# Patient Record
Sex: MALE | Race: WHITE | Employment: OTHER | URBAN - METROPOLITAN AREA
[De-identification: names, ages, dates, MRNs, and addresses within clinical notes are randomized per-mention and may not be internally consistent; named-entity substitution may affect disease eponyms.]

---

## 2020-09-26 ENCOUNTER — APPOINTMENT (OUTPATIENT)
Dept: GENERAL RADIOLOGY | Age: 75
DRG: 300 | End: 2020-09-26
Payer: COMMERCIAL

## 2020-09-26 ENCOUNTER — APPOINTMENT (OUTPATIENT)
Dept: CT IMAGING | Age: 75
DRG: 300 | End: 2020-09-26
Payer: COMMERCIAL

## 2020-09-26 ENCOUNTER — HOSPITAL ENCOUNTER (INPATIENT)
Age: 75
LOS: 3 days | Discharge: HOME | DRG: 300 | End: 2020-09-29
Attending: EMERGENCY MEDICINE | Admitting: INTERNAL MEDICINE
Payer: COMMERCIAL

## 2020-09-26 PROBLEM — I83.029 VENOUS ULCER OF LEFT LEG (HCC): Status: ACTIVE | Noted: 2020-09-26

## 2020-09-26 PROBLEM — L97.929 VENOUS ULCER OF LEFT LEG (HCC): Status: ACTIVE | Noted: 2020-09-26

## 2020-09-26 LAB
ABO/RH: NORMAL
ALBUMIN SERPL-MCNC: 3.2 GM/DL (ref 3.4–5)
ALP BLD-CCNC: 88 IU/L (ref 40–128)
ALT SERPL-CCNC: 10 U/L (ref 10–40)
ANION GAP SERPL CALCULATED.3IONS-SCNC: 23 MMOL/L (ref 4–16)
ANTIBODY SCREEN: NEGATIVE
APTT: 16.6 SECONDS (ref 25.1–37.1)
AST SERPL-CCNC: 14 IU/L (ref 15–37)
BACTERIA: NEGATIVE /HPF
BASE EXCESS: 3 (ref 0–3.3)
BASOPHILS ABSOLUTE: 0.1 K/CU MM
BASOPHILS RELATIVE PERCENT: 0.9 % (ref 0–1)
BILIRUB SERPL-MCNC: 0.5 MG/DL (ref 0–1)
BILIRUBIN URINE: NEGATIVE MG/DL
BLOOD, URINE: NEGATIVE
BUN BLDV-MCNC: 20 MG/DL (ref 6–23)
CALCIUM SERPL-MCNC: 8.3 MG/DL (ref 8.3–10.6)
CARBON MONOXIDE, BLOOD: 2 % (ref 0–5)
CHLORIDE BLD-SCNC: 100 MMOL/L (ref 99–110)
CLARITY: CLEAR
CO2 CONTENT: 22.1 MMOL/L (ref 19–24)
CO2: 15 MMOL/L (ref 21–32)
COLOR: YELLOW
COMMENT: ABNORMAL
CREAT SERPL-MCNC: 1 MG/DL (ref 0.9–1.3)
DIFFERENTIAL TYPE: ABNORMAL
EOSINOPHILS ABSOLUTE: 0.8 K/CU MM
EOSINOPHILS RELATIVE PERCENT: 6.5 % (ref 0–3)
ERYTHROCYTE SEDIMENTATION RATE: 24 MM/HR (ref 0–20)
GFR AFRICAN AMERICAN: >60 ML/MIN/1.73M2
GFR NON-AFRICAN AMERICAN: >60 ML/MIN/1.73M2
GLUCOSE BLD-MCNC: 241 MG/DL (ref 70–99)
GLUCOSE BLD-MCNC: 248 MG/DL (ref 70–99)
GLUCOSE BLD-MCNC: 257 MG/DL (ref 70–99)
GLUCOSE, URINE: >500 MG/DL
HCO3 ARTERIAL: 21.1 MMOL/L (ref 18–23)
HCT VFR BLD CALC: 35.2 % (ref 42–52)
HEMOGLOBIN: 9.8 GM/DL (ref 13.5–18)
HIGH SENSITIVE C-REACTIVE PROTEIN: 18.4 MG/L
IMMATURE NEUTROPHIL %: 1.6 % (ref 0–0.43)
INR BLD: 1.1 INDEX
KETONES, URINE: NEGATIVE MG/DL
LACTATE: 11 MMOL/L (ref 0.4–2)
LEUKOCYTE ESTERASE, URINE: NEGATIVE
LYMPHOCYTES ABSOLUTE: 5.2 K/CU MM
LYMPHOCYTES RELATIVE PERCENT: 40.2 % (ref 24–44)
MAGNESIUM: 2.2 MG/DL (ref 1.8–2.4)
MCH RBC QN AUTO: 25.5 PG (ref 27–31)
MCHC RBC AUTO-ENTMCNC: 27.8 % (ref 32–36)
MCV RBC AUTO: 91.4 FL (ref 78–100)
METHEMOGLOBIN ARTERIAL: 1.6 %
MONOCYTES ABSOLUTE: 1.2 K/CU MM
MONOCYTES RELATIVE PERCENT: 9 % (ref 0–4)
MUCUS: ABNORMAL HPF
NITRITE URINE, QUANTITATIVE: NEGATIVE
NUCLEATED RBC %: 0 %
O2 SATURATION: 95.7 % (ref 96–97)
PCO2 ARTERIAL: 34 MMHG (ref 32–45)
PDW BLD-RTO: 14.5 % (ref 11.7–14.9)
PH BLOOD: 7.4 (ref 7.34–7.45)
PH, URINE: 5 (ref 5–8)
PLATELET # BLD: 295 K/CU MM (ref 140–440)
PMV BLD AUTO: 10.1 FL (ref 7.5–11.1)
PO2 ARTERIAL: 87 MMHG (ref 75–100)
POTASSIUM SERPL-SCNC: 3 MMOL/L (ref 3.5–5.1)
PRO-BNP: 450.1 PG/ML
PROTEIN UA: NEGATIVE MG/DL
PROTHROMBIN TIME: 13.3 SECONDS (ref 11.7–14.5)
RBC # BLD: 3.85 M/CU MM (ref 4.6–6.2)
RBC URINE: ABNORMAL /HPF (ref 0–3)
SEGMENTED NEUTROPHILS ABSOLUTE COUNT: 5.4 K/CU MM
SEGMENTED NEUTROPHILS RELATIVE PERCENT: 41.8 % (ref 36–66)
SODIUM BLD-SCNC: 138 MMOL/L (ref 135–145)
SPECIFIC GRAVITY UA: 1.04 (ref 1–1.03)
TOTAL CK: 83 IU/L (ref 38–174)
TOTAL IMMATURE NEUTOROPHIL: 0.21 K/CU MM
TOTAL NUCLEATED RBC: 0 K/CU MM
TOTAL PROTEIN: 5.8 GM/DL (ref 6.4–8.2)
TRICHOMONAS: ABNORMAL /HPF
TROPONIN T: <0.01 NG/ML
TSH HIGH SENSITIVITY: 4.56 UIU/ML (ref 0.27–4.2)
UROBILINOGEN, URINE: NORMAL MG/DL (ref 0.2–1)
WBC # BLD: 13 K/CU MM (ref 4–10.5)
WBC UA: <1 /HPF (ref 0–2)

## 2020-09-26 PROCEDURE — 6360000002 HC RX W HCPCS: Performed by: PHYSICIAN ASSISTANT

## 2020-09-26 PROCEDURE — 84484 ASSAY OF TROPONIN QUANT: CPT

## 2020-09-26 PROCEDURE — 36600 WITHDRAWAL OF ARTERIAL BLOOD: CPT

## 2020-09-26 PROCEDURE — 73706 CT ANGIO LWR EXTR W/O&W/DYE: CPT

## 2020-09-26 PROCEDURE — 6360000002 HC RX W HCPCS: Performed by: EMERGENCY MEDICINE

## 2020-09-26 PROCEDURE — 73610 X-RAY EXAM OF ANKLE: CPT

## 2020-09-26 PROCEDURE — 85610 PROTHROMBIN TIME: CPT

## 2020-09-26 PROCEDURE — 82550 ASSAY OF CK (CPK): CPT

## 2020-09-26 PROCEDURE — 96368 THER/DIAG CONCURRENT INF: CPT

## 2020-09-26 PROCEDURE — 6370000000 HC RX 637 (ALT 250 FOR IP): Performed by: PHYSICIAN ASSISTANT

## 2020-09-26 PROCEDURE — 74174 CTA ABD&PLVS W/CONTRAST: CPT

## 2020-09-26 PROCEDURE — 86141 C-REACTIVE PROTEIN HS: CPT

## 2020-09-26 PROCEDURE — 85652 RBC SED RATE AUTOMATED: CPT

## 2020-09-26 PROCEDURE — 80053 COMPREHEN METABOLIC PANEL: CPT

## 2020-09-26 PROCEDURE — 82803 BLOOD GASES ANY COMBINATION: CPT

## 2020-09-26 PROCEDURE — 87077 CULTURE AEROBIC IDENTIFY: CPT

## 2020-09-26 PROCEDURE — 96366 THER/PROPH/DIAG IV INF ADDON: CPT

## 2020-09-26 PROCEDURE — 82962 GLUCOSE BLOOD TEST: CPT

## 2020-09-26 PROCEDURE — 96367 TX/PROPH/DG ADDL SEQ IV INF: CPT

## 2020-09-26 PROCEDURE — 2140000000 HC CCU INTERMEDIATE R&B

## 2020-09-26 PROCEDURE — 86900 BLOOD TYPING SEROLOGIC ABO: CPT

## 2020-09-26 PROCEDURE — 87186 SC STD MICRODIL/AGAR DIL: CPT

## 2020-09-26 PROCEDURE — 87040 BLOOD CULTURE FOR BACTERIA: CPT

## 2020-09-26 PROCEDURE — 71045 X-RAY EXAM CHEST 1 VIEW: CPT

## 2020-09-26 PROCEDURE — 86850 RBC ANTIBODY SCREEN: CPT

## 2020-09-26 PROCEDURE — 87086 URINE CULTURE/COLONY COUNT: CPT

## 2020-09-26 PROCEDURE — 83880 ASSAY OF NATRIURETIC PEPTIDE: CPT

## 2020-09-26 PROCEDURE — 85730 THROMBOPLASTIN TIME PARTIAL: CPT

## 2020-09-26 PROCEDURE — 87147 CULTURE TYPE IMMUNOLOGIC: CPT

## 2020-09-26 PROCEDURE — 73590 X-RAY EXAM OF LOWER LEG: CPT

## 2020-09-26 PROCEDURE — 83735 ASSAY OF MAGNESIUM: CPT

## 2020-09-26 PROCEDURE — 70450 CT HEAD/BRAIN W/O DYE: CPT

## 2020-09-26 PROCEDURE — 87070 CULTURE OTHR SPECIMN AEROBIC: CPT

## 2020-09-26 PROCEDURE — 87075 CULTR BACTERIA EXCEPT BLOOD: CPT

## 2020-09-26 PROCEDURE — 6360000004 HC RX CONTRAST MEDICATION: Performed by: EMERGENCY MEDICINE

## 2020-09-26 PROCEDURE — 85025 COMPLETE CBC W/AUTO DIFF WBC: CPT

## 2020-09-26 PROCEDURE — 96365 THER/PROPH/DIAG IV INF INIT: CPT

## 2020-09-26 PROCEDURE — 2580000003 HC RX 258: Performed by: EMERGENCY MEDICINE

## 2020-09-26 PROCEDURE — G0378 HOSPITAL OBSERVATION PER HR: HCPCS

## 2020-09-26 PROCEDURE — 87076 CULTURE ANAEROBE IDENT EACH: CPT

## 2020-09-26 PROCEDURE — 81001 URINALYSIS AUTO W/SCOPE: CPT

## 2020-09-26 PROCEDURE — 96375 TX/PRO/DX INJ NEW DRUG ADDON: CPT

## 2020-09-26 PROCEDURE — 86901 BLOOD TYPING SEROLOGIC RH(D): CPT

## 2020-09-26 PROCEDURE — 70460 CT HEAD/BRAIN W/DYE: CPT

## 2020-09-26 PROCEDURE — 99285 EMERGENCY DEPT VISIT HI MDM: CPT

## 2020-09-26 PROCEDURE — 93005 ELECTROCARDIOGRAM TRACING: CPT | Performed by: EMERGENCY MEDICINE

## 2020-09-26 PROCEDURE — 84443 ASSAY THYROID STIM HORMONE: CPT

## 2020-09-26 PROCEDURE — 83605 ASSAY OF LACTIC ACID: CPT

## 2020-09-26 RX ORDER — POTASSIUM CHLORIDE 7.45 MG/ML
10 INJECTION INTRAVENOUS PRN
Status: DISCONTINUED | OUTPATIENT
Start: 2020-09-26 | End: 2020-09-29 | Stop reason: HOSPADM

## 2020-09-26 RX ORDER — DEXTROSE MONOHYDRATE 25 G/50ML
12.5 INJECTION, SOLUTION INTRAVENOUS PRN
Status: DISCONTINUED | OUTPATIENT
Start: 2020-09-26 | End: 2020-09-29 | Stop reason: HOSPADM

## 2020-09-26 RX ORDER — 0.9 % SODIUM CHLORIDE 0.9 %
1000 INTRAVENOUS SOLUTION INTRAVENOUS ONCE
Status: COMPLETED | OUTPATIENT
Start: 2020-09-26 | End: 2020-09-26

## 2020-09-26 RX ORDER — POLYETHYLENE GLYCOL 3350 17 G/17G
17 POWDER, FOR SOLUTION ORAL DAILY PRN
Status: DISCONTINUED | OUTPATIENT
Start: 2020-09-26 | End: 2020-09-29 | Stop reason: HOSPADM

## 2020-09-26 RX ORDER — ACETAMINOPHEN 325 MG/1
650 TABLET ORAL EVERY 6 HOURS PRN
Status: DISCONTINUED | OUTPATIENT
Start: 2020-09-26 | End: 2020-09-29 | Stop reason: HOSPADM

## 2020-09-26 RX ORDER — PROMETHAZINE HYDROCHLORIDE 25 MG/1
12.5 TABLET ORAL EVERY 6 HOURS PRN
Status: DISCONTINUED | OUTPATIENT
Start: 2020-09-26 | End: 2020-09-29 | Stop reason: HOSPADM

## 2020-09-26 RX ORDER — SODIUM CHLORIDE 0.9 % (FLUSH) 0.9 %
10 SYRINGE (ML) INJECTION EVERY 12 HOURS SCHEDULED
Status: DISCONTINUED | OUTPATIENT
Start: 2020-09-27 | End: 2020-09-29 | Stop reason: HOSPADM

## 2020-09-26 RX ORDER — NICOTINE POLACRILEX 4 MG
15 LOZENGE BUCCAL PRN
Status: DISCONTINUED | OUTPATIENT
Start: 2020-09-26 | End: 2020-09-29 | Stop reason: HOSPADM

## 2020-09-26 RX ORDER — DEXTROSE MONOHYDRATE 50 MG/ML
100 INJECTION, SOLUTION INTRAVENOUS PRN
Status: DISCONTINUED | OUTPATIENT
Start: 2020-09-26 | End: 2020-09-29 | Stop reason: HOSPADM

## 2020-09-26 RX ORDER — SODIUM CHLORIDE 0.9 % (FLUSH) 0.9 %
10 SYRINGE (ML) INJECTION PRN
Status: DISCONTINUED | OUTPATIENT
Start: 2020-09-26 | End: 2020-09-29 | Stop reason: HOSPADM

## 2020-09-26 RX ORDER — SODIUM CHLORIDE 9 MG/ML
INJECTION, SOLUTION INTRAVENOUS CONTINUOUS
Status: ACTIVE | OUTPATIENT
Start: 2020-09-27 | End: 2020-09-27

## 2020-09-26 RX ORDER — ACETAMINOPHEN 650 MG/1
650 SUPPOSITORY RECTAL EVERY 6 HOURS PRN
Status: DISCONTINUED | OUTPATIENT
Start: 2020-09-26 | End: 2020-09-29 | Stop reason: HOSPADM

## 2020-09-26 RX ORDER — POTASSIUM CHLORIDE 7.45 MG/ML
20 INJECTION INTRAVENOUS ONCE
Status: COMPLETED | OUTPATIENT
Start: 2020-09-26 | End: 2020-09-26

## 2020-09-26 RX ORDER — POTASSIUM CHLORIDE 20 MEQ/1
40 TABLET, EXTENDED RELEASE ORAL PRN
Status: DISCONTINUED | OUTPATIENT
Start: 2020-09-26 | End: 2020-09-29 | Stop reason: HOSPADM

## 2020-09-26 RX ORDER — ONDANSETRON 2 MG/ML
4 INJECTION INTRAMUSCULAR; INTRAVENOUS EVERY 6 HOURS PRN
Status: DISCONTINUED | OUTPATIENT
Start: 2020-09-26 | End: 2020-09-29 | Stop reason: HOSPADM

## 2020-09-26 RX ORDER — POTASSIUM CHLORIDE 20 MEQ/1
40 TABLET, EXTENDED RELEASE ORAL ONCE
Status: COMPLETED | OUTPATIENT
Start: 2020-09-26 | End: 2020-09-26

## 2020-09-26 RX ADMIN — SODIUM CHLORIDE, PRESERVATIVE FREE 10 ML: 5 INJECTION INTRAVENOUS at 20:26

## 2020-09-26 RX ADMIN — IOPAMIDOL 75 ML: 755 INJECTION, SOLUTION INTRAVENOUS at 20:29

## 2020-09-26 RX ADMIN — SODIUM CHLORIDE 1000 ML: 9 INJECTION, SOLUTION INTRAVENOUS at 19:29

## 2020-09-26 RX ADMIN — LEVETIRACETAM 1000 MG: 100 INJECTION, SOLUTION INTRAVENOUS at 19:55

## 2020-09-26 RX ADMIN — SODIUM CHLORIDE 1000 ML: 9 INJECTION, SOLUTION INTRAVENOUS at 18:38

## 2020-09-26 RX ADMIN — IOPAMIDOL 75 ML: 755 INJECTION, SOLUTION INTRAVENOUS at 20:26

## 2020-09-26 RX ADMIN — CEFEPIME HYDROCHLORIDE 2 G: 2 INJECTION, POWDER, FOR SOLUTION INTRAVENOUS at 19:24

## 2020-09-26 RX ADMIN — POTASSIUM CHLORIDE 20 MEQ: 7.46 INJECTION, SOLUTION INTRAVENOUS at 20:21

## 2020-09-26 RX ADMIN — SODIUM CHLORIDE 1000 ML: 9 INJECTION, SOLUTION INTRAVENOUS at 19:25

## 2020-09-26 RX ADMIN — VANCOMYCIN HYDROCHLORIDE 1500 MG: 5 INJECTION, POWDER, LYOPHILIZED, FOR SOLUTION INTRAVENOUS at 20:00

## 2020-09-26 RX ADMIN — POTASSIUM CHLORIDE 40 MEQ: 1500 TABLET, EXTENDED RELEASE ORAL at 20:23

## 2020-09-26 ASSESSMENT — PAIN SCALES - GENERAL
PAINLEVEL_OUTOF10: 0
PAINLEVEL_OUTOF10: 7

## 2020-09-26 NOTE — ED PROVIDER NOTES
imaging performed. While patient was here patient sat up on the bedside commode and had a syncopal episode versus seizure episode. Patient did have a couple seconds of CPR given he did return to spontaneous circulation. Patient has multiple lab abnormalities including leukocytosis hypokalemia lactic acid again given fluids antibiotics potassium also given Keppra. Imaging of his head chest abdomen pelvis and leg showed no signs of ischemia no other acute Tessa Putty just chronic ulcer. Unclear etiology of patient's symptoms he could have had vasovagal syncope while using the restroom or hypoxia episode but no PE no dissection no ischemia patient admitted to hospital medicine for further observation. Total critical care time today provided was 20 minutes. This excludes seperately billable procedure. Critical care time provided for reviewing labs, images, giving fluids, antibiotics, keppra, potassium, consulting medicine that required close evaluation and/or intervention with concern for patient decompensation. 12 lead EKG per my interpretation #1:  Sinus Tachycardia 123  Axis is   Normal  QTc is  460  There is no specific T wave changes appreciated. There is no specific ST wave changes appreciated. Prior EKG to compare with was not available     12 lead EKG per my interpretation #2:  Sinus Tachycardia 120  Axis is   Normal  QTc is  472  There is no specific T wave changes appreciated. There is no specific ST wave changes appreciated. Prior EKG to compare with was not available     All diagnostic, treatment, and disposition decisions were made by myself in conjunction with the Advanced Practice Provider. For all further details of the patient's emergency department visit, please see the Advanced Practice Provider's documentation.         Maribel Tariq DO  09/26/20 [id number]

## 2020-09-26 NOTE — ED NOTES
Bed: 03TR-03  Expected date:   Expected time:   Means of arrival:   Comments:  brendon Wick RN  09/26/20 9264

## 2020-09-27 ENCOUNTER — APPOINTMENT (OUTPATIENT)
Dept: ULTRASOUND IMAGING | Age: 75
DRG: 300 | End: 2020-09-27
Payer: COMMERCIAL

## 2020-09-27 LAB
ANION GAP SERPL CALCULATED.3IONS-SCNC: 8 MMOL/L (ref 4–16)
ANION GAP SERPL CALCULATED.3IONS-SCNC: 8 MMOL/L (ref 4–16)
BASOPHILS ABSOLUTE: 0 K/CU MM
BASOPHILS ABSOLUTE: 0 K/CU MM
BASOPHILS ABSOLUTE: 0.1 K/CU MM
BASOPHILS RELATIVE PERCENT: 0.2 % (ref 0–1)
BASOPHILS RELATIVE PERCENT: 0.2 % (ref 0–1)
BASOPHILS RELATIVE PERCENT: 0.5 % (ref 0–1)
BUN BLDV-MCNC: 15 MG/DL (ref 6–23)
BUN BLDV-MCNC: 18 MG/DL (ref 6–23)
CALCIUM SERPL-MCNC: 7.6 MG/DL (ref 8.3–10.6)
CALCIUM SERPL-MCNC: 7.7 MG/DL (ref 8.3–10.6)
CHLORIDE BLD-SCNC: 106 MMOL/L (ref 99–110)
CHLORIDE BLD-SCNC: 108 MMOL/L (ref 99–110)
CHOLESTEROL, FASTING: 80 MG/DL
CO2: 22 MMOL/L (ref 21–32)
CO2: 24 MMOL/L (ref 21–32)
CREAT SERPL-MCNC: 0.8 MG/DL (ref 0.9–1.3)
CREAT SERPL-MCNC: 0.8 MG/DL (ref 0.9–1.3)
DIFFERENTIAL TYPE: ABNORMAL
EKG ATRIAL RATE: 120 BPM
EKG ATRIAL RATE: 123 BPM
EKG DIAGNOSIS: NORMAL
EKG DIAGNOSIS: NORMAL
EKG P AXIS: 38 DEGREES
EKG P AXIS: 50 DEGREES
EKG P-R INTERVAL: 120 MS
EKG P-R INTERVAL: 152 MS
EKG Q-T INTERVAL: 322 MS
EKG Q-T INTERVAL: 334 MS
EKG QRS DURATION: 82 MS
EKG QRS DURATION: 86 MS
EKG QTC CALCULATION (BAZETT): 460 MS
EKG QTC CALCULATION (BAZETT): 472 MS
EKG R AXIS: 23 DEGREES
EKG R AXIS: 24 DEGREES
EKG T AXIS: 64 DEGREES
EKG T AXIS: 71 DEGREES
EKG VENTRICULAR RATE: 120 BPM
EKG VENTRICULAR RATE: 123 BPM
EOSINOPHILS ABSOLUTE: 0 K/CU MM
EOSINOPHILS ABSOLUTE: 0.1 K/CU MM
EOSINOPHILS ABSOLUTE: 0.1 K/CU MM
EOSINOPHILS RELATIVE PERCENT: 0.1 % (ref 0–3)
EOSINOPHILS RELATIVE PERCENT: 0.6 % (ref 0–3)
EOSINOPHILS RELATIVE PERCENT: 1 % (ref 0–3)
ESTIMATED AVERAGE GLUCOSE: 114 MG/DL
FERRITIN: 24 NG/ML (ref 30–400)
FOLATE: 10.9 NG/ML (ref 3.1–17.5)
GFR AFRICAN AMERICAN: >60 ML/MIN/1.73M2
GFR AFRICAN AMERICAN: >60 ML/MIN/1.73M2
GFR NON-AFRICAN AMERICAN: >60 ML/MIN/1.73M2
GFR NON-AFRICAN AMERICAN: >60 ML/MIN/1.73M2
GLUCOSE BLD-MCNC: 106 MG/DL (ref 70–99)
GLUCOSE BLD-MCNC: 107 MG/DL (ref 70–99)
GLUCOSE BLD-MCNC: 110 MG/DL (ref 70–99)
GLUCOSE BLD-MCNC: 135 MG/DL (ref 70–99)
GLUCOSE BLD-MCNC: 143 MG/DL (ref 70–99)
GLUCOSE BLD-MCNC: 213 MG/DL (ref 70–99)
HBA1C MFR BLD: 5.6 % (ref 4.2–6.3)
HCT VFR BLD CALC: 25 % (ref 42–52)
HCT VFR BLD CALC: 25.3 % (ref 42–52)
HCT VFR BLD CALC: 26.1 % (ref 42–52)
HDLC SERPL-MCNC: 39 MG/DL
HEMOGLOBIN: 7.5 GM/DL (ref 13.5–18)
HEMOGLOBIN: 7.6 GM/DL (ref 13.5–18)
HEMOGLOBIN: 7.7 GM/DL (ref 13.5–18)
IMMATURE NEUTROPHIL %: 0.4 % (ref 0–0.43)
IMMATURE NEUTROPHIL %: 0.5 % (ref 0–0.43)
IMMATURE NEUTROPHIL %: 0.5 % (ref 0–0.43)
IRON: 17 UG/DL (ref 59–158)
LACTATE: 1.2 MMOL/L (ref 0.4–2)
LACTATE: 3.2 MMOL/L (ref 0.4–2)
LDL CHOLESTEROL DIRECT: 31 MG/DL
LYMPHOCYTES ABSOLUTE: 0.9 K/CU MM
LYMPHOCYTES ABSOLUTE: 1.5 K/CU MM
LYMPHOCYTES ABSOLUTE: 1.6 K/CU MM
LYMPHOCYTES RELATIVE PERCENT: 11.5 % (ref 24–44)
LYMPHOCYTES RELATIVE PERCENT: 15.5 % (ref 24–44)
LYMPHOCYTES RELATIVE PERCENT: 6.9 % (ref 24–44)
MCH RBC QN AUTO: 26.1 PG (ref 27–31)
MCH RBC QN AUTO: 26.3 PG (ref 27–31)
MCH RBC QN AUTO: 26.8 PG (ref 27–31)
MCHC RBC AUTO-ENTMCNC: 29.5 % (ref 32–36)
MCHC RBC AUTO-ENTMCNC: 30 % (ref 32–36)
MCHC RBC AUTO-ENTMCNC: 30 % (ref 32–36)
MCV RBC AUTO: 87.7 FL (ref 78–100)
MCV RBC AUTO: 88.5 FL (ref 78–100)
MCV RBC AUTO: 89.1 FL (ref 78–100)
MONOCYTES ABSOLUTE: 0.7 K/CU MM
MONOCYTES ABSOLUTE: 0.8 K/CU MM
MONOCYTES ABSOLUTE: 0.9 K/CU MM
MONOCYTES RELATIVE PERCENT: 6 % (ref 0–4)
MONOCYTES RELATIVE PERCENT: 6.5 % (ref 0–4)
MONOCYTES RELATIVE PERCENT: 6.8 % (ref 0–4)
NUCLEATED RBC %: 0 %
PCT TRANSFERRIN: 6 % (ref 10–44)
PDW BLD-RTO: 14.6 % (ref 11.7–14.9)
PDW BLD-RTO: 14.7 % (ref 11.7–14.9)
PDW BLD-RTO: 14.8 % (ref 11.7–14.9)
PLATELET # BLD: 175 K/CU MM (ref 140–440)
PLATELET # BLD: 184 K/CU MM (ref 140–440)
PLATELET # BLD: 189 K/CU MM (ref 140–440)
PMV BLD AUTO: 9.4 FL (ref 7.5–11.1)
PMV BLD AUTO: 9.6 FL (ref 7.5–11.1)
PMV BLD AUTO: 9.9 FL (ref 7.5–11.1)
POTASSIUM SERPL-SCNC: 4.1 MMOL/L (ref 3.5–5.1)
POTASSIUM SERPL-SCNC: 4.4 MMOL/L (ref 3.5–5.1)
PROLACTIN: 7.4 NG/ML
RBC # BLD: 2.84 M/CU MM (ref 4.6–6.2)
RBC # BLD: 2.85 M/CU MM (ref 4.6–6.2)
RBC # BLD: 2.95 M/CU MM (ref 4.6–6.2)
RETICULOCYTE COUNT PCT: 1.6 % (ref 0.2–2.2)
SEGMENTED NEUTROPHILS ABSOLUTE COUNT: 10.2 K/CU MM
SEGMENTED NEUTROPHILS ABSOLUTE COUNT: 11.6 K/CU MM
SEGMENTED NEUTROPHILS ABSOLUTE COUNT: 7.8 K/CU MM
SEGMENTED NEUTROPHILS RELATIVE PERCENT: 76.1 % (ref 36–66)
SEGMENTED NEUTROPHILS RELATIVE PERCENT: 80.4 % (ref 36–66)
SEGMENTED NEUTROPHILS RELATIVE PERCENT: 86.3 % (ref 36–66)
SODIUM BLD-SCNC: 136 MMOL/L (ref 135–145)
SODIUM BLD-SCNC: 140 MMOL/L (ref 135–145)
T4 FREE: 0.87 NG/DL (ref 0.9–1.8)
T4 FREE: 0.91 NG/DL (ref 0.9–1.8)
TOTAL IMMATURE NEUTOROPHIL: 0.04 K/CU MM
TOTAL IMMATURE NEUTOROPHIL: 0.06 K/CU MM
TOTAL IMMATURE NEUTOROPHIL: 0.07 K/CU MM
TOTAL IRON BINDING CAPACITY: 270 UG/DL (ref 250–450)
TOTAL NUCLEATED RBC: 0 K/CU MM
TRIGLYCERIDE, FASTING: 52 MG/DL
TSH HIGH SENSITIVITY: 2.73 UIU/ML (ref 0.27–4.2)
UNSATURATED IRON BINDING CAPACITY: 253 UG/DL (ref 110–370)
VITAMIN B-12: 285 PG/ML (ref 211–911)
WBC # BLD: 10.2 K/CU MM (ref 4–10.5)
WBC # BLD: 12.6 K/CU MM (ref 4–10.5)
WBC # BLD: 13.4 K/CU MM (ref 4–10.5)

## 2020-09-27 PROCEDURE — 83540 ASSAY OF IRON: CPT

## 2020-09-27 PROCEDURE — 86376 MICROSOMAL ANTIBODY EACH: CPT

## 2020-09-27 PROCEDURE — 80061 LIPID PANEL: CPT

## 2020-09-27 PROCEDURE — G0378 HOSPITAL OBSERVATION PER HR: HCPCS

## 2020-09-27 PROCEDURE — 86800 THYROGLOBULIN ANTIBODY: CPT

## 2020-09-27 PROCEDURE — 80048 BASIC METABOLIC PNL TOTAL CA: CPT

## 2020-09-27 PROCEDURE — 83036 HEMOGLOBIN GLYCOSYLATED A1C: CPT

## 2020-09-27 PROCEDURE — 94761 N-INVAS EAR/PLS OXIMETRY MLT: CPT

## 2020-09-27 PROCEDURE — 86850 RBC ANTIBODY SCREEN: CPT

## 2020-09-27 PROCEDURE — 93925 LOWER EXTREMITY STUDY: CPT

## 2020-09-27 PROCEDURE — 82962 GLUCOSE BLOOD TEST: CPT

## 2020-09-27 PROCEDURE — 6370000000 HC RX 637 (ALT 250 FOR IP): Performed by: NURSE PRACTITIONER

## 2020-09-27 PROCEDURE — 83550 IRON BINDING TEST: CPT

## 2020-09-27 PROCEDURE — 83605 ASSAY OF LACTIC ACID: CPT

## 2020-09-27 PROCEDURE — 2140000000 HC CCU INTERMEDIATE R&B

## 2020-09-27 PROCEDURE — 36415 COLL VENOUS BLD VENIPUNCTURE: CPT

## 2020-09-27 PROCEDURE — 82607 VITAMIN B-12: CPT

## 2020-09-27 PROCEDURE — 2580000003 HC RX 258: Performed by: NURSE PRACTITIONER

## 2020-09-27 PROCEDURE — 86900 BLOOD TYPING SEROLOGIC ABO: CPT

## 2020-09-27 PROCEDURE — 76536 US EXAM OF HEAD AND NECK: CPT

## 2020-09-27 PROCEDURE — 82728 ASSAY OF FERRITIN: CPT

## 2020-09-27 PROCEDURE — 85018 HEMOGLOBIN: CPT

## 2020-09-27 PROCEDURE — 82746 ASSAY OF FOLIC ACID SERUM: CPT

## 2020-09-27 PROCEDURE — 84443 ASSAY THYROID STIM HORMONE: CPT

## 2020-09-27 PROCEDURE — 85025 COMPLETE CBC W/AUTO DIFF WBC: CPT

## 2020-09-27 PROCEDURE — 84146 ASSAY OF PROLACTIN: CPT

## 2020-09-27 PROCEDURE — 84439 ASSAY OF FREE THYROXINE: CPT

## 2020-09-27 PROCEDURE — 99231 SBSQ HOSP IP/OBS SF/LOW 25: CPT | Performed by: SURGERY

## 2020-09-27 PROCEDURE — 93010 ELECTROCARDIOGRAM REPORT: CPT | Performed by: INTERNAL MEDICINE

## 2020-09-27 PROCEDURE — 87040 BLOOD CULTURE FOR BACTERIA: CPT

## 2020-09-27 PROCEDURE — 85045 AUTOMATED RETICULOCYTE COUNT: CPT

## 2020-09-27 RX ADMIN — SODIUM CHLORIDE, PRESERVATIVE FREE 10 ML: 5 INJECTION INTRAVENOUS at 09:29

## 2020-09-27 RX ADMIN — SODIUM CHLORIDE, PRESERVATIVE FREE 10 ML: 5 INJECTION INTRAVENOUS at 21:17

## 2020-09-27 RX ADMIN — SODIUM CHLORIDE: 9 INJECTION, SOLUTION INTRAVENOUS at 02:48

## 2020-09-27 RX ADMIN — INSULIN LISPRO 1 UNITS: 100 INJECTION, SOLUTION INTRAVENOUS; SUBCUTANEOUS at 17:30

## 2020-09-27 ASSESSMENT — PAIN SCALES - GENERAL
PAINLEVEL_OUTOF10: 0

## 2020-09-27 ASSESSMENT — ENCOUNTER SYMPTOMS
SHORTNESS OF BREATH: 0
BACK PAIN: 0
CONSTIPATION: 0
NAUSEA: 0
ABDOMINAL PAIN: 0
CHOKING: 0
DIARRHEA: 0
COUGH: 0

## 2020-09-27 NOTE — ED PROVIDER NOTES
Patient Identification  Mary Williamson is a 77 y.o. male    Chief Complaint  Foot Injury (R)      HPI  (History provided by patient)  This is a 77 y.o. male who was brought in by EMS for chief complaint of foot wound. Patient has had a chronic wound on his right medial ankle for the past decade. This evening it began spontaneously bleeding, wife reports it was a large amount of blood coming from wound, upon seeing this much blood patient reports that he passed out, states this happened twice. On arrival patient reports pain in his right leg around wounds, denies any other symptoms including chest pain or headache or shortness of breath. He states that he does not like to go to the doctor and which is why he has not sought any care for multiple years. REVIEW OF SYSTEMS    Constitutional:  Denies fever, chills  HENT:  Denies sore throat or ear pain   Eyes: Denies vision changes, eye pain  Cardiovascular:  Denies chest pain, syncope  Respiratory:  Denies shortness of breath, cough   GI:  Denies abdominal pain, nausea, vomiting  :  Denies dysuria, discharge  Musculoskeletal:  Denies back pain.  + ankle pain  Skin:  + wound  Neurologic:  Denies headache, focal weakness, or sensory changes     See HPI and nursing notes for additional information     I have reviewed the following nursing documentation:  Allergies: No Known Allergies    Past medical history:  has a past medical history of Hypertension and Lymphoma (Dx 11-11). Past surgical history:  has a past surgical history that includes Tunneled venous port placement and Tunneled venous port placement. Home medications:   Prior to Admission medications    Medication Sig Start Date End Date Taking? Authorizing Provider   ondansetron (ZOFRAN) 8 MG tablet Take 8 mg by mouth every 8 hours as needed. Historical Provider, MD       Social history:  reports that he has never smoked.  He has never used smokeless tobacco. He reports that he does not drink alcohol or use drugs. Family history:    Family History   Problem Relation Age of Onset    Early Death Mother 52    Diabetes Father     Heart Disease Father     Vision Loss Son         \"He wears glasses\"    Early Death Daughter 36         Exam  /82   Pulse 125   Temp 97.8 °F (36.6 °C)   Resp 18   Ht 6' 1\" (1.854 m)   Wt 153 lb (69.4 kg)   SpO2 100%   BMI 20.19 kg/m²   Nursing note and vitals reviewed. Constitutional: Well developed, well nourished. Pale. HENT:      Head: Normocephalic and atraumatic. Ears: External ears normal.      Nose: Nose normal.     Mouth: Membrane mucosa moist and pink. No posterior oropharynx erythema or tonsillar edema  Eyes: Anicteric sclera. No discharge, PERRL  Neck: Supple. Trachea midline. Cardiovascular: Tachycardic, regular rhythm, no murmurs, rubs, or gallops, radial pulses 2+ bilaterally. Posterior tibialis and dorsalis pedis pulses 2+ bilaterally. Cap refill is slightly diminished in bilateral toes. Pulmonary/Chest: Effort normal. No respiratory distress. CTAB. No stridor. No wheezes. No rales. Abdominal: Soft. Nontender to palpation. No distension. No guarding, rebound tenderness, or evidence of ascites. : No CVA tenderness. Musculoskeletal: Moves all extremities. No gross deformity. Neurological: Alert and oriented to person, place, and time. Normal muscle tone. Skin: Patient has large wound noted over the right medial ankle that appears to be some form of venous stasis ulcer, it is approximately 20 cm in diameter, depth is to fat, there are several punctate areas of clotted blood noted but no active bleeding at this time. No surrounding erythema. There is signs of stasis venous dermatitis around edges of wound. Psychiatric: Normal mood and affect. Behavior is normal.      EKG   Please see Dr. Isai Orr note for EKG read.       Radiographs (if obtained):  [] The following radiograph was interpreted by myself in the absence of a radiologist:   [x] Radiologist's Report Reviewed:  CTA LOWER EXTREMITY RIGHT W CONTRAST   Final Result   1. Extensive subcutaneous edema distal to the knee which becomes more   pronounced distally with associated skin thickening and ulceration along the   medial soft tissues of the distal right lower leg. Correlate for cellulitis. No organized drainable fluid collection or subcutaneous gas identified. 2. Mild periosteal reaction along the distal tibia and fibula which is   favored to be related to changes of chronic venous stasis. Osteomyelitis is   felt less likely given no evidence for osteolysis. Please note MRI is more   sensitive and specific for early changes of osteomyelitis. 3. 2 vessel runoff to the right foot with multifocal moderate calcified   atherosclerotic plaque throughout the trifurcation vessels. The peroneal   artery is not identified distal to the mid to distal fibular diaphysis. No   significant stenosis of the imaged bilateral iliac and femoral arteries. 4. Shotty lymph nodes along the right iliac chain and right inguinal region   which are non pathologically enlarged. Patient does have given history of   lymphoma. CT HEAD W CONTRAST   Final Result   No acute intracranial abnormality. CTA CHEST ABDOMEN PELVIS W CONTRAST   Final Result   1. No acute intrathoracic or intra-abdominal process identified. 2. No acute vascular injury identified. 3. No acute thoracic or pelvic fracture identified. 4. Shotty non pathologically enlarged retroperitoneal and right iliac chain   nodes as well as right inguinal nodes. These are nonspecific given no   pathologic enlargement, however, the retroperitoneal and iliac chain nodes do   appear slightly more prominent when compared with PET-CT from 2012 in a   patient with given history of lymphoma. XR TIBIA FIBULA RIGHT (2 VIEWS)   Final Result   1.  No acute fracture of the right tibia and fibula or ankle identified. 2. Remote healed deformity of the distal ankle with surrounding callus   formation at the distal fibula. 3. Nonspecific soft tissue edema of the lower leg and ankle. XR CHEST PORTABLE   Final Result   No acute process. XR ANKLE RIGHT (MIN 3 VIEWS)   Final Result   1. No acute fracture of the right tibia and fibula or ankle identified. 2. Remote healed deformity of the distal ankle with surrounding callus   formation at the distal fibula. 3. Nonspecific soft tissue edema of the lower leg and ankle.                 Labs  Results for orders placed or performed during the hospital encounter of 09/26/20   CBC Auto Differential   Result Value Ref Range    WBC 13.0 (H) 4.0 - 10.5 K/CU MM    RBC 3.85 (L) 4.6 - 6.2 M/CU MM    Hemoglobin 9.8 (L) 13.5 - 18.0 GM/DL    Hematocrit 35.2 (L) 42 - 52 %    MCV 91.4 78 - 100 FL    MCH 25.5 (L) 27 - 31 PG    MCHC 27.8 (L) 32.0 - 36.0 %    RDW 14.5 11.7 - 14.9 %    Platelets 234 985 - 424 K/CU MM    MPV 10.1 7.5 - 11.1 FL    Differential Type AUTOMATED DIFFERENTIAL     Segs Relative 41.8 36 - 66 %    Lymphocytes % 40.2 24 - 44 %    Monocytes % 9.0 (H) 0 - 4 %    Eosinophils % 6.5 (H) 0 - 3 %    Basophils % 0.9 0 - 1 %    Segs Absolute 5.4 K/CU MM    Lymphocytes Absolute 5.2 K/CU MM    Monocytes Absolute 1.2 K/CU MM    Eosinophils Absolute 0.8 K/CU MM    Basophils Absolute 0.1 K/CU MM    Nucleated RBC % 0.0 %    Total Nucleated RBC 0.0 K/CU MM    Total Immature Neutrophil 0.21 K/CU MM    Immature Neutrophil % 1.6 (H) 0 - 0.43 %   CMP   Result Value Ref Range    Sodium 138 135 - 145 MMOL/L    Potassium 3.0 (LL) 3.5 - 5.1 MMOL/L    Chloride 100 99 - 110 mMol/L    CO2 15 (L) 21 - 32 MMOL/L    BUN 20 6 - 23 MG/DL    CREATININE 1.0 0.9 - 1.3 MG/DL    Glucose 241 (H) 70 - 99 MG/DL    Calcium 8.3 8.3 - 10.6 MG/DL    Alb 3.2 (L) 3.4 - 5.0 GM/DL    Total Protein 5.8 (L) 6.4 - 8.2 GM/DL    Total Bilirubin 0.5 0.0 - 1.0 MG/DL    ALT 10 10 - 40 U/L    AST 14 (L) 15 - 37 IU/L    Alkaline Phosphatase 88 40 - 128 IU/L    GFR Non-African American >60 >60 mL/min/1.73m2    GFR African American >60 >60 mL/min/1.73m2    Anion Gap 23 (H) 4 - 16   Lactic Acid, Plasma   Result Value Ref Range    Lactate 11.0 (HH) 0.4 - 2.0 mMOL/L   Troponin   Result Value Ref Range    Troponin T <0.010 <0.01 NG/ML   CK   Result Value Ref Range    Total CK 83 38 - 174 IU/L   Brain Natriuretic Peptide   Result Value Ref Range    Pro-.1 (H) <300 PG/ML   Sedimentation Rate   Result Value Ref Range    Sed Rate 24 (H) 0 - 20 MM/HR   CRP   Result Value Ref Range    CRP, High Sensitivity 18.4 mg/L   Magnesium   Result Value Ref Range    Magnesium 2.2 1.8 - 2.4 mg/dl   TSH without Reflex   Result Value Ref Range    TSH, High Sensitivity 4.560 (H) 0.270 - 4.20 uIu/ml   Protime/INR & PTT   Result Value Ref Range    Protime 13.3 11.7 - 14.5 SECONDS    INR 1.10 INDEX    aPTT 16.6 (L) 25.1 - 37.1 SECONDS   POCT Glucose   Result Value Ref Range    POC Glucose 257 (H) 70 - 99 MG/DL   EKG 12 Lead   Result Value Ref Range    Ventricular Rate 123 BPM    Atrial Rate 123 BPM    P-R Interval 152 ms    QRS Duration 86 ms    Q-T Interval 322 ms    QTc Calculation (Bazett) 460 ms    P Axis 50 degrees    R Axis 24 degrees    T Axis 71 degrees    Diagnosis       Sinus tachycardia  Otherwise normal ECG  No previous ECGs available     TYPE AND SCREEN   Result Value Ref Range    ABO/Rh O POSITIVE     Antibody Screen NEGATIVE          MDM  Patient presents for bleeding from wound, has large chronic wound of her right ankle, also had 2 syncopal episodes that he attributes to seeing his on blood. In ED he is tachycardic, pale in color. Work-up initiated, no bleeding noted from wound. While in ED he was placed on a commode, had another syncopal episode during which she was unresponsive, very pale, difficult to obtain pulses, laid on bed and just a CPR was initiated he was found to have a pulse and awoke.   Unclear source of

## 2020-09-27 NOTE — CONSULTS
and rhythm, normal S1 and S2, no S3 or S4, and has no  murmur   ABDOMEN:  No scars, normal bowel sounds, soft, non-distended, non-tender, no masses palpated, no hepatolienomegaly  Musculoskeletal: Normal  Extremities: Normal, peripheral pulses normal, , yes has  edema has ulcers on the right leg and swollen it is been covered with dressing now  NEUROLOGIC:  Awake, alert, oriented to name, place and time. Cranial nerves II-XII are grossly intact. Motor is  intact. Sensory is intact. ,  and gait is abnormal.    DATA:    CBC:   Recent Labs     09/27/20  0030 09/27/20  0401 09/27/20  1158   WBC 13.4* 12.6* 10.2   HGB 7.7* 7.5* 7.6*    184 189    CMP:  Recent Labs     09/26/20  1835 09/27/20  0030 09/27/20  0401    136 140   K 3.0* 4.4 4.1    106 108   CO2 15* 22 24   BUN 20 18 15   CREATININE 1.0 0.8* 0.8*   CALCIUM 8.3 7.6* 7.7*   PROT 5.8*  --   --    LABALBU 3.2*  --   --    BILITOT 0.5  --   --    ALKPHOS 88  --   --    AST 14*  --   --    ALT 10  --   --      Lipids:   Lab Results   Component Value Date    HDL 39 09/27/2020     Glucose:   Recent Labs     09/26/20  2351 09/27/20  0837 09/27/20  1118   POCGLU 248* 106* 110*     Hemoglobin A1C:   Lab Results   Component Value Date    LABA1C 5.6 09/27/2020     Free T4:   Lab Results   Component Value Date    T4FREE 0.87 09/27/2020     Free T3:   Lab Results   Component Value Date    FT3 3.6 04/30/2012     TSH High Sensitivity:   Lab Results   Component Value Date    TSHHS 4.560 09/26/2020       Xr Tibia Fibula Right (2 Views)    Result Date: 9/26/2020  EXAMINATION: THREE XRAY VIEWS OF THE RIGHT ANKLE; 4 XRAY VIEWS OF THE RIGHT TIBIA AND FIBULA 9/26/2020 6:38 pm COMPARISON: None. d.     1. No acute fracture of the right tibia and fibula or ankle identified. 2. Remote healed deformity of the distal ankle with surrounding callus formation at the distal fibula. 3. Nonspecific soft tissue edema of the lower leg and ankle.      Xr Ankle Right (min 3 Views)    Result Date: 9/26/2020  EXAMINATION: THREE XRAY VIEWS OF THE RIGHT ANKLE; 4 XRAY VIEWS OF THE RIGHT TIBIA AND FIBULA 9/26/2020 6:38 pm COMPARISON: None. HISTORY: ORDERING SYSTEM PROVIDED HISTORY: trauma TECHNOLOGIST PROVIDED HISTORY: Reason for exam:->trauma Reason for Exam: fall Acuity:        1. No acute fracture of the right tibia and fibula or ankle identified. 2. Remote healed deformity of the distal ankle with surrounding callus formation at the distal fibula. 3. Nonspecific soft tissue edema of the lower leg and ankle. Ct Head W Contrast    Result Date: 9/26/2020  EXAMINATION: CT OF THE HEAD WITH CONTRAST  9/26/2020 8:24 pm     No acute intracranial abnormality. Xr Chest Portable    Result Date: 9/26/2020  EXAMINATION: ONE XRAY VIEW OF THE CHEST 9/26/2020 6:38 pm COMPARISON: Chest x-ray August 22, 2013 HISTORY: ORDERING SYSTEM PROVIDED HISTORY: syncope TECHNOLOGIST PROVIDED HISTORY: Reason for exam:->syncope Acuity: Acute FINDINGS: The lungs are without acute focal process. There is no effusion or pneumothorax. The cardiomediastinal silhouette is without acute process. The osseous structures are without acute process. Chronic deformity of the right distal clavicle. No acute process. Cta Chest Abdomen Pelvis W Contrast    Result Date: 9/26/2020  EXAMINATION: CTA OF THE CHEST, ABDOMEN AND PELVIS WITH CONTRAST, 9/26/2020 8:24 pm     1. No acute intrathoracic or intra-abdominal process identified. 2. No acute vascular injury identified. 3. No acute thoracic or pelvic fracture identified. 4. Shotty non pathologically enlarged retroperitoneal and right iliac chain nodes as well as right inguinal nodes. These are nonspecific given no pathologic enlargement, however, the retroperitoneal and iliac chain nodes do appear slightly more prominent when compared with PET-CT from 2012 in a patient with given history of lymphoma.      Cta Lower Extremity Right W Contrast    Result Date: 9/26/2020  EXAMINATION: CTA OF THE RIGHT LOWER EXTREMITY 9/26/2020 8:24 pm     1. Extensive subcutaneous edema distal to the knee which becomes more pronounced distally with associated skin thickening and ulceration along the medial soft tissues of the distal right lower leg. Correlate for cellulitis. No organized drainable fluid collection or subcutaneous gas identified. 2. Mild periosteal reaction along the distal tibia and fibula which is favored to be related to changes of chronic venous stasis. Osteomyelitis is felt less likely given no evidence for osteolysis. Please note MRI is more sensitive and specific for early changes of osteomyelitis. 3. 2 vessel runoff to the right foot with multifocal moderate calcified atherosclerotic plaque throughout the trifurcation vessels. The peroneal artery is not identified distal to the mid to distal fibular diaphysis. No significant stenosis of the imaged bilateral iliac and femoral arteries. 4. Shotty lymph nodes along the right iliac chain and right inguinal region which are non pathologically enlarged. Patient does have given history of lymphoma. Scheduled Medicines   Medications:    sodium chloride flush  10 mL Intravenous 2 times per day    insulin lispro  0-6 Units Subcutaneous TID WC    insulin lispro  0-3 Units Subcutaneous Nightly      Infusions:    dextrose           IMPRESSION    Patient Active Problem List   Diagnosis    Lymphoma ([address]    Confusion    Seizure disorder ([address]    Bacteremia associated with IV line ([address]    Altered mental status    Chronic venous hypertension with ulcer and inflammation (HCC)    Paranoia (HCC)    Leg ulcer ([address]    Venous ulcer of left leg (HCC)        Abnormal thyroid function      RECOMMENDATIONS:      1. Reviewed POC blood glucose . Labs and X ray results   2. Reviewed Home and Current Medicines  3. Will order serum free T4, TSH, and take thyroid antibodies  4.  Also order ultrasound of thyroid gland Will follow with you  Again thank you for sharing pt's care with me.      Truly yours,       Geraldine Rivas MD

## 2020-09-27 NOTE — CONSULTS
Department of [address] MD   Consult Note      Reason for Consult:  Venous stasis ulcer with bleeding    Referring Physician:  Dr. Fabiola Isabel:    Chief Complaint   Patient presents with    Foot Injury     R       History Obtained From:  patient    HISTORY OF PRESENTILLNESS:                The patient is a 77 y.o. male who presents with a bleeding venous stasis ulcer. He has had the ulcer for at least 6 years and it bleeds intermittently. Last night it bled and he passed out twice and then came to the ER. Hgb was 7 in the ER. He does not have a history of blood clots but has evidence of venous disease.     Past Medical History:    Past Medical History:   Diagnosis Date    Hypertension     Lymphoma Dx 11-11     Past Surgical History:    Past Surgical History:   Procedure Laterality Date    TUNNELED VENOUS PORT PLACEMENT      TUNNELED VENOUS PORT PLACEMENT      removed 5/5 2012     Current Medications:   Current Facility-Administered Medications   Medication Dose Route Frequency Provider Last Rate Last Dose    sodium chloride flush 0.9 % injection 10 mL  10 mL Intravenous PRN Colt Wilkinson DO   10 mL at 09/26/20 2026    sodium chloride flush 0.9 % injection 10 mL  10 mL Intravenous 2 times per day Ankit Areas, APRN - NP   10 mL at 09/27/20 0929    sodium chloride flush 0.9 % injection 10 mL  10 mL Intravenous PRN Posey Areas, APRN - NP        acetaminophen (TYLENOL) tablet 650 mg  650 mg Oral Q6H PRN Ankit Areas, APRN - NP        Or   Lena Acosta acetaminophen (TYLENOL) suppository 650 mg  650 mg Rectal Q6H PRN Posey Areas, APRN - NP        polyethylene glycol (GLYCOLAX) packet 17 g  17 g Oral Daily PRN [place] Areas, APRN - NP        promethazine (PHENERGAN) tablet 12.5 mg  12.5 mg Oral Q6H PRN Posey Areas, APRN - NP        Or    ondansetron (ZOFRAN) injection 4 mg  4 mg Intravenous Q6H PRN Posey Areas, APRN - NP        potassium chloride (KLOR-CON M) extended release tablet 40 mEq  40 mEq Oral PRN Jaqui Martínez, APRN - NP        Or    potassium bicarb-citric acid (EFFER-K) effervescent tablet 40 mEq  40 mEq Oral PRN Jaqui Martínez, APRN - NP        Or    potassium chloride 10 mEq/100 mL IVPB (Peripheral Line)  10 mEq Intravenous PRN Jaqui Martínez, APRN - NP        insulin lispro (HUMALOG) injection vial 0-6 Units  0-6 Units Subcutaneous TID WC Jaqui Martínez, APRN - NP        insulin lispro (HUMALOG) injection vial 0-3 Units  0-3 Units Subcutaneous Nightly Jaqui Martínez, APRN - NP   1 Units at 09/26/20 2356    glucose (GLUTOSE) 40 % oral gel 15 g  15 g Oral PRN Jaqui Martínez, APRN - NP        dextrose 50 % IV solution  12.5 g Intravenous PRN Jaqui Martínez, APRN - NP        glucagon (rDNA) injection 1 mg  1 mg Intramuscular PRN Jaqui Martínez, APRN - NP        dextrose 5 % solution  100 mL/hr Intravenous PRN Jaqui Martínez, APRN - NP        0.9 % sodium chloride infusion   Intravenous Continuous Jaqui Martínez, APRN -  mL/hr at 09/27/20 0248        Allergies:  Patient has no known allergies.     Social History:   Social History     Socioeconomic History    Marital status:      Spouse name: Not on file    Number of children: Not on file    Years of education: Not on file    Highest education level: Not on file   Occupational History    Not on file   Social Needs    Financial resource strain: Not on file    Food insecurity     Worry: Not on file     Inability: Not on file    Transportation needs     Medical: Not on file     Non-medical: Not on file   Tobacco Use    Smoking status: Never Smoker    Smokeless tobacco: Never Used   Substance and Sexual Activity    Alcohol use: No    Drug use: No    Sexual activity: Not on file   Lifestyle    Physical activity     Days per week: Not on file     Minutes per session: Not on file    Stress: Not on file   Relationships    Social connections Talks on phone: Not on file     Gets together: Not on file     Attends [employer] service: Not on file     Active member of club or organization: Not on file     Attends meetings of clubs or organizations: Not on file     Relationship status: Not on file    Intimate partner violence     Fear of current or ex partner: Not on file     Emotionally abused: Not on file     Physically abused: Not on file     Forced sexual activity: Not on file   Other Topics Concern    Not on file   Social History Narrative    Not on file     Family History:   Family History   Problem Relation Age of Onset    Early Death Mother 52    Diabetes Father     Heart Disease Father     Vision Loss Son         \"He wears glasses\"    Early Death Daughter 36        REVIEW OF SYSTEMS:    Review of Systems   Constitutional: Negative for activity change, appetite change, chills and fever. Respiratory: Negative for cough, choking and shortness of breath. Cardiovascular: Positive for leg swelling. Negative for chest pain. Gastrointestinal: Negative for abdominal pain, constipation, diarrhea and nausea. Musculoskeletal: Negative for back pain and gait problem. Skin: Positive for pallor and wound. Neurological: Negative for light-headedness and headaches. Psychiatric/Behavioral: Negative for agitation and behavioral problems. PHYSICALEXAM:    /84   Pulse 104   Temp 98.6 °F (37 °C) (Oral)   Resp 24   Ht 6' 1\" (1.854 m)   Wt 18 lb 15.4 oz (8.6 kg)   SpO2 97%   BMI 2.50 kg/m²    Physical Exam  Vitals signs reviewed. Constitutional:       General: He is not in acute distress. Appearance: Normal appearance. He is normal weight. He is not ill-appearing. HENT:      Head: Normocephalic and atraumatic. Neck:      Musculoskeletal: Normal range of motion and neck supple. Cardiovascular:      Rate and Rhythm: Normal rate and regular rhythm.    Pulmonary:      Effort: Pulmonary effort is normal. No respiratory

## 2020-09-27 NOTE — PROGRESS NOTES
Hospitalist Progress Note      Name:  El Ortiz /Age/Sex: 1945  (77 y.o. male)   MRN & CSN:  [id number] & [id number] Admission Date/Time: 2020  6:29 PM   [facility]  [facility] PCP: Marquis Costa, [address] Day: 2    Assessment and Plan:   Mr. Rekha Patel is a 78-year-old male that presented with bleeding from his groin lower extremity wound. Right lower extremity venous stasis ulcer, chronic  Acute blood loss anemia  -Hemoglobin 7.5 from 9.8 yesterday  -Wound dressing this morning clean dry and intact  -General surgery following, appreciate recommendations. Fecal occult blood test has been ordered  - Will f/u LE duplexes  -We will continue to monitor hemoglobin and transfuse for hemoglobin less than 7. Diabetes mellitus  -We will continue sliding scale insulin    Syncope  -Patient describes likely orthostatic event  -PT consult to assist with ambulation  -Was given continuous IV fluid hydration overnight, continue monitor for any further signs of orthostatic hypotension    Lactic acidosis  -Unclear etiology  -Downtrending with fluid hydration  -We will repeat 1 more lactic acid to ensure normalization    Elevated TSH  -T4 on lower end of normal; will consult endocrinology    Diet Diet NPO, After Midnight   DVT Prophylaxis [] Lovenox, []  Heparin, [x] SCDs, [x] Ambulation   GI Prophylaxis [] PPI,  [] H2 Blocker,  [] Carafate,  [x] Diet/Tube Feeds   Code Status Full Code   Disposition Patient requires continued admission due to right lower extremity wound, anemia   MDM [] Low, [x] Moderate,[]  High  Patient's risk as above due to one or more chronic illnesses with exacerbation      History of Present Illness:     Patient states he is now pain is very lower extremity this morning due to his chronic wound. Denies any further bleeding overnight with dressing clean and dry this morning.   When asked about the patient's possible syncopal episode he states that he did not lose consciousness. Patient states he got up from the bathroom felt lightheaded and laid back down to bed and felt better with lightheadedness resolved. No recurrent episodes overnight. Objective: Intake/Output Summary (Last 24 hours) at 9/27/2020 1020  Last data filed at 9/27/2020 0932  Gross per 24 hour   Intake 10 ml   Output 1250 ml   Net -1240 ml      Vitals:   Vitals:    09/27/20 1000   BP: 118/73   Pulse: 94   Resp: 24   Temp:    SpO2: 97%     Physical Exam:   GEN Awake male, sitting upright in bed in no apparent distress. Appears given age. EYES Pupils are equally round. No scleral erythema, discharge, or conjunctivitis. HENT Mucous membranes are moist.   NECK Supple, no apparent thyromegaly or masses. RESP Clear to auscultation, no wheezes, rales or rhonchi. Symmetric chest movement while on room air. CARDIO/VASC S1/S2 auscultated. Regular rate without appreciable murmurs, rubs, or gallops. GI no abdominal tenderness. rectal exam deferred.  No costovertebral angle tenderness. Normal appearing external genitalia. Rubalcava catheter is not present. HEME/LYMPH   No petechiae or ecchymoses. MSK No gross joint deformities. SKIN Normal coloration, warm, dry, lower extremity dressing clean dry and intact  NEURO Cranial nerves appear grossly intact, normal speech  PSYCH Awake, alert, oriented x 4. Affect appropriate.     Medications:   Medications:    sodium chloride flush  10 mL Intravenous 2 times per day    insulin lispro  0-6 Units Subcutaneous TID WC    insulin lispro  0-3 Units Subcutaneous Nightly      Infusions:    dextrose       PRN Meds: sodium chloride flush, 10 mL, PRN  sodium chloride flush, 10 mL, PRN  acetaminophen, 650 mg, Q6H PRN    Or  acetaminophen, 650 mg, Q6H PRN  polyethylene glycol, 17 g, Daily PRN  promethazine, 12.5 mg, Q6H PRN    Or  ondansetron, 4 mg, Q6H PRN  potassium chloride, 40 mEq, PRN    Or  potassium alternative oral replacement, 40 mEq, PRN    Or  potassium chloride, 10 mEq, PRN  glucose, 15 g, PRN  dextrose, 12.5 g, PRN  glucagon (rDNA), 1 mg, PRN  dextrose, 100 mL/hr, PRN          Electronically signed by Jono Cornell MD on 9/27/2020 at 10:20 AM

## 2020-09-27 NOTE — H&P
History and Physical      Name:  Radha Ovalle /Age/Sex: 1945  (77 y.o. male)   MRN & CSN:  [id number] & [id number] Admission Date/Time: 2020  6:29 PM   Location:  [place] [address] PCP: Carrie Garnica MD       Discussed patient with Dr. Tony Olsen and Plan:     Radha Ovalle is a 77 y.o.  male  who presents with bleeding from chronic RLE wound    - Venous stasis ulcer, RLE  Chronic wound- family/pt reports has had for 6-7 years, untreated  Xray tib/fib: no acute process  CTA RLE: Subq edema with no evidence of osteomyelitis; no subq gas or fluid collection  Blood and wound cultures pending from ED  Received Vanc and cefepime in ED  Holding abx; consider restarting if pt spikes fever, WBC increase  Consult to GEN SURG and wound care  Neurovasc checks    - Anemia  hgb 9.8 (unknown baseline)  Reports significant blood loss from wound today; hemostasis prior to arrival  Repeat H & H   Check anemia panel as no recent baseline for hgb   Check type and screen    - Syncope  ? Vasovagal reaction to bleeding and BM in ED  CTA chest: non-acute  Check echo  Fall precautions    - ? Seizure activity  Occurred with syncopal episode in ED- conflicting accounts of event  No hx of seizures  Check prolactin  Seizure precautions  Received loading dose keppra; holding further keppra doses at this time  CT head nonacute  Holding Neuro consult     - Lactic acidosis  Lactic 11  Unclear etiology- low suspicion for infection, ?  Seizure  IVF and trend lactic    - Hypokalemia  K 3.0  Mg 2.2  Replacement protocol and trend BMP    - DM with hyperglycemia  241  Stopped taking meds 7 years ago  Check A1c  Start SSI    - Elevated TSH  4.56  Check free t4    DVT ppx: unable to use pharmacologic intervention (due to bleeding) and SCDs (due to location of large venous ulcer); consider starting lovenox if bleeding does not recur      Chronic  - HLD- Patient stopped taking statin; check lipid profile  - Hx of lymphoma- Reports remission since 2013; CT with enlarged RP and ilaiac susan nodes    Discussed patient with ER physician     Diet Carb control; NPO MN   DVT Prophylaxis [] Lovenox, []  Heparin, [] SCDs, [] Ambulation   GI Prophylaxis [] PPI,  [] H2 Blocker,  [] Carafate,  [] Diet/Tube Feeds   Code Status Full   Disposition Patient requires continued admission due to syncope   MDM [] Low, [] Moderate,[x]  High  Patient's risk as above due to      [] One or more chronic illnesses with exacerbation progression      [x] Two or more stable chronic illnesses      [x] Undiagnosed new problem with uncertain prognosis      [] Elective major surgery      []Prescription drug management       History of Present Illness:     Chief Complaint: bleeding from chronic RLE wound    Vero Thomas is a 77 y.o.  male  who presents with the above complaints, onset today. Context is, patient and spouse report patient has had chronic RLE wound for 6-7 years, that has progressed in severity. He has not had it treated/evaluated during this time. She reports putting hydrogen peroxide on wound and dressing every other day. He states he was removing the dressing today when it began to bleed severely. His spouse states he \"passed out\" during this episode of bleeding 2 times; she denies he fell and hit his head, as she caught him both times. Reports he immediately regained consciousness and was not confused. She called EMS. Once in ED, apparently he was on the bedside commode when he again had a syncopal episode. Some ED staff report he had a seizure based on \"his eyes were rolled back in his head\"; no tonic clonic activity was witnessed. He denies hx of seizure. It was reported his lost his pulse during the episode; however, no CPR, defibrillation, ACLS drugs were administered and patient regained consciousness promptly. His aaox3 on exam. Denies chest pain/pressure, sob, abdominal pain, back pain, n/v, diarrhea. Confirms code status.  Denies regular alcohol use, illicit drug use. Ten point ROS reviewed negative, unless as noted above    Objective:     No intake or output data in the 24 hours ending 09/26/20 2211     Vitals:   Vitals:    09/26/20 1839   BP: 127/80   Pulse: 112   Resp: 18   Temp: 97.8 °F (36.6 °C)   SpO2: 100       Physical Exam:     GEN Awake male, sitting upright in bed in no apparent distress. Appears given age. EYES Pupils are equally round. No scleral erythema, discharge, or conjunctivitis. HENT Mucous membranes are moist. Oral pharynx without exudates, no evidence of thrush. NECK Supple, no apparent thyromegaly or masses. RESP Clear to auscultation, no wheezes, rales or rhonchi. Symmetric chest movement while on room air. CARDIO/VASC S1/S2 auscultated. Regular rate without appreciable murmurs, rubs, or gallops. No JVD or carotid bruits. Peripheral pulses equal bilaterally and palpable. No peripheral edema. GI Abdomen is soft without significant tenderness, masses, or guarding. Bowel sounds are normoactive. Rectal exam deferred.  No costovertebral angle tenderness. Rubalcava catheter is not present. HEME/LYMPH No palpable cervical lymphadenopathy and no hepatosplenomegaly. No petechiae or ecchymoses. MSK No gross joint deformities. Strength equal in BLE and BUE  SKIN Has chronic appearing venous ulcer approx 20 cm long and 12 cm wide to medial RLE. Foot is warm; pt/dp pulses are palpable. NEURO Cranial nerves appear grossly intact, normal speech, no lateralizing weakness. PSYCH Awake, alert, oriented x 3. Affect appropriate. Past Medical History:        Past Medical History:   Diagnosis Date    Hypertension     Lymphoma Dx 11-11       PSHX:  has a past surgical history that includes Tunneled venous port placement and Tunneled venous port placement.     Allergies: No Known Allergies    FAM HX: family history includes Diabetes in his father; Early Death (age of onset: 36) in his daughter; Early Death (age of onset: 52) in his mother; Heart Disease in his father; Vision Loss in his son. Soc HX:   Social History     Socioeconomic History    Marital status:      Spouse name: Not on file    Number of children: Not on file    Years of education: Not on file    Highest education level: Not on file   Occupational History    Not on file   Social Needs    Financial resource strain: Not on file    Food insecurity     Worry: Not on file     Inability: Not on file    Transportation needs     Medical: Not on file     Non-medical: Not on file   Tobacco Use    Smoking status: Never Smoker    Smokeless tobacco: Never Used   Substance and Sexual Activity    Alcohol use: No    Drug use: No    Sexual activity: Not on file   Lifestyle    Physical activity     Days per week: Not on file     Minutes per session: Not on file    Stress: Not on file   Relationships    Social connections     Talks on phone: Not on file     Gets together: Not on file     Attends [employer] service: Not on file     Active member of club or organization: Not on file     Attends meetings of clubs or organizations: Not on file     Relationship status: Not on file    Intimate partner violence     Fear of current or ex partner: Not on file     Emotionally abused: Not on file     Physically abused: Not on file     Forced sexual activity: Not on file   Other Topics Concern    Not on file   Social History Narrative    Not on file       Medications:     Medications:    potassium chloride  20 mEq Intravenous Once      sertraline (ZOLOFT) 25 MG tablet  Take 1 tablet by mouth daily. Shirley Angelucci, APRN - NP  Not Ordered    clotrimazole-betamethasone (LOTRISONE) cream  Twice daily  Shirley Angelucci, APRN - NP  Not Ordered    atenolol (TENORMIN) 25 MG tablet  Take 1 tablet by mouth daily. Shirley Angelucci, APRN - NP  Not Ordered    potassium chloride SA (K-DUR;KLOR-CON M) 20 MEQ tablet  Take 1 tablet by mouth 2 times daily (with meals). stasis.  Osteomyelitis is   felt less likely given no evidence for osteolysis.  Please note MRI is more   sensitive and specific for early changes of osteomyelitis. 3. 2 vessel runoff to the right foot with multifocal moderate calcified   atherosclerotic plaque throughout the trifurcation vessels.  The peroneal   artery is not identified distal to the mid to distal fibular diaphysis.  No   significant stenosis of the imaged bilateral iliac and femoral arteries. 4. Shotty lymph nodes along the right iliac chain and right inguinal region   which are non pathologically enlarged.  Patient does have given history of   lymphoma.       CTA CHEST ABDOMEN PELVIS W CONTRAST [[id number]]  Collected: 09/26/20 2040       Order Status: Completed  Updated: 09/26/20 2053       Narrative:         EXAMINATION:   CTA OF THE CHEST, ABDOMEN AND PELVIS WITH CONTRAST, 9/26/2020 8:24 pm     TECHNIQUE:   CTA of the chest, abdomen and pelvis was performed after the administration   of intravenous contrast.  Multiplanar reformatted images are provided for   review.  MIP images are provided for review. Dose modulation, iterative   reconstruction, and/or weight based adjustment of the mA/kV was utilized to   reduce the radiation dose to as low as reasonably achievable. COMPARISON:   Chest x-ray September 26, 2020; PET-CT July 12, 2012     HISTORY:   ORDERING SYSTEM PROVIDED HISTORY: syncope/cancer   TECHNOLOGIST PROVIDED HISTORY:   Reason for exam:->syncope/cancer   Reason for Exam: syncope/cancer   Acuity: Acute   Type of Exam: Initial     FINDINGS:     CTA CHEST:     Heart and pericardium demonstrate no acute abnormality.  The thoracic aorta   is normal in caliber.  No mediastinal lymphadenopathy.  No pericardial   effusion. No focal consolidation, pleural effusion, or pneumothorax identified.      No acute osseous or soft tissue abnormality of the thorax identified.  Remote   deformity of the distal right clavicle with adjacent small well corticated   ossicle present.  Mild degenerative changes of the bilateral glenohumeral   acromioclavicular joints. CTA ABDOMEN:     The liver and gallbladder demonstrate no acute abnormality.  Spleen,   pancreas, and bilateral adrenal glands demonstrate no acute abnormality.  The   kidneys enhance symmetrically.  No hydronephrosis identified. No evidence for bowel obstruction.  No abnormal bowel wall thickening is   identified.  Normal appendix.  The small bowel is normal in caliber. The abdominal aorta is normal in caliber with no significant atherosclerotic   plaque identified.  A few shotty non pathologically enlarged retroperitoneal   nodes are identified. Marilynne Mage are nonspecific.  A few mildly prominent right   iliac chain lymph nodes are also identified which are nonspecific.  These   nodes appear slightly more prominent when compared with comparison comparison   PET-CT from July 12, 2012.  Similar shotty nodes are identified within the   right inguinal region which are non pathologically enlarged.  These nodes   appear grossly similar to prior PET-CT from 2012. CTA PELVIS:     The bladder and solid pelvic organs demonstrate no acute abnormality. No acute osseous findings.  Small bilateral fat filled inguinal hernia. Degenerative changes of the lower lumbar spine.       Impression:         1. No acute intrathoracic or intra-abdominal process identified. 2. No acute vascular injury identified. 3. No acute thoracic or pelvic fracture identified.    4. Shotty non pathologically enlarged retroperitoneal and right iliac chain   nodes as well as right inguinal nodes.  These are nonspecific given no   pathologic enlargement, however, the retroperitoneal and iliac chain nodes do   appear slightly more prominent when compared with PET-CT from 2012 in a   patient with given history of lymphoma.       CT HEAD W CONTRAST [[id number]]  Collected: 09/26/20 2041       Order Status: AND   FIBULA     9/26/2020 6:38 pm     COMPARISON:   None. HISTORY:   ORDERING SYSTEM PROVIDED HISTORY: trauma   TECHNOLOGIST PROVIDED HISTORY:   Reason for exam:->trauma   Reason for Exam: fall   Acuity: Acute   Type of Exam: Initial; ORDERING SYSTEM PROVIDED HISTORY: pain   TECHNOLOGIST PROVIDED HISTORY:   Reason for exam:->pain   Reason for Exam: fall   Acuity: Acute   Type of Exam: Initial     FINDINGS:   Tibia and fibula: Four views of the right tibia and fibula demonstrate no   acute fracture or dislocation.  Alignment is anatomic.  Mild osteoarthritic   changes of the right knee.  Remote healed deformity of the right distal   fibula with surrounding callus formation present. Right ankle: Two views of the right ankle demonstrate no acute fracture or   dislocation.  Remote healed deformity of the distal fibula.  Anatomic   alignment of the ankle mortise.  Mild hindfoot and midfoot osteoarthritis. Nonspecific soft tissue edema of the lower leg and ankle.  Deformity of the   calcaneus likely reflecting a remote posttraumatic deformity.  No acute   calcaneal fracture identified.       Impression:         1. No acute fracture of the right tibia and fibula or ankle identified. 2. Remote healed deformity of the distal ankle with surrounding callus   formation at the distal fibula. 3. Nonspecific soft tissue edema of the lower leg and ankle.       XR ANKLE RIGHT (MIN 3 VIEWS) [[id number]]  Collected: 09/26/20 1936       Order Status: Completed  Updated: 09/1945       Narrative:         EXAMINATION:   THREE XRAY VIEWS OF THE RIGHT ANKLE; 4 XRAY VIEWS OF THE RIGHT TIBIA AND   FIBULA     9/26/2020 6:38 pm     COMPARISON:   None.      HISTORY:   ORDERING SYSTEM PROVIDED HISTORY: trauma   TECHNOLOGIST PROVIDED HISTORY:   Reason for exam:->trauma   Reason for Exam: fall   Acuity: Acute   Type of Exam: Initial; ORDERING SYSTEM PROVIDED HISTORY: pain   TECHNOLOGIST PROVIDED HISTORY:   Reason for exam:->pain Reason for Exam: fall   Acuity: Acute   Type of Exam: Initial     FINDINGS:   Tibia and fibula: Four views of the right tibia and fibula demonstrate no   acute fracture or dislocation.  Alignment is anatomic.  Mild osteoarthritic   changes of the right knee.  Remote healed deformity of the right distal   fibula with surrounding callus formation present. Right ankle: Two views of the right ankle demonstrate no acute fracture or   dislocation.  Remote healed deformity of the distal fibula.  Anatomic   alignment of the ankle mortise.  Mild hindfoot and midfoot osteoarthritis. Nonspecific soft tissue edema of the lower leg and ankle.  Deformity of the   calcaneus likely reflecting a remote posttraumatic deformity.  No acute   calcaneal fracture identified.       Impression:         1. No acute fracture of the right tibia and fibula or ankle identified. 2. Remote healed deformity of the distal ankle with surrounding callus   formation at the distal fibula. 3. Nonspecific soft tissue edema of the lower leg and ankle.       XR CHEST PORTABLE [[id number]]  Collected: 09/26/20 1934       Order Status: Completed  Updated: 09/26/20 1939       Narrative:         EXAMINATION:   ONE XRAY VIEW OF THE CHEST     9/26/2020 6:38 pm     COMPARISON:   Chest x-ray August 22, 2013     HISTORY:   ORDERING SYSTEM PROVIDED HISTORY: syncope   TECHNOLOGIST PROVIDED HISTORY:   Reason for exam:->syncope   Acuity: Acute     FINDINGS:   The lungs are without acute focal process.  There is no effusion or   pneumothorax. The cardiomediastinal silhouette is without acute process.  The   osseous structures are without acute process.  Chronic deformity of the right   distal clavicle.       Impression:         No acute process.           Sinus tachycardia   Otherwise normal ECG   No previous ECGs available     Electronically signed by Ginette Merlin, APRN - NP on 9/26/2020 at 10:11 PM

## 2020-09-28 LAB
ANION GAP SERPL CALCULATED.3IONS-SCNC: 5 MMOL/L (ref 4–16)
BASOPHILS ABSOLUTE: 0.1 K/CU MM
BASOPHILS RELATIVE PERCENT: 0.8 % (ref 0–1)
BUN BLDV-MCNC: 13 MG/DL (ref 6–23)
CALCIUM SERPL-MCNC: 8.1 MG/DL (ref 8.3–10.6)
CHLORIDE BLD-SCNC: 105 MMOL/L (ref 99–110)
CO2: 28 MMOL/L (ref 21–32)
CREAT SERPL-MCNC: 0.8 MG/DL (ref 0.9–1.3)
CULTURE: NORMAL
DIFFERENTIAL TYPE: ABNORMAL
EOSINOPHILS ABSOLUTE: 0.3 K/CU MM
EOSINOPHILS RELATIVE PERCENT: 3.8 % (ref 0–3)
GFR AFRICAN AMERICAN: >60 ML/MIN/1.73M2
GFR NON-AFRICAN AMERICAN: >60 ML/MIN/1.73M2
GLUCOSE BLD-MCNC: 119 MG/DL (ref 70–99)
GLUCOSE BLD-MCNC: 130 MG/DL (ref 70–99)
GLUCOSE BLD-MCNC: 132 MG/DL (ref 70–99)
GLUCOSE BLD-MCNC: 134 MG/DL (ref 70–99)
GLUCOSE BLD-MCNC: 147 MG/DL (ref 70–99)
HCT VFR BLD CALC: 22.6 % (ref 42–52)
HCT VFR BLD CALC: 28.3 % (ref 42–52)
HEMOGLOBIN: 6.9 GM/DL (ref 13.5–18)
HEMOGLOBIN: 8.4 GM/DL (ref 13.5–18)
IMMATURE NEUTROPHIL %: 0.5 % (ref 0–0.43)
LV EF: 55 %
LVEF MODALITY: NORMAL
LYMPHOCYTES ABSOLUTE: 1.3 K/CU MM
LYMPHOCYTES RELATIVE PERCENT: 19.2 % (ref 24–44)
Lab: NORMAL
MCH RBC QN AUTO: 26.4 PG (ref 27–31)
MCHC RBC AUTO-ENTMCNC: 30.5 % (ref 32–36)
MCV RBC AUTO: 86.6 FL (ref 78–100)
MONOCYTES ABSOLUTE: 0.6 K/CU MM
MONOCYTES RELATIVE PERCENT: 8.8 % (ref 0–4)
NUCLEATED RBC %: 0 %
PDW BLD-RTO: 15.1 % (ref 11.7–14.9)
PLATELET # BLD: 163 K/CU MM (ref 140–440)
PMV BLD AUTO: 9.6 FL (ref 7.5–11.1)
POTASSIUM SERPL-SCNC: 4 MMOL/L (ref 3.5–5.1)
RBC # BLD: 2.61 M/CU MM (ref 4.6–6.2)
SEGMENTED NEUTROPHILS ABSOLUTE COUNT: 4.4 K/CU MM
SEGMENTED NEUTROPHILS RELATIVE PERCENT: 66.9 % (ref 36–66)
SODIUM BLD-SCNC: 138 MMOL/L (ref 135–145)
SPECIMEN: NORMAL
TOTAL IMMATURE NEUTOROPHIL: 0.03 K/CU MM
TOTAL NUCLEATED RBC: 0 K/CU MM
WBC # BLD: 6.6 K/CU MM (ref 4–10.5)

## 2020-09-28 PROCEDURE — 36415 COLL VENOUS BLD VENIPUNCTURE: CPT

## 2020-09-28 PROCEDURE — 94761 N-INVAS EAR/PLS OXIMETRY MLT: CPT

## 2020-09-28 PROCEDURE — 36430 TRANSFUSION BLD/BLD COMPNT: CPT

## 2020-09-28 PROCEDURE — 86922 COMPATIBILITY TEST ANTIGLOB: CPT

## 2020-09-28 PROCEDURE — 99211 OFF/OP EST MAY X REQ PHY/QHP: CPT

## 2020-09-28 PROCEDURE — 2580000003 HC RX 258: Performed by: NURSE PRACTITIONER

## 2020-09-28 PROCEDURE — 2140000000 HC CCU INTERMEDIATE R&B

## 2020-09-28 PROCEDURE — G0378 HOSPITAL OBSERVATION PER HR: HCPCS

## 2020-09-28 PROCEDURE — 86850 RBC ANTIBODY SCREEN: CPT

## 2020-09-28 PROCEDURE — 85014 HEMATOCRIT: CPT

## 2020-09-28 PROCEDURE — 93306 TTE W/DOPPLER COMPLETE: CPT

## 2020-09-28 PROCEDURE — 86900 BLOOD TYPING SEROLOGIC ABO: CPT

## 2020-09-28 PROCEDURE — 80048 BASIC METABOLIC PNL TOTAL CA: CPT

## 2020-09-28 PROCEDURE — 83036 HEMOGLOBIN GLYCOSYLATED A1C: CPT

## 2020-09-28 PROCEDURE — 82962 GLUCOSE BLOOD TEST: CPT

## 2020-09-28 PROCEDURE — 99231 SBSQ HOSP IP/OBS SF/LOW 25: CPT | Performed by: NURSE PRACTITIONER

## 2020-09-28 PROCEDURE — 86901 BLOOD TYPING SEROLOGIC RH(D): CPT

## 2020-09-28 PROCEDURE — 85025 COMPLETE CBC W/AUTO DIFF WBC: CPT

## 2020-09-28 PROCEDURE — P9016 RBC LEUKOCYTES REDUCED: HCPCS

## 2020-09-28 PROCEDURE — 85018 HEMOGLOBIN: CPT

## 2020-09-28 RX ORDER — 0.9 % SODIUM CHLORIDE 0.9 %
250 INTRAVENOUS SOLUTION INTRAVENOUS ONCE
Status: COMPLETED | OUTPATIENT
Start: 2020-09-28 | End: 2020-09-28

## 2020-09-28 RX ADMIN — SODIUM CHLORIDE, PRESERVATIVE FREE 10 ML: 5 INJECTION INTRAVENOUS at 21:18

## 2020-09-28 RX ADMIN — SODIUM CHLORIDE, PRESERVATIVE FREE 10 ML: 5 INJECTION INTRAVENOUS at 10:20

## 2020-09-28 RX ADMIN — SODIUM CHLORIDE 250 ML: 9 INJECTION, SOLUTION INTRAVENOUS at 10:49

## 2020-09-28 RX ADMIN — INSULIN LISPRO 1 UNITS: 100 INJECTION, SOLUTION INTRAVENOUS; SUBCUTANEOUS at 12:09

## 2020-09-28 ASSESSMENT — PAIN SCALES - GENERAL
PAINLEVEL_OUTOF10: 0

## 2020-09-28 NOTE — PROGRESS NOTES
Patient's Hemoglobin level this morning resulted 6.9  Tonya Bhagat informed via perfect serve.   Awaiting response

## 2020-09-28 NOTE — CONSULTS
Consult completed. Pt currently has 2x #18ga PIV's documented as patent and therapeutic needs are met.

## 2020-09-28 NOTE — PLAN OF CARE
Problem: Falls - Risk of:  Goal: Will remain free from falls  Description: Will remain free from falls  9/28/2020 1948 by Ga Chou RN  Outcome: Ongoing  9/28/2020 1223 by Radhika Sparks RN  Outcome: Ongoing  Goal: Absence of physical injury  Description: Absence of physical injury  9/28/2020 1948 by Ga Chou RN  Outcome: Ongoing  9/28/2020 1223 by Radhika Sparks RN  Outcome: Ongoing     Problem: Skin Integrity:  Goal: Will show no infection signs and symptoms  Description: Will show no infection signs and symptoms  9/28/2020 1948 by Ga Chou RN  Outcome: Ongoing  9/28/2020 1223 by Radhika Sparks RN  Outcome: Ongoing  Goal: Absence of new skin breakdown  Description: Absence of new skin breakdown  9/28/2020 1948 by Ga Chou RN  Outcome: Ongoing  9/28/2020 1223 by Radhika Sparks RN  Outcome: Ongoing

## 2020-09-28 NOTE — PROGRESS NOTES
Comprehensive Nutrition Assessment    Type and Reason for Visit:  Positive Nutrition Screen    Nutrition Recommendations/Plan:   Add Ensure High Protein BID  Encourage po intake as able  Will closely monitor po intake, nutrition status, poc    Nutrition Assessment:  Pt admitted for syncope and collapse, pt w/ chronic venous stasis ulcers, positive nutrition screen for wounds, pt on carb control diet with no meals recorded at this time, visited pt at bedside, pt reports UBW ~175#, pt denies any recent unintentional wt loss, pt reports good appetite, pt denies any chewing or swallowing difficulty, pt agreeable to trial high protein supplement, pt is a moderate nutrition risk at this time    Malnutrition Assessment:  Malnutrition Status:    insufficient data  Context:  Acute Illness(no NFPE completed at this time d/t pt eating lunch at time of visit and declined exam at this time)       Estimated Daily Nutrient Needs:  Energy (kcal):  3341-3781(25-28 kcal/kg); Weight Used for Energy Requirements:  Current     Protein (g):  (1.1-1.3 g/kg);  Weight Used for Protein Requirements:  Current          Nutrition Related Findings:  Hemoglobin 6.9      Wounds:  Venous Stasis       Current Nutrition Therapies:    DIET CARB CONTROL; Carb Control: 5 carb choices (75 gms)/meal    Anthropometric Measures:  · Height: 6' 0.99\" (185.4 cm)  · Current Body Weight: 180 lb 8.9 oz (81.9 kg)   · Admission Body Weight: 180 lb 12.4 oz (82 kg)    · Usual Body Weight: (no wt hx available per chart review)     · Ideal Body Weight: 184 lbs; % Ideal Body Weight 98.1 %   · BMI: 23.8  · BMI Categories: Normal Weight (BMI 22.0 to 24.9) age over 72       Nutrition Diagnosis:   · Increased nutrient needs related to healing as evidenced by wounds    Nutrition Interventions:   Food and/or Nutrient Delivery:  Continue Current Diet, Start Oral Nutrition Supplement  Nutrition Education/Counseling:  No recommendation at this time   Coordination of Nutrition Care:  Continued Inpatient Monitoring    Goals:  pt will consume greater than 75% of meals and supplements       Nutrition Monitoring and Evaluation:   Food/Nutrient Intake Outcomes:  Supplement Intake, Food and Nutrient Intake  Physical Signs/Symptoms Outcomes:  Biochemical Data, Chewing or Swallowing, Weight, GI Status, Skin     Discharge Planning:     Too soon to determine     Electronically signed by Daphne Abdi MS, RD, LD on 9/28/20 at 12:16 PM EDT    Contact: [phone]

## 2020-09-28 NOTE — PROGRESS NOTES
GENERAL SURGERY INPATIENT POST-OP NOTE  [facility]) Physicians    PATIENT: Javier Powers, 77 y.o., male, MRN: [id number]    Hospital Day:  LOS: 2 days      Procedure(s): none    Patient was stable overnight. Chart reviewed. Hgb 6.9. Objective:    Vitals: BP (!) 143/86   Pulse 98   Temp 98 °F (36.7 °C) (Oral)   Resp 24   Ht 6' 0.99\" (1.854 m)   Wt 180 lb 8 oz (81.9 kg)   SpO2 98%   BMI 23.82 kg/m²     I/O: 09/27 0701 - 09/28 0700  In: 10 [I.V.:10]  Out: 2650 [Urine:2650]    IV Fluids: dextrose    Scheduled Meds: sodium chloride flush, 10 mL, Intravenous, 2 times per day    insulin lispro, 0-6 Units, Subcutaneous, TID WC    insulin lispro, 0-3 Units, Subcutaneous, Nightly    Physical Exam:  General appearance - alert, oriented to person, place, and time  Chest - clear to auscultation, no wheezes, rales or rhonchi, symmetric air entry  Cardiovascular - normal rate and regular rhythm  Skin - large 15 x 20 cm venous ulcer of right lower leg. There is a dilatated vein which is the probably source.    Neurological - Alert and oriented, Normal speech and No focal findings or movement disorder noted  Musculoskeletal -Full ROM times 4 extremities, No peripheral edema     Labs/Imaging Results:   Recent Results (from the past 24 hour(s))   POCT Glucose    Collection Time: 09/27/20  5:01 PM   Result Value Ref Range    POC Glucose 143 (H) 70 - 99 MG/DL   T4, Free    Collection Time: 09/27/20  8:19 PM   Result Value Ref Range    T4 Free 0.91 0.9 - 1.8 NG/DL   TSH without Reflex    Collection Time: 09/27/20  8:19 PM   Result Value Ref Range    TSH, High Sensitivity 2.730 0.270 - 4.20 uIu/ml   POCT Glucose    Collection Time: 09/27/20  8:43 PM   Result Value Ref Range    POC Glucose 135 (H) 70 - 99 MG/DL   Basic Metabolic Panel w/ Reflex to MG    Collection Time: 09/28/20  4:53 AM   Result Value Ref Range    Sodium 138 135 - 145 MMOL/L    Potassium 4.0 3.5 - 5.1 MMOL/L    Chloride 105 99 - 110 mMol/L    CO2 28 21 - 32 wound center on discharge. Will consult wound care to apply profore    Active Problems:    Venous ulcer of left leg ([address]  Resolved Problems:    * No resolved hospital problems.  Carlo Diaz, APRN-CNP  9/28/2020  12:31 PM

## 2020-09-28 NOTE — CONSULTS
[address] Continence Nurse  Consult Note       Nabila Hector  AGE: 77 y.o. GENDER: male  : 1945  TODAY'S DATE:  2020    Subjective:     Reason for  Evaluation and Assessment:  Wound care eval for rt lower leg wound dressing and profore compression wrap placement       Nabila Hector is a 77 y.o. male referred by:   [x] Physician  [] Nursing  [] Other:     Wound Identification:  Wound Type: venous  Contributing Factors: venous stasis        PAST MEDICAL HISTORY        Diagnosis Date    Hypertension     Lymphoma Dx 11       PAST SURGICAL HISTORY    Past Surgical History:   Procedure Laterality Date    TUNNELED VENOUS PORT PLACEMENT      TUNNELED VENOUS PORT PLACEMENT      removed 2012       FAMILY HISTORY    Family History   Problem Relation Age of Onset    Early Death Mother 52    Diabetes Father     Heart Disease Father     Vision Loss Son         \"He wears glasses\"    Early Death Daughter 36       SOCIAL HISTORY    Social History     Tobacco Use    Smoking status: Never Smoker    Smokeless tobacco: Never Used   Substance Use Topics    Alcohol use: No    Drug use: No       ALLERGIES    No Known Allergies    MEDICATIONS    No current facility-administered medications on file prior to encounter. Current Outpatient Medications on File Prior to Encounter   Medication Sig Dispense Refill    Naproxen Sodium (ALEVE PO) Take 2 tablets by mouth 2 times daily Indications: Adjunct to Opioid Analgesia for Moderate to Severe Pain, Pain      ondansetron (ZOFRAN) 8 MG tablet Take 8 mg by mouth every 8 hours as needed.              Objective:      /83   Pulse 99   Temp 98.6 °F (37 °C) (Oral)   Resp 30   Ht 6' 0.99\" (1.854 m)   Wt 180 lb 8 oz (81.9 kg)   SpO2 97%   BMI 23.82 kg/m²   Mal Risk Score: Mal Scale Score: 17    LABS    CBC:   Lab Results   Component Value Date    WBC 6.6 2020    RBC 2.61 2020    HGB 6.9 2020    HCT 22.6 2020 0 09/28/20 1546   Undermining Maxium Distance (cm) 0 09/28/20 1546   Wound Assessment Other (Comment) 09/28/20 1034   Drainage Amount None 09/28/20 1034   Drainage Description Serosanguinous; Yellow 09/28/20 0941   Odor None 09/28/20 1034   Margins Defined edges 09/28/20 0941   Cassia-wound Assessment Intact 09/28/20 0941   Non-staged Wound Description Full thickness 09/28/20 1546   [place]%Wound Bed 40 09/28/20 0941   Red%Wound Bed 30 09/28/20 0941   Yellow%Wound Bed 30 09/28/20 0941   Black%Wound Bed 0 09/28/20 0941   Purple%Wound Bed 0 09/28/20 0941   Other%Wound Bed 0 09/28/20 0941   Number of days: 1       Response to treatment:  Well tolerated by patient. Pain Assessment:  Severity:  0  Quality of pain: none  Wound Pain Timing/Severity:   Premedicated: no    Plan:     Plan of Care: Wound 09/27/20 Leg Right-Dressing/Treatment: (profore wrap- other dressing still in place aquacel ag salin)     Pt in bed agreeable to wound care eval first eval was this am then wound care returned for 2nd visit this afternoon to place compression wrap. Pt has chronic wound to RLE venous in nature dr Glenys Braga is seeing this pt has ordered dressings. Removed dressing cleansed with NS measured and pictured. Placed dressing this am saline moist aquacel ag saline moist 4x4's abd kerlix tape wound care returned this afternoon removed kerlix and placed profore compression wrap per [employer] order. Pt tolerated well. Pt to f/u at the wound clinic when discharged. Wound care to change dressing and compression wrap on wed. Buttocks and heels intact. Pt is at mild risk for skin breakdown AEB gerard score. Observe gerard orders.      Specialty Bed Required :  yes  [] Low Air Loss   [x] Pressure Redistribution  [] Fluid Immersion  [] Bariatric  [] Total Pressure Relief  [] Other:     Discharge Plan:  Placement for patient upon discharge: tbd  Hospice Care: no  Patient appropriate for Outpatient [address]:  Yes send referral    Patient/Caregiver Teaching:  Level of patient/caregiver understanding able to: pt verbalized understanding. Electronically signed by Rahul Sultana RN,  on 9/28/2020 at 3:53 PM

## 2020-09-28 NOTE — PROGRESS NOTES
Hospitalist Progress Note      Name:  Pam Gutierrez /Age/Sex: 1945  (77 y.o. male)   MRN & CSN:  [id number] & [id number] Admission Date/Time: 2020  6:29 PM   [facility] PCP: Jacqueline Taveras [address] Day: 3    Assessment and Plan:     Mr. Gabriel Morning is a 80-year-old male that presented with bleeding from his groin lower extremity wound.     Right lower extremity venous stasis ulcer, chronic  Acute blood loss anemia  -Hgb 6.9 this AM; GI source? Patient without a BM since admission; instructed patient to let his nurse know when he has a BM so they can collect fecal occult. -General Surgery following, appreciate recs  -LE arterial studies with no significant findings  - Will given one unit pRBC's today and f/u post transfusion HH     Diabetes mellitus  -We will continue sliding scale insulin     Syncope  -Patient describes likely orthostatic event in ED  -PT consult to assist with ambulation  -Will d/c IVF today as patient with no further episodes of Orthostasis and tolerating PO     Lactic acidosis  -Resolved     Elevated TSH  -Discussed with Endocrinology this morning; repeat labs wnl, no evidence of hypothyroidism    Diet DIET CARB CONTROL; Carb Control: 5 carb choices (75 gms)/meal  Dietary Nutrition Supplements: Low Calorie High Protein Supplement   DVT Prophylaxis [] Lovenox, []  Heparin, [x] SCDs, [x] Ambulation   GI Prophylaxis [] PPI,  [] H2 Blocker,  [] Carafate,  [x] Diet/Tube Feeds   Code Status Full Code   Disposition Patient requires continued admission due to anemia   MDM [] Low, [x] Moderate,[]  High  Patient's risk as above due to      History of Present Illness:     Patient denies any blood from the lower extremity wound dressing this morning or overnight. Denies any episodes of hematemesis or hemoptysis. Has yet to have a bowel movement so does not know if he is currently having dark or bloody bowel movements, however prior to admission he denies.   Patient is tolerating diet and denies any nausea vomiting fevers or chills. Objective: Intake/Output Summary (Last 24 hours) at 9/28/2020 1315  Last data filed at 9/28/2020 1030  Gross per 24 hour   Intake 350 ml   Output 1875 ml   Net -1525 ml      Vitals:   Vitals:    09/28/20 1300   BP: 124/73   Pulse: 96   Resp: 25   Temp:    SpO2:      Physical Exam:     GEN    Awake male, sitting upright in bed in no apparent distress. Appears given age. EYES   Pupils are equally round. No scleral erythema, discharge, or conjunctivitis. HENT  Mucous membranes are moist.   NECK  Supple, no apparent thyromegaly or masses. RESP  Clear to auscultation, no wheezes, rales or rhonchi. Symmetric chest movement while on room air. CARDIO/VASC           S1/S2 auscultated. Regular rate without appreciable murmurs, rubs, or gallops. GI        no abdominal tenderness. rectal exam deferred.        No costovertebral angle tenderness. Normal appearing external genitalia. Rubalcava catheter is not present. HEME/LYMPH              No petechiae or ecchymoses. MSK    No gross joint deformities. SKIN    Normal coloration, warm, dry, lower extremity dressing clean dry and intact  NEURO           Cranial nerves appear grossly intact, normal speech  PSYCH            Awake, alert, oriented x 4. Affect appropriate.     Medications:   Medications:    sodium chloride flush  10 mL Intravenous 2 times per day    insulin lispro  0-6 Units Subcutaneous TID WC    insulin lispro  0-3 Units Subcutaneous Nightly      Infusions:    dextrose       PRN Meds: sodium chloride flush, 10 mL, PRN  sodium chloride flush, 10 mL, PRN  acetaminophen, 650 mg, Q6H PRN    Or  acetaminophen, 650 mg, Q6H PRN  polyethylene glycol, 17 g, Daily PRN  promethazine, 12.5 mg, Q6H PRN    Or  ondansetron, 4 mg, Q6H PRN  potassium chloride, 40 mEq, PRN    Or  potassium alternative oral replacement, 40 mEq, PRN    Or  potassium chloride, 10 mEq, PRN  glucose, 15 g, PRN  dextrose, 12.5 g, PRN  glucagon (rDNA), 1 mg, PRN  dextrose, 100 mL/hr, PRN          Electronically signed by Jae Frined MD on 9/28/2020 at 1:15 PM

## 2020-09-29 ENCOUNTER — TELEPHONE (OUTPATIENT)
Dept: FAMILY MEDICINE CLINIC | Age: 75
End: 2020-09-29

## 2020-09-29 VITALS
TEMPERATURE: 98.6 F | BODY MASS INDEX: 23.52 KG/M2 | RESPIRATION RATE: 20 BRPM | SYSTOLIC BLOOD PRESSURE: 144 MMHG | DIASTOLIC BLOOD PRESSURE: 93 MMHG | OXYGEN SATURATION: 97 % | HEART RATE: 99 BPM | HEIGHT: 73 IN | WEIGHT: 177.47 LBS

## 2020-09-29 LAB
ABO/RH: NORMAL
ANION GAP SERPL CALCULATED.3IONS-SCNC: 12 MMOL/L (ref 4–16)
ANTIBODY SCREEN: NEGATIVE
ANTITHYROGLOBULIN AB: <0.9 IU/ML (ref 0–4)
ANTITHYROID MICORSOMAL: 0.3 IU/ML (ref 0–9)
BASOPHILS ABSOLUTE: 0.1 K/CU MM
BASOPHILS RELATIVE PERCENT: 0.9 % (ref 0–1)
BUN BLDV-MCNC: 12 MG/DL (ref 6–23)
CALCIUM SERPL-MCNC: 8.6 MG/DL (ref 8.3–10.6)
CHLORIDE BLD-SCNC: 101 MMOL/L (ref 99–110)
CO2: 24 MMOL/L (ref 21–32)
COMPONENT: NORMAL
CREAT SERPL-MCNC: 0.7 MG/DL (ref 0.9–1.3)
CROSSMATCH RESULT: NORMAL
DIFFERENTIAL TYPE: ABNORMAL
EOSINOPHILS ABSOLUTE: 0.3 K/CU MM
EOSINOPHILS RELATIVE PERCENT: 4.7 % (ref 0–3)
FERRITIN: 25 NG/ML (ref 30–400)
GFR AFRICAN AMERICAN: >60 ML/MIN/1.73M2
GFR NON-AFRICAN AMERICAN: >60 ML/MIN/1.73M2
GLUCOSE BLD-MCNC: 117 MG/DL (ref 70–99)
GLUCOSE BLD-MCNC: 124 MG/DL (ref 70–99)
GLUCOSE BLD-MCNC: 130 MG/DL (ref 70–99)
GLUCOSE BLD-MCNC: 149 MG/DL (ref 70–99)
HCT VFR BLD CALC: 28.6 % (ref 42–52)
HEMOGLOBIN: 8.6 GM/DL (ref 13.5–18)
IMMATURE NEUTROPHIL %: 0.4 % (ref 0–0.43)
IRON: 23 UG/DL (ref 59–158)
LYMPHOCYTES ABSOLUTE: 1.6 K/CU MM
LYMPHOCYTES RELATIVE PERCENT: 22.4 % (ref 24–44)
MCH RBC QN AUTO: 26.1 PG (ref 27–31)
MCHC RBC AUTO-ENTMCNC: 30.1 % (ref 32–36)
MCV RBC AUTO: 86.9 FL (ref 78–100)
MONOCYTES ABSOLUTE: 0.6 K/CU MM
MONOCYTES RELATIVE PERCENT: 9.2 % (ref 0–4)
NUCLEATED RBC %: 0 %
PCT TRANSFERRIN: 7 % (ref 10–44)
PDW BLD-RTO: 14.7 % (ref 11.7–14.9)
PLATELET # BLD: 190 K/CU MM (ref 140–440)
PMV BLD AUTO: 9.4 FL (ref 7.5–11.1)
POTASSIUM SERPL-SCNC: 4 MMOL/L (ref 3.5–5.1)
RBC # BLD: 3.29 M/CU MM (ref 4.6–6.2)
SEGMENTED NEUTROPHILS ABSOLUTE COUNT: 4.3 K/CU MM
SEGMENTED NEUTROPHILS RELATIVE PERCENT: 62.4 % (ref 36–66)
SODIUM BLD-SCNC: 137 MMOL/L (ref 135–145)
STATUS: NORMAL
TOTAL IMMATURE NEUTOROPHIL: 0.03 K/CU MM
TOTAL IRON BINDING CAPACITY: 331 UG/DL (ref 250–450)
TOTAL NUCLEATED RBC: 0 K/CU MM
TRANSFUSION STATUS: NORMAL
UNIT DIVISION: 0
UNIT NUMBER: NORMAL
UNSATURATED IRON BINDING CAPACITY: 308 UG/DL (ref 110–370)
WBC # BLD: 7 K/CU MM (ref 4–10.5)

## 2020-09-29 PROCEDURE — 83540 ASSAY OF IRON: CPT

## 2020-09-29 PROCEDURE — 96366 THER/PROPH/DIAG IV INF ADDON: CPT

## 2020-09-29 PROCEDURE — 80048 BASIC METABOLIC PNL TOTAL CA: CPT

## 2020-09-29 PROCEDURE — 6360000002 HC RX W HCPCS: Performed by: INTERNAL MEDICINE

## 2020-09-29 PROCEDURE — 82962 GLUCOSE BLOOD TEST: CPT

## 2020-09-29 PROCEDURE — 82728 ASSAY OF FERRITIN: CPT

## 2020-09-29 PROCEDURE — 36415 COLL VENOUS BLD VENIPUNCTURE: CPT

## 2020-09-29 PROCEDURE — G0378 HOSPITAL OBSERVATION PER HR: HCPCS

## 2020-09-29 PROCEDURE — 96367 TX/PROPH/DG ADDL SEQ IV INF: CPT

## 2020-09-29 PROCEDURE — 94761 N-INVAS EAR/PLS OXIMETRY MLT: CPT

## 2020-09-29 PROCEDURE — 85025 COMPLETE CBC W/AUTO DIFF WBC: CPT

## 2020-09-29 PROCEDURE — 83550 IRON BINDING TEST: CPT

## 2020-09-29 PROCEDURE — 2580000003 HC RX 258: Performed by: INTERNAL MEDICINE

## 2020-09-29 RX ORDER — FERROUS SULFATE 325(65) MG
325 TABLET ORAL 2 TIMES DAILY
Qty: 180 TABLET | Refills: 1 | Status: SHIPPED | OUTPATIENT
Start: 2020-09-29 | End: 2021-01-06 | Stop reason: ALTCHOICE

## 2020-09-29 RX ORDER — CIPROFLOXACIN 500 MG/1
500 TABLET, FILM COATED ORAL 2 TIMES DAILY
Qty: 10 TABLET | Refills: 0 | Status: SHIPPED | OUTPATIENT
Start: 2020-09-29 | End: 2020-10-04

## 2020-09-29 RX ADMIN — SODIUM CHLORIDE 500 MG: 9 INJECTION, SOLUTION INTRAVENOUS at 11:42

## 2020-09-29 RX ADMIN — INSULIN LISPRO 1 UNITS: 100 INJECTION, SOLUTION INTRAVENOUS; SUBCUTANEOUS at 11:50

## 2020-09-29 ASSESSMENT — PAIN SCALES - GENERAL
PAINLEVEL_OUTOF10: 0

## 2020-09-29 ASSESSMENT — PAIN DESCRIPTION - PAIN TYPE: TYPE: INTRACTABLE PAIN

## 2020-09-29 NOTE — CARE COORDINATION
CM into see pt to initiate a safe discharge plan. Cm into see, introduced self and explained role of CM. Pt and his wife of 47 yrs in room. Pt is kind, alert and oriented. Pt lives with his wife and a one level with basement. Pt uses no DME although they have available a walker. Pt has a PCP. Pt has insurance and is able to afford their medications now. Wife is very concerned regarding the cost of insulin. CM offered to contact [facility] and they agreed. Pt has had home care in the past with [facility]. Pt shared that they maybe sending pt to Wound Clinic for dsg changes. CM contacted [address] with [facility] to follow for needs. Discharge plan at this time is home with wife. Open to [facility] if needed. CM provided card and encouraged to call for any needs or concern. CM is available if any needs arise.    [facility]
Pt on discharge for home with [facility] CHATA Jolley and gave referral. Landon Holder will initiate HC services.
Referral received from [place], [place]. Will follow for possible assistance with new medications at discharge. If the need is determined I will also mail an application to patients home for ongoing help after discharge.
Name band;

## 2020-09-29 NOTE — PROGRESS NOTES
[address] [facility] Liaison spoke pt & wife & is aware of discharge & after speaking with wife pt has not seen a physician since 2013. Advised to make appt with Dr. Liseth Rincon and at that appt ask for hhc to be initiated. Agapito Sylvester agreed and verbalized understanding with hhc not being initiated d/t no MD. James Shi aware.

## 2020-09-29 NOTE — TELEPHONE ENCOUNTER
To Dr. Anika West---    Wants to ask for Home Care after patient is discharged from [facility]  ---for wound care.

## 2020-09-29 NOTE — PROGRESS NOTES
Physical Therapy  Triage defer evaluation, d/c summary in, plan to return home, ambulating in room mod I.   No acute therapy needs at this time

## 2020-09-29 NOTE — DISCHARGE SUMMARY
Discharge Summary    Name:  Vero Thomas /Age/Sex: 1945  (77 y.o. male)   MRN & CSN:  [id number] & [id number] Admission Date/Time: 2020  6:29 PM   Attending:  Brandon Duarte MD Discharging Physician: Lilian Litten, MD     Hospital Course:   Mr. Caden Galeano is a 76-year-old male that presented with bleeding from his groin lower extremity wound. Patient was seen and examined  Denied any worsening leg pain  Ambulating without difficulty  No dizziness, palpitations  No fever, chills     Assessment and Plan  1) Right lower extremity venous stasis ulcer, chronic  -Wound Cx: Klebsiella oxytoca and Providencia rettgeri both sensitive to Cipro  -Dc on Cipro  -Gen surg on board; recommended venous evaluation for ablation post discharge  -Continue wound care as Op    2) Acute blood loss anemia 2/2 above  -Received 1unit PRBC due to Hb of 6.9  -Fe suggestive of Fe def  -Will give IV Venofer prior to discharge  -OP Follow up with GI as patient has not had colonoscopy in the past     3) Elevated BG  -A1c 5.6  -Will have PCP monitor further trend in BG as Op        The patient expressed appropriate understanding of and agreement with the discharge recommendations, medications, and plan.      Consults this admission:  IP CONSULT TO IV TEAM  IP CONSULT TO HOSPITALIST  IP CONSULT TO GENERAL SURGERY  IP CONSULT TO ENDOCRINOLOGY    Discharge Instruction:   Follow up appointments: Gen Surg and GI in 2 weeks  Primary care physician:  within 2 weeks    Diet:  diabetic diet   Activity: activity as tolerated  Disposition: Discharged to:   [x]Home, []HHC, []SNF, []Acute Rehab, []Hospice   Condition on discharge: Stable    Discharge Medications:      Sage Purvis   Home Medication Instructions [id number]    Printed on:20 1139   Medication Information                      ciprofloxacin (CIPRO) 500 MG tablet  Take 1 tablet by mouth 2 times daily for 5 days             ferrous sulfate (IRON 325) 325 (65 Fe) MG tablet  Take 1 tablet by mouth 2 times daily             ondansetron (ZOFRAN) 8 MG tablet  Take 8 mg by mouth every 8 hours as needed. Objective Findings at Discharge:   BP (!) 132/91   Pulse 100   Temp 98.6 °F (37 °C) (Oral)   Resp 20   Ht 6' 0.99\" (1.854 m)   Wt 177 lb 7.5 oz (80.5 kg)   SpO2 97%   BMI 23.42 kg/m²            PHYSICAL EXAM   GEN Awake male, sitting upright in bed in no apparent distress. Appears given age. EYES Pupils are equally round. No scleral erythema, discharge, or conjunctivitis. HENT Mucous membranes are moist. Oral pharynx without exudates, no evidence of thrush. NECK Supple, no apparent thyromegaly or masses. RESP Clear to auscultation, no wheezes, rales or rhonchi. Symmetric chest movement while on room air. CARDIO/VASC S1/S2 auscultated. Regular rate without appreciable murmurs, rubs, or gallops. No JVD or carotid bruits. Peripheral pulses equal bilaterally and palpable. No peripheral edema. GI Abdomen is soft without significant tenderness, masses, or guarding. Bowel sounds are normoactive. Rectal exam deferred.  No costovertebral angle tenderness. Normal appearing external genitalia. Rubalcava catheter is not present. HEME/LYMPH No palpable cervical lymphadenopathy and no hepatosplenomegaly. No petechiae or ecchymoses. MSK No gross joint deformities. SKIN Normal coloration, warm, dry. NEURO Cranial nerves appear grossly intact, normal speech, no lateralizing weakness. PSYCH Awake, alert, oriented x 4. Affect appropriate.     BMP/CBC  Recent Labs     09/27/20  0401 09/27/20  1158 09/28/20  0453 09/28/20  1531 09/29/20  0353     --  138  --  137   K 4.1  --  4.0  --  4.0     --  105  --  101   CO2 24  --  28  --  24   BUN 15  --  13  --  12   CREATININE 0.8*  --  0.8*  --  0.7*   WBC 12.6* 10.2 6.6  --  7.0   HCT 25.0* 25.3* 22.6* 28.3* 28.6*    189 163  --  190       IMAGING:  As above    Discharge Time of 35 minutes    Electronically signed by Pedro Caceres MD on 9/29/2020 at 11:37 AM

## 2020-09-29 NOTE — PLAN OF CARE
Problem: Falls - Risk of:  Goal: Will remain free from falls  Description: Will remain free from falls  9/29/2020 1155 by Marti Borjas RN  Outcome: Completed  9/29/2020 0939 by Marti Borjas RN  Outcome: Ongoing  Goal: Absence of physical injury  Description: Absence of physical injury  9/29/2020 1155 by Marti Borjas RN  Outcome: Completed  9/29/2020 0939 by Marti Borjas RN  Outcome: Ongoing     Problem: Skin Integrity:  Goal: Will show no infection signs and symptoms  Description: Will show no infection signs and symptoms  9/29/2020 1155 by Marti Borjas RN  Outcome: Completed  9/29/2020 0939 by Marti Borjas RN  Outcome: Ongoing  Goal: Absence of new skin breakdown  Description: Absence of new skin breakdown  9/29/2020 1155 by Marti Borjas RN  Outcome: Completed  9/29/2020 0939 by Marti Borjas RN  Outcome: Ongoing

## 2020-09-29 NOTE — PROGRESS NOTES
Progress Note( Dr. Katy Brown)  9/28/2020  Subjective:   Admit Date: 9/26/2020  PCP: Theresa Larry MD    Admitted For :  Bleeding from right lower extremity wound has chronic wound anemia     Consulted For: Abnormal thyroid function test    Interval History: Patient says hemoglobin dropped to 6.9 earlier 7.5 obvious source of bleeding except right leg bleeding as discussed in H&    denies any chest pains,   Is mild SOB . Denies nausea or vomiting. No new bowel or bladder symptoms. Intake/Output Summary (Last 24 hours) at 9/28/2020 2119  Last data filed at 9/28/2020 1504  Gross per 24 hour   Intake 804.17 ml   Output 2550 ml   Net -1745.83 ml       DATA    CBC:   Recent Labs     09/27/20  0401 09/27/20  1158 09/28/20  0453 09/28/20  1531   WBC 12.6* 10.2 6.6  --    HGB 7.5* 7.6* 6.9* 8.4*    189 163  --     CMP:  Recent Labs     09/26/20  1835 09/27/20  0030 09/27/20  0401 09/28/20  0453    136 140 138   K 3.0* 4.4 4.1 4.0    106 108 105   CO2 15* 22 24 28   BUN 20 18 15 13   CREATININE 1.0 0.8* 0.8* 0.8*   CALCIUM 8.3 7.6* 7.7* 8.1*   PROT 5.8*  --   --   --    LABALBU 3.2*  --   --   --    BILITOT 0.5  --   --   --    ALKPHOS 88  --   --   --    AST 14*  --   --   --    ALT 10  --   --   --      Lipids:   Lab Results   Component Value Date    HDL 39 09/27/2020     Glucose:  Recent Labs     09/28/20  1123 09/28/20  1605 09/28/20  2046   POCGLU 147* 132* 119*     LkemgaoliiK0B:  Lab Results   Component Value Date    LABA1C 5.6 09/27/2020     High Sensitivity TSH:   Lab Results   Component Value Date    TSHHS 2.730 09/27/2020     Free T3:   Lab Results   Component Value Date    FT3 3.6 04/30/2012     Free T4:  Lab Results   Component Value Date    T4FREE 0.91 09/27/2020       Xr Tibia Fibula Right (2 Views)    Result Date: 9/26/2020  EXAMINATION: THREE XRAY VIEWS OF THE RIGHT ANKLE; 4 XRAY VIEWS OF THE RIGHT TIBIA AND FIBULA 9/26/2020 6:38 pm COMPARISON: None.  HISTORY: ORDERING SYSTEM PROVIDED HISTORY: trauma TECHNOLOGIST PROVIDED HISTORY: Reason for exam:->trauma Reason for Exam: fall Acuity: Acute Type of Exam: Initial; ORDERING SYSTEM PROVIDED HISTORY: pain TECHNOLOGIST PROVIDED HISTORY: Reason for exam:->pain Reason for Exam: fall Acuity: Acute Type of Exam: Initial FINDINGS: Tibia and fibula: Four views of the right tibia and fibula demonstrate no acute fracture or dislocation. Alignment is anatomic. Mild osteoarthritic changes of the right knee. Remote healed deformity of the right distal fibula with surrounding callus formation present. Right ankle: Two views of the right ankle demonstrate no acute fracture or dislocation. Remote healed deformity of the distal fibula. Anatomic alignment of the ankle mortise. Mild hindfoot and midfoot osteoarthritis. Nonspecific soft tissue edema of the lower leg and ankle. Deformity of the calcaneus likely reflecting a remote posttraumatic deformity. No acute calcaneal fracture identified. 1. No acute fracture of the right tibia and fibula or ankle identified. 2. Remote healed deformity of the distal ankle with surrounding callus formation at the distal fibula. 3. Nonspecific soft tissue edema of the lower leg and ankle. Xr Ankle Right (min 3 Views)    Result Date: 9/26/2020  EXAMINATION: THREE XRAY VIEWS OF THE RIGHT ANKLE; 4 XRAY VIEWS OF THE RIGHT TIBIA AND FIBULA 9/26/2020 6:38 pm COMPARISON: None. HISTORY: ORDERING SYSTEM PROVIDED HISTORY: trauma TECHNOLOGIST PROVIDED HISTORY: Reason for exam:->trauma Reason for Exam: fall Acuity: Acute Type of Exam: Initial; ORDERING SYSTEM PROVIDED HISTORY: pain TECHNOLOGIST PROVIDED HISTORY: Reason for exam:->pain Reason for Exam: fall Acuity: Acute Type of Exam: Initial FINDINGS: Tibia and fibula: Four views of the right tibia and fibula demonstrate no acute fracture or dislocation. Alignment is anatomic. Mild osteoarthritic changes of the right knee.   Remote healed deformity of the right distal fibula with surrounding callus formation present. Right ankle: Two views of the right ankle demonstrate no acute fracture or dislocation. Remote healed deformity of the distal fibula. Anatomic alignment of the ankle mortise. Mild hindfoot and midfoot osteoarthritis. Nonspecific soft tissue edema of the lower leg and ankle. Deformity of the calcaneus likely reflecting a remote posttraumatic deformity. No acute calcaneal fracture identified. 1. No acute fracture of the right tibia and fibula or ankle identified. 2. Remote healed deformity of the distal ankle with surrounding callus formation at the distal fibula. 3. Nonspecific soft tissue edema of the lower leg and ankle. Ct Head W Contrast    Result Date: 9/26/2020  EXAMINATION: CT OF THE HEAD WITH CONTRAST  9/26/2020 8:24 pm TECHNIQUE: CT of the head/brain was performed with the administration of intravenous contrast. Multiplanar reformatted images are provided for review. Dose modulation, iterative reconstruction, and/or weight based adjustment of the mA/kV was utilized to reduce the radiation dose to as low as reasonably achievable. COMPARISON: 08/22/2013. HISTORY: ORDERING SYSTEM PROVIDED HISTORY: seizure/syncope? TECHNOLOGIST PROVIDED HISTORY: Patient had a CTA exam performed prior to head scan Reason for exam:->seizure/syncope? Reason for Exam: seizure/syncope? Acuity: Acute Type of Exam: Initial FINDINGS: BRAIN/VENTRICLES: There is no acute intracranial hemorrhage, mass effect or midline shift. No abnormal extra-axial fluid collection. The gray-white differentiation is maintained without evidence of an acute infarct. There is prominence of the ventricles and sulci due to global parenchymal volume loss. There are nonspecific areas of hypoattenuation within the periventricular and subcortical white matter, which likely represent chronic microvascular ischemic change. No abnormal areas of enhancement.  ORBITS: The visualized portion of the orbits demonstrate no acute abnormality. SINUSES: The visualized paranasal sinuses and mastoid air cells demonstrate no acute abnormality. SOFT TISSUES/SKULL: No acute abnormality of the visualized skull or soft tissues. No acute intracranial abnormality. Us Head Neck Soft Tissue Thyroid    Result Date: 9/27/2020  EXAMINATION: THYROID ULTRASOUND 9/27/2020 COMPARISON: CT soft tissue neck performed 05/02/2012. HISTORY: ORDERING SYSTEM PROVIDED HISTORY: Borderline Hypothyroidism TECHNOLOGIST PROVIDED HISTORY: Reason for exam:-> borderline Hypothyroidism Acuity: Acute Type of Exam: Initial FINDINGS: Right thyroid lobe:  3.5 x 1.4 x 1.9 cm Left thyroid lobe:  3.6 x 1.1 x 1.3 cm Isthmus:  0.3 cm Thyroid Gland:  Thyroid gland demonstrates normal echotexture and vascularity. Nodules: No thyroid nodules are present. Cervical lymphadenopathy: No abnormal lymph nodes in the imaged portions of the neck. Unremarkable thyroid ultrasound. Xr Chest Portable    Result Date: 9/26/2020  EXAMINATION: ONE XRAY VIEW OF THE CHEST 9/26/2020 6:38 pm COMPARISON: Chest x-ray August 22, 2013 HISTORY: ORDERING SYSTEM PROVIDED HISTORY: syncope TECHNOLOGIST PROVIDED HISTORY: Reason for exam:->syncope Acuity: Acute FINDINGS: The lungs are without acute focal process. There is no effusion or pneumothorax. The cardiomediastinal silhouette is without acute process. The osseous structures are without acute process. Chronic deformity of the right distal clavicle. No acute process. Vl Dup Lower Extremity Arteries Bilateral    Result Date: 9/27/2020  EXAMINATION: ARTERIAL DUPLEX ULTRASOUND OF THE BILATERAL LOWER EXTREMITIES  9/27/2020 3:47 pm TECHNIQUE: Duplex ultrasound and Doppler images were obtained of the bilateral lower extremities COMPARISON: None.  HISTORY: ORDERING SYSTEM PROVIDED HISTORY: venous stasis ulcer, check for arterial component TECHNOLOGIST PROVIDED HISTORY: Reason for exam:->venous stasis ulcer, check for arterial component Acuity: Acute Type of Exam: Initial FINDINGS: Suboptimal examination due to edema. Mild scattered atherosclerosis. Right: Flow velocities were measured as follows: Com. Fem. 115 cm/sec Prof.            99 cm/sec SFA Prox. 125 cm/sec SFA Mid.      118 cm/sec SFA Dist.      126 cm/sec Pop.             123, 123 cm/sec PTA             84, 133, 110 cm/sec Peron. Nonvisualized cm/sec TANNA             90, 85, 92 cm/sec Left: Flow velocities were measured as follows: Com. Fem. 142 cm/sec Prof.            94 cm/sec SFA Prox. 163 cm/sec SFA Mid.      81 cm/sec SFA Dist.      94 cm/sec Pop.             83, 89 cm/sec PTA             91, 78, nonvisualized cm/sec Peron. 94, 67, 56 cm/sec TANNA             79, 85, 94 cm/sec     Mild scattered atherosclerosis. No hemodynamically significant stenosis. Cta Chest Abdomen Pelvis W Contrast    Result Date: 9/26/2020  EXAMINATION: CTA OF THE CHEST, ABDOMEN AND PELVIS WITH CONTRAST, 9/26/2020 8:24 pm TECHNIQUE: CTA of the chest, abdomen and pelvis was performed after the administration of intravenous contrast.  Multiplanar reformatted images are provided for review. MIP images are provided for review. Dose modulation, iterative reconstruction, and/or weight based adjustment of the mA/kV was utilized to reduce the radiation dose to as low as reasonably achievable. COMPARISON: Chest x-ray September 26, 2020; PET-CT July 12, 2012 HISTORY: ORDERING SYSTEM PROVIDED HISTORY: syncope/cancer TECHNOLOGIST PROVIDED HISTORY: Reason for exam:->syncope/cancer Reason for Exam: syncope/cancer Acuity: Acute Type of Exam: Initial FINDINGS: CTA CHEST: Heart and pericardium demonstrate no acute abnormality. The thoracic aorta is normal in caliber. No mediastinal lymphadenopathy. No pericardial effusion. No focal consolidation, pleural effusion, or pneumothorax identified. No acute osseous or soft tissue abnormality of the thorax identified.   Remote deformity of CTA of the right lower extremity was performed after the administration of intravenous contrast. Multiplanar reformatted images are provided for review. MIP images are provided for review. . Dose modulation, iterative reconstruction, and/or weight based adjustment of the mA/kV was utilized to reduce the radiation dose to as low as reasonably achievable. COMPARISON: None. HISTORY ORDERING SYSTEM PROVIDED HISTORY: ulceration of leg TECHNOLOGIST PROVIDED HISTORY: Reason for exam:->ulceration of leg Reason for Exam: ulceration of leg Acuity: Unknown Type of Exam: Initial FINDINGS: Bones: No acute fracture or dislocation identified. No suspicious lytic or sclerotic osseous lesions. Periosteal reaction at the distal tibia and fibula is favored to be related to longstanding changes of lymph edema. No osteolysis identified. Healed fracture deformity of the distal fibula. Soft Tissue:  Soft tissues demonstrate diffuse subcutaneous edema distal to the knee with soft tissue thickening and ulceration predominantly along the medial soft tissues of the distal lower leg extending to the ankle. Edema extends into the soft tissues of the foot. Correlate for cellulitis. No organized drainable subcutaneous fluid collection identified. Venous varicosities noted throughout the distal lower extremity. Venous varicosities noted to a lesser extent along the medial right thigh. No subcutaneous gas identified. Shotty non pathologically enlarged nodes along the right iliac chain and right inguinal region. These are nonspecific. Patient does have given history of lymphoma. Normal vascular flow the imaged bilateral common iliac, external iliac, and internal iliac arteries. Scattered calcified atherosclerotic plaque of the bilateral superficial femoral arteries and per fundal with normal flow within the imaged bilateral superficial femoral arteries and profundus a femoral spine laterally.   Mild scattered atherosclerotic plaque of the right popliteal artery with no significant stenosis identified. Multifocal moderate atherosclerotic plaque throughout the trifurcation vessels. There is 2 vessel runoff to the right foot along both the anterior and posterior tibial arteries. The peroneal artery is not seen distal to the level of the mid to distal fibula. Joint:  Anatomic alignment of the right hip, knee, and ankle. Mild degenerative change of the right hip. Mild tricompartmental osteoarthritis of the right knee. 1. Extensive subcutaneous edema distal to the knee which becomes more pronounced distally with associated skin thickening and ulceration along the medial soft tissues of the distal right lower leg. Correlate for cellulitis. No organized drainable fluid collection or subcutaneous gas identified. 2. Mild periosteal reaction along the distal tibia and fibula which is favored to be related to changes of chronic venous stasis. Osteomyelitis is felt less likely given no evidence for osteolysis. Please note MRI is more sensitive and specific for early changes of osteomyelitis. 3. 2 vessel runoff to the right foot with multifocal moderate calcified atherosclerotic plaque throughout the trifurcation vessels. The peroneal artery is not identified distal to the mid to distal fibular diaphysis. No significant stenosis of the imaged bilateral iliac and femoral arteries. 4. Shotty lymph nodes along the right iliac chain and right inguinal region which are non pathologically enlarged. Patient does have given history of lymphoma.        Scheduled Medicines   Medications:    sodium chloride flush  10 mL Intravenous 2 times per day    insulin lispro  0-6 Units Subcutaneous TID WC    insulin lispro  0-3 Units Subcutaneous Nightly      Infusions:    dextrose           Objective:   Vitals: /83   Pulse 96   Temp 98.8 °F (37.1 °C) (Oral)   Resp 22   Ht 6' 0.99\" (1.854 m)   Wt 180 lb 8 oz (81.9 kg)   SpO2 96%   BMI 23.82 kg/m² General appearance: alert and cooperative with exam  Neck: no JVD or bruit  Thyroid : Normal lobes   Lungs: Has Vesicular Breath sounds   Heart:  regular rate and rhythm  Abdomen: soft, non-tender; bowel sounds normal; no masses,  no organomegaly  Musculoskeletal: Normal  Extremities: extremities normal, , yes has edema right leg has stasis ulcers of the legs are covered with dressings neurologic:  Awake, alert, oriented to name, place and time. Cranial nerves II-XII are grossly intact. Motor is  intact. Sensory is intact. ,  and gait is abnormal.    Assessment:     Patient Active Problem List:     Lymphoma ([facility] [address]     Confusion     Seizure disorder ([address]     Bacteremia associated with IV line ([address]     Altered mental status     Chronic venous hypertension with ulcer and inflammation (HCC)     Paranoia (HCC)     Leg ulcer ([facility] [address]     Venous ulcer of left leg ([facility] [address]      Plan:     1. Reviewed POC blood glucose . Labs and X ray results   2. Reviewed Current Medicines   3. Reviewed the repeat thyroid function test normal at this time  4. Thyroid antibody tests are still pending  5. Ultrasound of thyroid was in normal  6. Will sign off the case at this time as my contribution to his care be negligible      .      Emeka Thompson MD

## 2020-09-30 ENCOUNTER — HOSPITAL ENCOUNTER (OUTPATIENT)
Dept: WOUND CARE | Age: 75
Discharge: HOME | End: 2020-09-30
Payer: COMMERCIAL

## 2020-09-30 VITALS
RESPIRATION RATE: 18 BRPM | SYSTOLIC BLOOD PRESSURE: 166 MMHG | DIASTOLIC BLOOD PRESSURE: 98 MMHG | TEMPERATURE: 99.6 F | HEART RATE: 112 BPM

## 2020-09-30 LAB
CULTURE: ABNORMAL
Lab: ABNORMAL
SPECIMEN: ABNORMAL

## 2020-09-30 PROCEDURE — 99213 OFFICE O/P EST LOW 20 MIN: CPT

## 2020-09-30 PROCEDURE — 99213 OFFICE O/P EST LOW 20 MIN: CPT | Performed by: SURGERY

## 2020-09-30 PROCEDURE — 29581 APPL MULTLAYER CMPRN SYS LEG: CPT

## 2020-09-30 NOTE — TELEPHONE ENCOUNTER
Called patient -patient has not been seen here since 2013, per his wife, (chart was not abstracted)----patient contacted Dr. Shahid Davis and made an appt---they have also have [facility] in place.

## 2020-09-30 NOTE — PROGRESS NOTES
[facility] Initial Visit      Radha Ovalle  AGE: 77 y.o. GENDER: male  : 1945  EPISODE DATE:  2020   Referred by:Me    Subjective:     CHIEF COMPLAINT VSU     HISTORY of PRESENT ILLNESS      Radha Ovalle is a 77 y.o. male who presents to the [address] [place] [address] for an initial visit for evaluation and treatment of Chronic venous  wound(s) of  R lower leg medial, R lower leg anterior, R lower leg posterior. The condition is of moderate severity. The wound has been present for 6years. The underlying cause is thought to be venous disease. The patients care to date has included wrapping. The patient has significant underlying medical conditions as below.      Wound Pain Timing/Severity: none  Quality of pain: N/A  Severity of pain:  0 / 10   Modifying Factors: edema and venous stasis  Associated Signs/Symptoms: drainage        PAST MEDICAL HISTORY        Diagnosis Date    Hypertension     Lymphoma Dx 11-       PAST SURGICAL HISTORY    Past Surgical History:   Procedure Laterality Date    TUNNELED VENOUS PORT PLACEMENT      TUNNELED VENOUS PORT PLACEMENT      removed 2012       FAMILY HISTORY    Family History   Problem Relation Age of Onset    Early Death Mother 52    Diabetes Father     Heart Disease Father     Vision Loss Son         \"He wears glasses\"    Early Death Daughter 36       SOCIAL HISTORY    Social History     Tobacco Use    Smoking status: Never Smoker    Smokeless tobacco: Never Used   Substance Use Topics    Alcohol use: No    Drug use: No       ALLERGIES    No Known Allergies    MEDICATIONS    Current Outpatient Medications on File Prior to Encounter   Medication Sig Dispense Refill    ferrous sulfate (IRON 325) 325 (65 Fe) MG tablet Take 1 tablet by mouth 2 times daily 180 tablet 1    ciprofloxacin (CIPRO) 500 MG tablet Take 1 tablet by mouth 2 times daily for 5 days 10 tablet 0    ondansetron (ZOFRAN) 8 MG tablet Take 8 mg by mouth every 8 hours as needed. No current facility-administered medications on file prior to encounter. PROBLEM LIST    Patient Active Problem List   Diagnosis    [facility] [address]    Confusion    Seizure disorder ([facility] [address]    Bacteremia associated with IV line ([facility] [address]    Altered mental status    Chronic venous hypertension with ulcer and inflammation (HCC)    Paranoia (HCC)    Leg ulcer ([facility] [address]    Venous ulcer of left leg ([facility] [address]       REVIEW OF SYSTEMS    Pertinent items are noted in HPI. Objective:      BP (!) 166/98   Pulse 112   Temp 99.6 °F (37.6 °C) (Temporal)   Resp 18     PHYSICAL EXAM    Dermatologic exam: Visual inspection of the periwound reveals the skin to be edematous  Wound exam: see wound description below in procedure note      Assessment:       Chantal Pascual  appears to have a non-healing wound of the right lower leg. The etiology of the wound is felt to be venous. There are multiple complicating factors including edema. A comprehensive wound management program would be helpful to heal this wound. Assessments completed include fall risk and nutritional, functional,and psychological status. At this time appropriate care would include: periodic debridement and wound care as below. Plan:     Discharge instructions:  Compression wrap. WIll apply for graft.  Will need evaluation by Dr. Carlyle Duverney for venous intervention     Treatment Note      Written Patient Dismissal Instructions Given            Electronically signed by Hamida Canseco MD on 9/30/2020 at 1:48 PM

## 2020-10-01 LAB
CULTURE: NORMAL
Lab: NORMAL
SPECIMEN: NORMAL

## 2020-10-02 LAB
CULTURE: NORMAL
Lab: NORMAL
SPECIMEN: NORMAL

## 2020-10-07 ENCOUNTER — HOSPITAL ENCOUNTER (OUTPATIENT)
Dept: WOUND CARE | Age: 75
Discharge: HOME | End: 2020-10-07
Payer: COMMERCIAL

## 2020-10-07 VITALS
SYSTOLIC BLOOD PRESSURE: 156 MMHG | DIASTOLIC BLOOD PRESSURE: 77 MMHG | TEMPERATURE: 99.4 F | RESPIRATION RATE: 20 BRPM | HEART RATE: 109 BPM

## 2020-10-07 PROCEDURE — 11045 DBRDMT SUBQ TISS EACH ADDL: CPT

## 2020-10-07 PROCEDURE — 11045 DBRDMT SUBQ TISS EACH ADDL: CPT | Performed by: SURGERY

## 2020-10-07 PROCEDURE — 11042 DBRDMT SUBQ TIS 1ST 20SQCM/<: CPT

## 2020-10-07 PROCEDURE — 11042 DBRDMT SUBQ TIS 1ST 20SQCM/<: CPT | Performed by: SURGERY

## 2020-10-07 NOTE — PROGRESS NOTES
Wound Care Center Progress Note with Procedure Note      Kashif Hansen  AGE: 77 y.o. GENDER: male  : 1945  EPISODE DATE:  10/7/2020     Subjective:     Chief Complaint   Patient presents with    Wound Check     Right lower leg         HISTORY of PRESENT ILLNESS      Kashif Hansen is a 77 y.o. male who presents today for wound evaluation of Chronic venous wound(s) of R lower leg medial, R lower leg posterior. The wound is of moderate severity. The underlying cause of the wound is venous disease. Wound Pain Timing/Severity: mild  Quality of pain: aching  Severity of pain:  1 / 10   Modifying Factors: venous stasis  Associated Signs/Symptoms: drainage        PAST MEDICAL HISTORY        Diagnosis Date    Hypertension     Lymphoma Dx        PAST SURGICAL HISTORY    Past Surgical History:   Procedure Laterality Date    TUNNELED VENOUS PORT PLACEMENT      TUNNELED VENOUS PORT PLACEMENT      removed 2012       FAMILY HISTORY    Family History   Problem Relation Age of Onset    Early Death Mother 52    Diabetes Father     Heart Disease Father     Vision Loss Son         \"He wears glasses\"    Early Death Daughter 36       SOCIAL HISTORY    Social History     Tobacco Use    Smoking status: Never Smoker    Smokeless tobacco: Never Used   Substance Use Topics    Alcohol use: No    Drug use: No       ALLERGIES    No Known Allergies    MEDICATIONS    Current Outpatient Medications on File Prior to Encounter   Medication Sig Dispense Refill    ferrous sulfate (IRON 325) 325 (65 Fe) MG tablet Take 1 tablet by mouth 2 times daily 180 tablet 1    ondansetron (ZOFRAN) 8 MG tablet Take 8 mg by mouth every 8 hours as needed. No current facility-administered medications on file prior to encounter. REVIEW OF SYSTEMS    Pertinent items are noted in HPI.   Constitutional: Negative for systemic symptoms including fever, chills and malaise. Objective:      BP (!) 156/77   Pulse 109   Temp 99.4 °F (37.4 °C) (Temporal)   Resp 20     PHYSICAL EXAM      General: The patient is in no acute distress. Mental status:  Patient is appropriate, is  oriented to place and plan of care. Dermatologic exam: Visual inspection of the periwound reveals the skin to be edematous  Wound exam: see wound description below in procedure note      Assessment:     Problem List Items Addressed This Visit     Chronic venous hypertension with ulcer and inflammation ([address]    Venous ulcer of left leg ([address] - Primary        Procedure Note    Indications:  Based on my examination of this patient's wound(s) today, sharp excision into necrotic subcutaneous tissue is required to promote healing and evaluate the extent of previous healing. Performed by: Urszula Luong MD    Consent obtained: Yes    Time out taken:  Yes    Pain Control: topical       Debridement:Excisional Debridement    Using curette the wound(s) was/were sharply debrided down through and including the removal of subcutaneous tissue. Devitalized Tissue Debrided:  slough    Pre Debridement Measurements:  Are located in the Wound Documentation Flow Sheet    All active wounds listed below with today's date are evaluated  Wound(s)    debrided this date include # : 1     Post  Debridement Measurements:  Wound 09/30/20 Wound #1 Right Medial Lower Leg Cluster (Onset 5 Years) (Active)   Wound Image   09/30/20 1306   Wound Cleansed Cleansed with saline; Wound cleanser; Soap and water 10/07/20 1303   Offloading for Diabetic Foot Ulcers No 10/07/20 1303   Wound Length (cm) 16.5 cm 10/07/20 1303   Wound Width (cm) 13.5 cm 10/07/20 1303   Wound Depth (cm) 0.1 cm 10/07/20 1303   Wound Surface Area (cm^2) 222.75 cm^2 10/07/20 1303   Change in Wound Size % (l*w) 11.61 10/07/20 1303   Wound Volume (cm^3) 22.28 cm^3 10/07/20 1303   Wound Healing % 12 10/07/20 1303   Post-Procedure Length (cm) 16 cm 10/07/20 1332   Post-Procedure Width (cm) 13.5 cm 10/07/20 1332   Post-Procedure Depth (cm) 0.1 cm 10/07/20 1332   Post-Procedure Surface Area (cm^2) 216 cm^2 10/07/20 1332   Post-Procedure Volume (cm^3) 21.6 cm^3 10/07/20 1332   Distance Tunneling (cm) 0 cm 10/07/20 1303   Tunneling Position ___ O'Clock 0 10/07/20 1303   Undermining Starts ___ O'Clock 0 10/07/20 1303   Undermining Ends___ O'Clock 0 10/07/20 1303   Undermining Maxium Distance (cm) 0 10/07/20 1303   Wound Assessment Granulation tissue 10/07/20 1303   Drainage Amount Large 10/07/20 1303   Drainage Description Serosanguinous 10/07/20 1303   Odor Mild 10/07/20 1303   Cassia-wound Assessment Intact; Warm 10/07/20 1303   Margins Defined edges 10/07/20 1303   Wound Thickness Description not for Pressure Injury Full thickness 10/07/20 1303   Number of days: 7       Percent of Wound(s) Debrided: approximately 100%    Total Surface Area Debrided:  223 sq cm     Bleeding:  Minimal    Hemostasis Achieved:  not needed    Procedural Pain:  1  / 10     Post Procedural Pain:  0 / 10     Response to treatment:  Well tolerated by patient. Status of wound progress and description from last visit:   Improved. Will see about grafts. Plan:       Discharge Instructions            PHYSICIAN ORDERS AND DISCHARGE INSTRUCTIONS     NOTE: Upon discharge from the [address], you will receive a patient experience survey.  We would be grateful if you would take the time to fill this survey out.     Wound care order history:                 Vascular studies: Arterial studies done 9/27/20; no strenosis              Imaging:   Date               Cultures:   Date               Labs/ HbA1c:   Date               Grafts:  Date               HBO:                Antibiotics:               Earlier Wound care treatments:                Authorizations:                        Consults:   Date                           Primary care physician:      Continuing wound care orders and 2:17 PM

## 2020-10-07 NOTE — PROGRESS NOTES
Multilayer Compression Wrap   (Not Unna) Below the Knee    NAME:  Jesse Wilson  YOB: 1945  MEDICAL RECORD NUMBER:  [id number]  DATE:  10/7/2020    Multilayer compression wrap: Removed old Multilayer wrap if indicated and wash leg with mild soap/water. Applied moisturizing agent to dry skin as needed. Applied primary and secondary dressing as ordered. Applied multilayered dressing below the knee to right lower leg. Instructed patient/caregiver not to remove dressing and to keep it clean and dry. Instructed patient/caregiver on complications to report to provider, such as pain, numbness in toes, heavy drainage, and slippage of dressing. Instructed patient on purpose of compression dressing and on activity and exercise recommendations.       Electronically signed by Burgess Semaj LPN on 35/4/8992 at 6:65 PM

## 2020-10-14 ENCOUNTER — HOSPITAL ENCOUNTER (OUTPATIENT)
Dept: WOUND CARE | Age: 75
Discharge: HOME | End: 2020-10-14
Payer: COMMERCIAL

## 2020-10-14 ENCOUNTER — OFFICE VISIT (OUTPATIENT)
Dept: INTERNAL MEDICINE CLINIC | Age: 75
End: 2020-10-14
Payer: COMMERCIAL

## 2020-10-14 VITALS
SYSTOLIC BLOOD PRESSURE: 133 MMHG | TEMPERATURE: 99.4 F | RESPIRATION RATE: 18 BRPM | DIASTOLIC BLOOD PRESSURE: 84 MMHG | HEART RATE: 102 BPM

## 2020-10-14 VITALS
BODY MASS INDEX: 24.67 KG/M2 | TEMPERATURE: 98.1 F | HEART RATE: 104 BPM | WEIGHT: 176.2 LBS | DIASTOLIC BLOOD PRESSURE: 90 MMHG | HEIGHT: 71 IN | OXYGEN SATURATION: 99 % | SYSTOLIC BLOOD PRESSURE: 134 MMHG

## 2020-10-14 PROBLEM — D62 ACUTE BLOOD LOSS ANEMIA: Status: ACTIVE | Noted: 2020-10-14

## 2020-10-14 PROBLEM — E61.1 IRON DEFICIENCY: Status: ACTIVE | Noted: 2020-10-14

## 2020-10-14 PROCEDURE — 99203 OFFICE O/P NEW LOW 30 MIN: CPT | Performed by: FAMILY MEDICINE

## 2020-10-14 PROCEDURE — 11042 DBRDMT SUBQ TIS 1ST 20SQCM/<: CPT | Performed by: SURGERY

## 2020-10-14 PROCEDURE — 11045 DBRDMT SUBQ TISS EACH ADDL: CPT | Performed by: SURGERY

## 2020-10-14 PROCEDURE — 11045 DBRDMT SUBQ TISS EACH ADDL: CPT

## 2020-10-14 PROCEDURE — 11042 DBRDMT SUBQ TIS 1ST 20SQCM/<: CPT

## 2020-10-14 RX ORDER — GINSENG 100 MG
CAPSULE ORAL ONCE
Status: CANCELLED | OUTPATIENT
Start: 2020-10-14

## 2020-10-14 RX ORDER — GENTAMICIN SULFATE 1 MG/G
OINTMENT TOPICAL ONCE
Status: CANCELLED | OUTPATIENT
Start: 2020-10-14

## 2020-10-14 RX ORDER — LIDOCAINE HYDROCHLORIDE 20 MG/ML
JELLY TOPICAL ONCE
Status: CANCELLED | OUTPATIENT
Start: 2020-10-14

## 2020-10-14 RX ORDER — BACITRACIN ZINC AND POLYMYXIN B SULFATE 500; 1000 [USP'U]/G; [USP'U]/G
OINTMENT TOPICAL ONCE
Status: CANCELLED | OUTPATIENT
Start: 2020-10-14

## 2020-10-14 RX ORDER — BACITRACIN, NEOMYCIN, POLYMYXIN B 400; 3.5; 5 [USP'U]/G; MG/G; [USP'U]/G
OINTMENT TOPICAL ONCE
Status: CANCELLED | OUTPATIENT
Start: 2020-10-14

## 2020-10-14 RX ORDER — LIDOCAINE HYDROCHLORIDE 40 MG/ML
SOLUTION TOPICAL ONCE
Status: CANCELLED | OUTPATIENT
Start: 2020-10-14

## 2020-10-14 RX ORDER — LIDOCAINE 40 MG/G
CREAM TOPICAL ONCE
Status: CANCELLED | OUTPATIENT
Start: 2020-10-14

## 2020-10-14 RX ORDER — CLOBETASOL PROPIONATE 0.5 MG/G
OINTMENT TOPICAL ONCE
Status: CANCELLED | OUTPATIENT
Start: 2020-10-14

## 2020-10-14 RX ORDER — LIDOCAINE 50 MG/G
OINTMENT TOPICAL ONCE
Status: CANCELLED | OUTPATIENT
Start: 2020-10-14

## 2020-10-14 RX ORDER — BETAMETHASONE DIPROPIONATE 0.05 %
OINTMENT (GRAM) TOPICAL ONCE
Status: CANCELLED | OUTPATIENT
Start: 2020-10-14

## 2020-10-14 ASSESSMENT — PATIENT HEALTH QUESTIONNAIRE - PHQ9
SUM OF ALL RESPONSES TO PHQ9 QUESTIONS 1 & 2: 0
SUM OF ALL RESPONSES TO PHQ QUESTIONS 1-9: 0
2. FEELING DOWN, DEPRESSED OR HOPELESS: 0
SUM OF ALL RESPONSES TO PHQ QUESTIONS 1-9: 0
1. LITTLE INTEREST OR PLEASURE IN DOING THINGS: 0

## 2020-10-14 ASSESSMENT — ENCOUNTER SYMPTOMS
SHORTNESS OF BREATH: 0
CHEST TIGHTNESS: 0
ABDOMINAL PAIN: 0
CONSTIPATION: 0
COUGH: 0
NAUSEA: 0
BLOOD IN STOOL: 0
BACK PAIN: 0
DIARRHEA: 0
EYES NEGATIVE: 1

## 2020-10-14 NOTE — PROGRESS NOTES
Social History     Tobacco Use    Smoking status: Never Smoker    Smokeless tobacco: Never Used   Substance Use Topics    Alcohol use: No    Drug use: No       Current Outpatient Medications   Medication Sig Dispense Refill    ferrous sulfate (IRON 325) 325 (65 Fe) MG tablet Take 1 tablet by mouth 2 times daily 180 tablet 1     No current facility-administered medications for this visit. OBJECTIVE:    BP (!) 134/90   Pulse 104   Temp 98.1 °F (36.7 °C)   Ht 5' 11\" (1.803 m)   Wt 176 lb 3.2 oz (79.9 kg)   SpO2 99%   BMI 24.57 kg/m²     Physical Exam  Vitals signs reviewed. Constitutional:       General: He is not in acute distress. Appearance: He is well-developed. HENT:      Head: Normocephalic and atraumatic. Right Ear: Tympanic membrane normal.      Left Ear: Tympanic membrane normal.      Nose: Nose normal.      Mouth/Throat:      Mouth: Mucous membranes are moist.   Eyes:      Extraocular Movements: Extraocular movements intact. Pupils: Pupils are equal, round, and reactive to light. Neck:      Musculoskeletal: Neck supple. Cardiovascular:      Rate and Rhythm: Normal rate and regular rhythm. Pulmonary:      Effort: Pulmonary effort is normal. No respiratory distress. Breath sounds: Normal breath sounds. Abdominal:      General: Bowel sounds are normal. There is no distension. Palpations: Abdomen is soft. Tenderness: There is no abdominal tenderness. Musculoskeletal:      Left lower leg: No edema. Comments: R LE--wrapped   Skin:     General: Skin is warm and dry. Neurological:      Mental Status: He is alert and oriented to person, place, and time. Cranial Nerves: No cranial nerve deficit. Psychiatric:         Mood and Affect: Mood normal.         ASSESSMENT:  1. Chronic venous hypertension with ulcer and inflammation involving right side ([address]    2. Acute blood loss anemia    3. Iron deficiency    4.  Screening for colon cancer PLAN:    Orders Placed This Encounter   Procedures    CBC Auto Differential    FERRITIN    IRON AND TIBC   [address]    POCT Fecal Immunochemical Test (FIT)   Obtain old records  Obtain lab  Obtain FIT  [place] [facility]  Keep f/u with wound care  Declines GI any preventative screens at this time         Return in about 6 weeks (around 11/25/2020) for anemia.     Electronically Signed by Molly Oliveira DO

## 2020-10-14 NOTE — PROGRESS NOTES
malaise. Objective:      /84   Pulse 102   Temp 99.4 °F (37.4 °C) (Temporal)   Resp 18     PHYSICAL EXAM      General: The patient is in no acute distress. Mental status:  Patient is appropriate, is  oriented to place and plan of care. Dermatologic exam: Visual inspection of the periwound reveals the skin to be edematous  Wound exam: see wound description below in procedure note      Assessment:     Problem List Items Addressed This Visit     Venous ulcer of left leg ([address] - Primary        Procedure Note    Indications:  Based on my examination of this patient's wound(s) today, sharp excision into necrotic subcutaneous tissue is required to promote healing and evaluate the extent of previous healing. Performed by: Blayne Zhu MD    Consent obtained: Yes    Time out taken:  Yes    Pain Control: topical Anesthetic  Anesthetic: 4% Lidocaine Liquid Topical     Debridement:Excisional Debridement    Using curette the wound(s) was/were sharply debrided down through and including the removal of subcutaneous tissue. Devitalized Tissue Debrided:  slough    Pre Debridement Measurements:  Are located in the Wound Documentation Flow Sheet    All active wounds listed below with today's date are evaluated  Wound(s)    debrided this date include # : 1    Post  Debridement Measurements:      Wound 09/30/20 Wound #1 Right Medial Lower Leg Cluster (Onset 5 Years) (Active)   Wound Image   10/14/20 1430   Wound Cleansed Wound cleanser; Soap and water 10/14/20 1430   Dressing/Treatment Alginate with Ag 09/30/20 1355   Offloading for Diabetic Foot Ulcers No 10/14/20 1430   Wound Length (cm) 14 cm 10/14/20 1430   Wound Width (cm) 13.5 cm 10/14/20 1430   Wound Depth (cm) 0.1 cm 10/14/20 1430   Wound Surface Area (cm^2) 189 cm^2 10/14/20 1430   Change in Wound Size % (l*w) 25 10/14/20 1430   Wound Volume (cm^3) 18.9 cm^3 10/14/20 1430   Wound Healing % 25 10/14/20 1430   Post-Procedure Length (cm) 16 cm 10/07/20 1332   Post-Procedure Width (cm) 13.5 cm 10/07/20 1332   Post-Procedure Depth (cm) 0.1 cm 10/07/20 1332   Post-Procedure Surface Area (cm^2) 216 cm^2 10/07/20 1332   Post-Procedure Volume (cm^3) 21.6 cm^3 10/07/20 1332   Distance Tunneling (cm) 0 cm 10/14/20 1430   Tunneling Position ___ O'Clock 0 10/14/20 1430   Undermining Starts ___ O'Clock 0 10/14/20 1430   Undermining Ends___ O'Clock 0 10/14/20 1430   Undermining Maxium Distance (cm) 0 10/14/20 1430   Wound Assessment Granulation tissue;Slough 10/14/20 1430   Drainage Amount Large 10/14/20 1430   Drainage Description Serosanguinous 10/14/20 1430   Odor Mild 10/14/20 1430   Cassia-wound Assessment Intact 10/14/20 1430   Margins Defined edges 10/14/20 1430   Wound Thickness Description not for Pressure Injury Full thickness 10/14/20 1430   Number of days: 14       Percent of Wound(s) Debrided: approximately 100%    Total Surface Area Debrided:  189 sq cm     Bleeding:  Minimal    Hemostasis Achieved:  by pressure    Procedural Pain:  1  / 10     Post Procedural Pain:  0 / 10     Response to treatment:  Well tolerated by patient. Status of wound progress and description from last visit:   No grafts available to apply so will reapply coban and have it changed 3 times a week due to the drainage. Hopefully we will have enough grafts to apply next week. .      Plan:       Discharge Instructions       PHYSICIAN ORDERS AND DISCHARGE INSTRUCTIONS     NOTE: Upon discharge from the [address], you will receive a patient experience survey.  We would be grateful if you would take the time to fill this survey out.     Wound care order history:                 Vascular studies: Arterial studies done 9/27/20; no stenosis              Imaging:   Date               Cultures:   Date               Labs/ HbA1c:   Date               Grafts:  Date               HBO:                Antibiotics:               Earlier Wound care treatments:                Authorizations:                        Consults: [facility] physician:      Continuing wound care orders and information:              Residence:  private               Continue home health care with:  [id number]               Your wound-care supplies will be provided by: [facility] Cannon provider:              JANINA Suarez loading:  Date               DTKCJ Medications:              SRIOM cleansing:                           LL not scrub or use excessive force.                          Wash hands with soap and water before and after dressing changes.                         Prior to applying a clean dressing, cleanse wound with normal saline,                                wound cleanser, or mild soap and water.                           Ask the physician or nurse before getting the wound(s) wet in a shower              Daily Wound management:                          Keep weight off wounds and reposition every 2 hours.                          AICNF standing for long periods of time.                          LTHTV wraps/stockings in AM and remove at bedtime.                          If swelling is present, elevate legs to the level of the heart or above for 30                              minutes 4-5 times a day and/or when sitting.                                             When taking antibiotics take entire prescription as ordered by physician                             do not stop taking until medicine is all gone.                                                       Orders for this week:  10/14/2020     Right lower leg -- wash with soap and water,pat dry.  Cover with silver calcium alginate, Kerramax. Wrap with Coban 2 leave in place until next visit.  Homecare to change on Friday and Monday  ( may use profore  to wrap)      Auth for grafts on 10/7/2020 -- approved for Janay Lynch and Bijanigraf                        Follow up with CNP   In 1 weeks in the wound care center  Call [phone] for any questions or concerns.   Date__________   Time____________        Treatment Note Wound 09/30/20 Wound #1 Right Medial Lower Leg Cluster (Onset 5 Years)-Dressing/Treatment: (calcium alginate Ag, kerramax, coban 2, )    Written Patient Dismissal Instructions Given            Electronically signed by Navneet Alvarez MD on 10/14/2020 at 7:12 PM

## 2020-10-16 ENCOUNTER — TELEPHONE (OUTPATIENT)
Dept: INTERNAL MEDICINE CLINIC | Age: 75
End: 2020-10-16

## 2020-10-21 ENCOUNTER — HOSPITAL ENCOUNTER (OUTPATIENT)
Dept: WOUND CARE | Age: 75
Discharge: HOME | End: 2020-10-21
Payer: COMMERCIAL

## 2020-10-21 VITALS
TEMPERATURE: 97.6 F | HEART RATE: 89 BPM | RESPIRATION RATE: 16 BRPM | DIASTOLIC BLOOD PRESSURE: 82 MMHG | SYSTOLIC BLOOD PRESSURE: 163 MMHG

## 2020-10-21 PROCEDURE — 87070 CULTURE OTHR SPECIMN AEROBIC: CPT

## 2020-10-21 PROCEDURE — 11045 DBRDMT SUBQ TISS EACH ADDL: CPT | Performed by: NURSE PRACTITIONER

## 2020-10-21 PROCEDURE — 87186 SC STD MICRODIL/AGAR DIL: CPT

## 2020-10-21 PROCEDURE — 11045 DBRDMT SUBQ TISS EACH ADDL: CPT

## 2020-10-21 PROCEDURE — 11042 DBRDMT SUBQ TIS 1ST 20SQCM/<: CPT | Performed by: NURSE PRACTITIONER

## 2020-10-21 PROCEDURE — 11042 DBRDMT SUBQ TIS 1ST 20SQCM/<: CPT

## 2020-10-21 PROCEDURE — 87075 CULTR BACTERIA EXCEPT BLOOD: CPT

## 2020-10-21 PROCEDURE — 87076 CULTURE ANAEROBE IDENT EACH: CPT

## 2020-10-21 PROCEDURE — 87077 CULTURE AEROBIC IDENTIFY: CPT

## 2020-10-21 RX ORDER — LIDOCAINE HYDROCHLORIDE 40 MG/ML
SOLUTION TOPICAL ONCE
Status: DISCONTINUED | OUTPATIENT
Start: 2020-10-21 | End: 2020-10-22 | Stop reason: HOSPADM

## 2020-10-21 RX ORDER — GINSENG 100 MG
CAPSULE ORAL ONCE
Status: CANCELLED | OUTPATIENT
Start: 2020-10-21

## 2020-10-21 RX ORDER — LIDOCAINE HYDROCHLORIDE 20 MG/ML
JELLY TOPICAL ONCE
Status: CANCELLED | OUTPATIENT
Start: 2020-10-21

## 2020-10-21 RX ORDER — LIDOCAINE 50 MG/G
OINTMENT TOPICAL ONCE
Status: CANCELLED | OUTPATIENT
Start: 2020-10-21

## 2020-10-21 RX ORDER — CLOBETASOL PROPIONATE 0.5 MG/G
OINTMENT TOPICAL ONCE
Status: CANCELLED | OUTPATIENT
Start: 2020-10-21

## 2020-10-21 RX ORDER — GENTAMICIN SULFATE 1 MG/G
OINTMENT TOPICAL ONCE
Status: CANCELLED | OUTPATIENT
Start: 2020-10-21

## 2020-10-21 RX ORDER — LIDOCAINE HYDROCHLORIDE 40 MG/ML
SOLUTION TOPICAL ONCE
Status: CANCELLED | OUTPATIENT
Start: 2020-10-21

## 2020-10-21 RX ORDER — BACITRACIN, NEOMYCIN, POLYMYXIN B 400; 3.5; 5 [USP'U]/G; MG/G; [USP'U]/G
OINTMENT TOPICAL ONCE
Status: CANCELLED | OUTPATIENT
Start: 2020-10-21

## 2020-10-21 RX ORDER — BACITRACIN ZINC AND POLYMYXIN B SULFATE 500; 1000 [USP'U]/G; [USP'U]/G
OINTMENT TOPICAL ONCE
Status: CANCELLED | OUTPATIENT
Start: 2020-10-21

## 2020-10-21 RX ORDER — LIDOCAINE 40 MG/G
CREAM TOPICAL ONCE
Status: CANCELLED | OUTPATIENT
Start: 2020-10-21

## 2020-10-21 RX ORDER — BETAMETHASONE DIPROPIONATE 0.05 %
OINTMENT (GRAM) TOPICAL ONCE
Status: CANCELLED | OUTPATIENT
Start: 2020-10-21

## 2020-10-21 NOTE — PROGRESS NOTES
Multilayer Compression Wrap   (Not Unna) Below the Knee    NAME:  Gatito Longoria  YOB: 1945  MEDICAL RECORD NUMBER:  [id number]  DATE:  10/21/2020    Multilayer compression wrap: Removed old Multilayer wrap if indicated and wash leg with mild soap/water. Applied moisturizing agent to dry skin as needed. Applied primary and secondary dressing as ordered. Applied multilayered dressing below the knee to right lower leg. Instructed patient/caregiver not to remove dressing and to keep it clean and dry. Instructed patient/caregiver on complications to report to provider, such as pain, numbness in toes, heavy drainage, and slippage of dressing. Instructed patient on purpose of compression dressing and on activity and exercise recommendations.       Electronically signed by Alayna Dubon LPN on 49/69/1550 at 3:21 PM

## 2020-10-21 NOTE — PROGRESS NOTES
Wound Care Center Progress Note With Procedure    Vero Thomas  AGE: 77 y.o. GENDER: male  : 1945  EPISODE DATE:  10/21/2020     Subjective:     Chief Complaint   Patient presents with    Wound Check     Right lower leg         HISTORY of PRESENT ILLNESS      Vero Thomas is a 77 y.o. male who presents today for wound evaluation of Chronic venous wound(s) of R lower leg medial, R lower leg anterior. The wound is of moderate severity. The underlying cause of the wound is venous disease. Wound Pain Timing/Severity: mild  Quality of pain: aching  Severity of pain:  1 / 10   Modifying Factors: edema and venous stasis  Associated Signs/Symptoms: drainage        PAST MEDICAL HISTORY        Diagnosis Date    Chronic venous hypertension with ulcer and inflammation involving right side (HCC)     RLE    Hypertension     past    Iron deficiency     Lymphoma Dx 11-    Hx of B cell lymphoma-in remission since        PAST SURGICAL HISTORY    Past Surgical History:   Procedure Laterality Date    TUNNELED VENOUS PORT PLACEMENT      TUNNELED VENOUS PORT PLACEMENT      removed 2012       FAMILY HISTORY    Family History   Problem Relation Age of Onset    Early Death Mother 52    Diabetes Father     Heart Disease Father     Vision Loss Son         \"He wears glasses\"    Early Death Daughter 36       SOCIAL HISTORY    Social History     Tobacco Use    Smoking status: Never Smoker    Smokeless tobacco: Never Used   Substance Use Topics    Alcohol use: No    Drug use: No       ALLERGIES    No Known Allergies    MEDICATIONS    Current Outpatient Medications on File Prior to Encounter   Medication Sig Dispense Refill    ferrous sulfate (IRON 325) 325 (65 Fe) MG tablet Take 1 tablet by mouth 2 times daily 180 tablet 1     No current facility-administered medications on file prior to encounter. REVIEW OF SYSTEMS    Pertinent items are noted in HPI.     Constitutional: Negative for Right;Lateral (Active)   Number of days: 2641       Wound 09/27/20 Leg Right (Active)   Wound Etiology Venous 09/28/20 1940   Wound Cleansed Rinsed/Irrigated with saline 09/28/20 0941   Dressing/Treatment Other (comment) 09/29/20 1400   Wound Length (cm) 18 cm 09/28/20 0941   Wound Width (cm) 14 cm 09/28/20 0941   Wound Depth (cm) 0.3 cm 09/28/20 0941   Wound Surface Area (cm^2) 252 cm^2 09/28/20 0941   Wound Volume (cm^3) 75.6 cm^3 09/28/20 0941   Distance Tunneling (cm) 0 cm 09/28/20 1546   Tunneling Position ___ O'Clock 0 09/28/20 1546   Undermining Starts ___ O'Clock 0 09/28/20 1546   Undermining Ends___ O'Clock 0 09/28/20 1546   Undermining Maxium Distance (cm) 0 09/28/20 1546   Wound Thickness Description not for Pressure Injury Full thickness 09/28/20 1546   Number of days: 24       Wound 09/30/20 Wound #1 Right Medial Lower Leg Cluster (Onset 5 Years) (Active)   Wound Image   10/14/20 1430   Wound Cleansed Wound cleanser; Soap and water 10/21/20 1339   Dressing/Treatment Alginate with Ag 09/30/20 1355   Offloading for Diabetic Foot Ulcers No 10/21/20 1339   Wound Length (cm) 16 cm 10/21/20 1339   Wound Width (cm) 13 cm 10/21/20 1339   Wound Depth (cm) 0.1 cm 10/21/20 1339   Wound Surface Area (cm^2) 208 cm^2 10/21/20 1339   Change in Wound Size % (l*w) 17.46 10/21/20 1339   Wound Volume (cm^3) 20.8 cm^3 10/21/20 1339   Wound Healing % 17 10/21/20 1339   Post-Procedure Length (cm) 16 cm 10/21/20 1442   Post-Procedure Width (cm) 13 cm 10/21/20 1442   Post-Procedure Depth (cm) 0.1 cm 10/21/20 1442   Post-Procedure Surface Area (cm^2) 208 cm^2 10/21/20 1442   Post-Procedure Volume (cm^3) 20.8 cm^3 10/21/20 1442   Distance Tunneling (cm) 0 cm 10/21/20 1339   Tunneling Position ___ O'Clock 0 10/21/20 1339   Undermining Starts ___ O'Clock 0 10/21/20 1339   Undermining Ends___ O'Clock 0 10/21/20 1339   Undermining Maxium Distance (cm) 0 10/21/20 1339   Wound Assessment Granulation tissue 10/21/20 1339   Drainage Amount Large 10/21/20 1339   Drainage Description Serosanguinous; Yellow 10/21/20 1339   Odor Moderate 10/21/20 1339   Cassia-wound Assessment Intact 10/21/20 1339   Margins Defined edges 10/21/20 1339   Wound Thickness Description not for Pressure Injury Full thickness 10/21/20 1339   Number of days: 21       Percent of Wound(s) Debrided: approximately 100%    Total  Area  Debrided:  208 sq cm     Bleeding:  Minimal    Hemostasis Achieved:  by pressure    Procedural Pain:  0  / 10     Post Procedural Pain:  0 / 10     Response to treatment:  Well tolerated by patient. Status of wound progress and description from last visit:   There is suspicion for infection. There is increased drainage, pain, and a strong odor. Area was debrided and a culture was obtained. We will hold off on grafts for now. There are areas of healing along medial border of wound and nodules of healthy tissue forming. There is also general hypergranulation throughout. We will apply silver nitrate paste to treat this for 3 days, then home care will re-wrap and add an alginate. We will continue to monitor. Plan:       Discharge Instructions         PHYSICIAN ORDERS AND DISCHARGE INSTRUCTIONS     NOTE: Upon discharge from the [address], you will receive a patient experience survey.  We would be grateful if you would take the time to fill this survey out.     Wound care order history:                 Vascular studies: Arterial studies done 9/27/20; no stenosis              Imaging:   Date               Cultures:   Date               Labs/ HbA1c:   Date               Grafts:  Date               HBO:                Antibiotics:               Earlier Wound care treatments:                Authorizations:                        Consults:   Date                           Primary care physician:      Continuing wound care orders and information:              Residence:  private               [facility] home health care with:  [address] [place] [address] Eliezer Meléndez             [facility] wound-care supplies will be provided by: Melanie Mariee provider:              [id number] with  Elvis Cheney loading:  Date               OVAAU Medications:              JWXDX cleansing:                           NB not scrub or use excessive force.                          Wash hands with soap and water before and after dressing changes.                         Prior to applying a clean dressing, cleanse wound with normal saline,                                wound cleanser, or mild soap and water.                           Ask the physician or nurse before getting the wound(s) wet in a shower              Daily Wound management:                          Keep weight off wounds and reposition every 2 hours.                          AOHCZ standing for long periods of time.                          ZKYQD wraps/stockings in AM and remove at bedtime.                          If swelling is present, elevate legs to the level of the heart or above for 30                              minutes 4-5 times a day and/or when sitting.                                             When taking antibiotics take entire prescription as ordered by physician                             VX not stop taking until medicine is all gone.                                                       Orders for this week:  10/21/2020     Right lower leg -- wash with soap and water,pat dry.  Cover with silver nitrate dampened 4x4 gauze , Kerramax. Wrap with Coban 2 leave in place UNTIL  Friday  Then homecare to cover with silver calcium alginate , Kerramax or ABD and wrap with Coban 2 or profore      Homecare to change on Friday and Monday  ( may use profore  to wrap)      Auth for grafts on 10/7/2020 -- approved for Janay Lynch and Aplvaldezaf                        Follow up with Dr London Colbert 1 weeks in the wound care center  Call [phone] for any questions or concerns.   Date__________   Time____________        Treatment Note Wound 09/30/20 Wound #1 Right Medial Lower Leg Cluster (Onset 5 Years)-Dressing/Treatment: (silver nitrate moist 4x4 gauze keramax coban 2 lite)    Written Patient Dismissal Instructions Given            Electronically signed by LOLY Johnston CNP on 10/21/2020 at 4:46 PM

## 2020-10-25 LAB
CULTURE: ABNORMAL
Lab: ABNORMAL
SPECIMEN: ABNORMAL

## 2020-10-28 ENCOUNTER — HOSPITAL ENCOUNTER (OUTPATIENT)
Dept: WOUND CARE | Age: 75
Discharge: HOME | End: 2020-10-28
Payer: COMMERCIAL

## 2020-10-28 VITALS
SYSTOLIC BLOOD PRESSURE: 196 MMHG | DIASTOLIC BLOOD PRESSURE: 92 MMHG | HEART RATE: 97 BPM | TEMPERATURE: 97.9 F | RESPIRATION RATE: 20 BRPM

## 2020-10-28 PROCEDURE — 29581 APPL MULTLAYER CMPRN SYS LEG: CPT

## 2020-10-28 PROCEDURE — 99213 OFFICE O/P EST LOW 20 MIN: CPT | Performed by: SURGERY

## 2020-10-28 RX ORDER — LIDOCAINE HYDROCHLORIDE 20 MG/ML
JELLY TOPICAL ONCE
Status: CANCELLED | OUTPATIENT
Start: 2020-10-28

## 2020-10-28 RX ORDER — BACITRACIN ZINC AND POLYMYXIN B SULFATE 500; 1000 [USP'U]/G; [USP'U]/G
OINTMENT TOPICAL ONCE
Status: CANCELLED | OUTPATIENT
Start: 2020-10-28

## 2020-10-28 RX ORDER — BETAMETHASONE DIPROPIONATE 0.05 %
OINTMENT (GRAM) TOPICAL ONCE
Status: CANCELLED | OUTPATIENT
Start: 2020-10-28

## 2020-10-28 RX ORDER — BACITRACIN, NEOMYCIN, POLYMYXIN B 400; 3.5; 5 [USP'U]/G; MG/G; [USP'U]/G
OINTMENT TOPICAL ONCE
Status: CANCELLED | OUTPATIENT
Start: 2020-10-28

## 2020-10-28 RX ORDER — GENTAMICIN SULFATE 1 MG/G
OINTMENT TOPICAL ONCE
Status: CANCELLED | OUTPATIENT
Start: 2020-10-28

## 2020-10-28 RX ORDER — LIDOCAINE 40 MG/G
CREAM TOPICAL ONCE
Status: CANCELLED | OUTPATIENT
Start: 2020-10-28

## 2020-10-28 RX ORDER — LIDOCAINE 50 MG/G
OINTMENT TOPICAL ONCE
Status: CANCELLED | OUTPATIENT
Start: 2020-10-28

## 2020-10-28 RX ORDER — LIDOCAINE HYDROCHLORIDE 40 MG/ML
SOLUTION TOPICAL ONCE
Status: DISCONTINUED | OUTPATIENT
Start: 2020-10-28 | End: 2020-10-29 | Stop reason: HOSPADM

## 2020-10-28 RX ORDER — GINSENG 100 MG
CAPSULE ORAL ONCE
Status: CANCELLED | OUTPATIENT
Start: 2020-10-28

## 2020-10-28 RX ORDER — LIDOCAINE HYDROCHLORIDE 40 MG/ML
SOLUTION TOPICAL ONCE
Status: CANCELLED | OUTPATIENT
Start: 2020-10-28

## 2020-10-28 RX ORDER — CLOBETASOL PROPIONATE 0.5 MG/G
OINTMENT TOPICAL ONCE
Status: CANCELLED | OUTPATIENT
Start: 2020-10-28

## 2020-10-28 NOTE — PROGRESS NOTES
Wound Care Center Progress Note       Brigid Banerjee  AGE: 77 y.o. GENDER: male  : 1945  TODAY'S DATE:  10/28/2020        Subjective:     Chief Complaint   Patient presents with    Wound Check     Right lower leg         HISTORY of PRESENT ILLNESS     Brigid Banerjee is a 77 y.o. male who presents today for wound evaluation of Chronic venous wound(s) of R lower leg medial, R lower leg anterior. The wound is of moderate severity. The underlying cause of the wound is venous disease. We cannot do purapply because the wound is TOO big. Wound Pain Timing/Severity: mild  Quality of pain: aching  Severity of pain:  1 / 10   Modifying Factors: venous stasis  Associated Signs/Symptoms: drainage        PAST MEDICAL HISTORY        Diagnosis Date    Chronic venous hypertension with ulcer and inflammation involving right side (HCC)     RLE    Hypertension     past    Iron deficiency     Lymphoma Dx     Hx of B cell lymphoma-in remission since        PAST SURGICAL HISTORY    Past Surgical History:   Procedure Laterality Date    TUNNELED VENOUS PORT PLACEMENT      TUNNELED VENOUS PORT PLACEMENT      removed 2012       FAMILY HISTORY    Family History   Problem Relation Age of Onset    Early Death Mother 52    Diabetes Father     Heart Disease Father     Vision Loss Son         \"He wears glasses\"    Early Death Daughter 36       SOCIAL HISTORY    Social History     Tobacco Use    Smoking status: Never Smoker    Smokeless tobacco: Never Used   Substance Use Topics    Alcohol use: No    Drug use: No       ALLERGIES    No Known Allergies    MEDICATIONS    Current Outpatient Medications on File Prior to Encounter   Medication Sig Dispense Refill    ferrous sulfate (IRON 325) 325 (65 Fe) MG tablet Take 1 tablet by mouth 2 times daily 180 tablet 1     No current facility-administered medications on file prior to encounter.         REVIEW OF SYSTEMS    Pertinent items are noted in HPI. Constitutional: Negative for systemic symptoms including fever, chills and malaise. Objective:      BP (!) 196/92   Pulse 97   Temp 97.9 °F (36.6 °C) (Temporal)   Resp 20     PHYSICAL EXAM      General: The patient is in no acute distress. Mental status:  Patient is appropriate, is  oriented to place and plan of care. Dermatologic exam: Visual inspection of the periwound reveals the skin to be edematous. Wound exam:  see wound description below     All active wounds listed below with today's date are evaluated      Wound 09/30/20 Wound #1 Right Medial Lower Leg Cluster (Onset 5 Years) (Active)   Wound Image   10/28/20 1416   Wound Cleansed Wound cleanser; Soap and water 10/28/20 1416   Dressing/Treatment Alginate with Ag 09/30/20 1355   Offloading for Diabetic Foot Ulcers No 10/21/20 1339   Wound Length (cm) 14.5 cm 10/28/20 1416   Wound Width (cm) 12.5 cm 10/28/20 1416   Wound Depth (cm) 0.1 cm 10/28/20 1416   Wound Surface Area (cm^2) 181.25 cm^2 10/28/20 1416   Change in Wound Size % (l*w) 28.08 10/28/20 1416   Wound Volume (cm^3) 18.12 cm^3 10/28/20 1416   Wound Healing % 28 10/28/20 1416   Post-Procedure Length (cm) 10 cm 10/28/20 1433   Post-Procedure Width (cm) 7 cm 10/28/20 1433   Post-Procedure Depth (cm) 0.1 cm 10/28/20 1433   Post-Procedure Surface Area (cm^2) 70 cm^2 10/28/20 1433   Post-Procedure Volume (cm^3) 7 cm^3 10/28/20 1433   Distance Tunneling (cm) 0 cm 10/28/20 1416   Tunneling Position ___ O'Clock 0 10/28/20 1416   Undermining Starts ___ O'Clock 0 10/28/20 1416   Undermining Ends___ O'Clock 0 10/28/20 1416   Undermining Maxium Distance (cm) 0 10/28/20 1416   Wound Assessment Granulation tissue;Slough 10/28/20 1416   Drainage Amount Large 10/28/20 1416   Drainage Description Serosanguinous; Yellow 10/28/20 1416   Odor Moderate 10/28/20 1416   Cassia-wound Assessment Intact; Warm 10/28/20 1416   Margins Defined edges 10/28/20 1416   Wound             KUILT Medications:              ONVGZ cleansing:                           RV not scrub or use excessive force.                          Wash hands with soap and water before and after dressing changes.                         Prior to applying a clean dressing, cleanse wound with normal saline,                                wound cleanser, or mild soap and water.                           Ask the physician or nurse before getting the wound(s) wet in a shower              Daily Wound management:                          Keep weight off wounds and reposition every 2 hours.                          ZATHX standing for long periods of time.                          ZQTJG wraps/stockings in AM and remove at bedtime.                          If swelling is present, elevate legs to the level of the heart or above for 30                              minutes 4-5 times a day and/or when sitting.                                             When taking antibiotics take entire prescription as ordered by physician                             HR not stop taking until medicine is all gone.                                                       Orders for this week:  10/28/2020     Right lower leg -- wash with soap and water,pat dry.  Cover with silver nitrate dampened 4x4 gauze , Kerramax. Wrap with Coban 2 leave in place UNTIL  Friday  Then homecare to cover with silver calcium alginate , Kerramax or ABD and wrap with Coban 2 or profore       Homecare to change on Friday and Monday  ( may use profore  to wrap)      Auth for grafts on 10/7/2020 -- approved for Janay Lynch and Andreasaf                        Follow up with Dr Starla Mesa 1 weeks in the wound care center  Call [phone] for any questions or concerns.   Date__________   Time____________                     Treatment Note      Written Patient Dismissal Instructions Given            Electronically signed by Carol Calhoun MD on

## 2020-10-28 NOTE — PROGRESS NOTES
Multilayer Compression Wrap   (Not Unna) Below the Knee    NAME:  Serafin Fleming  YOB: 1945  MEDICAL RECORD NUMBER:  [id number]  DATE:  10/28/2020    Multilayer compression wrap: Removed old Multilayer wrap if indicated and wash leg with mild soap/water. Applied moisturizing agent to dry skin as needed. Applied primary and secondary dressing as ordered. Applied multilayered dressing below the knee to right lower leg. Applied multilayered dressing below the knee to left lower leg. Instructed patient/caregiver not to remove dressing and to keep it clean and dry. Instructed patient/caregiver on complications to report to provider, such as pain, numbness in toes, heavy drainage, and slippage of dressing. Instructed patient on purpose of compression dressing and on activity and exercise recommendations.       Electronically signed by Brandi Wall LPN on 71/76/8510 at 3:00 PM

## 2020-11-04 ENCOUNTER — HOSPITAL ENCOUNTER (OUTPATIENT)
Dept: WOUND CARE | Age: 75
Discharge: HOME | End: 2020-11-04
Payer: COMMERCIAL

## 2020-11-04 VITALS
DIASTOLIC BLOOD PRESSURE: 96 MMHG | TEMPERATURE: 98.8 F | HEART RATE: 106 BPM | SYSTOLIC BLOOD PRESSURE: 199 MMHG | RESPIRATION RATE: 18 BRPM

## 2020-11-04 PROCEDURE — 11042 DBRDMT SUBQ TIS 1ST 20SQCM/<: CPT | Performed by: SURGERY

## 2020-11-04 PROCEDURE — 11045 DBRDMT SUBQ TISS EACH ADDL: CPT | Performed by: SURGERY

## 2020-11-04 PROCEDURE — 11045 DBRDMT SUBQ TISS EACH ADDL: CPT

## 2020-11-04 PROCEDURE — 11042 DBRDMT SUBQ TIS 1ST 20SQCM/<: CPT

## 2020-11-04 RX ORDER — LIDOCAINE HYDROCHLORIDE 20 MG/ML
JELLY TOPICAL ONCE
Status: CANCELLED | OUTPATIENT
Start: 2020-11-04

## 2020-11-04 RX ORDER — LIDOCAINE 50 MG/G
OINTMENT TOPICAL ONCE
Status: CANCELLED | OUTPATIENT
Start: 2020-11-04

## 2020-11-04 RX ORDER — LIDOCAINE HYDROCHLORIDE 40 MG/ML
SOLUTION TOPICAL ONCE
Status: CANCELLED | OUTPATIENT
Start: 2020-11-04

## 2020-11-04 RX ORDER — LIDOCAINE 40 MG/G
CREAM TOPICAL ONCE
Status: CANCELLED | OUTPATIENT
Start: 2020-11-04

## 2020-11-04 RX ORDER — GINSENG 100 MG
CAPSULE ORAL ONCE
Status: CANCELLED | OUTPATIENT
Start: 2020-11-04

## 2020-11-04 RX ORDER — BACITRACIN ZINC AND POLYMYXIN B SULFATE 500; 1000 [USP'U]/G; [USP'U]/G
OINTMENT TOPICAL ONCE
Status: CANCELLED | OUTPATIENT
Start: 2020-11-04

## 2020-11-04 RX ORDER — BETAMETHASONE DIPROPIONATE 0.05 %
OINTMENT (GRAM) TOPICAL ONCE
Status: CANCELLED | OUTPATIENT
Start: 2020-11-04

## 2020-11-04 RX ORDER — BACITRACIN, NEOMYCIN, POLYMYXIN B 400; 3.5; 5 [USP'U]/G; MG/G; [USP'U]/G
OINTMENT TOPICAL ONCE
Status: CANCELLED | OUTPATIENT
Start: 2020-11-04

## 2020-11-04 RX ORDER — GENTAMICIN SULFATE 1 MG/G
OINTMENT TOPICAL ONCE
Status: CANCELLED | OUTPATIENT
Start: 2020-11-04

## 2020-11-04 RX ORDER — CLOBETASOL PROPIONATE 0.5 MG/G
OINTMENT TOPICAL ONCE
Status: CANCELLED | OUTPATIENT
Start: 2020-11-04

## 2020-11-04 NOTE — PLAN OF CARE
Problem: Wound:  Goal: Will show signs of wound healing; wound closure and no evidence of infection  Description: Will show signs of wound healing; wound closure and no evidence of infection  11/4/2020 1415 by Claudia Weaver LPN  Outcome: Ongoing  11/4/2020 1345 by Kimberly Bettencourt LPN  Outcome: Ongoing

## 2020-11-04 NOTE — PROGRESS NOTES
Multilayer Compression Wrap   (Not Unna) Below the Knee    NAME:  El Ortiz  YOB: 1945  MEDICAL RECORD NUMBER:  [id number]  DATE:  11/4/2020    Multilayer compression wrap: Removed old Multilayer wrap if indicated and wash leg with mild soap/water. Applied moisturizing agent to dry skin as needed. Applied primary and secondary dressing as ordered. Applied multilayered dressing below the knee to right lower leg. Instructed patient/caregiver not to remove dressing and to keep it clean and dry. Instructed patient/caregiver on complications to report to provider, such as pain, numbness in toes, heavy drainage, and slippage of dressing. Instructed patient on purpose of compression dressing and on activity and exercise recommendations.       Electronically signed by Terrence Zhang LPN on 80/1/8484 at 0:96 PM

## 2020-11-04 NOTE — PLAN OF CARE
Problem: Wound:  Goal: Will show signs of wound healing; wound closure and no evidence of infection  Description: Will show signs of wound healing; wound closure and no evidence of infection  Outcome: Ongoing     Problem: Wound:  Goal: Will show signs of wound healing; wound closure and no evidence of infection  Description: Will show signs of wound healing; wound closure and no evidence of infection  Outcome: Ongoing

## 2020-11-04 NOTE — PROGRESS NOTES
Wound Care Center Progress Note with Procedure Note      Dillan Fernandez  AGE: 77 y.o. GENDER: male  : 1945  EPISODE DATE:  2020     Subjective:     Chief Complaint   Patient presents with    Wound Check     Right lower leg         HISTORY of PRESENT ILLNESS      Dillan Fernandez is a 77 y.o. male who presents today for wound evaluation of Chronic venous wound(s) of R lower leg medial, R lower leg anterior. The wound is of moderate severity. The underlying cause of the wound is venous disease. It is slowly improving  Wound Pain Timing/Severity: none  Quality of pain: N/A  Severity of pain:  0 / 10   Modifying Factors: venous stasis  Associated Signs/Symptoms: drainage        PAST MEDICAL HISTORY        Diagnosis Date    Chronic venous hypertension with ulcer and inflammation involving right side (HCC)     RLE    Hypertension     past    Iron deficiency     Lymphoma Dx 11    Hx of B cell lymphoma-in remission since        PAST SURGICAL HISTORY    Past Surgical History:   Procedure Laterality Date    TUNNELED VENOUS PORT PLACEMENT      TUNNELED VENOUS PORT PLACEMENT      removed 2012       FAMILY HISTORY    Family History   Problem Relation Age of Onset    Early Death Mother 52    Diabetes Father     Heart Disease Father     Vision Loss Son         \"He wears glasses\"    Early Death Daughter 36       SOCIAL HISTORY    Social History     Tobacco Use    Smoking status: Never Smoker    Smokeless tobacco: Never Used   Substance Use Topics    Alcohol use: No    Drug use: No       ALLERGIES    No Known Allergies    MEDICATIONS    Current Outpatient Medications on File Prior to Encounter   Medication Sig Dispense Refill    ferrous sulfate (IRON 325) 325 (65 Fe) MG tablet Take 1 tablet by mouth 2 times daily 180 tablet 1     No current facility-administered medications on file prior to encounter.         REVIEW OF SYSTEMS      Constitutional: Negative for systemic symptoms including fever, chills and malaise. Objective:      BP (!) 199/96   Pulse 106   Temp 98.8 °F (37.1 °C) (Temporal)   Resp 18     PHYSICAL EXAM      General: The patient is in no acute distress. Mental status:  Patient is appropriate, is  oriented to place and plan of care. Dermatologic exam: Visual inspection of the periwound reveals the skin to be edematous  Wound exam: see wound description below in procedure note      Assessment:     Problem List Items Addressed This Visit     Chronic venous hypertension with ulcer and inflammation (HCC) - Primary    Relevant Orders    Supply: Wound Dressings    Supply: Cover and Secure    Supply: Edema Control    Venous ulcer of left leg (HCC)    Relevant Orders    Supply: Wound Dressings    Supply: Cover and Secure    Supply: Edema Control        Procedure Note    Indications:  Based on my examination of this patient's wound(s) today, sharp excision into necrotic dermis and subcutaneous tissue is required to promote healing and evaluate the extent of previous healing. Performed by: Yvonne Macias MD    Consent obtained: Yes    Time out taken:  Yes    Pain Control: topical Anesthetic  Anesthetic: 4% Lidocaine Liquid Topical     Debridement:Excisional Debridement    Using curette and forceps the wound(s) was/were sharply debrided down through and including the removal of dermis and subcutaneous tissue. Devitalized Tissue Debrided:  slough    Pre Debridement Measurements:  Are located in the Wound Documentation Flow Sheet    All active wounds listed below with today's date are evaluated  Wound(s)    debrided this date include # : 1     Post  Debridement Measurements:  Wound 09/30/20 Wound #1 Right Medial Lower Leg Cluster (Onset 5 Years) (Active)   Wound Image   11/04/20 1332   Wound Cleansed Wound cleanser; Soap and water 11/04/20 1332   Dressing/Treatment Alginate with Ag 09/30/20 5834 Offloading for Diabetic Foot Ulcers No 11/04/20 1332   Wound Length (cm) 14 cm 11/04/20 1332   Wound Width (cm) 9 cm 11/04/20 1332   Wound Depth (cm) 0.1 cm 11/04/20 1332   Wound Surface Area (cm^2) 126 cm^2 11/04/20 1332   Change in Wound Size % (l*w) 50 11/04/20 1332   Wound Volume (cm^3) 12.6 cm^3 11/04/20 1332   Wound Healing % 50 11/04/20 1332   Post-Procedure Length (cm) 14 cm 11/04/20 1343   Post-Procedure Width (cm) 9 cm 11/04/20 1343   Post-Procedure Depth (cm) 0.1 cm 11/04/20 1343   Post-Procedure Surface Area (cm^2) 126 cm^2 11/04/20 1343   Post-Procedure Volume (cm^3) 12.6 cm^3 11/04/20 1343   Distance Tunneling (cm) 0 cm 11/04/20 1332   Tunneling Position ___ O'Clock 0 11/04/20 1332   Undermining Starts ___ O'Clock 0 11/04/20 1332   Undermining Ends___ O'Clock 0 11/04/20 1332   Undermining Maxium Distance (cm) 0 11/04/20 1332   Wound Assessment Granulation tissue;Slough 11/04/20 1332   Drainage Amount Moderate 11/04/20 1332   Drainage Description Serosanguinous 11/04/20 1332   Odor Moderate 11/04/20 1332   Cassia-wound Assessment Dry/flaky; Intact 11/04/20 1332   Margins Defined edges 11/04/20 1332   Wound Thickness Description not for Pressure Injury Full thickness 11/04/20 1332   Number of days: 35       Percent of Wound(s) Debrided: approximately 100%    Total Surface Area Debrided:  126 sq cm     Bleeding:  None    Hemostasis Achieved:  not needed    Procedural Pain:  0  / 10     Post Procedural Pain:  0 / 10     Response to treatment:  Well tolerated by patient. Status of wound progress and description from last visit:   Improved. Still too large for a graft per our administration. Plan:       Discharge Instructions       PHYSICIAN ORDERS AND DISCHARGE INSTRUCTIONS     NOTE: [facility] will receive a patient experience survey.  We would be grateful if you would take the time to fill this survey out.     Wound care order history:                 Vascular studies: Arterial studies done 9/27/20; no stenosis              Imaging:   Date               Cultures:   Date               Labs/ HbA1c:   Date               Grafts:  Date               HBO:                Antibiotics:               Earlier Wound care treatments:                Authorizations:                        Consults:   Date                           Primary care physician:      Continuing wound care orders and information:              Residence:  [facility] home health care with: [facility]               Your wound-care supplies will be provided by: Flora Sheffield provider:              DEDRICK with  Greeley Dryer loading:  Date               RUNIQ Medications:              ANSHV cleansing:                           LY not scrub or use excessive force.                          Wash hands with soap and water before and after dressing changes.                           Prior to applying a clean dressing, cleanse wound with normal saline,                                wound cleanser, or mild soap and water.                           Ask the physician or nurse before getting the wound(s) wet in a shower              Daily Wound management:                          Keep weight off wounds and reposition every 2 hours.                          YKVRL standing for long periods of time.                          GEWKP wraps/stockings in AM and remove at bedtime.                          If swelling is present, elevate legs to the level of the heart or above for 30                              minutes 4-5 times a day and/or when sitting.                                             When taking antibiotics take entire prescription as ordered by physician                             LQ not stop taking until medicine is all gone.                                                       Orders for this week:  11/04/2020     Right lower leg -- wash with soap and water,pat dry.  Cover with silver nitrate dampened 4x4 gauze , Kerramax. Wrap with Coban 2 leave in place UNTIL  Friday [facility] homecare to cover with Silver calcium alginate , Kerramax or ABD and wrap with Coban 2 or profore       Homecare to change on Friday and Monday  ( may use profore  to wrap)      Auth for grafts on 10/7/2020 -- approved for Cris, Janay and Apligraf                        Follow up with Dr [facility] 1 weeks in the wound care center  Call [phone] for any questions or concerns.   Date__________   Time____________        Treatment Note      Written Patient Dismissal Instructions Given            Electronically signed by Rajan Chong MD on 11/4/2020 at 1:54 PM

## 2020-11-11 ENCOUNTER — HOSPITAL ENCOUNTER (OUTPATIENT)
Dept: WOUND CARE | Age: 75
Discharge: HOME | End: 2020-11-11
Payer: COMMERCIAL

## 2020-11-11 PROCEDURE — 11042 DBRDMT SUBQ TIS 1ST 20SQCM/<: CPT

## 2020-11-11 PROCEDURE — 11042 DBRDMT SUBQ TIS 1ST 20SQCM/<: CPT | Performed by: SURGERY

## 2020-11-11 RX ORDER — GINSENG 100 MG
CAPSULE ORAL ONCE
Status: CANCELLED | OUTPATIENT
Start: 2020-11-11

## 2020-11-11 RX ORDER — CLOBETASOL PROPIONATE 0.5 MG/G
OINTMENT TOPICAL ONCE
Status: CANCELLED | OUTPATIENT
Start: 2020-11-11

## 2020-11-11 RX ORDER — LIDOCAINE 40 MG/G
CREAM TOPICAL ONCE
Status: CANCELLED | OUTPATIENT
Start: 2020-11-11

## 2020-11-11 RX ORDER — LIDOCAINE HYDROCHLORIDE 40 MG/ML
SOLUTION TOPICAL ONCE
Status: DISCONTINUED | OUTPATIENT
Start: 2020-11-11 | End: 2020-11-12 | Stop reason: HOSPADM

## 2020-11-11 RX ORDER — BACITRACIN, NEOMYCIN, POLYMYXIN B 400; 3.5; 5 [USP'U]/G; MG/G; [USP'U]/G
OINTMENT TOPICAL ONCE
Status: CANCELLED | OUTPATIENT
Start: 2020-11-11

## 2020-11-11 RX ORDER — LIDOCAINE HYDROCHLORIDE 20 MG/ML
JELLY TOPICAL ONCE
Status: CANCELLED | OUTPATIENT
Start: 2020-11-11

## 2020-11-11 RX ORDER — GENTAMICIN SULFATE 1 MG/G
OINTMENT TOPICAL ONCE
Status: CANCELLED | OUTPATIENT
Start: 2020-11-11

## 2020-11-11 RX ORDER — BACITRACIN ZINC AND POLYMYXIN B SULFATE 500; 1000 [USP'U]/G; [USP'U]/G
OINTMENT TOPICAL ONCE
Status: CANCELLED | OUTPATIENT
Start: 2020-11-11

## 2020-11-11 RX ORDER — LIDOCAINE HYDROCHLORIDE 40 MG/ML
SOLUTION TOPICAL ONCE
Status: CANCELLED | OUTPATIENT
Start: 2020-11-11

## 2020-11-11 RX ORDER — BETAMETHASONE DIPROPIONATE 0.05 %
OINTMENT (GRAM) TOPICAL ONCE
Status: CANCELLED | OUTPATIENT
Start: 2020-11-11

## 2020-11-11 RX ORDER — LIDOCAINE 50 MG/G
OINTMENT TOPICAL ONCE
Status: CANCELLED | OUTPATIENT
Start: 2020-11-11

## 2020-11-11 NOTE — PROGRESS NOTES
Multilayer Compression Wrap   (Not Unna) Below the Knee    NAME:  Mary Williamson  YOB: 1945  MEDICAL RECORD NUMBER:  [id number]  DATE:  11/11/2020    Multilayer compression wrap: Removed old Multilayer wrap if indicated and wash leg with mild soap/water. Applied moisturizing agent to dry skin as needed. Applied primary and secondary dressing as ordered. Applied multilayered dressing below the knee to right lower leg. Instructed patient/caregiver not to remove dressing and to keep it clean and dry. Instructed patient/caregiver on complications to report to provider, such as pain, numbness in toes, heavy drainage, and slippage of dressing. Instructed patient on purpose of compression dressing and on activity and exercise recommendations.       Electronically signed by Roel Mendoza LPN on 41/27/9329 at 2:05 PM

## 2020-11-11 NOTE — PROGRESS NOTES
Wound Care Center Progress Note with Procedure Note      Haleigh Escobar  AGE: 77 y.o. GENDER: male  : 1945  EPISODE DATE:  2020     Subjective:     Chief Complaint   Patient presents with    Wound Check     riht lower leg         HISTORY of PRESENT ILLNESS      aHleigh Escobar is a 77 y.o. male who presents today for wound evaluation of Chronic venous and traumatic wound(s) of R lower leg medial, R lower leg anterior. The wound is of moderate severity. The underlying cause of the wound is trauma and venous disease. It is getting smaller  Wound Pain Timing/Severity: none  Quality of pain: N/A  Severity of pain:  0 / 10   Modifying Factors: venous stasis  Associated Signs/Symptoms: drainage        PAST MEDICAL HISTORY        Diagnosis Date    Chronic venous hypertension with ulcer and inflammation involving right side (HCC)     RLE    Hypertension     past    Iron deficiency     Lymphoma Dx     Hx of B cell lymphoma-in remission since        PAST SURGICAL HISTORY    Past Surgical History:   Procedure Laterality Date    TUNNELED VENOUS PORT PLACEMENT      TUNNELED VENOUS PORT PLACEMENT      removed 2012       FAMILY HISTORY    Family History   Problem Relation Age of Onset    Early Death Mother 52    Diabetes Father     Heart Disease Father     Vision Loss Son         \"He wears glasses\"    Early Death Daughter 36       SOCIAL HISTORY    Social History     Tobacco Use    Smoking status: Never Smoker    Smokeless tobacco: Never Used   Substance Use Topics    Alcohol use: No    Drug use: No       ALLERGIES    No Known Allergies    MEDICATIONS    Current Outpatient Medications on File Prior to Encounter   Medication Sig Dispense Refill    ferrous sulfate (IRON 325) 325 (65 Fe) MG tablet Take 1 tablet by mouth 2 times daily 180 tablet 1     No current facility-administered medications on file prior to encounter.         REVIEW OF SYSTEMS    Pertinent items are noted in HPI. Constitutional: Negative for systemic symptoms including fever, chills and malaise. Objective: There were no vitals taken for this visit. PHYSICAL EXAM      General: The patient is in no acute distress. Mental status:  Patient is appropriate, is  oriented to place and plan of care. Dermatologic exam: Visual inspection of the periwound reveals the skin to be edematous  Wound exam: see wound description below in procedure note      Assessment:     Problem List Items Addressed This Visit     Chronic venous hypertension with ulcer and inflammation (HCC) - Primary    Relevant Medications    lidocaine (XYLOCAINE) 4 % external solution    Other Relevant Orders    Supply: Wound Cleanser    Venous ulcer of left leg (HCC)    Relevant Medications    lidocaine (XYLOCAINE) 4 % external solution    Other Relevant Orders    Supply: Wound Cleanser        Procedure Note    Indications:  Based on my examination of this patient's wound(s) today, sharp excision into necrotic subcutaneous tissue is required to promote healing and evaluate the extent of previous healing. Performed by: William Luong MD    Consent obtained: Yes    Time out taken:  Yes    Pain Control: topical Anesthetic  Anesthetic: 4% Lidocaine Liquid Topical     Debridement:Excisional Debridement    Using curette the wound(s) was/were sharply debrided down through and including the removal of subcutaneous tissue. Devitalized Tissue Debrided:  slough    Pre Debridement Measurements:  Are located in the Wound Documentation Flow Sheet    All active wounds listed below with today's date are evaluated  Wound(s)    debrided this date include # : 1     Post  Debridement Measurements:  Wound 09/30/20 Wound #1 Right Medial Lower Leg Cluster (Onset 5 Years) (Active)   Wound Image   11/04/20 1332   Dressing Status New dressing applied;Clean;Dry; Intact 11/11/20 1406   Wound Cleansed Wound cleanser; Soap and water;Vashe 11/11/20 1338   Dressing/Treatment Alginate with Ag 09/30/20 1355   Offloading for Diabetic Foot Ulcers No 11/11/20 1338   Wound Length (cm) 13 cm 11/11/20 1338   Wound Width (cm) 8.3 cm 11/11/20 1338   Wound Depth (cm) 0.1 cm 11/11/20 1338   Wound Surface Area (cm^2) 107.9 cm^2 11/11/20 1338   Change in Wound Size % (l*w) 57.18 11/11/20 1338   Wound Volume (cm^3) 10.79 cm^3 11/11/20 1338   Wound Healing % 57 11/11/20 1338   Post-Procedure Length (cm) 13 cm 11/11/20 1356   Post-Procedure Width (cm) 8.3 cm 11/11/20 1356   Post-Procedure Depth (cm) 0.1 cm 11/11/20 1356   Post-Procedure Surface Area (cm^2) 107.9 cm^2 11/11/20 1356   Post-Procedure Volume (cm^3) 10.79 cm^3 11/11/20 1356   Distance Tunneling (cm) 0 cm 11/11/20 1338   Tunneling Position ___ O'Clock 0 11/11/20 1338   Undermining Starts ___ O'Clock 0 11/11/20 1338   Undermining Ends___ O'Clock 0 11/11/20 1338   Undermining Maxium Distance (cm) 0 11/11/20 1338   Wound Assessment Granulation tissue 11/11/20 1338   Drainage Amount Large 11/11/20 1338   Drainage Description Serosanguinous 11/11/20 1338   Odor Mild 11/11/20 1338   Cassia-wound Assessment Dry/flaky 11/11/20 1338   Margins Defined edges 11/11/20 1338   Wound Thickness Description not for Pressure Injury Full thickness 11/11/20 1338   Number of days: 42       Percent of Wound(s) Debrided: approximately 20%    Total Surface Area Debrided:  20 sq cm     Bleeding:  None    Hemostasis Achieved:  not needed    Procedural Pain:  0  / 10     Post Procedural Pain:  0 / 10     Response to treatment:  Well tolerated by patient. Status of wound progress and description from last visit:   Improved but still too large for a graft per our administration. Plan:       Discharge Instructions         PHYSICIAN ORDERS AND DISCHARGE INSTRUCTIONS     NOTE: Upon discharge from the [address], you will receive a patient experience survey.  We would be grateful if you would take the time to fill  11/11/2020     Right lower leg -- wash with soap and water,pat dry. To sarah wound desitin.  Cover with silver calcium alginate and , Kerramax. Wrap with Coban 2 leave in place UNTIL  Friday [facility] homecare to cover with Silver calcium alginate , Kerramax or ABD and wrap with Coban 2 or profore       Homecare to change on Friday and Monday  ( may use profore  to wrap)      Auth for grafts on 10/7/2020 -- approved for Janay Lynch and Reebl                        Follow up with Dr Baltazar Liz 1 weeks in the wound care center  Call [phone] for any questions or concerns.   Date__________   Time____________             Treatment Note Wound 09/30/20 Wound #1 Right Medial Lower Leg Cluster (Onset 5 Years)-Dressing/Treatment: (silver algaine, kerramax, coban 2)    Written Patient Dismissal Instructions Given            Electronically signed by Morro Wang MD on 11/11/2020 at 2:21 PM

## 2020-11-18 ENCOUNTER — HOSPITAL ENCOUNTER (OUTPATIENT)
Dept: WOUND CARE | Age: 75
Discharge: HOME | End: 2020-11-18
Payer: COMMERCIAL

## 2020-11-18 VITALS
TEMPERATURE: 99 F | RESPIRATION RATE: 16 BRPM | SYSTOLIC BLOOD PRESSURE: 168 MMHG | DIASTOLIC BLOOD PRESSURE: 96 MMHG | HEART RATE: 100 BPM

## 2020-11-18 PROCEDURE — 11042 DBRDMT SUBQ TIS 1ST 20SQCM/<: CPT

## 2020-11-18 PROCEDURE — 87186 SC STD MICRODIL/AGAR DIL: CPT

## 2020-11-18 PROCEDURE — 87075 CULTR BACTERIA EXCEPT BLOOD: CPT

## 2020-11-18 PROCEDURE — 11042 DBRDMT SUBQ TIS 1ST 20SQCM/<: CPT | Performed by: NURSE PRACTITIONER

## 2020-11-18 PROCEDURE — 87070 CULTURE OTHR SPECIMN AEROBIC: CPT

## 2020-11-18 PROCEDURE — 11045 DBRDMT SUBQ TISS EACH ADDL: CPT | Performed by: NURSE PRACTITIONER

## 2020-11-18 PROCEDURE — 87077 CULTURE AEROBIC IDENTIFY: CPT

## 2020-11-18 PROCEDURE — 11045 DBRDMT SUBQ TISS EACH ADDL: CPT

## 2020-11-18 PROCEDURE — 87076 CULTURE ANAEROBE IDENT EACH: CPT

## 2020-11-18 RX ORDER — BETAMETHASONE DIPROPIONATE 0.05 %
OINTMENT (GRAM) TOPICAL ONCE
Status: CANCELLED | OUTPATIENT
Start: 2020-11-18

## 2020-11-18 RX ORDER — LIDOCAINE 50 MG/G
OINTMENT TOPICAL ONCE
Status: CANCELLED | OUTPATIENT
Start: 2020-11-18

## 2020-11-18 RX ORDER — LIDOCAINE HYDROCHLORIDE 40 MG/ML
SOLUTION TOPICAL ONCE
Status: DISCONTINUED | OUTPATIENT
Start: 2020-11-18 | End: 2020-11-19 | Stop reason: HOSPADM

## 2020-11-18 RX ORDER — GENTAMICIN SULFATE 1 MG/G
OINTMENT TOPICAL ONCE
Status: CANCELLED | OUTPATIENT
Start: 2020-11-18

## 2020-11-18 RX ORDER — LIDOCAINE 40 MG/G
CREAM TOPICAL ONCE
Status: CANCELLED | OUTPATIENT
Start: 2020-11-18

## 2020-11-18 RX ORDER — BACITRACIN, NEOMYCIN, POLYMYXIN B 400; 3.5; 5 [USP'U]/G; MG/G; [USP'U]/G
OINTMENT TOPICAL ONCE
Status: CANCELLED | OUTPATIENT
Start: 2020-11-18

## 2020-11-18 RX ORDER — BACITRACIN ZINC AND POLYMYXIN B SULFATE 500; 1000 [USP'U]/G; [USP'U]/G
OINTMENT TOPICAL ONCE
Status: CANCELLED | OUTPATIENT
Start: 2020-11-18

## 2020-11-18 RX ORDER — GENTAMICIN SULFATE 1 MG/G
OINTMENT TOPICAL
Qty: 30 G | Refills: 0 | Status: SHIPPED | OUTPATIENT
Start: 2020-11-18 | End: 2020-11-25

## 2020-11-18 RX ORDER — LIDOCAINE HYDROCHLORIDE 20 MG/ML
JELLY TOPICAL ONCE
Status: CANCELLED | OUTPATIENT
Start: 2020-11-18

## 2020-11-18 RX ORDER — GINSENG 100 MG
CAPSULE ORAL ONCE
Status: CANCELLED | OUTPATIENT
Start: 2020-11-18

## 2020-11-18 RX ORDER — CLOBETASOL PROPIONATE 0.5 MG/G
OINTMENT TOPICAL ONCE
Status: CANCELLED | OUTPATIENT
Start: 2020-11-18

## 2020-11-18 RX ORDER — LIDOCAINE HYDROCHLORIDE 40 MG/ML
SOLUTION TOPICAL ONCE
Status: CANCELLED | OUTPATIENT
Start: 2020-11-18

## 2020-11-18 RX ORDER — SULFAMETHOXAZOLE AND TRIMETHOPRIM 800; 160 MG/1; MG/1
1 TABLET ORAL 2 TIMES DAILY
Qty: 20 TABLET | Refills: 0 | Status: SHIPPED | OUTPATIENT
Start: 2020-11-18 | End: 2020-11-28

## 2020-11-18 RX ORDER — GENTAMICIN SULFATE 1 MG/G
OINTMENT TOPICAL ONCE
Status: DISCONTINUED | OUTPATIENT
Start: 2020-11-18 | End: 2020-11-19 | Stop reason: HOSPADM

## 2020-11-18 NOTE — PROGRESS NOTES
Wound Care Center Progress Note With Procedure    Brandy Pete  AGE: 77 y.o. GENDER: male  : 1945  EPISODE DATE:  2020     Subjective:     Chief Complaint   Patient presents with    Wound Check     right medial leg         HISTORY of PRESENT ILLNESS      Brandy Pete is a 77 y.o. male who presents today for wound evaluation of Chronic venous and traumatic ulcer(s) of the right medial lower leg and right anterior lower leg. The ulcer is of moderate severity. The underlying cause of the wound is trauma and venous. Wound Pain Timing/Severity: none  Quality of pain: N/A  Severity of pain:  0 / 10   Modifying Factors: venous stasis  Associated Signs/Symptoms: edema and drainage        PAST MEDICAL HISTORY        Diagnosis Date    Chronic venous hypertension with ulcer and inflammation involving right side (HCC)     RLE    Hypertension     past    Iron deficiency     Lymphoma Dx     Hx of B cell lymphoma-in remission since        PAST SURGICAL HISTORY    Past Surgical History:   Procedure Laterality Date    TUNNELED VENOUS PORT PLACEMENT      TUNNELED VENOUS PORT PLACEMENT      removed 2012       FAMILY HISTORY    Family History   Problem Relation Age of Onset    Early Death Mother 52    Diabetes Father     Heart Disease Father     Vision Loss Son         \"He wears glasses\"    Early Death Daughter 36       SOCIAL HISTORY    Social History     Tobacco Use    Smoking status: Never Smoker    Smokeless tobacco: Never Used   Substance Use Topics    Alcohol use: No    Drug use: No       ALLERGIES    No Known Allergies    MEDICATIONS    Current Outpatient Medications on File Prior to Encounter   Medication Sig Dispense Refill    ferrous sulfate (IRON 325) 325 (65 Fe) MG tablet Take 1 tablet by mouth 2 times daily 180 tablet 1     No current facility-administered medications on file prior to encounter.         REVIEW OF SYSTEMS    Pertinent items are noted in HPI.    Constitutional: Negative for systemic symptoms including fever, chills and malaise. Objective:      BP (!) 168/96   Pulse 100   Temp 99 °F (37.2 °C) (Temporal)   Resp 16     PHYSICAL EXAM    General: The patient is in no acute distress. Mental status:  Patient is appropriate, is  oriented to place and plan of care. Dermatologic exam: Visual inspection of the periwound reveals the skin to be edematous  Wound exam: see wound description below in procedure note      Assessment:     Problem List Items Addressed This Visit     Chronic venous hypertension with ulcer and inflammation (HCC) - Primary    Relevant Medications    lidocaine (XYLOCAINE) 4 % external solution    gentamicin (GARAMYCIN) 0.1 % ointment (Start on 11/18/2020  2:15 PM)    mupirocin (BACTROBAN) 2 % ointment (Start on 11/18/2020  2:15 PM)    Other Relevant Orders    Supply: Wound Cleanser    Supply: Wound Dressings    Supply: Cover and Secure    Culture, Wound    Venous ulcer of left leg (HCC)    Relevant Medications    lidocaine (XYLOCAINE) 4 % external solution    gentamicin (GARAMYCIN) 0.1 % ointment (Start on 11/18/2020  2:15 PM)    mupirocin (BACTROBAN) 2 % ointment (Start on 11/18/2020  2:15 PM)    Other Relevant Orders    Supply: Wound Cleanser    Supply: Wound Dressings    Supply: Cover and Secure        Procedure Note    Indications:  Based on my examination of this patient's wound(s) today, sharp excision into necrotic subcutaneous tissue is required to promote healing and evaluate the extent of previous healing. Performed by: LOLY Lujan CNP    Consent obtained: Yes    Time out taken:  Yes    Pain Control: Anesthetic  Anesthetic: 4% Lidocaine Liquid Topical        Debridement:Excisional Debridement    Using curette the wound(s) was/were sharply debrided down through and including the removal of subcutaneous tissue.         Devitalized Tissue Debrided:  fibrin, biofilm, slough and exudate    Pre Debridement Measurements:  Are located in the Wound Documentation Flow Sheet    All active wounds listed below with today's date are evaluated  Wound(s)    debrided this date include # : 1     Post  Debridement Measurements:  Wound 09/30/20 Wound #1 Right Medial Lower Leg Cluster (Onset 5 Years) (Active)   Wound Image   11/18/20 1305   Dressing Status New dressing applied;Clean;Dry; Intact 11/11/20 1406   Wound Cleansed Wound cleanser; Soap and water 11/18/20 1305   Dressing/Treatment Alginate with Ag 09/30/20 1355   Offloading for Diabetic Foot Ulcers No 11/18/20 1305   Wound Length (cm) 14 cm 11/18/20 1305   Wound Width (cm) 8.5 cm 11/18/20 1305   Wound Depth (cm) 0.1 cm 11/18/20 1305   Wound Surface Area (cm^2) 119 cm^2 11/18/20 1305   Change in Wound Size % (l*w) 52.78 11/18/20 1305   Wound Volume (cm^3) 11.9 cm^3 11/18/20 1305   Wound Healing % 53 11/18/20 1305   Post-Procedure Length (cm) 14 cm 11/18/20 1348   Post-Procedure Width (cm) 8.5 cm 11/18/20 1348   Post-Procedure Depth (cm) 0.1 cm 11/18/20 1348   Post-Procedure Surface Area (cm^2) 119 cm^2 11/18/20 1348   Post-Procedure Volume (cm^3) 11.9 cm^3 11/18/20 1348   Distance Tunneling (cm) 0 cm 11/18/20 1305   Tunneling Position ___ O'Clock 0 11/18/20 1305   Undermining Starts ___ O'Clock 0 11/18/20 1305   Undermining Ends___ O'Clock 0 11/18/20 1305   Undermining Maxium Distance (cm) 0 11/18/20 1305   Wound Assessment Granulation tissue 11/18/20 1305   Drainage Amount Large 11/18/20 1305   Drainage Description Serosanguinous 11/18/20 1305   Odor Moderate 11/18/20 1305   Cassia-wound Assessment Dry/flaky 11/18/20 1305   Margins Defined edges 11/18/20 1305   Wound Thickness Description not for Pressure Injury Full thickness 11/18/20 1305   Number of days: 49       Percent of Wound(s) Debrided: approximately 100%    Total  Area  Debrided:  119 sq cm     Bleeding:  Minimal    Hemostasis Achieved:  by pressure    Procedural Pain:  0  / 10     Post Procedural Pain:  0 / 10 Response to treatment:  Well tolerated by patient. Status of wound progress and description from last visit: Wound larger today, dark areas of tissue with an odor. Wound culture obtained. Based on last wound culture will start Bactrim for 10 days and topical Bactroban and gent and continue compression. Plan:       Discharge Instructions       PHYSICIAN ORDERS AND DISCHARGE INSTRUCTIONS     NOTE: Upon discharge from the [address], you will receive a patient experience survey. We would be grateful if you would take the time to fill this survey out.     Wound care order history:                 Vascular studies: Arterial studies done 9/27/20; no stenosis              Imaging:   Date               Cultures:   obtained on 11/18/2020               Labs/ HbA1c:   Date               Grafts:  Date               HBO:                Antibiotics:  Bactrim DS BID for 10 days on 11/18/2020              Earlier Wound care treatments:                Authorizations:                        Consults:   Date                           Primary care physician:      Continuing wound care orders and information:              Residence:  [facility]               Continue home health care with: [facility]               Your wound-care supplies will be provided by: Marvin Jessica provider:              BALJEET with  Ken Feliz loading:  Date               YLVRX Medications:              IGLBW cleansing:                           LN not scrub or use excessive force.                          Wash hands with soap and water before and after dressing changes.                           Prior to applying a clean dressing, cleanse wound with normal saline,                                wound cleanser, or mild soap and water.                           Ask the physician or nurse before getting the wound(s) wet in a shower              Daily Wound management:                          Keep weight off wounds and reposition every 2 hours.                          LZLEX standing for long periods of time.                          IMFII wraps/stockings in AM and remove at bedtime.                          If swelling is present, elevate legs to the level of the heart or above for 30                              minutes 4-5 times a day and/or when sitting.                                             When taking antibiotics take entire prescription as ordered by physician                             do not stop taking until medicine is all gone.                                                       Orders for this week:  11/11/2020     Right lower leg -- wash with soap and water,pat dry. Apply Bactroban and Gentamycin then: Cover with calcium alginate and , Kerramax. Wrap with Coban 2. Homecare to cover with Silver calcium alginate , Kerramax or ABD and wrap with Coban 2 or profore       Homecare to change on Friday and Monday  ( may use profore  to wrap)      Auth for grafts on 10/7/2020 -- approved for Janay Lynch and [employer]                        Follow up with Dr Pamella Osler 1 weeks in the wound care center  Call [phone] for any questions or concerns.   Date__________   Time____________        Treatment Note Wound 09/30/20 Wound #1 Right Medial Lower Leg Cluster (Onset 5 Years)-Dressing/Treatment: (gentamycin bactroban Ca+ algaine, kerramax, coban 2)    Written Patient Dismissal Instructions Given            Electronically signed by LOLY Silverman CNP on 11/18/2020 at 2:04 PM

## 2020-11-18 NOTE — PROGRESS NOTES
Multilayer Compression Wrap   (Not Unna) Below the Knee    NAME:  Pam Gutierrez  YOB: 1945  MEDICAL RECORD NUMBER:  [id number]  DATE:  11/18/2020    Multilayer compression wrap: Removed old Multilayer wrap if indicated and wash leg with mild soap/water. Applied moisturizing agent to dry skin as needed. Applied primary and secondary dressing as ordered. Applied multilayered dressing below the knee to right lower leg. Instructed patient/caregiver not to remove dressing and to keep it clean and dry. Instructed patient/caregiver on complications to report to provider, such as pain, numbness in toes, heavy drainage, and slippage of dressing. Instructed patient on purpose of compression dressing and on activity and exercise recommendations.       Electronically signed by Pita Alaniz LPN on 35/74/4907 at 1:55 PM

## 2020-11-24 ENCOUNTER — OFFICE VISIT (OUTPATIENT)
Dept: INTERNAL MEDICINE CLINIC | Age: 75
End: 2020-11-24
Payer: COMMERCIAL

## 2020-11-24 VITALS
HEIGHT: 71 IN | HEART RATE: 100 BPM | WEIGHT: 176 LBS | BODY MASS INDEX: 24.64 KG/M2 | OXYGEN SATURATION: 96 % | DIASTOLIC BLOOD PRESSURE: 98 MMHG | SYSTOLIC BLOOD PRESSURE: 148 MMHG | TEMPERATURE: 98 F

## 2020-11-24 PROCEDURE — 99213 OFFICE O/P EST LOW 20 MIN: CPT | Performed by: FAMILY MEDICINE

## 2020-11-24 RX ORDER — METOPROLOL SUCCINATE 25 MG/1
25 TABLET, EXTENDED RELEASE ORAL DAILY
Qty: 30 TABLET | Refills: 2 | Status: SHIPPED | OUTPATIENT
Start: 2020-11-24 | End: 2021-03-01

## 2020-11-24 ASSESSMENT — ENCOUNTER SYMPTOMS
BACK PAIN: 0
SHORTNESS OF BREATH: 0
ABDOMINAL PAIN: 0
CHEST TIGHTNESS: 0
COUGH: 0
NAUSEA: 0

## 2020-11-24 NOTE — PROGRESS NOTES
Vero Thomas  1945  77 y.o.  male    Chief Complaint   Patient presents with    1 Month Follow-Up     6 week follow up         History of Present Illness  Vero Thomas is a 77 y.o. male who presents today for a check. He did not do labs. He has home care now and continues to follow with the wound center. He has RLE chronic venous HTN with ulcerations. He feels the swelling has decreased and he is doing better. +BP elevated. Several readings have been up. He states he can get very nervous at the doctor's office. +Anemia on last labs. He denies bleeding. Hx of lymphoma-in remission per pt. Review of Systems   Constitutional: Negative for diaphoresis and fever. Respiratory: Negative for cough, chest tightness and shortness of breath. Cardiovascular: Negative for chest pain and palpitations. Gastrointestinal: Negative for abdominal pain and nausea. Genitourinary: Negative for difficulty urinating. Musculoskeletal: Negative for back pain. Neurological: Negative for dizziness and headaches. Psychiatric/Behavioral: Negative for dysphoric mood.        No Known Allergies    Past Medical History:   Diagnosis Date    Chronic venous hypertension with ulcer and inflammation involving right side (HCC)     RLE    Hypertension     past    Iron deficiency     Lymphoma Dx 11-11    Hx of B cell lymphoma-in remission since 2012       Past Surgical History:   Procedure Laterality Date    TUNNELED VENOUS PORT PLACEMENT      TUNNELED VENOUS PORT PLACEMENT      removed 5/5 2012       Family History   Problem Relation Age of Onset    Early Death Mother 52    Diabetes Father     Heart Disease Father     Vision Loss Son         \"He wears glasses\"    Early Death Daughter 36       Social History     Tobacco Use    Smoking status: Never Smoker    Smokeless tobacco: Never Used   Substance Use Topics    Alcohol use: No    Drug use: No       Current Outpatient Medications   Medication Sig Dispense Refill  metoprolol succinate (TOPROL XL) 25 MG extended release tablet Take 1 tablet by mouth daily 30 tablet 2    ferrous sulfate (IRON 325) 325 (65 Fe) MG tablet Take 1 tablet by mouth 2 times daily 180 tablet 1    ciprofloxacin (CIPRO) 500 MG tablet Take 500 mg by mouth 2 times daily       No current facility-administered medications for this visit. OBJECTIVE:    BP (!) 148/98 (Site: Left Upper Arm, Position: Sitting, Cuff Size: Large Adult)   Pulse 100   Temp 98 °F (36.7 °C) (Temporal)   Ht 5' 11\" (1.803 m)   Wt 176 lb (79.8 kg)   SpO2 96%   BMI 24.55 kg/m²     Physical Exam  Vitals signs reviewed. Constitutional:       General: He is not in acute distress. Appearance: He is well-developed. Eyes:      Extraocular Movements: Extraocular movements intact. Conjunctiva/sclera: Conjunctivae normal.      Pupils: Pupils are equal, round, and reactive to light. Neck:      Musculoskeletal: Neck supple. Cardiovascular:      Rate and Rhythm: Normal rate and regular rhythm. Pulmonary:      Effort: Pulmonary effort is normal. No respiratory distress. Breath sounds: Normal breath sounds. Abdominal:      General: Bowel sounds are normal.      Palpations: Abdomen is soft. Tenderness: There is no abdominal tenderness. Musculoskeletal:      Left lower leg: No edema. Comments: RLE- bandaged   Neurological:      Mental Status: He is alert and oriented to person, place, and time. Cranial Nerves: No cranial nerve deficit. Psychiatric:         Mood and Affect: Mood normal.         ASSESSMENT:  1. Essential hypertension    2. Chronic venous hypertension with ulcer and inflammation involving right side ([address]    3. Anemia, unspecified type    4.  Need for vaccination against Streptococcus pneumoniae        PLAN:    Orders Placed This Encounter   Procedures    Pneumococcal polysaccharide vaccine 23-valent greater than or equal to 3yo subcutaneous/IM    CBC Auto Differential    FERRITIN    IRON AND TIBC    BASIC METABOLIC PANEL    Urinalysis with Microscopic       Orders Placed This Encounter   Medications    metoprolol succinate (TOPROL XL) 25 MG extended release tablet     Sig: Take 1 tablet by mouth daily     Dispense:  30 tablet     Refill:  2   Obtain lab now  Start Metoprolol  ADR's explained  Monitor BP and call with readings  Keep f/u with the wound center           Return in about 3 months (around 2/24/2021) for HTN.     Electronically Signed by Bushra Biggs DO

## 2020-11-25 ENCOUNTER — HOSPITAL ENCOUNTER (OUTPATIENT)
Dept: WOUND CARE | Age: 75
Discharge: HOME | End: 2020-11-25
Payer: COMMERCIAL

## 2020-11-25 VITALS
RESPIRATION RATE: 20 BRPM | SYSTOLIC BLOOD PRESSURE: 173 MMHG | HEART RATE: 101 BPM | TEMPERATURE: 98 F | DIASTOLIC BLOOD PRESSURE: 100 MMHG

## 2020-11-25 LAB
CULTURE: ABNORMAL
Lab: ABNORMAL
SPECIMEN: ABNORMAL

## 2020-11-25 PROCEDURE — 11042 DBRDMT SUBQ TIS 1ST 20SQCM/<: CPT | Performed by: SURGERY

## 2020-11-25 PROCEDURE — 11042 DBRDMT SUBQ TIS 1ST 20SQCM/<: CPT

## 2020-11-25 PROCEDURE — 11045 DBRDMT SUBQ TISS EACH ADDL: CPT

## 2020-11-25 PROCEDURE — 11045 DBRDMT SUBQ TISS EACH ADDL: CPT | Performed by: SURGERY

## 2020-11-25 RX ORDER — CLOBETASOL PROPIONATE 0.5 MG/G
OINTMENT TOPICAL ONCE
Status: CANCELLED | OUTPATIENT
Start: 2020-11-25

## 2020-11-25 RX ORDER — BACITRACIN, NEOMYCIN, POLYMYXIN B 400; 3.5; 5 [USP'U]/G; MG/G; [USP'U]/G
OINTMENT TOPICAL ONCE
Status: CANCELLED | OUTPATIENT
Start: 2020-11-25

## 2020-11-25 RX ORDER — GENTAMICIN SULFATE 1 MG/G
OINTMENT TOPICAL ONCE
Status: DISCONTINUED | OUTPATIENT
Start: 2020-11-25 | End: 2020-11-26 | Stop reason: HOSPADM

## 2020-11-25 RX ORDER — LIDOCAINE HYDROCHLORIDE 40 MG/ML
SOLUTION TOPICAL ONCE
Status: CANCELLED | OUTPATIENT
Start: 2020-11-25

## 2020-11-25 RX ORDER — BETAMETHASONE DIPROPIONATE 0.05 %
OINTMENT (GRAM) TOPICAL ONCE
Status: CANCELLED | OUTPATIENT
Start: 2020-11-25

## 2020-11-25 RX ORDER — LIDOCAINE HYDROCHLORIDE 20 MG/ML
JELLY TOPICAL ONCE
Status: CANCELLED | OUTPATIENT
Start: 2020-11-25

## 2020-11-25 RX ORDER — LIDOCAINE 40 MG/G
CREAM TOPICAL ONCE
Status: CANCELLED | OUTPATIENT
Start: 2020-11-25

## 2020-11-25 RX ORDER — LIDOCAINE 50 MG/G
OINTMENT TOPICAL ONCE
Status: DISCONTINUED | OUTPATIENT
Start: 2020-11-25 | End: 2020-11-26 | Stop reason: HOSPADM

## 2020-11-25 RX ORDER — BACITRACIN ZINC AND POLYMYXIN B SULFATE 500; 1000 [USP'U]/G; [USP'U]/G
OINTMENT TOPICAL ONCE
Status: CANCELLED | OUTPATIENT
Start: 2020-11-25

## 2020-11-25 RX ORDER — LIDOCAINE 50 MG/G
OINTMENT TOPICAL ONCE
Status: CANCELLED | OUTPATIENT
Start: 2020-11-25

## 2020-11-25 RX ORDER — GINSENG 100 MG
CAPSULE ORAL ONCE
Status: CANCELLED | OUTPATIENT
Start: 2020-11-25

## 2020-11-25 RX ORDER — GENTAMICIN SULFATE 1 MG/G
OINTMENT TOPICAL ONCE
Status: CANCELLED | OUTPATIENT
Start: 2020-11-25

## 2020-11-25 NOTE — PROGRESS NOTES
Wound Care Center Progress Note with Procedure Note      Марина Tejeda  AGE: 77 y.o. GENDER: male  : 1945  EPISODE DATE:  2020     Subjective:     Chief Complaint   Patient presents with    Wound Check     rt leg         HISTORY of PRESENT ILLNESS      Марина Tejeda is a 77 y.o. male who presents today for wound evaluation of Chronic venous wound(s) of R lower leg medial, R lower leg anterior. The wound is of moderate severity. The underlying cause of the wound is trauma and venous disease.     Wound Pain Timing/Severity: none  Quality of pain: N/A  Severity of pain:  0 / 10   Modifying Factors: edema and venous stasis  Associated Signs/Symptoms: drainage        PAST MEDICAL HISTORY        Diagnosis Date    Chronic venous hypertension with ulcer and inflammation involving right side (HCC)     RLE    Hypertension     past    Iron deficiency     Lymphoma Dx 11    Hx of B cell lymphoma-in remission since        PAST SURGICAL HISTORY    Past Surgical History:   Procedure Laterality Date    TUNNELED VENOUS PORT PLACEMENT      TUNNELED VENOUS PORT PLACEMENT      removed 2012       FAMILY HISTORY    Family History   Problem Relation Age of Onset    Early Death Mother 52    Diabetes Father     Heart Disease Father     Vision Loss Son         \"He wears glasses\"    Early Death Daughter 36       SOCIAL HISTORY    Social History     Tobacco Use    Smoking status: Never Smoker    Smokeless tobacco: Never Used   Substance Use Topics    Alcohol use: No    Drug use: No       ALLERGIES    No Known Allergies    MEDICATIONS    Current Outpatient Medications on File Prior to Encounter   Medication Sig Dispense Refill    mupirocin (BACTROBAN) 2 % ointment Apply to wound with each dressing change 30 g 0    gentamicin (GARAMYCIN) 0.1 % ointment Apply to wound with each dressing change 30 g 0    sulfamethoxazole-trimethoprim (BACTRIM DS;SEPTRA DS) 800-160 MG per tablet Take 1 tablet by mouth 2 times daily for 10 days 20 tablet 0    ferrous sulfate (IRON 325) 325 (65 Fe) MG tablet Take 1 tablet by mouth 2 times daily 180 tablet 1    metoprolol succinate (TOPROL XL) 25 MG extended release tablet Take 1 tablet by mouth daily 30 tablet 2     No current facility-administered medications on file prior to encounter. REVIEW OF SYSTEMS    Pertinent items are noted in HPI. Constitutional: Negative for systemic symptoms including fever, chills and malaise. Objective:      BP (!) 173/100   Pulse 101   Temp 98 °F (36.7 °C) (Temporal)   Resp 20     PHYSICAL EXAM      General: The patient is in no acute distress. Mental status:  Patient is appropriate, is  oriented to place and plan of care.   Dermatologic exam: Visual inspection of the periwound reveals the skin to be atrophic and edematous  Wound exam: see wound description below in procedure note      Assessment:     Problem List Items Addressed This Visit     Chronic venous hypertension with ulcer and inflammation ([facility] [address] - Primary    Relevant Medications    lidocaine (XYLOCAINE) 5 % ointment (Start on 11/25/2020  2:15 PM)    gentamicin (GARAMYCIN) 0.1 % ointment (Start on 11/25/2020  2:15 PM)    mupirocin (BACTROBAN) 2 % ointment (Start on 11/25/2020  2:15 PM)    Other Relevant Orders    Supply: Wound Cleanser    Supply: Wound Dressings    Supply: Cover and Secure    Supply: Edema Control    Venous ulcer of left leg (HCC)    Relevant Medications    lidocaine (XYLOCAINE) 5 % ointment (Start on 11/25/2020  2:15 PM)    gentamicin (GARAMYCIN) 0.1 % ointment (Start on 11/25/2020  2:15 PM)    mupirocin (BACTROBAN) 2 % ointment (Start on 11/25/2020  2:15 PM)    Other Relevant Orders    Supply: Wound Cleanser    Supply: Wound Dressings    Supply: Cover and Secure    Supply: Edema Control        Procedure Note    Indications:  Based on my examination of this patient's wound(s) today, sharp excision into necrotic subcutaneous tissue is required to promote healing and evaluate the extent of previous healing. Performed by: Dipika Orlando MD    Consent obtained: Yes    Time out taken:  Yes    Pain Control: topical       Debridement:Excisional Debridement    Using curette the wound(s) was/were sharply debrided down through and including the removal of subcutaneous tissue. Devitalized Tissue Debrided:  slough    Pre Debridement Measurements:  Are located in the Wound Documentation Flow Sheet    All active wounds listed below with today's date are evaluated  Wound(s)    debrided this date include # : 1     Post  Debridement Measurements:  Wound 09/30/20 Wound #1 Right Medial Lower Leg Cluster (Onset 5 Years) (Active)   Wound Image   11/18/20 1305   Dressing Status New dressing applied;Clean;Dry; Intact 11/11/20 1406   Wound Cleansed Wound cleanser 11/25/20 1337   Dressing/Treatment Alginate with Ag 09/30/20 1355   Offloading for Diabetic Foot Ulcers No 11/18/20 1305   Wound Length (cm) 13.5 cm 11/25/20 1337   Wound Width (cm) 8.5 cm 11/25/20 1337   Wound Depth (cm) 0.1 cm 11/25/20 1337   Wound Surface Area (cm^2) 114.75 cm^2 11/25/20 1337   Change in Wound Size % (l*w) 54.46 11/25/20 1337   Wound Volume (cm^3) 11.48 cm^3 11/25/20 1337   Wound Healing % 54 11/25/20 1337   Post-Procedure Length (cm) 13.5 cm 11/25/20 1356   Post-Procedure Width (cm) 8.5 cm 11/25/20 1356   Post-Procedure Depth (cm) 0.1 cm 11/25/20 1356   Post-Procedure Surface Area (cm^2) 114.75 cm^2 11/25/20 1356   Post-Procedure Volume (cm^3) 11.48 cm^3 11/25/20 1356   Distance Tunneling (cm) 0 cm 11/18/20 1305   Tunneling Position ___ O'Clock 0 11/25/20 1337   Undermining Starts ___ O'Clock 0 11/25/20 1337   Undermining Ends___ O'Clock 0 11/25/20 1337   Undermining Maxium Distance (cm) 0 11/25/20 1337   Wound Assessment Granulation tissue 11/18/20 1305   Drainage Amount Moderate 11/25/20 1337   Drainage Description Serosanguinous 11/25/20 1337   Odor None 11/25/20 1337   Cassia-wound Assessment Intact 11/25/20 1337   Margins Defined edges 11/25/20 1337   Wound Thickness Description not for Pressure Injury Full thickness 11/25/20 1337   Number of days: 56       Percent of Wound(s) Debrided: approximately 50%    Total Surface Area Debrided:  60 sq cm     Bleeding:  None    Hemostasis Achieved:  not needed    Procedural Pain:  0  / 10     Post Procedural Pain:  0 / 10     Response to treatment:  Well tolerated by patient. Status of wound progress and description from last visit:   Improved. Will add cipro to cover the gm neg bacteria in the culture. Plan:       Discharge Instructions            PHYSICIAN ORDERS AND DISCHARGE INSTRUCTIONS     NOTE: Upon discharge from the [address], you will receive a patient experience survey. We would be grateful if you would take the time to fill this survey out.     Wound care order history:                 Vascular studies: Arterial studies done 9/27/20; no stenosis              Imaging:   Date               Cultures:   obtained on 11/18/2020               Labs/ HbA1c:   Date               Grafts:  Date               HBO:                Antibiotics:  Bactrim DS BID for 10 days on 11/18/2020                                   Cipro BID for 10 days ordered on 11/25/2020              Earlier Wound care treatments:                Authorizations:                        Consults:   Date                           Primary care physician:      Continuing wound care orders and information:              Residence:  private               Continue [facility] health care with: [facility]               Your wound-care supplies will be provided by:  Hugh Monroe provider:              Compression with  Jennifer Cost loading:  Date               PCEFP Medications:              WTQIZ cleansing:                           FE not scrub or use excessive force.                          Wash hands with soap and water before and after dressing changes.                         Prior to applying a clean dressing, cleanse wound with normal saline,                                wound cleanser, or mild soap and water.                           Ask the physician or nurse before getting the wound(s) wet in a shower              Daily Wound management:                          Keep weight off wounds and reposition every 2 hours.                          WUMPH standing for long periods of time.                          NZBCO wraps/stockings in AM and remove at bedtime.                          If swelling is present, elevate legs to the level of the heart or above for 30                              minutes 4-5 times a day and/or when sitting.                                             When taking antibiotics take entire prescription as ordered by physician                             do not stop taking until medicine is all gone.                                                       Orders for this week:  11/25/2020     Right lower leg -- wash with soap and water,pat dry. Apply Bactroban and Gentamycin then: Cover with calcium alginate and , Kerramax. Wrap with Coban 2. Homecare to cover with Silver calcium alginate , Kerramax or ABD and wrap with Coban 2 or profore       Homecare to change on Friday and Monday  ( may use profore  to wrap)      Auth for grafts on 10/7/2020 -- approved for Janay Lynch and [employer]                        Follow up with Dr Omaira Farrar 1 weeks in the wound care center  Call [phone] for any questions or concerns.   Date__________   Time____________             Treatment Note      Written Patient Dismissal Instructions Given            Electronically signed by Diamantina Goldmann, MD on 11/25/2020 at 2:02 PM

## 2020-11-30 ENCOUNTER — VIRTUAL VISIT (OUTPATIENT)
Dept: INTERNAL MEDICINE CLINIC | Age: 75
End: 2020-11-30
Payer: COMMERCIAL

## 2020-11-30 VITALS — HEIGHT: 71 IN | WEIGHT: 176 LBS | BODY MASS INDEX: 24.64 KG/M2

## 2020-11-30 PROCEDURE — G0438 PPPS, INITIAL VISIT: HCPCS | Performed by: FAMILY MEDICINE

## 2020-11-30 RX ORDER — CIPROFLOXACIN 500 MG/1
500 TABLET, FILM COATED ORAL 2 TIMES DAILY
COMMUNITY
End: 2021-01-06 | Stop reason: ALTCHOICE

## 2020-11-30 ASSESSMENT — PATIENT HEALTH QUESTIONNAIRE - PHQ9
SUM OF ALL RESPONSES TO PHQ QUESTIONS 1-9: 0
2. FEELING DOWN, DEPRESSED OR HOPELESS: 0
SUM OF ALL RESPONSES TO PHQ9 QUESTIONS 1 & 2: 0
SUM OF ALL RESPONSES TO PHQ QUESTIONS 1-9: 0
SUM OF ALL RESPONSES TO PHQ QUESTIONS 1-9: 0
1. LITTLE INTEREST OR PLEASURE IN DOING THINGS: 0

## 2020-11-30 ASSESSMENT — LIFESTYLE VARIABLES: HOW OFTEN DO YOU HAVE A DRINK CONTAINING ALCOHOL: 0

## 2020-11-30 NOTE — PATIENT INSTRUCTIONS
Personalized Preventive Plan for Young Mueller - 11/30/2020  Medicare offers a range of preventive health benefits. Some of the tests and screenings are paid in full while other may be subject to a deductible, co-insurance, and/or copay. Some of these benefits include a comprehensive review of your medical history including lifestyle, illnesses that may run in your family, and various assessments and screenings as appropriate. After reviewing your medical record and screening and assessments performed today your provider may have ordered immunizations, labs, imaging, and/or referrals for you. A list of these orders (if applicable) as well as your Preventive Care list are included within your After Visit Summary for your review. Other Preventive Recommendations:    · A preventive eye exam performed by an eye specialist is recommended every 1-2 years to screen for glaucoma; cataracts, macular degeneration, and other eye disorders. · A preventive dental visit is recommended every 6 months. · Try to get at least 150 minutes of exercise per week or 10,000 steps per day on a pedometer . · Order or download the FREE \"Exercise & Physical Activity: Your Everyday Guide\" from [employer]. Call 4-209.657.7381 or search [employer] online. · You need 0650-9670 mg of calcium and 8928-2217 IU of vitamin D per day. It is possible to meet your calcium requirement with diet alone, but a vitamin D supplement is usually necessary to meet this goal.  · When exposed to the sun, use a sunscreen that protects against both UVA and UVB radiation with an SPF of 30 or greater. Reapply every 2 to 3 hours or after sweating, drying off with a towel, or swimming. · Always wear a seat belt when traveling in a car. Always wear a helmet when riding a bicycle or motorcycle.

## 2020-11-30 NOTE — PROGRESS NOTES
Medicare Annual Wellness Visit  Name: Bartolo Juarez Date: 2020   MRN: [id number] Sex: Male   Age: 77 y.o. Ethnicity: Non-/Non    : 1945 Race: Jcarlos Kennedy is here for Medicare AWV    Screenings for behavioral, psychosocial and functional/safety risks, and cognitive dysfunction are all negative except as indicated below. These results, as well as other patient data from the [address] [facility] [address] form, are documented in Flowsheets linked to this Encounter. No Known Allergies    Prior to Visit Medications    Medication Sig Taking?  Authorizing Provider   ciprofloxacin (CIPRO) 500 MG tablet Take 500 mg by mouth 2 times daily Yes Fran Gupta MD   metoprolol succinate (TOPROL XL) 25 MG extended release tablet Take 1 tablet by mouth daily Yes Andres Morris DO   ferrous sulfate (IRON 325) 325 (65 Fe) MG tablet Take 1 tablet by mouth 2 times daily Yes Danyelle Sales MD       Past Medical History:   Diagnosis Date    Chronic venous hypertension with ulcer and inflammation involving right side (HCC)     RLE    Hypertension     past    Iron deficiency     Lymphoma Dx     Hx of B cell lymphoma-in remission since        Past Surgical History:   Procedure Laterality Date    TUNNELED VENOUS PORT PLACEMENT      TUNNELED VENOUS PORT PLACEMENT      removed 2012       Family History   Problem Relation Age of Onset    Early Death Mother 52    Diabetes Father     Heart Disease Father     Vision Loss Son         \"He wears glasses\"    Early Death Daughter 36       CareTeam (Including outside providers/suppliers regularly involved in providing care):   Patient Care Team:  Andres Morris DO as PCP - General (Family Medicine)  Andres Morris DO as PCP - Xiomara Alvarez Provider    Wt Readings from Last 3 Encounters:   20 176 lb (79.8 kg)   20 176 lb (79.8 kg)   10/14/20 176 lb 3.2 oz (79.9 kg)     Vitals:    20 0856   Weight: 176 lb trouble with your hearing?: No  Do you have difficulty driving, watching TV, or doing any of your daily activities because of your eyesight?: No  Have you had an eye exam within the past year?: (!) No  Hearing/Vision Interventions:  · Vision concerns:  patient encouraged to make appointment with his/her eye specialist    ADL:  ADLs  In the past 7 days, did you need help from others to perform any of the following everyday activities? Eating, dressing, grooming, bathing, toileting, or walking/balance?: None  In the past 7 days, did you need help from others to take care of any of the following? Laundry, housekeeping, banking/finances, shopping, telephone use, food preparation, transportation, or taking medications?: (!) Transportation  ADL Interventions:  · Patient declines any further evaluation/treatment for this issue stating that he hasn't driven for quite some time, and that his wife takes him where he needs to go    Personalized Preventive Plan   Current Health Maintenance Status  There is no immunization history for the selected administration types on file for this patient. Health Maintenance   Topic Date Due    Hepatitis C screen  1945    DTaP/Tdap/Td vaccine (1 - Tdap) 03/04/1965    Shingles Vaccine (1 of 2) 03/04/1996    Colon cancer screen colonoscopy  03/04/1996    Pneumococcal 65+ years Vaccine (1 of 1 - PPSV23) 03/04/2011    Flu vaccine (1) 09/01/2020    Annual Wellness Visit (AWV)  09/26/2020    Lipid screen  09/27/2025    Hepatitis A vaccine  Aged Out    Hepatitis B vaccine  Aged Out    Hib vaccine  Aged Out    Meningococcal (ACWY) vaccine  Aged Out     Recommendations for [employer] Due: see orders and patient instructions/AVS.    Unable to obtain 3 vital signs due to patient not having equipment to take temperature/BP.     Recommended screening schedule for the next 5-10 years is provided to the patient in written form: see Patient Instructions/AVS.    Vania Em is a 77 y.o. male being evaluated by a Virtual Visit (audio visit) encounter to address concerns as mentioned above. A caregiver was present when appropriate. Due to this being a TeleHealth encounter (During [facility] public health emergency), evaluation of the following organ systems was limited: Vitals/Constitutional/EENT/Resp/CV/GI//MS/Neuro/Skin/Heme-Lymph-Imm. Pursuant to the emergency declaration under the [address] [place] [address], [address] [place] [address] authority and the [employer] Act, this Virtual Visit was conducted with patient's (and/or legal guardian's) consent, to reduce the patient's risk of exposure to COVID-19 and provide necessary medical care. The patient (and/or legal guardian) has also been advised to contact this office for worsening conditions or problems, and seek emergency medical treatment and/or call 911 if deemed necessary. Patient identification was verified at the start of the visit: Yes    Total time spent for this encounter: Not billed by time    Services were provided through a audio synchronous discussion virtually to substitute for in-person clinic visit. Patient and provider were located at their individual homes. --Leretha Angelucci on 11/30/2020 at 9:06 AM    An electronic signature was used to authenticate this note. Bruce Quijano, 11/30/2020, performed the documented evaluation under the direct supervision of the attending physician.

## 2020-12-02 ENCOUNTER — HOSPITAL ENCOUNTER (OUTPATIENT)
Dept: WOUND CARE | Age: 75
Discharge: HOME | End: 2020-12-02
Payer: COMMERCIAL

## 2020-12-02 VITALS — SYSTOLIC BLOOD PRESSURE: 168 MMHG | HEART RATE: 86 BPM | TEMPERATURE: 97.6 F | DIASTOLIC BLOOD PRESSURE: 93 MMHG

## 2020-12-02 PROCEDURE — 11045 DBRDMT SUBQ TISS EACH ADDL: CPT | Performed by: SURGERY

## 2020-12-02 PROCEDURE — 11045 DBRDMT SUBQ TISS EACH ADDL: CPT

## 2020-12-02 PROCEDURE — 11042 DBRDMT SUBQ TIS 1ST 20SQCM/<: CPT | Performed by: SURGERY

## 2020-12-02 PROCEDURE — 11042 DBRDMT SUBQ TIS 1ST 20SQCM/<: CPT

## 2020-12-02 RX ORDER — LIDOCAINE HYDROCHLORIDE 20 MG/ML
JELLY TOPICAL ONCE
Status: CANCELLED | OUTPATIENT
Start: 2020-12-02

## 2020-12-02 RX ORDER — CLOBETASOL PROPIONATE 0.5 MG/G
OINTMENT TOPICAL ONCE
Status: CANCELLED | OUTPATIENT
Start: 2020-12-02

## 2020-12-02 RX ORDER — LIDOCAINE 40 MG/G
CREAM TOPICAL ONCE
Status: CANCELLED | OUTPATIENT
Start: 2020-12-02

## 2020-12-02 RX ORDER — BACITRACIN, NEOMYCIN, POLYMYXIN B 400; 3.5; 5 [USP'U]/G; MG/G; [USP'U]/G
OINTMENT TOPICAL ONCE
Status: CANCELLED | OUTPATIENT
Start: 2020-12-02

## 2020-12-02 RX ORDER — GENTAMICIN SULFATE 1 MG/G
OINTMENT TOPICAL ONCE
Status: CANCELLED | OUTPATIENT
Start: 2020-12-02

## 2020-12-02 RX ORDER — LIDOCAINE 50 MG/G
OINTMENT TOPICAL ONCE
Status: CANCELLED | OUTPATIENT
Start: 2020-12-02

## 2020-12-02 RX ORDER — LIDOCAINE HYDROCHLORIDE 40 MG/ML
SOLUTION TOPICAL ONCE
Status: CANCELLED | OUTPATIENT
Start: 2020-12-02

## 2020-12-02 RX ORDER — BETAMETHASONE DIPROPIONATE 0.05 %
OINTMENT (GRAM) TOPICAL ONCE
Status: CANCELLED | OUTPATIENT
Start: 2020-12-02

## 2020-12-02 RX ORDER — BACITRACIN ZINC AND POLYMYXIN B SULFATE 500; 1000 [USP'U]/G; [USP'U]/G
OINTMENT TOPICAL ONCE
Status: CANCELLED | OUTPATIENT
Start: 2020-12-02

## 2020-12-02 RX ORDER — GINSENG 100 MG
CAPSULE ORAL ONCE
Status: CANCELLED | OUTPATIENT
Start: 2020-12-02

## 2020-12-02 NOTE — PROGRESS NOTES
Wound Care Center Progress Note with Procedure Note      Huang Gil  AGE: 77 y.o. GENDER: male  : 1945  EPISODE DATE:  2020     Subjective:     Chief Complaint   Patient presents with    Wound Check     right leg          HISTORY of PRESENT ILLNESS      Huang Gil is a 77 y.o. male who presents today for wound evaluation of Chronic venous wound(s) of R lower leg medial, R lower leg anterior. The wound is of moderate severity. The underlying cause of the wound is venous disease and trauma.     Wound Pain Timing/Severity: none  Quality of pain: N/A  Severity of pain:  0 / 10   Modifying Factors: venous stasis  Associated Signs/Symptoms: drainage        PAST MEDICAL HISTORY        Diagnosis Date    Chronic venous hypertension with ulcer and inflammation involving right side (HCC)     RLE    Hypertension     past    Iron deficiency     Lymphoma Dx 11-    Hx of B cell lymphoma-in remission since        PAST SURGICAL HISTORY    Past Surgical History:   Procedure Laterality Date    TUNNELED VENOUS PORT PLACEMENT      TUNNELED VENOUS PORT PLACEMENT      removed 2012       FAMILY HISTORY    Family History   Problem Relation Age of Onset    Early Death Mother 52    Diabetes Father     Heart Disease Father     Vision Loss Son         \"He wears glasses\"    Early Death Daughter 36       SOCIAL HISTORY    Social History     Tobacco Use    Smoking status: Never Smoker    Smokeless tobacco: Never Used   Substance Use Topics    Alcohol use: No    Drug use: No       ALLERGIES    No Known Allergies    MEDICATIONS    Current Outpatient Medications on File Prior to Encounter   Medication Sig Dispense Refill    ciprofloxacin (CIPRO) 500 MG tablet Take 500 mg by mouth 2 times daily      metoprolol succinate (TOPROL XL) 25 MG extended release tablet Take 1 tablet by mouth daily 30 tablet 2    ferrous sulfate (IRON 325) 325 (65 Fe) MG tablet Take 1 tablet by mouth 2 times daily 180 tablet 1     No current facility-administered medications on file prior to encounter. REVIEW OF SYSTEMS    Pertinent items are noted in HPI. Constitutional: Negative for systemic symptoms including fever, chills and malaise. Objective:      BP (!) 168/93   Pulse 86   Temp 97.6 °F (36.4 °C) (Temporal)     PHYSICAL EXAM      General: The patient is in no acute distress. Mental status:  Patient is appropriate, is  oriented to place and plan of care. Dermatologic exam: Visual inspection of the periwound reveals the skin to be edematous  Wound exam: see wound description below in procedure note      Assessment:     Problem List Items Addressed This Visit     Chronic venous hypertension with ulcer and inflammation ([facility] [address] - Primary    Relevant Orders    Supply: Wound Cleanser    Supply: Wound Dressings    Supply: Cover and Secure    Supply: Edema Control    Venous ulcer of left leg (HCC)    Relevant Orders    Supply: Wound Cleanser    Supply: Wound Dressings    Supply: Cover and Secure    Supply: Edema Control        Procedure Note    Indications:  Based on my examination of this patient's wound(s) today, sharp excision into necrotic subcutaneous tissue is required to promote healing and evaluate the extent of previous healing. Performed by: Anuj Tomas MD    Consent obtained: Yes    Time out taken:  Yes    Pain Control: topical Anesthetic  Anesthetic: 5% Lidocaine Ointment Topical     Debridement:Excisional Debridement    Using curette the wound(s) was/were sharply debrided down through and including the removal of subcutaneous tissue.         Devitalized Tissue Debrided:  slough    Pre Debridement Measurements:  Are located in the Wound Documentation Flow Sheet    All active wounds listed below with today's date are evaluated  Wound(s)    debrided this date include # : 1     Post  Debridement Measurements:  Wound 09/30/20 Wound #1 Right Medial Lower Leg Cluster (Onset 5 Years) (Active)   Wound Image   12/02/20 1319   Dressing Status New dressing applied;Clean;Dry; Intact 11/11/20 1406   Wound Cleansed Soap and water 12/02/20 1319   Dressing/Treatment Alginate with Ag 09/30/20 1355   Offloading for Diabetic Foot Ulcers No 11/18/20 1305   Wound Length (cm) 14 cm 12/02/20 1319   Wound Width (cm) 9.5 cm 12/02/20 1319   Wound Depth (cm) 0.1 cm 12/02/20 1319   Wound Surface Area (cm^2) 133 cm^2 12/02/20 1319   Change in Wound Size % (l*w) 47.22 12/02/20 1319   Wound Volume (cm^3) 13.3 cm^3 12/02/20 1319   Wound Healing % 47 12/02/20 1319   Post-Procedure Length (cm) 14 cm 12/02/20 1344   Post-Procedure Width (cm) 9.5 cm 12/02/20 1344   Post-Procedure Depth (cm) 0.1 cm 12/02/20 1344   Post-Procedure Surface Area (cm^2) 133 cm^2 12/02/20 1344   Post-Procedure Volume (cm^3) 13.3 cm^3 12/02/20 1344   Distance Tunneling (cm) 0 cm 11/18/20 1305   Tunneling Position ___ O'Clock 0 12/02/20 1319   Undermining Starts ___ O'Clock 0 12/02/20 1319   Undermining Ends___ O'Clock 0 12/02/20 1319   Undermining Maxium Distance (cm) 0 12/02/20 1319   Wound Assessment Granulation tissue 11/18/20 1305   Drainage Amount Moderate 12/02/20 1319   Drainage Description Serosanguinous 12/02/20 1319   Odor None 12/02/20 1319   Cassia-wound Assessment Intact 12/02/20 1319   Margins Defined edges 12/02/20 1319   Wound Thickness Description not for Pressure Injury Full thickness 12/02/20 1319   Number of days: 63       Percent of Wound(s) Debrided: approximately 50%    Total Surface Area Debrided:  60 sq cm     Bleeding:  None    Hemostasis Achieved:  not needed    Procedural Pain:  0  / 10     Post Procedural Pain:  0 / 10     Response to treatment:  Well tolerated by patient. Status of wound progress and description from last visit:   Wound still too large for grafts per admiistration.       Plan:       Discharge Instructions          PHYSICIAN ORDERS AND DISCHARGE INSTRUCTIONS     NOTE: Upon discharge from the [address] [facility],[address], you will receive a patient experience survey. We would be grateful if you would take the time to fill this survey out.     Wound care order history:                 Vascular studies: Arterial studies done 9/27/20; no stenosis              Imaging:   Date               Cultures:   obtained on 11/18/2020               Labs/ HbA1c:   Date               Grafts:  Date               HBO:                Antibiotics:  Bactrim DS BID for 10 days on 11/18/2020                                   Cipro BID for 10 days ordered on 11/25/2020              Earlier Wound care treatments:                Authorizations:                        Consults:   Date                           Primary care physician:      Continuing wound care orders and information:              Residence:  [facility] care with: [facility]               Your wound-care supplies will be provided by: Doe Spencer provider:              [id number] with  Raul Perez loading:  Date               OJKWI Medications:              HIPFB cleansing:                           VY not scrub or use excessive force.                          Wash hands with soap and water before and after dressing changes.                         Prior to applying a clean dressing, cleanse wound with normal saline,                                wound cleanser, or mild soap and water.                           Ask the physician or nurse before getting the wound(s) wet in a shower              Daily Wound management:                          Keep weight off wounds and reposition every 2 hours.                          Avoid standing for long periods of time.                          Apply wraps/stockings in AM and remove at bedtime.                          If swelling is present, elevate legs to the level of the heart or above for 30                              minutes 4-5 times a day and/or when sitting.                                             VQZV taking antibiotics take entire prescription as ordered by physician                             VO not stop taking until medicine is all gone.                                                       Orders for this week:  12/2/2020     Right lower leg -- wash with soap and water,pat dry. Apply Desitin or Calmazine to sarah wound  IN CLINIC TODAY:  Apply Santyl to wound bed  then: Cover with  Saline moist 4x4  and , Kerramax. Wrap with Coban 2. AT home: Apply Bactriban and gentamycin ointment and  Homecare to cover with Silver calcium alginate , Kerramax or ABD and wrap with Coban 2 or profore       Homecare to change on Friday and Monday  ( may use profore  to wrap)      Auth for grafts on 10/7/2020 -- approved for Janay Lynch and Andreasaf                        Follow up with Dr Waldo Brooks 1 weeks in the wound care center  Call [phone] for any questions or concerns.   Date__________   Time____________             Treatment Note Wound 09/30/20 Wound #1 Right Medial Lower Leg Cluster (Onset 5 Years)-Dressing/Treatment: (desitin santyl saline moistg 4x4 kerramax coban 2)    Written Patient Dismissal Instructions Given            Electronically signed by Hamida Canseco MD on 12/2/2020 at 2:37 PM

## 2020-12-02 NOTE — PROGRESS NOTES
Multilayer Compression Wrap   (Not Unna) Below the Knee    NAME:  Emmanuelle Garrett  YOB: 1945  MEDICAL RECORD NUMBER:  [id number]  DATE:  12/2/2020    Multilayer compression wrap: Removed old Multilayer wrap if indicated and wash leg with mild soap/water. Applied moisturizing agent to dry skin as needed. Applied primary and secondary dressing as ordered. Applied multilayered dressing below the knee to left lower leg. Instructed patient/caregiver not to remove dressing and to keep it clean and dry. Instructed patient/caregiver on complications to report to provider, such as pain, numbness in toes, heavy drainage, and slippage of dressing. Instructed patient on purpose of compression dressing and on activity and exercise recommendations.       Electronically signed by Hector Adam LPN on 53/1/7398 at 0:83 PM

## 2020-12-09 ENCOUNTER — HOSPITAL ENCOUNTER (OUTPATIENT)
Dept: WOUND CARE | Age: 75
Discharge: HOME | End: 2020-12-09
Payer: COMMERCIAL

## 2020-12-09 VITALS
RESPIRATION RATE: 18 BRPM | TEMPERATURE: 97.5 F | HEART RATE: 80 BPM | DIASTOLIC BLOOD PRESSURE: 88 MMHG | SYSTOLIC BLOOD PRESSURE: 164 MMHG

## 2020-12-09 PROCEDURE — 11042 DBRDMT SUBQ TIS 1ST 20SQCM/<: CPT

## 2020-12-09 PROCEDURE — 11045 DBRDMT SUBQ TISS EACH ADDL: CPT

## 2020-12-09 PROCEDURE — 11042 DBRDMT SUBQ TIS 1ST 20SQCM/<: CPT | Performed by: SURGERY

## 2020-12-09 PROCEDURE — 11045 DBRDMT SUBQ TISS EACH ADDL: CPT | Performed by: SURGERY

## 2020-12-09 RX ORDER — GINSENG 100 MG
CAPSULE ORAL ONCE
Status: CANCELLED | OUTPATIENT
Start: 2020-12-09

## 2020-12-09 RX ORDER — BETAMETHASONE DIPROPIONATE 0.05 %
OINTMENT (GRAM) TOPICAL ONCE
Status: CANCELLED | OUTPATIENT
Start: 2020-12-09

## 2020-12-09 RX ORDER — BACITRACIN, NEOMYCIN, POLYMYXIN B 400; 3.5; 5 [USP'U]/G; MG/G; [USP'U]/G
OINTMENT TOPICAL ONCE
Status: CANCELLED | OUTPATIENT
Start: 2020-12-09

## 2020-12-09 RX ORDER — LIDOCAINE 40 MG/G
CREAM TOPICAL ONCE
Status: CANCELLED | OUTPATIENT
Start: 2020-12-09

## 2020-12-09 RX ORDER — LIDOCAINE HYDROCHLORIDE 40 MG/ML
SOLUTION TOPICAL ONCE
Status: CANCELLED | OUTPATIENT
Start: 2020-12-09

## 2020-12-09 RX ORDER — GENTAMICIN SULFATE 1 MG/G
OINTMENT TOPICAL ONCE
Status: CANCELLED | OUTPATIENT
Start: 2020-12-09

## 2020-12-09 RX ORDER — LIDOCAINE HYDROCHLORIDE 20 MG/ML
JELLY TOPICAL ONCE
Status: CANCELLED | OUTPATIENT
Start: 2020-12-09

## 2020-12-09 RX ORDER — BACITRACIN ZINC AND POLYMYXIN B SULFATE 500; 1000 [USP'U]/G; [USP'U]/G
OINTMENT TOPICAL ONCE
Status: CANCELLED | OUTPATIENT
Start: 2020-12-09

## 2020-12-09 RX ORDER — LIDOCAINE 50 MG/G
OINTMENT TOPICAL ONCE
Status: CANCELLED | OUTPATIENT
Start: 2020-12-09

## 2020-12-09 RX ORDER — LIDOCAINE HYDROCHLORIDE 40 MG/ML
SOLUTION TOPICAL ONCE
Status: DISCONTINUED | OUTPATIENT
Start: 2020-12-09 | End: 2020-12-10 | Stop reason: HOSPADM

## 2020-12-09 RX ORDER — CLOBETASOL PROPIONATE 0.5 MG/G
OINTMENT TOPICAL ONCE
Status: CANCELLED | OUTPATIENT
Start: 2020-12-09

## 2020-12-09 NOTE — PROGRESS NOTES
Hayder Both Compression Wrap   (Not Unna) Below the Knee    NAME:  El Ortiz  YOB: 1945  MEDICAL RECORD NUMBER:  [id number]  DATE:  12/9/2020    Multilayer compression wrap: Removed old Multilayer wrap if indicated and wash leg with mild soap/water. Applied moisturizing agent to dry skin as needed. Applied primary and secondary dressing as ordered. Applied multilayered dressing below the knee to right lower leg. Instructed patient/caregiver not to remove dressing and to keep it clean and dry. Instructed patient/caregiver on complications to report to provider, such as pain, numbness in toes, heavy drainage, and slippage of dressing. Instructed patient on purpose of compression dressing and on activity and exercise recommendations.       Electronically signed by Sara Philip RN on 12/9/2020 at 2:32 PM

## 2020-12-09 NOTE — PROGRESS NOTES
Wound Care Center Progress Note with Procedure Note      Brigid Banerjee  AGE: 77 y.o. GENDER: male  : 1945  EPISODE DATE:  2020     Subjective:     Chief Complaint   Patient presents with    Wound Check     right lower leg         HISTORY of PRESENT ILLNESS      Brigid Banerjee is a 77 y.o. male who presents today for wound evaluation of Chronic venous wound(s) of R lower leg medial, R lower leg anterior. The wound is of moderate severity. The underlying cause of the wound is trauma and venous disease.     Wound Pain Timing/Severity: none  Quality of pain: N/A  Severity of pain:  0 / 10   Modifying Factors: edema and venous stasis  Associated Signs/Symptoms: drainage        PAST MEDICAL HISTORY        Diagnosis Date    Chronic venous hypertension with ulcer and inflammation involving right side (HCC)     RLE    Hypertension     past    Iron deficiency     Lymphoma Dx 11-    Hx of B cell lymphoma-in remission since        PAST SURGICAL HISTORY    Past Surgical History:   Procedure Laterality Date    TUNNELED VENOUS PORT PLACEMENT      TUNNELED VENOUS PORT PLACEMENT      removed 2012       FAMILY HISTORY    Family History   Problem Relation Age of Onset    Early Death Mother 52    Diabetes Father     Heart Disease Father     Vision Loss Son         \"He wears glasses\"    Early Death Daughter 36       SOCIAL HISTORY    Social History     Tobacco Use    Smoking status: Never Smoker    Smokeless tobacco: Never Used   Substance Use Topics    Alcohol use: No    Drug use: No       ALLERGIES    No Known Allergies    MEDICATIONS    Current Outpatient Medications on File Prior to Encounter   Medication Sig Dispense Refill    ciprofloxacin (CIPRO) 500 MG tablet Take 500 mg by mouth 2 times daily      metoprolol succinate (TOPROL XL) 25 MG extended release tablet Take 1 tablet by mouth daily 30 tablet 2    ferrous sulfate (IRON 325) 325 (65 Fe) MG tablet Take 1 tablet by mouth 2 times daily 180 tablet 1     No current facility-administered medications on file prior to encounter. REVIEW OF SYSTEMS    Pertinent items are noted in HPI. Constitutional: Negative for systemic symptoms including fever, chills and malaise. Objective:      BP (!) 164/88   Pulse 80   Temp 97.5 °F (36.4 °C) (Temporal)   Resp 18     PHYSICAL EXAM      General: The patient is in no acute distress. Mental status:  Patient is appropriate, is  oriented to place and plan of care. Dermatologic exam: Visual inspection of the periwound reveals the skin to be atrophic and edematous  Wound exam: see wound description below in procedure note      Assessment:     Problem List Items Addressed This Visit     Chronic venous hypertension with ulcer and inflammation (HCC) - Primary    Relevant Medications    lidocaine (XYLOCAINE) 4 % external solution    collagenase ointment    Other Relevant Orders    Supply: Wound Cleanser    Supply: Wound Dressings    Supply: Cover and Secure    Supply: Edema Control    Venous ulcer of left leg (HCC)    Relevant Medications    lidocaine (XYLOCAINE) 4 % external solution    collagenase ointment    Other Relevant Orders    Supply: Wound Cleanser    Supply: Wound Dressings    Supply: Cover and Secure    Supply: Edema Control        Procedure Note    Indications:  Based on my examination of this patient's wound(s) today, sharp excision into necrotic subcutaneous tissue is required to promote healing and evaluate the extent of previous healing. Performed by: Sabrina Perez MD    Consent obtained: Yes    Time out taken:  Yes    Pain Control: topical       Debridement:Excisional Debridement    Using curette the wound(s) was/were sharply debrided down through and including the removal of subcutaneous tissue.         Devitalized Tissue Debrided:  slough    Pre Debridement Measurements:  Are located in the Wound Documentation Flow Sheet    All active wounds listed below with today's date are evaluated  Wound(s)    debrided this date include # : 1     Post  Debridement Measurements:  Wound 09/30/20 Wound #1 Right Medial Lower Leg Cluster (Onset 5 Years) (Active)   Wound Image   12/02/20 1319   Dressing Status New dressing applied;Clean;Dry; Intact 11/11/20 1406   Wound Cleansed Soap and water;Vashe 12/09/20 1400   Dressing/Treatment Alginate with Ag 09/30/20 1355   Offloading for Diabetic Foot Ulcers No 12/09/20 1400   Wound Length (cm) 13 cm 12/09/20 1400   Wound Width (cm) 7 cm 12/09/20 1400   Wound Depth (cm) 0.1 cm 12/09/20 1400   Wound Surface Area (cm^2) 91 cm^2 12/09/20 1400   Change in Wound Size % (l*w) 63.89 12/09/20 1400   Wound Volume (cm^3) 9.1 cm^3 12/09/20 1400   Wound Healing % 64 12/09/20 1400   Post-Procedure Length (cm) 13 cm 12/09/20 1411   Post-Procedure Width (cm) 7 cm 12/09/20 1411   Post-Procedure Depth (cm) 0.1 cm 12/09/20 1411   Post-Procedure Surface Area (cm^2) 91 cm^2 12/09/20 1411   Post-Procedure Volume (cm^3) 9.1 cm^3 12/09/20 1411   Distance Tunneling (cm) 0 cm 11/18/20 1305   Tunneling Position ___ O'Clock 0 12/09/20 1400   Undermining Starts ___ O'Clock 0 12/09/20 1400   Undermining Ends___ O'Clock 0 12/09/20 1400   Undermining Maxium Distance (cm) 0 12/09/20 1400   Wound Assessment Granulation tissue 12/09/20 1400   Drainage Amount Moderate 12/09/20 1400   Drainage Description Serosanguinous 12/09/20 1400   Odor Mild 12/09/20 1400   Cassia-wound Assessment Maceration 12/09/20 1400   Margins Defined edges 12/09/20 1400   Wound Thickness Description not for Pressure Injury Full thickness 12/09/20 1400   Number of days: 70       Percent of Wound(s) Debrided: approximately 100%    Total Surface Area Debrided:  90 sq cm     Bleeding:  None    Hemostasis Achieved:  not needed    Procedural Pain:  0  / 10     Post Procedural Pain:  0 / 10     Response to treatment:  Well tolerated by patient.      Status of wound progress and description from last visit:   Improved. Plan:       Discharge Instructions       PHYSICIAN ORDERS AND DISCHARGE INSTRUCTIONS     NOTE: Upon discharge from the [address], you will receive a patient experience survey. We would be grateful if you would take the time to fill this survey out.     Wound care order history:                 Vascular studies: Arterial studies done 9/27/20; no stenosis              Imaging:   Date               Cultures:   obtained on 11/18/2020               Labs/ HbA1c:   Date               Grafts:  Date               HBO:                Antibiotics:  Bactrim DS BID for 10 days on 11/18/2020                                   Cipro BID for 10 days ordered on 11/25/2020              Earlier Wound care treatments:                Authorizations:                        Consults:   Date                           Primary care physician:      Continuing wound care orders and information:              Residence:  private               Continue [facility] health care with: [facility]               Your wound-care supplies will be provided by: Mendel Kayser provider:              [id number] with  Janith Meline loading:  Date               CEFLS Medications:              ZCLFH cleansing:                           JV not scrub or use excessive force.                          Wash hands with soap and water before and after dressing changes.                           Prior to applying a clean dressing, cleanse wound with normal saline,                                wound cleanser, or mild soap and water.                           Ask the physician or nurse before getting the wound(s) wet in a shower              Daily Wound management:                          Keep weight off wounds and reposition every 2 hours.                          TFGSW standing for long periods of time.                          IZBWZ wraps/stockings in AM and remove at bedtime.                          If swelling is present, elevate legs to the level of the heart or above for 30                              minutes 4-5 times a day and/or when sitting.                                             When taking antibiotics take entire prescription as ordered by physician                             do not stop taking until medicine is all gone.                                                       Orders for this week:  12/9/2020     Right lower leg -- wash with soap and water,pat dry. Apply Desitin or Calmazine to sarah wound  IN CLINIC TODAY:  Apply Santyl to wound bed  then: Cover with  Saline moist 4x4  and , Kerramax. Wrap with Coban 2. AT home: Apply Bactriban and gentamycin ointment and  Homecare to cover with Silver calcium alginate , Kerramax or ABD and wrap with Coban 2 or profore       Homecare to change on Friday and Monday  ( may use profore  to wrap)      Auth for grafts on 10/7/2020 -- approved for Cris, Nushield and Apligraf                        Follow up with Dr Starla Mesa 1 weeks in the wound care center  Call [phone] for any questions or concerns.   Date__________   Time        Treatment Note Wound 09/30/20 Wound #1 Right Medial Lower Leg Cluster (Onset 5 Years)-Dressing/Treatment: (desitin santyl saline moistg 4x4 kerramax coban 2)    Written Patient Dismissal Instructions Given            Electronically signed by Carol Calhoun MD on 12/9/2020 at 2:52 PM

## 2020-12-16 ENCOUNTER — HOSPITAL ENCOUNTER (OUTPATIENT)
Dept: WOUND CARE | Age: 75
Discharge: HOME | End: 2020-12-16
Payer: COMMERCIAL

## 2020-12-16 VITALS
HEART RATE: 84 BPM | SYSTOLIC BLOOD PRESSURE: 167 MMHG | DIASTOLIC BLOOD PRESSURE: 88 MMHG | RESPIRATION RATE: 18 BRPM | TEMPERATURE: 97.3 F

## 2020-12-16 PROCEDURE — 15272 SKIN SUB GRAFT T/A/L ADD-ON: CPT

## 2020-12-16 PROCEDURE — 15272 SKIN SUB GRAFT T/A/L ADD-ON: CPT | Performed by: SURGERY

## 2020-12-16 PROCEDURE — 15271 SKIN SUB GRAFT TRNK/ARM/LEG: CPT

## 2020-12-16 PROCEDURE — 15271 SKIN SUB GRAFT TRNK/ARM/LEG: CPT | Performed by: SURGERY

## 2020-12-16 RX ORDER — LIDOCAINE 50 MG/G
OINTMENT TOPICAL ONCE
Status: CANCELLED | OUTPATIENT
Start: 2020-12-16 | End: 2020-12-16

## 2020-12-16 RX ORDER — LIDOCAINE HYDROCHLORIDE 20 MG/ML
JELLY TOPICAL ONCE
Status: CANCELLED | OUTPATIENT
Start: 2020-12-16 | End: 2020-12-16

## 2020-12-16 RX ORDER — LIDOCAINE 40 MG/G
CREAM TOPICAL ONCE
Status: CANCELLED | OUTPATIENT
Start: 2020-12-16 | End: 2020-12-16

## 2020-12-16 RX ORDER — LIDOCAINE HYDROCHLORIDE 40 MG/ML
SOLUTION TOPICAL ONCE
Status: CANCELLED | OUTPATIENT
Start: 2020-12-16 | End: 2020-12-16

## 2020-12-16 RX ORDER — BACITRACIN ZINC AND POLYMYXIN B SULFATE 500; 1000 [USP'U]/G; [USP'U]/G
OINTMENT TOPICAL ONCE
Status: CANCELLED | OUTPATIENT
Start: 2020-12-16 | End: 2020-12-16

## 2020-12-16 RX ORDER — BETAMETHASONE DIPROPIONATE 0.05 %
OINTMENT (GRAM) TOPICAL ONCE
Status: CANCELLED | OUTPATIENT
Start: 2020-12-16 | End: 2020-12-16

## 2020-12-16 RX ORDER — GINSENG 100 MG
CAPSULE ORAL ONCE
Status: CANCELLED | OUTPATIENT
Start: 2020-12-16 | End: 2020-12-16

## 2020-12-16 RX ORDER — BACITRACIN, NEOMYCIN, POLYMYXIN B 400; 3.5; 5 [USP'U]/G; MG/G; [USP'U]/G
OINTMENT TOPICAL ONCE
Status: CANCELLED | OUTPATIENT
Start: 2020-12-16 | End: 2020-12-16

## 2020-12-16 RX ORDER — CLOBETASOL PROPIONATE 0.5 MG/G
OINTMENT TOPICAL ONCE
Status: CANCELLED | OUTPATIENT
Start: 2020-12-16 | End: 2020-12-16

## 2020-12-16 RX ORDER — LIDOCAINE 50 MG/G
OINTMENT TOPICAL ONCE
Status: DISCONTINUED | OUTPATIENT
Start: 2020-12-16 | End: 2020-12-17 | Stop reason: HOSPADM

## 2020-12-16 RX ORDER — GENTAMICIN SULFATE 1 MG/G
OINTMENT TOPICAL ONCE
Status: CANCELLED | OUTPATIENT
Start: 2020-12-16 | End: 2020-12-16

## 2020-12-16 ASSESSMENT — PAIN SCALES - GENERAL: PAINLEVEL_OUTOF10: 0

## 2020-12-16 NOTE — PROGRESS NOTES
Multilayer Compression Wrap   (Not Unna) Below the Knee    NAME:  El Ortiz  YOB: 1945  MEDICAL RECORD NUMBER:  [id number]  DATE:  12/16/2020    Multilayer compression wrap: Removed old Multilayer wrap if indicated and wash leg with mild soap/water. Applied moisturizing agent to dry skin as needed. Applied primary and secondary dressing as ordered. Applied multilayered dressing below the knee to right lower leg. Instructed patient/caregiver not to remove dressing and to keep it clean and dry. Instructed patient/caregiver on complications to report to provider, such as pain, numbness in toes, heavy drainage, and slippage of dressing. Instructed patient on purpose of compression dressing and on activity and exercise recommendations.       Electronically signed by Terrence Zhang LPN on 32/20/8640 at 2:28 PM

## 2020-12-16 NOTE — PROGRESS NOTES
Wound Care Center Progress Note and Bioengineered Skin Application      Dillan Fernandez  AGE: 77 y.o. GENDER: male  : 1945  EPISODE DATE:  2020     Subjective:     Chief Complaint   Patient presents with    Wound Check     right leg          HISTORY of PRESENT ILLNESS      Dillan Fernandez is a 77 y.o. male who presents today for wound evaluation of Chronic venous ulcer(s) of R lower leg lateral.  The ulcer is of moderate severity. The underlying cause of the wound is venous disease and trauma. Appligraft If appropriate skin graft will be applied.   Wound Pain Timing/Severity: none  Quality of pain: N/A  Severity of pain:  0 / 10   Modifying Factors: edema and venous stasis  Associated Signs/Symptoms: drainage        PAST MEDICAL HISTORY        Diagnosis Date    Chronic venous hypertension with ulcer and inflammation involving right side (HCC)     RLE    Hypertension     past    Iron deficiency     Lymphoma Dx 11    Hx of B cell lymphoma-in remission since        PAST SURGICAL HISTORY    Past Surgical History:   Procedure Laterality Date    TUNNELED VENOUS PORT PLACEMENT      TUNNELED VENOUS PORT PLACEMENT      removed 2012       FAMILY HISTORY    Family History   Problem Relation Age of Onset    Early Death Mother 52    Diabetes Father     Heart Disease Father     Vision Loss Son         \"He wears glasses\"    Early Death Daughter 36       SOCIAL HISTORY    Social History     Tobacco Use    Smoking status: Never Smoker    Smokeless tobacco: Never Used   Substance Use Topics    Alcohol use: No    Drug use: No       ALLERGIES    No Known Allergies    MEDICATIONS    Current Outpatient Medications on File Prior to Encounter   Medication Sig Dispense Refill    ciprofloxacin (CIPRO) 500 MG tablet Take 500 mg by mouth 2 times daily      metoprolol succinate (TOPROL XL) 25 MG extended release tablet Take 1 tablet by mouth daily 30 tablet 2    ferrous sulfate (IRON 325) 325 (65 Fe) MG tablet Take 1 tablet by mouth 2 times daily 180 tablet 1     No current facility-administered medications on file prior to encounter. REVIEW OF SYSTEMS    Pertinent items are noted in HPI. Constitutional: Negative for systemic symptoms including fever, chills and malaise. Objective:      BP (!) 167/88   Pulse 84   Temp 97.3 °F (36.3 °C) (Temporal)   Resp 18     PHYSICAL EXAM      General: The patient is in no acute distress. Mental status:  Patient is appropriate, is  oriented to place and plan of care. Dermatologic exam: Visual inspection of the periwound reveals the skin to be edematous  Wound exam: see wound description below in procedure note      Assessment:     Problem List Items Addressed This Visit     Chronic venous hypertension with ulcer and inflammation (HCC) - Primary    Relevant Medications    lidocaine (XYLOCAINE) 5 % ointment    Other Relevant Orders    Supply: Wound Cleanser    Supply: Wound Dressings    Supply: Cover and Secure    Venous ulcer of left leg (HCC)    Relevant Medications    lidocaine (XYLOCAINE) 5 % ointment    Other Relevant Orders    Supply: Wound Cleanser    Supply: Wound Dressings    Supply: Cover and Secure          Conditions above and those listed in the past history are being treated appropriately and are considered under adequate control so as not to preclude the use of a graft. SKIN SUBSTITUTES/GRAFT APPLICATIONS PROCEDURE NOTE    Indicaton:     Chronic ulcer(s) of the right leg  that has(have) failed to heal with the usual wound protocol used for greater than 30 days. See Epic chart for previous modalities and details of previous treatment. The patient has no contraindications or evidence of infection. The patient's competency and support system is appropriate for proper care and follow up for the use of this graft, therefore a graft was placed as below. Procedure:     The wound bed was cleansed and prepared as necessary to accept the graft. Debridement was not required. Product Utilized:          [x] APLIGRAF   [x]44 sq cm   []88 sq cm    []132 sq cm  []176 sq cm           [] DERMAGRAFT  [] 38 sq cm   []76 sq cm    []114 sq cm  []152 sq cm      [] NUSHIELD  [] 1.6 sq/cm disc   [] (2X3) 6 sq/cm    [] (2X4) 8 sq/cm         [] (3X4) 12 sq/cm  [] (4x4) 16 sq/cm   [] (4X6) 24 sq/cm         [] (6X6) 36 sq/cm        [] AFFINITY    [] (1.5 cm x 1.5cm) 2.25 cm   [] (2.5 cm x 2.5 cm) 6.25 cm         [] THERASKIN  [] 13 sq cm   []37.34 sq cm             [] EpiFix   [] 1.54 sq cm disc    [] 2 pieces (3.08 sq cm)    [] 3  pieces (4.62 sq  cm)   [] 6 sq/cm    [] 16 sq cm     [] 18 sq cm   Fenestrated        [] OASIS   [] 10.5 sq cm   []21 sq cm    []35 sq cm Tri-Matrix  []70 sq cm          Tri-Matrix                    [] PURAPLY   [] 1.6 sq cm disc     [] 4 sq cm   [] 8 sq cm   [] 25sq cm  [] 54 sq cm                  [] Grafix      [] 1.54 sq cm disc    [] 3 sq cm    [] 6 sq cm   [] 12 sq cm   [] 25 sq cm        Skin Substitute Applied:    Performed by: Emery Bruno MD    Consent obtained: Yes    Time out taken: Yes     Fenestrated: Yes    Skin Substitute was Applied to Wound Number(s):     1      Wound 09/30/20 Wound #1 Right Medial Lower Leg Cluster (Onset 5 Years) (Active)   Wound Image   12/16/20 1351   Dressing Status New dressing applied;Clean;Dry; Intact 11/11/20 1406   Wound Cleansed Soap and water 12/16/20 1351   Dressing/Treatment Alginate with Ag 09/30/20 1355   Offloading for Diabetic Foot Ulcers No 12/16/20 1351   Wound Length (cm) 13 cm 12/16/20 1351   Wound Width (cm) 7 cm 12/16/20 1351   Wound Depth (cm) 0.1 cm 12/16/20 1351   Wound Surface Area (cm^2) 91 cm^2 12/16/20 1351   Change in Wound Size % (l*w) 63.89 12/16/20 1351   Wound Volume (cm^3) 9.1 cm^3 12/16/20 1351   Wound Healing % 64 12/16/20 1351   Post-Procedure Length (cm) 13 cm 12/09/20 1411   Post-Procedure Width (cm) 7 cm 12/09/20 1411   Post-Procedure Depth (cm) 0.1 cm 12/09/20 1411   Post-Procedure Surface Area (cm^2) 91 cm^2 12/09/20 1411   Post-Procedure Volume (cm^3) 9.1 cm^3 12/09/20 1411   Distance Tunneling (cm) 0 cm 12/16/20 1351   Tunneling Position ___ O'Clock 0 12/16/20 1351   Undermining Starts ___ O'Clock 0 12/16/20 1351   Undermining Ends___ O'Clock 0 12/16/20 1351   Undermining Maxium Distance (cm) 0 12/16/20 1351   Wound Assessment Granulation tissue 12/16/20 1351   Drainage Amount Moderate 12/16/20 1351   Drainage Description Serosanguinous 12/16/20 1351   Odor Mild 12/16/20 1351   Cassia-wound Assessment Maceration 12/16/20 1351   Margins Defined edges 12/16/20 1351   Wound Thickness Description not for Pressure Injury Full thickness 12/16/20 1351   Number of days: 77         Total Surface Area Covered 44 sq/cm    Was the Product Layered  No      Amount Wasted 44 sq/cm    Reason for Waste: material provided was greater than wound size      Secured: Yes    Instruments used to complete placement of graft::forceps    Secured With:   [] N/A  []Steri Strips    []Sutures     []Staples [x]Other    Procedural Pain: 0/10     Post Procedural Pain: 0 / 10    Response to Treatment:  Well tolerated by patient. Status of wound progress and description from last visit:   Improved. Plan:     Discharge instructions:  Discharge Instructions          PHYSICIAN ORDERS AND DISCHARGE INSTRUCTIONS     NOTE: [facility] will receive a patient experience survey.  We would be grateful if you would take the time to fill this survey out.     Wound care order history:                 Vascular studies: Arterial studies done 9/27/20; no stenosis              Imaging:   Date               Cultures:   obtained on 11/18/2020               Labs/ HbA1c:   Date               Grafts:                       #1 Apligraf on 12/16/2020              HBO:                Antibiotics:  Bactrim DS BID for 10 days on 11/18/2020                                   Cipro BID for 10 days ordered on 11/25/2020              Earlier Wound care treatments:                Authorizations:                        Consults:   Date                           Primary care physician:      Continuing wound care orders and information:              Residence:  private               [facility] care with: [facility] wound-care supplies will be provided by: Mendel Kayser provider:              [email or web address] with  Miguel A Solorzano loading:  Date               EMAXI Medications:              CCZEG cleansing:                           PA not scrub or use excessive force.                          Wash hands with soap and water before and after dressing changes.                         Prior to applying a clean dressing, cleanse wound with normal saline,                                wound cleanser, or mild soap and water.                           Ask the physician or nurse before getting the wound(s) wet in a shower              Daily Wound management:                          Keep weight off wounds and reposition every 2 hours.                          CJJJF standing for long periods of time.                          OYZQS wraps/stockings in AM and remove at bedtime.                          If swelling is present, elevate legs to the level of the heart or above for 30                              minutes 4-5 times a day and/or when sitting.                                             When taking antibiotics take entire prescription as ordered by physician                             RT not stop taking until medicine is all gone.                                                       Orders for this week:  12/16/2020     Right lower leg -- wash with soap and water,pat dry. Apply Desitin or Calmazine to sarah wound  IN CLINIC TODAY:  Apply #1 Apligraf to wound bed leave in place , mepitel in place then  DRAMMEN.  Wrap with Coban 2.  AT home: ( Apligaf in place do not remove mepitel) Homecare to cover with Silver calcium alginate , Kerramax or ABD and wrap with Coban 2 or profore       Homecare to change on Friday and Monday  ( may use profore  to wrap) --- GRAFT IN PLACE      Auth for grafts on 10/7/2020 -- approved for Puraply, Nushield and Apligraf                        Follow up with Dr Gallo Alondra 1 weeks in the wound care center  Call [phone] for any questions or concerns.   Date__________   Time             Treatment Note      Written Patient Dismissal Instructions Given            Electronically signed by Anuj Tomas MD on 12/16/2020 at 2:23 PM

## 2020-12-16 NOTE — PROGRESS NOTES
Apligraf Treatment Note    NAME:  Cherie Arias  YOB: 1945  MEDICAL RECORD NUMBER:  [id number]  DATE:  12/16/2020    Goal: Patient will receive safe and proper application of skin substitute. Patient will comply with caring for dressing, offloading and reporting complications. Expiration date and pH of Apligraf checked immediately prior to use. Package intact prior to use and no damage noted. Transport temperature controlled and acceptable. Apligraf was removed from protective sterile packaging by physician and applied to prepared wound bed. Apligraf was applied to right leg  and affixed with steri-strips by the provider. Apligraf was covered with non-adherent ulcer dressing. Applied mepitel over non-adherent. Applied dry gauze and/or roll gauze. Coban or ace wrap was applied to secure graft and decrease edema. Patient/caregiver was instructed not to remove dressing and to keep it clean and dry. Pt/family/caregiver was instructed on signs and symptoms of complications to report such as draining through dressing, dressing falling down/slipping, getting wet, or severe pain or tingling in toes   Pt/family/caregiver was instructed on need for offloading and elevation of affected extremity and on use of prescribed offloading device. Apligraf may be applied a total of 5 times per wound over a 12 week period. Date of first application of Apligraf for this current wound is December 16, 2020.        Guidelines followed    Electronically signed by Yael Matos RN on 12/16/2020 at 3:17 PM

## 2020-12-23 ENCOUNTER — HOSPITAL ENCOUNTER (OUTPATIENT)
Dept: WOUND CARE | Age: 75
Discharge: HOME | End: 2020-12-23
Payer: COMMERCIAL

## 2020-12-23 VITALS
SYSTOLIC BLOOD PRESSURE: 177 MMHG | DIASTOLIC BLOOD PRESSURE: 98 MMHG | TEMPERATURE: 97.4 F | HEART RATE: 93 BPM | RESPIRATION RATE: 18 BRPM

## 2020-12-23 PROCEDURE — 15271 SKIN SUB GRAFT TRNK/ARM/LEG: CPT | Performed by: SURGERY

## 2020-12-23 PROCEDURE — 15272 SKIN SUB GRAFT T/A/L ADD-ON: CPT

## 2020-12-23 PROCEDURE — 15272 SKIN SUB GRAFT T/A/L ADD-ON: CPT | Performed by: SURGERY

## 2020-12-23 PROCEDURE — C5271 LOW COST SKIN SUBSTITUTE APP: HCPCS

## 2020-12-23 RX ORDER — GENTAMICIN SULFATE 1 MG/G
OINTMENT TOPICAL ONCE
Status: CANCELLED | OUTPATIENT
Start: 2020-12-23 | End: 2020-12-23

## 2020-12-23 RX ORDER — BACITRACIN ZINC AND POLYMYXIN B SULFATE 500; 1000 [USP'U]/G; [USP'U]/G
OINTMENT TOPICAL ONCE
Status: CANCELLED | OUTPATIENT
Start: 2020-12-23 | End: 2020-12-23

## 2020-12-23 RX ORDER — LIDOCAINE HYDROCHLORIDE 20 MG/ML
JELLY TOPICAL ONCE
Status: CANCELLED | OUTPATIENT
Start: 2020-12-23 | End: 2020-12-23

## 2020-12-23 RX ORDER — BACITRACIN, NEOMYCIN, POLYMYXIN B 400; 3.5; 5 [USP'U]/G; MG/G; [USP'U]/G
OINTMENT TOPICAL ONCE
Status: CANCELLED | OUTPATIENT
Start: 2020-12-23 | End: 2020-12-23

## 2020-12-23 RX ORDER — LIDOCAINE HYDROCHLORIDE 40 MG/ML
SOLUTION TOPICAL ONCE
Status: CANCELLED | OUTPATIENT
Start: 2020-12-23 | End: 2020-12-23

## 2020-12-23 RX ORDER — LIDOCAINE 50 MG/G
OINTMENT TOPICAL ONCE
Status: CANCELLED | OUTPATIENT
Start: 2020-12-23 | End: 2020-12-23

## 2020-12-23 RX ORDER — GINSENG 100 MG
CAPSULE ORAL ONCE
Status: CANCELLED | OUTPATIENT
Start: 2020-12-23 | End: 2020-12-23

## 2020-12-23 RX ORDER — BETAMETHASONE DIPROPIONATE 0.05 %
OINTMENT (GRAM) TOPICAL ONCE
Status: CANCELLED | OUTPATIENT
Start: 2020-12-23 | End: 2020-12-23

## 2020-12-23 RX ORDER — CLOBETASOL PROPIONATE 0.5 MG/G
OINTMENT TOPICAL ONCE
Status: CANCELLED | OUTPATIENT
Start: 2020-12-23 | End: 2020-12-23

## 2020-12-23 RX ORDER — LIDOCAINE 40 MG/G
CREAM TOPICAL ONCE
Status: CANCELLED | OUTPATIENT
Start: 2020-12-23 | End: 2020-12-23

## 2020-12-23 ASSESSMENT — PAIN SCALES - GENERAL: PAINLEVEL_OUTOF10: 0

## 2020-12-23 NOTE — PROGRESS NOTES
Multilayer Compression Wrap   (Not Unna) Below the Knee    NAME:  Jesse Wilson  YOB: 1945  MEDICAL RECORD NUMBER:  [id number]  DATE:  12/23/2020    Multilayer compression wrap: Removed old Multilayer wrap if indicated and wash leg with mild soap/water. Applied moisturizing agent to dry skin as needed. Applied primary and secondary dressing as ordered. Applied multilayered dressing below the knee to right lower leg. Instructed patient/caregiver not to remove dressing and to keep it clean and dry. Instructed patient/caregiver on complications to report to provider, such as pain, numbness in toes, heavy drainage, and slippage of dressing. Instructed patient on purpose of compression dressing and on activity and exercise recommendations.       Electronically signed by Burgess Semaj LPN on 83/76/6496 at 2:33 PM

## 2020-12-23 NOTE — PROGRESS NOTES
325 (65 Fe) MG tablet Take 1 tablet by mouth 2 times daily 180 tablet 1     No current facility-administered medications on file prior to encounter. REVIEW OF SYSTEMS    Pertinent items are noted in HPI. Constitutional: Negative for systemic symptoms including fever, chills and malaise. Objective:      BP (!) 177/98   Pulse 93   Temp 97.4 °F (36.3 °C) (Temporal)   Resp 18     PHYSICAL EXAM      General: The patient is in no acute distress. Mental status:  Patient is appropriate, is  oriented to place and plan of care. Dermatologic exam: Visual inspection of the periwound reveals the skin to be atrophic and edematous  Wound exam: see wound description below in procedure note      Assessment:     Problem List Items Addressed This Visit     Chronic venous hypertension with ulcer and inflammation (HCC) - Primary    Relevant Orders    Supply: Wound Dressings    Supply: Cover and Secure    Supply: Edema Control    Venous ulcer of left leg (HCC)    Relevant Orders    Supply: Wound Dressings    Supply: Cover and Secure    Supply: Edema Control          Conditions above and those listed in the past history are being treated appropriately and are considered under adequate control so as not to preclude the use of a graft. SKIN SUBSTITUTES/GRAFT APPLICATIONS PROCEDURE NOTE    Indicaton:     Chronic ulcer(s) of the right lower leg  that has(have) failed to heal with the usual wound protocol used for greater than 30 days. See Epic chart for previous modalities and details of previous treatment. The patient has no contraindications or evidence of infection. The patient's competency and support system is appropriate for proper care and follow up for the use of this graft, therefore a graft was placed as below. Procedure: The wound bed was cleansed and prepared as necessary to accept the graft. Debridement was required.     Consent obtained: Yes    Time out taken: Yes    Using curette sharp Excisional Debridement of the wound(s) was/were performed down through and including the removal of subcutaneous tissue. Devitalized Tissue Debrided:  slough      Bleeding:  None    Hemostasis Achieved:  not needed    Procedural Pain:  0  / 10     Post Procedural Pain:  0 / 10     Having prepared the wound bed, the skin graft was completed as below. Product Utilized:          [x] APLIGRAF   [x]44 sq cm   []88 sq cm    []132 sq cm  []176 sq cm           [] DERMAGRAFT  [] 38 sq cm   []76 sq cm    []114 sq cm  []152 sq cm      [] NUSHIELD  [] 1.6 sq/cm disc   [] (2X3) 6 sq/cm    [] (2X4) 8 sq/cm         [] (3X4) 12 sq/cm  [] (4x4) 16 sq/cm   [] (4X6) 24 sq/cm         [] (6X6) 36 sq/cm        [] AFFINITY    [] (1.5 cm x 1.5cm) 2.25 cm   [] (2.5 cm x 2.5 cm) 6.25 cm         [] THERASKIN  [] 13 sq cm   []37.34 sq cm             [] EpiFix   [] 1.54 sq cm disc    [] 2 pieces (3.08 sq cm)    [] 3  pieces (4.62 sq  cm)   [] 6 sq/cm    [] 16 sq cm     [] 18 sq cm   Fenestrated        [] OASIS   [] 10.5 sq cm   []21 sq cm    []35 sq cm Tri-Matrix  []70 sq cm          Tri-Matrix                    [] PURAPLY   [] 1.6 sq cm disc     [] 4 sq cm   [] 8 sq cm   [] 25sq cm  [] 54 sq cm                  [] Grafix      [] 1.54 sq cm disc    [] 3 sq cm    [] 6 sq cm   [] 12 sq cm   [] 25 sq cm        Skin Substitute Applied:    Performed by: Blayne Zhu MD    Consent obtained: Yes    Time out taken: Yes     Fenestrated: Yes    Skin Substitute was Applied to Wound Number(s):     1      Wound 09/30/20 Wound #1 Right Medial Lower Leg Cluster (Onset 5 Years) (Active)   Wound Image   12/16/20 1351   Dressing Status New dressing applied;Clean;Dry; Intact 11/11/20 1406   Wound Cleansed Soap and water 12/23/20 1336   Dressing/Treatment Alginate with Ag 09/30/20 1355   Offloading for Diabetic Foot Ulcers No 12/23/20 1336   Wound Length (cm) 13 cm 12/23/20 1336   Wound Width (cm) 7.2 cm 12/23/20 1336   Wound Depth (cm) 0.1 cm 12/23/20 1336   Wound Surface Area (cm^2) 93.6 cm^2 12/23/20 1336   Change in Wound Size % (l*w) 62.86 12/23/20 1336   Wound Volume (cm^3) 9.36 cm^3 12/23/20 1336   Wound Healing % 63 12/23/20 1336   Post-Procedure Length (cm) 13 cm 12/09/20 1411   Post-Procedure Width (cm) 7 cm 12/09/20 1411   Post-Procedure Depth (cm) 0.1 cm 12/09/20 1411   Post-Procedure Surface Area (cm^2) 91 cm^2 12/09/20 1411   Post-Procedure Volume (cm^3) 9.1 cm^3 12/09/20 1411   Distance Tunneling (cm) 0 cm 12/23/20 1336   Tunneling Position ___ O'Clock 0 12/23/20 1336   Undermining Starts ___ O'Clock 0 12/23/20 1336   Undermining Ends___ O'Clock 0 12/23/20 1336   Undermining Maxium Distance (cm) 0 12/23/20 1336   Wound Assessment Granulation tissue 12/23/20 1336   Drainage Amount Moderate 12/23/20 1336   Drainage Description Serosanguinous 12/23/20 1336   Odor Mild 12/23/20 1336   Cassia-wound Assessment Maceration 12/23/20 1336   Margins Defined edges 12/23/20 1336   Wound Thickness Description not for Pressure Injury Full thickness 12/16/20 1351   Number of days: 84         Total Surface Area Covered 44 sq/cm    Was the Product Layered  No      Amount Wasted 0 sq/cm    Reason for Waste: material provided was greater than wound size      Secured: Yes    Instruments used to complete placement of graft::curette    Secured With:   [] N/A  []Steri Strips    []Sutures     []Staples [x]Other    Procedural Pain: 0/10     Post Procedural Pain: 0 / 10    Response to Treatment:  Well tolerated by patient. Status of wound progress and description from last visit:   Improved. Plan:     Discharge instructions:  Discharge Instructions       PHYSICIAN ORDERS AND DISCHARGE INSTRUCTIONS     NOTE: [facility] you will receive a patient experience survey.  We would be grateful if you would take the time to fill this survey out.     Wound care order history:                 Vascular studies: Arterial studies done 9/27/20; no stenosis              Imaging:   Date               Cultures:   obtained on 11/18/2020               Labs/ HbA1c:   Date               Grafts:                       #1 Apligraf on 12/16/2020                     #2 Apligraf on 12/23/2020              HBO:                Antibiotics:  Bactrim DS BID for 10 days on 11/18/2020                                   Cipro BID for 10 days ordered on 11/25/2020              Earlier Wound care treatments:                Authorizations:                        Consults:   Date                           Primary care physician:      Continuing wound care orders and information:              Residence:  [facility] care with: [facility]               Your wound-care supplies will be provided by: Talon Myers provider:              JESICA with  Gurdeep Azul loading:  Date               LZFFR Medications:              ZBGXF cleansing:                           ZW not scrub or use excessive force.                          Wash hands with soap and water before and after dressing changes.                           Prior to applying a clean dressing, cleanse wound with normal saline,                                wound cleanser, or mild soap and water.                           Ask the physician or nurse before getting the wound(s) wet in a shower              Daily Wound management:                          Keep weight off wounds and reposition every 2 hours.                          HQUFT standing for long periods of time.                          QOPUP wraps/stockings in AM and remove at bedtime.                          If swelling is present, elevate legs to the level of the heart or above for 30                              minutes 4-5 times a day and/or when sitting.                                             When taking antibiotics take entire prescription as ordered by physician                             CY not stop taking until medicine is all gone.                                                       Orders for this week:  12/23/2020     Right lower leg -- wash with soap and water,pat dry. Apply Desitin or Calmazine to sarah wound  IN CLINIC TODAY:  Apply #2 Apligraf to wound bed leave in place , mepitel in place then  DRAMMEN. Wrap with Coban 2.  AT home: ( Apligaf in place do not remove mepitel) Homecare to cover with Silver calcium alginate , Kerramax or ABD and wrap with Coban 2 or profore       Homecare to change on Friday and Monday  ( may use profore  to wrap) --- GRAFT IN PLACE      Auth for grafts on 10/7/2020 -- approved for Puraply, Nushield and Apligraf                        Follow up with Dr [facility] 1 weeks in the wound care center  Call [phone] for any questions or concerns.   Date__________   Time        Treatment Note      Written Patient Dismissal Instructions Given            Electronically signed by Rajan Chong MD on 12/23/2020 at 2:01 PM

## 2020-12-30 ENCOUNTER — HOSPITAL ENCOUNTER (OUTPATIENT)
Dept: WOUND CARE | Age: 75
Discharge: HOME | End: 2020-12-30
Payer: COMMERCIAL

## 2020-12-30 VITALS
DIASTOLIC BLOOD PRESSURE: 84 MMHG | SYSTOLIC BLOOD PRESSURE: 157 MMHG | HEART RATE: 96 BPM | TEMPERATURE: 98.5 F | RESPIRATION RATE: 18 BRPM

## 2020-12-30 PROCEDURE — 15272 SKIN SUB GRAFT T/A/L ADD-ON: CPT | Performed by: SURGERY

## 2020-12-30 PROCEDURE — 15271 SKIN SUB GRAFT TRNK/ARM/LEG: CPT | Performed by: SURGERY

## 2020-12-30 PROCEDURE — 15272 SKIN SUB GRAFT T/A/L ADD-ON: CPT

## 2020-12-30 PROCEDURE — 15271 SKIN SUB GRAFT TRNK/ARM/LEG: CPT

## 2020-12-30 RX ORDER — CLOBETASOL PROPIONATE 0.5 MG/G
OINTMENT TOPICAL ONCE
Status: CANCELLED | OUTPATIENT
Start: 2020-12-30 | End: 2020-12-30

## 2020-12-30 RX ORDER — GENTAMICIN SULFATE 1 MG/G
OINTMENT TOPICAL ONCE
Status: CANCELLED | OUTPATIENT
Start: 2020-12-30 | End: 2020-12-30

## 2020-12-30 RX ORDER — BETAMETHASONE DIPROPIONATE 0.05 %
OINTMENT (GRAM) TOPICAL ONCE
Status: CANCELLED | OUTPATIENT
Start: 2020-12-30 | End: 2020-12-30

## 2020-12-30 RX ORDER — LIDOCAINE HYDROCHLORIDE 40 MG/ML
SOLUTION TOPICAL ONCE
Status: CANCELLED | OUTPATIENT
Start: 2020-12-30 | End: 2020-12-30

## 2020-12-30 RX ORDER — LIDOCAINE 50 MG/G
OINTMENT TOPICAL ONCE
Status: CANCELLED | OUTPATIENT
Start: 2020-12-30 | End: 2020-12-30

## 2020-12-30 RX ORDER — LIDOCAINE HYDROCHLORIDE 20 MG/ML
JELLY TOPICAL ONCE
Status: CANCELLED | OUTPATIENT
Start: 2020-12-30 | End: 2020-12-30

## 2020-12-30 RX ORDER — BACITRACIN, NEOMYCIN, POLYMYXIN B 400; 3.5; 5 [USP'U]/G; MG/G; [USP'U]/G
OINTMENT TOPICAL ONCE
Status: CANCELLED | OUTPATIENT
Start: 2020-12-30 | End: 2020-12-30

## 2020-12-30 RX ORDER — GINSENG 100 MG
CAPSULE ORAL ONCE
Status: CANCELLED | OUTPATIENT
Start: 2020-12-30 | End: 2020-12-30

## 2020-12-30 RX ORDER — LIDOCAINE 50 MG/G
OINTMENT TOPICAL ONCE
Status: DISCONTINUED | OUTPATIENT
Start: 2020-12-30 | End: 2020-12-31 | Stop reason: HOSPADM

## 2020-12-30 RX ORDER — BACITRACIN ZINC AND POLYMYXIN B SULFATE 500; 1000 [USP'U]/G; [USP'U]/G
OINTMENT TOPICAL ONCE
Status: CANCELLED | OUTPATIENT
Start: 2020-12-30 | End: 2020-12-30

## 2020-12-30 RX ORDER — LIDOCAINE 40 MG/G
CREAM TOPICAL ONCE
Status: CANCELLED | OUTPATIENT
Start: 2020-12-30 | End: 2020-12-30

## 2020-12-30 NOTE — PROGRESS NOTES
Apligraf Treatment Note    NAME:  Brandy Pete  YOB: 1945  MEDICAL RECORD NUMBER:  [id number]  DATE:  12/30/2020    Goal: Patient will receive safe and proper application of skin substitute. Patient will comply with caring for dressing, offloading and reporting complications. Expiration date and pH of Apligraf checked immediately prior to use. Package intact prior to use and no damage noted. Transport temperature controlled and acceptable. Apligraf was removed from protective sterile packaging by physician and applied to prepared wound bed. Apligraf was applied to right leg and affixed with steri-strips by the provider. Apligraf was covered with non-adherent ulcer dressing. Applied mepitel over non-adherent. Applied dry gauze and/or roll gauze. Coban or ace wrap was applied to secure graft and decrease edema. Patient/caregiver was instructed not to remove dressing and to keep it clean and dry. Pt/family/caregiver was instructed on signs and symptoms of complications to report such as draining through dressing, dressing falling down/slipping, getting wet, or severe pain or tingling in toes   Pt/family/caregiver was instructed on need for offloading and elevation of affected extremity and on use of prescribed offloading device. Apligraf may be applied a total of 5 times per wound over a 12 week period. Date of first application of Apligraf for this current wound is December 16, 2020.        Guidelines followed    Electronically signed by Mango George RN on 12/30/2020 at 2:07 PM

## 2020-12-30 NOTE — PROGRESS NOTES
Wound Care Center Progress Note and Bioengineered Skin Application      Fe Parsons  AGE: 77 y.o. GENDER: male  : 1945  EPISODE DATE:  2020     Subjective:     Chief Complaint   Patient presents with    Wound Check     Right lower leg         HISTORY of PRESENT ILLNESS      Fe Parsons is a 77 y.o. male who presents today for wound evaluation of Chronic venous ulcer(s) of R lower leg medial.  The ulcer is of moderate severity. The underlying cause of the wound is trauma and venous disease. Apligraf If appropriate skin graft will be applied.   Wound Pain Timing/Severity: none  Quality of pain: N/A  Severity of pain:  0 / 10   Modifying Factors: venous stasis  Associated Signs/Symptoms: drainage        PAST MEDICAL HISTORY        Diagnosis Date    Chronic venous hypertension with ulcer and inflammation involving right side (HCC)     RLE    Hypertension     past    Iron deficiency     Lymphoma Dx 11    Hx of B cell lymphoma-in remission since        PAST SURGICAL HISTORY    Past Surgical History:   Procedure Laterality Date    TUNNELED VENOUS PORT PLACEMENT      TUNNELED VENOUS PORT PLACEMENT      removed 2012       FAMILY HISTORY    Family History   Problem Relation Age of Onset    Early Death Mother 52    Diabetes Father     Heart Disease Father     Vision Loss Son         \"He wears glasses\"    Early Death Daughter 36       SOCIAL HISTORY    Social History     Tobacco Use    Smoking status: Never Smoker    Smokeless tobacco: Never Used   Substance Use Topics    Alcohol use: No    Drug use: No       ALLERGIES    No Known Allergies    MEDICATIONS    Current Outpatient Medications on File Prior to Encounter   Medication Sig Dispense Refill    ciprofloxacin (CIPRO) 500 MG tablet Take 500 mg by mouth 2 times daily      metoprolol succinate (TOPROL XL) 25 MG extended release tablet Take 1 tablet by mouth daily 30 tablet 2    ferrous sulfate (IRON 325) 325 (65 Fe) MG tablet Take 1 tablet by mouth 2 times daily 180 tablet 1     No current facility-administered medications on file prior to encounter. REVIEW OF SYSTEMS    Pertinent items are noted in HPI. Constitutional: Negative for systemic symptoms including fever, chills and malaise. Objective:      BP (!) 157/84   Pulse 96   Temp 98.5 °F (36.9 °C) (Temporal)   Resp 18     PHYSICAL EXAM      General: The patient is in no acute distress. Mental status:  Patient is appropriate, is  oriented to place and plan of care. Dermatologic exam: Visual inspection of the periwound reveals the skin to be edematous  Wound exam: see wound description below in procedure note      Assessment:     Problem List Items Addressed This Visit     Chronic venous hypertension with ulcer and inflammation (HCC) - Primary    Relevant Medications    lidocaine (XYLOCAINE) 5 % ointment (Start on 12/30/2020  2:00 PM)    Other Relevant Orders    Supply: Wound Cleanser    Supply: Wound Dressings    Supply: Cover and Secure    Venous ulcer of left leg (HCC)    Relevant Medications    lidocaine (XYLOCAINE) 5 % ointment (Start on 12/30/2020  2:00 PM)    Other Relevant Orders    Supply: Wound Cleanser    Supply: Wound Dressings    Supply: Cover and Secure          Conditions above and those listed in the past history are being treated appropriately and are considered under adequate control so as not to preclude the use of a graft. SKIN SUBSTITUTES/GRAFT APPLICATIONS PROCEDURE NOTE    Indicaton:     Chronic ulcer(s) of the right lower leg  that has(have) failed to heal with the usual wound protocol used for greater than 30 days. See Epic chart for previous modalities and details of previous treatment. The patient has no contraindications or evidence of infection. The patient's competency and support system is appropriate for proper care and follow up for the use of this graft, therefore a graft was placed as below. Procedure:     The survey. We would be grateful if you would take the time to fill this survey out.     Wound care order history:                 Vascular studies: Arterial studies done 9/27/20; no stenosis              Imaging:   Date               Cultures:   obtained on 11/18/2020               Labs/ HbA1c:   Date               Grafts:                       #1 Apligraf on 12/16/2020                     #2 Apligraf on 12/23/2020                     #3 Apligraf on 12/30/2020               HBO:                Antibiotics:  Bactrim DS BID for 10 days on 11/18/2020                                   Cipro BID for 10 days ordered on 11/25/2020              Earlier Wound care treatments:                Authorizations:                        Consults:   Date                           Primary care physician:      Continuing wound care orders and information:              Residence:  [facility]               Continue home health care with: [facility] wound-care supplies will be provided by: Darren Banks provider:              [email or web address] with  Ene Frias loading:  Date               HNYIW Medications:              RLTSL cleansing:                           ZV not scrub or use excessive force.                          Wash hands with soap and water before and after dressing changes.                           Prior to applying a clean dressing, cleanse wound with normal saline,                                wound cleanser, or mild soap and water.                           Ask the physician or nurse before getting the wound(s) wet in a shower              Daily Wound management:                          Keep weight off wounds and reposition every 2 hours.                          Avoid standing for long periods of time.                          GDCCO wraps/stockings in AM and remove at bedtime.                          If swelling is present, elevate legs to the level of the heart or above for 30                              NQOXRZD 4-5 times a day and/or when sitting.                                             When taking antibiotics take entire prescription as ordered by physician                             do not stop taking until medicine is all gone.                                                       Orders for this week:  12/30/2020     Right lower leg -- wash with soap and water,pat dry. Apply Desitin or Calmazine to sarah wound  IN CLINIC TODAY:  Apply #3 Apligraf to wound bed leave in place , mepitel in place then [place]. Wrap with Coban 2.  AT home: ( Apligaf in place do not remove mepitel) Homecare to cover with Silver calcium alginate , Kerramax or ABD and wrap with Coban 2 or profore       Homecare to change on Friday and Monday  ( may use profore  to wrap) --- GRAFT IN PLACE      Auth for grafts on 10/7/2020 -- approved for Puraplvin, Nushield and Apligraf                        Follow up with Dr Elmer Chauhan 1 weeks in the wound care center  Call [phone] for any questions or concerns.   Date__________   Time        Treatment Note Wound 09/30/20 Wound #1 Right Medial Lower Leg Cluster (Onset 5 Years)-Dressing/Treatment: (silver algainte, abd, coban 2 )    Written Patient Dismissal Instructions Given            Electronically signed by Kapil Anderson MD on 12/30/2020 at 1:55 PM

## 2020-12-30 NOTE — PROGRESS NOTES
Multilayer Compression Wrap   (Not Unna) Below the Knee    NAME:  Stephanie Leonard  YOB: 1945  MEDICAL RECORD NUMBER:  [id number]  DATE:  12/30/2020    Multilayer compression wrap: Removed old Multilayer wrap if indicated and wash leg with mild soap/water. Applied moisturizing agent to dry skin as needed. Applied primary and secondary dressing as ordered. Applied multilayered dressing below the knee to right lower leg. Instructed patient/caregiver not to remove dressing and to keep it clean and dry. Instructed patient/caregiver on complications to report to provider, such as pain, numbness in toes, heavy drainage, and slippage of dressing. Instructed patient on purpose of compression dressing and on activity and exercise recommendations.       Electronically signed by Hira Napoles LPN on 03/30/8955 at 1:54 PM

## 2021-01-06 ENCOUNTER — HOSPITAL ENCOUNTER (OUTPATIENT)
Dept: WOUND CARE | Age: 76
Discharge: HOME | End: 2021-01-06
Payer: COMMERCIAL

## 2021-01-06 VITALS
RESPIRATION RATE: 20 BRPM | TEMPERATURE: 97 F | HEART RATE: 79 BPM | SYSTOLIC BLOOD PRESSURE: 175 MMHG | DIASTOLIC BLOOD PRESSURE: 93 MMHG

## 2021-01-06 DIAGNOSIS — I87.331 CHRONIC VENOUS HYPERTENSION WITH ULCER AND INFLAMMATION INVOLVING RIGHT SIDE (HCC): Primary | ICD-10-CM

## 2021-01-06 DIAGNOSIS — I83.029 VENOUS ULCER OF LEFT LEG (HCC): ICD-10-CM

## 2021-01-06 DIAGNOSIS — L97.929 VENOUS ULCER OF LEFT LEG (HCC): ICD-10-CM

## 2021-01-06 DIAGNOSIS — L97.919 CHRONIC VENOUS HYPERTENSION WITH ULCER AND INFLAMMATION INVOLVING RIGHT SIDE (HCC): Primary | ICD-10-CM

## 2021-01-06 PROCEDURE — 15271 SKIN SUB GRAFT TRNK/ARM/LEG: CPT | Performed by: SURGERY

## 2021-01-06 PROCEDURE — 15272 SKIN SUB GRAFT T/A/L ADD-ON: CPT | Performed by: SURGERY

## 2021-01-06 PROCEDURE — 15272 SKIN SUB GRAFT T/A/L ADD-ON: CPT

## 2021-01-06 PROCEDURE — 15271 SKIN SUB GRAFT TRNK/ARM/LEG: CPT

## 2021-01-06 RX ORDER — GENTAMICIN SULFATE 1 MG/G
OINTMENT TOPICAL ONCE
Status: CANCELLED | OUTPATIENT
Start: 2021-01-06 | End: 2021-01-06

## 2021-01-06 RX ORDER — BACITRACIN, NEOMYCIN, POLYMYXIN B 400; 3.5; 5 [USP'U]/G; MG/G; [USP'U]/G
OINTMENT TOPICAL ONCE
Status: CANCELLED | OUTPATIENT
Start: 2021-01-06 | End: 2021-01-06

## 2021-01-06 RX ORDER — LIDOCAINE HYDROCHLORIDE 20 MG/ML
JELLY TOPICAL ONCE
Status: CANCELLED | OUTPATIENT
Start: 2021-01-06 | End: 2021-01-06

## 2021-01-06 RX ORDER — CLOBETASOL PROPIONATE 0.5 MG/G
OINTMENT TOPICAL ONCE
Status: CANCELLED | OUTPATIENT
Start: 2021-01-06 | End: 2021-01-06

## 2021-01-06 RX ORDER — LIDOCAINE 50 MG/G
OINTMENT TOPICAL ONCE
Status: DISCONTINUED | OUTPATIENT
Start: 2021-01-06 | End: 2021-01-07 | Stop reason: HOSPADM

## 2021-01-06 RX ORDER — GINSENG 100 MG
CAPSULE ORAL ONCE
Status: CANCELLED | OUTPATIENT
Start: 2021-01-06 | End: 2021-01-06

## 2021-01-06 RX ORDER — LIDOCAINE HYDROCHLORIDE 40 MG/ML
SOLUTION TOPICAL ONCE
Status: CANCELLED | OUTPATIENT
Start: 2021-01-06 | End: 2021-01-06

## 2021-01-06 RX ORDER — LIDOCAINE 50 MG/G
OINTMENT TOPICAL ONCE
Status: CANCELLED | OUTPATIENT
Start: 2021-01-06 | End: 2021-01-06

## 2021-01-06 RX ORDER — BACITRACIN ZINC AND POLYMYXIN B SULFATE 500; 1000 [USP'U]/G; [USP'U]/G
OINTMENT TOPICAL ONCE
Status: CANCELLED | OUTPATIENT
Start: 2021-01-06 | End: 2021-01-06

## 2021-01-06 RX ORDER — BETAMETHASONE DIPROPIONATE 0.05 %
OINTMENT (GRAM) TOPICAL ONCE
Status: CANCELLED | OUTPATIENT
Start: 2021-01-06 | End: 2021-01-06

## 2021-01-06 RX ORDER — LIDOCAINE 40 MG/G
CREAM TOPICAL ONCE
Status: CANCELLED | OUTPATIENT
Start: 2021-01-06 | End: 2021-01-06

## 2021-01-06 NOTE — PROGRESS NOTES
Apligraf Treatment Note    NAME:  Marichuy Sharpe  YOB: 1945  MEDICAL RECORD NUMBER:  [id number]  DATE:  1/6/2021    Goal: Patient will receive safe and proper application of skin substitute. Patient will comply with caring for dressing, offloading and reporting complications. Expiration date and pH of Apligraf checked immediately prior to use. Package intact prior to use and no damage noted. Transport temperature controlled and acceptable. Apligraf was removed from protective sterile packaging by physician and applied to prepared wound bed. Apligraf was applied to right leg and affixed with steri-strips by the provider. Apligraf was covered with non-adherent ulcer dressing. Applied mepitel over non-adherent. Applied dry gauze and/or roll gauze. Coban or ace wrap was applied to secure graft and decrease edema. Patient/caregiver was instructed not to remove dressing and to keep it clean and dry. Pt/family/caregiver was instructed on signs and symptoms of complications to report such as draining through dressing, dressing falling down/slipping, getting wet, or severe pain or tingling in toes   Pt/family/caregiver was instructed on need for offloading and elevation of affected extremity and on use of prescribed offloading device. Apligraf may be applied a total of 5 times per wound over a 12 week period. Date of first application of Apligraf for this current wound is December 16, 2020.        Guidelines followed    Electronically signed by Michelle Concepcion RN on 1/6/2021 at 2:47 PM

## 2021-01-06 NOTE — PROGRESS NOTES
Wound Care Center Progress Note and Bioengineered Skin Application      Aria West  AGE: 77 y.o. GENDER: male  : 1945  EPISODE DATE:  2021     Subjective:     Chief Complaint   Patient presents with    Wound Check     rt leg         HISTORY of PRESENT ILLNESS      Aria West is a 77 y.o. male who presents today for wound evaluation of Chronic venous ulcer(s) of R lower leg medial.  The ulcer is of moderate severity. The underlying cause of the wound is venous disease and trauma. Apligraf If appropriate skin graft will be applied. Wound Pain Timing/Severity: none  Quality of pain: N/A  Severity of pain:  0 / 10   Modifying Factors: venous stasis  Associated Signs/Symptoms: none        PAST MEDICAL HISTORY        Diagnosis Date    Chronic venous hypertension with ulcer and inflammation involving right side (HCC)     RLE    Hypertension     past    Iron deficiency     Lymphoma Dx     Hx of B cell lymphoma-in remission since        PAST SURGICAL HISTORY    Past Surgical History:   Procedure Laterality Date    TUNNELED VENOUS PORT PLACEMENT      TUNNELED VENOUS PORT PLACEMENT      removed 2012       FAMILY HISTORY    Family History   Problem Relation Age of Onset    Early Death Mother 52    Diabetes Father     Heart Disease Father     Vision Loss Son         \"He wears glasses\"    Early Death Daughter 36       SOCIAL HISTORY    Social History     Tobacco Use    Smoking status: Never Smoker    Smokeless tobacco: Never Used   Substance Use Topics    Alcohol use: No    Drug use: No       ALLERGIES    No Known Allergies    MEDICATIONS    Current Outpatient Medications on File Prior to Encounter   Medication Sig Dispense Refill    metoprolol succinate (TOPROL XL) 25 MG extended release tablet Take 1 tablet by mouth daily 30 tablet 2     No current facility-administered medications on file prior to encounter.         REVIEW OF SYSTEMS    Pertinent items are noted in line and down to [facility]" otherwise just the red text. Consent obtained: Yes    Time out taken: Yes    Using curette sharp Excisional Debridement of the wound(s) was/were performed down through and including the removal of subcutaneous tissue. Devitalized Tissue Debrided:  slough      Bleeding:  Minimal    Hemostasis Achieved:  by pressure    Procedural Pain:  0  / 10     Post Procedural Pain:  0 / 10     Having prepared the wound bed, the skin graft was completed as below. Product Utilized:          [x] APLIGRAF   [x]44 sq cm   []88 sq cm    []132 sq cm  []176 sq cm           [] DERMAGRAFT  [] 38 sq cm   []76 sq cm    []114 sq cm  []152 sq cm      [] NUSHIELD  [] 1.6 sq/cm disc   [] (2X3) 6 sq/cm    [] (2X4) 8 sq/cm         [] (3X4) 12 sq/cm  [] (4x4) 16 sq/cm   [] (4X6) 24 sq/cm         [] (6X6) 36 sq/cm        [] AFFINITY    [] (1.5 cm x 1.5cm) 2.25 cm   [] (2.5 cm x 2.5 cm) 6.25 cm         [] THERASKIN  [] 13 sq cm   []37.34 sq cm             [] EpiFix   [] 1.54 sq cm disc    [] 2 pieces (3.08 sq cm)    [] 3  pieces (4.62 sq  cm)   [] 6 sq/cm    [] 16 sq cm     [] 18 sq cm   Fenestrated        [] OASIS   [] 10.5 sq cm   []21 sq cm    []35 sq cm Tri-Matrix  []70 sq cm          Tri-Matrix                    [] PURAPLY   [] 1.6 sq cm disc     [] 4 sq cm   [] 8 sq cm   [] 25sq cm  [] 54 sq cm                  [] Grafix      [] 1.54 sq cm disc    [] 3 sq cm    [] 6 sq cm   [] 12 sq cm   [] 25 sq cm        Skin Substitute Applied:    Performed by: Tami Bernstein MD    Consent obtained: Yes    Time out taken: Yes     Fenestrated: Yes    Skin Substitute was Applied to Wound Number(s):  1         Wound 09/30/20 Wound #1 Right Medial Lower Leg Cluster (Onset 5 Years) (Active)   Wound Image   12/30/20 1321   Dressing Status New dressing applied;Clean;Dry; Intact 12/30/20 1355   Wound Cleansed Soap and water 01/06/21 1334   Dressing/Treatment Alginate with Ag 09/30/20 1355   Offloading for Diabetic Foot Ulcers No 12/30/20 1321   Wound Length (cm) 12 cm 01/06/21 1334   Wound Width (cm) 6 cm 01/06/21 1334   Wound Depth (cm) 0.1 cm 01/06/21 1334   Wound Surface Area (cm^2) 72 cm^2 01/06/21 1334   Change in Wound Size % (l*w) 71.43 01/06/21 1334   Wound Volume (cm^3) 7.2 cm^3 01/06/21 1334   Wound Healing % 71 01/06/21 1334   Post-Procedure Length (cm) 12 cm 01/06/21 1354   Post-Procedure Width (cm) 6 cm 01/06/21 1354   Post-Procedure Depth (cm) 0.1 cm 01/06/21 1354   Post-Procedure Surface Area (cm^2) 72 cm^2 01/06/21 1354   Post-Procedure Volume (cm^3) 7.2 cm^3 01/06/21 1354   Distance Tunneling (cm) 0 cm 01/06/21 1334   Tunneling Position ___ O'Clock 0 01/06/21 1334   Undermining Starts ___ O'Clock 0 01/06/21 1334   Undermining Ends___ O'Clock 00 01/06/21 1334   Undermining Maxium Distance (cm) 0 12/30/20 1321   Wound Assessment Granulation tissue 12/30/20 1321   Drainage Amount Moderate 01/06/21 1334   Drainage Description Serosanguinous 01/06/21 1334   Odor Mild 01/06/21 1334   Cassia-wound Assessment Maceration 12/30/20 1321   Margins Defined edges 01/06/21 1334   Wound Thickness Description not for Pressure Injury Full thickness 01/06/21 1334   Number of days: 98         Total Surface Area Covered 44 sq/cm    Was the Product Layered  No      Amount Wasted 0 sq/cm    Reason for Waste: material provided was greater than wound size      Secured: Yes    Instruments used to complete placement of graft::curette    Secured With:   [] N/A  []Steri Strips    []Sutures     []Staples [x]Other    Procedural Pain: 0/10     Post Procedural Pain: 0 / 10    Response to Treatment:  Well tolerated by patient. Status of wound progress and description from last visit:   Improved. Plan:     Discharge instructions:  Discharge Instructions       PHYSICIAN ORDERS AND DISCHARGE INSTRUCTIONS     NOTE: Upon discharge from the [address], you will receive a patient experience survey.  We would be grateful if you would take the time to fill this survey out.     Wound care order history:                 Vascular studies: Arterial studies done 9/27/20; no stenosis              Imaging:   Date               Cultures:   obtained on 11/18/2020               Labs/ HbA1c:   Date               Grafts:                       #1 Apligraf on 12/16/2020                     #2 Apligraf on 12/23/2020                     #3 Apligraf on 12/30/2020                     #4 Apligraf on 1/6/2021               HBO:                Antibiotics:  Bactrim DS BID for 10 days on 11/18/2020                                   Cipro BID for 10 days ordered on 11/25/2020              Earlier Wound care treatments:                Authorizations:                        Consults:   Date                           Primary care physician:      Continuing wound care orders and information:              Residence:  private               [facility] home health care with: [facility] wound-care supplies will be provided by: Jc Diallo provider:              MIO with  Norvel Fallen loading:  Date               KPZEP Medications:              PMSMK cleansing:                           NL not scrub or use excessive force.                          Wash hands with soap and water before and after dressing changes.                           Prior to applying a clean dressing, cleanse wound with normal saline,                                wound cleanser, or mild soap and water.                           Ask the physician or nurse before getting the wound(s) wet in a shower              Daily Wound management:                          Keep weight off wounds and reposition every 2 hours.                          Avoid standing for long periods of time.                          CODKR wraps/stockings in AM and remove at bedtime.                          If swelling is present, elevate legs to the level of the heart or above for 30                              LHHQAOU 4-5 times a day and/or when sitting.                                             When taking antibiotics take entire prescription as ordered by physician                             do not stop taking until medicine is all gone.                                           Orders for this week:  1/6/21     Right lower leg -- wash with soap and water,pat dry. Apply Desitin or Calmazine to sarah wound  IN CLINIC TODAY:  Apply #4 Apligraf to wound bed leave in place , mepitel in place then [facility]. Wrap with Coban 2.  AT home: ( Apligaf in place do not remove mepitel) Homecare to cover with Silver calcium alginate , Kerramax or ABD and wrap with Coban 2 or profore       Homecare to change on Friday and Monday  ( may use profore  to wrap) --- GRAFT IN PLACE      Auth for grafts on 10/7/2020 -- approved for Janay Lynch and Apligraf                        Follow up with Dr Zhanna Galvan 1 weeks in the wound care center  Call [phone] for any questions or concerns.   Date__________   Time        Treatment Note      Written Patient Dismissal Instructions Given            Electronically signed by Val Segovia MD on 1/6/2021 at 2:08 PM

## 2021-01-06 NOTE — PROGRESS NOTES
Multilayer Compression Wrap   (Not Unna) Below the Knee    NAME:  Shaun Carr  YOB: 1945  MEDICAL RECORD NUMBER:  [id number]  DATE:  1/6/2021    Multilayer compression wrap: Removed old Multilayer wrap if indicated and wash leg with mild soap/water. Applied moisturizing agent to dry skin as needed. Applied primary and secondary dressing as ordered. Applied multilayered dressing below the knee to right lower leg. Instructed patient/caregiver not to remove dressing and to keep it clean and dry. Instructed patient/caregiver on complications to report to provider, such as pain, numbness in toes, heavy drainage, and slippage of dressing. Instructed patient on purpose of compression dressing and on activity and exercise recommendations.       Electronically signed by Shireen Allison LPN on 6/9/2192 at 5:91 PM

## 2021-01-13 ENCOUNTER — HOSPITAL ENCOUNTER (OUTPATIENT)
Dept: WOUND CARE | Age: 76
Discharge: HOME | End: 2021-01-13
Payer: COMMERCIAL

## 2021-01-13 VITALS
TEMPERATURE: 97.6 F | HEART RATE: 79 BPM | RESPIRATION RATE: 20 BRPM | SYSTOLIC BLOOD PRESSURE: 177 MMHG | DIASTOLIC BLOOD PRESSURE: 90 MMHG

## 2021-01-13 DIAGNOSIS — L97.919 CHRONIC VENOUS HYPERTENSION WITH ULCER AND INFLAMMATION INVOLVING RIGHT SIDE (HCC): Primary | ICD-10-CM

## 2021-01-13 DIAGNOSIS — L97.929 VENOUS ULCER OF LEFT LEG (HCC): ICD-10-CM

## 2021-01-13 DIAGNOSIS — I87.331 CHRONIC VENOUS HYPERTENSION WITH ULCER AND INFLAMMATION INVOLVING RIGHT SIDE (HCC): Primary | ICD-10-CM

## 2021-01-13 DIAGNOSIS — I83.029 VENOUS ULCER OF LEFT LEG (HCC): ICD-10-CM

## 2021-01-13 PROCEDURE — 15272 SKIN SUB GRAFT T/A/L ADD-ON: CPT | Performed by: SURGERY

## 2021-01-13 PROCEDURE — 15272 SKIN SUB GRAFT T/A/L ADD-ON: CPT

## 2021-01-13 PROCEDURE — 15271 SKIN SUB GRAFT TRNK/ARM/LEG: CPT | Performed by: SURGERY

## 2021-01-13 PROCEDURE — 15271 SKIN SUB GRAFT TRNK/ARM/LEG: CPT

## 2021-01-13 RX ORDER — BACITRACIN, NEOMYCIN, POLYMYXIN B 400; 3.5; 5 [USP'U]/G; MG/G; [USP'U]/G
OINTMENT TOPICAL ONCE
Status: CANCELLED | OUTPATIENT
Start: 2021-01-13 | End: 2021-01-13

## 2021-01-13 RX ORDER — CLOBETASOL PROPIONATE 0.5 MG/G
OINTMENT TOPICAL ONCE
Status: CANCELLED | OUTPATIENT
Start: 2021-01-13 | End: 2021-01-13

## 2021-01-13 RX ORDER — LIDOCAINE 40 MG/G
CREAM TOPICAL ONCE
Status: CANCELLED | OUTPATIENT
Start: 2021-01-13 | End: 2021-01-13

## 2021-01-13 RX ORDER — LIDOCAINE HYDROCHLORIDE 40 MG/ML
SOLUTION TOPICAL ONCE
Status: CANCELLED | OUTPATIENT
Start: 2021-01-13 | End: 2021-01-13

## 2021-01-13 RX ORDER — LIDOCAINE HYDROCHLORIDE 20 MG/ML
JELLY TOPICAL ONCE
Status: CANCELLED | OUTPATIENT
Start: 2021-01-13 | End: 2021-01-13

## 2021-01-13 RX ORDER — GINSENG 100 MG
CAPSULE ORAL ONCE
Status: CANCELLED | OUTPATIENT
Start: 2021-01-13 | End: 2021-01-13

## 2021-01-13 RX ORDER — BETAMETHASONE DIPROPIONATE 0.05 %
OINTMENT (GRAM) TOPICAL ONCE
Status: CANCELLED | OUTPATIENT
Start: 2021-01-13 | End: 2021-01-13

## 2021-01-13 RX ORDER — BACITRACIN ZINC AND POLYMYXIN B SULFATE 500; 1000 [USP'U]/G; [USP'U]/G
OINTMENT TOPICAL ONCE
Status: CANCELLED | OUTPATIENT
Start: 2021-01-13 | End: 2021-01-13

## 2021-01-13 RX ORDER — LIDOCAINE 50 MG/G
OINTMENT TOPICAL ONCE
Status: CANCELLED | OUTPATIENT
Start: 2021-01-13 | End: 2021-01-13

## 2021-01-13 RX ORDER — GENTAMICIN SULFATE 1 MG/G
OINTMENT TOPICAL ONCE
Status: CANCELLED | OUTPATIENT
Start: 2021-01-13 | End: 2021-01-13

## 2021-01-13 RX ORDER — LIDOCAINE HYDROCHLORIDE 40 MG/ML
SOLUTION TOPICAL ONCE
Status: DISCONTINUED | OUTPATIENT
Start: 2021-01-13 | End: 2021-01-14 | Stop reason: HOSPADM

## 2021-01-13 NOTE — PROGRESS NOTES
Multilayer Compression Wrap   (Not Unna) Below the Knee    NAME:  Dez Cordero  YOB: 1945  MEDICAL RECORD NUMBER:  [id number]  DATE:  1/13/2021    Multilayer compression wrap: Removed old Multilayer wrap if indicated and wash leg with mild soap/water. Applied moisturizing agent to dry skin as needed. Applied primary and secondary dressing as ordered. Applied multilayered dressing below the knee to right lower leg. Instructed patient/caregiver not to remove dressing and to keep it clean and dry. Instructed patient/caregiver on complications to report to provider, such as pain, numbness in toes, heavy drainage, and slippage of dressing. Instructed patient on purpose of compression dressing and on activity and exercise recommendations.       Electronically signed by Jose Duncan RN on 1/13/2021 at 4:58 PM

## 2021-01-13 NOTE — PROGRESS NOTES
Wound Care Center Progress Note and Bioengineered Skin Application      Mahi Mensah  AGE: 77 y.o. GENDER: male  : 1945  EPISODE DATE:  2021     Subjective:     Chief Complaint   Patient presents with    Wound Check     rt leg         HISTORY of PRESENT ILLNESS      Mahi Mensah is a 77 y.o. male who presents today for wound evaluation of Chronic venous ulcer(s) of R lower leg medial.  The ulcer is of moderate severity. The underlying cause of the wound is trauma and venous disease. Appligraf If appropriate skin graft will be applied. Wound Pain Timing/Severity: none  Quality of pain: N/A  Severity of pain:  0 / 10   Modifying Factors: edema and venous stasis  Associated Signs/Symptoms: drainage        PAST MEDICAL HISTORY        Diagnosis Date    Chronic venous hypertension with ulcer and inflammation involving right side (HCC)     RLE    Hypertension     past    Iron deficiency     Lymphoma Dx 11-    Hx of B cell lymphoma-in remission since        PAST SURGICAL HISTORY    Past Surgical History:   Procedure Laterality Date    TUNNELED VENOUS PORT PLACEMENT      TUNNELED VENOUS PORT PLACEMENT      removed 2012       FAMILY HISTORY    Family History   Problem Relation Age of Onset    Early Death Mother 52    Diabetes Father     Heart Disease Father     Vision Loss Son         \"He wears glasses\"    Early Death Daughter 36       SOCIAL HISTORY    Social History     Tobacco Use    Smoking status: Never Smoker    Smokeless tobacco: Never Used   Substance Use Topics    Alcohol use: No    Drug use: No       ALLERGIES    No Known Allergies    MEDICATIONS    Current Outpatient Medications on File Prior to Encounter   Medication Sig Dispense Refill    metoprolol succinate (TOPROL XL) 25 MG extended release tablet Take 1 tablet by mouth daily 30 tablet 2     No current facility-administered medications on file prior to encounter.         REVIEW OF SYSTEMS    Pertinent items are (cm) 6 cm 01/13/21 1432   Wound Depth (cm) 0.1 cm 01/13/21 1432   Wound Surface Area (cm^2) 72 cm^2 01/13/21 1432   Change in Wound Size % (l*w) 71.43 01/13/21 1432   Wound Volume (cm^3) 7.2 cm^3 01/13/21 1432   Wound Healing % 71 01/13/21 1432   Post-Procedure Length (cm) 12 cm 01/13/21 1455   Post-Procedure Width (cm) 6 cm 01/13/21 1455   Post-Procedure Depth (cm) 0.1 cm 01/13/21 1455   Post-Procedure Surface Area (cm^2) 72 cm^2 01/13/21 1455   Post-Procedure Volume (cm^3) 7.2 cm^3 01/13/21 1455   Distance Tunneling (cm) 0 cm 01/13/21 1432   Tunneling Position ___ O'Clock 0 01/13/21 1432   Undermining Starts ___ O'Clock 0 01/13/21 1432   Undermining Ends___ O'Clock 0 01/13/21 1432   Undermining Maxium Distance (cm) 0 01/13/21 1432   Wound Assessment Granulation tissue 12/30/20 1321   Drainage Amount Moderate 01/13/21 1432   Drainage Description Serosanguinous 01/13/21 1432   Odor None 01/13/21 1432   Cassia-wound Assessment Intact 01/13/21 1432   Margins Defined edges 01/13/21 1432   Wound Thickness Description not for Pressure Injury Full thickness 01/13/21 1432   Number of days: 105         Total Surface Area Covered 44 sq/cm    Was the Product Layered  No      Amount Wasted 0 sq/cm    Reason for Waste: material provided was greater than wound size      Secured: Yes    Instruments used to complete placement of graft::forceps    Secured With:   [] N/A  []Steri Strips    []Sutures     []Staples [x]Other    Procedural Pain: 0/10     Post Procedural Pain: 0 / 10    Response to Treatment:  Well tolerated by patient. Status of wound progress and description from last visit:   Improved. Plan:     Discharge instructions:  Discharge Instructions          PHYSICIAN ORDERS AND DISCHARGE INSTRUCTIONS     NOTE: [facility] will receive a patient experience survey.  We would be grateful if you would take the time to fill this survey out.     Wound care order history:                 Vascular studies: Arterial studies done 9/27/20; no stenosis              Imaging:   Date               Cultures:   obtained on 11/18/2020               Labs/ HbA1c:   Date               Grafts:                       #1 Apligraf on 12/16/2020                     #2 Apligraf on 12/23/2020                     #3 Apligraf on 12/30/2020                     #4 Apligraf on 1/6/2021                     # 5 Apligraf on 1/13/21               HBO:                Antibiotics:  Bactrim DS BID for 10 days on 11/18/2020                                   Cipro BID for 10 days ordered on 11/25/2020              Earlier Wound care treatments:                Authorizations:                        Consults:   Date                           Primary care physician:      Continuing wound care orders and information:              Residence:  [facility]               Continue home health care with: [facility]               Your wound-care supplies will be provided by: Benitez Paez provider:              MARVIN with  Yuri Mueller loading:  Date               NJCGA Medications:              GFSLS cleansing:                           WK not scrub or use excessive force.                          Wash hands with soap and water before and after dressing changes.                           Prior to applying a clean dressing, cleanse wound with normal saline,                                wound cleanser, or mild soap and water.                           Ask the physician or nurse before getting the wound(s) wet in a shower              Daily Wound management:                          Keep weight off wounds and reposition every 2 hours.                          YPQDR standing for long periods of time.                          IWZCA wraps/stockings in AM and remove at bedtime.                          If swelling is present, elevate legs to the level of the heart or above for 30                              minutes 4-5 times a day and/or when sitting.                                             When taking antibiotics take entire prescription as ordered by physician                             do not stop taking until medicine is all gone.                                           Orders for this week:  1/13/21     Right lower leg -- wash with soap and water,pat dry. Apply Desitin or Calmazine to sarah wound  IN CLINIC TODAY:  Apply #5 Apligraf to wound bed leave in place , mepitel in place then [facility] scott. Wrap with Coban 2.  AT home: ( Apligaf in place do not remove mepitel) Homecare to cover with Silver calcium alginate , Kerramax or ABD and wrap with Coban 2 or profore       Homecare to change on Friday and Monday  ( may use profore  to wrap) --- GRAFT IN PLACE      Auth for grafts on 10/7/2020 -- approved for Janay Lynch and Apligraf                        Follow up with Dr Jocelyne Severin 1 weeks in the wound care center  Call [phone] for any questions or concerns.   Date__________   Time             Treatment Note Wound 09/30/20 Wound #1 Right Medial Lower Leg Cluster (Onset 5 Years)-Dressing/Treatment: (Apligraf, mepitel)    Written Patient Dismissal Instructions Given            Electronically signed by Barrera Hwang MD on 1/13/2021 at 3:03 PM

## 2021-01-20 ENCOUNTER — HOSPITAL ENCOUNTER (OUTPATIENT)
Dept: WOUND CARE | Age: 76
Discharge: HOME | End: 2021-01-20
Payer: COMMERCIAL

## 2021-01-20 VITALS — SYSTOLIC BLOOD PRESSURE: 178 MMHG | DIASTOLIC BLOOD PRESSURE: 100 MMHG | TEMPERATURE: 97.5 F | HEART RATE: 94 BPM

## 2021-01-20 DIAGNOSIS — I87.331 CHRONIC VENOUS HYPERTENSION WITH ULCER AND INFLAMMATION INVOLVING RIGHT SIDE (HCC): Primary | ICD-10-CM

## 2021-01-20 DIAGNOSIS — L97.929 VENOUS ULCER OF LEFT LEG (HCC): ICD-10-CM

## 2021-01-20 DIAGNOSIS — L97.919 CHRONIC VENOUS HYPERTENSION WITH ULCER AND INFLAMMATION INVOLVING RIGHT SIDE (HCC): Primary | ICD-10-CM

## 2021-01-20 DIAGNOSIS — I83.029 VENOUS ULCER OF LEFT LEG (HCC): ICD-10-CM

## 2021-01-20 PROCEDURE — 11045 DBRDMT SUBQ TISS EACH ADDL: CPT | Performed by: SURGERY

## 2021-01-20 PROCEDURE — 87070 CULTURE OTHR SPECIMN AEROBIC: CPT

## 2021-01-20 PROCEDURE — 87186 SC STD MICRODIL/AGAR DIL: CPT

## 2021-01-20 PROCEDURE — 87077 CULTURE AEROBIC IDENTIFY: CPT

## 2021-01-20 PROCEDURE — 87076 CULTURE ANAEROBE IDENT EACH: CPT

## 2021-01-20 PROCEDURE — 11042 DBRDMT SUBQ TIS 1ST 20SQCM/<: CPT

## 2021-01-20 PROCEDURE — 87075 CULTR BACTERIA EXCEPT BLOOD: CPT

## 2021-01-20 PROCEDURE — 11042 DBRDMT SUBQ TIS 1ST 20SQCM/<: CPT | Performed by: SURGERY

## 2021-01-20 PROCEDURE — 11045 DBRDMT SUBQ TISS EACH ADDL: CPT

## 2021-01-20 PROCEDURE — 87185 SC STD ENZYME DETCJ PER NZM: CPT

## 2021-01-20 RX ORDER — BACITRACIN, NEOMYCIN, POLYMYXIN B 400; 3.5; 5 [USP'U]/G; MG/G; [USP'U]/G
OINTMENT TOPICAL ONCE
Status: CANCELLED | OUTPATIENT
Start: 2021-01-20 | End: 2021-01-20

## 2021-01-20 RX ORDER — LIDOCAINE HYDROCHLORIDE 20 MG/ML
JELLY TOPICAL ONCE
Status: CANCELLED | OUTPATIENT
Start: 2021-01-20 | End: 2021-01-20

## 2021-01-20 RX ORDER — LIDOCAINE 40 MG/G
CREAM TOPICAL ONCE
Status: CANCELLED | OUTPATIENT
Start: 2021-01-20 | End: 2021-01-20

## 2021-01-20 RX ORDER — CLOBETASOL PROPIONATE 0.5 MG/G
OINTMENT TOPICAL ONCE
Status: CANCELLED | OUTPATIENT
Start: 2021-01-20 | End: 2021-01-20

## 2021-01-20 RX ORDER — BACITRACIN ZINC AND POLYMYXIN B SULFATE 500; 1000 [USP'U]/G; [USP'U]/G
OINTMENT TOPICAL ONCE
Status: CANCELLED | OUTPATIENT
Start: 2021-01-20 | End: 2021-01-20

## 2021-01-20 RX ORDER — BETAMETHASONE DIPROPIONATE 0.05 %
OINTMENT (GRAM) TOPICAL ONCE
Status: CANCELLED | OUTPATIENT
Start: 2021-01-20 | End: 2021-01-20

## 2021-01-20 RX ORDER — GINSENG 100 MG
CAPSULE ORAL ONCE
Status: CANCELLED | OUTPATIENT
Start: 2021-01-20 | End: 2021-01-20

## 2021-01-20 RX ORDER — LIDOCAINE HYDROCHLORIDE 40 MG/ML
SOLUTION TOPICAL ONCE
Status: CANCELLED | OUTPATIENT
Start: 2021-01-20 | End: 2021-01-20

## 2021-01-20 RX ORDER — LIDOCAINE HYDROCHLORIDE 40 MG/ML
SOLUTION TOPICAL ONCE
Status: DISCONTINUED | OUTPATIENT
Start: 2021-01-20 | End: 2021-01-21 | Stop reason: HOSPADM

## 2021-01-20 RX ORDER — LIDOCAINE 50 MG/G
OINTMENT TOPICAL ONCE
Status: CANCELLED | OUTPATIENT
Start: 2021-01-20 | End: 2021-01-20

## 2021-01-20 RX ORDER — GENTAMICIN SULFATE 1 MG/G
OINTMENT TOPICAL ONCE
Status: CANCELLED | OUTPATIENT
Start: 2021-01-20 | End: 2021-01-20

## 2021-01-20 NOTE — PLAN OF CARE
Multilayer Compression Wrap   (Not Unna) Below the Knee    NAME:  Vladimir Cardoso  YOB: 1945  MEDICAL RECORD NUMBER:  [id number]  DATE:  1/20/2021    Multilayer compression wrap: Applied multilayered dressing below the knee to right lower leg.       Electronically signed by Shannan Kay LPN on 8/57/7079 at 2:63 PM

## 2021-01-20 NOTE — PROGRESS NOTES
Wound Care Center Progress Note with Procedure Note      Pedro Vasquez  AGE: 77 y.o. GENDER: male  : 1945  EPISODE DATE:  2021     Subjective:     Chief Complaint   Patient presents with    Wound Check     R leg          HISTORY of PRESENT ILLNESS      Pedro Vasquez is a 77 y.o. male who presents today for wound evaluation of Chronic venous wound(s) of R lower leg medial.  The wound is of moderate severity. The underlying cause of the wound is venous disease and trauma. Wound Pain Timing/Severity: none  Quality of pain: N/A  Severity of pain:  0 / 10   Modifying Factors: venous stasis  Associated Signs/Symptoms: erythema        PAST MEDICAL HISTORY        Diagnosis Date    Chronic venous hypertension with ulcer and inflammation involving right side (HCC)     RLE    Hypertension     past    Iron deficiency     Lymphoma Dx 11    Hx of B cell lymphoma-in remission since        PAST SURGICAL HISTORY    Past Surgical History:   Procedure Laterality Date    TUNNELED VENOUS PORT PLACEMENT      TUNNELED VENOUS PORT PLACEMENT      removed 2012       FAMILY HISTORY    Family History   Problem Relation Age of Onset    Early Death Mother 52    Diabetes Father     Heart Disease Father     Vision Loss Son         \"He wears glasses\"    Early Death Daughter 36       SOCIAL HISTORY    Social History     Tobacco Use    Smoking status: Never Smoker    Smokeless tobacco: Never Used   Substance Use Topics    Alcohol use: No    Drug use: No       ALLERGIES    No Known Allergies    MEDICATIONS    Current Outpatient Medications on File Prior to Encounter   Medication Sig Dispense Refill    metoprolol succinate (TOPROL XL) 25 MG extended release tablet Take 1 tablet by mouth daily 30 tablet 2     No current facility-administered medications on file prior to encounter.         REVIEW OF SYSTEMS    A comprehensive review of systems was negative. Constitutional: Negative for systemic symptoms including fever, chills and malaise. Objective:      BP (!) 178/100   Pulse 94   Temp 97.5 °F (36.4 °C) (Temporal)     PHYSICAL EXAM      General: The patient is in no acute distress. Mental status:  Patient is appropriate, is  oriented to place and plan of care. Dermatologic exam: Visual inspection of the periwound reveals the skin to be edematous  Wound exam: see wound description below in procedure note      Assessment:     Problem List Items Addressed This Visit     Chronic venous hypertension with ulcer and inflammation (HCC) - Primary    Relevant Medications    lidocaine (XYLOCAINE) 4 % external solution    Other Relevant Orders    Supply: Wound Cleanser    Supply: Wound Dressings    Supply: Cover and Secure    Supply: Edema Control    Venous ulcer of left leg (HCC)    Relevant Medications    lidocaine (XYLOCAINE) 4 % external solution    Other Relevant Orders    Supply: Wound Cleanser    Supply: Wound Dressings    Supply: Cover and Secure    Supply: Edema Control        Procedure Note    Indications:  Based on my examination of this patient's wound(s) today, sharp excision into necrotic subcutaneous tissue is required to promote healing and evaluate the extent of previous healing. Performed by: Sheri Bobby MD    Consent obtained: Yes    Time out taken:  Yes    Pain Control: topical Anesthetic  Anesthetic: 4% Lidocaine Liquid Topical     Debridement:Excisional Debridement    Using curette the wound(s) was/were sharply debrided down through and including the removal of subcutaneous tissue.         Devitalized Tissue Debrided:  slough    Pre Debridement Measurements:  Are located in the Wound Documentation Flow Sheet    All active wounds listed below with today's date are evaluated  Wound(s)    debrided this date include # : 1     Post  Debridement Measurements:  Wound 09/30/20 Wound #1 Right Medial Lower Leg Cluster (Onset 5 Years) (Active)   Wound Image   01/13/21 1432   Dressing Status New dressing applied 01/13/21 1657   Wound Cleansed Soap and water 01/20/21 1331   Dressing/Treatment Alginate with Ag 01/13/21 1657   Offloading for Diabetic Foot Ulcers No 12/30/20 1321   Wound Length (cm) 12 cm 01/20/21 1331   Wound Width (cm) 8 cm 01/20/21 1331   Wound Depth (cm) 0.1 cm 01/20/21 1331   Wound Surface Area (cm^2) 96 cm^2 01/20/21 1331   Change in Wound Size % (l*w) 61.9 01/20/21 1331   Wound Volume (cm^3) 9.6 cm^3 01/20/21 1331   Wound Healing % 62 01/20/21 1331   Post-Procedure Length (cm) 12 cm 01/13/21 1455   Post-Procedure Width (cm) 6 cm 01/13/21 1455   Post-Procedure Depth (cm) 0.1 cm 01/13/21 1455   Post-Procedure Surface Area (cm^2) 72 cm^2 01/13/21 1455   Post-Procedure Volume (cm^3) 7.2 cm^3 01/13/21 1455   Distance Tunneling (cm) 0 cm 01/20/21 1331   Tunneling Position ___ O'Clock 0 01/20/21 1331   Undermining Starts ___ O'Clock 0 01/20/21 1331   Undermining Ends___ O'Clock 0 01/20/21 1331   Undermining Maxium Distance (cm) 0 01/20/21 1331   Wound Assessment Hyper granulation tissue 01/20/21 1331   Drainage Amount Moderate 01/20/21 1331   Drainage Description Serosanguinous 01/20/21 1331   Odor None 01/20/21 1331   Cassia-wound Assessment Intact 01/20/21 1331   Margins Defined edges 01/20/21 1331   Wound Thickness Description not for Pressure Injury Full thickness 01/20/21 1331   Number of days: 112       Percent of Wound(s) Debrided: approximately 50%    Total Surface Area Debrided:  40 sq cm     Bleeding:  Minimal    Hemostasis Achieved:  by pressure    Procedural Pain:  0  / 10     Post Procedural Pain:  0 / 10     Response to treatment:  Well tolerated by patient. Status of wound progress and description from last visit:   Improved. Will start on cipro for cellulitis. .      Plan:       Discharge Instructions          PHYSICIAN ORDERS AND DISCHARGE INSTRUCTIONS     NOTE: Upon discharge from the [address], you will receive a patient experience survey. We would be grateful if you would take the time to fill this survey out.     Wound care order history:                 Vascular studies: Arterial studies done 9/27/20; no stenosis              Imaging:   Date               Cultures:   obtained on 11/18/2020                                 Obtained on 1/20/2021               Labs/ HbA1c:   Date               Grafts:                       #1 Apligraf on 12/16/2020                     #2 Apligraf on 12/23/2020                     #3 Apligraf on 12/30/2020                     #4 Apligraf on 1/6/2021                     # 5 Apligraf on 1/13/21               HBO:                Antibiotics:  Bactrim DS BID for 10 days on 11/18/2020                                   Cipro BID for 10 days ordered on 11/25/2020                                    Cipro 500 mg BID on 1/20/2021              Earlier Wound care treatments:                Authorizations:                        Consults:   Date                           Primary care physician:      Continuing wound care orders and information:              Residence:  [facility]               Continue home health care with: [facility]               Your wound-care supplies will be provided by: Timothy Palafox provider:              LULY with  Zaki Ortiz loading:  Date               HWKNN Medications:              NSSAP cleansing:                           TB not scrub or use excessive force.                          Wash hands with soap and water before and after dressing changes.                           Prior to applying a clean dressing, cleanse wound with normal saline,                                wound cleanser, or mild soap and water.                           Ask the physician or nurse before getting the wound(s) wet in a shower              Daily Wound management:                          Keep weight off wounds and reposition every 2 hours.                          IEMNX standing for long periods of time.                          HPACA wraps/stockings in AM and remove at bedtime.                          If swelling is present, elevate legs to the level of the heart or above for 30                              minutes 4-5 times a day and/or when sitting.                                             When taking antibiotics take entire prescription as ordered by physician                             SX not stop taking until medicine is all gone.                                           Orders for this week:  1/20/21     Right lower leg -- wash with soap and water,pat dry. Apply Desitin or Calmazine to sarah wound  IN CLINIC TODAY:  Apply #5 Apligraf to wound bed leave in place , mepitel in place then [place]. Wrap with Coban 2.  AT home: ( Apligaf in place do not remove mepitel) Homecare to cover with Silver calcium alginate , Kerramax or ABD and wrap with conform and tape. Tubi e to leg, on at all times        Homecare to change on Friday and Monday  ( may use profore  to wrap) --- GRAFT IN PLACE      Auth for grafts on 10/7/2020 -- approved for Janay Lynch and Apligraf                        Follow up with Dr Bess Mercado 1 weeks in the wound care center  Call [phone] for any questions or concerns.   Date__________   Time             Treatment Note      Written Patient Dismissal Instructions Given            Electronically signed by Domenica Dubon MD on 1/20/2021 at 2:02 PM

## 2021-01-24 LAB
CULTURE: ABNORMAL
Lab: ABNORMAL
SPECIMEN: ABNORMAL

## 2021-01-27 ENCOUNTER — HOSPITAL ENCOUNTER (OUTPATIENT)
Dept: WOUND CARE | Age: 76
Discharge: HOME | End: 2021-01-27
Payer: COMMERCIAL

## 2021-01-27 VITALS
RESPIRATION RATE: 18 BRPM | HEART RATE: 79 BPM | DIASTOLIC BLOOD PRESSURE: 91 MMHG | SYSTOLIC BLOOD PRESSURE: 161 MMHG | TEMPERATURE: 96.8 F

## 2021-01-27 DIAGNOSIS — I83.029 VENOUS ULCER OF LEFT LEG (HCC): ICD-10-CM

## 2021-01-27 DIAGNOSIS — L97.919 CHRONIC VENOUS HYPERTENSION WITH ULCER AND INFLAMMATION INVOLVING RIGHT SIDE (HCC): Primary | ICD-10-CM

## 2021-01-27 DIAGNOSIS — L97.929 VENOUS ULCER OF LEFT LEG (HCC): ICD-10-CM

## 2021-01-27 DIAGNOSIS — I87.331 CHRONIC VENOUS HYPERTENSION WITH ULCER AND INFLAMMATION INVOLVING RIGHT SIDE (HCC): Primary | ICD-10-CM

## 2021-01-27 PROCEDURE — 15271 SKIN SUB GRAFT TRNK/ARM/LEG: CPT | Performed by: SURGERY

## 2021-01-27 PROCEDURE — 15271 SKIN SUB GRAFT TRNK/ARM/LEG: CPT

## 2021-01-27 RX ORDER — LIDOCAINE HYDROCHLORIDE 20 MG/ML
JELLY TOPICAL ONCE
Status: CANCELLED | OUTPATIENT
Start: 2021-01-27 | End: 2021-01-27

## 2021-01-27 RX ORDER — BETAMETHASONE DIPROPIONATE 0.05 %
OINTMENT (GRAM) TOPICAL ONCE
Status: CANCELLED | OUTPATIENT
Start: 2021-01-27 | End: 2021-01-27

## 2021-01-27 RX ORDER — LIDOCAINE 40 MG/G
CREAM TOPICAL ONCE
Status: CANCELLED | OUTPATIENT
Start: 2021-01-27 | End: 2021-01-27

## 2021-01-27 RX ORDER — BACITRACIN ZINC AND POLYMYXIN B SULFATE 500; 1000 [USP'U]/G; [USP'U]/G
OINTMENT TOPICAL ONCE
Status: CANCELLED | OUTPATIENT
Start: 2021-01-27 | End: 2021-01-27

## 2021-01-27 RX ORDER — CLOBETASOL PROPIONATE 0.5 MG/G
OINTMENT TOPICAL ONCE
Status: CANCELLED | OUTPATIENT
Start: 2021-01-27 | End: 2021-01-27

## 2021-01-27 RX ORDER — LIDOCAINE 50 MG/G
OINTMENT TOPICAL ONCE
Status: CANCELLED | OUTPATIENT
Start: 2021-01-27 | End: 2021-01-27

## 2021-01-27 RX ORDER — LIDOCAINE HYDROCHLORIDE 40 MG/ML
SOLUTION TOPICAL ONCE
Status: DISCONTINUED | OUTPATIENT
Start: 2021-01-27 | End: 2021-01-28 | Stop reason: HOSPADM

## 2021-01-27 RX ORDER — BACITRACIN, NEOMYCIN, POLYMYXIN B 400; 3.5; 5 [USP'U]/G; MG/G; [USP'U]/G
OINTMENT TOPICAL ONCE
Status: CANCELLED | OUTPATIENT
Start: 2021-01-27 | End: 2021-01-27

## 2021-01-27 RX ORDER — GENTAMICIN SULFATE 1 MG/G
OINTMENT TOPICAL ONCE
Status: CANCELLED | OUTPATIENT
Start: 2021-01-27 | End: 2021-01-27

## 2021-01-27 RX ORDER — GINSENG 100 MG
CAPSULE ORAL ONCE
Status: CANCELLED | OUTPATIENT
Start: 2021-01-27 | End: 2021-01-27

## 2021-01-27 RX ORDER — LIDOCAINE HYDROCHLORIDE 40 MG/ML
SOLUTION TOPICAL ONCE
Status: CANCELLED | OUTPATIENT
Start: 2021-01-27 | End: 2021-01-27

## 2021-01-27 ASSESSMENT — PAIN SCALES - GENERAL: PAINLEVEL_OUTOF10: 0

## 2021-01-27 ASSESSMENT — PAIN DESCRIPTION - PAIN TYPE: TYPE: ACUTE PAIN

## 2021-01-27 NOTE — PROGRESS NOTES
Wound Care Center Progress Note and Bioengineered Skin Application      Anne Marie Spivey  AGE: 77 y.o. GENDER: male  : 1945  EPISODE DATE:  2021     Subjective:     Chief Complaint   Patient presents with    Wound Check         HISTORY of PRESENT ILLNESS      Anne Marie Spivey is a 77 y.o. male who presents today for wound evaluation of Chronic venous ulcer(s) of R lower leg medial.  The ulcer is of moderate severity. The underlying cause of the wound is venous disease and trauma. Nushield If appropriate skin graft will be applied. Wound Pain Timing/Severity: none  Quality of pain: N/A  Severity of pain:  0 / 10   Modifying Factors: venous stasis  Associated Signs/Symptoms: none        PAST MEDICAL HISTORY        Diagnosis Date    Chronic venous hypertension with ulcer and inflammation involving right side (HCC)     RLE    Hypertension     past    Iron deficiency     Lymphoma Dx     Hx of B cell lymphoma-in remission since        PAST SURGICAL HISTORY    Past Surgical History:   Procedure Laterality Date    TUNNELED VENOUS PORT PLACEMENT      TUNNELED VENOUS PORT PLACEMENT      removed 2012       FAMILY HISTORY    Family History   Problem Relation Age of Onset    Early Death Mother 52    Diabetes Father     Heart Disease Father     Vision Loss Son         \"He wears glasses\"    Early Death Daughter 36       SOCIAL HISTORY    Social History     Tobacco Use    Smoking status: Never Smoker    Smokeless tobacco: Never Used   Substance Use Topics    Alcohol use: No    Drug use: No       ALLERGIES    No Known Allergies    MEDICATIONS    Current Outpatient Medications on File Prior to Encounter   Medication Sig Dispense Refill    metoprolol succinate (TOPROL XL) 25 MG extended release tablet Take 1 tablet by mouth daily 30 tablet 2     No current facility-administered medications on file prior to encounter.         REVIEW OF SYSTEMS    Pertinent items are noted in HPI.  Constitutional: Negative for systemic symptoms including fever, chills and malaise. Objective:      BP (!) 161/91   Pulse 79   Temp 96.8 °F (36 °C) (Temporal)   Resp 18     PHYSICAL EXAM      General: The patient is in no acute distress. Mental status:  Patient is appropriate, is  oriented to place and plan of care. Dermatologic exam: Visual inspection of the periwound reveals the skin to be edematous  Wound exam: see wound description below in procedure note      Assessment:     Problem List Items Addressed This Visit     Chronic venous hypertension with ulcer and inflammation (HCC) - Primary    Relevant Medications    lidocaine (XYLOCAINE) 4 % external solution (Start on 1/27/2021  2:15 PM)    Other Relevant Orders    Supply: Wound Cleanser    Supply: Wound Dressings    Supply: Cover and Secure    Supply: Edema Control    Venous ulcer of left leg (HCC)    Relevant Medications    lidocaine (XYLOCAINE) 4 % external solution (Start on 1/27/2021  2:15 PM)    Other Relevant Orders    Supply: Wound Cleanser    Supply: Wound Dressings    Supply: Cover and Secure    Supply: Edema Control          Conditions above and those listed in the past history are being treated appropriately and are considered under adequate control so as not to preclude the use of a graft. SKIN SUBSTITUTES/GRAFT APPLICATIONS PROCEDURE NOTE    Indicaton:     Chronic ulcer(s) of the right lower leg  that has(have) failed to heal with the usual wound protocol used for greater than 30 days. See Epic chart for previous modalities and details of previous treatment. The patient has no contraindications or evidence of infection. The patient's competency and support system is appropriate for proper care and follow up for the use of this graft, therefore a graft was placed as below. Procedure: The wound bed was cleansed and prepared as necessary to accept the graft. Debridement was required.     Consent obtained: Yes    Time out taken: Yes    Using curette sharp Excisional Debridement of the wound(s) was/were performed down through and including the removal of subcutaneous tissue. Devitalized Tissue Debrided:  slough      Bleeding:  None    Hemostasis Achieved:  not needed    Procedural Pain:  0  / 10     Post Procedural Pain:  0 / 10     Having prepared the wound bed, the skin graft was completed as below.     Product Utilized:          [] APLIGRAF   []44 sq cm   []88 sq cm    []132 sq cm  []176 sq cm           [] DERMAGRAFT  [] 38 sq cm   []76 sq cm    []114 sq cm  []152 sq cm      [x] NUSHIELD  [] 1.6 sq/cm disc   [] (2X3) 6 sq/cm    [] (2X4) 8 sq/cm         [] (3X4) 12 sq/cm  [] (4x4) 16 sq/cm   [x] (4X6) 24 sq/cm         [] (6X6) 36 sq/cm        [] AFFINITY    [] (1.5 cm x 1.5cm) 2.25 cm   [] (2.5 cm x 2.5 cm) 6.25 cm         [] THERASKIN  [] 13 sq cm   []37.34 sq cm             [] EpiFix   [] 1.54 sq cm disc    [] 2 pieces (3.08 sq cm)    [] 3  pieces (4.62 sq  cm)   [] 6 sq/cm    [] 16 sq cm     [] 18 sq cm   Fenestrated        [] OASIS   [] 10.5 sq cm   []21 sq cm    []35 sq cm Tri-Matrix  []70 sq cm          Tri-Matrix                    [] PURAPLY   [] 1.6 sq cm disc     [] 4 sq cm   [] 8 sq cm   [] 25sq cm  [] 54 sq cm                  [] Grafix      [] 1.54 sq cm disc    [] 3 sq cm    [] 6 sq cm   [] 12 sq cm   [] 25 sq cm        Skin Substitute Applied:    Performed by: Alissa Shipley MD    Consent obtained: Yes    Time out taken: Yes     Fenestrated: Yes    Skin Substitute was Applied to Wound Number(s):     1      Wound 09/30/20 Wound #1 Right Medial Lower Leg Cluster (Onset 5 Years) (Active)   Wound Image   01/27/21 1334   Wound Etiology Venous 01/27/21 1334   Dressing Status New dressing applied 01/13/21 1657   Wound Cleansed Soap and water 01/27/21 1334   Dressing/Treatment Alginate with Ag 01/13/21 1657   Offloading for Diabetic Foot Ulcers No 12/30/20 1321   Wound Length (cm) 12 cm 01/27/21 1334   Wound Width (cm) 7.8 cm 01/27/21 1334   Wound Depth (cm) 0.1 cm 01/27/21 1334   Wound Surface Area (cm^2) 93.6 cm^2 01/27/21 1334   Change in Wound Size % (l*w) 62.86 01/27/21 1334   Wound Volume (cm^3) 9.36 cm^3 01/27/21 1334   Wound Healing % 63 01/27/21 1334   Post-Procedure Length (cm) 12 cm 01/27/21 1349   Post-Procedure Width (cm) 7.8 cm 01/27/21 1349   Post-Procedure Depth (cm) 0.1 cm 01/27/21 1349   Post-Procedure Surface Area (cm^2) 93.6 cm^2 01/27/21 1349   Post-Procedure Volume (cm^3) 9.36 cm^3 01/27/21 1349   Distance Tunneling (cm) 0 cm 01/27/21 1334   Tunneling Position ___ O'Clock 0 01/27/21 1334   Undermining Starts ___ O'Clock 0 01/27/21 1334   Undermining Ends___ O'Clock 0 01/27/21 1334   Undermining Maxium Distance (cm) 0 01/27/21 1334   Wound Assessment Granulation tissue 01/27/21 1334   Drainage Amount Moderate 01/27/21 1334   Drainage Description Serosanguinous 01/27/21 1334   Odor None 01/27/21 1334   Cassia-wound Assessment Intact 01/27/21 1334   Margins Defined edges 01/27/21 1334   Wound Thickness Description not for Pressure Injury Full thickness 01/27/21 1334   Number of days: 119         Total Surface Area Covered 24 sq/cm    Was the Product Layered  No      Amount Wasted 0 sq/cm    Reason for Waste: material provided was greater than wound size      Secured: Yes    Instruments used to complete placement of graft::curette    Secured With:   [] N/A  []Steri Strips    []Sutures     []Staples [x]Other    Procedural Pain: 0/10     Post Procedural Pain: 0 / 10    Response to Treatment:  Well tolerated by patient. Status of wound progress and description from last visit:   Improved. Plan:     Discharge instructions:  Discharge Instructions         PHYSICIAN ORDERS AND DISCHARGE INSTRUCTIONS     NOTE: Upon discharge from the [address], you will receive a patient experience survey.  We would be grateful if you would take the time to fill this survey out.     Wound care order bedtime.                          If swelling is present, elevate legs to the level of the heart or above for 30                              minutes 4-5 times a day and/or when sitting.                                             When taking antibiotics take entire prescription as ordered by physician                             do not stop taking until medicine is all gone.                                           Orders for this week:  1/27/21     Right lower leg -- wash with soap and water,pat dry. Apply Desitin or Calmazine to sarah wound  IN CLINIC TODAY:  Apply #1 Nushield  to wound bed leave in place , mepitel in place then [place]. Wrap with Coban 2.  AT home: ( Apligaf in place do not remove mepitel) Homecare to cover with Silver calcium alginate , Kerramax or ABD and wrap with conform and tape. Tubi e to leg, on at all times        Homecare to change on Friday and Monday  ( may use profore  to wrap) --- GRAFT IN PLACE      Auth for grafts on 10/7/2020 -- approved for Puraply, Nushield and Apligraf                        Follow up with Dr Johana Dixon 1 weeks in the wound care center  Call [phone] for any questions or concerns.   Date__________ [place] [facility]                 Treatment Note      Written Patient Dismissal Instructions Given            Electronically signed by Laura Ontiveros MD on 1/27/2021 at 1:55 PM

## 2021-01-27 NOTE — PROGRESS NOTES
NuShieldTreatment Note    NAME:  Lizet Odell  YOB: 1945  MEDICAL RECORD NUMBER:  [id number]  DATE:  1/27/2021    Goal:  Patient will receive safe and proper application of skin substitute. Patient will comply with caring for dressing, and reporting complications. [x]Expiration date checked immediately prior to use. [x] Package intact prior to use and no damage noted. [x] Transport temperature controlled and acceptable. Nushield was removed from protective sterile packaging by physician and            applied Chorion side down to the prepared ulcer bed. Nushield was hydrated with sterile normal saline per physician. Nushield was applied to rigth lower leg  and affixed with steri-strips by the physician. [x] Nushield was covered with non-adherent ulcer dressing. [x] Applied mepitel over non-adherent. [x] Applied dry gauze and/or roll gauze. Patient/caregiver was instructed not to remove dressing and to keep it clean    and dry. Pt/family/caregiver was instructed on signs and symptoms of complications       to report such as draining through dressing, dressing falling down/slipping,           getting wet, or severe pain or tingling. NuShield may be applied a total of 10 times per wound over a 12 month period. Date of first application of NuShield for this current wound is January 27, 2021 .     Guidelines followed    Electronically signed by Hira Almodovar RN on 1/27/2021 at 3:23 PM

## 2021-02-03 ENCOUNTER — HOSPITAL ENCOUNTER (OUTPATIENT)
Dept: WOUND CARE | Age: 76
Discharge: HOME | End: 2021-02-03
Payer: COMMERCIAL

## 2021-02-03 VITALS
TEMPERATURE: 97.8 F | DIASTOLIC BLOOD PRESSURE: 94 MMHG | HEART RATE: 81 BPM | SYSTOLIC BLOOD PRESSURE: 165 MMHG | RESPIRATION RATE: 18 BRPM

## 2021-02-03 DIAGNOSIS — L97.919 CHRONIC VENOUS HYPERTENSION WITH ULCER AND INFLAMMATION INVOLVING RIGHT SIDE (HCC): Primary | ICD-10-CM

## 2021-02-03 DIAGNOSIS — I87.331 CHRONIC VENOUS HYPERTENSION WITH ULCER AND INFLAMMATION INVOLVING RIGHT SIDE (HCC): Primary | ICD-10-CM

## 2021-02-03 DIAGNOSIS — L97.929 VENOUS ULCER OF LEFT LEG (HCC): ICD-10-CM

## 2021-02-03 DIAGNOSIS — I83.029 VENOUS ULCER OF LEFT LEG (HCC): ICD-10-CM

## 2021-02-03 PROCEDURE — 15271 SKIN SUB GRAFT TRNK/ARM/LEG: CPT | Performed by: NURSE PRACTITIONER

## 2021-02-03 PROCEDURE — 15271 SKIN SUB GRAFT TRNK/ARM/LEG: CPT

## 2021-02-03 RX ORDER — LIDOCAINE 50 MG/G
OINTMENT TOPICAL ONCE
Status: CANCELLED | OUTPATIENT
Start: 2021-02-03 | End: 2021-02-03

## 2021-02-03 RX ORDER — GINSENG 100 MG
CAPSULE ORAL ONCE
Status: CANCELLED | OUTPATIENT
Start: 2021-02-03 | End: 2021-02-03

## 2021-02-03 RX ORDER — BETAMETHASONE DIPROPIONATE 0.05 %
OINTMENT (GRAM) TOPICAL ONCE
Status: CANCELLED | OUTPATIENT
Start: 2021-02-03 | End: 2021-02-03

## 2021-02-03 RX ORDER — BACITRACIN, NEOMYCIN, POLYMYXIN B 400; 3.5; 5 [USP'U]/G; MG/G; [USP'U]/G
OINTMENT TOPICAL ONCE
Status: CANCELLED | OUTPATIENT
Start: 2021-02-03 | End: 2021-02-03

## 2021-02-03 RX ORDER — LIDOCAINE 40 MG/G
CREAM TOPICAL ONCE
Status: CANCELLED | OUTPATIENT
Start: 2021-02-03 | End: 2021-02-03

## 2021-02-03 RX ORDER — LIDOCAINE 50 MG/G
OINTMENT TOPICAL ONCE
Status: DISCONTINUED | OUTPATIENT
Start: 2021-02-03 | End: 2021-02-04 | Stop reason: HOSPADM

## 2021-02-03 RX ORDER — CLOBETASOL PROPIONATE 0.5 MG/G
OINTMENT TOPICAL ONCE
Status: CANCELLED | OUTPATIENT
Start: 2021-02-03 | End: 2021-02-03

## 2021-02-03 RX ORDER — LIDOCAINE HYDROCHLORIDE 20 MG/ML
JELLY TOPICAL ONCE
Status: CANCELLED | OUTPATIENT
Start: 2021-02-03 | End: 2021-02-03

## 2021-02-03 RX ORDER — GENTAMICIN SULFATE 1 MG/G
OINTMENT TOPICAL ONCE
Status: CANCELLED | OUTPATIENT
Start: 2021-02-03 | End: 2021-02-03

## 2021-02-03 RX ORDER — BACITRACIN ZINC AND POLYMYXIN B SULFATE 500; 1000 [USP'U]/G; [USP'U]/G
OINTMENT TOPICAL ONCE
Status: CANCELLED | OUTPATIENT
Start: 2021-02-03 | End: 2021-02-03

## 2021-02-03 RX ORDER — LIDOCAINE HYDROCHLORIDE 40 MG/ML
SOLUTION TOPICAL ONCE
Status: CANCELLED | OUTPATIENT
Start: 2021-02-03 | End: 2021-02-03

## 2021-02-03 NOTE — PROGRESS NOTES
Wound Care Center Progress Note and Bioengineered Skin Application      Si Dell  AGE: 77 y.o. GENDER: male  : 1945  EPISODE DATE:  2/3/2021     Subjective:     Chief Complaint   Patient presents with    Wound Check     Right lower leg         HISTORY of PRESENT ILLNESS      Si Dell is a 77 y.o. male who presents today for wound evaluation of Chronic venous ulcer(s) of R lower leg medial.  The ulcer is of moderate severity. The underlying cause of the wound is venous disease and trauma. Nushield If appropriate skin graft will be applied. Wound Pain Timing/Severity: none  Quality of pain: N/A  Severity of pain:  0 / 10   Modifying Factors: venous stasis  Associated Signs/Symptoms: none        PAST MEDICAL HISTORY        Diagnosis Date    Chronic venous hypertension with ulcer and inflammation involving right side (HCC)     RLE    Hypertension     past    Iron deficiency     Lymphoma Dx     Hx of B cell lymphoma-in remission since        PAST SURGICAL HISTORY    Past Surgical History:   Procedure Laterality Date    TUNNELED VENOUS PORT PLACEMENT      TUNNELED VENOUS PORT PLACEMENT      removed 2012       FAMILY HISTORY    Family History   Problem Relation Age of Onset    Early Death Mother 52    Diabetes Father     Heart Disease Father     Vision Loss Son         \"He wears glasses\"    Early Death Daughter 36       SOCIAL HISTORY    Social History     Tobacco Use    Smoking status: Never Smoker    Smokeless tobacco: Never Used   Substance Use Topics    Alcohol use: No    Drug use: No       ALLERGIES    No Known Allergies    MEDICATIONS    Current Outpatient Medications on File Prior to Encounter   Medication Sig Dispense Refill    metoprolol succinate (TOPROL XL) 25 MG extended release tablet Take 1 tablet by mouth daily 30 tablet 2     No current facility-administered medications on file prior to encounter.         REVIEW OF SYSTEMS    Pertinent items are noted in Yes    Time out taken: Yes    Using curette sharp Excisional Debridement of the wound(s) was/were performed down through and including the removal of subcutaneous tissue. Devitalized Tissue Debrided:  slough and exudate      Bleeding:  Minimal    Hemostasis Achieved:  by pressure    Procedural Pain:  0  / 10     Post Procedural Pain:  0 / 10     Having prepared the wound bed, the skin graft was completed as below.     Product Utilized:          [] APLIGRAF   []44 sq cm   []88 sq cm    []132 sq cm  []176 sq cm           [] DERMAGRAFT  [] 38 sq cm   []76 sq cm    []114 sq cm  []152 sq cm      [x] NUSHIELD  [] 1.6 sq/cm disc   [] (2X3) 6 sq/cm    [] (2X4) 8 sq/cm         [] (3X4) 12 sq/cm  [] (4x4) 16 sq/cm   [x] (4X6) 24 sq/cm         [] (6X6) 36 sq/cm        [] AFFINITY    [] (1.5 cm x 1.5cm) 2.25 cm   [] (2.5 cm x 2.5 cm) 6.25 cm         [] THERASKIN  [] 13 sq cm   []37.34 sq cm             [] EpiFix   [] 1.54 sq cm disc    [] 2 pieces (3.08 sq cm)    [] 3  pieces (4.62 sq  cm)   [] 6 sq/cm    [] 16 sq cm     [] 18 sq cm   Fenestrated        [] OASIS   [] 10.5 sq cm   []21 sq cm    []35 sq cm Tri-Matrix  []70 sq cm          Tri-Matrix                    [] PURAPLY   [] 1.6 sq cm disc     [] 4 sq cm   [] 8 sq cm   [] 25sq cm  [] 54 sq cm                  [] Grafix      [] 1.54 sq cm disc    [] 3 sq cm    [] 6 sq cm   [] 12 sq cm   [] 25 sq cm        Skin Substitute Applied:    Performed by: LOLY Morocho CNP    Consent obtained: Yes    Time out taken: Yes     Fenestrated: No    Skin Substitute was Applied to Wound Number(s):     1      Wound 07/29/13 Venous ulcer Ankle Right;Lateral (Active)   Number of days: 2746       Wound 09/30/20 Wound #1 Right Medial Lower Leg Cluster (Onset 5 Years) (Active)   Wound Image   01/27/21 1334   Wound Etiology Venous 02/03/21 1326   Dressing Status New dressing applied 01/27/21 1414   Wound Cleansed Soap and water 02/03/21 1326   Dressing/Treatment Alginate with Ag 01/13/21 1657   Offloading for Diabetic Foot Ulcers No 12/30/20 1321   Wound Length (cm) 12 cm 02/03/21 1326   Wound Width (cm) 8.4 cm 02/03/21 1326   Wound Depth (cm) 0.1 cm 02/03/21 1326   Wound Surface Area (cm^2) 100.8 cm^2 02/03/21 1326   Change in Wound Size % (l*w) 60 02/03/21 1326   Wound Volume (cm^3) 10.08 cm^3 02/03/21 1326   Wound Healing % 60 02/03/21 1326   Post-Procedure Length (cm) 12 cm 02/03/21 1343   Post-Procedure Width (cm) 8.4 cm 02/03/21 1343   Post-Procedure Depth (cm) 0.1 cm 02/03/21 1343   Post-Procedure Surface Area (cm^2) 100.8 cm^2 02/03/21 1343   Post-Procedure Volume (cm^3) 10.08 cm^3 02/03/21 1343   Distance Tunneling (cm) 0 cm 02/03/21 1326   Tunneling Position ___ O'Clock 0 02/03/21 1326   Undermining Starts ___ O'Clock 0 02/03/21 1326   Undermining Ends___ O'Clock 0 02/03/21 1326   Undermining Maxium Distance (cm) 0 02/03/21 1326   Wound Assessment Granulation tissue;Pink/red;Slough 02/03/21 1326   Drainage Amount Moderate 02/03/21 1326   Drainage Description Serosanguinous; Yellow 02/03/21 1326   Odor Mild 02/03/21 1326   Cassia-wound Assessment Dry/flaky; Intact 02/03/21 1326   Margins Defined edges 02/03/21 1326   Wound Thickness Description not for Pressure Injury Full thickness 02/03/21 1326   Number of days: 126         Total Surface Area Covered 24 sq/cm    Was the Product Layered  No      Amount Wasted 0 sq/cm    Reason for Waste: material provided was greater than wound size      Secured: Yes    Instruments used to complete placement of graft::scissors and forceps    Secured With:   [] N/A  [x]Steri Strips    []Sutures     []Staples []Other    Procedural Pain: 0/10     Post Procedural Pain: 0 / 10    Response to Treatment:  Well tolerated by patient. Status of wound progress and description from last visit:   Improving. Some hypergranulation is present. We will proceed with graft today. No signs and symptoms of infections present.        Plan:     Discharge instructions:  Discharge Instructions       PHYSICIAN ORDERS AND DISCHARGE INSTRUCTIONS     NOTE: Upon discharge from the [address], you will receive a patient experience survey. We would be grateful if you would take the time to fill this survey out.     Wound care order history:                 Vascular studies: Arterial studies done 9/27/20; no stenosis              Imaging:   Date               Cultures:   obtained on 11/18/2020                                 Obtained on 1/20/2021               Labs/ HbA1c:   Date               Grafts:                       #1 Apligraf on 12/16/2020                     #2 Apligraf on 12/23/2020                     #3 Apligraf on 12/30/2020                     #4 Apligraf on 1/6/2021                     # 5 Apligraf on 1/13/21                     #1 Nushield 1/27/2021                     #2 Nushield 2/3/2021               HBO:                Antibiotics:  Bactrim DS BID for 10 days on 11/18/2020                                   Cipro BID for 10 days ordered on 11/25/2020                                    Cipro 500 mg BID on 1/20/2021              Earlier Wound care treatments:                Authorizations:                        Consults:   Date                           Primary care physician:      Continuing wound care orders and information:              Residence:  private               [facility] care with: [facility]               Your wound-care supplies will be provided by: Jodee Levin provider:              CRISTIANA with  Agnes Palacios loading:  Date               WKXHL Medications:              VMWDS cleansing:                           UZ not scrub or use excessive force.                          Wash hands with soap and water before and after dressing changes.                         Prior to applying a clean dressing, cleanse wound with normal saline,                                wound cleanser, or mild soap and water.                         UPE the physician or nurse before getting the wound(s) wet in a shower              Daily Wound management:                          Keep weight off wounds and reposition every 2 hours.                          ONKXG standing for long periods of time.                          BENQI wraps/stockings in AM and remove at bedtime.                          If swelling is present, elevate legs to the level of the heart or above for 30                              minutes 4-5 times a day and/or when sitting.                                             When taking antibiotics take entire prescription as ordered by physician                             MQ not stop taking until medicine is all gone.                                           Orders for this week: 2/3/21     Right lower leg -- wash with soap and water,pat dry. Apply Desitin or Calmazine to sarah wound  IN CLINIC TODAY:  Apply #2 Nushield  to wound bed leave in place , sorbact in place then [place]. Wrap with Coban 2.  AT home: ( Apligaf in place do not remove mepitel) Homecare to cover with Silver calcium alginate , Kerramax or ABD and wrap with conform and tape. Tubi e to leg, on at all times        Homecare to change on Friday and Monday  ( may use profore  to wrap) --- GRAFT IN PLACE      Auth for grafts on 10/7/2020 -- approved for Puraply, Nushield and Apligraf                        Follow up with Dr Filiberto aSmuels 1 weeks in the wound care center  Call [phone] for any questions or concerns.   Date__________   Time        Treatment Note      Written Patient Dismissal Instructions Given            Electronically signed by LOLY Wooten CNP on 2/3/2021 at 1:49 PM

## 2021-02-10 ENCOUNTER — HOSPITAL ENCOUNTER (OUTPATIENT)
Dept: WOUND CARE | Age: 76
Discharge: HOME | End: 2021-02-10
Payer: COMMERCIAL

## 2021-02-10 VITALS
SYSTOLIC BLOOD PRESSURE: 157 MMHG | DIASTOLIC BLOOD PRESSURE: 86 MMHG | TEMPERATURE: 97.8 F | RESPIRATION RATE: 18 BRPM | HEART RATE: 78 BPM

## 2021-02-10 DIAGNOSIS — L97.929 VENOUS ULCER OF LEFT LEG (HCC): ICD-10-CM

## 2021-02-10 DIAGNOSIS — I87.331 CHRONIC VENOUS HYPERTENSION WITH ULCER AND INFLAMMATION INVOLVING RIGHT SIDE (HCC): Primary | ICD-10-CM

## 2021-02-10 DIAGNOSIS — L97.919 CHRONIC VENOUS HYPERTENSION WITH ULCER AND INFLAMMATION INVOLVING RIGHT SIDE (HCC): Primary | ICD-10-CM

## 2021-02-10 DIAGNOSIS — I83.029 VENOUS ULCER OF LEFT LEG (HCC): ICD-10-CM

## 2021-02-10 PROCEDURE — 15271 SKIN SUB GRAFT TRNK/ARM/LEG: CPT | Performed by: SURGERY

## 2021-02-10 PROCEDURE — 15271 SKIN SUB GRAFT TRNK/ARM/LEG: CPT

## 2021-02-10 RX ORDER — GINSENG 100 MG
CAPSULE ORAL ONCE
Status: CANCELLED | OUTPATIENT
Start: 2021-02-10 | End: 2021-02-10

## 2021-02-10 RX ORDER — LIDOCAINE HYDROCHLORIDE 20 MG/ML
JELLY TOPICAL ONCE
Status: CANCELLED | OUTPATIENT
Start: 2021-02-10 | End: 2021-02-10

## 2021-02-10 RX ORDER — LIDOCAINE 50 MG/G
OINTMENT TOPICAL ONCE
Status: CANCELLED | OUTPATIENT
Start: 2021-02-10 | End: 2021-02-10

## 2021-02-10 RX ORDER — BACITRACIN ZINC AND POLYMYXIN B SULFATE 500; 1000 [USP'U]/G; [USP'U]/G
OINTMENT TOPICAL ONCE
Status: CANCELLED | OUTPATIENT
Start: 2021-02-10 | End: 2021-02-10

## 2021-02-10 RX ORDER — CLOBETASOL PROPIONATE 0.5 MG/G
OINTMENT TOPICAL ONCE
Status: CANCELLED | OUTPATIENT
Start: 2021-02-10 | End: 2021-02-10

## 2021-02-10 RX ORDER — BETAMETHASONE DIPROPIONATE 0.05 %
OINTMENT (GRAM) TOPICAL ONCE
Status: CANCELLED | OUTPATIENT
Start: 2021-02-10 | End: 2021-02-10

## 2021-02-10 RX ORDER — LIDOCAINE 40 MG/G
CREAM TOPICAL ONCE
Status: CANCELLED | OUTPATIENT
Start: 2021-02-10 | End: 2021-02-10

## 2021-02-10 RX ORDER — GENTAMICIN SULFATE 1 MG/G
OINTMENT TOPICAL ONCE
Status: CANCELLED | OUTPATIENT
Start: 2021-02-10 | End: 2021-02-10

## 2021-02-10 RX ORDER — LIDOCAINE HYDROCHLORIDE 40 MG/ML
SOLUTION TOPICAL ONCE
Status: CANCELLED | OUTPATIENT
Start: 2021-02-10 | End: 2021-02-10

## 2021-02-10 RX ORDER — BACITRACIN, NEOMYCIN, POLYMYXIN B 400; 3.5; 5 [USP'U]/G; MG/G; [USP'U]/G
OINTMENT TOPICAL ONCE
Status: CANCELLED | OUTPATIENT
Start: 2021-02-10 | End: 2021-02-10

## 2021-02-10 RX ORDER — LIDOCAINE 50 MG/G
OINTMENT TOPICAL ONCE
Status: DISCONTINUED | OUTPATIENT
Start: 2021-02-10 | End: 2021-02-11 | Stop reason: HOSPADM

## 2021-02-10 NOTE — PROGRESS NOTES
NuShieldTreatment Note    NAME:  Vandana Prescott  YOB: 1945  MEDICAL RECORD NUMBER:  [id number]  DATE:  2/10/2021    Goal:  Patient will receive safe and proper application of skin substitute. Patient will comply with caring for dressing, and reporting complications. [x]Expiration date checked immediately prior to use. [x] Package intact prior to use and no damage noted. [x] Transport temperature controlled and acceptable. Nushield was removed from protective sterile packaging by physician and            applied Chorion side down to the prepared ulcer bed. Nushield was hydrated with sterile normal saline per physician. Nushield was applied to right leg  and affixed with steri-strips by the physician. [x] Nushield was covered with non-adherent ulcer dressing. [x] Applied mepitel over non-adherent. [x] Applied dry gauze and/or roll gauze. Patient/caregiver was instructed not to remove dressing and to keep it clean    and dry. Pt/family/caregiver was instructed on signs and symptoms of complications       to report such as draining through dressing, dressing falling down/slipping,           getting wet, or severe pain or tingling. NuShield may be applied a total of 10 times per wound over a 12 month period. Date of first application of NuShield for this current wound is January 27, 2021 .     Guidelines followed    Electronically signed by Lenora Guerrero RN on 2/10/2021 at 3:09 PM

## 2021-02-10 NOTE — PROGRESS NOTES
HPI.  Constitutional: Negative for systemic symptoms including fever, chills and malaise. Objective:      BP (!) 157/86   Pulse 78   Temp 97.8 °F (36.6 °C) (Bladder)   Resp 18     PHYSICAL EXAM      General: The patient is in no acute distress. Mental status:  Patient is appropriate, is  oriented to place and plan of care. Dermatologic exam: Visual inspection of the periwound reveals the skin to be edematous  Wound exam: see wound description below in procedure note      Assessment:     Problem List Items Addressed This Visit     Chronic venous hypertension with ulcer and inflammation (HCC) - Primary    Relevant Medications    lidocaine (XYLOCAINE) 5 % ointment (Start on 2/10/2021  1:45 PM)    Other Relevant Orders    Supply: Wound Cleanser    Supply: Wound Dressings    Supply: Cover and Secure    Supply: Edema Control    Venous ulcer of left leg (HCC)    Relevant Medications    lidocaine (XYLOCAINE) 5 % ointment (Start on 2/10/2021  1:45 PM)    Other Relevant Orders    Supply: Wound Cleanser    Supply: Wound Dressings    Supply: Cover and Secure    Supply: Edema Control          Conditions above and those listed in the past history are being treated appropriately and are considered under adequate control so as not to preclude the use of a graft. SKIN SUBSTITUTES/GRAFT APPLICATIONS PROCEDURE NOTE    Indicaton:     Chronic ulcer(s) of the right lower leg  that has(have) failed to heal with the usual wound protocol used for greater than 30 days. See Epic chart for previous modalities and details of previous treatment. The patient has no contraindications or evidence of infection. The patient's competency and support system is appropriate for proper care and follow up for the use of this graft, therefore a graft was placed as below. Procedure: The wound bed was cleansed and prepared as necessary to accept the graft. Debridement was required.     Consent obtained: Yes    Time out taken: Yes    Using curette and forceps sharp Excisional Debridement of the wound(s) was/were performed down through and including the removal of dermis and subcutaneous tissue. Devitalized Tissue Debrided:  slough      Bleeding:  None    Hemostasis Achieved:  not needed    Procedural Pain:  0  / 10     Post Procedural Pain:  0 / 10     Having prepared the wound bed, the skin graft was completed as below.     Product Utilized:          [] APLIGRAF   []44 sq cm   []88 sq cm    []132 sq cm  []176 sq cm           [] DERMAGRAFT  [] 38 sq cm   []76 sq cm    []114 sq cm  []152 sq cm      [] NUSHIELD  [] 1.6 sq/cm disc   [] (2X3) 6 sq/cm    [] (2X4) 8 sq/cm         [] (3X4) 12 sq/cm  [] (4x4) 16 sq/cm   [x] (4X6) 24 sq/cm         [] (6X6) 36 sq/cm        [] AFFINITY    [] (1.5 cm x 1.5cm) 2.25 cm   [] (2.5 cm x 2.5 cm) 6.25 cm         [] THERASKIN  [] 13 sq cm   []37.34 sq cm             [] EpiFix   [] 1.54 sq cm disc    [] 2 pieces (3.08 sq cm)    [] 3  pieces (4.62 sq  cm)   [] 6 sq/cm    [] 16 sq cm     [] 18 sq cm   Fenestrated        [] OASIS   [] 10.5 sq cm   []21 sq cm    []35 sq cm Tri-Matrix  []70 sq cm          Tri-Matrix                    [] PURAPLY   [] 1.6 sq cm disc     [] 4 sq cm   [] 8 sq cm   [] 25sq cm  [] 54 sq cm                  [] Grafix      [] 1.54 sq cm disc    [] 3 sq cm    [] 6 sq cm   [] 12 sq cm   [] 25 sq cm        Skin Substitute Applied:    Performed by: Kong Lassiter MD    Consent obtained: Yes    Time out taken: Yes     Fenestrated: Yes    Skin Substitute was Applied to Wound Number(s):     1      Wound 09/30/20 Wound #1 Right Medial Lower Leg Cluster (Onset 5 Years) (Active)   Wound Image   02/10/21 1311   Wound Etiology Venous 02/10/21 1311   Dressing Status New dressing applied 02/03/21 1413   Wound Cleansed Soap and water 02/10/21 1311   Dressing/Treatment Alginate with Ag 01/13/21 1657   Offloading for Diabetic Foot Ulcers No 12/30/20 1321   Wound Length (cm) 12.5 cm 02/10/21 1311   Wound Width (cm) 8.5 cm 02/10/21 1311   Wound Depth (cm) 0.1 cm 02/10/21 1311   Wound Surface Area (cm^2) 106.25 cm^2 02/10/21 1311   Change in Wound Size % (l*w) 57.84 02/10/21 1311   Wound Volume (cm^3) 10.62 cm^3 02/10/21 1311   Wound Healing % 58 02/10/21 1311   Post-Procedure Length (cm) 12 cm 02/03/21 1343   Post-Procedure Width (cm) 8.4 cm 02/03/21 1343   Post-Procedure Depth (cm) 0.1 cm 02/03/21 1343   Post-Procedure Surface Area (cm^2) 100.8 cm^2 02/03/21 1343   Post-Procedure Volume (cm^3) 10.08 cm^3 02/03/21 1343   Distance Tunneling (cm) 0 cm 02/10/21 1311   Tunneling Position ___ O'Clock 0 02/10/21 1311   Undermining Starts ___ O'Clock 0 02/10/21 1311   Undermining Ends___ O'Clock 0 02/10/21 1311   Undermining Maxium Distance (cm) 0 02/10/21 1311   Wound Assessment Granulation tissue;Pink/red;Slough 02/10/21 1311   Drainage Amount Moderate 02/10/21 1311   Drainage Description Serosanguinous; Yellow 02/10/21 1311   Odor Mild 02/10/21 1311   Cassia-wound Assessment Dry/flaky; Intact 02/10/21 1311   Margins Defined edges 02/10/21 1311   Wound Thickness Description not for Pressure Injury Full thickness 02/10/21 1311   Number of days: 133         Total Surface Area Covered 24 sq/cm    Was the Product Layered  No      Amount Wasted 0 sq/cm    Reason for Waste: material provided was greater than wound size      Secured: Yes    Instruments used to complete placement of graft::forceps    Secured With:   [] N/A  []Steri Strips    []Sutures     []Staples [x]Other    Procedural Pain: 0/10     Post Procedural Pain: 0 / 10    Response to Treatment:  Well tolerated by patient. Status of wound progress and description from last visit:   IMproved. Plan:     Discharge instructions:  Discharge Instructions           PHYSICIAN ORDERS AND DISCHARGE INSTRUCTIONS     NOTE: Upon discharge from the [address], you will receive a patient experience survey.  We would be grateful if you would take the time to fill this survey out.     Wound care order history:                 Vascular studies: Arterial studies done 9/27/20; no stenosis              Imaging:   Date               Cultures:   obtained on 11/18/2020                                 Obtained on 1/20/2021               Labs/ HbA1c:   Date               Grafts:                       #1 Apligraf on 12/16/2020                     #2 Apligraf on 12/23/2020                     #3 Apligraf on 12/30/2020                     #4 Apligraf on 1/6/2021                     # 5 Apligraf on 1/13/21                     #1 Nushield 1/27/2021                     #2 Nushield 2/3/2021                     #3 Nushield 2/10/2021               HBO:                Antibiotics:  Bactrim DS BID for 10 days on 11/18/2020                                   Cipro BID for 10 days ordered on 11/25/2020                                    Cipro 500 mg BID on 1/20/2021              Earlier Wound care treatments:                Authorizations:                        Consults:   Date                           Primary care physician:      Continuing wound care orders and information:              Residence:  [facility] home health care with: [facility]               Your wound-care supplies will be provided by: Erin Peña provider:              MONTSERRAT Kraft loading:  Date               QPAKI Medications:              YOPGA cleansing:                           KS not scrub or use excessive force.                          Wash hands with soap and water before and after dressing changes.                           Prior to applying a clean dressing, cleanse wound with normal saline,                                wound cleanser, or mild soap and water.                           Ask the physician or nurse before getting the wound(s) wet in a shower              Daily Wound management:                          Keep weight off wounds and reposition every 2 hours.                          DYBPJ standing for long periods of time.                          FEAUX wraps/stockings in AM and remove at bedtime.                          If swelling is present, elevate legs to the level of the heart or above for 30                              minutes 4-5 times a day and/or when sitting.                                             When taking antibiotics take entire prescription as ordered by physician                             UB not stop taking until medicine is all gone.                                           Orders for this week: 2/10/21     Right lower leg -- wash with soap and water,pat dry. Apply Desitin or Calmazine to sarah wound  IN CLINIC TODAY:  Apply #3 Nushield  to wound bed leave in place , sorbact in place then [place]. Wrap with Coban 2.  AT home: ( Nushield in place do not remove mepitel) Homecare to cover with Silver calcium alginate , Kerramax or ABD and wrap with conform and tape. Tubi e to leg, on at all times        Homecare to change on Friday and Monday  ( may use profore  to wrap) --- GRAFT IN PLACE      Auth for grafts on 10/7/2020 -- approved for Cris, Janay and Apligraf                        Follow up with Dr Quynh Garcia 1 weeks in the wound care center  Call [phone] for any questions or concerns.   Date__________   Time             Treatment Note      Written Patient Dismissal Instructions Given            Electronically signed by Vikas Gongora MD on 2/10/2021 at 1:44 PM

## 2021-02-12 ENCOUNTER — TELEPHONE (OUTPATIENT)
Dept: INTERNAL MEDICINE CLINIC | Age: 76
End: 2021-02-12

## 2021-02-12 NOTE — TELEPHONE ENCOUNTER
Received a msg from [facility] needing a verbal okay for home visit as well as wound care. Left msg giving verbal okay for home care and wound care.

## 2021-02-17 ENCOUNTER — HOSPITAL ENCOUNTER (OUTPATIENT)
Dept: WOUND CARE | Age: 76
Discharge: HOME | End: 2021-02-17
Payer: COMMERCIAL

## 2021-02-17 VITALS
DIASTOLIC BLOOD PRESSURE: 82 MMHG | RESPIRATION RATE: 20 BRPM | SYSTOLIC BLOOD PRESSURE: 147 MMHG | HEART RATE: 72 BPM | TEMPERATURE: 97.8 F

## 2021-02-17 DIAGNOSIS — L97.929 VENOUS ULCER OF LEFT LEG (HCC): ICD-10-CM

## 2021-02-17 DIAGNOSIS — I83.029 VENOUS ULCER OF LEFT LEG (HCC): ICD-10-CM

## 2021-02-17 DIAGNOSIS — L97.919 CHRONIC VENOUS HYPERTENSION WITH ULCER AND INFLAMMATION INVOLVING RIGHT SIDE (HCC): Primary | ICD-10-CM

## 2021-02-17 DIAGNOSIS — I87.331 CHRONIC VENOUS HYPERTENSION WITH ULCER AND INFLAMMATION INVOLVING RIGHT SIDE (HCC): Primary | ICD-10-CM

## 2021-02-17 PROCEDURE — 15271 SKIN SUB GRAFT TRNK/ARM/LEG: CPT

## 2021-02-17 PROCEDURE — 15271 SKIN SUB GRAFT TRNK/ARM/LEG: CPT | Performed by: NURSE PRACTITIONER

## 2021-02-17 RX ORDER — BACITRACIN, NEOMYCIN, POLYMYXIN B 400; 3.5; 5 [USP'U]/G; MG/G; [USP'U]/G
OINTMENT TOPICAL ONCE
Status: CANCELLED | OUTPATIENT
Start: 2021-02-17 | End: 2021-02-17

## 2021-02-17 RX ORDER — LIDOCAINE 50 MG/G
OINTMENT TOPICAL ONCE
Status: DISCONTINUED | OUTPATIENT
Start: 2021-02-17 | End: 2021-02-18 | Stop reason: HOSPADM

## 2021-02-17 RX ORDER — LIDOCAINE 40 MG/G
CREAM TOPICAL ONCE
Status: CANCELLED | OUTPATIENT
Start: 2021-02-17 | End: 2021-02-17

## 2021-02-17 RX ORDER — BACITRACIN ZINC AND POLYMYXIN B SULFATE 500; 1000 [USP'U]/G; [USP'U]/G
OINTMENT TOPICAL ONCE
Status: CANCELLED | OUTPATIENT
Start: 2021-02-17 | End: 2021-02-17

## 2021-02-17 RX ORDER — GENTAMICIN SULFATE 1 MG/G
OINTMENT TOPICAL ONCE
Status: CANCELLED | OUTPATIENT
Start: 2021-02-17 | End: 2021-02-17

## 2021-02-17 RX ORDER — LIDOCAINE HYDROCHLORIDE 20 MG/ML
JELLY TOPICAL ONCE
Status: CANCELLED | OUTPATIENT
Start: 2021-02-17 | End: 2021-02-17

## 2021-02-17 RX ORDER — GINSENG 100 MG
CAPSULE ORAL ONCE
Status: CANCELLED | OUTPATIENT
Start: 2021-02-17 | End: 2021-02-17

## 2021-02-17 RX ORDER — LIDOCAINE HYDROCHLORIDE 40 MG/ML
SOLUTION TOPICAL ONCE
Status: CANCELLED | OUTPATIENT
Start: 2021-02-17 | End: 2021-02-17

## 2021-02-17 RX ORDER — LIDOCAINE 50 MG/G
OINTMENT TOPICAL ONCE
Status: CANCELLED | OUTPATIENT
Start: 2021-02-17 | End: 2021-02-17

## 2021-02-17 RX ORDER — BETAMETHASONE DIPROPIONATE 0.05 %
OINTMENT (GRAM) TOPICAL ONCE
Status: CANCELLED | OUTPATIENT
Start: 2021-02-17 | End: 2021-02-17

## 2021-02-17 RX ORDER — CLOBETASOL PROPIONATE 0.5 MG/G
OINTMENT TOPICAL ONCE
Status: CANCELLED | OUTPATIENT
Start: 2021-02-17 | End: 2021-02-17

## 2021-02-17 NOTE — PROGRESS NOTES
Multilayer Compression Wrap   (Not Unna) Below the Knee    NAME:  Jair Obregon  YOB: 1945  MEDICAL RECORD NUMBER:  [id number]  DATE:  2/17/2021    Multilayer compression wrap: Removed old Multilayer wrap if indicated and wash leg with mild soap/water. Applied moisturizing agent to dry skin as needed. Applied primary and secondary dressing as ordered. Applied multilayered dressing below the knee to right lower leg. Instructed patient/caregiver not to remove dressing and to keep it clean and dry. Instructed patient/caregiver on complications to report to provider, such as pain, numbness in toes, heavy drainage, and slippage of dressing. Instructed patient on purpose of compression dressing and on activity and exercise recommendations.       Electronically signed by Grant Harris RN on 2/17/2021 at 2:10 PM

## 2021-02-17 NOTE — PROGRESS NOTES
Wound Care Center Progress Note and Bioengineered Skin Application      Davis Cardozo  AGE: 77 y.o. GENDER: male  : 1945  EPISODE DATE:  2021     Subjective:     Chief Complaint   Patient presents with    Wound Infection     rt eg         HISTORY of PRESENT ILLNESS     Davis Cardozo is a 77 y.o. male who presents today for wound evaluation of Chronic venous ulcer(s) of the right lower medial leg. The ulcer is of moderate severity. The underlying cause of the wound is venous disease and trauma. If appropriate skin graft will be applied. Wound Pain Timing/Severity: none  Quality of pain: N/A  Severity of pain:  0 / 10   Modifying Factors: edema and venous stasis  Associated Signs/Symptoms: drainage        PAST MEDICAL HISTORY        Diagnosis Date    Chronic venous hypertension with ulcer and inflammation involving right side (HCC)     RLE    Hypertension     past    Iron deficiency     Lymphoma Dx 11-    Hx of B cell lymphoma-in remission since        PAST SURGICAL HISTORY    Past Surgical History:   Procedure Laterality Date    TUNNELED VENOUS PORT PLACEMENT      TUNNELED VENOUS PORT PLACEMENT      removed 2012       FAMILY HISTORY    Family History   Problem Relation Age of Onset    Early Death Mother 52    Diabetes Father     Heart Disease Father     Vision Loss Son         \"He wears glasses\"    Early Death Daughter 36       SOCIAL HISTORY    Social History     Tobacco Use    Smoking status: Never Smoker    Smokeless tobacco: Never Used   Substance Use Topics    Alcohol use: No    Drug use: No       ALLERGIES    No Known Allergies    MEDICATIONS    Current Outpatient Medications on File Prior to Encounter   Medication Sig Dispense Refill    metoprolol succinate (TOPROL XL) 25 MG extended release tablet Take 1 tablet by mouth daily 30 tablet 2     No current facility-administered medications on file prior to encounter.         REVIEW OF SYSTEMS    Pertinent items are noted in HPI. Constitutional: Negative for systemic symptoms including fever, chills and malaise. Objective:      BP (!) 147/82   Pulse 72   Temp 97.8 °F (36.6 °C)   Resp 20     PHYSICAL EXAM  General: The patient is in no acute distress. Mental status:  Patient is appropriate, is  oriented to place and plan of care. Dermatologic exam: Visual inspection of the periwound reveals the skin to be edematous  Wound exam: see wound description below in procedure note      Assessment:     Problem List Items Addressed This Visit     Chronic venous hypertension with ulcer and inflammation (HCC) - Primary    Relevant Medications    lidocaine (XYLOCAINE) 5 % ointment (Start on 2/17/2021  2:00 PM)    Other Relevant Orders    Supply: Wound Cleanser    Supply: Wound Dressings    Supply: Cover and Secure    Supply: Edema Control    Venous ulcer of left leg (HCC)    Relevant Medications    lidocaine (XYLOCAINE) 5 % ointment (Start on 2/17/2021  2:00 PM)    Other Relevant Orders    Supply: Wound Cleanser    Supply: Wound Dressings    Supply: Cover and Secure    Supply: Edema Control          Conditions above and those listed in the past history are being treated appropriately and are considered under adequate control so as not to preclude the use of a graft. SKIN SUBSTITUTES/GRAFT APPLICATIONS PROCEDURE NOTE    Indicaton:     Chronic ulcer(s) of the right lower medial leg  that has(have) failed to heal with the usual wound protocol used for greater than 30 days. See Epic chart for previous modalities and details of previous treatment. The patient has no contraindications or evidence of infection. The patient's competency and support system is appropriate for proper care and follow up for the use of this graft, therefore a graft was placed as below. Procedure: The wound bed was cleansed and prepared as necessary to accept the graft. Debridement was required.     Consent obtained: Yes    Time out taken: Yes    Using curette sharp Excisional Debridement of the wound(s) was/were performed down through and including the removal of subcutaneous tissue. Devitalized Tissue Debrided:  fibrin, slough and exudate      Bleeding:  Minimal    Hemostasis Achieved:  by pressure    Procedural Pain:  0  / 10     Post Procedural Pain:  0 / 10     Having prepared the wound bed, the skin graft was completed as below.     Product Utilized:          [] APLIGRAF   []44 sq cm   []88 sq cm    []132 sq cm  []176 sq cm           [] DERMAGRAFT  [] 38 sq cm   []76 sq cm    []114 sq cm  []152 sq cm      [x] NUSHIELD  [] 1.6 sq/cm disc   [] (2X3) 6 sq/cm    [] (2X4) 8 sq/cm         [] (3X4) 12 sq/cm  [] (4x4) 16 sq/cm   [x] (4X6) 24 sq/cm         [] (6X6) 36 sq/cm        [] AFFINITY    [] (1.5 cm x 1.5cm) 2.25 cm   [] (2.5 cm x 2.5 cm) 6.25 cm         [] THERASKIN  [] 13 sq cm   []37.34 sq cm             [] EpiFix   [] 1.54 sq cm disc    [] 2 pieces (3.08 sq cm)    [] 3  pieces (4.62 sq  cm)   [] 6 sq/cm    [] 16 sq cm     [] 18 sq cm   Fenestrated        [] OASIS   [] 10.5 sq cm   []21 sq cm    []35 sq cm Tri-Matrix  []70 sq cm          Tri-Matrix                    [] PURAPLY   [] 1.6 sq cm disc     [] 4 sq cm   [] 8 sq cm   [] 25sq cm  [] 54 sq cm                  [] Grafix      [] 1.54 sq cm disc    [] 3 sq cm    [] 6 sq cm   [] 12 sq cm   [] 25 sq cm        Skin Substitute Applied:    Performed by: LOLY Howard CNP    Consent obtained: Yes    Time out taken: Yes     Fenestrated: No    Skin Substitute was Applied to Wound Number(s):     1      Wound 09/30/20 Wound #1 Right Medial Lower Leg Cluster (Onset 5 Years) (Active)   Wound Image   02/10/21 1311   Wound Etiology Venous 02/17/21 1317   Dressing Status New dressing applied 02/03/21 1413   Wound Cleansed Soap and water 02/17/21 1317   Dressing/Treatment Alginate with Ag 01/13/21 1657   Offloading for Diabetic Foot Ulcers No 12/30/20 1321   Wound Length (cm) 1.5 cm 02/17/21 1317   Wound Width (cm) 7.3 cm 02/17/21 1317   Wound Depth (cm) 0.1 cm 02/17/21 1317   Wound Surface Area (cm^2) 10.95 cm^2 02/17/21 1317   Change in Wound Size % (l*w) 95.65 02/17/21 1317   Wound Volume (cm^3) 1.1 cm^3 02/17/21 1317   Wound Healing % 96 02/17/21 1317   Post-Procedure Length (cm) 1.5 cm 02/17/21 1343   Post-Procedure Width (cm) 7.3 cm 02/17/21 1343   Post-Procedure Depth (cm) 0.1 cm 02/17/21 1343   Post-Procedure Surface Area (cm^2) 10.95 cm^2 02/17/21 1343   Post-Procedure Volume (cm^3) 1.1 cm^3 02/17/21 1343   Distance Tunneling (cm) 0 cm 02/17/21 1317   Tunneling Position ___ O'Clock 0 02/17/21 1317   Undermining Starts ___ O'Clock 0 02/17/21 1317   Undermining Ends___ O'Clock 0 02/17/21 1317   Undermining Maxium Distance (cm) 00 02/17/21 1317   Wound Assessment Granulation tissue;Pink/red;Slough 02/10/21 1311   Drainage Amount Moderate 02/17/21 1317   Drainage Description Serosanguinous 02/17/21 1317   Odor None 02/17/21 1317   Cassia-wound Assessment Intact 02/17/21 1317   Margins Defined edges 02/17/21 1317   Wound Thickness Description not for Pressure Injury Full thickness 02/17/21 1317   Number of days: 140         Total Surface Area Covered 10.95 sq/cm    Was the Product Layered  Yes      Amount Wasted 0 sq/cm    Reason for Waste: material provided was greater than wound size      Secured: Yes    Instruments used to complete placement of graft::scissors and forceps    Secured With:   [] N/A  []Steri Strips  []Sutures  []Staples [] Mepitel  [x] Sorbact  []Other    Procedural Pain: 0/10     Post Procedural Pain: 0 / 10    Response to Treatment:  Well tolerated by patient. Status of wound progress and description from last visit: Improved. Plan:     Discharge instructions:  Discharge Instructions       PHYSICIAN ORDERS AND DISCHARGE INSTRUCTIONS     NOTE: Upon discharge from the [address], you will receive a patient experience survey.  We would be grateful if you would take the time to fill this survey out.     Wound care order history:                 Vascular studies: Arterial studies done 9/27/20; no stenosis              Imaging:   Date               Cultures:   obtained on 11/18/2020                                 Obtained on 1/20/2021               Labs/ HbA1c:   Date               Grafts:                       #1 Apligraf on 12/16/2020                     #2 Apligraf on 12/23/2020                     #3 Apligraf on 12/30/2020                     #4 Apligraf on 1/6/2021                     # 5 Apligraf on 1/13/21                     #1 Nushield 1/27/2021                     #2 Nushield 2/3/2021                     #3 Nushield 2/10/2021                     #4 Nushield Right Medial Lower Leg 2/17/21                 HBO:                Antibiotics:  Bactrim DS BID for 10 days on 11/18/2020                                   Cipro BID for 10 days ordered on 11/25/2020                                    Cipro 500 mg BID on 1/20/2021              Earlier Wound care treatments:                Authorizations:                        Consults:   Date                           Primary care physician:      Continuing wound care orders and information:              Residence:  private               [facility] home health care with: [facility]               Your wound-care supplies will be provided by: Joycelyn Thompson provider:              [id number] with  Alpa Spore loading:  Date               SINDY Medications:              BIAFE cleansing:                           PJ not scrub or use excessive force.                          Wash hands with soap and water before and after dressing changes.                           Prior to applying a clean dressing, cleanse wound with normal saline,                                wound cleanser, or mild soap and water.                           Ask the physician or nurse before getting the wound(s) wet in a shower              Daily Wound management:                          ZAHM weight off wounds and reposition every 2 hours.                          ZFHEE standing for long periods of time.                          LHFXB wraps/stockings in AM and remove at bedtime.                          If swelling is present, elevate legs to the level of the heart or above for 30                              minutes 4-5 times a day and/or when sitting.                                             When taking antibiotics take entire prescription as ordered by physician                             QN not stop taking until medicine is all gone.                                           Orders for this week: 2/10/21     Right lower leg -- wash with soap and water,pat dry. Apply Desitin or Calmazine to sarah wound  IN CLINIC TODAY:  Apply #4 Nushield to wound bed leave in place, sorbact in place. Stimulin Power to areas not covered by graft. Cover with Kerramax. Wrap with Coban 2.  AT home: (Nushield in place do not remove sorbact) Homecare to cover with Silver calcium alginate , Kerramax or ABD and wrap with conform and tape. Tubi e to leg, on at all times        Homecare to change on Friday and Monday (may use profore  to wrap) --- GRAFT IN PLACE      Auth for grafts on 10/7/2020 -- approved for Puraply, Nushield and Apligraf                        Follow up with Dr Beti Petit in 1 week in the wound care center  Call [phone] for any questions or concerns.   Date__________   Time        Treatment Note Wound 09/30/20 Wound #1 Right Medial Lower Leg Cluster (Onset 5 Years)-Dressing/Treatment: (NuShield, sorbact)    Written Patient Dismissal Instructions Given            Electronically signed by LOLY Shepard CNP on 2/17/2021 at 1:49 PM

## 2021-02-24 ENCOUNTER — HOSPITAL ENCOUNTER (OUTPATIENT)
Dept: WOUND CARE | Age: 76
Discharge: HOME | End: 2021-02-24
Payer: COMMERCIAL

## 2021-02-24 VITALS
SYSTOLIC BLOOD PRESSURE: 162 MMHG | DIASTOLIC BLOOD PRESSURE: 91 MMHG | RESPIRATION RATE: 18 BRPM | TEMPERATURE: 98 F | HEART RATE: 78 BPM

## 2021-02-24 DIAGNOSIS — I87.331 CHRONIC VENOUS HYPERTENSION WITH ULCER AND INFLAMMATION INVOLVING RIGHT SIDE (HCC): Primary | ICD-10-CM

## 2021-02-24 DIAGNOSIS — L97.929 VENOUS ULCER OF LEFT LEG (HCC): ICD-10-CM

## 2021-02-24 DIAGNOSIS — I83.029 VENOUS ULCER OF LEFT LEG (HCC): ICD-10-CM

## 2021-02-24 DIAGNOSIS — L97.919 CHRONIC VENOUS HYPERTENSION WITH ULCER AND INFLAMMATION INVOLVING RIGHT SIDE (HCC): Primary | ICD-10-CM

## 2021-02-24 PROCEDURE — 11042 DBRDMT SUBQ TIS 1ST 20SQCM/<: CPT

## 2021-02-24 PROCEDURE — 11045 DBRDMT SUBQ TISS EACH ADDL: CPT

## 2021-02-24 PROCEDURE — 11042 DBRDMT SUBQ TIS 1ST 20SQCM/<: CPT | Performed by: SURGERY

## 2021-02-24 PROCEDURE — 11045 DBRDMT SUBQ TISS EACH ADDL: CPT | Performed by: SURGERY

## 2021-02-24 RX ORDER — LIDOCAINE HYDROCHLORIDE 20 MG/ML
JELLY TOPICAL ONCE
Status: CANCELLED | OUTPATIENT
Start: 2021-02-24 | End: 2021-02-24

## 2021-02-24 RX ORDER — BACITRACIN, NEOMYCIN, POLYMYXIN B 400; 3.5; 5 [USP'U]/G; MG/G; [USP'U]/G
OINTMENT TOPICAL ONCE
Status: CANCELLED | OUTPATIENT
Start: 2021-02-24 | End: 2021-02-24

## 2021-02-24 RX ORDER — LIDOCAINE HYDROCHLORIDE 40 MG/ML
SOLUTION TOPICAL ONCE
Status: CANCELLED | OUTPATIENT
Start: 2021-02-24 | End: 2021-02-24

## 2021-02-24 RX ORDER — GINSENG 100 MG
CAPSULE ORAL ONCE
Status: CANCELLED | OUTPATIENT
Start: 2021-02-24 | End: 2021-02-24

## 2021-02-24 RX ORDER — BETAMETHASONE DIPROPIONATE 0.05 %
OINTMENT (GRAM) TOPICAL ONCE
Status: CANCELLED | OUTPATIENT
Start: 2021-02-24 | End: 2021-02-24

## 2021-02-24 RX ORDER — BACITRACIN ZINC AND POLYMYXIN B SULFATE 500; 1000 [USP'U]/G; [USP'U]/G
OINTMENT TOPICAL ONCE
Status: CANCELLED | OUTPATIENT
Start: 2021-02-24 | End: 2021-02-24

## 2021-02-24 RX ORDER — GENTAMICIN SULFATE 1 MG/G
OINTMENT TOPICAL ONCE
Status: CANCELLED | OUTPATIENT
Start: 2021-02-24 | End: 2021-02-24

## 2021-02-24 RX ORDER — LIDOCAINE 50 MG/G
OINTMENT TOPICAL ONCE
Status: CANCELLED | OUTPATIENT
Start: 2021-02-24 | End: 2021-02-24

## 2021-02-24 RX ORDER — LIDOCAINE HYDROCHLORIDE 40 MG/ML
SOLUTION TOPICAL ONCE
Status: DISCONTINUED | OUTPATIENT
Start: 2021-02-24 | End: 2021-02-25 | Stop reason: HOSPADM

## 2021-02-24 RX ORDER — LIDOCAINE 40 MG/G
CREAM TOPICAL ONCE
Status: CANCELLED | OUTPATIENT
Start: 2021-02-24 | End: 2021-02-24

## 2021-02-24 RX ORDER — CLOBETASOL PROPIONATE 0.5 MG/G
OINTMENT TOPICAL ONCE
Status: CANCELLED | OUTPATIENT
Start: 2021-02-24 | End: 2021-02-24

## 2021-02-24 ASSESSMENT — PAIN SCALES - GENERAL: PAINLEVEL_OUTOF10: 0

## 2021-02-24 NOTE — PROGRESS NOTES
Wound Care Center Progress Note with Procedure Note      Si Dell  AGE: 77 y.o. GENDER: male  : 1945  EPISODE DATE:  2021     Subjective:     Chief Complaint   Patient presents with    Wound Check         HISTORY of PRESENT ILLNESS      Si Dell is a 77 y.o. male who presents today for wound evaluation of Chronic venous wound(s) of R lower leg medial.  The wound is of moderate severity. The underlying cause of the wound is trauma and venous disease. Wound Pain Timing/Severity: none  Quality of pain: N/A  Severity of pain:  0 / 10   Modifying Factors: edema and venous stasis  Associated Signs/Symptoms: drainage        PAST MEDICAL HISTORY        Diagnosis Date    Chronic venous hypertension with ulcer and inflammation involving right side (HCC)     RLE    Hypertension     past    Iron deficiency     Lymphoma Dx     Hx of B cell lymphoma-in remission since        PAST SURGICAL HISTORY    Past Surgical History:   Procedure Laterality Date    TUNNELED VENOUS PORT PLACEMENT      TUNNELED VENOUS PORT PLACEMENT      removed 2012       FAMILY HISTORY    Family History   Problem Relation Age of Onset    Early Death Mother 52    Diabetes Father     Heart Disease Father     Vision Loss Son         \"He wears glasses\"    Early Death Daughter 36       SOCIAL HISTORY    Social History     Tobacco Use    Smoking status: Never Smoker    Smokeless tobacco: Never Used   Substance Use Topics    Alcohol use: No    Drug use: No       ALLERGIES    No Known Allergies    MEDICATIONS    Current Outpatient Medications on File Prior to Encounter   Medication Sig Dispense Refill    metoprolol succinate (TOPROL XL) 25 MG extended release tablet Take 1 tablet by mouth daily 30 tablet 2     No current facility-administered medications on file prior to encounter. REVIEW OF SYSTEMS    Pertinent items are noted in HPI.   Constitutional: Negative for systemic symptoms including fever, chills and malaise. Objective:      BP (!) 162/91   Pulse 78   Temp 98 °F (36.7 °C) (Temporal)   Resp 18     PHYSICAL EXAM      General: The patient is in no acute distress. Mental status:  Patient is appropriate, is  oriented to place and plan of care. Dermatologic exam: Visual inspection of the periwound reveals the skin to be normal in turgor and texture  Wound exam: see wound description below in procedure note      Assessment:     Problem List Items Addressed This Visit     Chronic venous hypertension with ulcer and inflammation (HCC) - Primary    Relevant Medications    lidocaine (XYLOCAINE) 4 % external solution    Other Relevant Orders    Supply: Wound Cleanser    Supply: Wound Dressings    Supply: Cover and Secure    Supply: Edema Control    Venous ulcer of left leg (HCC)    Relevant Medications    lidocaine (XYLOCAINE) 4 % external solution    Other Relevant Orders    Supply: Wound Cleanser    Supply: Wound Dressings    Supply: Cover and Secure    Supply: Edema Control        Procedure Note    Indications:  Based on my examination of this patient's wound(s) today, sharp excision into necrotic subcutaneous tissue is required to promote healing and evaluate the extent of previous healing. Performed by: Krista Strong MD    Consent obtained: Yes    Time out taken:  Yes    Pain Control: topical Anesthetic  Anesthetic: 5% Lidocaine Ointment Topical     Debridement:Excisional Debridement    Using curette the wound(s) was/were sharply debrided down through and including the removal of subcutaneous tissue.         Devitalized Tissue Debrided:  slough    Pre Debridement Measurements:  Are located in the Wound Documentation Flow Sheet    All active wounds listed below with today's date are evaluated  Wound(s)    debrided this date include # : 1     Post  Debridement Measurements:  Wound 09/30/20 Wound #1 Right Medial Lower Leg Cluster (Onset 5 INSTRUCTIONS     NOTE: Upon discharge from the [address], you will receive a patient experience survey. We would be grateful if you would take the time to fill this survey out.     Wound care order history:                 Vascular studies: Arterial studies done 9/27/20; no stenosis              Imaging:   Date               Cultures:   obtained on 11/18/2020                                 Obtained on 1/20/2021               Labs/ HbA1c:   Date               Grafts:                       #1 Apligraf on 12/16/2020                     #2 Apligraf on 12/23/2020                     #3 Apligraf on 12/30/2020                     #4 Apligraf on 1/6/2021                     # 5 Apligraf on 1/13/21                     #1 [place] 1/27/2021                     #2 [place] 2/3/2021                     #3 [place] 2/10/2021                     #4 [place] Right Medial Lower Leg 2/17/21                  HBO:                Antibiotics:  Bactrim DS BID for 10 days on 11/18/2020                                   Cipro BID for 10 days ordered on 11/25/2020                                    Cipro 500 mg BID on 1/20/2021              Earlier Wound care treatments:                Authorizations:                        Consults:   Date                           Primary care physician:      Continuing wound care orders and information:              Residence:  private               [facility] health care with: [facility]               Your wound-care supplies will be provided by: Roge Baxter provider:              BEV with  Consuelo Joyner loading:  Date               TCKXL Medications:              DHTMQ cleansing:                           JL not scrub or use excessive force.                          Wash hands with soap and water before and after dressing changes.                           Prior to applying a clean dressing, cleanse wound with normal saline,                                wound cleanser, or mild soap

## 2021-03-01 ENCOUNTER — OFFICE VISIT (OUTPATIENT)
Dept: INTERNAL MEDICINE CLINIC | Age: 76
End: 2021-03-01
Payer: COMMERCIAL

## 2021-03-01 VITALS
OXYGEN SATURATION: 98 % | HEART RATE: 72 BPM | BODY MASS INDEX: 25.44 KG/M2 | WEIGHT: 182.4 LBS | TEMPERATURE: 96.2 F | SYSTOLIC BLOOD PRESSURE: 146 MMHG | DIASTOLIC BLOOD PRESSURE: 70 MMHG

## 2021-03-01 DIAGNOSIS — L97.919 CHRONIC VENOUS HYPERTENSION WITH ULCER AND INFLAMMATION INVOLVING RIGHT SIDE (HCC): ICD-10-CM

## 2021-03-01 DIAGNOSIS — I87.331 CHRONIC VENOUS HYPERTENSION WITH ULCER AND INFLAMMATION INVOLVING RIGHT SIDE (HCC): ICD-10-CM

## 2021-03-01 DIAGNOSIS — R73.9 HYPERGLYCEMIA: ICD-10-CM

## 2021-03-01 DIAGNOSIS — I10 ESSENTIAL HYPERTENSION: Primary | ICD-10-CM

## 2021-03-01 DIAGNOSIS — D64.9 ANEMIA, UNSPECIFIED TYPE: ICD-10-CM

## 2021-03-01 LAB
ANION GAP SERPL CALCULATED.3IONS-SCNC: 11 MMOL/L (ref 3–16)
BASOPHILS ABSOLUTE: 0 K/UL (ref 0–0.2)
BASOPHILS RELATIVE PERCENT: 0.6 %
BUN BLDV-MCNC: 14 MG/DL (ref 7–20)
CALCIUM SERPL-MCNC: 9.5 MG/DL (ref 8.3–10.6)
CHLORIDE BLD-SCNC: 101 MMOL/L (ref 99–110)
CO2: 28 MMOL/L (ref 21–32)
CREAT SERPL-MCNC: 0.6 MG/DL (ref 0.8–1.3)
EOSINOPHILS ABSOLUTE: 0.4 K/UL (ref 0–0.6)
EOSINOPHILS RELATIVE PERCENT: 6.5 %
FERRITIN: 99.7 NG/ML (ref 30–400)
GFR AFRICAN AMERICAN: >60
GFR NON-AFRICAN AMERICAN: >60
GLUCOSE BLD-MCNC: 95 MG/DL (ref 70–99)
HCT VFR BLD CALC: 44.7 % (ref 40.5–52.5)
HEMOGLOBIN: 15.2 G/DL (ref 13.5–17.5)
IRON SATURATION: 23 % (ref 20–50)
IRON: 86 UG/DL (ref 59–158)
LYMPHOCYTES ABSOLUTE: 1.5 K/UL (ref 1–5.1)
LYMPHOCYTES RELATIVE PERCENT: 22.2 %
MCH RBC QN AUTO: 31.3 PG (ref 26–34)
MCHC RBC AUTO-ENTMCNC: 34 G/DL (ref 31–36)
MCV RBC AUTO: 92 FL (ref 80–100)
MONOCYTES ABSOLUTE: 0.6 K/UL (ref 0–1.3)
MONOCYTES RELATIVE PERCENT: 9.5 %
NEUTROPHILS ABSOLUTE: 4.1 K/UL (ref 1.7–7.7)
NEUTROPHILS RELATIVE PERCENT: 61.2 %
PDW BLD-RTO: 15 % (ref 12.4–15.4)
PLATELET # BLD: 156 K/UL (ref 135–450)
PMV BLD AUTO: 9.4 FL (ref 5–10.5)
POTASSIUM SERPL-SCNC: 4.5 MMOL/L (ref 3.5–5.1)
RBC # BLD: 4.86 M/UL (ref 4.2–5.9)
SODIUM BLD-SCNC: 140 MMOL/L (ref 136–145)
TOTAL IRON BINDING CAPACITY: 367 UG/DL (ref 260–445)
WBC # BLD: 6.6 K/UL (ref 4–11)

## 2021-03-01 PROCEDURE — 36415 COLL VENOUS BLD VENIPUNCTURE: CPT | Performed by: FAMILY MEDICINE

## 2021-03-01 PROCEDURE — 99214 OFFICE O/P EST MOD 30 MIN: CPT | Performed by: FAMILY MEDICINE

## 2021-03-01 RX ORDER — METOPROLOL SUCCINATE 50 MG/1
50 TABLET, EXTENDED RELEASE ORAL DAILY
Qty: 30 TABLET | Refills: 2 | Status: SHIPPED | OUTPATIENT
Start: 2021-03-01 | End: 2021-06-14 | Stop reason: SDUPTHER

## 2021-03-01 ASSESSMENT — ENCOUNTER SYMPTOMS
COUGH: 0
NAUSEA: 0
ABDOMINAL PAIN: 0
SHORTNESS OF BREATH: 0
BACK PAIN: 0

## 2021-03-01 ASSESSMENT — PATIENT HEALTH QUESTIONNAIRE - PHQ9
SUM OF ALL RESPONSES TO PHQ QUESTIONS 1-9: 0
SUM OF ALL RESPONSES TO PHQ9 QUESTIONS 1 & 2: 0

## 2021-03-01 NOTE — PROGRESS NOTES
Anne Marie Spivey  1945  77 y.o.  male    Chief Complaint   Patient presents with    3 Month Follow-Up    Hypertension     discuss blood pressure medications         History of Present Illness  Anne Marie Spivey is a 77 y.o. male who presents today for a check up. Patient Active Problem List    Diagnosis Date Noted    Chronic venous hypertension with ulcer and inflammation ([facility] 08/09/2013     Priority: High    Essential hypertension 03/01/2021    Acute blood loss anemia 10/14/2020    Iron deficiency 10/14/2020    Venous ulcer of left leg [facility] 09/26/2020    Leg ulcer ([facility] 08/23/2013     -HTN- On Metoprolol XL 25 mg. Bp elevated today  -Anemia- He did not do labs  -RLE venous ulcer- he is improving, in wound management  -Hyperglycemia    Review of Systems   Constitutional: Negative for diaphoresis and fever. Respiratory: Negative for cough and shortness of breath. Cardiovascular: Negative for chest pain and palpitations. Gastrointestinal: Negative for abdominal pain and nausea. Genitourinary: Negative for difficulty urinating. Musculoskeletal: Negative for back pain. Neurological: Negative for dizziness and headaches. Psychiatric/Behavioral: Negative for dysphoric mood.        No Known Allergies    Past Medical History:   Diagnosis Date    Chronic venous hypertension with ulcer and inflammation involving right side (HCC)     RLE    Hypertension     past    Iron deficiency     Lymphoma Dx 11-11    Hx of B cell lymphoma-in remission since 2012       Past Surgical History:   Procedure Laterality Date    TUNNELED VENOUS PORT PLACEMENT      TUNNELED VENOUS PORT PLACEMENT      removed 5/5 2012       Family History   Problem Relation Age of Onset    Early Death Mother 52    Diabetes Father     Heart Disease Father     Vision Loss Son         \"He wears glasses\"    Early Death Daughter 36       Social History     Tobacco Use    Smoking status: Never Smoker    Smokeless tobacco: Never Used Substance Use Topics    Alcohol use: No    Drug use: No       Current Outpatient Medications   Medication Sig Dispense Refill    metoprolol succinate (TOPROL XL) 50 MG extended release tablet Take 1 tablet by mouth daily 30 tablet 2     No current facility-administered medications for this visit. OBJECTIVE:    BP (!) 146/70   Pulse 72   Temp 96.2 °F (35.7 °C)   Wt 182 lb 6.4 oz (82.7 kg)   SpO2 98%   BMI 25.44 kg/m²     Physical Exam  Vitals signs reviewed. Constitutional:       General: He is not in acute distress. Eyes:      Extraocular Movements: Extraocular movements intact. Conjunctiva/sclera: Conjunctivae normal.      Pupils: Pupils are equal, round, and reactive to light. Neck:      Musculoskeletal: Neck supple. Cardiovascular:      Rate and Rhythm: Normal rate and regular rhythm. Comments: Varicose veins  Pulmonary:      Effort: Pulmonary effort is normal. No respiratory distress. Breath sounds: Normal breath sounds. Abdominal:      General: Bowel sounds are normal.      Palpations: Abdomen is soft. Tenderness: There is no abdominal tenderness. Musculoskeletal:      Right lower leg: No edema. Left lower leg: No edema. Comments: R leg wrapped   Neurological:      Mental Status: He is alert and oriented to person, place, and time. Cranial Nerves: No cranial nerve deficit. Psychiatric:         Mood and Affect: Mood normal.         ASSESSMENT:  1. Essential hypertension    2. Chronic venous hypertension with ulcer and inflammation involving right side ([address]    3. Anemia, unspecified type    4.  Hyperglycemia        PLAN:    Orders Placed This Encounter   Procedures    CBC Auto Differential    IRON AND TIBC    FERRITIN    Hemoglobin A1C    BASIC METABOLIC PANEL       Orders Placed This Encounter   Medications    metoprolol succinate (TOPROL XL) 50 MG extended release tablet     Sig: Take 1 tablet by mouth daily     Dispense:  30 tablet Refill:  2     D/C Metoprolol 25   Obtain lab  Increase Metoprolol to 50 mg  ADR's explained  Pt to complete FIT test  Keep f/u with wound mgmt           Return for Follow up in  3 to 4 months.     Electronically Signed by Samantha Segundo DO

## 2021-03-02 LAB
ESTIMATED AVERAGE GLUCOSE: 114 MG/DL
HBA1C MFR BLD: 5.6 %

## 2021-03-03 ENCOUNTER — HOSPITAL ENCOUNTER (OUTPATIENT)
Dept: WOUND CARE | Age: 76
Discharge: HOME | End: 2021-03-03
Payer: COMMERCIAL

## 2021-03-03 VITALS
DIASTOLIC BLOOD PRESSURE: 89 MMHG | SYSTOLIC BLOOD PRESSURE: 168 MMHG | HEART RATE: 70 BPM | RESPIRATION RATE: 16 BRPM | TEMPERATURE: 97 F

## 2021-03-03 DIAGNOSIS — L97.929 VENOUS ULCER OF LEFT LEG (HCC): ICD-10-CM

## 2021-03-03 DIAGNOSIS — I83.029 VENOUS ULCER OF LEFT LEG (HCC): ICD-10-CM

## 2021-03-03 DIAGNOSIS — L97.919 CHRONIC VENOUS HYPERTENSION WITH ULCER AND INFLAMMATION INVOLVING RIGHT SIDE (HCC): Primary | ICD-10-CM

## 2021-03-03 DIAGNOSIS — I87.331 CHRONIC VENOUS HYPERTENSION WITH ULCER AND INFLAMMATION INVOLVING RIGHT SIDE (HCC): Primary | ICD-10-CM

## 2021-03-03 PROCEDURE — 99213 OFFICE O/P EST LOW 20 MIN: CPT

## 2021-03-03 PROCEDURE — 99213 OFFICE O/P EST LOW 20 MIN: CPT | Performed by: SURGERY

## 2021-03-03 RX ORDER — LIDOCAINE HYDROCHLORIDE 20 MG/ML
JELLY TOPICAL ONCE
Status: CANCELLED | OUTPATIENT
Start: 2021-03-03 | End: 2021-03-03

## 2021-03-03 RX ORDER — LIDOCAINE HYDROCHLORIDE 40 MG/ML
SOLUTION TOPICAL ONCE
Status: CANCELLED | OUTPATIENT
Start: 2021-03-03 | End: 2021-03-03

## 2021-03-03 RX ORDER — GENTAMICIN SULFATE 1 MG/G
OINTMENT TOPICAL ONCE
Status: CANCELLED | OUTPATIENT
Start: 2021-03-03 | End: 2021-03-03

## 2021-03-03 RX ORDER — LIDOCAINE 50 MG/G
OINTMENT TOPICAL ONCE
Status: CANCELLED | OUTPATIENT
Start: 2021-03-03 | End: 2021-03-03

## 2021-03-03 RX ORDER — GINSENG 100 MG
CAPSULE ORAL ONCE
Status: CANCELLED | OUTPATIENT
Start: 2021-03-03 | End: 2021-03-03

## 2021-03-03 RX ORDER — CLOBETASOL PROPIONATE 0.5 MG/G
OINTMENT TOPICAL ONCE
Status: CANCELLED | OUTPATIENT
Start: 2021-03-03 | End: 2021-03-03

## 2021-03-03 RX ORDER — BACITRACIN, NEOMYCIN, POLYMYXIN B 400; 3.5; 5 [USP'U]/G; MG/G; [USP'U]/G
OINTMENT TOPICAL ONCE
Status: CANCELLED | OUTPATIENT
Start: 2021-03-03 | End: 2021-03-03

## 2021-03-03 RX ORDER — BETAMETHASONE DIPROPIONATE 0.05 %
OINTMENT (GRAM) TOPICAL ONCE
Status: CANCELLED | OUTPATIENT
Start: 2021-03-03 | End: 2021-03-03

## 2021-03-03 RX ORDER — LIDOCAINE 40 MG/G
CREAM TOPICAL ONCE
Status: CANCELLED | OUTPATIENT
Start: 2021-03-03 | End: 2021-03-03

## 2021-03-03 RX ORDER — BACITRACIN ZINC AND POLYMYXIN B SULFATE 500; 1000 [USP'U]/G; [USP'U]/G
OINTMENT TOPICAL ONCE
Status: CANCELLED | OUTPATIENT
Start: 2021-03-03 | End: 2021-03-03

## 2021-03-03 NOTE — PROGRESS NOTES
Wound Care Center Progress Note       Jenni Marino  AGE: 77 y.o. GENDER: male  : 1945  TODAY'S DATE:  3/3/2021        Subjective:     Chief Complaint   Patient presents with    Wound Check     RLE          HISTORY of PRESENT ILLNESS     Jenni Marino is a 77 y.o. male who presents today for wound evaluation of Chronic venous and traumatic wound(s) of R lower leg medial.  The wound is of moderate severity. The underlying cause of the wound is venous disease and trauma. Wound Pain Timing/Severity: none  Quality of pain: N/A  Severity of pain:  0 / 10   Modifying Factors: edema and venous stasis  Associated Signs/Symptoms: drainage        PAST MEDICAL HISTORY        Diagnosis Date    Chronic venous hypertension with ulcer and inflammation involving right side (HCC)     RLE    Hypertension     past    Iron deficiency     Lymphoma Dx 11-    Hx of B cell lymphoma-in remission since        PAST SURGICAL HISTORY    Past Surgical History:   Procedure Laterality Date    TUNNELED VENOUS PORT PLACEMENT      TUNNELED VENOUS PORT PLACEMENT      removed 2012       FAMILY HISTORY    Family History   Problem Relation Age of Onset    Early Death Mother 52    Diabetes Father     Heart Disease Father     Vision Loss Son         \"He wears glasses\"    Early Death Daughter 36       SOCIAL HISTORY    Social History     Tobacco Use    Smoking status: Never Smoker    Smokeless tobacco: Never Used   Substance Use Topics    Alcohol use: No    Drug use: No       ALLERGIES    No Known Allergies    MEDICATIONS    Current Outpatient Medications on File Prior to Encounter   Medication Sig Dispense Refill    metoprolol succinate (TOPROL XL) 50 MG extended release tablet Take 1 tablet by mouth daily 30 tablet 2     No current facility-administered medications on file prior to encounter.         REVIEW OF SYSTEMS    Pertinent items are noted in HPI.  Constitutional: Negative for systemic symptoms including fever, chills and malaise. Objective:      BP (!) 168/89   Pulse 70   Temp 97 °F (36.1 °C) (Temporal)   Resp 16     PHYSICAL EXAM      General: The patient is in no acute distress. Mental status:  Patient is appropriate, is  oriented to place and plan of care. Dermatologic exam: Visual inspection of the periwound reveals the skin to be edematous.   Wound exam:  see wound description below     All active wounds listed below with today's date are evaluated      Wound 09/30/20 Wound #1 Right Medial Lower Leg Cluster (Onset 5 Years) (Active)   Wound Image   03/03/21 1322   Wound Etiology Venous 02/24/21 1347   Dressing Status New dressing applied 02/17/21 1400   Wound Cleansed Soap and water 03/03/21 1322   Dressing/Treatment Alginate with Ag 01/13/21 1657   Offloading for Diabetic Foot Ulcers No offloading required 03/03/21 1322   Wound Length (cm) 12.5 cm 03/03/21 1322   Wound Width (cm) 8 cm 03/03/21 1322   Wound Depth (cm) 0.1 cm 03/03/21 1322   Wound Surface Area (cm^2) 100 cm^2 03/03/21 1322   Change in Wound Size % (l*w) 60.32 03/03/21 1322   Wound Volume (cm^3) 10 cm^3 03/03/21 1322   Wound Healing % 60 03/03/21 1322   Post-Procedure Length (cm) 12.6 cm 02/24/21 1409   Post-Procedure Width (cm) 8.5 cm 02/24/21 1409   Post-Procedure Depth (cm) 0.1 cm 02/24/21 1409   Post-Procedure Surface Area (cm^2) 107.1 cm^2 02/24/21 1409   Post-Procedure Volume (cm^3) 10.71 cm^3 02/24/21 1409   Distance Tunneling (cm) 0 cm 03/03/21 1322   Tunneling Position ___ O'Clock 0 03/03/21 1322   Undermining Starts ___ O'Clock 0 03/03/21 1322   Undermining Ends___ O'Clock 0 03/03/21 1322   Undermining Maxium Distance (cm) 0 03/03/21 1322   Wound Assessment Granulation tissue;Slough 03/03/21 1322   Drainage Amount Large 03/03/21 1322   Drainage Description Serosanguinous;Brown 03/03/21 1322   Odor Moderate 03/03/21 1322   Cassia-wound Assessment Maceration;Dry/flaky 03/03/21 1322   Margins Attached edges; Defined edges 03/03/21 1322   Wound Thickness Description not for Pressure Injury Full thickness 03/03/21 1322   Number of days: 154       Assessment:       Problem List Items Addressed This Visit     Chronic venous hypertension with ulcer and inflammation (HCC)    Venous ulcer of left leg ([address]          Status of wound progress and description from last visit:   Improved. Seeing Dr. Ijeoma Chahal on the 18th for venous ablation evaluation. Plan:     Discharge Instructions       PHYSICIAN ORDERS AND DISCHARGE INSTRUCTIONS     NOTE: Upon discharge from the [address], you will receive a patient experience survey.  We would be grateful if you would take the time to fill this survey out.     Wound care order history:                 Vascular studies: Arterial studies done 9/27/20; no stenosis              Imaging:   Date               Cultures:   obtained on 11/18/2020                                 Obtained on 1/20/2021               Labs/ HbA1c:   Date               Grafts:                       #1 Apligraf on 12/16/2020                     #2 Apligraf on 12/23/2020                     #3 Apligraf on 12/30/2020                     #4 Apligraf on 1/6/2021                     # 5 Apligraf on 1/13/21                     #1 [place] 1/27/2021                     #2 [place] 2/3/2021                     #3 [place] 2/10/2021                     #4 [place] Right Medial Lower Leg 2/17/21                  HBO:                Antibiotics:  Bactrim DS BID for 10 days on 11/18/2020                                   Cipro BID for 10 days ordered on 11/25/2020                                    Cipro 500 mg BID on 1/20/2021              Earlier Wound care treatments:                Authorizations:                        Consults:   Date                           Primary care physician:      Continuing wound care orders and information:              Residence:  [facility] home health care with: [facility]               [facility] wound-care supplies will be provided by: Monica Plata provider:              [id number] with  Chaparro Tena loading:  Date               ZANDK Medications:              YTPLR cleansing:                           VW not scrub or use excessive force.                          Wash hands with soap and water before and after dressing changes.                         Prior to applying a clean dressing, cleanse wound with normal saline,                                wound cleanser, or mild soap and water.                           Ask the physician or nurse before getting the wound(s) wet in a shower              Daily Wound management:                          Keep weight off wounds and reposition every 2 hours.                          JDIGG standing for long periods of time.                          BQGRE wraps/stockings in AM and remove at bedtime.                          If swelling is present, elevate legs to the level of the heart or above for 30                              minutes 4-5 times a day and/or when sitting.                                             When taking antibiotics take entire prescription as ordered by physician                             IC not stop taking until medicine is all gone.                                           Orders for this week: 3/3/21     Right lower leg -- wash with soap and water,pat dry. Apply Desitin or Calmazine to sarah wound  IN CLINIC TODAY: Stimulin Power to areas not covered by graft. Cover with Kerramax. Wrap with Coban 2.  AT home:  Homecare to cover with Silver calcium alginate , Kerramax or ABD and wrap with conform and tape.  Tubi e to leg, on at all times        Homecare to change on Friday and Monday (may use profore  to wrap) ---      Auth for grafts on 10/7/2020 -- approved for Janay Lynch and Rebel     Referral to Dr Mely Stringer on 2/24/2021                         Follow up with Dr Elizabeth Marcos in 1 week in the wound care center  Call [phone] for any questions or concerns.   Date__________   Time        Treatment Note      Written Patient Dismissal Instructions Given            Electronically signed by Laura Ontiveros MD on 3/3/2021 at 1:39 PM

## 2021-03-10 ENCOUNTER — HOSPITAL ENCOUNTER (OUTPATIENT)
Dept: WOUND CARE | Age: 76
Discharge: HOME | End: 2021-03-10
Payer: COMMERCIAL

## 2021-03-10 VITALS
SYSTOLIC BLOOD PRESSURE: 149 MMHG | RESPIRATION RATE: 16 BRPM | TEMPERATURE: 98 F | HEART RATE: 67 BPM | DIASTOLIC BLOOD PRESSURE: 78 MMHG

## 2021-03-10 DIAGNOSIS — I87.331 CHRONIC VENOUS HYPERTENSION WITH ULCER AND INFLAMMATION INVOLVING RIGHT SIDE (HCC): Primary | ICD-10-CM

## 2021-03-10 DIAGNOSIS — I83.029 VENOUS ULCER OF LEFT LEG (HCC): ICD-10-CM

## 2021-03-10 DIAGNOSIS — L97.919 CHRONIC VENOUS HYPERTENSION WITH ULCER AND INFLAMMATION INVOLVING RIGHT SIDE (HCC): Primary | ICD-10-CM

## 2021-03-10 DIAGNOSIS — L97.929 VENOUS ULCER OF LEFT LEG (HCC): ICD-10-CM

## 2021-03-10 PROCEDURE — 15271 SKIN SUB GRAFT TRNK/ARM/LEG: CPT

## 2021-03-10 PROCEDURE — 15271 SKIN SUB GRAFT TRNK/ARM/LEG: CPT | Performed by: SURGERY

## 2021-03-10 RX ORDER — BACITRACIN, NEOMYCIN, POLYMYXIN B 400; 3.5; 5 [USP'U]/G; MG/G; [USP'U]/G
OINTMENT TOPICAL ONCE
Status: CANCELLED | OUTPATIENT
Start: 2021-03-10 | End: 2021-03-10

## 2021-03-10 RX ORDER — LIDOCAINE HYDROCHLORIDE 40 MG/ML
SOLUTION TOPICAL ONCE
Status: CANCELLED | OUTPATIENT
Start: 2021-03-10 | End: 2021-03-10

## 2021-03-10 RX ORDER — BACITRACIN ZINC AND POLYMYXIN B SULFATE 500; 1000 [USP'U]/G; [USP'U]/G
OINTMENT TOPICAL ONCE
Status: CANCELLED | OUTPATIENT
Start: 2021-03-10 | End: 2021-03-10

## 2021-03-10 RX ORDER — LIDOCAINE HYDROCHLORIDE 20 MG/ML
JELLY TOPICAL ONCE
Status: CANCELLED | OUTPATIENT
Start: 2021-03-10 | End: 2021-03-10

## 2021-03-10 RX ORDER — LIDOCAINE HYDROCHLORIDE 40 MG/ML
SOLUTION TOPICAL ONCE
Status: DISCONTINUED | OUTPATIENT
Start: 2021-03-10 | End: 2021-03-11 | Stop reason: HOSPADM

## 2021-03-10 RX ORDER — LIDOCAINE 50 MG/G
OINTMENT TOPICAL ONCE
Status: CANCELLED | OUTPATIENT
Start: 2021-03-10 | End: 2021-03-10

## 2021-03-10 RX ORDER — GINSENG 100 MG
CAPSULE ORAL ONCE
Status: CANCELLED | OUTPATIENT
Start: 2021-03-10 | End: 2021-03-10

## 2021-03-10 RX ORDER — GENTAMICIN SULFATE 1 MG/G
OINTMENT TOPICAL ONCE
Status: CANCELLED | OUTPATIENT
Start: 2021-03-10 | End: 2021-03-10

## 2021-03-10 RX ORDER — LIDOCAINE 40 MG/G
CREAM TOPICAL ONCE
Status: CANCELLED | OUTPATIENT
Start: 2021-03-10 | End: 2021-03-10

## 2021-03-10 RX ORDER — BETAMETHASONE DIPROPIONATE 0.05 %
OINTMENT (GRAM) TOPICAL ONCE
Status: CANCELLED | OUTPATIENT
Start: 2021-03-10 | End: 2021-03-10

## 2021-03-10 RX ORDER — CLOBETASOL PROPIONATE 0.5 MG/G
OINTMENT TOPICAL ONCE
Status: CANCELLED | OUTPATIENT
Start: 2021-03-10 | End: 2021-03-10

## 2021-03-10 NOTE — PROGRESS NOTES
Wound Care Center Progress Note and Bioengineered Skin Application      Juan Luis Camacho  AGE: 77 y.o. GENDER: male  : 1945  EPISODE DATE:  3/10/2021     Subjective:     Chief Complaint   Patient presents with    Wound Check         HISTORY of PRESENT ILLNESS      Juan Luis Camacho is a 77 y.o. male who presents today for wound evaluation of Chronic venous ulcer(s) of R lower leg medial.  The ulcer is of moderate severity. The underlying cause of the wound is venous disease and trauma. Nushield If appropriate skin graft will be applied. Wound Pain Timing/Severity: none  Quality of pain: N/A  Severity of pain:  0 / 10   Modifying Factors: edema and venous stasis  Associated Signs/Symptoms: drainage        PAST MEDICAL HISTORY        Diagnosis Date    Chronic venous hypertension with ulcer and inflammation involving right side (HCC)     RLE    Hypertension     past    Iron deficiency     Lymphoma Dx 11-    Hx of B cell lymphoma-in remission since        PAST SURGICAL HISTORY    Past Surgical History:   Procedure Laterality Date    TUNNELED VENOUS PORT PLACEMENT      TUNNELED VENOUS PORT PLACEMENT      removed 2012       FAMILY HISTORY    Family History   Problem Relation Age of Onset    Early Death Mother 52    Diabetes Father     Heart Disease Father     Vision Loss Son         \"He wears glasses\"    Early Death Daughter 36       SOCIAL HISTORY    Social History     Tobacco Use    Smoking status: Never Smoker    Smokeless tobacco: Never Used   Substance Use Topics    Alcohol use: No    Drug use: No       ALLERGIES    No Known Allergies    MEDICATIONS    Current Outpatient Medications on File Prior to Encounter   Medication Sig Dispense Refill    metoprolol succinate (TOPROL XL) 50 MG extended release tablet Take 1 tablet by mouth daily 30 tablet 2     No current facility-administered medications on file prior to encounter.         REVIEW OF SYSTEMS    Pertinent items are noted in HPI.  Constitutional: Negative for systemic symptoms including fever, chills and malaise. Objective:      BP (!) 149/78   Pulse 67   Temp 98 °F (36.7 °C) (Temporal)   Resp 16     PHYSICAL EXAM      General: The patient is in no acute distress. Mental status:  Patient is appropriate, is  oriented to place and plan of care. Dermatologic exam: Visual inspection of the periwound reveals the skin to be edematous  Wound exam: see wound description below in procedure note      Assessment:     Problem List Items Addressed This Visit     Chronic venous hypertension with ulcer and inflammation (HCC) - Primary    Relevant Medications    lidocaine (XYLOCAINE) 4 % external solution (Start on 3/10/2021  2:15 PM)    Other Relevant Orders    Supply: Wound Cleanser    Supply: Wound Dressings    Supply: Cover and Secure    Supply: Edema Control    Venous ulcer of left leg (HCC)    Relevant Medications    lidocaine (XYLOCAINE) 4 % external solution (Start on 3/10/2021  2:15 PM)    Other Relevant Orders    Supply: Wound Cleanser    Supply: Wound Dressings    Supply: Cover and Secure    Supply: Edema Control          Conditions above and those listed in the past history are being treated appropriately and are considered under adequate control so as not to preclude the use of a graft. SKIN SUBSTITUTES/GRAFT APPLICATIONS PROCEDURE NOTE    Indicaton:     Chronic ulcer(s) of the right lower leg  that has(have) failed to heal with the usual wound protocol used for greater than 30 days. See Epic chart for previous modalities and details of previous treatment. The patient has no contraindications or evidence of infection. The patient's competency and support system is appropriate for proper care and follow up for the use of this graft, therefore a graft was placed as below. Procedure: The wound bed was cleansed and prepared as necessary to accept the graft. Debridement was required.     Consent obtained: Yes    Time out taken: Yes    Using curette and forceps sharp Excisional Debridement of the wound(s) was/were performed down through and including the removal of dermis and subcutaneous tissue. Devitalized Tissue Debrided:  slough      Bleeding:  None    Hemostasis Achieved:  not needed    Procedural Pain:  0  / 10     Post Procedural Pain:  0 / 10     Having prepared the wound bed, the skin graft was completed as below.     Product Utilized:          [] APLIGRAF   []44 sq cm   []88 sq cm    []132 sq cm  []176 sq cm           [] DERMAGRAFT  [] 38 sq cm   []76 sq cm    []114 sq cm  []152 sq cm      [x] NUSHIELD  [] 1.6 sq/cm disc   [] (2X3) 6 sq/cm    [] (2X4) 8 sq/cm         [] (3X4) 12 sq/cm  [] (4x4) 16 sq/cm   [x] (4X6) 24 sq/cm         [] (6X6) 36 sq/cm        [] AFFINITY    [] (1.5 cm x 1.5cm) 2.25 cm   [] (2.5 cm x 2.5 cm) 6.25 cm         [] THERASKIN  [] 13 sq cm   []37.34 sq cm             [] EpiFix   [] 1.54 sq cm disc    [] 2 pieces (3.08 sq cm)    [] 3  pieces (4.62 sq  cm)   [] 6 sq/cm    [] 16 sq cm     [] 18 sq cm   Fenestrated        [] OASIS   [] 10.5 sq cm   []21 sq cm    []35 sq cm Tri-Matrix  []70 sq cm          Tri-Matrix                    [] PURAPLY   [] 1.6 sq cm disc     [] 4 sq cm   [] 8 sq cm   [] 25sq cm  [] 54 sq cm                  [] Grafix      [] 1.54 sq cm disc    [] 3 sq cm    [] 6 sq cm   [] 12 sq cm   [] 25 sq cm        Skin Substitute Applied:    Performed by: Alissa Shipley MD    Consent obtained: Yes    Time out taken: Yes     Fenestrated: Yes    Skin Substitute was Applied to Wound Number(s):     1      Wound 09/30/20 Wound #1 Right Medial Lower Leg Cluster (Onset 5 Years) (Active)   Wound Image   03/03/21 1322   Wound Etiology Venous 03/10/21 1334   Dressing Status New dressing applied 02/17/21 1400   Wound Cleansed Soap and water 03/10/21 1334   Dressing/Treatment Alginate with Ag 01/13/21 1657   Offloading for Diabetic Foot Ulcers No offloading required 03/10/21 1334   Wound Length (cm) 12 cm 03/10/21 1334   Wound Width (cm) 8.5 cm 03/10/21 1334   Wound Depth (cm) 0.1 cm 03/10/21 1334   Wound Surface Area (cm^2) 102 cm^2 03/10/21 1334   Change in Wound Size % (l*w) 59.52 03/10/21 1334   Wound Volume (cm^3) 10.2 cm^3 03/10/21 1334   Wound Healing % 60 03/10/21 1334   Post-Procedure Length (cm) 12.6 cm 02/24/21 1409   Post-Procedure Width (cm) 8.5 cm 02/24/21 1409   Post-Procedure Depth (cm) 0.1 cm 02/24/21 1409   Post-Procedure Surface Area (cm^2) 107.1 cm^2 02/24/21 1409   Post-Procedure Volume (cm^3) 10.71 cm^3 02/24/21 1409   Distance Tunneling (cm) 0 cm 03/10/21 1334   Tunneling Position ___ O'Clock 0 03/10/21 1334   Undermining Starts ___ O'Clock 0 03/10/21 1334   Undermining Ends___ O'Clock 0 03/10/21 1334   Undermining Maxium Distance (cm) 0 03/10/21 1334   Wound Assessment Granulation tissue;Slough 03/10/21 1334   Drainage Amount Large 03/10/21 1334   Drainage Description Serosanguinous;Brown 03/10/21 1334   Odor Moderate 03/10/21 1334   Cassia-wound Assessment Maceration;Dry/flaky 03/10/21 1334   Margins Attached edges; Defined edges 03/10/21 1334   Wound Thickness Description not for Pressure Injury Full thickness 03/10/21 1334   Number of days: 161         Total Surface Area Covered 24 sq/cm    Was the Product Layered  No      Amount Wasted 0 sq/cm    Reason for Waste: material provided was greater than wound size      Secured: Yes    Instruments used to complete placement of graft::forceps    Secured With:   [] N/A  []Steri Strips    []Sutures     []Staples [x]Other    Procedural Pain: 0/10     Post Procedural Pain: 0 / 10    Response to Treatment:  Well tolerated by patient. Status of wound progress and description from last visit:   Improved. Plan:     Discharge instructions:  Discharge Instructions          PHYSICIAN ORDERS AND DISCHARGE INSTRUCTIONS     NOTE: Upon discharge from the [address], you will receive a patient experience survey. We would be grateful if you would take the time to fill this survey out.     Wound care order history:                 Vascular studies: Arterial studies done 9/27/20; no stenosis              Imaging:   Date               Cultures:   obtained on 11/18/2020                                 Obtained on 1/20/2021               Labs/ HbA1c:   Date               Grafts:                       #1 Apligraf on 12/16/2020                     #2 Apligraf on 12/23/2020                     #3 Apligraf on 12/30/2020                     #4 Apligraf on 1/6/2021                     # 5 Apligraf on 1/13/21                     #1 Nushield 1/27/2021                     #2 Nushield 2/3/2021                     #3 Nushield 2/10/2021                     #4 Nushield Right Medial Lower Leg 2/17/21                     #5 Nushield 3/10/2021                   HBO:                Antibiotics:  Bactrim DS BID for 10 days on 11/18/2020                                   Cipro BID for 10 days ordered on 11/25/2020                                    Cipro 500 mg BID on 1/20/2021              Earlier Wound care treatments:                Authorizations:                        Consults:   Date                           Primary care physician:      Continuing wound care orders and information:              Residence:  private               [facility] care with: [facility]               Your wound-care supplies will be provided by: Cee Bingham provider:              MISTY with  Lugene Lute loading:  Date               RRWPG Medications:              TBWMW cleansing:                           NF not scrub or use excessive force.                          Wash hands with soap and water before and after dressing changes.                           Prior to applying a clean dressing, cleanse wound with normal saline,                                wound cleanser, or mild soap and water.                           Ask the physician or nurse before getting the wound(s) wet in a shower              Daily Wound management:                          YJKX weight off wounds and reposition every 2 hours.                          KFHLZ standing for long periods of time.                          NBBUH wraps/stockings in AM and remove at bedtime.                          If swelling is present, elevate legs to the level of the heart or above for 30                              minutes 4-5 times a day and/or when sitting.                                             When taking antibiotics take entire prescription as ordered by physician                             FV not stop taking until medicine is all gone.                                           Orders for this week: 3/10/21     Right lower leg -- wash with soap and water,pat dry. Apply Desitin or Calmazine to sarah wound. #5 Nushield to area and covered with mepitel(leave in place until next visit) , Cover with Silver calcium alginate , Kerramax or ABD and wrap with coban 2       Homecare to change on Friday and Monday (may use profore to wrap) ---      Referral to Dr Emeka Singh on 2/24/2021                         Follow up with Dr Melanie Cuello in 1 week in the wound care center  Call [phone] for any questions or concerns.   Date__________   Time             Treatment Note      Written Patient Dismissal Instructions Given            Electronically signed by Daria Hou MD on 3/10/2021 at 2:13 PM

## 2021-03-10 NOTE — PROGRESS NOTES
Multilayer Compression Wrap   (Not Unna) Below the Knee    NAME:  Jair Obregon  YOB: 1945  MEDICAL RECORD NUMBER:  [id number]  DATE:  3/10/2021    Multilayer compression wrap: Removed old Multilayer wrap if indicated and wash leg with mild soap/water. Applied moisturizing agent to dry skin as needed. Applied primary and secondary dressing as ordered. Applied multilayered dressing below the knee to right lower leg. Instructed patient/caregiver not to remove dressing and to keep it clean and dry. Instructed patient/caregiver on complications to report to provider, such as pain, numbness in toes, heavy drainage, and slippage of dressing. Instructed patient on purpose of compression dressing and on activity and exercise recommendations.       Electronically signed by Radha Ghosh LPN on 7/71/5741 at 8:70 PM

## 2021-03-17 ENCOUNTER — HOSPITAL ENCOUNTER (OUTPATIENT)
Dept: WOUND CARE | Age: 76
Discharge: HOME | End: 2021-03-17
Payer: COMMERCIAL

## 2021-03-17 VITALS
HEART RATE: 68 BPM | SYSTOLIC BLOOD PRESSURE: 156 MMHG | RESPIRATION RATE: 16 BRPM | TEMPERATURE: 97.7 F | DIASTOLIC BLOOD PRESSURE: 74 MMHG

## 2021-03-17 DIAGNOSIS — L97.919 CHRONIC VENOUS HYPERTENSION WITH ULCER AND INFLAMMATION INVOLVING RIGHT SIDE (HCC): Primary | ICD-10-CM

## 2021-03-17 DIAGNOSIS — L97.929 VENOUS ULCER OF LEFT LEG (HCC): ICD-10-CM

## 2021-03-17 DIAGNOSIS — I83.029 VENOUS ULCER OF LEFT LEG (HCC): ICD-10-CM

## 2021-03-17 DIAGNOSIS — I87.331 CHRONIC VENOUS HYPERTENSION WITH ULCER AND INFLAMMATION INVOLVING RIGHT SIDE (HCC): Primary | ICD-10-CM

## 2021-03-17 PROCEDURE — 11042 DBRDMT SUBQ TIS 1ST 20SQCM/<: CPT | Performed by: SURGERY

## 2021-03-17 PROCEDURE — 11042 DBRDMT SUBQ TIS 1ST 20SQCM/<: CPT

## 2021-03-17 RX ORDER — LIDOCAINE HYDROCHLORIDE 40 MG/ML
SOLUTION TOPICAL ONCE
Status: CANCELLED | OUTPATIENT
Start: 2021-03-17 | End: 2021-03-17

## 2021-03-17 RX ORDER — BETAMETHASONE DIPROPIONATE 0.05 %
OINTMENT (GRAM) TOPICAL ONCE
Status: CANCELLED | OUTPATIENT
Start: 2021-03-17 | End: 2021-03-17

## 2021-03-17 RX ORDER — CLOBETASOL PROPIONATE 0.5 MG/G
OINTMENT TOPICAL ONCE
Status: CANCELLED | OUTPATIENT
Start: 2021-03-17 | End: 2021-03-17

## 2021-03-17 RX ORDER — LIDOCAINE HYDROCHLORIDE 20 MG/ML
JELLY TOPICAL ONCE
Status: CANCELLED | OUTPATIENT
Start: 2021-03-17 | End: 2021-03-17

## 2021-03-17 RX ORDER — GINSENG 100 MG
CAPSULE ORAL ONCE
Status: CANCELLED | OUTPATIENT
Start: 2021-03-17 | End: 2021-03-17

## 2021-03-17 RX ORDER — LIDOCAINE 50 MG/G
OINTMENT TOPICAL ONCE
Status: CANCELLED | OUTPATIENT
Start: 2021-03-17 | End: 2021-03-17

## 2021-03-17 RX ORDER — GENTAMICIN SULFATE 1 MG/G
OINTMENT TOPICAL ONCE
Status: CANCELLED | OUTPATIENT
Start: 2021-03-17 | End: 2021-03-17

## 2021-03-17 RX ORDER — BACITRACIN, NEOMYCIN, POLYMYXIN B 400; 3.5; 5 [USP'U]/G; MG/G; [USP'U]/G
OINTMENT TOPICAL ONCE
Status: CANCELLED | OUTPATIENT
Start: 2021-03-17 | End: 2021-03-17

## 2021-03-17 RX ORDER — BACITRACIN ZINC AND POLYMYXIN B SULFATE 500; 1000 [USP'U]/G; [USP'U]/G
OINTMENT TOPICAL ONCE
Status: CANCELLED | OUTPATIENT
Start: 2021-03-17 | End: 2021-03-17

## 2021-03-17 RX ORDER — LIDOCAINE 40 MG/G
CREAM TOPICAL ONCE
Status: CANCELLED | OUTPATIENT
Start: 2021-03-17 | End: 2021-03-17

## 2021-03-17 NOTE — PROGRESS NOTES
Wound Care Center Progress Note with Procedure Note      Shanta Carrizales  AGE: 77 y.o. GENDER: male  : 1945  EPISODE DATE:  3/17/2021     Subjective:     Chief Complaint   Patient presents with    Wound Check     BLE          HISTORY of PRESENT ILLNESS      Shanta Carrizales is a 77 y.o. male who presents today for wound evaluation of Chronic venous wound(s) of R lower leg medial.  The wound is of moderate severity. The underlying cause of the wound is venous disease and trauma. Wound Pain Timing/Severity: none  Quality of pain: N/A  Severity of pain:  0 / 10   Modifying Factors: venous stasis  Associated Signs/Symptoms: drainage        PAST MEDICAL HISTORY        Diagnosis Date    Chronic venous hypertension with ulcer and inflammation involving right side (HCC)     RLE    Hypertension     past    Iron deficiency     Lymphoma Dx 11-    Hx of B cell lymphoma-in remission since        PAST SURGICAL HISTORY    Past Surgical History:   Procedure Laterality Date    TUNNELED VENOUS PORT PLACEMENT      TUNNELED VENOUS PORT PLACEMENT      removed 2012       FAMILY HISTORY    Family History   Problem Relation Age of Onset    Early Death Mother 52    Diabetes Father     Heart Disease Father     Vision Loss Son         \"He wears glasses\"    Early Death Daughter 36       SOCIAL HISTORY    Social History     Tobacco Use    Smoking status: Never Smoker    Smokeless tobacco: Never Used   Substance Use Topics    Alcohol use: No    Drug use: No       ALLERGIES    No Known Allergies    MEDICATIONS    Current Outpatient Medications on File Prior to Encounter   Medication Sig Dispense Refill    metoprolol succinate (TOPROL XL) 50 MG extended release tablet Take 1 tablet by mouth daily 30 tablet 2     No current facility-administered medications on file prior to encounter.         REVIEW OF SYSTEMS      Constitutional: Negative for systemic symptoms including fever, chills and malaise. Objective:      BP (!) 156/74   Pulse 68   Temp 97.7 °F (36.5 °C) (Temporal)   Resp 16     PHYSICAL EXAM      General: The patient is in no acute distress. Mental status:  Patient is appropriate, is  oriented to place and plan of care. Dermatologic exam: Visual inspection of the periwound reveals the skin to be edematous  Wound exam: see wound description below in procedure note      Assessment:     Problem List Items Addressed This Visit     Chronic venous hypertension with ulcer and inflammation (HCC) - Primary    Relevant Orders    Supply: Wound Cleanser    Supply: Wound Dressings    Supply: Cover and Secure    Venous ulcer of left leg (HCC)    Relevant Orders    Supply: Wound Cleanser    Supply: Wound Dressings    Supply: Cover and Secure        Procedure Note    Indications:  Based on my examination of this patient's wound(s) today, sharp excision into necrotic subcutaneous tissue is required to promote healing and evaluate the extent of previous healing. Performed by: Lisandra Ibarra MD    Consent obtained: Yes    Time out taken:  Yes    Pain Control: topical Anesthetic  Anesthetic: 5% Lidocaine Ointment Topical     Debridement:Excisional Debridement    Using curette the wound(s) was/were sharply debrided down through and including the removal of subcutaneous tissue.         Devitalized Tissue Debrided:  slough    Pre Debridement Measurements:  Are located in the Wound Documentation Flow Sheet    All active wounds listed below with today's date are evaluated  Wound(s)    debrided this date include # : 1     Post  Debridement Measurements:  Wound 09/30/20 Wound #1 Right Medial Lower Leg Cluster (Onset 5 Years) (Active)   Wound Image   03/03/21 1322   Wound Etiology Venous 03/10/21 1334   Dressing Status New dressing applied 02/17/21 1400   Wound Cleansed Soap and water 03/17/21 1317   Dressing/Treatment Alginate with Ag 01/13/21 1657   Offloading for Diabetic Foot Ulcers No offloading required 03/17/21 1317   Wound Length (cm) 13 cm 03/17/21 1317   Wound Width (cm) 8 cm 03/17/21 1317   Wound Depth (cm) 0.1 cm 03/17/21 1317   Wound Surface Area (cm^2) 104 cm^2 03/17/21 1317   Change in Wound Size % (l*w) 58.73 03/17/21 1317   Wound Volume (cm^3) 10.4 cm^3 03/17/21 1317   Wound Healing % 59 03/17/21 1317   Post-Procedure Length (cm) 12.6 cm 02/24/21 1409   Post-Procedure Width (cm) 8.5 cm 02/24/21 1409   Post-Procedure Depth (cm) 0.1 cm 02/24/21 1409   Post-Procedure Surface Area (cm^2) 107.1 cm^2 02/24/21 1409   Post-Procedure Volume (cm^3) 10.71 cm^3 02/24/21 1409   Distance Tunneling (cm) 0 cm 03/17/21 1317   Tunneling Position ___ O'Clock 0 03/17/21 1317   Undermining Starts ___ O'Clock 0 03/17/21 1317   Undermining Ends___ O'Clock 0 03/17/21 1317   Undermining Maxium Distance (cm) 0 03/17/21 1317   Wound Assessment Granulation tissue;Slough 03/17/21 1317   Drainage Amount Large 03/17/21 1317   Drainage Description Serosanguinous;Brown 03/17/21 1317   Odor Moderate 03/17/21 1317   Cassia-wound Assessment Maceration;Dry/flaky 03/17/21 1317   Margins Attached edges; Defined edges 03/17/21 1317   Wound Thickness Description not for Pressure Injury Full thickness 03/17/21 1317   Number of days: 168       Percent of Wound(s) Debrided: approximately 20%    Total Surface Area Debrided:  20 sq cm     Bleeding:  None    Hemostasis Achieved:  not needed    Procedural Pain:  0  / 10     Post Procedural Pain:  0 / 10     Response to treatment:  Well tolerated by patient. Status of wound progress and description from last visit:   Improved. Plan:       Discharge Instructions           PHYSICIAN ORDERS AND DISCHARGE INSTRUCTIONS     NOTE: Upon discharge from the [address], you will receive a patient experience survey.  We would be grateful if you would take the time to fill this survey out.     Wound care order history:                 Vascular studies: Arterial studies done 9/27/20; no stenosis              Imaging:   Date               Cultures:   obtained on 11/18/2020                                 Obtained on 1/20/2021               Labs/ HbA1c:   Date               Grafts:                       #1 Apligraf on 12/16/2020                     #2 Apligraf on 12/23/2020                     #3 Apligraf on 12/30/2020                     #4 Apligraf on 1/6/2021                     # 5 Apligraf on 1/13/21                     #1 Nushield 1/27/2021                     #2 Nushield 2/3/2021                     #3 Nushield 2/10/2021                     #4 Nushield Right Medial Lower Leg 2/17/21                     #5 Nushield 3/10/2021                   HBO:                Antibiotics:  Bactrim DS BID for 10 days on 11/18/2020                                   Cipro BID for 10 days ordered on 11/25/2020                                    Cipro 500 mg BID on 1/20/2021              Earlier Wound care treatments:                Authorizations:                        Consults:   Date                           Primary care physician:      Continuing wound care orders and information:              Residence:  private               [facility] home health care with: [facility]               Your wound-care supplies will be provided by: Rosa Maria Nunes provider:              ILIANA with  Katarina Jose loading:  Date               CISVF Medications:              ENVLA cleansing:                           QS not scrub or use excessive force.                          Wash hands with soap and water before and after dressing changes.                           Prior to applying a clean dressing, cleanse wound with normal saline,                                wound cleanser, or mild soap and water.                           Ask the physician or nurse before getting the wound(s) wet in a shower              Daily Wound management:                          Keep weight off wounds and reposition

## 2021-03-18 ENCOUNTER — HOSPITAL ENCOUNTER (OUTPATIENT)
Dept: WOUND CARE | Age: 76
Discharge: HOME | End: 2021-03-18

## 2021-03-24 ENCOUNTER — HOSPITAL ENCOUNTER (OUTPATIENT)
Dept: WOUND CARE | Age: 76
Discharge: HOME | End: 2021-03-24
Payer: COMMERCIAL

## 2021-03-24 VITALS
TEMPERATURE: 97.2 F | RESPIRATION RATE: 16 BRPM | DIASTOLIC BLOOD PRESSURE: 87 MMHG | SYSTOLIC BLOOD PRESSURE: 151 MMHG | HEART RATE: 81 BPM

## 2021-03-24 DIAGNOSIS — L97.919 CHRONIC VENOUS HYPERTENSION WITH ULCER AND INFLAMMATION INVOLVING RIGHT SIDE (HCC): Primary | ICD-10-CM

## 2021-03-24 DIAGNOSIS — I83.029 VENOUS ULCER OF LEFT LEG (HCC): ICD-10-CM

## 2021-03-24 DIAGNOSIS — L97.929 VENOUS ULCER OF LEFT LEG (HCC): ICD-10-CM

## 2021-03-24 DIAGNOSIS — I87.331 CHRONIC VENOUS HYPERTENSION WITH ULCER AND INFLAMMATION INVOLVING RIGHT SIDE (HCC): Primary | ICD-10-CM

## 2021-03-24 PROCEDURE — 11042 DBRDMT SUBQ TIS 1ST 20SQCM/<: CPT

## 2021-03-24 PROCEDURE — 11042 DBRDMT SUBQ TIS 1ST 20SQCM/<: CPT | Performed by: SURGERY

## 2021-03-24 RX ORDER — LIDOCAINE 50 MG/G
OINTMENT TOPICAL ONCE
Status: CANCELLED | OUTPATIENT
Start: 2021-03-24 | End: 2021-03-24

## 2021-03-24 RX ORDER — LIDOCAINE HYDROCHLORIDE 20 MG/ML
JELLY TOPICAL ONCE
Status: CANCELLED | OUTPATIENT
Start: 2021-03-24 | End: 2021-03-24

## 2021-03-24 RX ORDER — LIDOCAINE HYDROCHLORIDE 40 MG/ML
SOLUTION TOPICAL ONCE
Status: DISCONTINUED | OUTPATIENT
Start: 2021-03-24 | End: 2021-03-25 | Stop reason: HOSPADM

## 2021-03-24 RX ORDER — CLOBETASOL PROPIONATE 0.5 MG/G
OINTMENT TOPICAL ONCE
Status: CANCELLED | OUTPATIENT
Start: 2021-03-24 | End: 2021-03-24

## 2021-03-24 RX ORDER — LIDOCAINE HYDROCHLORIDE 40 MG/ML
SOLUTION TOPICAL ONCE
Status: CANCELLED | OUTPATIENT
Start: 2021-03-24 | End: 2021-03-24

## 2021-03-24 RX ORDER — GENTAMICIN SULFATE 1 MG/G
OINTMENT TOPICAL ONCE
Status: CANCELLED | OUTPATIENT
Start: 2021-03-24 | End: 2021-03-24

## 2021-03-24 RX ORDER — BACITRACIN ZINC AND POLYMYXIN B SULFATE 500; 1000 [USP'U]/G; [USP'U]/G
OINTMENT TOPICAL ONCE
Status: CANCELLED | OUTPATIENT
Start: 2021-03-24 | End: 2021-03-24

## 2021-03-24 RX ORDER — BETAMETHASONE DIPROPIONATE 0.05 %
OINTMENT (GRAM) TOPICAL ONCE
Status: CANCELLED | OUTPATIENT
Start: 2021-03-24 | End: 2021-03-24

## 2021-03-24 RX ORDER — BACITRACIN, NEOMYCIN, POLYMYXIN B 400; 3.5; 5 [USP'U]/G; MG/G; [USP'U]/G
OINTMENT TOPICAL ONCE
Status: CANCELLED | OUTPATIENT
Start: 2021-03-24 | End: 2021-03-24

## 2021-03-24 RX ORDER — LIDOCAINE 40 MG/G
CREAM TOPICAL ONCE
Status: CANCELLED | OUTPATIENT
Start: 2021-03-24 | End: 2021-03-24

## 2021-03-24 RX ORDER — GINSENG 100 MG
CAPSULE ORAL ONCE
Status: CANCELLED | OUTPATIENT
Start: 2021-03-24 | End: 2021-03-24

## 2021-03-24 NOTE — PROGRESS NOTES
Wound Care Center Progress Note with Procedure Note      Lizet Odell  AGE: 77 y.o. GENDER: male  : 1945  EPISODE DATE:  3/24/2021     Subjective:     Chief Complaint   Patient presents with    Wound Check         HISTORY of PRESENT ILLNESS      Lizet Odell is a 77 y.o. male who presents today for wound evaluation of Chronic venous and traumatic wound(s) of R lower leg medial.  The wound is of moderate severity. The underlying cause of the wound is venous disease and trauma. Wound Pain Timing/Severity: none  Quality of pain: N/A  Severity of pain:  0 / 10   Modifying Factors: venous stasis  Associated Signs/Symptoms: drainage        PAST MEDICAL HISTORY        Diagnosis Date    Chronic venous hypertension with ulcer and inflammation involving right side (HCC)     RLE    Hypertension     past    Iron deficiency     Lymphoma Dx 11-    Hx of B cell lymphoma-in remission since        PAST SURGICAL HISTORY    Past Surgical History:   Procedure Laterality Date    TUNNELED VENOUS PORT PLACEMENT      TUNNELED VENOUS PORT PLACEMENT      removed 2012       FAMILY HISTORY    Family History   Problem Relation Age of Onset    Early Death Mother 52    Diabetes Father     Heart Disease Father     Vision Loss Son         \"He wears glasses\"    Early Death Daughter 36       SOCIAL HISTORY    Social History     Tobacco Use    Smoking status: Never Smoker    Smokeless tobacco: Never Used   Substance Use Topics    Alcohol use: No    Drug use: No       ALLERGIES    No Known Allergies    MEDICATIONS    Current Outpatient Medications on File Prior to Encounter   Medication Sig Dispense Refill    metoprolol succinate (TOPROL XL) 50 MG extended release tablet Take 1 tablet by mouth daily 30 tablet 2     No current facility-administered medications on file prior to encounter.         REVIEW OF SYSTEMS    Pertinent items are noted in HPI.  Constitutional: Negative for systemic symptoms including fever, chills and malaise. Objective:      BP (!) 151/87   Pulse 81   Temp 97.2 °F (36.2 °C) (Temporal)   Resp 16     PHYSICAL EXAM      General: The patient is in no acute distress. Mental status:  Patient is appropriate, is  oriented to place and plan of care. Dermatologic exam: Visual inspection of the periwound reveals the skin to be edematous  Wound exam: see wound description below in procedure note      Assessment:     Problem List Items Addressed This Visit     Chronic venous hypertension with ulcer and inflammation (HCC) - Primary    Relevant Medications    lidocaine (XYLOCAINE) 4 % external solution (Start on 3/24/2021  1:45 PM)    Other Relevant Orders    Supply: Wound Cleanser    Supply: Wound Dressings    Supply: Cover and Secure    Supply: Edema Control    Venous ulcer of left leg (HCC)    Relevant Medications    lidocaine (XYLOCAINE) 4 % external solution (Start on 3/24/2021  1:45 PM)    Other Relevant Orders    Supply: Wound Cleanser    Supply: Wound Dressings    Supply: Cover and Secure    Supply: Edema Control        Procedure Note    Indications:  Based on my examination of this patient's wound(s) today, sharp excision into necrotic subcutaneous tissue is required to promote healing and evaluate the extent of previous healing. Performed by: Lisandra Ibarra MD    Consent obtained: Yes    Time out taken:  Yes    Pain Control: topical Anesthetic  Anesthetic: 4% Lidocaine Liquid Topical     Debridement:Excisional Debridement    Using curette and forceps the wound(s) was/were sharply debrided down through and including the removal of dermis and subcutaneous tissue.         Devitalized Tissue Debrided:  slough    Pre Debridement Measurements:  Are located in the Wound Documentation Flow Sheet    All active wounds listed below with today's date are evaluated  Wound(s)    debrided this date include # : 1     Post Debridement Measurements:  Wound 09/30/20 Wound #1 Right Medial Lower Leg Cluster (Onset 5 Years) (Active)   Wound Image   03/24/21 1315   Wound Etiology Venous 03/24/21 1315   Dressing Status New dressing applied 02/17/21 1400   Wound Cleansed Soap and water 03/24/21 1315   Dressing/Treatment Alginate with Ag 01/13/21 1657   Offloading for Diabetic Foot Ulcers No offloading required 03/24/21 1315   Wound Length (cm) 12.5 cm 03/24/21 1315   Wound Width (cm) 8 cm 03/24/21 1315   Wound Depth (cm) 0.1 cm 03/24/21 1315   Wound Surface Area (cm^2) 100 cm^2 03/24/21 1315   Change in Wound Size % (l*w) 60.32 03/24/21 1315   Wound Volume (cm^3) 10 cm^3 03/24/21 1315   Wound Healing % 60 03/24/21 1315   Post-Procedure Length (cm) 12.5 cm 03/24/21 1323   Post-Procedure Width (cm) 8 cm 03/24/21 1323   Post-Procedure Depth (cm) 0.1 cm 03/24/21 1323   Post-Procedure Surface Area (cm^2) 100 cm^2 03/24/21 1323   Post-Procedure Volume (cm^3) 10 cm^3 03/24/21 1323   Distance Tunneling (cm) 0 cm 03/24/21 1315   Tunneling Position ___ O'Clock 0 03/24/21 1315   Undermining Starts ___ O'Clock 0 03/24/21 1315   Undermining Ends___ O'Clock 0 03/24/21 1315   Undermining Maxium Distance (cm) 0 03/24/21 1315   Wound Assessment Granulation tissue;Slough 03/24/21 1315   Drainage Amount Large 03/24/21 1315   Drainage Description Serosanguinous;Brown 03/24/21 1315   Odor Moderate 03/24/21 1315   Cassia-wound Assessment Maceration;Dry/flaky 03/24/21 1315   Margins Attached edges; Defined edges 03/24/21 1315   Wound Thickness Description not for Pressure Injury Full thickness 03/24/21 1315   Number of days: 175       Percent of Wound(s) Debrided: approximately 10%    Total Surface Area Debrided:  10 sq cm     Bleeding:  None    Hemostasis Achieved:  not needed    Procedural Pain:  0  / 10     Post Procedural Pain:  0 / 10     Response to treatment:  Well tolerated by patient.      Status of wound progress and description from last visit: Improved. Plan:       Discharge Instructions          PHYSICIAN ORDERS AND DISCHARGE INSTRUCTIONS     NOTE: Upon discharge from the [address], you will receive a patient experience survey. We would be grateful if you would take the time to fill this survey out.     Wound care order history:                 Vascular studies: Arterial studies done 9/27/20; no stenosis              Imaging:   Date               Cultures:   obtained on 11/18/2020                                 Obtained on 1/20/2021               Labs/ HbA1c:   Date               Grafts:                       #1 Apligraf on 12/16/2020                     #2 Apligraf on 12/23/2020                     #3 Apligraf on 12/30/2020                     #4 Apligraf on 1/6/2021                     # 5 Apligraf on 1/13/21                     #1 [place] 1/27/2021                     #2 [place] 2/3/2021                     #3 [place] 2/10/2021                     #4 [place] Right Medial Lower Leg 2/17/21                     #5 [place] 3/10/2021                   HBO:                Antibiotics:  Bactrim DS BID for 10 days on 11/18/2020                                   Cipro BID for 10 days ordered on 11/25/2020                                    Cipro 500 mg BID on 1/20/2021              Earlier Wound care treatments:                Authorizations:                        Consults:   Date                           Primary care physician:      Continuing wound care orders and information:              Residence:  private               formerly [facility] home health care with: [facility]               Your wound-care supplies will be provided by:  Lucina Butler provider:              EDITH Ibarra loading:  Date               SZYUX Medications:              PYGFT cleansing:                           MR not scrub or use excessive force.                          Wash hands with soap and water before and after dressing changes.                         Prior to applying a clean dressing, cleanse wound with normal saline,                                wound cleanser, or mild soap and water.                           Ask the physician or nurse before getting the wound(s) wet in a shower              Daily Wound management:                          Keep weight off wounds and reposition every 2 hours.                          VBJQP standing for long periods of time.                          JHPWR wraps/stockings in AM and remove at bedtime.                          If swelling is present, elevate legs to the level of the heart or above for 30                              minutes 4-5 times a day and/or when sitting.                                             When taking antibiotics take entire prescription as ordered by physician                             MY not stop taking until medicine is all gone.                                           Orders for this week: 3/24/21     Right lower leg -- wash with soap and water,pat dry. Apply Desitin or Calmazine to sarah wound. Cover with Silver calcium alginate , Kerramax or ABD and wrap with conform and double tubi f       Homecare to change on  Monday (may use profore to wrap) ---      Referral to Dr Ijeoma Chahal on 2/24/2021, appt 3/18/2021     Nurse visit on Thursday Right lower leg -- wash with soap and water,pat dry. Apply Desitin or Calmazine to sarah wound. Cover with Silver calcium alginate , Kerramax or ABD and wrap with coban 2                      Follow up with Dr Kulwant Mendenhall in 1 week in the wound care center  Call [phone] for any questions or concerns.   Date__________   Time             Treatment Note      Written Patient Dismissal Instructions Given            Electronically signed by Janene Boland MD on 3/24/2021 at 1:34 PM

## 2021-03-31 ENCOUNTER — HOSPITAL ENCOUNTER (OUTPATIENT)
Dept: WOUND CARE | Age: 76
Discharge: HOME | End: 2021-03-31
Payer: COMMERCIAL

## 2021-03-31 VITALS
SYSTOLIC BLOOD PRESSURE: 159 MMHG | DIASTOLIC BLOOD PRESSURE: 86 MMHG | RESPIRATION RATE: 18 BRPM | TEMPERATURE: 97 F | HEART RATE: 84 BPM

## 2021-03-31 DIAGNOSIS — I87.331 CHRONIC VENOUS HYPERTENSION WITH ULCER AND INFLAMMATION INVOLVING RIGHT SIDE (HCC): Primary | ICD-10-CM

## 2021-03-31 DIAGNOSIS — I83.029 VENOUS ULCER OF LEFT LEG (HCC): ICD-10-CM

## 2021-03-31 DIAGNOSIS — L97.919 CHRONIC VENOUS HYPERTENSION WITH ULCER AND INFLAMMATION INVOLVING RIGHT SIDE (HCC): Primary | ICD-10-CM

## 2021-03-31 DIAGNOSIS — L97.929 VENOUS ULCER OF LEFT LEG (HCC): ICD-10-CM

## 2021-03-31 PROCEDURE — 11045 DBRDMT SUBQ TISS EACH ADDL: CPT

## 2021-03-31 PROCEDURE — 11042 DBRDMT SUBQ TIS 1ST 20SQCM/<: CPT

## 2021-03-31 PROCEDURE — 11045 DBRDMT SUBQ TISS EACH ADDL: CPT | Performed by: NURSE PRACTITIONER

## 2021-03-31 PROCEDURE — 11042 DBRDMT SUBQ TIS 1ST 20SQCM/<: CPT | Performed by: NURSE PRACTITIONER

## 2021-03-31 RX ORDER — CLOBETASOL PROPIONATE 0.5 MG/G
OINTMENT TOPICAL ONCE
Status: CANCELLED | OUTPATIENT
Start: 2021-03-31 | End: 2021-03-31

## 2021-03-31 RX ORDER — GINSENG 100 MG
CAPSULE ORAL ONCE
Status: CANCELLED | OUTPATIENT
Start: 2021-03-31 | End: 2021-03-31

## 2021-03-31 RX ORDER — GENTAMICIN SULFATE 1 MG/G
OINTMENT TOPICAL ONCE
Status: CANCELLED | OUTPATIENT
Start: 2021-03-31 | End: 2021-03-31

## 2021-03-31 RX ORDER — LIDOCAINE HYDROCHLORIDE 20 MG/ML
JELLY TOPICAL ONCE
Status: CANCELLED | OUTPATIENT
Start: 2021-03-31 | End: 2021-03-31

## 2021-03-31 RX ORDER — BACITRACIN, NEOMYCIN, POLYMYXIN B 400; 3.5; 5 [USP'U]/G; MG/G; [USP'U]/G
OINTMENT TOPICAL ONCE
Status: CANCELLED | OUTPATIENT
Start: 2021-03-31 | End: 2021-03-31

## 2021-03-31 RX ORDER — LIDOCAINE 40 MG/G
CREAM TOPICAL ONCE
Status: CANCELLED | OUTPATIENT
Start: 2021-03-31 | End: 2021-03-31

## 2021-03-31 RX ORDER — LIDOCAINE 50 MG/G
OINTMENT TOPICAL ONCE
Status: CANCELLED | OUTPATIENT
Start: 2021-03-31 | End: 2021-03-31

## 2021-03-31 RX ORDER — BACITRACIN ZINC AND POLYMYXIN B SULFATE 500; 1000 [USP'U]/G; [USP'U]/G
OINTMENT TOPICAL ONCE
Status: CANCELLED | OUTPATIENT
Start: 2021-03-31 | End: 2021-03-31

## 2021-03-31 RX ORDER — LIDOCAINE HYDROCHLORIDE 40 MG/ML
SOLUTION TOPICAL ONCE
Status: DISCONTINUED | OUTPATIENT
Start: 2021-03-31 | End: 2021-04-01 | Stop reason: HOSPADM

## 2021-03-31 RX ORDER — LIDOCAINE HYDROCHLORIDE 40 MG/ML
SOLUTION TOPICAL ONCE
Status: CANCELLED | OUTPATIENT
Start: 2021-03-31 | End: 2021-03-31

## 2021-03-31 RX ORDER — BETAMETHASONE DIPROPIONATE 0.05 %
OINTMENT (GRAM) TOPICAL ONCE
Status: CANCELLED | OUTPATIENT
Start: 2021-03-31 | End: 2021-03-31

## 2021-03-31 NOTE — PROGRESS NOTES
Wound Care Center Progress Note With Procedure    Shellie Baldwin  AGE: 77 y.o. GENDER: male  : 1945  EPISODE DATE:  3/31/2021     Subjective:     Chief Complaint   Patient presents with    Wound Check     RLE         HISTORY of PRESENT ILLNESS    Shellie Baldwin is a 77 y.o. male who presents today for wound evaluation of Chronic venous and traumatic wound(s) of right medial lower leg. The wound is of moderate severity. The underlying cause of the wound is venous disease and trauma. Wound Pain Timing/Severity: none  Quality of pain: N/A  Severity of pain:  0 / 10   Modifying Factors: venous stasis  Associated Signs/Symptoms: drainage        PAST MEDICAL HISTORY        Diagnosis Date    Chronic venous hypertension with ulcer and inflammation involving right side (HCC)     RLE    Hypertension     past    Iron deficiency     Lymphoma Dx 11-    Hx of B cell lymphoma-in remission since        PAST SURGICAL HISTORY    Past Surgical History:   Procedure Laterality Date    TUNNELED VENOUS PORT PLACEMENT      TUNNELED VENOUS PORT PLACEMENT      removed 2012       FAMILY HISTORY    Family History   Problem Relation Age of Onset    Early Death Mother 52    Diabetes Father     Heart Disease Father     Vision Loss Son         \"He wears glasses\"    Early Death Daughter 36       SOCIAL HISTORY    Social History     Tobacco Use    Smoking status: Never Smoker    Smokeless tobacco: Never Used   Substance Use Topics    Alcohol use: No    Drug use: No       ALLERGIES    No Known Allergies    MEDICATIONS    Current Outpatient Medications on File Prior to Encounter   Medication Sig Dispense Refill    metoprolol succinate (TOPROL XL) 50 MG extended release tablet Take 1 tablet by mouth daily 30 tablet 2     No current facility-administered medications on file prior to encounter. REVIEW OF SYSTEMS    Pertinent items are noted in HPI.     Constitutional: Negative for systemic symptoms including fever, chills and malaise. Objective:      BP (!) 159/86   Pulse 84   Temp 97 °F (36.1 °C) (Temporal)   Resp 18     PHYSICAL EXAM    General: The patient is in no acute distress. Mental status:  Patient is appropriate, is  oriented to place and plan of care. Dermatologic exam: Visual inspection of the periwound reveals the skin to be edematous  Wound exam: see wound description below in procedure note      Assessment:     Problem List Items Addressed This Visit     Chronic venous hypertension with ulcer and inflammation (HCC) - Primary    Relevant Medications    lidocaine (XYLOCAINE) 4 % external solution    Other Relevant Orders    Supply: Wound Cleanser    Supply: Wound Dressings    Supply: Cover and Secure    Supply: Edema Control    Venous ulcer of left leg (HCC)    Relevant Medications    lidocaine (XYLOCAINE) 4 % external solution    Other Relevant Orders    Supply: Wound Cleanser    Supply: Wound Dressings    Supply: Cover and Secure    Supply: Edema Control        Procedure Note    Indications:  Based on my examination of this patient's wound(s) today, sharp excision into necrotic subcutaneous tissue is required to promote healing and evaluate the extent of previous healing. Performed by: LOLY Patel CNP    Consent obtained: Yes    Time out taken:  Yes    Pain Control: Anesthetic  Anesthetic: 5% Lidocaine Ointment Topical     Debridement:Excisional Debridement    Using curette the wound(s) was/were sharply debrided down through and including the removal of subcutaneous tissue.         Devitalized Tissue Debrided:  slough and exudate    Pre Debridement Measurements:  Are located in the Wound Documentation Flow Sheet    All active wounds listed below with today's date are evaluated  Wound(s)    debrided this date include # : 1     Post  Debridement Measurements:  Wound 09/30/20 Wound #1 Right Medial Lower Leg Cluster (Onset 5 Years) (Active)   Wound Image   03/24/21 1315   Wound Etiology Venous 03/24/21 1315   Dressing Status New dressing applied 03/24/21 1339   Wound Cleansed Soap and water 03/31/21 1253   Dressing/Treatment Alginate with Ag 01/13/21 1657   Offloading for Diabetic Foot Ulcers No offloading required 03/24/21 1315   Wound Length (cm) 12.5 cm 03/31/21 1253   Wound Width (cm) 7.5 cm 03/31/21 1253   Wound Depth (cm) 0.1 cm 03/31/21 1253   Wound Surface Area (cm^2) 93.75 cm^2 03/31/21 1253   Change in Wound Size % (l*w) 62.8 03/31/21 1253   Wound Volume (cm^3) 9.38 cm^3 03/31/21 1253   Wound Healing % 63 03/31/21 1253   Post-Procedure Length (cm) 12.5 cm 03/31/21 1307   Post-Procedure Width (cm) 7.5 cm 03/31/21 1307   Post-Procedure Depth (cm) 0.1 cm 03/31/21 1307   Post-Procedure Surface Area (cm^2) 93.75 cm^2 03/31/21 1307   Post-Procedure Volume (cm^3) 9.38 cm^3 03/31/21 1307   Distance Tunneling (cm) 0 cm 03/31/21 1253   Tunneling Position ___ O'Clock 0 03/31/21 1253   Undermining Starts ___ O'Clock 0 03/31/21 1253   Undermining Ends___ O'Clock 0 03/31/21 1253   Undermining Maxium Distance (cm) 0 03/31/21 1253   Wound Assessment Granulation tissue;Slough 03/31/21 1253   Drainage Amount Large 03/31/21 1253   Drainage Description Serosanguinous;Brown 03/31/21 1253   Odor Moderate 03/31/21 1253   Cassia-wound Assessment Maceration;Dry/flaky 03/31/21 1253   Margins Attached edges; Defined edges 03/31/21 1253   Wound Thickness Description not for Pressure Injury Full thickness 03/31/21 1253   Number of days: 182       Percent of Wound(s) Debrided: approximately 100%    Total  Area  Debrided:  93.75 sq cm     Bleeding:  Minimal    Hemostasis Achieved:  by pressure    Procedural Pain:  0  / 10     Post Procedural Pain:  0 / 10     Response to treatment:  Well tolerated by patient. Status of wound progress and description from last visit: Improved, continue regimen.       Plan:       Discharge Instructions           PHYSICIAN ORDERS AND DISCHARGE INSTRUCTIONS     NOTE: Upon discharge from the [address] [facility],[address], you will receive a patient experience survey. We would be grateful if you would take the time to fill this survey out.     Wound care order history:                 Vascular studies: Arterial studies done 9/27/20; no stenosis              Imaging:   Date               Cultures:   obtained on 11/18/2020                                 Obtained on 1/20/2021               Labs/ HbA1c:   Date               Grafts:                       #1 Apligraf on 12/16/2020                     #2 Apligraf on 12/23/2020                     #3 Apligraf on 12/30/2020                     #4 Apligraf on 1/6/2021                     # 5 Apligraf on 1/13/21                     #1 [place] 1/27/2021                     #2 [place] 2/3/2021                     #3 [place] 2/10/2021                     #4 [place] Right Medial Lower Leg 2/17/21                     #5 [place] 3/10/2021                   HBO:                Antibiotics:  Bactrim DS BID for 10 days on 11/18/2020                                   Cipro BID for 10 days ordered on 11/25/2020                                    Cipro 500 mg BID on 1/20/2021              Earlier Wound care treatments:                Authorizations:                        Consults:   Date                           Primary care physician:      Continuing wound care orders and information:              Residence:  private               Continue home health care with: [facility]               Your wound-care supplies will be provided by: Aidan Mcfarland provider:              [id number] with  Ronald Laser loading:  Date               GSVPJ Medications:              XQGFH cleansing:                           US not scrub or use excessive force.                          Wash hands with soap and water before and after dressing changes.                           Prior to applying a clean dressing, cleanse wound with normal saline,                                PQJKF cleanser, or mild soap and water.                           Ask the physician or nurse before getting the wound(s) wet in a shower              Daily Wound management:                          AQNX weight off wounds and reposition every 2 hours.                          OMQKG standing for long periods of time.                          EFWIF wraps/stockings in AM and remove at bedtime.                          If swelling is present, elevate legs to the level of the heart or above for 30                              minutes 4-5 times a day and/or when sitting.                                             When taking antibiotics take entire prescription as ordered by physician                             NS not stop taking until medicine is all gone.                                           Orders for this week: 3/31/21     Right lower leg -- wash with soap and water,pat dry. Right lower leg -- wash with soap and water,pat dry. Apply Desitin or Calmazine to sarah wound. Cover with Silver calcium alginate , Kerramax or ABD and wrap with coban 2        Homecare to change on Friday and   Monday (may use profore to wrap)  ---      Referral to Dr Nicole Traylor on 2/24/2021, appt 3/18/2021                     Follow up with Dr Ileana Gilliland in 1 week in the wound care center  Call [phone] for any questions or concerns.   Date__________ Devon Smith              Treatment Note      Written Patient Dismissal Instructions Given            Electronically signed by LOLY Quintanilla CNP on 3/31/2021 at 1:20 PM

## 2021-03-31 NOTE — PROGRESS NOTES
Multilayer Compression Wrap   (Not Unna) Below the Knee    NAME:  Argenis Cruz  YOB: 1945  MEDICAL RECORD NUMBER:  [id number]  DATE:  3/31/2021    Multilayer compression wrap: Removed old Multilayer wrap if indicated and wash leg with mild soap/water. Applied moisturizing agent to dry skin as needed. Applied primary and secondary dressing as ordered. Applied multilayered dressing below the knee to right lower leg. Instructed patient/caregiver not to remove dressing and to keep it clean and dry. Instructed patient/caregiver on complications to report to provider, such as pain, numbness in toes, heavy drainage, and slippage of dressing. Instructed patient on purpose of compression dressing and on activity and exercise recommendations.       Electronically signed by Marta Hart LPN on 1/67/8494 at 5:30 PM

## 2021-04-07 ENCOUNTER — HOSPITAL ENCOUNTER (OUTPATIENT)
Dept: WOUND CARE | Age: 76
Discharge: HOME | End: 2021-04-07
Payer: COMMERCIAL

## 2021-04-07 VITALS
HEART RATE: 81 BPM | DIASTOLIC BLOOD PRESSURE: 85 MMHG | SYSTOLIC BLOOD PRESSURE: 157 MMHG | RESPIRATION RATE: 18 BRPM | TEMPERATURE: 98.7 F

## 2021-04-07 DIAGNOSIS — L97.929 VENOUS ULCER OF LEFT LEG (HCC): ICD-10-CM

## 2021-04-07 DIAGNOSIS — L97.919 CHRONIC VENOUS HYPERTENSION WITH ULCER AND INFLAMMATION INVOLVING RIGHT SIDE (HCC): Primary | ICD-10-CM

## 2021-04-07 DIAGNOSIS — I83.029 VENOUS ULCER OF LEFT LEG (HCC): ICD-10-CM

## 2021-04-07 DIAGNOSIS — I87.331 CHRONIC VENOUS HYPERTENSION WITH ULCER AND INFLAMMATION INVOLVING RIGHT SIDE (HCC): Primary | ICD-10-CM

## 2021-04-07 PROCEDURE — 11045 DBRDMT SUBQ TISS EACH ADDL: CPT

## 2021-04-07 PROCEDURE — 11045 DBRDMT SUBQ TISS EACH ADDL: CPT | Performed by: SURGERY

## 2021-04-07 PROCEDURE — 11042 DBRDMT SUBQ TIS 1ST 20SQCM/<: CPT | Performed by: SURGERY

## 2021-04-07 PROCEDURE — 11042 DBRDMT SUBQ TIS 1ST 20SQCM/<: CPT

## 2021-04-07 RX ORDER — BETAMETHASONE DIPROPIONATE 0.05 %
OINTMENT (GRAM) TOPICAL ONCE
Status: CANCELLED | OUTPATIENT
Start: 2021-04-07 | End: 2021-04-07

## 2021-04-07 RX ORDER — LIDOCAINE 50 MG/G
OINTMENT TOPICAL ONCE
Status: DISCONTINUED | OUTPATIENT
Start: 2021-04-07 | End: 2021-04-08 | Stop reason: HOSPADM

## 2021-04-07 RX ORDER — GINSENG 100 MG
CAPSULE ORAL ONCE
Status: CANCELLED | OUTPATIENT
Start: 2021-04-07 | End: 2021-04-07

## 2021-04-07 RX ORDER — GENTAMICIN SULFATE 1 MG/G
OINTMENT TOPICAL ONCE
Status: CANCELLED | OUTPATIENT
Start: 2021-04-07 | End: 2021-04-07

## 2021-04-07 RX ORDER — BACITRACIN ZINC AND POLYMYXIN B SULFATE 500; 1000 [USP'U]/G; [USP'U]/G
OINTMENT TOPICAL ONCE
Status: CANCELLED | OUTPATIENT
Start: 2021-04-07 | End: 2021-04-07

## 2021-04-07 RX ORDER — CLOBETASOL PROPIONATE 0.5 MG/G
OINTMENT TOPICAL ONCE
Status: CANCELLED | OUTPATIENT
Start: 2021-04-07 | End: 2021-04-07

## 2021-04-07 RX ORDER — LIDOCAINE HYDROCHLORIDE 40 MG/ML
SOLUTION TOPICAL ONCE
Status: CANCELLED | OUTPATIENT
Start: 2021-04-07 | End: 2021-04-07

## 2021-04-07 RX ORDER — LIDOCAINE HYDROCHLORIDE 20 MG/ML
JELLY TOPICAL ONCE
Status: CANCELLED | OUTPATIENT
Start: 2021-04-07 | End: 2021-04-07

## 2021-04-07 RX ORDER — BACITRACIN, NEOMYCIN, POLYMYXIN B 400; 3.5; 5 [USP'U]/G; MG/G; [USP'U]/G
OINTMENT TOPICAL ONCE
Status: CANCELLED | OUTPATIENT
Start: 2021-04-07 | End: 2021-04-07

## 2021-04-07 RX ORDER — CLOBETASOL PROPIONATE 0.5 MG/G
OINTMENT TOPICAL ONCE
Status: DISCONTINUED | OUTPATIENT
Start: 2021-04-07 | End: 2021-04-08 | Stop reason: HOSPADM

## 2021-04-07 RX ORDER — LIDOCAINE 40 MG/G
CREAM TOPICAL ONCE
Status: CANCELLED | OUTPATIENT
Start: 2021-04-07 | End: 2021-04-07

## 2021-04-07 RX ORDER — LIDOCAINE 50 MG/G
OINTMENT TOPICAL ONCE
Status: CANCELLED | OUTPATIENT
Start: 2021-04-07 | End: 2021-04-07

## 2021-04-07 ASSESSMENT — PAIN SCALES - GENERAL: PAINLEVEL_OUTOF10: 0

## 2021-04-07 NOTE — PROGRESS NOTES
Multilayer Compression Wrap   (Not Unna) Below the Knee    NAME:  Autumn Aguilar  YOB: 1945  MEDICAL RECORD NUMBER:  [id number]  DATE:  4/7/2021    Multilayer compression wrap: Removed old Multilayer wrap if indicated and wash leg with mild soap/water. Applied moisturizing agent to dry skin as needed. Applied primary and secondary dressing as ordered. Applied multilayered dressing below the knee to right lower leg. Instructed patient/caregiver not to remove dressing and to keep it clean and dry. Instructed patient/caregiver on complications to report to provider, such as pain, numbness in toes, heavy drainage, and slippage of dressing. Instructed patient on purpose of compression dressing and on activity and exercise recommendations.       Electronically signed by Yajaira Huston LPN on 1/1/9354 at 1:07 PM

## 2021-04-07 NOTE — PROGRESS NOTES
Wound Care Center Progress Note with Procedure Note      Robert Charles  AGE: 77 y.o. GENDER: male  : 1945  EPISODE DATE:  2021     Subjective:     Chief Complaint   Patient presents with    Wound Check     right leg          HISTORY of PRESENT ILLNESS      Robert Charles is a 77 y.o. male who presents today for wound evaluation of Chronic venous wound(s) of R lower leg medial.  The wound is of moderate severity. The underlying cause of the wound is venous disease and trauma. It is improving  Wound Pain Timing/Severity: none  Quality of pain: N/A  Severity of pain:  0 / 10   Modifying Factors: venous stasis  Associated Signs/Symptoms: drainage        PAST MEDICAL HISTORY        Diagnosis Date    Chronic venous hypertension with ulcer and inflammation involving right side (HCC)     RLE    Hypertension     past    Iron deficiency     Lymphoma Dx 11-    Hx of B cell lymphoma-in remission since        PAST SURGICAL HISTORY    Past Surgical History:   Procedure Laterality Date    TUNNELED VENOUS PORT PLACEMENT      TUNNELED VENOUS PORT PLACEMENT      removed 2012       FAMILY HISTORY    Family History   Problem Relation Age of Onset    Early Death Mother 52    Diabetes Father     Heart Disease Father     Vision Loss Son         \"He wears glasses\"    Early Death Daughter 36       SOCIAL HISTORY    Social History     Tobacco Use    Smoking status: Never Smoker    Smokeless tobacco: Never Used   Substance Use Topics    Alcohol use: No    Drug use: No       ALLERGIES    No Known Allergies    MEDICATIONS    Current Outpatient Medications on File Prior to Encounter   Medication Sig Dispense Refill    metoprolol succinate (TOPROL XL) 50 MG extended release tablet Take 1 tablet by mouth daily 30 tablet 2     No current facility-administered medications on file prior to encounter.         REVIEW OF SYSTEMS    Pertinent items are noted in debrided this date include # : 1     Post  Debridement Measurements:  Wound 09/30/20 Wound #1 Right Medial Lower Leg Cluster (Onset 5 Years) (Active)   Wound Image   03/24/21 1315   Wound Etiology Venous 04/07/21 1302   Dressing Status New dressing applied 03/31/21 1325   Wound Cleansed Soap and water 04/07/21 1302   Dressing/Treatment Alginate with Ag 01/13/21 1657   Offloading for Diabetic Foot Ulcers No offloading required 04/07/21 1302   Wound Length (cm) 12.5 cm 04/07/21 1302   Wound Width (cm) 6.5 cm 04/07/21 1302   Wound Depth (cm) 0.1 cm 04/07/21 1302   Wound Surface Area (cm^2) 81.25 cm^2 04/07/21 1302   Change in Wound Size % (l*w) 67.76 04/07/21 1302   Wound Volume (cm^3) 8.12 cm^3 04/07/21 1302   Wound Healing % 68 04/07/21 1302   Post-Procedure Length (cm) 12.5 cm 03/31/21 1307   Post-Procedure Width (cm) 7.5 cm 03/31/21 1307   Post-Procedure Depth (cm) 0.1 cm 03/31/21 1307   Post-Procedure Surface Area (cm^2) 93.75 cm^2 03/31/21 1307   Post-Procedure Volume (cm^3) 9.38 cm^3 03/31/21 1307   Distance Tunneling (cm) 0 cm 04/07/21 1302   Tunneling Position ___ O'Clock 0 04/07/21 1302   Undermining Starts ___ O'Clock 0 04/07/21 1302   Undermining Ends___ O'Clock 0 04/07/21 1302   Undermining Maxium Distance (cm) 0 04/07/21 1302   Wound Assessment Granulation tissue;Slough 04/07/21 1302   Drainage Amount Large 04/07/21 1302   Drainage Description Serosanguinous;Brown 04/07/21 1302   Odor Moderate 04/07/21 1302   Cassia-wound Assessment Maceration;Dry/flaky 04/07/21 1302   Margins Attached edges; Defined edges 04/07/21 1302   Wound Thickness Description not for Pressure Injury Full thickness 04/07/21 1302   Number of days: 189       Percent of Wound(s) Debrided: approximately 100%    Total Surface Area Debrided:  81 sq cm     Bleeding:  None    Hemostasis Achieved:  not needed    Procedural Pain:  0  / 10     Post Procedural Pain:  0 / 10     Response to treatment:  Well tolerated by patient.      Status of wound progress and description from last visit:   Imrpoved. Plan:       Discharge Instructions         PHYSICIAN ORDERS AND DISCHARGE INSTRUCTIONS     NOTE: Upon discharge from the [address], you will receive a patient experience survey. We would be grateful if you would take the time to fill this survey out.     Wound care order history:                 Vascular studies: Arterial studies done 9/27/20; no stenosis              Imaging:   Date               Cultures:   obtained on 11/18/2020                                 Obtained on 1/20/2021               Labs/ HbA1c:   Date               Grafts:                       #1 Apligraf on 12/16/2020                     #2 Apligraf on 12/23/2020                     #3 Apligraf on 12/30/2020                     #4 Apligraf on 1/6/2021                     # 5 Apligraf on 1/13/21                     #1 [place] 1/27/2021                     #2 [place] 2/3/2021                     #3 [place] 2/10/2021                     #4 [place] Right Medial Lower Leg 2/17/21                     #5 [place] 3/10/2021                   HBO:                Antibiotics:  Bactrim DS BID for 10 days on 11/18/2020                                   Cipro BID for 10 days ordered on 11/25/2020                                    Cipro 500 mg BID on 1/20/2021              Earlier Wound care treatments:                Authorizations:                        Consults:   Date                           Primary care physician:      Continuing wound care orders and information:              Residence:  private               Continue home health care with: [facility]               Your wound-care supplies will be provided by:  Hanna Herron provider:              Compression with  Rafaela Fearing loading:  Date               MNGES Medications:              KDEPV cleansing:                           KB not scrub or use excessive force.                          Wash hands with soap and water before and after dressing changes.                         Prior to applying a clean dressing, cleanse wound with normal saline,                                wound cleanser, or mild soap and water.                           Ask the physician or nurse before getting the wound(s) wet in a shower              Daily Wound management:                          Keep weight off wounds and reposition every 2 hours.                          PVEQK standing for long periods of time.                          KPUAG wraps/stockings in AM and remove at bedtime.                          If swelling is present, elevate legs to the level of the heart or above for 30                              minutes 4-5 times a day and/or when sitting.                                             When taking antibiotics take entire prescription as ordered by physician                             ZU not stop taking until medicine is all gone.                                           Orders for this week: 4/7/21     Right lower leg -- wash with soap and water,pat dry. Right lower leg -- wash with soap and water,pat dry. ( Apply clobetasol on proximal leg in clinic)Apply Desitin or Calmazine to sarah wound. Cover with Silver calcium alginate , Kerramax or ABD and wrap with coban 2        Homecare to change on Friday and   Monday (may use profore to wrap)  ---      Referral to Dr Cici Khan on 2/24/2021, appt 3/18/2021 then 4/9/2021                    Follow up with Dr Joel Wiggins in 1 week in the wound care center  Call [phone] for any questions or concerns.   Date__________   Time             Treatment Note      Written Patient Dismissal Instructions Given            Electronically signed by Sharon Quick MD on 4/7/2021 at 1:31 PM

## 2021-04-11 PROBLEM — I83.019 VENOUS STASIS ULCER OF RIGHT LOWER LEG WITH EDEMA OF RIGHT LOWER LEG (HCC): Status: ACTIVE | Noted: 2021-04-11

## 2021-04-11 PROBLEM — R60.9 VENOUS STASIS ULCER OF RIGHT LOWER LEG WITH EDEMA OF RIGHT LOWER LEG (HCC): Status: ACTIVE | Noted: 2021-04-11

## 2021-04-11 PROBLEM — I83.893 VARICOSE VEINS OF LOWER EXTREMITIES WITH COMPLICATIONS, BILATERAL: Status: ACTIVE | Noted: 2021-04-11

## 2021-04-11 PROBLEM — I83.891 VENOUS STASIS ULCER OF RIGHT LOWER LEG WITH EDEMA OF RIGHT LOWER LEG (HCC): Status: ACTIVE | Noted: 2021-04-11

## 2021-04-11 PROBLEM — R60.0 VENOUS STASIS ULCER OF RIGHT LOWER LEG WITH EDEMA OF RIGHT LOWER LEG (HCC): Status: ACTIVE | Noted: 2021-04-11

## 2021-04-11 PROBLEM — L97.919 VENOUS STASIS ULCER OF RIGHT LOWER LEG WITH EDEMA OF RIGHT LOWER LEG (HCC): Status: ACTIVE | Noted: 2021-04-11

## 2021-04-12 ENCOUNTER — TELEPHONE (OUTPATIENT)
Dept: INTERNAL MEDICINE CLINIC | Age: 76
End: 2021-04-12

## 2021-04-12 NOTE — TELEPHONE ENCOUNTER
[facility] calling to request Verbal Order to recert for wound care. Returned call. VO given via . Advised to return call, if any further need. No further action is needed, at this time.

## 2021-04-14 ENCOUNTER — HOSPITAL ENCOUNTER (OUTPATIENT)
Dept: WOUND CARE | Age: 76
Discharge: HOME | End: 2021-04-14
Payer: COMMERCIAL

## 2021-04-14 VITALS
RESPIRATION RATE: 18 BRPM | SYSTOLIC BLOOD PRESSURE: 158 MMHG | TEMPERATURE: 97.6 F | HEART RATE: 67 BPM | DIASTOLIC BLOOD PRESSURE: 91 MMHG

## 2021-04-14 DIAGNOSIS — R60.9 VENOUS STASIS ULCER OF RIGHT LOWER LEG WITH EDEMA OF RIGHT LOWER LEG (HCC): Primary | ICD-10-CM

## 2021-04-14 DIAGNOSIS — L97.929 VENOUS ULCER OF LEFT LEG (HCC): ICD-10-CM

## 2021-04-14 DIAGNOSIS — L97.919 CHRONIC VENOUS HYPERTENSION WITH ULCER AND INFLAMMATION INVOLVING RIGHT SIDE (HCC): ICD-10-CM

## 2021-04-14 DIAGNOSIS — I83.891 VENOUS STASIS ULCER OF RIGHT LOWER LEG WITH EDEMA OF RIGHT LOWER LEG (HCC): Primary | ICD-10-CM

## 2021-04-14 DIAGNOSIS — I87.331 CHRONIC VENOUS HYPERTENSION WITH ULCER AND INFLAMMATION INVOLVING RIGHT SIDE (HCC): ICD-10-CM

## 2021-04-14 DIAGNOSIS — I83.019 VENOUS STASIS ULCER OF RIGHT LOWER LEG WITH EDEMA OF RIGHT LOWER LEG (HCC): Primary | ICD-10-CM

## 2021-04-14 DIAGNOSIS — L97.919 VENOUS STASIS ULCER OF RIGHT LOWER LEG WITH EDEMA OF RIGHT LOWER LEG (HCC): Primary | ICD-10-CM

## 2021-04-14 DIAGNOSIS — I83.029 VENOUS ULCER OF LEFT LEG (HCC): ICD-10-CM

## 2021-04-14 DIAGNOSIS — I83.893 VARICOSE VEINS OF LOWER EXTREMITIES WITH COMPLICATIONS, BILATERAL: ICD-10-CM

## 2021-04-14 PROCEDURE — 11042 DBRDMT SUBQ TIS 1ST 20SQCM/<: CPT

## 2021-04-14 PROCEDURE — 11045 DBRDMT SUBQ TISS EACH ADDL: CPT

## 2021-04-14 PROCEDURE — 11045 DBRDMT SUBQ TISS EACH ADDL: CPT | Performed by: NURSE PRACTITIONER

## 2021-04-14 PROCEDURE — 11042 DBRDMT SUBQ TIS 1ST 20SQCM/<: CPT | Performed by: NURSE PRACTITIONER

## 2021-04-14 RX ORDER — BACITRACIN ZINC AND POLYMYXIN B SULFATE 500; 1000 [USP'U]/G; [USP'U]/G
OINTMENT TOPICAL ONCE
Status: CANCELLED | OUTPATIENT
Start: 2021-04-14 | End: 2021-04-14

## 2021-04-14 RX ORDER — LIDOCAINE 40 MG/G
CREAM TOPICAL ONCE
Status: CANCELLED | OUTPATIENT
Start: 2021-04-14 | End: 2021-04-14

## 2021-04-14 RX ORDER — GENTAMICIN SULFATE 1 MG/G
OINTMENT TOPICAL ONCE
Status: CANCELLED | OUTPATIENT
Start: 2021-04-14 | End: 2021-04-14

## 2021-04-14 RX ORDER — LIDOCAINE HYDROCHLORIDE 40 MG/ML
SOLUTION TOPICAL ONCE
Status: CANCELLED | OUTPATIENT
Start: 2021-04-14 | End: 2021-04-14

## 2021-04-14 RX ORDER — GINSENG 100 MG
CAPSULE ORAL ONCE
Status: CANCELLED | OUTPATIENT
Start: 2021-04-14 | End: 2021-04-14

## 2021-04-14 RX ORDER — LIDOCAINE HYDROCHLORIDE 40 MG/ML
SOLUTION TOPICAL ONCE
Status: DISCONTINUED | OUTPATIENT
Start: 2021-04-14 | End: 2021-04-15 | Stop reason: HOSPADM

## 2021-04-14 RX ORDER — LIDOCAINE HYDROCHLORIDE 20 MG/ML
JELLY TOPICAL ONCE
Status: CANCELLED | OUTPATIENT
Start: 2021-04-14 | End: 2021-04-14

## 2021-04-14 RX ORDER — BACITRACIN, NEOMYCIN, POLYMYXIN B 400; 3.5; 5 [USP'U]/G; MG/G; [USP'U]/G
OINTMENT TOPICAL ONCE
Status: CANCELLED | OUTPATIENT
Start: 2021-04-14 | End: 2021-04-14

## 2021-04-14 RX ORDER — BETAMETHASONE DIPROPIONATE 0.05 %
OINTMENT (GRAM) TOPICAL ONCE
Status: CANCELLED | OUTPATIENT
Start: 2021-04-14 | End: 2021-04-14

## 2021-04-14 RX ORDER — CLOBETASOL PROPIONATE 0.5 MG/G
OINTMENT TOPICAL ONCE
Status: CANCELLED | OUTPATIENT
Start: 2021-04-14 | End: 2021-04-14

## 2021-04-14 RX ORDER — LIDOCAINE 50 MG/G
OINTMENT TOPICAL ONCE
Status: CANCELLED | OUTPATIENT
Start: 2021-04-14 | End: 2021-04-14

## 2021-04-14 NOTE — PROGRESS NOTES
Wound Care Center Progress Note With Procedure    Terese Lopes  AGE: 77 y.o. GENDER: male  : 1945  EPISODE DATE:  2021     Subjective:     Chief Complaint   Patient presents with    Wound Check     RLE          HISTORY of PRESENT ILLNESS      Terese Lopes is a 77 y.o. male who presents today for wound evaluation of Chronic venous wound(s) of R lower leg medial.  The wound is of moderate severity. The underlying cause of the wound is venous disease and trauma. It is improving  Patient was seen by dr. April Santana and is getting venous interventions later this month. Wound Pain Timing/Severity: none  Quality of pain: N/A  Severity of pain:  0 / 10   Modifying Factors: venous stasis  Associated Signs/Symptoms: drainage        PAST MEDICAL HISTORY        Diagnosis Date    Chronic venous hypertension with ulcer and inflammation involving right side (HCC)     RLE    Hypertension     past    Iron deficiency     Lymphoma Dx 11-11    Hx of B cell lymphoma-in remission since        PAST SURGICAL HISTORY    Past Surgical History:   Procedure Laterality Date    TUNNELED VENOUS PORT PLACEMENT      TUNNELED VENOUS PORT PLACEMENT      removed 2012       FAMILY HISTORY    Family History   Problem Relation Age of Onset    Early Death Mother 52    Diabetes Father     Heart Disease Father     Vision Loss Son         \"He wears glasses\"    Early Death Daughter 36       SOCIAL HISTORY    Social History     Tobacco Use    Smoking status: Never Smoker    Smokeless tobacco: Never Used   Substance Use Topics    Alcohol use: No    Drug use: No       ALLERGIES    No Known Allergies    MEDICATIONS    Current Outpatient Medications on File Prior to Encounter   Medication Sig Dispense Refill    metoprolol succinate (TOPROL XL) 50 MG extended release tablet Take 1 tablet by mouth daily 30 tablet 2     No current facility-administered medications on file prior to encounter.         REVIEW OF SYSTEMS    Pertinent items are noted in HPI. Constitutional: Negative for systemic symptoms including fever, chills and malaise. Objective:      BP (!) 158/91   Pulse 67   Temp 97.6 °F (36.4 °C) (Temporal)   Resp 18     PHYSICAL EXAM      General: The patient is in no acute distress. Mental status:  Patient is appropriate, is  oriented to place and plan of care. Dermatologic exam: Visual inspection of the periwound reveals the skin to be dry, scaly and edematous  Wound exam: see wound description below in procedure note      Assessment:     Problem List Items Addressed This Visit     Chronic venous hypertension with ulcer and inflammation (HCC)    Relevant Medications    lidocaine (XYLOCAINE) 4 % external solution (Start on 4/14/2021  1:45 PM)    Other Relevant Orders    Supply: Wound Cleanser    Supply: Wound Dressings    Supply: Cover and Secure    Supply: Edema Control    Venous ulcer of left leg (HCC)    Relevant Medications    lidocaine (XYLOCAINE) 4 % external solution (Start on 4/14/2021  1:45 PM)    Other Relevant Orders    Supply: Wound Cleanser    Supply: Wound Dressings    Supply: Cover and Secure    Supply: Edema Control    Venous stasis ulcer of right lower leg with edema of right lower leg (HCC) - Primary    Varicose veins of lower extremities with complications, bilateral        Procedure Note    Indications:  Based on my examination of this patient's wound(s) today, sharp excision into necrotic subcutaneous tissue is required to promote healing and evaluate the extent of previous healing. Performed by: LOLY Avila CNP    Consent obtained: Yes    Time out taken:  Yes    Pain Control: Anesthetic  Anesthetic: 4% Lidocaine Liquid Topical     Debridement:Excisional Debridement    Using #15 blade scalpel the wound(s) was/were sharply debrided down through and including the removal of subcutaneous tissue.         Devitalized Tissue Debrided:  fibrin, biofilm and slough    Pre Debridement Measurements:  Are located in the Wound Documentation Flow Sheet    All active wounds listed below with today's date are evaluated  Wound(s)    debrided this date include # : 1     Post  Debridement Measurements:  Wound 07/29/13 Venous ulcer Ankle Right;Lateral (Active)   Number of days: 2816       Wound 09/30/20 Wound #1 Right Medial Lower Leg Cluster (Onset 5 Years) (Active)   Wound Image   04/14/21 1307   Wound Etiology Venous 04/07/21 1302   Dressing Status New dressing applied 04/07/21 1352   Wound Cleansed Soap and water;Cleansed with saline 04/14/21 1307   Dressing/Treatment Alginate with Ag 01/13/21 1657   Offloading for Diabetic Foot Ulcers No offloading required 04/14/21 1307   Wound Length (cm) 12.5 cm 04/14/21 1307   Wound Width (cm) 7.4 cm 04/14/21 1307   Wound Depth (cm) 0.1 cm 04/14/21 1307   Wound Surface Area (cm^2) 92.5 cm^2 04/14/21 1307   Change in Wound Size % (l*w) 63.29 04/14/21 1307   Wound Volume (cm^3) 9.25 cm^3 04/14/21 1307   Wound Healing % 63 04/14/21 1307   Post-Procedure Length (cm) 12.5 cm 04/07/21 1321   Post-Procedure Width (cm) 6.5 cm 04/07/21 1321   Post-Procedure Depth (cm) 0.1 cm 04/07/21 1321   Post-Procedure Surface Area (cm^2) 81.25 cm^2 04/07/21 1321   Post-Procedure Volume (cm^3) 8.12 cm^3 04/07/21 1321   Distance Tunneling (cm) 0 cm 04/14/21 1307   Tunneling Position ___ O'Clock 0 04/14/21 1307   Undermining Starts ___ O'Clock 0 04/14/21 1307   Undermining Ends___ O'Clock 0 04/14/21 1307   Undermining Maxium Distance (cm) 0 04/14/21 1307   Wound Assessment Granulation tissue;Slough 04/14/21 1307   Drainage Amount Large 04/14/21 1307   Drainage Description Serosanguinous;Brown 04/14/21 1307   Odor Moderate 04/14/21 1307   Cassia-wound Assessment Maceration;Dry/flaky 04/14/21 1307   Margins Defined edges; Attached edges 04/14/21 1307   Wound Thickness Description not for Pressure Injury Full thickness 04/14/21 1307   Number of days: 196       Percent of Wound(s) Debrided: approximately 100%    Total  Area  Debrided:  92.5 sq cm     Bleeding:  Minimal    Hemostasis Achieved:  by pressure    Procedural Pain:  0  / 10     Post Procedural Pain:  0 / 10     Response to treatment:  Well tolerated by patient. Status of wound progress and description from last visit:   Stable. Patient wound bed is clean. He is receiving procedure from dr. Ya Vidales later this month to fix veins in bilat lower extremities. May consider grafts when this is done. Continue plan of care for now       Plan:       Discharge Instructions       [address]     NOTE: Upon discharge from the [address], you will receive a patient experience survey.  We would be grateful if you would take the time to fill this survey out.     Wound care order history:                 Vascular studies: Arterial studies done 9/27/20; no stenosis              Imaging:   Date               Cultures:   obtained on 11/18/2020                                 Obtained on 1/20/2021               Labs/ HbA1c:   Date               Grafts:                       #1 Apligraf on 12/16/2020                     #2 Apligraf on 12/23/2020                     #3 Apligraf on 12/30/2020                     #4 Apligraf on 1/6/2021                     # 5 Apligraf on 1/13/21                     #1 [place] 1/27/2021                     #2 [place] 2/3/2021                     #3 [place] 2/10/2021                     #4 [place] Right Medial Lower Leg 2/17/21                     #5 [place] 3/10/2021                   HBO:                Antibiotics:  Bactrim DS BID for 10 days on 11/18/2020                                   Cipro BID for 10 days ordered on 11/25/2020                                    Cipro 500 mg BID on 1/20/2021              Earlier Wound care treatments:                Authorizations:                        Consults:   Date                           Primary care physician:      Continuing

## 2021-04-14 NOTE — PROGRESS NOTES
Multilayer Compression Wrap   (Not Unna) Below the Knee    NAME:  Nehal Sylvester  YOB: 1945  MEDICAL RECORD NUMBER:  [id number]  DATE:  4/14/2021    Multilayer compression wrap: Removed old Multilayer wrap if indicated and wash leg with mild soap/water. Applied moisturizing agent to dry skin as needed. Applied primary and secondary dressing as ordered. Applied multilayered dressing below the knee to right lower leg. Instructed patient/caregiver not to remove dressing and to keep it clean and dry. Instructed patient/caregiver on complications to report to provider, such as pain, numbness in toes, heavy drainage, and slippage of dressing. Instructed patient on purpose of compression dressing and on activity and exercise recommendations.       Electronically signed by William Mendoza LPN on 7/61/9133 at 2:36 PM

## 2021-04-19 ENCOUNTER — TELEPHONE (OUTPATIENT)
Dept: INTERNAL MEDICINE CLINIC | Age: 76
End: 2021-04-19

## 2021-04-19 NOTE — TELEPHONE ENCOUNTER
[facility] to continue/recert [facility]. Returned call. Spoke with Danay Cervantes at [address] and provided verbal order. She voiced understanding/thanks. No further action is needed, at this time.

## 2021-04-21 ENCOUNTER — HOSPITAL ENCOUNTER (OUTPATIENT)
Dept: WOUND CARE | Age: 76
Discharge: HOME | End: 2021-04-21
Payer: COMMERCIAL

## 2021-04-21 VITALS
TEMPERATURE: 97.9 F | RESPIRATION RATE: 20 BRPM | DIASTOLIC BLOOD PRESSURE: 89 MMHG | HEART RATE: 70 BPM | SYSTOLIC BLOOD PRESSURE: 150 MMHG

## 2021-04-21 DIAGNOSIS — L97.929 VENOUS ULCER OF LEFT LEG (HCC): ICD-10-CM

## 2021-04-21 DIAGNOSIS — L97.919 CHRONIC VENOUS HYPERTENSION WITH ULCER AND INFLAMMATION INVOLVING RIGHT SIDE (HCC): Primary | ICD-10-CM

## 2021-04-21 DIAGNOSIS — I83.029 VENOUS ULCER OF LEFT LEG (HCC): ICD-10-CM

## 2021-04-21 DIAGNOSIS — I87.331 CHRONIC VENOUS HYPERTENSION WITH ULCER AND INFLAMMATION INVOLVING RIGHT SIDE (HCC): Primary | ICD-10-CM

## 2021-04-21 PROCEDURE — 11042 DBRDMT SUBQ TIS 1ST 20SQCM/<: CPT

## 2021-04-21 PROCEDURE — 11045 DBRDMT SUBQ TISS EACH ADDL: CPT

## 2021-04-21 PROCEDURE — 11045 DBRDMT SUBQ TISS EACH ADDL: CPT | Performed by: SURGERY

## 2021-04-21 PROCEDURE — 11043 DBRDMT MUSC&/FSCA 1ST 20/<: CPT

## 2021-04-21 PROCEDURE — 11042 DBRDMT SUBQ TIS 1ST 20SQCM/<: CPT | Performed by: SURGERY

## 2021-04-21 RX ORDER — LIDOCAINE HYDROCHLORIDE 20 MG/ML
JELLY TOPICAL ONCE
Status: CANCELLED | OUTPATIENT
Start: 2021-04-21 | End: 2021-04-21

## 2021-04-21 RX ORDER — BETAMETHASONE DIPROPIONATE 0.05 %
OINTMENT (GRAM) TOPICAL ONCE
Status: CANCELLED | OUTPATIENT
Start: 2021-04-21 | End: 2021-04-21

## 2021-04-21 RX ORDER — GENTAMICIN SULFATE 1 MG/G
OINTMENT TOPICAL ONCE
Status: CANCELLED | OUTPATIENT
Start: 2021-04-21 | End: 2021-04-21

## 2021-04-21 RX ORDER — BACITRACIN, NEOMYCIN, POLYMYXIN B 400; 3.5; 5 [USP'U]/G; MG/G; [USP'U]/G
OINTMENT TOPICAL ONCE
Status: CANCELLED | OUTPATIENT
Start: 2021-04-21 | End: 2021-04-21

## 2021-04-21 RX ORDER — LIDOCAINE 40 MG/G
CREAM TOPICAL ONCE
Status: CANCELLED | OUTPATIENT
Start: 2021-04-21 | End: 2021-04-21

## 2021-04-21 RX ORDER — CLOBETASOL PROPIONATE 0.5 MG/G
OINTMENT TOPICAL ONCE
Status: CANCELLED | OUTPATIENT
Start: 2021-04-21 | End: 2021-04-21

## 2021-04-21 RX ORDER — GINSENG 100 MG
CAPSULE ORAL ONCE
Status: CANCELLED | OUTPATIENT
Start: 2021-04-21 | End: 2021-04-21

## 2021-04-21 RX ORDER — LIDOCAINE 50 MG/G
OINTMENT TOPICAL ONCE
Status: CANCELLED | OUTPATIENT
Start: 2021-04-21 | End: 2021-04-21

## 2021-04-21 RX ORDER — LIDOCAINE HYDROCHLORIDE 40 MG/ML
SOLUTION TOPICAL ONCE
Status: CANCELLED | OUTPATIENT
Start: 2021-04-21 | End: 2021-04-21

## 2021-04-21 RX ORDER — LIDOCAINE HYDROCHLORIDE 40 MG/ML
SOLUTION TOPICAL ONCE
Status: DISCONTINUED | OUTPATIENT
Start: 2021-04-21 | End: 2021-04-22 | Stop reason: HOSPADM

## 2021-04-21 RX ORDER — BACITRACIN ZINC AND POLYMYXIN B SULFATE 500; 1000 [USP'U]/G; [USP'U]/G
OINTMENT TOPICAL ONCE
Status: CANCELLED | OUTPATIENT
Start: 2021-04-21 | End: 2021-04-21

## 2021-04-21 NOTE — PROGRESS NOTES
Multilayer Compression Wrap   (Not Unna) Below the Knee    NAME:  Janet Vance  YOB: 1945  MEDICAL RECORD NUMBER:  [id number]  DATE:  4/21/2021    Multilayer compression wrap: Removed old Multilayer wrap if indicated and wash leg with mild soap/water. Applied moisturizing agent to dry skin as needed. Applied primary and secondary dressing as ordered. Applied multilayered dressing below the knee to right lower leg. Instructed patient/caregiver not to remove dressing and to keep it clean and dry. Instructed patient/caregiver on complications to report to provider, such as pain, numbness in toes, heavy drainage, and slippage of dressing. Instructed patient on purpose of compression dressing and on activity and exercise recommendations.       Electronically signed by Jose D March RN on 4/21/2021 at 2:23 PM

## 2021-04-21 NOTE — PROGRESS NOTES
Wound Care Center Progress Note with Procedure Note      Dorita Douglass  AGE: 77 y.o. GENDER: male  : 1945  EPISODE DATE:  2021     Subjective:     Chief Complaint   Patient presents with    Wound Check     rt leg         HISTORY of PRESENT ILLNESS      Dorita Douglass is a 77 y.o. male who presents today for wound evaluation of Chronic venous wound(s) of R lower leg medial.  The wound is of moderate severity. The underlying cause of the wound is trauma and venous disease. He is having an ablation next week. Wound Pain Timing/Severity: none  Quality of pain: N/A  Severity of pain:  0 / 10   Modifying Factors: edema and venous stasis  Associated Signs/Symptoms: drainage        PAST MEDICAL HISTORY        Diagnosis Date    Chronic venous hypertension with ulcer and inflammation involving right side (HCC)     RLE    Hypertension     past    Iron deficiency     Lymphoma Dx 11-    Hx of B cell lymphoma-in remission since        PAST SURGICAL HISTORY    Past Surgical History:   Procedure Laterality Date    TUNNELED VENOUS PORT PLACEMENT      TUNNELED VENOUS PORT PLACEMENT      removed 2012       FAMILY HISTORY    Family History   Problem Relation Age of Onset    Early Death Mother 52    Diabetes Father     Heart Disease Father     Vision Loss Son         \"He wears glasses\"    Early Death Daughter 36       SOCIAL HISTORY    Social History     Tobacco Use    Smoking status: Never Smoker    Smokeless tobacco: Never Used   Substance Use Topics    Alcohol use: No    Drug use: No       ALLERGIES    No Known Allergies    MEDICATIONS    Current Outpatient Medications on File Prior to Encounter   Medication Sig Dispense Refill    metoprolol succinate (TOPROL XL) 50 MG extended release tablet Take 1 tablet by mouth daily 30 tablet 2     No current facility-administered medications on file prior to encounter.         REVIEW OF SYSTEMS    Pertinent items are noted in HPI. Constitutional: Negative for systemic symptoms including fever, chills and malaise. Objective:      BP (!) 150/89   Pulse 70   Temp 97.9 °F (36.6 °C) (Temporal)   Resp 20     PHYSICAL EXAM      General: The patient is in no acute distress. Mental status:  Patient is appropriate, is  oriented to place and plan of care. Dermatologic exam: Visual inspection of the periwound reveals the skin to be edematous  Wound exam: see wound description below in procedure note      Assessment:     Problem List Items Addressed This Visit     Chronic venous hypertension with ulcer and inflammation (HCC) - Primary    Relevant Medications    lidocaine (XYLOCAINE) 4 % external solution    Other Relevant Orders    Supply: Wound Cleanser    Supply: Wound Dressings    Supply: Cover and Secure    Venous ulcer of left leg (HCC)    Relevant Medications    lidocaine (XYLOCAINE) 4 % external solution    Other Relevant Orders    Supply: Wound Cleanser    Supply: Wound Dressings    Supply: Cover and Secure        Procedure Note    Indications:  Based on my examination of this patient's wound(s) today, sharp excision into necrotic subcutaneous tissue is required to promote healing and evaluate the extent of previous healing. Performed by: Sukh Arguelles MD    Consent obtained: Yes    Time out taken:  Yes    Pain Control: topical Anesthetic  Anesthetic: 4% Lidocaine Liquid Topical     Debridement:Excisional Debridement    Using curette the wound(s) was/were sharply debrided down through and including the removal of subcutaneous tissue.         Devitalized Tissue Debrided:  slough    Pre Debridement Measurements:  Are located in the Wound Documentation Flow Sheet    All active wounds listed below with today's date are evaluated  Wound(s)    debrided this date include # : 1     Post  Debridement Measurements:  Wound 09/30/20 Wound #1 Right Medial Lower Leg Cluster (Onset 5 Years) (Active)   Wound Image   04/14/21 1307   Wound Etiology Venous 04/07/21 1302   Dressing Status New dressing applied 04/14/21 1349   Wound Cleansed Soap and water 04/21/21 1259   Dressing/Treatment Alginate with Ag 01/13/21 1657   Offloading for Diabetic Foot Ulcers No offloading required 04/21/21 1259   Wound Length (cm) 12 cm 04/21/21 1259   Wound Width (cm) 6 cm 04/21/21 1259   Wound Depth (cm) 0.1 cm 04/21/21 1259   Wound Surface Area (cm^2) 72 cm^2 04/21/21 1259   Change in Wound Size % (l*w) 71.43 04/21/21 1259   Wound Volume (cm^3) 7.2 cm^3 04/21/21 1259   Wound Healing % 71 04/21/21 1259   Post-Procedure Length (cm) 12 cm 04/21/21 1320   Post-Procedure Width (cm) 6 cm 04/21/21 1320   Post-Procedure Depth (cm) 0.1 cm 04/21/21 1320   Post-Procedure Surface Area (cm^2) 72 cm^2 04/21/21 1320   Post-Procedure Volume (cm^3) 7.2 cm^3 04/21/21 1320   Distance Tunneling (cm) 0 cm 04/21/21 1259   Tunneling Position ___ O'Clock 0 04/21/21 1259   Undermining Starts ___ O'Clock 0 04/21/21 1259   Undermining Ends___ O'Clock 0 04/21/21 1259   Undermining Maxium Distance (cm) 0 04/21/21 1259   Wound Assessment Granulation tissue;Slough 04/14/21 1307   Drainage Amount Moderate 04/21/21 1259   Drainage Description Serosanguinous 04/21/21 1259   Odor Moderate 04/21/21 1259   Cassia-wound Assessment Intact 04/21/21 1259   Margins Defined edges 04/21/21 1259   Wound Thickness Description not for Pressure Injury Full thickness 04/21/21 1259   Number of days: 203       Percent of Wound(s) Debrided: approximately 50%    Total Surface Area Debrided:  35 sq cm     Bleeding:  Minimal    Hemostasis Achieved:  by pressure    Procedural Pain:  0  / 10     Post Procedural Pain:  0 / 10     Response to treatment:  Well tolerated by patient. Status of wound progress and description from last visit:   Improved.       Plan:       Discharge Instructions       PHYSICIAN ORDERS AND DISCHARGE INSTRUCTIONS     NOTE: Upon discharge from the Wound Center, you will receive a patient experience survey. We would be grateful if you would take the time to fill this survey out.     Wound care order history:                 Vascular studies: Arterial studies done 9/27/20; no stenosis              Imaging:   Date               Cultures:   obtained on 11/18/2020                                 Obtained on 1/20/2021               Labs/ HbA1c:   Date               Grafts:                       #1 Apligraf on 12/16/2020                     #2 Apligraf on 12/23/2020                     #3 Apligraf on 12/30/2020                     #4 Apligraf on 1/6/2021                     # 5 Apligraf on 1/13/21                     #1 [place] 1/27/2021                     #2 [place] 2/3/2021                     #3 [place] 2/10/2021                     #4 [place] Right Medial Lower Leg 2/17/21                     #5 [place] 3/10/2021                   HBO:                Antibiotics:  Bactrim DS BID for 10 days on 11/18/2020                                   Cipro BID for 10 days ordered on 11/25/2020                                    Cipro 500 mg BID on 1/20/2021              Earlier Wound care treatments:                Authorizations:                        Consults:   Date                           Primary care physician:      Continuing wound care orders and information:              [place]idence[place]  [facility] [place] [facility] [place]               Your wound-care supplies will be provided by: Joyce Hickman provider:              MISHA Elizondo loading:  Date               [facility] Medications:              TEFJB cleansing:                           CO not scrub or use excessive force.                          Wash hands with soap and water before and after dressing changes.                           Prior to applying a clean dressing, cleanse wound with normal saline,                                wound cleanser, or mild soap and water.                           SILAS the physician or nurse before getting the wound(s) wet in a shower              Daily Wound management:                          KHCY weight off wounds and reposition every 2 hours.                          LXTXL standing for long periods of time.                          HXMIJ wraps/stockings in AM and remove at bedtime.                          If swelling is present, elevate legs to the level of the heart or above for 30                              minutes 4-5 times a day and/or when sitting.                                             When taking antibiotics take entire prescription as ordered by physician                             BY not stop taking until medicine is all gone.                                           Orders for this week: 4/21/21     Right lower leg -- wash with soap and water,pat dry. Right lower leg -- wash with soap and water,pat dry. ( Apply clobetasol on proximal leg in clinic)Apply Desitin or Calmazine to sarah wound. Cover with Silver calcium alginate , Kerramax or ABD and wrap with coban 2        Homecare to change on Friday and   Monday (may use profore to wrap)  ---      Referral to Dr Tam Jimenez on 2/24/2021, appt 3/18/2021 then 4/9/2021,  first procedure scheduled on 4/27/2021                    Follow up with Dr Keyana Jasso in 1 week in the wound care center  Call [phone] for any questions or concerns.   Date__________   Time        Treatment Note      Written Patient Dismissal Instructions Given            Electronically signed by Lonnie Marvin MD on 4/21/2021 at 1:32 PM

## 2021-04-28 ENCOUNTER — HOSPITAL ENCOUNTER (OUTPATIENT)
Dept: WOUND CARE | Age: 76
Discharge: HOME | End: 2021-04-28
Payer: COMMERCIAL

## 2021-04-28 VITALS
TEMPERATURE: 99 F | RESPIRATION RATE: 18 BRPM | HEART RATE: 68 BPM | DIASTOLIC BLOOD PRESSURE: 83 MMHG | SYSTOLIC BLOOD PRESSURE: 135 MMHG

## 2021-04-28 DIAGNOSIS — L97.929 VENOUS ULCER OF LEFT LEG (HCC): ICD-10-CM

## 2021-04-28 DIAGNOSIS — I83.029 VENOUS ULCER OF LEFT LEG (HCC): ICD-10-CM

## 2021-04-28 DIAGNOSIS — I87.331 CHRONIC VENOUS HYPERTENSION WITH ULCER AND INFLAMMATION INVOLVING RIGHT SIDE (HCC): Primary | ICD-10-CM

## 2021-04-28 DIAGNOSIS — L97.919 CHRONIC VENOUS HYPERTENSION WITH ULCER AND INFLAMMATION INVOLVING RIGHT SIDE (HCC): Primary | ICD-10-CM

## 2021-04-28 PROCEDURE — 11042 DBRDMT SUBQ TIS 1ST 20SQCM/<: CPT | Performed by: SURGERY

## 2021-04-28 PROCEDURE — 11042 DBRDMT SUBQ TIS 1ST 20SQCM/<: CPT

## 2021-04-28 RX ORDER — LIDOCAINE HYDROCHLORIDE 20 MG/ML
JELLY TOPICAL ONCE
Status: CANCELLED | OUTPATIENT
Start: 2021-04-28 | End: 2021-04-28

## 2021-04-28 RX ORDER — GINSENG 100 MG
CAPSULE ORAL ONCE
Status: CANCELLED | OUTPATIENT
Start: 2021-04-28 | End: 2021-04-28

## 2021-04-28 RX ORDER — BETAMETHASONE DIPROPIONATE 0.05 %
OINTMENT (GRAM) TOPICAL ONCE
Status: CANCELLED | OUTPATIENT
Start: 2021-04-28 | End: 2021-04-28

## 2021-04-28 RX ORDER — BACITRACIN, NEOMYCIN, POLYMYXIN B 400; 3.5; 5 [USP'U]/G; MG/G; [USP'U]/G
OINTMENT TOPICAL ONCE
Status: CANCELLED | OUTPATIENT
Start: 2021-04-28 | End: 2021-04-28

## 2021-04-28 RX ORDER — LIDOCAINE HYDROCHLORIDE 40 MG/ML
SOLUTION TOPICAL ONCE
Status: CANCELLED | OUTPATIENT
Start: 2021-04-28 | End: 2021-04-28

## 2021-04-28 RX ORDER — LIDOCAINE 40 MG/G
CREAM TOPICAL ONCE
Status: CANCELLED | OUTPATIENT
Start: 2021-04-28 | End: 2021-04-28

## 2021-04-28 RX ORDER — LIDOCAINE HYDROCHLORIDE 40 MG/ML
SOLUTION TOPICAL ONCE
Status: DISCONTINUED | OUTPATIENT
Start: 2021-04-28 | End: 2021-04-29 | Stop reason: HOSPADM

## 2021-04-28 RX ORDER — BACITRACIN ZINC AND POLYMYXIN B SULFATE 500; 1000 [USP'U]/G; [USP'U]/G
OINTMENT TOPICAL ONCE
Status: CANCELLED | OUTPATIENT
Start: 2021-04-28 | End: 2021-04-28

## 2021-04-28 RX ORDER — CLOBETASOL PROPIONATE 0.5 MG/G
OINTMENT TOPICAL ONCE
Status: CANCELLED | OUTPATIENT
Start: 2021-04-28 | End: 2021-04-28

## 2021-04-28 RX ORDER — LIDOCAINE 50 MG/G
OINTMENT TOPICAL ONCE
Status: CANCELLED | OUTPATIENT
Start: 2021-04-28 | End: 2021-04-28

## 2021-04-28 RX ORDER — GENTAMICIN SULFATE 1 MG/G
OINTMENT TOPICAL ONCE
Status: CANCELLED | OUTPATIENT
Start: 2021-04-28 | End: 2021-04-28

## 2021-04-28 ASSESSMENT — PAIN SCALES - GENERAL: PAINLEVEL_OUTOF10: 0

## 2021-04-28 NOTE — PROGRESS NOTES
Multilayer Compression Wrap   (Not Unna) Below the Knee    NAME:  Prabhu Villegas  YOB: 1945  MEDICAL RECORD NUMBER:  [id number]  DATE:  4/28/2021    Multilayer compression wrap: Removed old Multilayer wrap if indicated and wash leg with mild soap/water. Applied moisturizing agent to dry skin as needed. Applied primary and secondary dressing as ordered. Applied multilayered dressing below the knee to right lower leg. Instructed patient/caregiver not to remove dressing and to keep it clean and dry. Instructed patient/caregiver on complications to report to provider, such as pain, numbness in toes, heavy drainage, and slippage of dressing. Instructed patient on purpose of compression dressing and on activity and exercise recommendations.       Electronically signed by Julissa Covington RN on 4/28/2021 at 1:42 PM

## 2021-04-28 NOTE — PROGRESS NOTES
SYSTEMS    Pertinent items are noted in HPI. Constitutional: Negative for systemic symptoms including fever, chills and malaise. Objective:      /83   Pulse 68   Temp 99 °F (37.2 °C) (Temporal)   Resp 18     PHYSICAL EXAM      General: The patient is in no acute distress. Mental status:  Patient is appropriate, is  oriented to place and plan of care. Dermatologic exam: Visual inspection of the periwound reveals the skin to be edematous  Wound exam: see wound description below in procedure note      Assessment:     Problem List Items Addressed This Visit     Chronic venous hypertension with ulcer and inflammation (HCC) - Primary    Relevant Medications    lidocaine (XYLOCAINE) 4 % external solution    Other Relevant Orders    Supply: Wound Cleanser    Supply: Wound Dressings    Supply: Cover and Secure    Supply: Edema Control    Venous ulcer of left leg (HCC)    Relevant Medications    lidocaine (XYLOCAINE) 4 % external solution    Other Relevant Orders    Supply: Wound Cleanser    Supply: Wound Dressings    Supply: Cover and Secure    Supply: Edema Control        Procedure Note    Indications:  Based on my examination of this patient's wound(s) today, sharp excision into necrotic subcutaneous tissue is required to promote healing and evaluate the extent of previous healing. Performed by: Ranjeet Lind MD    Consent obtained: Yes    Time out taken:  Yes    Pain Control: topical Anesthetic  Anesthetic: 4% Lidocaine Liquid Topical     Debridement:Excisional Debridement    Using curette the wound(s) was/were sharply debrided down through and including the removal of subcutaneous tissue.         Devitalized Tissue Debrided:  slough    Pre Debridement Measurements:  Are located in the Wound Documentation Flow Sheet    All active wounds listed below with today's date are evaluated  Wound(s)    debrided this date include # : 1     Post  Debridement Measurements:  Wound 09/30/20 Wound #1 Right Medial Lower Leg Cluster (Onset 5 Years) (Active)   Wound Image   04/28/21 1302   Wound Etiology Venous 04/28/21 1302   Dressing Status New dressing applied 04/21/21 1422   Wound Cleansed Soap and water 04/28/21 1302   Dressing/Treatment Alginate with Ag 01/13/21 1657   Offloading for Diabetic Foot Ulcers No offloading required 04/21/21 1259   Wound Length (cm) 12 cm 04/28/21 1302   Wound Width (cm) 6 cm 04/28/21 1302   Wound Depth (cm) 0.1 cm 04/28/21 1302   Wound Surface Area (cm^2) 72 cm^2 04/28/21 1302   Change in Wound Size % (l*w) 71.43 04/28/21 1302   Wound Volume (cm^3) 7.2 cm^3 04/28/21 1302   Wound Healing % 71 04/28/21 1302   Post-Procedure Length (cm) 12 cm 04/28/21 1321   Post-Procedure Width (cm) 6 cm 04/28/21 1321   Post-Procedure Depth (cm) 0.1 cm 04/28/21 1321   Post-Procedure Surface Area (cm^2) 72 cm^2 04/28/21 1321   Post-Procedure Volume (cm^3) 7.2 cm^3 04/28/21 1321   Distance Tunneling (cm) 0 cm 04/28/21 1302   Tunneling Position ___ O'Clock 0 04/28/21 1302   Undermining Starts ___ O'Clock 0 04/28/21 1302   Undermining Ends___ O'Clock 0 04/28/21 1302   Undermining Maxium Distance (cm) 0 04/28/21 1302   Wound Assessment Granulation tissue 04/28/21 1302   Drainage Amount Moderate 04/28/21 1302   Drainage Description Serosanguinous 04/28/21 1302   Odor Moderate 04/28/21 1302   Cassia-wound Assessment Intact 04/28/21 1302   Margins Defined edges 04/28/21 1302   Wound Thickness Description not for Pressure Injury Full thickness 04/28/21 1302   Number of days: 210       Percent of Wound(s) Debrided: approximately 10%    Total Surface Area Debrided:  7 sq cm     Bleeding:  None    Hemostasis Achieved:  not needed    Procedural Pain:  0  / 10     Post Procedural Pain:  0 / 10     Response to treatment:  Well tolerated by patient. Status of wound progress and description from last visit:   Improved.       Plan:       Discharge Instructions         PHYSICIAN ORDERS AND DISCHARGE INSTRUCTIONS     NOTE: Upon discharge from the [address], you will receive a patient experience survey. We would be grateful if you would take the time to fill this survey out.     Wound care order history:                 Vascular studies: Arterial studies done 9/27/20; no stenosis              Imaging:   Date               Cultures:   obtained on 11/18/2020                                 Obtained on 1/20/2021               Labs/ HbA1c:   Date               Grafts:                       #1 Apligraf on 12/16/2020                     #2 Apligraf on 12/23/2020                     #3 Apligraf on 12/30/2020                     #4 Apligraf on 1/6/2021                     # 5 Apligraf on 1/13/21                     #1 [place] 1/27/2021                     #2 [place] 2/3/2021                     #3 [place] 2/10/2021                     #4 [place] Right Medial Lower Leg 2/17/21                     #5 [place] 3/10/2021                   HBO:                Antibiotics:  Bactrim DS BID for 10 days on 11/18/2020                                   Cipro BID for 10 days ordered on 11/25/2020                                    Cipro 500 mg BID on 1/20/2021                                    Cipro 500 mg BID on 4/28/2021              Earlier Wound care treatments:                Authorizations:                        Consults:   Date                           Primary care physician:      Continuing wound care orders and information:              Residence:  private               Continue home health care with: [facility]               Your wound-care supplies will be provided by: Eder Begum provider:              [id number] with Mal Blizzard loading:  Date               RBTFS Medications:              QGAHG cleansing:                           UL not scrub or use excessive force.                          Wash hands with soap and water before and after dressing changes.                           Prior to applying a clean dressing, cleanse wound with normal saline,                                wound cleanser, or mild soap and water.                           Ask the physician or nurse before getting the wound(s) wet in a shower              Daily Wound management:                          DLIG weight off wounds and reposition every 2 hours.                          AHOFC standing for long periods of time.                          IIPHH wraps/stockings in AM and remove at bedtime.                          If swelling is present, elevate legs to the level of the heart or above for 30                              minutes 4-5 times a day and/or when sitting.                                             When taking antibiotics take entire prescription as ordered by physician                             HX not stop taking until medicine is all gone.                                           Orders for this week: 4/28/21     Right lower leg -- wash with soap and water,pat dry. Right lower leg -- wash with soap and water,pat dry. ( Apply clobetasol on proximal leg in clinic)Apply Desitin or Calmazine to sarah wound. Cover with anasept gel and damp with saline  4x4  , Kerramax or ABD and wrap with coban 2        Homecare to change on Friday and   Monday (may use profore to wrap)  ---      Referral to Dr Bill Elliott on 2/24/2021, appt 3/18/2021 then 4/9/2021,  first procedure scheduled on 4/27/2021                    Follow up with Dr Marek Barrientos in 1 week in the wound care center  Call [phone] for any questions or concerns.   Date__________   Time             Treatment Note      Written Patient Dismissal Instructions Given            Electronically signed by Loetta Cheadle, MD on 4/28/2021 at 1:38 PM

## 2021-04-30 ENCOUNTER — TELEPHONE (OUTPATIENT)
Dept: WOUND CARE | Age: 76
End: 2021-04-30

## 2021-04-30 NOTE — TELEPHONE ENCOUNTER
Spoke with Tod Banegas at [facility] who said she is working on getting Anasept, but client's wife is concerned about the cost. Home care nurse will use previous wound treatment (silver ca alginate) until Anasept is available.     Electronically signed by Luis Cortes RN on 4/30/2021 at 10:43 AM

## 2021-05-05 ENCOUNTER — HOSPITAL ENCOUNTER (OUTPATIENT)
Dept: WOUND CARE | Age: 76
Discharge: HOME | End: 2021-05-05
Payer: COMMERCIAL

## 2021-05-05 VITALS
HEART RATE: 74 BPM | RESPIRATION RATE: 16 BRPM | SYSTOLIC BLOOD PRESSURE: 162 MMHG | DIASTOLIC BLOOD PRESSURE: 90 MMHG | TEMPERATURE: 98.3 F

## 2021-05-05 DIAGNOSIS — L97.929 VENOUS ULCER OF LEFT LEG (HCC): ICD-10-CM

## 2021-05-05 DIAGNOSIS — I83.029 VENOUS ULCER OF LEFT LEG (HCC): ICD-10-CM

## 2021-05-05 DIAGNOSIS — L97.919 CHRONIC VENOUS HYPERTENSION WITH ULCER AND INFLAMMATION INVOLVING RIGHT SIDE (HCC): Primary | ICD-10-CM

## 2021-05-05 DIAGNOSIS — I87.331 CHRONIC VENOUS HYPERTENSION WITH ULCER AND INFLAMMATION INVOLVING RIGHT SIDE (HCC): Primary | ICD-10-CM

## 2021-05-05 PROCEDURE — 11042 DBRDMT SUBQ TIS 1ST 20SQCM/<: CPT | Performed by: SURGERY

## 2021-05-05 PROCEDURE — 11042 DBRDMT SUBQ TIS 1ST 20SQCM/<: CPT

## 2021-05-05 PROCEDURE — 11045 DBRDMT SUBQ TISS EACH ADDL: CPT

## 2021-05-05 PROCEDURE — 11045 DBRDMT SUBQ TISS EACH ADDL: CPT | Performed by: SURGERY

## 2021-05-05 RX ORDER — GINSENG 100 MG
CAPSULE ORAL ONCE
Status: CANCELLED | OUTPATIENT
Start: 2021-05-05 | End: 2021-05-05

## 2021-05-05 RX ORDER — BACITRACIN, NEOMYCIN, POLYMYXIN B 400; 3.5; 5 [USP'U]/G; MG/G; [USP'U]/G
OINTMENT TOPICAL ONCE
Status: CANCELLED | OUTPATIENT
Start: 2021-05-05 | End: 2021-05-05

## 2021-05-05 RX ORDER — LIDOCAINE HYDROCHLORIDE 20 MG/ML
JELLY TOPICAL ONCE
Status: CANCELLED | OUTPATIENT
Start: 2021-05-05 | End: 2021-05-05

## 2021-05-05 RX ORDER — LIDOCAINE 50 MG/G
OINTMENT TOPICAL ONCE
Status: CANCELLED | OUTPATIENT
Start: 2021-05-05 | End: 2021-05-05

## 2021-05-05 RX ORDER — GENTAMICIN SULFATE 1 MG/G
OINTMENT TOPICAL ONCE
Status: CANCELLED | OUTPATIENT
Start: 2021-05-05 | End: 2021-05-05

## 2021-05-05 RX ORDER — LIDOCAINE 50 MG/G
OINTMENT TOPICAL ONCE
Status: DISCONTINUED | OUTPATIENT
Start: 2021-05-05 | End: 2021-05-06 | Stop reason: HOSPADM

## 2021-05-05 RX ORDER — CLOBETASOL PROPIONATE 0.5 MG/G
OINTMENT TOPICAL ONCE
Status: CANCELLED | OUTPATIENT
Start: 2021-05-05 | End: 2021-05-05

## 2021-05-05 RX ORDER — LIDOCAINE 40 MG/G
CREAM TOPICAL ONCE
Status: CANCELLED | OUTPATIENT
Start: 2021-05-05 | End: 2021-05-05

## 2021-05-05 RX ORDER — BETAMETHASONE DIPROPIONATE 0.05 %
OINTMENT (GRAM) TOPICAL ONCE
Status: CANCELLED | OUTPATIENT
Start: 2021-05-05 | End: 2021-05-05

## 2021-05-05 RX ORDER — BACITRACIN ZINC AND POLYMYXIN B SULFATE 500; 1000 [USP'U]/G; [USP'U]/G
OINTMENT TOPICAL ONCE
Status: CANCELLED | OUTPATIENT
Start: 2021-05-05 | End: 2021-05-05

## 2021-05-05 RX ORDER — LIDOCAINE HYDROCHLORIDE 40 MG/ML
SOLUTION TOPICAL ONCE
Status: CANCELLED | OUTPATIENT
Start: 2021-05-05 | End: 2021-05-05

## 2021-05-05 NOTE — PROGRESS NOTES
Multilayer Compression Wrap   (Not Unna) Below the Knee    NAME:  Ronny Stroud  YOB: 1945  MEDICAL RECORD NUMBER:  [id number]  DATE:  5/5/2021    Multilayer compression wrap: Removed old Multilayer wrap if indicated and wash leg with mild soap/water. Applied moisturizing agent to dry skin as needed. Applied primary and secondary dressing as ordered. Applied multilayered dressing below the knee to right lower leg. Instructed patient/caregiver not to remove dressing and to keep it clean and dry. Instructed patient/caregiver on complications to report to provider, such as pain, numbness in toes, heavy drainage, and slippage of dressing. Instructed patient on purpose of compression dressing and on activity and exercise recommendations.       Electronically signed by Tnaja Patel LPN on 6/3/4724 at 7:93 PM

## 2021-05-05 NOTE — PROGRESS NOTES
Wound Care Center Progress Note with Procedure Note      Vinny Jerez  AGE: 77 y.o. GENDER: male  : 1945  EPISODE DATE:  2021     Subjective:     Chief Complaint   Patient presents with    Wound Check     Right lower leg         HISTORY of PRESENT ILLNESS      Vinny Jerez is a 77 y.o. male who presents today for wound evaluation of Chronic venous wound(s) of R lower leg medial.  The wound is of moderate severity. The underlying cause of the wound is venous disease and trauma. Wound Pain Timing/Severity: none  Quality of pain: N/A  Severity of pain:  0 / 10   Modifying Factors: venous stasis  Associated Signs/Symptoms: erythema        PAST MEDICAL HISTORY        Diagnosis Date    Chronic venous hypertension with ulcer and inflammation involving right side (HCC)     RLE    Hypertension     past    Iron deficiency     Lymphoma Dx 11-    Hx of B cell lymphoma-in remission since        PAST SURGICAL HISTORY    Past Surgical History:   Procedure Laterality Date    TUNNELED VENOUS PORT PLACEMENT      TUNNELED VENOUS PORT PLACEMENT      removed 2012       FAMILY HISTORY    Family History   Problem Relation Age of Onset    Early Death Mother 52    Diabetes Father     Heart Disease Father     Vision Loss Son         \"He wears glasses\"    Early Death Daughter 36       SOCIAL HISTORY    Social History     Tobacco Use    Smoking status: Never Smoker    Smokeless tobacco: Never Used   Substance Use Topics    Alcohol use: No    Drug use: No       ALLERGIES    No Known Allergies    MEDICATIONS    Current Outpatient Medications on File Prior to Encounter   Medication Sig Dispense Refill    metoprolol succinate (TOPROL XL) 50 MG extended release tablet Take 1 tablet by mouth daily 30 tablet 2     No current facility-administered medications on file prior to encounter.         REVIEW OF SYSTEMS    Pertinent items are noted in HPI.  Constitutional: Negative for systemic symptoms including fever, chills and malaise. Objective:      BP (!) 162/90   Pulse 74   Temp 98.3 °F (36.8 °C) (Temporal)   Resp 16     PHYSICAL EXAM      General: The patient is in no acute distress. Mental status:  Patient is appropriate, is  oriented to place and plan of care. Dermatologic exam: Visual inspection of the periwound reveals the skin to be edematous  Wound exam: see wound description below in procedure note      Assessment:     Problem List Items Addressed This Visit     Chronic venous hypertension with ulcer and inflammation (HCC) - Primary    Relevant Medications    lidocaine (XYLOCAINE) 5 % ointment    Other Relevant Orders    Supply: Wound Cleanser    Supply: Wound Dressings    Supply: Cover and Secure    Supply: Edema Control    Venous ulcer of left leg (HCC)    Relevant Medications    lidocaine (XYLOCAINE) 5 % ointment    Other Relevant Orders    Supply: Wound Cleanser    Supply: Wound Dressings    Supply: Cover and Secure    Supply: Edema Control        Procedure Note    Indications:  Based on my examination of this patient's wound(s) today, sharp excision into necrotic subcutaneous tissue is required to promote healing and evaluate the extent of previous healing. Performed by: Andreia Lara MD    Consent obtained: Yes    Time out taken:  Yes    Pain Control: topical Anesthetic  Anesthetic: 4% Lidocaine Liquid Topical     Debridement:Excisional Debridement    Using curette the wound(s) was/were sharply debrided down through and including the removal of subcutaneous tissue.         Devitalized Tissue Debrided:  slough    Pre Debridement Measurements:  Are located in the Wound Documentation Flow Sheet    All active wounds listed below with today's date are evaluated  Wound(s)    debrided this date include # : 1     Post  Debridement Measurements:  Wound 09/30/20 Wound #1 Right Medial Lower Leg Cluster (Onset 5 Years) (Active) Wound Image   04/28/21 1302   Wound Etiology Venous 04/28/21 1302   Dressing Status New dressing applied 04/28/21 1340   Wound Cleansed Soap and water 05/05/21 1240   Dressing/Treatment Alginate with Ag 01/13/21 1657   Offloading for Diabetic Foot Ulcers No 05/05/21 1240   Wound Length (cm) 12.5 cm 05/05/21 1240   Wound Width (cm) 6.5 cm 05/05/21 1240   Wound Depth (cm) 0.1 cm 05/05/21 1240   Wound Surface Area (cm^2) 81.25 cm^2 05/05/21 1240   Change in Wound Size % (l*w) 67.76 05/05/21 1240   Wound Volume (cm^3) 8.12 cm^3 05/05/21 1240   Wound Healing % 68 05/05/21 1240   Post-Procedure Length (cm) 12 cm 04/28/21 1321   Post-Procedure Width (cm) 6 cm 04/28/21 1321   Post-Procedure Depth (cm) 0.1 cm 04/28/21 1321   Post-Procedure Surface Area (cm^2) 72 cm^2 04/28/21 1321   Post-Procedure Volume (cm^3) 7.2 cm^3 04/28/21 1321   Distance Tunneling (cm) 0 cm 05/05/21 1240   Tunneling Position ___ O'Clock 0 05/05/21 1240   Undermining Starts ___ O'Clock 0 05/05/21 1240   Undermining Ends___ O'Clock 0 05/05/21 1240   Undermining Maxium Distance (cm) 0 05/05/21 1240   Wound Assessment Granulation tissue;Pink/red 05/05/21 1240   Drainage Amount Moderate 05/05/21 1240   Drainage Description Serosanguinous 05/05/21 1240   Odor Mild 05/05/21 1240   Cassia-wound Assessment Dry/flaky 05/05/21 1240   Margins Defined edges 05/05/21 1240   Wound Thickness Description not for Pressure Injury Full thickness 05/05/21 1240   Number of days: 217       Percent of Wound(s) Debrided: approximately 90%    Total Surface Area Debrided:  80 sq cm     Bleeding:  None    Hemostasis Achieved:  not needed    Procedural Pain:  0  / 10     Post Procedural Pain:  0 / 10     Response to treatment:  Well tolerated by patient. Status of wound progress and description from last visit:   Improved.       Plan:       Discharge Instructions       PHYSICIAN ORDERS AND DISCHARGE INSTRUCTIONS     NOTE: Upon discharge from the [address], you will receive a patient experience survey. We would be grateful if you would take the time to fill this survey out.     Wound care order history:                 Vascular studies: Arterial studies done 9/27/20; no stenosis              Imaging:   Date               Cultures:   obtained on 11/18/2020                                 Obtained on 1/20/2021               Labs/ HbA1c:   Date               Grafts:                       #1 Apligraf on 12/16/2020                     #2 Apligraf on 12/23/2020                     #3 Apligraf on 12/30/2020                     #4 Apligraf on 1/6/2021                     # 5 Apligraf on 1/13/21                     #1 [place] 1/27/2021                     #2 [place] 2/3/2021                     #3 [place] 2/10/2021                     #4 [place] Right Medial Lower Leg 2/17/21                     #5 [place] 3/10/2021                   HBO:                Antibiotics:  Bactrim DS BID for 10 days on 11/18/2020                                   Cipro BID for 10 days ordered on 11/25/2020                                    Cipro 500 mg BID on 1/20/2021                                    Cipro 500 mg BID on 4/28/2021              Earlier Wound care treatments:                Authorizations:                        Consults:   Date                           Primary care physician:      Continuing wound care orders and information:              Residence:  private               [facility] care with: [facility]               Your wound-care supplies will be provided by: Satnam Corbin provider:              JAKE Singh loading:  Date               XCCON Medications:              OHNHM cleansing:                           VD not scrub or use excessive force.                          Wash hands with soap and water before and after dressing changes.                           Prior to applying a clean dressing, cleanse wound with normal saline,                                wound cleanser, or mild soap and water.                           Ask the physician or nurse before getting the wound(s) wet in a shower              Daily Wound management:                          YICO weight off wounds and reposition every 2 hours.                          UGQPR standing for long periods of time.                          ORTSK wraps/stockings in AM and remove at bedtime.                          If swelling is present, elevate legs to the level of the heart or above for 30                              minutes 4-5 times a day and/or when sitting.                                             When taking antibiotics take entire prescription as ordered by physician                             II not stop taking until medicine is all gone.                                           Orders for this week: 5/5/21     Right lower leg -- wash with soap and water,pat dry. Right lower leg -- wash with soap and water,pat dry. ( Apply clobetasol on proximal leg in clinic)Apply Desitin or Calmazine to sarah wound. Cover with anasept gel covered  4x4  , Kerramax or ABD and wrap with coban 2        Homecare to change on Friday and   Monday (may use profore to wrap)  ---      Referral to Dr Sakshi Fagan on 2/24/2021, appt 3/18/2021 then 4/9/2021,  first procedure scheduled on 4/27/2021                    Follow up with Dr Sincere Foster in 1 week in the wound care center  Call [phone] for any questions or concerns.   Date__________   Time        Treatment Note      Written Patient Dismissal Instructions Given            Electronically signed by Fatoumata Godoy MD on 5/5/2021 at 1:21 PM

## 2021-05-12 ENCOUNTER — HOSPITAL ENCOUNTER (OUTPATIENT)
Dept: WOUND CARE | Age: 76
Discharge: HOME | End: 2021-05-12
Payer: COMMERCIAL

## 2021-05-12 VITALS
TEMPERATURE: 97.4 F | RESPIRATION RATE: 16 BRPM | DIASTOLIC BLOOD PRESSURE: 89 MMHG | SYSTOLIC BLOOD PRESSURE: 187 MMHG | HEART RATE: 73 BPM

## 2021-05-12 DIAGNOSIS — I87.331 CHRONIC VENOUS HYPERTENSION WITH ULCER AND INFLAMMATION INVOLVING RIGHT SIDE (HCC): Primary | ICD-10-CM

## 2021-05-12 DIAGNOSIS — L97.919 CHRONIC VENOUS HYPERTENSION WITH ULCER AND INFLAMMATION INVOLVING RIGHT SIDE (HCC): Primary | ICD-10-CM

## 2021-05-12 DIAGNOSIS — I83.029 VENOUS ULCER OF LEFT LEG (HCC): ICD-10-CM

## 2021-05-12 DIAGNOSIS — L97.929 VENOUS ULCER OF LEFT LEG (HCC): ICD-10-CM

## 2021-05-12 PROCEDURE — 11045 DBRDMT SUBQ TISS EACH ADDL: CPT | Performed by: SURGERY

## 2021-05-12 PROCEDURE — 11045 DBRDMT SUBQ TISS EACH ADDL: CPT

## 2021-05-12 PROCEDURE — 11042 DBRDMT SUBQ TIS 1ST 20SQCM/<: CPT | Performed by: SURGERY

## 2021-05-12 PROCEDURE — 11042 DBRDMT SUBQ TIS 1ST 20SQCM/<: CPT

## 2021-05-12 RX ORDER — LIDOCAINE HYDROCHLORIDE 40 MG/ML
SOLUTION TOPICAL ONCE
Status: CANCELLED | OUTPATIENT
Start: 2021-05-12 | End: 2021-05-12

## 2021-05-12 RX ORDER — LIDOCAINE 40 MG/G
CREAM TOPICAL ONCE
Status: CANCELLED | OUTPATIENT
Start: 2021-05-12 | End: 2021-05-12

## 2021-05-12 RX ORDER — LIDOCAINE HYDROCHLORIDE 20 MG/ML
JELLY TOPICAL ONCE
Status: CANCELLED | OUTPATIENT
Start: 2021-05-12 | End: 2021-05-12

## 2021-05-12 RX ORDER — LIDOCAINE HYDROCHLORIDE 40 MG/ML
SOLUTION TOPICAL ONCE
Status: DISCONTINUED | OUTPATIENT
Start: 2021-05-12 | End: 2021-05-13 | Stop reason: HOSPADM

## 2021-05-12 RX ORDER — CLOBETASOL PROPIONATE 0.5 MG/G
OINTMENT TOPICAL ONCE
Status: CANCELLED | OUTPATIENT
Start: 2021-05-12 | End: 2021-05-12

## 2021-05-12 RX ORDER — BACITRACIN, NEOMYCIN, POLYMYXIN B 400; 3.5; 5 [USP'U]/G; MG/G; [USP'U]/G
OINTMENT TOPICAL ONCE
Status: CANCELLED | OUTPATIENT
Start: 2021-05-12 | End: 2021-05-12

## 2021-05-12 RX ORDER — BETAMETHASONE DIPROPIONATE 0.05 %
OINTMENT (GRAM) TOPICAL ONCE
Status: CANCELLED | OUTPATIENT
Start: 2021-05-12 | End: 2021-05-12

## 2021-05-12 RX ORDER — GINSENG 100 MG
CAPSULE ORAL ONCE
Status: CANCELLED | OUTPATIENT
Start: 2021-05-12 | End: 2021-05-12

## 2021-05-12 RX ORDER — BACITRACIN ZINC AND POLYMYXIN B SULFATE 500; 1000 [USP'U]/G; [USP'U]/G
OINTMENT TOPICAL ONCE
Status: CANCELLED | OUTPATIENT
Start: 2021-05-12 | End: 2021-05-12

## 2021-05-12 RX ORDER — LIDOCAINE 50 MG/G
OINTMENT TOPICAL ONCE
Status: CANCELLED | OUTPATIENT
Start: 2021-05-12 | End: 2021-05-12

## 2021-05-12 RX ORDER — GENTAMICIN SULFATE 1 MG/G
OINTMENT TOPICAL ONCE
Status: CANCELLED | OUTPATIENT
Start: 2021-05-12 | End: 2021-05-12

## 2021-05-12 NOTE — PROGRESS NOTES
Wound Care Center Progress Note with Procedure Note      Ronny Stroud  AGE: 77 y.o. GENDER: male  : 1945  EPISODE DATE:  2021     Subjective:     Chief Complaint   Patient presents with    Wound Check     Right lower leg         HISTORY of PRESENT ILLNESS      Ronny Stroud is a 77 y.o. male who presents today for wound evaluation of Chronic venous wound(s) of R lower leg medial.  The wound is of moderate severity. The underlying cause of the wound is venous disease and trauma.     Wound Pain Timing/Severity: none  Quality of pain: N/A  Severity of pain:  0 / 10   Modifying Factors: edema and venous stasis  Associated Signs/Symptoms: none        PAST MEDICAL HISTORY        Diagnosis Date    Chronic venous hypertension with ulcer and inflammation involving right side (HCC)     RLE    Hypertension     past    Iron deficiency     Lymphoma Dx 11-    Hx of B cell lymphoma-in remission since        PAST SURGICAL HISTORY    Past Surgical History:   Procedure Laterality Date    TUNNELED VENOUS PORT PLACEMENT      TUNNELED VENOUS PORT PLACEMENT      removed 2012       FAMILY HISTORY    Family History   Problem Relation Age of Onset    Early Death Mother 52    Diabetes Father     Heart Disease Father     Vision Loss Son         \"He wears glasses\"    Early Death Daughter 36       SOCIAL HISTORY    Social History     Tobacco Use    Smoking status: Never Smoker    Smokeless tobacco: Never Used   Substance Use Topics    Alcohol use: No    Drug use: No       ALLERGIES    No Known Allergies    MEDICATIONS    Current Outpatient Medications on File Prior to Encounter   Medication Sig Dispense Refill    ciprofloxacin (CIPRO) 500 MG tablet TAKE 1 TABLET BY MOUTH TWO TIMES A DAY      metoprolol succinate (TOPROL XL) 50 MG extended release tablet Take 1 tablet by mouth daily 30 tablet 2     No current facility-administered medications on file prior to encounter. REVIEW OF SYSTEMS    Pertinent items are noted in HPI. Constitutional: Negative for systemic symptoms including fever, chills and malaise. Objective:      BP (!) 187/89   Pulse 73   Temp 97.4 °F (36.3 °C) (Temporal)   Resp 16     PHYSICAL EXAM      General: The patient is in no acute distress. Mental status:  Patient is appropriate, is  oriented to place and plan of care. Dermatologic exam: Visual inspection of the periwound reveals the skin to be edematous  Wound exam: see wound description below in procedure note      Assessment:     Problem List Items Addressed This Visit     Chronic venous hypertension with ulcer and inflammation (HCC) - Primary    Relevant Medications    lidocaine (XYLOCAINE) 4 % external solution (Start on 5/12/2021  1:45 PM)    Other Relevant Orders    Supply: Wound Cleanser    Venous ulcer of left leg (HCC)    Relevant Medications    lidocaine (XYLOCAINE) 4 % external solution (Start on 5/12/2021  1:45 PM)    Other Relevant Orders    Supply: Wound Cleanser        Procedure Note    Indications:  Based on my examination of this patient's wound(s) today, sharp excision into necrotic subcutaneous tissue is required to promote healing and evaluate the extent of previous healing. Performed by: Susan Hernandez MD    Consent obtained: Yes    Time out taken:  Yes    Pain Control: topical Anesthetic  Anesthetic: 4% Lidocaine Liquid Topical     Debridement:Excisional Debridement    Using curette the wound(s) was/were sharply debrided down through and including the removal of subcutaneous tissue.         Devitalized Tissue Debrided:  slough    Pre Debridement Measurements:  Are located in the Wound Documentation Flow Sheet    All active wounds listed below with today's date are evaluated  Wound(s)    debrided this date include # : 1     Post  Debridement Measurements:  Wound 09/30/20 Wound #1 Right Medial Lower Leg Cluster (Onset 5 Years) (Active)   Wound Image   04/28/21 1302   Wound Etiology Venous 04/28/21 1302   Dressing Status New dressing applied;Clean;Dry; Intact 05/05/21 1324   Wound Cleansed Soap and water 05/12/21 1321   Dressing/Treatment Alginate with Ag 01/13/21 1657   Offloading for Diabetic Foot Ulcers No 05/12/21 1321   Wound Length (cm) 12 cm 05/12/21 1321   Wound Width (cm) 6 cm 05/12/21 1321   Wound Depth (cm) 0.1 cm 05/12/21 1321   Wound Surface Area (cm^2) 72 cm^2 05/12/21 1321   Change in Wound Size % (l*w) 71.43 05/12/21 1321   Wound Volume (cm^3) 7.2 cm^3 05/12/21 1321   Wound Healing % 71 05/12/21 1321   Post-Procedure Length (cm) 12 cm 05/12/21 1323   Post-Procedure Width (cm) 6 cm 05/12/21 1323   Post-Procedure Depth (cm) 0.1 cm 05/12/21 1323   Post-Procedure Surface Area (cm^2) 72 cm^2 05/12/21 1323   Post-Procedure Volume (cm^3) 7.2 cm^3 05/12/21 1323   Distance Tunneling (cm) 0 cm 05/12/21 1321   Tunneling Position ___ O'Clock 0 05/12/21 1321   Undermining Starts ___ O'Clock 0 05/12/21 1321   Undermining Ends___ O'Clock 0 05/12/21 1321   Undermining Maxium Distance (cm) 0 05/12/21 1321   Wound Assessment Granulation tissue;Pink/red 05/12/21 1321   Drainage Amount Moderate 05/12/21 1321   Drainage Description Serosanguinous 05/12/21 1321   Odor Mild 05/12/21 1321   Cassia-wound Assessment Dry/flaky 05/12/21 1321   Margins Defined edges 05/12/21 1321   Wound Thickness Description not for Pressure Injury Full thickness 05/12/21 1321   Number of days: 224       Percent of Wound(s) Debrided: approximately 100%    Total Surface Area Debrided:  72 sq cm     Bleeding:  Minimal    Hemostasis Achieved:  by pressure    Procedural Pain:  0  / 10     Post Procedural Pain:  0 / 10     Response to treatment:  Well tolerated by patient. Status of wound progress and description from last visit:   Improved.       Plan:       Discharge Instructions       PHYSICIAN ORDERS AND DISCHARGE INSTRUCTIONS     NOTE: Upon discharge from the [address], you will receive a patient experience survey. We would be grateful if you would take the time to fill this survey out.     Wound care order history:                 Vascular studies: Arterial studies done 9/27/20; no stenosis              Imaging:   Date               Cultures:   obtained on 11/18/2020                                 Obtained on 1/20/2021               Labs/ HbA1c:   Date               Grafts:                       #1 Apligraf on 12/16/2020                     #2 Apligraf on 12/23/2020                     #3 Apligraf on 12/30/2020                     #4 Apligraf on 1/6/2021                     # 5 Apligraf on 1/13/21                     #1 [place] 1/27/2021                     #2 [place] 2/3/2021                     #3 [place] 2/10/2021                     #4 [place] Right Medial Lower Leg 2/17/21                     #5 [place] 3/10/2021                   HBO:                Antibiotics:  Bactrim DS BID for 10 days on 11/18/2020                                   Cipro BID for 10 days ordered on 11/25/2020                                    Cipro 500 mg BID on 1/20/2021                                    Cipro 500 mg BID on 4/28/2021              Earlier Wound care treatments:                Authorizations:                        Consults:   Date                           Primary care physician:      Continuing wound care orders and information:              Residence:  private               Continue [facility] care with: [facility]               Your wound-care supplies will be provided by: Jane Kearney provider:              [id number] with  Michelle Shin loading:  Date               YAVYZ Medications:              NTMCD cleansing:                           NU not scrub or use excessive force.                          Wash hands with soap and water before and after dressing changes.                           Prior to applying a clean dressing, cleanse wound with normal saline,                                wound cleanser, or mild soap and water.                           Ask the physician or nurse before getting the wound(s) wet in a shower              Daily Wound management:                          LZKI weight off wounds and reposition every 2 hours.                          ZYYMX standing for long periods of time.                          IYRQJ wraps/stockings in AM and remove at bedtime.                          If swelling is present, elevate legs to the level of the heart or above for 30                              minutes 4-5 times a day and/or when sitting.                                             When taking antibiotics take entire prescription as ordered by physician                             XY not stop taking until medicine is all gone.                                           Orders for this week: 5/12/21     Right lower leg -- wash with soap and water,pat dry. Right lower leg -- wash with soap and water,pat dry. ( Apply clobetasol on proximal leg in clinic)Apply Desitin or Calmazine to sarah wound. Cover with anasept gel covered  4x4  , Kerramax or ABD and wrap with coban 2        Homecare to change on Friday and   Monday (may use profore to wrap)  ---      Referral to Dr Nicole Traylor on 2/24/2021, appt 3/18/2021 then 4/9/2021,  first procedure scheduled on 4/27/2021                    Follow up with Dr Ileana Gilliland in 1 week in the wound care center  Call [phone] for any questions or concerns.   Date__________   Time        Treatment Note      Written Patient Dismissal Instructions Given            Electronically signed by Sivan Gomez MD on 5/12/2021 at 1:38 PM

## 2021-05-19 ENCOUNTER — HOSPITAL ENCOUNTER (OUTPATIENT)
Dept: WOUND CARE | Age: 76
Discharge: HOME | End: 2021-05-19
Payer: COMMERCIAL

## 2021-05-19 VITALS
RESPIRATION RATE: 16 BRPM | HEART RATE: 68 BPM | TEMPERATURE: 97.5 F | SYSTOLIC BLOOD PRESSURE: 151 MMHG | DIASTOLIC BLOOD PRESSURE: 88 MMHG

## 2021-05-19 DIAGNOSIS — I83.029 VENOUS ULCER OF LEFT LEG (HCC): ICD-10-CM

## 2021-05-19 DIAGNOSIS — I87.331 CHRONIC VENOUS HYPERTENSION WITH ULCER AND INFLAMMATION INVOLVING RIGHT SIDE (HCC): Primary | ICD-10-CM

## 2021-05-19 DIAGNOSIS — L97.919 CHRONIC VENOUS HYPERTENSION WITH ULCER AND INFLAMMATION INVOLVING RIGHT SIDE (HCC): Primary | ICD-10-CM

## 2021-05-19 DIAGNOSIS — L97.929 VENOUS ULCER OF LEFT LEG (HCC): ICD-10-CM

## 2021-05-19 PROCEDURE — 11045 DBRDMT SUBQ TISS EACH ADDL: CPT

## 2021-05-19 PROCEDURE — 11045 DBRDMT SUBQ TISS EACH ADDL: CPT | Performed by: SURGERY

## 2021-05-19 PROCEDURE — 11042 DBRDMT SUBQ TIS 1ST 20SQCM/<: CPT | Performed by: SURGERY

## 2021-05-19 PROCEDURE — 11042 DBRDMT SUBQ TIS 1ST 20SQCM/<: CPT

## 2021-05-19 RX ORDER — LIDOCAINE HYDROCHLORIDE 40 MG/ML
SOLUTION TOPICAL ONCE
Status: CANCELLED | OUTPATIENT
Start: 2021-05-19 | End: 2021-05-19

## 2021-05-19 RX ORDER — LIDOCAINE HYDROCHLORIDE 20 MG/ML
JELLY TOPICAL ONCE
Status: CANCELLED | OUTPATIENT
Start: 2021-05-19 | End: 2021-05-19

## 2021-05-19 RX ORDER — GINSENG 100 MG
CAPSULE ORAL ONCE
Status: CANCELLED | OUTPATIENT
Start: 2021-05-19 | End: 2021-05-19

## 2021-05-19 RX ORDER — BACITRACIN, NEOMYCIN, POLYMYXIN B 400; 3.5; 5 [USP'U]/G; MG/G; [USP'U]/G
OINTMENT TOPICAL ONCE
Status: CANCELLED | OUTPATIENT
Start: 2021-05-19 | End: 2021-05-19

## 2021-05-19 RX ORDER — BACITRACIN ZINC AND POLYMYXIN B SULFATE 500; 1000 [USP'U]/G; [USP'U]/G
OINTMENT TOPICAL ONCE
Status: CANCELLED | OUTPATIENT
Start: 2021-05-19 | End: 2021-05-19

## 2021-05-19 RX ORDER — LIDOCAINE 40 MG/G
CREAM TOPICAL ONCE
Status: CANCELLED | OUTPATIENT
Start: 2021-05-19 | End: 2021-05-19

## 2021-05-19 RX ORDER — GENTAMICIN SULFATE 1 MG/G
OINTMENT TOPICAL ONCE
Status: CANCELLED | OUTPATIENT
Start: 2021-05-19 | End: 2021-05-19

## 2021-05-19 RX ORDER — LIDOCAINE 50 MG/G
OINTMENT TOPICAL ONCE
Status: CANCELLED | OUTPATIENT
Start: 2021-05-19 | End: 2021-05-19

## 2021-05-19 RX ORDER — LIDOCAINE HYDROCHLORIDE 40 MG/ML
SOLUTION TOPICAL ONCE
Status: DISCONTINUED | OUTPATIENT
Start: 2021-05-19 | End: 2021-05-20 | Stop reason: HOSPADM

## 2021-05-19 RX ORDER — BETAMETHASONE DIPROPIONATE 0.05 %
OINTMENT (GRAM) TOPICAL ONCE
Status: CANCELLED | OUTPATIENT
Start: 2021-05-19 | End: 2021-05-19

## 2021-05-19 RX ORDER — CLOBETASOL PROPIONATE 0.5 MG/G
OINTMENT TOPICAL ONCE
Status: CANCELLED | OUTPATIENT
Start: 2021-05-19 | End: 2021-05-19

## 2021-05-19 ASSESSMENT — PAIN SCALES - GENERAL: PAINLEVEL_OUTOF10: 0

## 2021-05-19 NOTE — PROGRESS NOTES
Wound Care Center Progress Note with Procedure Note      Prabhu Villegas  AGE: 77 y.o. GENDER: male  : 1945  EPISODE DATE:  2021     Subjective:     Chief Complaint   Patient presents with    Wound Check         HISTORY of PRESENT ILLNESS      Prabhu Villegas is a 77 y.o. male who presents today for wound evaluation of Chronic venous and traumatic wound(s) of R lower leg medial.  The wound is of moderate severity. The underlying cause of the wound is venous disease and trauma.     Wound Pain Timing/Severity: none  Quality of pain: N/A  Severity of pain:  0 / 10   Modifying Factors: edema and venous stasis  Associated Signs/Symptoms: none        PAST MEDICAL HISTORY        Diagnosis Date    Chronic venous hypertension with ulcer and inflammation involving right side (HCC)     RLE    Hypertension     past    Iron deficiency     Lymphoma Dx 11    Hx of B cell lymphoma-in remission since        PAST SURGICAL HISTORY    Past Surgical History:   Procedure Laterality Date    TUNNELED VENOUS PORT PLACEMENT      TUNNELED VENOUS PORT PLACEMENT      removed 2012       FAMILY HISTORY    Family History   Problem Relation Age of Onset    Early Death Mother 52    Diabetes Father     Heart Disease Father     Vision Loss Son         \"He wears glasses\"    Early Death Daughter 36       SOCIAL HISTORY    Social History     Tobacco Use    Smoking status: Never Smoker    Smokeless tobacco: Never Used   Substance Use Topics    Alcohol use: No    Drug use: No       ALLERGIES    No Known Allergies    MEDICATIONS    Current Outpatient Medications on File Prior to Encounter   Medication Sig Dispense Refill    ciprofloxacin (CIPRO) 500 MG tablet TAKE 1 TABLET BY MOUTH TWO TIMES A DAY      metoprolol succinate (TOPROL XL) 50 MG extended release tablet Take 1 tablet by mouth daily 30 tablet 2     No current facility-administered medications on file prior to encounter. REVIEW OF SYSTEMS    Pertinent items are noted in HPI. Constitutional: Negative for systemic symptoms including fever, chills and malaise. Objective:      BP (!) 151/88   Pulse 68   Temp 97.5 °F (36.4 °C) (Temporal)   Resp 16     PHYSICAL EXAM      General: The patient is in no acute distress. Mental status:  Patient is appropriate, is  oriented to place and plan of care. Dermatologic exam: Visual inspection of the periwound reveals the skin to be edematous  Wound exam: see wound description below in procedure note      Assessment:     Problem List Items Addressed This Visit     Chronic venous hypertension with ulcer and inflammation (HCC) - Primary    Relevant Medications    lidocaine (XYLOCAINE) 4 % external solution    Other Relevant Orders    Supply: Wound Cleanser    Supply: Wound Dressings    Supply: Cover and Secure    Supply: Edema Control    Venous ulcer of left leg (HCC)    Relevant Medications    lidocaine (XYLOCAINE) 4 % external solution    Other Relevant Orders    Supply: Wound Cleanser    Supply: Wound Dressings    Supply: Cover and Secure    Supply: Edema Control        Procedure Note    Indications:  Based on my examination of this patient's wound(s) today, sharp excision into necrotic subcutaneous tissue is required to promote healing and evaluate the extent of previous healing. Performed by: Fatoumata Godoy MD    Consent obtained: Yes    Time out taken:  Yes    Pain Control: topical       Debridement:Excisional Debridement    Using curette the wound(s) was/were sharply debrided down through and including the removal of subcutaneous tissue.         Devitalized Tissue Debrided:  slough    Pre Debridement Measurements:  Are located in the Wound Documentation Flow Sheet    All active wounds listed below with today's date are evaluated  Wound(s)    debrided this date include # : 1     Post  Debridement Measurements:  Wound 09/30/20 Wound #1 Right Medial Lower Leg Cluster (Onset 5 Years) (Active)   Wound Image   05/19/21 1257   Wound Etiology Venous 05/19/21 1257   Dressing Status New dressing applied;Clean;Dry; Intact 05/12/21 1354   Wound Cleansed Soap and water 05/19/21 1257   Dressing/Treatment Alginate with Ag 01/13/21 1657   Offloading for Diabetic Foot Ulcers No 05/12/21 1321   Wound Length (cm) 12 cm 05/19/21 1257   Wound Width (cm) 6 cm 05/19/21 1257   Wound Depth (cm) 0.1 cm 05/19/21 1257   Wound Surface Area (cm^2) 72 cm^2 05/19/21 1257   Change in Wound Size % (l*w) 71.43 05/19/21 1257   Wound Volume (cm^3) 7.2 cm^3 05/19/21 1257   Wound Healing % 71 05/19/21 1257   Post-Procedure Length (cm) 12 cm 05/19/21 1310   Post-Procedure Width (cm) 6 cm 05/19/21 1310   Post-Procedure Depth (cm) 0.1 cm 05/19/21 1310   Post-Procedure Surface Area (cm^2) 72 cm^2 05/19/21 1310   Post-Procedure Volume (cm^3) 7.2 cm^3 05/19/21 1310   Distance Tunneling (cm) 0 cm 05/19/21 1257   Tunneling Position ___ O'Clock 0 05/19/21 1257   Undermining Starts ___ O'Clock 0 05/19/21 1257   Undermining Ends___ O'Clock 0 05/19/21 1257   Undermining Maxium Distance (cm) 0 05/19/21 1257   Wound Assessment Granulation tissue 05/19/21 1257   Drainage Amount Moderate 05/19/21 1257   Drainage Description Serosanguinous 05/19/21 1257   Odor Mild 05/19/21 1257   Cassia-wound Assessment Dry/flaky 05/19/21 1257   Margins Defined edges 05/19/21 1257   Wound Thickness Description not for Pressure Injury Full thickness 05/19/21 1257   Number of days: 231       Wound 05/19/21 Pedal Anterior;Right #2 Right dorsal foot (Active)   Wound Image   05/19/21 1310   Wound Etiology Traumatic 05/19/21 1310   Wound Cleansed Cleansed with saline 05/19/21 1310   Wound Length (cm) 0.7 cm 05/19/21 1310   Wound Width (cm) 0.5 cm 05/19/21 1310   Wound Depth (cm) 0.1 cm 05/19/21 1310   Wound Surface Area (cm^2) 0.35 cm^2 05/19/21 1310   Wound Volume (cm^3) 0.04 cm^3 05/19/21 1310   Distance Tunneling (cm) 0 cm 05/19/21 1310 Tunneling Position ___ O'Clock 0 05/19/21 1310   Undermining Starts ___ O'Clock 0 05/19/21 1310   Undermining Ends___ O'Clock 0 05/19/21 1310   Undermining Maxium Distance (cm) 0 05/19/21 1310   Wound Assessment Granulation tissue 05/19/21 1310   Drainage Amount Moderate 05/19/21 1310   Drainage Description Serosanguinous 05/19/21 1310   Odor None 05/19/21 1310   Cassia-wound Assessment Intact 05/19/21 1310   Margins Attached edges; Defined edges 05/19/21 1310   Wound Thickness Description not for Pressure Injury Full thickness 05/19/21 1310   Number of days: 0       Percent of Wound(s) Debrided: approximately 100%    Total Surface Area Debrided:  72 sq cm     Bleeding:  None    Hemostasis Achieved:  not needed    Procedural Pain:  0  / 10     Post Procedural Pain:  0 / 10     Response to treatment:  Well tolerated by patient. Status of wound progress and description from last visit:   Improved. Plan:       Discharge Instructions       PHYSICIAN ORDERS AND DISCHARGE INSTRUCTIONS     NOTE: Upon discharge from the [address], you will receive a patient experience survey.  We would be grateful if you would take the time to fill this survey out.     Wound care order history:                 Vascular studies: Arterial studies done 9/27/20; no stenosis              Imaging:   Date               Cultures:   obtained on 11/18/2020                                 Obtained on 1/20/2021               Labs/ HbA1c:   Date               Grafts:                       #1 Apligraf on 12/16/2020                     #2 Apligraf on 12/23/2020                     #3 Apligraf on 12/30/2020                     #4 Apligraf on 1/6/2021                     # 5 Apligraf on 1/13/21                     #1 [place] 1/27/2021                     #2 [place] 2/3/2021                     #3 [place] 2/10/2021                     #4 [place] Right Medial Lower Leg 2/17/21                     #5 [place] 3/10/2021                   HBO:                Antibiotics:  Bactrim DS BID for 10 days on 11/18/2020                                   Cipro BID for 10 days ordered on 11/25/2020                                    Cipro 500 mg BID on 1/20/2021                                    Cipro 500 mg BID on 4/28/2021              Earlier Wound care treatments:                [id number]:                        Consults:   Date                           Primary care physician:      Continuing wound care orders and information:              Residence:  private               Continue home health care with: [facility] wound-care supplies will be provided by: Leidy Wise provider:              CHANDANA with  Jesica Tadeo loading:  Date               VTPAB Medications:              ROUZC cleansing:                           ZY not scrub or use excessive force.                          Wash hands with soap and water before and after dressing changes.                           Prior to applying a clean dressing, cleanse wound with normal saline,                                wound cleanser, or mild soap and water.                           Ask the physician or nurse before getting the wound(s) wet in a shower              Daily Wound management:                          Keep weight off wounds and reposition every 2 hours.                          CRZKK standing for long periods of time.                          WDCZV wraps/stockings in AM and remove at bedtime.                          If swelling is present, elevate legs to the level of the heart or above for 30                              minutes 4-5 times a day and/or when sitting.                                             When taking antibiotics take entire prescription as ordered by physician                             CP not stop taking until medicine is all gone.                                           Orders for this week: 5/19/21    Right dorsal foot- wash with soap and water,past dry. Cover with jennifer and optfoam border. Change with wrap change.      Right lower leg -- wash with soap and water,pat dry. Right lower leg -- wash with soap and water,pat dry. ( Apply clobetasol on proximal leg in clinic)Apply Desitin or Calmazine to sarah wound. Cover with anasept gel covered  4x4  , Kerramax or ABD and wrap with coban 2        Homecare to change on Friday and   Monday (may use profore to wrap)  ---      Referral to Dr Martin Lab on 2/24/2021, appt 3/18/2021 then 4/9/2021,  first procedure scheduled on 4/27/2021                    Follow up with Dr yMra Aguirre in 1 week in the wound care center  Call [phone] for any questions or concerns.   Date__________   Time        Treatment Note      Written Patient Dismissal Instructions Given            Electronically signed by Rommel Matute MD on 5/19/2021 at 1:34 PM

## 2021-05-19 NOTE — PROGRESS NOTES
Multilayer Compression Wrap   (Not Unna) Below the Knee    NAME:  Argenis Cruz  YOB: 1945  MEDICAL RECORD NUMBER:  [id number]  DATE:  5/19/2021    Multilayer compression wrap: Removed old Multilayer wrap if indicated and wash leg with mild soap/water. Applied moisturizing agent to dry skin as needed. Applied primary and secondary dressing as ordered. Applied multilayered dressing below the knee to right lower leg. Instructed patient/caregiver not to remove dressing and to keep it clean and dry. Instructed patient/caregiver on complications to report to provider, such as pain, numbness in toes, heavy drainage, and slippage of dressing. Instructed patient on purpose of compression dressing and on activity and exercise recommendations.       Electronically signed by Stewart Dumont LPN on 5/58/7476 at 0:83 PM

## 2021-05-25 PROBLEM — I83.892 VARICOSE VEINS OF LEFT LOWER EXTREMITY WITH OTHER COMPLICATIONS: Status: ACTIVE | Noted: 2021-05-25

## 2021-05-26 ENCOUNTER — HOSPITAL ENCOUNTER (OUTPATIENT)
Dept: WOUND CARE | Age: 76
Discharge: HOME | End: 2021-05-26
Payer: COMMERCIAL

## 2021-05-26 VITALS
HEART RATE: 70 BPM | SYSTOLIC BLOOD PRESSURE: 151 MMHG | TEMPERATURE: 98.7 F | RESPIRATION RATE: 18 BRPM | DIASTOLIC BLOOD PRESSURE: 85 MMHG

## 2021-05-26 DIAGNOSIS — I83.029 VENOUS ULCER OF LEFT LEG (HCC): ICD-10-CM

## 2021-05-26 DIAGNOSIS — I87.331 CHRONIC VENOUS HYPERTENSION WITH ULCER AND INFLAMMATION INVOLVING RIGHT SIDE (HCC): Primary | ICD-10-CM

## 2021-05-26 DIAGNOSIS — L97.929 VENOUS ULCER OF LEFT LEG (HCC): ICD-10-CM

## 2021-05-26 DIAGNOSIS — L97.919 CHRONIC VENOUS HYPERTENSION WITH ULCER AND INFLAMMATION INVOLVING RIGHT SIDE (HCC): Primary | ICD-10-CM

## 2021-05-26 PROCEDURE — 11042 DBRDMT SUBQ TIS 1ST 20SQCM/<: CPT | Performed by: SURGERY

## 2021-05-26 PROCEDURE — 11042 DBRDMT SUBQ TIS 1ST 20SQCM/<: CPT

## 2021-05-26 PROCEDURE — 11045 DBRDMT SUBQ TISS EACH ADDL: CPT

## 2021-05-26 PROCEDURE — 11045 DBRDMT SUBQ TISS EACH ADDL: CPT | Performed by: SURGERY

## 2021-05-26 RX ORDER — GINSENG 100 MG
CAPSULE ORAL ONCE
Status: CANCELLED | OUTPATIENT
Start: 2021-05-26 | End: 2021-05-26

## 2021-05-26 RX ORDER — GENTAMICIN SULFATE 1 MG/G
OINTMENT TOPICAL ONCE
Status: CANCELLED | OUTPATIENT
Start: 2021-05-26 | End: 2021-05-26

## 2021-05-26 RX ORDER — LIDOCAINE HYDROCHLORIDE 20 MG/ML
JELLY TOPICAL ONCE
Status: CANCELLED | OUTPATIENT
Start: 2021-05-26 | End: 2021-05-26

## 2021-05-26 RX ORDER — BACITRACIN ZINC AND POLYMYXIN B SULFATE 500; 1000 [USP'U]/G; [USP'U]/G
OINTMENT TOPICAL ONCE
Status: CANCELLED | OUTPATIENT
Start: 2021-05-26 | End: 2021-05-26

## 2021-05-26 RX ORDER — LIDOCAINE HYDROCHLORIDE 40 MG/ML
SOLUTION TOPICAL ONCE
Status: CANCELLED | OUTPATIENT
Start: 2021-05-26 | End: 2021-05-26

## 2021-05-26 RX ORDER — LIDOCAINE 40 MG/G
CREAM TOPICAL ONCE
Status: CANCELLED | OUTPATIENT
Start: 2021-05-26 | End: 2021-05-26

## 2021-05-26 RX ORDER — BACITRACIN, NEOMYCIN, POLYMYXIN B 400; 3.5; 5 [USP'U]/G; MG/G; [USP'U]/G
OINTMENT TOPICAL ONCE
Status: CANCELLED | OUTPATIENT
Start: 2021-05-26 | End: 2021-05-26

## 2021-05-26 RX ORDER — LIDOCAINE HYDROCHLORIDE 40 MG/ML
SOLUTION TOPICAL ONCE
Status: DISCONTINUED | OUTPATIENT
Start: 2021-05-26 | End: 2021-05-27 | Stop reason: HOSPADM

## 2021-05-26 RX ORDER — LIDOCAINE 50 MG/G
OINTMENT TOPICAL ONCE
Status: CANCELLED | OUTPATIENT
Start: 2021-05-26 | End: 2021-05-26

## 2021-05-26 RX ORDER — BETAMETHASONE DIPROPIONATE 0.05 %
OINTMENT (GRAM) TOPICAL ONCE
Status: CANCELLED | OUTPATIENT
Start: 2021-05-26 | End: 2021-05-26

## 2021-05-26 RX ORDER — CLOBETASOL PROPIONATE 0.5 MG/G
OINTMENT TOPICAL ONCE
Status: CANCELLED | OUTPATIENT
Start: 2021-05-26 | End: 2021-05-26

## 2021-05-26 NOTE — PROGRESS NOTES
Wound Care Center Progress Note with Procedure Note      Maxine Quick  AGE: 77 y.o. GENDER: male  : 1945  EPISODE DATE:  2021     Subjective:     Chief Complaint   Patient presents with    Wound Check     Right lower leg         HISTORY of PRESENT ILLNESS      Maxine Quick is a 77 y.o. male who presents today for wound evaluation of Chronic venous wound(s) of R lower leg medial.  The wound is of moderate severity. The underlying cause of the wound is venous disease and trauma.     Wound Pain Timing/Severity: none  Quality of pain: N/A  Severity of pain:  0 / 10   Modifying Factors: venous stasis  Associated Signs/Symptoms: drainage        PAST MEDICAL HISTORY        Diagnosis Date    Chronic venous hypertension with ulcer and inflammation involving right side (HCC)     RLE    Hypertension     past    Iron deficiency     Lymphoma Dx 11-    Hx of B cell lymphoma-in remission since        PAST SURGICAL HISTORY    Past Surgical History:   Procedure Laterality Date    TUNNELED VENOUS PORT PLACEMENT      TUNNELED VENOUS PORT PLACEMENT      removed 2012       FAMILY HISTORY    Family History   Problem Relation Age of Onset    Early Death Mother 52    Diabetes Father     Heart Disease Father     Vision Loss Son         \"He wears glasses\"    Early Death Daughter 36       SOCIAL HISTORY    Social History     Tobacco Use    Smoking status: Never Smoker    Smokeless tobacco: Never Used   Substance Use Topics    Alcohol use: No    Drug use: No       ALLERGIES    No Known Allergies    MEDICATIONS    Current Outpatient Medications on File Prior to Encounter   Medication Sig Dispense Refill    ciprofloxacin (CIPRO) 500 MG tablet TAKE 1 TABLET BY MOUTH TWO TIMES A DAY      metoprolol succinate (TOPROL XL) 50 MG extended release tablet Take 1 tablet by mouth daily 30 tablet 2     No current facility-administered medications on file prior to encounter. REVIEW OF SYSTEMS    Pertinent items are noted in HPI. Constitutional: Negative for systemic symptoms including fever, chills and malaise. Objective:      BP (!) 151/85   Pulse 70   Temp 98.7 °F (37.1 °C) (Temporal)   Resp 18     PHYSICAL EXAM      General: The patient is in no acute distress. Mental status:  Patient is appropriate, is  oriented to place and plan of care. Dermatologic exam: Visual inspection of the periwound reveals the skin to be edematous  Wound exam: see wound description below in procedure note      Assessment:     Problem List Items Addressed This Visit     Chronic venous hypertension with ulcer and inflammation (HCC) - Primary    Relevant Medications    lidocaine (XYLOCAINE) 4 % external solution    Other Relevant Orders    Supply: Wound Cleanser    Supply: Wound Dressings    Supply: Cover and Secure    Supply: Edema Control    Venous ulcer of left leg (HCC)    Relevant Medications    lidocaine (XYLOCAINE) 4 % external solution    Other Relevant Orders    Supply: Wound Cleanser    Supply: Wound Dressings    Supply: Cover and Secure    Supply: Edema Control        Procedure Note    Indications:  Based on my examination of this patient's wound(s) today, sharp excision into necrotic subcutaneous tissue is required to promote healing and evaluate the extent of previous healing. Performed by: Peri Villeda MD    Consent obtained: Yes    Time out taken:  Yes    Pain Control: topical       Debridement:Excisional Debridement    Using curette the wound(s) was/were sharply debrided down through and including the removal of subcutaneous tissue.         Devitalized Tissue Debrided:  slough    Pre Debridement Measurements:  Are located in the Wound Documentation Flow Sheet    All active wounds listed below with today's date are evaluated  Wound(s)    debrided this date include # : 1     Post  Debridement Measurements:  Wound 09/30/20 Wound #1 Right Medial Lower Leg Cluster (Onset 5 Years) (Active)   Wound Image   05/19/21 1257   Wound Etiology Venous 05/19/21 1257   Dressing Status New dressing applied;Clean;Dry; Intact 05/26/21 1325   Wound Cleansed Wound cleanser; Soap and water 05/26/21 1256   Dressing/Treatment Alginate with Ag 01/13/21 1657   Offloading for Diabetic Foot Ulcers No offloading required 05/26/21 1256   Wound Length (cm) 12 cm 05/26/21 1256   Wound Width (cm) 6 cm 05/26/21 1256   Wound Depth (cm) 0.1 cm 05/26/21 1256   Wound Surface Area (cm^2) 72 cm^2 05/26/21 1256   Change in Wound Size % (l*w) 71.43 05/26/21 1256   Wound Volume (cm^3) 7.2 cm^3 05/26/21 1256   Wound Healing % 71 05/26/21 1256   Post-Procedure Length (cm) 12 cm 05/26/21 1319   Post-Procedure Width (cm) 6 cm 05/26/21 1319   Post-Procedure Depth (cm) 0.1 cm 05/26/21 1319   Post-Procedure Surface Area (cm^2) 72 cm^2 05/26/21 1319   Post-Procedure Volume (cm^3) 7.2 cm^3 05/26/21 1319   Distance Tunneling (cm) 0 cm 05/26/21 1256   Tunneling Position ___ O'Clock 0 05/26/21 1256   Undermining Starts ___ O'Clock 0 05/26/21 1256   Undermining Ends___ O'Clock 0 05/26/21 1256   Undermining Maxium Distance (cm) 0 05/26/21 1256   Wound Assessment Granulation tissue;Pink/red 05/26/21 1256   Drainage Amount Moderate 05/26/21 1256   Drainage Description Serosanguinous 05/26/21 1256   Odor Mild 05/26/21 1256   Cassia-wound Assessment Dry/flaky 05/26/21 1256   Margins Defined edges 05/26/21 1256   Wound Thickness Description not for Pressure Injury Full thickness 05/26/21 1256   Number of days: 238       Wound 05/19/21 Pedal Anterior;Right #2 Right dorsal foot (Active)   Wound Image   05/19/21 1310   Wound Etiology Traumatic 05/19/21 1310   Dressing Status New dressing applied;Clean;Dry; Intact 05/26/21 1325   Wound Cleansed Soap and water 05/26/21 1256   Offloading for Diabetic Foot Ulcers No offloading required 05/26/21 1256   Wound Length (cm) 0.5 cm 05/26/21 1256   Wound Width (cm) 0.5 cm 05/26/21 1256   Wound Depth (cm) 0.1 cm 05/26/21 1256   Wound Surface Area (cm^2) 0.25 cm^2 05/26/21 1256   Change in Wound Size % (l*w) 28.57 05/26/21 1256   Wound Volume (cm^3) 0.02 cm^3 05/26/21 1256   Wound Healing % 50 05/26/21 1256   Distance Tunneling (cm) 0 cm 05/26/21 1256   Tunneling Position ___ O'Clock 0 05/26/21 1256   Undermining Starts ___ O'Clock 0 05/26/21 1256   Undermining Ends___ O'Clock 0 05/26/21 1256   Undermining Maxium Distance (cm) 0 05/26/21 1256   Wound Assessment Granulation tissue;Pink/red 05/26/21 1256   Drainage Amount Small 05/26/21 1256   Drainage Description Serosanguinous 05/26/21 1256   Odor None 05/26/21 1256   Cassia-wound Assessment Maceration 05/26/21 1256   Margins Attached edges 05/26/21 1256   Wound Thickness Description not for Pressure Injury Full thickness 05/26/21 1256   Number of days: 7       Percent of Wound(s) Debrided: approximately 100%    Total Surface Area Debrided:  71 sq cm     Bleeding:  Minimal    Hemostasis Achieved:  by pressure    Procedural Pain:  0  / 10     Post Procedural Pain:  0 / 10     Response to treatment:  Well tolerated by patient. Status of wound progress and description from last visit:   Improved. Plan:       Discharge Instructions          PHYSICIAN ORDERS AND DISCHARGE INSTRUCTIONS     NOTE: Upon discharge from the [address], you will receive a patient experience survey.  We would be grateful if you would take the time to fill this survey out.     Wound care order history:                 Vascular studies: Arterial studies done 9/27/20; no stenosis              Imaging:   Date               Cultures:   obtained on 11/18/2020                                 Obtained on 1/20/2021               Labs/ HbA1c:   Date               Grafts:                       #1 Apligraf on 12/16/2020                     #2 Apligraf on 12/23/2020                     #3 Apligraf on 12/30/2020                     #4 Apligraf on 1/6/2021                     # 5 Apligraf on 1/13/21                     #1 [place] 1/27/2021                     #2 [place] 2/3/2021                     #3 [place] 2/10/2021                     #4 [place] Right Medial Lower Leg 2/17/21                     #5 [place] 3/10/2021                   HBO:                Antibiotics:  Bactrim DS BID for 10 days on 11/18/2020                                   Cipro BID for 10 days ordered on 11/25/2020                                    Cipro 500 mg BID on 1/20/2021                                    Cipro 500 mg BID on 4/28/2021              Earlier Wound care treatments:                Authorizations:                        Consults:   Date                           Primary care physician:      Continuing wound care orders and information:              Residence:  private               Continue home health care with: [facility] wound-care supplies will be provided by: Leidy Wise provider:              JACKIE with  Jesica Tadeo loading:  Date               DMNRE Medications:              MIUBE cleansing:                           HC not scrub or use excessive force.                          Wash hands with soap and water before and after dressing changes.                         Prior to applying a clean dressing, cleanse wound with normal saline,                                wound cleanser, or mild soap and water.                           Ask the physician or nurse before getting the wound(s) wet in a shower              Daily Wound management:                          Keep weight off wounds and reposition every 2 hours.                          Avoid standing for long periods of time.                          Apply wraps/stockings in AM and remove at bedtime.                          If swelling is present, elevate legs to the level of the heart or above for 30                              minutes 4-5 times a day and/or when sitting.

## 2021-05-26 NOTE — PROGRESS NOTES
Multilayer Compression Wrap   (Not Unna) Below the Knee    NAME:  Jcarlos Green  YOB: 1945  MEDICAL RECORD NUMBER:  [id number]  DATE:  5/26/2021    Multilayer compression wrap: Removed old Multilayer wrap if indicated and wash leg with mild soap/water. Applied moisturizing agent to dry skin as needed. Applied primary and secondary dressing as ordered. Applied multilayered dressing below the knee to right lower leg. Instructed patient/caregiver not to remove dressing and to keep it clean and dry. Instructed patient/caregiver on complications to report to provider, such as pain, numbness in toes, heavy drainage, and slippage of dressing. Instructed patient on purpose of compression dressing and on activity and exercise recommendations.       Electronically signed by Cristela Yee LPN on 7/28/6669 at 0:33 PM

## 2021-06-02 ENCOUNTER — HOSPITAL ENCOUNTER (OUTPATIENT)
Dept: WOUND CARE | Age: 76
Discharge: HOME | End: 2021-06-02
Payer: COMMERCIAL

## 2021-06-02 VITALS
TEMPERATURE: 98 F | HEART RATE: 68 BPM | SYSTOLIC BLOOD PRESSURE: 165 MMHG | RESPIRATION RATE: 18 BRPM | DIASTOLIC BLOOD PRESSURE: 86 MMHG

## 2021-06-02 DIAGNOSIS — L97.919 CHRONIC VENOUS HYPERTENSION WITH ULCER AND INFLAMMATION INVOLVING RIGHT SIDE (HCC): Primary | ICD-10-CM

## 2021-06-02 DIAGNOSIS — L97.929 VENOUS ULCER OF LEFT LEG (HCC): ICD-10-CM

## 2021-06-02 DIAGNOSIS — I87.331 CHRONIC VENOUS HYPERTENSION WITH ULCER AND INFLAMMATION INVOLVING RIGHT SIDE (HCC): Primary | ICD-10-CM

## 2021-06-02 DIAGNOSIS — I83.029 VENOUS ULCER OF LEFT LEG (HCC): ICD-10-CM

## 2021-06-02 PROCEDURE — 11045 DBRDMT SUBQ TISS EACH ADDL: CPT | Performed by: SURGERY

## 2021-06-02 PROCEDURE — 11042 DBRDMT SUBQ TIS 1ST 20SQCM/<: CPT

## 2021-06-02 PROCEDURE — 11042 DBRDMT SUBQ TIS 1ST 20SQCM/<: CPT | Performed by: SURGERY

## 2021-06-02 PROCEDURE — 11045 DBRDMT SUBQ TISS EACH ADDL: CPT

## 2021-06-02 RX ORDER — LIDOCAINE HYDROCHLORIDE 20 MG/ML
JELLY TOPICAL ONCE
Status: CANCELLED | OUTPATIENT
Start: 2021-06-02 | End: 2021-06-02

## 2021-06-02 RX ORDER — GINSENG 100 MG
CAPSULE ORAL ONCE
Status: CANCELLED | OUTPATIENT
Start: 2021-06-02 | End: 2021-06-02

## 2021-06-02 RX ORDER — BACITRACIN ZINC AND POLYMYXIN B SULFATE 500; 1000 [USP'U]/G; [USP'U]/G
OINTMENT TOPICAL ONCE
Status: CANCELLED | OUTPATIENT
Start: 2021-06-02 | End: 2021-06-02

## 2021-06-02 RX ORDER — BETAMETHASONE DIPROPIONATE 0.05 %
OINTMENT (GRAM) TOPICAL ONCE
Status: CANCELLED | OUTPATIENT
Start: 2021-06-02 | End: 2021-06-02

## 2021-06-02 RX ORDER — GENTAMICIN SULFATE 1 MG/G
OINTMENT TOPICAL ONCE
Status: CANCELLED | OUTPATIENT
Start: 2021-06-02 | End: 2021-06-02

## 2021-06-02 RX ORDER — LIDOCAINE HYDROCHLORIDE 40 MG/ML
SOLUTION TOPICAL ONCE
Status: CANCELLED | OUTPATIENT
Start: 2021-06-02 | End: 2021-06-02

## 2021-06-02 RX ORDER — BACITRACIN, NEOMYCIN, POLYMYXIN B 400; 3.5; 5 [USP'U]/G; MG/G; [USP'U]/G
OINTMENT TOPICAL ONCE
Status: CANCELLED | OUTPATIENT
Start: 2021-06-02 | End: 2021-06-02

## 2021-06-02 RX ORDER — CLOBETASOL PROPIONATE 0.5 MG/G
OINTMENT TOPICAL ONCE
Status: CANCELLED | OUTPATIENT
Start: 2021-06-02 | End: 2021-06-02

## 2021-06-02 RX ORDER — LIDOCAINE 50 MG/G
OINTMENT TOPICAL ONCE
Status: DISCONTINUED | OUTPATIENT
Start: 2021-06-02 | End: 2021-06-03 | Stop reason: HOSPADM

## 2021-06-02 RX ORDER — LIDOCAINE 50 MG/G
OINTMENT TOPICAL ONCE
Status: CANCELLED | OUTPATIENT
Start: 2021-06-02 | End: 2021-06-02

## 2021-06-02 RX ORDER — LIDOCAINE 40 MG/G
CREAM TOPICAL ONCE
Status: CANCELLED | OUTPATIENT
Start: 2021-06-02 | End: 2021-06-02

## 2021-06-02 NOTE — PROGRESS NOTES
Wound Care Center Progress Note with Procedure Note      Ana Luisa Stiles  AGE: 77 y.o. GENDER: male  : 1945  EPISODE DATE:  2021     Subjective:     Chief Complaint   Patient presents with    Wound Check     right lower leg         HISTORY of PRESENT ILLNESS      Ana Luisa Stiles is a 77 y.o. male who presents today for wound evaluation of Chronic venous and traumatic wound(s) of R lower leg medial.  The wound is of moderate severity. The underlying cause of the wound is venous disease and trauma.     Wound Pain Timing/Severity: none  Quality of pain: N/A  Severity of pain:  0 / 10   Modifying Factors: edema and venous stasis  Associated Signs/Symptoms: erythema        PAST MEDICAL HISTORY        Diagnosis Date    Chronic venous hypertension with ulcer and inflammation involving right side (HCC)     RLE    Hypertension     past    Iron deficiency     Lymphoma Dx 11    Hx of B cell lymphoma-in remission since        PAST SURGICAL HISTORY    Past Surgical History:   Procedure Laterality Date    TUNNELED VENOUS PORT PLACEMENT      TUNNELED VENOUS PORT PLACEMENT      removed 2012       FAMILY HISTORY    Family History   Problem Relation Age of Onset    Early Death Mother 52    Diabetes Father     Heart Disease Father     Vision Loss Son         \"He wears glasses\"    Early Death Daughter 36       SOCIAL HISTORY    Social History     Tobacco Use    Smoking status: Never Smoker    Smokeless tobacco: Never Used   Substance Use Topics    Alcohol use: No    Drug use: No       ALLERGIES    No Known Allergies    MEDICATIONS    Current Outpatient Medications on File Prior to Encounter   Medication Sig Dispense Refill    ciprofloxacin (CIPRO) 500 MG tablet TAKE 1 TABLET BY MOUTH TWO TIMES A DAY      metoprolol succinate (TOPROL XL) 50 MG extended release tablet Take 1 tablet by mouth daily 30 tablet 2     No current facility-administered medications on file prior to encounter. REVIEW OF SYSTEMS    Pertinent items are noted in HPI. Constitutional: Negative for systemic symptoms including fever, chills and malaise. Objective:      BP (!) 165/86   Pulse 68   Temp 98 °F (36.7 °C) (Temporal)   Resp 18     PHYSICAL EXAM      General: The patient is in no acute distress. Mental status:  Patient is appropriate, is  oriented to place and plan of care. Dermatologic exam: Visual inspection of the periwound reveals the skin to be edematous  Wound exam: see wound description below in procedure note      Assessment:     Problem List Items Addressed This Visit     Chronic venous hypertension with ulcer and inflammation (HCC) - Primary    Relevant Medications    lidocaine (XYLOCAINE) 5 % ointment    Other Relevant Orders    Supply: Wound Cleanser    Supply: Wound Dressings    Supply: Cover and Secure    Supply: Edema Control    Venous ulcer of left leg (HCC)    Relevant Medications    lidocaine (XYLOCAINE) 5 % ointment    Other Relevant Orders    Supply: Wound Cleanser    Supply: Wound Dressings    Supply: Cover and Secure    Supply: Edema Control        Procedure Note    Indications:  Based on my examination of this patient's wound(s) today, sharp excision into necrotic subcutaneous tissue is required to promote healing and evaluate the extent of previous healing. Performed by: Kelly Hermosillo MD    Consent obtained: Yes    Time out taken:  Yes    Pain Control: topical       Debridement:Excisional Debridement    Using curette the wound(s) was/were sharply debrided down through and including the removal of subcutaneous tissue.         Devitalized Tissue Debrided:  slough    Pre Debridement Measurements:  Are located in the Wound Documentation Flow Sheet    All active wounds listed below with today's date are evaluated  Wound(s)    debrided this date include # : 1     Post  Debridement Measurements:  Wound 09/30/20 Wound #1 Right Medial Lower Leg Cluster (Onset 5 Years) (Active)   Wound Image   06/02/21 1247   Wound Etiology Venous 05/19/21 1257   Dressing Status New dressing applied;Clean;Dry; Intact 05/26/21 1325   Wound Cleansed Wound cleanser; Soap and water 06/02/21 1247   Dressing/Treatment Alginate with Ag 01/13/21 1657   Offloading for Diabetic Foot Ulcers No offloading required 06/02/21 1247   Wound Length (cm) 11 cm 06/02/21 1247   Wound Width (cm) 5.7 cm 06/02/21 1247   Wound Depth (cm) 0.1 cm 06/02/21 1247   Wound Surface Area (cm^2) 62.7 cm^2 06/02/21 1247   Change in Wound Size % (l*w) 75.12 06/02/21 1247   Wound Volume (cm^3) 6.27 cm^3 06/02/21 1247   Wound Healing % 75 06/02/21 1247   Post-Procedure Length (cm) 11 cm 06/02/21 1320   Post-Procedure Width (cm) 5.7 cm 06/02/21 1320   Post-Procedure Depth (cm) 0.1 cm 06/02/21 1320   Post-Procedure Surface Area (cm^2) 62.7 cm^2 06/02/21 1320   Post-Procedure Volume (cm^3) 6.27 cm^3 06/02/21 1320   Distance Tunneling (cm) 0 cm 06/02/21 1247   Tunneling Position ___ O'Clock 0 06/02/21 1247   Undermining Starts ___ O'Clock 0 06/02/21 1247   Undermining Ends___ O'Clock 0 06/02/21 1247   Undermining Maxium Distance (cm) 0 06/02/21 1247   Wound Assessment Granulation tissue;Pink/red 06/02/21 1247   Drainage Amount Moderate 06/02/21 1247   Drainage Description Serosanguinous 06/02/21 1247   Odor Mild 06/02/21 1247   Cassia-wound Assessment Dry/flaky 06/02/21 1247   Margins Defined edges 06/02/21 1247   Wound Thickness Description not for Pressure Injury Full thickness 06/02/21 1247   Number of days: 245       Wound 05/19/21 Pedal Anterior;Right #2 Right dorsal foot (Active)   Wound Image   05/19/21 1310   Wound Etiology Traumatic 05/19/21 1310   Dressing Status New dressing applied;Clean;Dry; Intact 05/26/21 1325   Wound Cleansed Soap and water 05/26/21 1256   Offloading for Diabetic Foot Ulcers No offloading required 05/26/21 1256   Wound Length (cm) 0.5 cm 05/26/21 1256   Wound Width (cm) 0.5 cm 05/26/21 1256   Wound Depth (cm) 0.1 cm 05/26/21 1256   Wound Surface Area (cm^2) 0.25 cm^2 05/26/21 1256   Change in Wound Size % (l*w) 28.57 05/26/21 1256   Wound Volume (cm^3) 0.02 cm^3 05/26/21 1256   Wound Healing % 50 05/26/21 1256   Distance Tunneling (cm) 0 cm 05/26/21 1256   Tunneling Position ___ O'Clock 0 05/26/21 1256   Undermining Starts ___ O'Clock 0 05/26/21 1256   Undermining Ends___ O'Clock 0 05/26/21 1256   Undermining Maxium Distance (cm) 0 05/26/21 1256   Wound Assessment Granulation tissue;Pink/red 05/26/21 1256   Drainage Amount Small 05/26/21 1256   Drainage Description Serosanguinous 05/26/21 1256   Odor None 05/26/21 1256   Cassia-wound Assessment Maceration 05/26/21 1256   Margins Attached edges 05/26/21 1256   Wound Thickness Description not for Pressure Injury Full thickness 05/26/21 1256   Number of days: 14       Percent of Wound(s) Debrided: approximately 100%    Total Surface Area Debrided:  62 sq cm     Bleeding:  Minimal    Hemostasis Achieved:  by pressure    Procedural Pain:  0  / 10     Post Procedural Pain:  0 / 10     Response to treatment:  Well tolerated by patient. Status of wound progress and description from last visit:   Improved. Plan:       Discharge Instructions       PHYSICIAN ORDERS AND DISCHARGE INSTRUCTIONS     NOTE: [facility] will receive a patient experience survey.  We would be grateful if you would take the time to fill this survey out.     Wound care order history:                 Vascular studies: Arterial studies done 9/27/20; no stenosis              Imaging:   Date               Cultures:   obtained on 11/18/2020                                 Obtained on 1/20/2021               Labs/ HbA1c:   Date               Grafts:                       #1 Apligraf on 12/16/2020                     #2 Apligraf on 12/23/2020                     #3 Apligraf on 12/30/2020                     #4 Apligraf on 1/6/2021                     # 5 Apligraf on 1/13/21                     #1 [place] 1/27/2021                     #2 [place] 2/3/2021                     #3 [place] 2/10/2021                     #4 [place] Right Medial Lower Leg 2/17/21                     #5 [place] 3/10/2021                   HBO:                Antibiotics:  Bactrim DS BID for 10 days on 11/18/2020                                   Cipro BID for 10 days ordered on 11/25/2020                                    Cipro 500 mg BID on 1/20/2021                                    Cipro 500 mg BID on 4/28/2021              Earlier Wound care treatments:                Authorizations:                        Consults:   Date                           Primary care physician:      Continuing wound care orders and information:              Residence:  [facility] home health care with: [facility] wound-care supplies will be provided by: Enoc Hernandes provider:              JAIME Lynn loading:  Date               GCIKN Medications:              NAEZC cleansing:                           HO not scrub or use excessive force.                          Wash hands with soap and water before and after dressing changes.                           Prior to applying a clean dressing, cleanse wound with normal saline,                                wound cleanser, or mild soap and water.                           Ask the physician or nurse before getting the wound(s) wet in a shower              Daily Wound management:                          Keep weight off wounds and reposition every 2 hours.                          Avoid standing for long periods of time.                          Apply wraps/stockings in AM and remove at bedtime.                          If swelling is present, elevate legs to the level of the heart or above for 30                              minutes 4-5 times a day and/or when sitting.                                             BSAX taking antibiotics take entire prescription as ordered by physician                             EL not stop taking until medicine is all gone.                                           Orders for this week: 6/2/21     Right dorsal foot- wash with soap and water,past dry. Cover with jennifer and optfoam border. Change with wrap change.      Right lower leg -- wash with soap and water,pat dry. Right lower leg -- wash with soap and water,pat dry. ( Apply clobetasol on proximal leg in clinic)Apply Desitin or Calmazine to sarah wound. Cover with anasept gel covered  4x4  , Kerramax or ABD and wrap with coban 2        Homecare to change on Friday and   Monday (may use profore to wrap)  ---      Referral to Dr July Rangel on 2/24/2021, appt 3/18/2021 then 4/9/2021,  first procedure scheduled on 4/27/2021                    Follow up with Dr Ezio Donovan in 1 week in the wound care center  Call [phone] for any questions or concerns.   Date__________   Time        Treatment Note      Written Patient Dismissal Instructions Given            Electronically signed by Pastor Miriam MD on 6/2/2021 at 1:39 PM

## 2021-06-02 NOTE — PROGRESS NOTES
Multilayer Compression Wrap   (Not Unna) Below the Knee    NAME:  Lola Encinas  YOB: 1945  MEDICAL RECORD NUMBER:  [id number]  DATE:  6/2/2021    Multilayer compression wrap: Removed old Multilayer wrap if indicated and wash leg with mild soap/water. Applied moisturizing agent to dry skin as needed. Applied primary and secondary dressing as ordered. Applied multilayered dressing below the knee to right lower leg. Instructed patient/caregiver not to remove dressing and to keep it clean and dry. Instructed patient/caregiver on complications to report to provider, such as pain, numbness in toes, heavy drainage, and slippage of dressing. Instructed patient on purpose of compression dressing and on activity and exercise recommendations.       Electronically signed by Petra Marx LPN on 6/1/4066 at 8:83 PM

## 2021-06-09 ENCOUNTER — HOSPITAL ENCOUNTER (OUTPATIENT)
Dept: WOUND CARE | Age: 76
Discharge: HOME | End: 2021-06-09
Payer: COMMERCIAL

## 2021-06-09 VITALS
DIASTOLIC BLOOD PRESSURE: 86 MMHG | SYSTOLIC BLOOD PRESSURE: 155 MMHG | RESPIRATION RATE: 16 BRPM | HEART RATE: 70 BPM | TEMPERATURE: 98.6 F

## 2021-06-09 DIAGNOSIS — L97.929 VENOUS ULCER OF LEFT LEG (HCC): ICD-10-CM

## 2021-06-09 DIAGNOSIS — L97.919 CHRONIC VENOUS HYPERTENSION WITH ULCER AND INFLAMMATION INVOLVING RIGHT SIDE (HCC): Primary | ICD-10-CM

## 2021-06-09 DIAGNOSIS — I83.029 VENOUS ULCER OF LEFT LEG (HCC): ICD-10-CM

## 2021-06-09 DIAGNOSIS — I87.331 CHRONIC VENOUS HYPERTENSION WITH ULCER AND INFLAMMATION INVOLVING RIGHT SIDE (HCC): Primary | ICD-10-CM

## 2021-06-09 PROCEDURE — 11045 DBRDMT SUBQ TISS EACH ADDL: CPT

## 2021-06-09 PROCEDURE — 11042 DBRDMT SUBQ TIS 1ST 20SQCM/<: CPT

## 2021-06-09 PROCEDURE — 11045 DBRDMT SUBQ TISS EACH ADDL: CPT | Performed by: SURGERY

## 2021-06-09 PROCEDURE — 11042 DBRDMT SUBQ TIS 1ST 20SQCM/<: CPT | Performed by: SURGERY

## 2021-06-09 RX ORDER — BETAMETHASONE DIPROPIONATE 0.05 %
OINTMENT (GRAM) TOPICAL ONCE
Status: CANCELLED | OUTPATIENT
Start: 2021-06-09 | End: 2021-06-09

## 2021-06-09 RX ORDER — GINSENG 100 MG
CAPSULE ORAL ONCE
Status: CANCELLED | OUTPATIENT
Start: 2021-06-09 | End: 2021-06-09

## 2021-06-09 RX ORDER — LIDOCAINE 50 MG/G
OINTMENT TOPICAL ONCE
Status: CANCELLED | OUTPATIENT
Start: 2021-06-09 | End: 2021-06-09

## 2021-06-09 RX ORDER — BACITRACIN, NEOMYCIN, POLYMYXIN B 400; 3.5; 5 [USP'U]/G; MG/G; [USP'U]/G
OINTMENT TOPICAL ONCE
Status: CANCELLED | OUTPATIENT
Start: 2021-06-09 | End: 2021-06-09

## 2021-06-09 RX ORDER — LIDOCAINE HYDROCHLORIDE 20 MG/ML
JELLY TOPICAL ONCE
Status: CANCELLED | OUTPATIENT
Start: 2021-06-09 | End: 2021-06-09

## 2021-06-09 RX ORDER — LIDOCAINE HYDROCHLORIDE 40 MG/ML
SOLUTION TOPICAL ONCE
Status: DISCONTINUED | OUTPATIENT
Start: 2021-06-09 | End: 2021-06-10 | Stop reason: HOSPADM

## 2021-06-09 RX ORDER — CLOBETASOL PROPIONATE 0.5 MG/G
OINTMENT TOPICAL ONCE
Status: CANCELLED | OUTPATIENT
Start: 2021-06-09 | End: 2021-06-09

## 2021-06-09 RX ORDER — BACITRACIN ZINC AND POLYMYXIN B SULFATE 500; 1000 [USP'U]/G; [USP'U]/G
OINTMENT TOPICAL ONCE
Status: CANCELLED | OUTPATIENT
Start: 2021-06-09 | End: 2021-06-09

## 2021-06-09 RX ORDER — LIDOCAINE HYDROCHLORIDE 40 MG/ML
SOLUTION TOPICAL ONCE
Status: CANCELLED | OUTPATIENT
Start: 2021-06-09 | End: 2021-06-09

## 2021-06-09 RX ORDER — GENTAMICIN SULFATE 1 MG/G
OINTMENT TOPICAL ONCE
Status: CANCELLED | OUTPATIENT
Start: 2021-06-09 | End: 2021-06-09

## 2021-06-09 RX ORDER — LIDOCAINE 40 MG/G
CREAM TOPICAL ONCE
Status: CANCELLED | OUTPATIENT
Start: 2021-06-09 | End: 2021-06-09

## 2021-06-09 NOTE — PROGRESS NOTES
Multilayer Compression Wrap   (Not Unna) Below the Knee    NAME:  Harland Moritz  YOB: 1945  MEDICAL RECORD NUMBER:  [id number]  DATE:  6/9/2021    Multilayer compression wrap: Removed old Multilayer wrap if indicated and wash leg with mild soap/water. Applied moisturizing agent to dry skin as needed. Applied primary and secondary dressing as ordered. Applied multilayered dressing below the knee to right lower leg. Instructed patient/caregiver not to remove dressing and to keep it clean and dry. Instructed patient/caregiver on complications to report to provider, such as pain, numbness in toes, heavy drainage, and slippage of dressing. Instructed patient on purpose of compression dressing and on activity and exercise recommendations.       Electronically signed by Compa Saba LPN on 6/0/0074 at 8:76 PM

## 2021-06-09 NOTE — PROGRESS NOTES
Wound Care Center Progress Note with Procedure Note      Harland Moritz  AGE: 77 y.o. GENDER: male  : 1945  EPISODE DATE:  2021     Subjective:     Chief Complaint   Patient presents with    Wound Check     Right lower leg         HISTORY of PRESENT ILLNESS      Harland Moritz is a 77 y.o. male who presents today for wound evaluation of Chronic venous wound(s) of R lower leg medial.  The wound is of moderate severity. The underlying cause of the wound is venous disease.     Wound Pain Timing/Severity: none  Quality of pain: N/A  Severity of pain:  0 / 10   Modifying Factors: edema and venous stasis  Associated Signs/Symptoms: none        PAST MEDICAL HISTORY        Diagnosis Date    Chronic venous hypertension with ulcer and inflammation involving right side (HCC)     RLE    Hypertension     past    Iron deficiency     Lymphoma Dx 11-    Hx of B cell lymphoma-in remission since        PAST SURGICAL HISTORY    Past Surgical History:   Procedure Laterality Date    TUNNELED VENOUS PORT PLACEMENT      TUNNELED VENOUS PORT PLACEMENT      removed 2012       FAMILY HISTORY    Family History   Problem Relation Age of Onset    Early Death Mother 52    Diabetes Father     Heart Disease Father     Vision Loss Son         \"He wears glasses\"    Early Death Daughter 36       SOCIAL HISTORY    Social History     Tobacco Use    Smoking status: Never Smoker    Smokeless tobacco: Never Used   Substance Use Topics    Alcohol use: No    Drug use: No       ALLERGIES    No Known Allergies    MEDICATIONS    Current Outpatient Medications on File Prior to Encounter   Medication Sig Dispense Refill    ciprofloxacin (CIPRO) 500 MG tablet TAKE 1 TABLET BY MOUTH TWO TIMES A DAY      metoprolol succinate (TOPROL XL) 50 MG extended release tablet Take 1 tablet by mouth daily 30 tablet 2     No current facility-administered medications on file prior to encounter. REVIEW OF SYSTEMS    Pertinent items are noted in HPI. Constitutional: Negative for systemic symptoms including fever, chills and malaise. Objective:      BP (!) 155/86   Pulse 70   Temp 98.6 °F (37 °C) (Temporal)   Resp 16     PHYSICAL EXAM      General: The patient is in no acute distress. Mental status:  Patient is appropriate, is  oriented to place and plan of care. Dermatologic exam: Visual inspection of the periwound reveals the skin to be edematous  Wound exam: see wound description below in procedure note      Assessment:     Problem List Items Addressed This Visit     Chronic venous hypertension with ulcer and inflammation (HCC) - Primary    Relevant Medications    lidocaine (XYLOCAINE) 4 % external solution    Other Relevant Orders    Supply: Wound Cleanser    Supply: Wound Dressings    Supply: Cover and Secure    Supply: Edema Control    Venous ulcer of left leg (HCC)    Relevant Medications    lidocaine (XYLOCAINE) 4 % external solution    Other Relevant Orders    Supply: Wound Cleanser    Supply: Wound Dressings    Supply: Cover and Secure    Supply: Edema Control        Procedure Note    Indications:  Based on my examination of this patient's wound(s) today, sharp excision into necrotic subcutaneous tissue is required to promote healing and evaluate the extent of previous healing. Performed by: Allison Ness MD    Consent obtained: Yes    Time out taken:  Yes    Pain Control: topical       Debridement:Excisional Debridement    Using curette the wound(s) was/were sharply debrided down through and including the removal of subcutaneous tissue.         Devitalized Tissue Debrided:  slough    Pre Debridement Measurements:  Are located in the Wound Documentation Flow Sheet    All active wounds listed below with today's date are evaluated  Wound(s)    debrided this date include # : 1     Post  Debridement Measurements:  Wound 09/30/20 Wound #1 Right Medial Lower Leg Cluster (Onset 5 Years) (Active)   Wound Image   06/09/21 1251   Wound Etiology Venous 05/19/21 1257   Dressing Status New dressing applied;Clean;Dry; Intact 06/02/21 1424   Wound Cleansed Wound cleanser; Soap and water 06/09/21 1251   Dressing/Treatment Alginate with Ag 01/13/21 1657   Offloading for Diabetic Foot Ulcers No offloading required 06/09/21 1251   Wound Length (cm) 10.5 cm 06/09/21 1251   Wound Width (cm) 5.5 cm 06/09/21 1251   Wound Depth (cm) 0.1 cm 06/09/21 1251   Wound Surface Area (cm^2) 57.75 cm^2 06/09/21 1251   Change in Wound Size % (l*w) 77.08 06/09/21 1251   Wound Volume (cm^3) 5.78 cm^3 06/09/21 1251   Wound Healing % 77 06/09/21 1251   Post-Procedure Length (cm) 10.5 cm 06/09/21 1316   Post-Procedure Width (cm) 5.5 cm 06/09/21 1316   Post-Procedure Depth (cm) 0.1 cm 06/09/21 1316   Post-Procedure Surface Area (cm^2) 57.75 cm^2 06/09/21 1316   Post-Procedure Volume (cm^3) 5.78 cm^3 06/09/21 1316   Distance Tunneling (cm) 0 cm 06/09/21 1251   Tunneling Position ___ O'Clock 0 06/09/21 1251   Undermining Starts ___ O'Clock 0 06/09/21 1251   Undermining Ends___ O'Clock 0 06/09/21 1251   Undermining Maxium Distance (cm) 0 06/09/21 1251   Wound Assessment Granulation tissue 06/09/21 1251   Drainage Amount Large 06/09/21 1251   Drainage Description Serosanguinous 06/09/21 1251   Odor None 06/09/21 1251   Cassia-wound Assessment Dry/flaky 06/09/21 1251   Margins Defined edges 06/09/21 1251   Wound Thickness Description not for Pressure Injury Full thickness 06/09/21 1251   Number of days: 252       Wound 05/19/21 Pedal Anterior;Right #2 Right dorsal foot (Active)   Wound Image   06/09/21 1251   Wound Etiology Traumatic 05/19/21 1310   Dressing Status New dressing applied;Clean;Dry; Intact 06/02/21 1320   Wound Cleansed Soap and water 06/09/21 1251   Offloading for Diabetic Foot Ulcers No offloading required 06/09/21 1251   Wound Length (cm) 0.5 cm 05/26/21 1256   Wound Width (cm) 0.5 cm 05/26/21 1256 Wound Depth (cm) 0.1 cm 06/09/21 1251   Wound Surface Area (cm^2) 0.25 cm^2 05/26/21 1256   Change in Wound Size % (l*w) 28.57 05/26/21 1256   Wound Volume (cm^3) 0.02 cm^3 05/26/21 1256   Wound Healing % 50 05/26/21 1256   Distance Tunneling (cm) 0 cm 05/26/21 1256   Tunneling Position ___ O'Clock 0 05/26/21 1256   Undermining Starts ___ O'Clock 0 05/26/21 1256   Undermining Ends___ O'Clock 0 05/26/21 1256   Undermining Maxium Distance (cm) 0 05/26/21 1256   Wound Assessment Granulation tissue;Pink/red 05/26/21 1256   Drainage Amount Small 05/26/21 1256   Drainage Description Serosanguinous 05/26/21 1256   Odor None 05/26/21 1256   Cassia-wound Assessment Maceration 05/26/21 1256   Margins Attached edges 05/26/21 1256   Wound Thickness Description not for Pressure Injury Full thickness 05/26/21 1256   Number of days: 21       Percent of Wound(s) Debrided: approximately 100%    Total Surface Area Debrided:  58 sq cm     Bleeding:  None    Hemostasis Achieved:  not needed    Procedural Pain:  0  / 10     Post Procedural Pain:  0 / 10     Response to treatment:  Well tolerated by patient. Status of wound progress and description from last visit:   Improved. Plan:       Discharge Instructions       PHYSICIAN ORDERS AND DISCHARGE INSTRUCTIONS     NOTE: [facility] you will receive a patient experience survey.  We would be grateful if you would take the time to fill this survey out.     Wound care order history:                 Vascular studies: Arterial studies done 9/27/20; no stenosis              Imaging:   Date               Cultures:   obtained on 11/18/2020                                 Obtained on 1/20/2021               Labs/ HbA1c:   Date               Grafts:                       #1 Apligraf on 12/16/2020                     #2 Apligraf on 12/23/2020                     #3 Apligraf on 12/30/2020                     #4 Apligraf on 1/6/2021                     # 5 Apligraf on                                             QDYL taking antibiotics take entire prescription as ordered by physician                             KB not stop taking until medicine is all gone.                                           Orders for this week: 6/9/21     Right dorsal foot- wash with soap and water,past dry. Cover with jennifer and optfoam border. Change with wrap change.      Right lower leg -- wash with soap and water,pat dry. Right lower leg -- wash with soap and water,pat dry. ( Apply clobetasol on proximal leg in clinic)Apply Desitin or Calmazine to sarah wound. Cover with anasept gel covered  4x4  , Kerramax or ABD and wrap with coban 2        Homecare to change on Friday and   Monday (may use Calcium alginate instead of anasept, profore to wrap)  ---      Referral to Dr Dolly Bourgeois on 2/24/2021, appt 3/18/2021 then 4/9/2021,  first procedure scheduled on 4/27/2021                    Follow up with Dr Mattie Alvarado in 1 week in the wound care center  Call [phone] for any questions or concerns.   Date__________   Time        Treatment Note      Written Patient Dismissal Instructions Given            Electronically signed by Beatrice Rachel MD on 6/9/2021 at 1:40 PM

## 2021-06-14 RX ORDER — METOPROLOL SUCCINATE 50 MG/1
50 TABLET, EXTENDED RELEASE ORAL DAILY
Qty: 30 TABLET | Refills: 0 | Status: SHIPPED | OUTPATIENT
Start: 2021-06-14 | End: 2021-07-01 | Stop reason: SDUPTHER

## 2021-06-16 ENCOUNTER — HOSPITAL ENCOUNTER (OUTPATIENT)
Dept: WOUND CARE | Age: 76
Discharge: HOME | End: 2021-06-16
Payer: COMMERCIAL

## 2021-06-16 VITALS — DIASTOLIC BLOOD PRESSURE: 77 MMHG | HEART RATE: 71 BPM | SYSTOLIC BLOOD PRESSURE: 141 MMHG | TEMPERATURE: 98.6 F

## 2021-06-16 DIAGNOSIS — I87.331 CHRONIC VENOUS HYPERTENSION WITH ULCER AND INFLAMMATION INVOLVING RIGHT SIDE (HCC): Primary | ICD-10-CM

## 2021-06-16 DIAGNOSIS — I83.029 VENOUS ULCER OF LEFT LEG (HCC): ICD-10-CM

## 2021-06-16 DIAGNOSIS — L97.929 VENOUS ULCER OF LEFT LEG (HCC): ICD-10-CM

## 2021-06-16 DIAGNOSIS — L97.919 CHRONIC VENOUS HYPERTENSION WITH ULCER AND INFLAMMATION INVOLVING RIGHT SIDE (HCC): Primary | ICD-10-CM

## 2021-06-16 PROCEDURE — 11042 DBRDMT SUBQ TIS 1ST 20SQCM/<: CPT

## 2021-06-16 PROCEDURE — 11045 DBRDMT SUBQ TISS EACH ADDL: CPT

## 2021-06-16 PROCEDURE — 11042 DBRDMT SUBQ TIS 1ST 20SQCM/<: CPT | Performed by: SURGERY

## 2021-06-16 PROCEDURE — 11045 DBRDMT SUBQ TISS EACH ADDL: CPT | Performed by: SURGERY

## 2021-06-16 RX ORDER — LIDOCAINE HYDROCHLORIDE 20 MG/ML
JELLY TOPICAL ONCE
Status: CANCELLED | OUTPATIENT
Start: 2021-06-16 | End: 2021-06-16

## 2021-06-16 RX ORDER — BETAMETHASONE DIPROPIONATE 0.05 %
OINTMENT (GRAM) TOPICAL ONCE
Status: CANCELLED | OUTPATIENT
Start: 2021-06-16 | End: 2021-06-16

## 2021-06-16 RX ORDER — LIDOCAINE 50 MG/G
OINTMENT TOPICAL ONCE
Status: DISCONTINUED | OUTPATIENT
Start: 2021-06-16 | End: 2021-06-17 | Stop reason: HOSPADM

## 2021-06-16 RX ORDER — GENTAMICIN SULFATE 1 MG/G
OINTMENT TOPICAL ONCE
Status: CANCELLED | OUTPATIENT
Start: 2021-06-16 | End: 2021-06-16

## 2021-06-16 RX ORDER — LIDOCAINE 40 MG/G
CREAM TOPICAL ONCE
Status: CANCELLED | OUTPATIENT
Start: 2021-06-16 | End: 2021-06-16

## 2021-06-16 RX ORDER — CLOBETASOL PROPIONATE 0.5 MG/G
OINTMENT TOPICAL ONCE
Status: CANCELLED | OUTPATIENT
Start: 2021-06-16 | End: 2021-06-16

## 2021-06-16 RX ORDER — BACITRACIN, NEOMYCIN, POLYMYXIN B 400; 3.5; 5 [USP'U]/G; MG/G; [USP'U]/G
OINTMENT TOPICAL ONCE
Status: CANCELLED | OUTPATIENT
Start: 2021-06-16 | End: 2021-06-16

## 2021-06-16 RX ORDER — GINSENG 100 MG
CAPSULE ORAL ONCE
Status: CANCELLED | OUTPATIENT
Start: 2021-06-16 | End: 2021-06-16

## 2021-06-16 RX ORDER — LIDOCAINE 50 MG/G
OINTMENT TOPICAL ONCE
Status: CANCELLED | OUTPATIENT
Start: 2021-06-16 | End: 2021-06-16

## 2021-06-16 RX ORDER — LIDOCAINE HYDROCHLORIDE 40 MG/ML
SOLUTION TOPICAL ONCE
Status: CANCELLED | OUTPATIENT
Start: 2021-06-16 | End: 2021-06-16

## 2021-06-16 RX ORDER — BACITRACIN ZINC AND POLYMYXIN B SULFATE 500; 1000 [USP'U]/G; [USP'U]/G
OINTMENT TOPICAL ONCE
Status: CANCELLED | OUTPATIENT
Start: 2021-06-16 | End: 2021-06-16

## 2021-06-16 NOTE — PROGRESS NOTES
Wound Care Center Progress Note with Procedure Note      Leslie Mckeon  AGE: 77 y.o. GENDER: male  : 1945  EPISODE DATE:  2021     Subjective:     Chief Complaint   Patient presents with    Wound Check     rt leg         HISTORY of PRESENT ILLNESS      Leslie Mckeon is a 77 y.o. male who presents today for wound evaluation of Chronic venous wound(s) of R lower leg medial.  The wound is of moderate severity. The underlying cause of the wound is trauma and venous disease. Wound Pain Timing/Severity: none  Quality of pain: N/A  Severity of pain:  0 / 10   Modifying Factors: edema and venous stasis  Associated Signs/Symptoms: none        PAST MEDICAL HISTORY        Diagnosis Date    Chronic venous hypertension with ulcer and inflammation involving right side (HCC)     RLE    Hypertension     past    Iron deficiency     Lymphoma Dx 11-    Hx of B cell lymphoma-in remission since        PAST SURGICAL HISTORY    Past Surgical History:   Procedure Laterality Date    TUNNELED VENOUS PORT PLACEMENT      TUNNELED VENOUS PORT PLACEMENT      removed 2012       FAMILY HISTORY    Family History   Problem Relation Age of Onset    Early Death Mother 52    Diabetes Father     Heart Disease Father     Vision Loss Son         \"He wears glasses\"    Early Death Daughter 36       SOCIAL HISTORY    Social History     Tobacco Use    Smoking status: Never Smoker    Smokeless tobacco: Never Used   Substance Use Topics    Alcohol use: No    Drug use: No       ALLERGIES    No Known Allergies    MEDICATIONS    Current Outpatient Medications on File Prior to Encounter   Medication Sig Dispense Refill    metoprolol succinate (TOPROL XL) 50 MG extended release tablet Take 1 tablet by mouth daily 30 tablet 0     No current facility-administered medications on file prior to encounter.        REVIEW OF SYSTEMS    Pertinent items are noted in HPI.  Constitutional: Negative for systemic symptoms including fever, chills and malaise. Objective:      BP (!) 141/77   Pulse 71   Temp 98.6 °F (37 °C) (Temporal)     PHYSICAL EXAM      General: The patient is in no acute distress. Mental status:  Patient is appropriate, is  oriented to place and plan of care. Dermatologic exam: Visual inspection of the periwound reveals the skin to be edematous  Wound exam: see wound description below in procedure note      Assessment:     Problem List Items Addressed This Visit     Chronic venous hypertension with ulcer and inflammation (HCC) - Primary    Relevant Medications    lidocaine (XYLOCAINE) 5 % ointment    Other Relevant Orders    Supply: Wound Cleanser    Supply: Wound Dressings    Supply: Cover and Secure    Supply: Edema Control    Venous ulcer of left leg (HCC)    Relevant Medications    lidocaine (XYLOCAINE) 5 % ointment    Other Relevant Orders    Supply: Wound Cleanser    Supply: Wound Dressings    Supply: Cover and Secure    Supply: Edema Control        Procedure Note    Indications:  Based on my examination of this patient's wound(s) today, sharp excision into necrotic subcutaneous tissue is required to promote healing and evaluate the extent of previous healing. Performed by: Allison Ness MD    Consent obtained: Yes    Time out taken:  Yes    Pain Control: topical       Debridement:Excisional Debridement    Using curette the wound(s) was/were sharply debrided down through and including the removal of subcutaneous tissue.         Devitalized Tissue Debrided:  slough    Pre Debridement Measurements:  Are located in the Wound Documentation Flow Sheet    All active wounds listed below with today's date are evaluated  Wound(s)    debrided this date include # : 1     Post  Debridement Measurements:  Wound 09/30/20 Wound #1 Right Medial Lower Leg Cluster (Onset 5 Years) (Active)   Wound Image   06/09/21 1251   Wound Etiology Venous 06/16/21 1304   Dressing Status New dressing applied;Clean;Dry; Intact 06/09/21 1357   Wound Cleansed Wound cleanser 06/16/21 1304   Dressing/Treatment Alginate with Ag 01/13/21 1657   Offloading for Diabetic Foot Ulcers No offloading required 06/16/21 1304   Wound Length (cm) 12 cm 06/16/21 1304   Wound Width (cm) 5.5 cm 06/16/21 1304   Wound Depth (cm) 0.1 cm 06/16/21 1304   Wound Surface Area (cm^2) 66 cm^2 06/16/21 1304   Change in Wound Size % (l*w) 73.81 06/16/21 1304   Wound Volume (cm^3) 6.6 cm^3 06/16/21 1304   Wound Healing % 74 06/16/21 1304   Post-Procedure Length (cm) 10.5 cm 06/09/21 1316   Post-Procedure Width (cm) 5.5 cm 06/09/21 1316   Post-Procedure Depth (cm) 0.1 cm 06/09/21 1316   Post-Procedure Surface Area (cm^2) 57.75 cm^2 06/09/21 1316   Post-Procedure Volume (cm^3) 5.78 cm^3 06/09/21 1316   Distance Tunneling (cm) 0 cm 06/16/21 1304   Tunneling Position ___ O'Clock 0 06/16/21 1304   Undermining Starts ___ O'Clock 0 06/16/21 1304   Undermining Ends___ O'Clock 0 06/16/21 1304   Undermining Maxium Distance (cm) 0 06/16/21 1304   Wound Assessment Granulation tissue 06/09/21 1251   Drainage Amount Moderate 06/16/21 1304   Drainage Description Serosanguinous 06/16/21 1304   Odor None 06/16/21 1304   Cassia-wound Assessment Intact 06/16/21 1304   Margins Defined edges 06/16/21 1304   Wound Thickness Description not for Pressure Injury Full thickness 06/16/21 1304   Number of days: 259       Percent of Wound(s) Debrided: approximately 90%    Total Surface Area Debrided:  54 sq cm     Bleeding:  None    Hemostasis Achieved:  not needed    Procedural Pain:  0  / 10     Post Procedural Pain:  0 / 10     Response to treatment:  Well tolerated by patient. Status of wound progress and description from last visit:   Improved. Plan:       Discharge Instructions       PHYSICIAN ORDERS AND DISCHARGE INSTRUCTIONS     NOTE: Upon discharge from the [address], you will receive a patient experience survey.  We would be grateful if you would take the time to fill this survey out.     Wound care order history:                 Vascular studies: Arterial studies done 9/27/20; no stenosis              Imaging:   Date               Cultures:   obtained on 11/18/2020                                 Obtained on 1/20/2021               Labs/ HbA1c:   Date               Grafts:                       #1 Apligraf on 12/16/2020                     #2 Apligraf on 12/23/2020                     #3 Apligraf on 12/30/2020                     #4 Apligraf on 1/6/2021                     # 5 Apligraf on 1/13/21                     #1 [place] 1/27/2021                     #2 [place] 2/3/2021                     #3 [place] 2/10/2021                     #4 [place] Right Medial Lower Leg 2/17/21                     #5 [place] 3/10/2021                   HBO:                Antibiotics:  Bactrim DS BID for 10 days on 11/18/2020                                   Cipro BID for 10 days ordered on 11/25/2020                                    Cipro 500 mg BID on 1/20/2021                                    Cipro 500 mg BID on 4/28/2021              Earlier Wound care treatments:                Authorizations:                        Consults:   Date                           Primary care physician:      Continuing wound care orders and information:              Residence:  private               [facility] care with: [facility]               Your wound-care supplies will be provided by: Jane Kearney provider:              RADHA with  Michelle Shin loading:  Date               KTDMV Medications:              QBMST cleansing:                           QV not scrub or use excessive force.                          Wash hands with soap and water before and after dressing changes.                           Prior to applying a clean dressing, cleanse wound with normal saline,                                wound cleanser, or mild soap

## 2021-06-16 NOTE — PROGRESS NOTES
Multilayer Compression Wrap   (Not Unna) Below the Knee    NAME:  Tairk Quiroz  YOB: 1945  MEDICAL RECORD NUMBER:  [id number]  DATE:  6/16/2021    Multilayer compression wrap: Removed old Multilayer wrap if indicated and wash leg with mild soap/water. Applied moisturizing agent to dry skin as needed. Applied primary and secondary dressing as ordered. Applied multilayered dressing below the knee to right lower leg. Instructed patient/caregiver not to remove dressing and to keep it clean and dry. Instructed patient/caregiver on complications to report to provider, such as pain, numbness in toes, heavy drainage, and slippage of dressing. Instructed patient on purpose of compression dressing and on activity and exercise recommendations.       Electronically signed by Ada Cruz LPN on 9/39/6574 at 6:06 PM

## 2021-06-23 ENCOUNTER — HOSPITAL ENCOUNTER (OUTPATIENT)
Dept: WOUND CARE | Age: 76
Discharge: HOME | End: 2021-06-23
Payer: COMMERCIAL

## 2021-06-23 VITALS
DIASTOLIC BLOOD PRESSURE: 85 MMHG | TEMPERATURE: 98.8 F | HEART RATE: 68 BPM | SYSTOLIC BLOOD PRESSURE: 144 MMHG | RESPIRATION RATE: 16 BRPM

## 2021-06-23 DIAGNOSIS — I87.331 CHRONIC VENOUS HYPERTENSION WITH ULCER AND INFLAMMATION INVOLVING RIGHT SIDE (HCC): Primary | ICD-10-CM

## 2021-06-23 DIAGNOSIS — L97.919 CHRONIC VENOUS HYPERTENSION WITH ULCER AND INFLAMMATION INVOLVING RIGHT SIDE (HCC): Primary | ICD-10-CM

## 2021-06-23 DIAGNOSIS — L97.929 VENOUS ULCER OF LEFT LEG (HCC): ICD-10-CM

## 2021-06-23 DIAGNOSIS — I83.029 VENOUS ULCER OF LEFT LEG (HCC): ICD-10-CM

## 2021-06-23 PROCEDURE — 11042 DBRDMT SUBQ TIS 1ST 20SQCM/<: CPT

## 2021-06-23 PROCEDURE — 11045 DBRDMT SUBQ TISS EACH ADDL: CPT

## 2021-06-23 PROCEDURE — 11042 DBRDMT SUBQ TIS 1ST 20SQCM/<: CPT | Performed by: SURGERY

## 2021-06-23 PROCEDURE — 11045 DBRDMT SUBQ TISS EACH ADDL: CPT | Performed by: SURGERY

## 2021-06-23 RX ORDER — BETAMETHASONE DIPROPIONATE 0.05 %
OINTMENT (GRAM) TOPICAL ONCE
Status: CANCELLED | OUTPATIENT
Start: 2021-06-23 | End: 2021-06-23

## 2021-06-23 RX ORDER — LIDOCAINE HYDROCHLORIDE 20 MG/ML
JELLY TOPICAL ONCE
Status: CANCELLED | OUTPATIENT
Start: 2021-06-23 | End: 2021-06-23

## 2021-06-23 RX ORDER — LIDOCAINE 40 MG/G
CREAM TOPICAL ONCE
Status: CANCELLED | OUTPATIENT
Start: 2021-06-23 | End: 2021-06-23

## 2021-06-23 RX ORDER — LIDOCAINE 50 MG/G
OINTMENT TOPICAL ONCE
Status: CANCELLED | OUTPATIENT
Start: 2021-06-23 | End: 2021-06-23

## 2021-06-23 RX ORDER — BACITRACIN, NEOMYCIN, POLYMYXIN B 400; 3.5; 5 [USP'U]/G; MG/G; [USP'U]/G
OINTMENT TOPICAL ONCE
Status: CANCELLED | OUTPATIENT
Start: 2021-06-23 | End: 2021-06-23

## 2021-06-23 RX ORDER — LIDOCAINE HYDROCHLORIDE 40 MG/ML
SOLUTION TOPICAL ONCE
Status: DISCONTINUED | OUTPATIENT
Start: 2021-06-23 | End: 2021-06-24 | Stop reason: HOSPADM

## 2021-06-23 RX ORDER — CLOBETASOL PROPIONATE 0.5 MG/G
OINTMENT TOPICAL ONCE
Status: CANCELLED | OUTPATIENT
Start: 2021-06-23 | End: 2021-06-23

## 2021-06-23 RX ORDER — GENTAMICIN SULFATE 1 MG/G
OINTMENT TOPICAL ONCE
Status: CANCELLED | OUTPATIENT
Start: 2021-06-23 | End: 2021-06-23

## 2021-06-23 RX ORDER — BACITRACIN ZINC AND POLYMYXIN B SULFATE 500; 1000 [USP'U]/G; [USP'U]/G
OINTMENT TOPICAL ONCE
Status: CANCELLED | OUTPATIENT
Start: 2021-06-23 | End: 2021-06-23

## 2021-06-23 RX ORDER — GINSENG 100 MG
CAPSULE ORAL ONCE
Status: CANCELLED | OUTPATIENT
Start: 2021-06-23 | End: 2021-06-23

## 2021-06-23 RX ORDER — LIDOCAINE HYDROCHLORIDE 40 MG/ML
SOLUTION TOPICAL ONCE
Status: CANCELLED | OUTPATIENT
Start: 2021-06-23 | End: 2021-06-23

## 2021-06-23 ASSESSMENT — PAIN SCALES - GENERAL: PAINLEVEL_OUTOF10: 0

## 2021-06-23 NOTE — PROGRESS NOTES
06/16/21 1304   Dressing Status New dressing applied 06/23/21 1336   Wound Cleansed Wound cleanser 06/23/21 1256   Dressing/Treatment Alginate with Ag 01/13/21 1657   Offloading for Diabetic Foot Ulcers No offloading required 06/23/21 1256   Wound Length (cm) 11.4 cm 06/23/21 1256   Wound Width (cm) 5.8 cm 06/23/21 1256   Wound Depth (cm) 0.1 cm 06/23/21 1256   Wound Surface Area (cm^2) 66.12 cm^2 06/23/21 1256   Change in Wound Size % (l*w) 73.76 06/23/21 1256   Wound Volume (cm^3) 6.61 cm^3 06/23/21 1256   Wound Healing % 74 06/23/21 1256   Post-Procedure Length (cm) 11.4 cm 06/23/21 1328   Post-Procedure Width (cm) 5.8 cm 06/23/21 1328   Post-Procedure Depth (cm) 0.1 cm 06/23/21 1328   Post-Procedure Surface Area (cm^2) 66.12 cm^2 06/23/21 1328   Post-Procedure Volume (cm^3) 6.61 cm^3 06/23/21 1328   Distance Tunneling (cm) 0 cm 06/23/21 1256   Tunneling Position ___ O'Clock 0 06/23/21 1256   Undermining Starts ___ O'Clock 0 06/23/21 1256   Undermining Ends___ O'Clock 0 06/23/21 1256   Undermining Maxium Distance (cm) 0 06/23/21 1256   Wound Assessment Granulation tissue 06/23/21 1256   Drainage Amount Moderate 06/23/21 1256   Drainage Description Serosanguinous 06/23/21 1256   Odor None 06/23/21 1256   Cassia-wound Assessment Intact 06/23/21 1256   Margins Defined edges 06/23/21 1256   Wound Thickness Description not for Pressure Injury Full thickness 06/23/21 1256   Number of days: 266       Wound 05/19/21 Pedal Anterior;Right #2 Right dorsal foot (Active)   Wound Image   06/23/21 1256   Wound Etiology Traumatic 05/19/21 1310   Dressing Status New dressing applied;Clean;Dry; Intact 06/23/21 1336   Wound Cleansed Soap and water;Cleansed with saline 06/23/21 1256   Offloading for Diabetic Foot Ulcers No offloading required 06/09/21 1251   Wound Length (cm) 0.4 cm 06/23/21 1256   Wound Width (cm) 0.2 cm 06/23/21 1256   Wound Depth (cm) 0.1 cm 06/23/21 1256   Wound Surface Area (cm^2) 0.08 cm^2 06/23/21 1256   Change in Wound Size % (l*w) 77.14 06/23/21 1256   Wound Volume (cm^3) 0.01 cm^3 06/23/21 1256   Wound Healing % 75 06/23/21 1256   Distance Tunneling (cm) 0 cm 06/23/21 1256   Tunneling Position ___ O'Clock 0 06/23/21 1256   Undermining Starts ___ O'Clock 0 06/23/21 1256   Undermining Ends___ O'Clock 0 06/23/21 1256   Undermining Maxium Distance (cm) 0 06/23/21 1256   Wound Assessment Granulation tissue;Pink/red 06/23/21 1256   Drainage Amount Small 06/23/21 1256   Drainage Description Serosanguinous 06/23/21 1256   Odor None 06/23/21 1256   Cassia-wound Assessment Maceration 06/23/21 1256   Margins Attached edges 06/23/21 1256   Wound Thickness Description not for Pressure Injury Full thickness 06/23/21 1256   Number of days: 35       Percent of Wound(s) Debrided: approximately 100%    Total Surface Area Debrided:  66 sq cm     Bleeding:  None    Hemostasis Achieved:  not needed    Procedural Pain:  0  / 10     Post Procedural Pain:  0 / 10     Response to treatment:  Well tolerated by patient. Status of wound progress and description from last visit:   Improved. Plan:       Discharge Instructions       PHYSICIAN ORDERS AND DISCHARGE INSTRUCTIONS     NOTE: Upon discharge from the [address], you will receive a patient experience survey.  We would be grateful if you would take the time to fill this survey out.     Wound care order history:                 Vascular studies: Arterial studies done 9/27/20; no stenosis              Imaging:   Date               Cultures:   obtained on 11/18/2020                                 Obtained on 1/20/2021               Labs/ HbA1c:   Date               Grafts:                       #1 Apligraf on 12/16/2020                     #2 Apligraf on 12/23/2020                     #3 Apligraf on 12/30/2020                     #4 Apligraf on 1/6/2021                     # 5 Apligraf on 1/13/21                     #1 [place] 1/27/2021                     #2 [place] 2/3/2021                     #3 [place] 2/10/2021                     #4 [place] Right Medial Lower Leg 2/17/21                     #5 [place] 3/10/2021                   HBO:                Antibiotics:  Bactrim DS BID for 10 days on 11/18/2020                                   Cipro BID for 10 days ordered on 11/25/2020                                    Cipro 500 mg BID on 1/20/2021                                    Cipro 500 mg BID on 4/28/2021              Earlier Wound care treatments:                Authorizations:                        Consults:   Date                           Primary care physician:      Continuing wound care orders and information:              Residence:  private               [facility]: Cami Olguin provider:              JN with  Jazmyne Staton loading:  Date               [facility] Medications:              CLAEY cleansing:                           XM not scrub or use excessive force.                          Wash hands with soap and water before and after dressing changes.                           Prior to applying a clean dressing, cleanse wound with normal saline,                                wound cleanser, or mild soap and water.                           Ask the physician or nurse before getting the wound(s) wet in a shower              Daily Wound management:                          Keep weight off wounds and reposition every 2 hours.                          Avoid standing for long periods of time.                          Apply wraps/stockings in AM and remove at bedtime.                          If swelling is present, elevate legs to the level of the heart or above for 30 minutes 4-5 times a day and/or when sitting.                                             When taking antibiotics take entire prescription as ordered by physician do not stop taking until medicine is all gone.                                           Orders for this week: 6/23/21     Fax orders to [facility]     Right dorsal foot - wash with soap and water,past dry. Cover with jennifer and optfoam border. Change with wrap change.      Right lower leg -- wash with soap and water,pat dry. Right lower leg -- wash with soap and water,pat dry. ( Apply clobetasol on proximal leg in clinic)Apply Desitin or Calmazine to sarah wound. Cover with anasept gel covered  4x4, Kerramax or ABD and wrap with coban 2         Homecare to change on Friday and Monday (may use Calcium alginate instead of anasept, profore to wrap)  ---      Referral to Dr Wendy Gonzalez on 2/24/2021, appt 3/18/2021 then 4/9/2021,  first procedure scheduled on 4/27/2021                    Follow up with Dr Antionette Dimas in 1 week in the wound care center  Call [phone] for any questions or concerns.   Date__________   Time        Treatment Note Wound 09/30/20 Wound #1 Right Medial Lower Leg Cluster (Onset 5 Years)-Dressing/Treatment:  (anasept, gel, 4x4, kerramx, abd coban 2 )  Wound 05/19/21 Pedal Anterior;Right #2 Right dorsal foot-Dressing/Treatment:  (jennifer, optifoam )    Written Patient Dismissal Instructions Given            Electronically signed by Isiah Sharpe MD on 6/23/2021 at 2:38 PM

## 2021-06-23 NOTE — PROGRESS NOTES
Multilayer Compression Wrap   (Not Unna) Below the Knee    NAME:  Maxine Quick  YOB: 1945  MEDICAL RECORD NUMBER:  [id number]  DATE:  6/23/2021    Multilayer compression wrap: Removed old Multilayer wrap if indicated and wash leg with mild soap/water. Applied moisturizing agent to dry skin as needed. Applied primary and secondary dressing as ordered. Applied multilayered dressing below the knee to right lower leg. Instructed patient/caregiver not to remove dressing and to keep it clean and dry. Instructed patient/caregiver on complications to report to provider, such as pain, numbness in toes, heavy drainage, and slippage of dressing. Instructed patient on purpose of compression dressing and on activity and exercise recommendations.       Electronically signed by Lakisha Haynes LPN on 8/91/8766 at 8:95 PM

## 2021-06-30 ENCOUNTER — HOSPITAL ENCOUNTER (OUTPATIENT)
Dept: WOUND CARE | Age: 76
Discharge: HOME | End: 2021-06-30
Payer: COMMERCIAL

## 2021-06-30 VITALS
HEART RATE: 68 BPM | SYSTOLIC BLOOD PRESSURE: 155 MMHG | TEMPERATURE: 99.4 F | RESPIRATION RATE: 16 BRPM | DIASTOLIC BLOOD PRESSURE: 88 MMHG

## 2021-06-30 DIAGNOSIS — I87.331 CHRONIC VENOUS HYPERTENSION WITH ULCER AND INFLAMMATION INVOLVING RIGHT SIDE (HCC): Primary | ICD-10-CM

## 2021-06-30 DIAGNOSIS — L97.919 CHRONIC VENOUS HYPERTENSION WITH ULCER AND INFLAMMATION INVOLVING RIGHT SIDE (HCC): Primary | ICD-10-CM

## 2021-06-30 DIAGNOSIS — I83.029 VENOUS ULCER OF LEFT LEG (HCC): ICD-10-CM

## 2021-06-30 DIAGNOSIS — L97.929 VENOUS ULCER OF LEFT LEG (HCC): ICD-10-CM

## 2021-06-30 PROCEDURE — 11042 DBRDMT SUBQ TIS 1ST 20SQCM/<: CPT

## 2021-06-30 PROCEDURE — 11042 DBRDMT SUBQ TIS 1ST 20SQCM/<: CPT | Performed by: SURGERY

## 2021-06-30 PROCEDURE — 11045 DBRDMT SUBQ TISS EACH ADDL: CPT

## 2021-06-30 PROCEDURE — 11045 DBRDMT SUBQ TISS EACH ADDL: CPT | Performed by: SURGERY

## 2021-06-30 RX ORDER — GENTAMICIN SULFATE 1 MG/G
OINTMENT TOPICAL ONCE
Status: CANCELLED | OUTPATIENT
Start: 2021-06-30 | End: 2021-06-30

## 2021-06-30 RX ORDER — BACITRACIN ZINC AND POLYMYXIN B SULFATE 500; 1000 [USP'U]/G; [USP'U]/G
OINTMENT TOPICAL ONCE
Status: CANCELLED | OUTPATIENT
Start: 2021-06-30 | End: 2021-06-30

## 2021-06-30 RX ORDER — LIDOCAINE HYDROCHLORIDE 20 MG/ML
JELLY TOPICAL ONCE
Status: CANCELLED | OUTPATIENT
Start: 2021-06-30 | End: 2021-06-30

## 2021-06-30 RX ORDER — BACITRACIN, NEOMYCIN, POLYMYXIN B 400; 3.5; 5 [USP'U]/G; MG/G; [USP'U]/G
OINTMENT TOPICAL ONCE
Status: CANCELLED | OUTPATIENT
Start: 2021-06-30 | End: 2021-06-30

## 2021-06-30 RX ORDER — LIDOCAINE HYDROCHLORIDE 40 MG/ML
SOLUTION TOPICAL ONCE
Status: CANCELLED | OUTPATIENT
Start: 2021-06-30 | End: 2021-06-30

## 2021-06-30 RX ORDER — GINSENG 100 MG
CAPSULE ORAL ONCE
Status: CANCELLED | OUTPATIENT
Start: 2021-06-30 | End: 2021-06-30

## 2021-06-30 RX ORDER — LIDOCAINE 50 MG/G
OINTMENT TOPICAL ONCE
Status: CANCELLED | OUTPATIENT
Start: 2021-06-30 | End: 2021-06-30

## 2021-06-30 RX ORDER — LIDOCAINE 40 MG/G
CREAM TOPICAL ONCE
Status: CANCELLED | OUTPATIENT
Start: 2021-06-30 | End: 2021-06-30

## 2021-06-30 RX ORDER — CLOBETASOL PROPIONATE 0.5 MG/G
OINTMENT TOPICAL ONCE
Status: CANCELLED | OUTPATIENT
Start: 2021-06-30 | End: 2021-06-30

## 2021-06-30 RX ORDER — BETAMETHASONE DIPROPIONATE 0.05 %
OINTMENT (GRAM) TOPICAL ONCE
Status: CANCELLED | OUTPATIENT
Start: 2021-06-30 | End: 2021-06-30

## 2021-06-30 RX ORDER — LIDOCAINE HYDROCHLORIDE 40 MG/ML
SOLUTION TOPICAL ONCE
Status: DISCONTINUED | OUTPATIENT
Start: 2021-06-30 | End: 2021-07-01 | Stop reason: HOSPADM

## 2021-06-30 NOTE — PROGRESS NOTES
Multilayer Compression Wrap   (Not Unna) Below the Knee    NAME:  Christian George  YOB: 1945  MEDICAL RECORD NUMBER:  [id number]  DATE:  6/30/2021    Multilayer compression wrap: Removed old Multilayer wrap if indicated and wash leg with mild soap/water. Applied moisturizing agent to dry skin as needed. Applied primary and secondary dressing as ordered. Applied multilayered dressing below the knee to right lower leg. Instructed patient/caregiver not to remove dressing and to keep it clean and dry. Instructed patient/caregiver on complications to report to provider, such as pain, numbness in toes, heavy drainage, and slippage of dressing. Instructed patient on purpose of compression dressing and on activity and exercise recommendations.       Electronically signed by Natalie Martinez LPN on 3/13/3537 at 1:13 PM

## 2021-06-30 NOTE — PROGRESS NOTES
Wound Care Center Progress Note with Procedure Note      Radha Ovalle  AGE: 77 y.o. GENDER: male  : 1945  EPISODE DATE:  2021     Subjective:     Chief Complaint   Patient presents with    Wound Check     Right lower leg         HISTORY of PRESENT ILLNESS      Radha Ovalle is a 77 y.o. male who presents today for wound evaluation of Chronic venous and traumatic wound(s) of R lower leg medial.  The wound is of moderate severity. The underlying cause of the wound is trauma and venous disease. Wound Pain Timing/Severity: none  Quality of pain: N/A  Severity of pain:  0 / 10   Modifying Factors: edema and venous stasis  Associated Signs/Symptoms: drainage        PAST MEDICAL HISTORY        Diagnosis Date    Chronic venous hypertension with ulcer and inflammation involving right side (HCC)     RLE    Hypertension     past    Iron deficiency     Lymphoma Dx 11-    Hx of B cell lymphoma-in remission since        PAST SURGICAL HISTORY    Past Surgical History:   Procedure Laterality Date    TUNNELED VENOUS PORT PLACEMENT      TUNNELED VENOUS PORT PLACEMENT      removed 2012       FAMILY HISTORY    Family History   Problem Relation Age of Onset    Early Death Mother 52    Diabetes Father     Heart Disease Father     Vision Loss Son         \"He wears glasses\"    Early Death Daughter 36       SOCIAL HISTORY    Social History     Tobacco Use    Smoking status: Never Smoker    Smokeless tobacco: Never Used   Substance Use Topics    Alcohol use: No    Drug use: No       ALLERGIES    No Known Allergies    MEDICATIONS    Current Outpatient Medications on File Prior to Encounter   Medication Sig Dispense Refill    metoprolol succinate (TOPROL XL) 50 MG extended release tablet Take 1 tablet by mouth daily 30 tablet 0     No current facility-administered medications on file prior to encounter.        REVIEW OF SYSTEMS    Pertinent items are noted in HPI. Constitutional: Negative for systemic symptoms including fever, chills and malaise. Objective:      BP (!) 155/88   Pulse 68   Temp 99.4 °F (37.4 °C) (Temporal)   Resp 16     PHYSICAL EXAM      General: The patient is in no acute distress. Mental status:  Patient is appropriate, is  oriented to place and plan of care. Dermatologic exam: Visual inspection of the periwound reveals the skin to be edematous  Wound exam: see wound description below in procedure note      Assessment:     Problem List Items Addressed This Visit     Chronic venous hypertension with ulcer and inflammation (HCC) - Primary    Relevant Medications    lidocaine (XYLOCAINE) 4 % external solution    Other Relevant Orders    Supply: Wound Cleanser    Supply: Wound Dressings    Supply: Cover and Secure    Supply: Edema Control    Venous ulcer of left leg (HCC)    Relevant Medications    lidocaine (XYLOCAINE) 4 % external solution    Other Relevant Orders    Supply: Wound Cleanser    Supply: Wound Dressings    Supply: Cover and Secure    Supply: Edema Control        Procedure Note    Indications:  Based on my examination of this patient's wound(s) today, sharp excision into necrotic subcutaneous tissue is required to promote healing and evaluate the extent of previous healing. Performed by: Diamantina Goldmann, MD    Consent obtained: Yes    Time out taken:  Yes    Pain Control: topical Anesthetic  Anesthetic: 4% Lidocaine Liquid Topical     Debridement:Excisional Debridement    Using curette the wound(s) was/were sharply debrided down through and including the removal of subcutaneous tissue.         Devitalized Tissue Debrided:  slough    Pre Debridement Measurements:  Are located in the Wound Documentation Flow Sheet    All active wounds listed below with today's date are evaluated  Wound(s)    debrided this date include # : 1     Post  Debridement Measurements:  Wound 09/30/20 Wound #1 Right Medial Lower Leg Cluster (Onset 5 Years) (Active)   Wound Image   06/23/21 1256   Wound Etiology Venous 06/16/21 1304   Dressing Status New dressing applied 06/23/21 1336   Wound Cleansed Wound cleanser; Soap and water 06/30/21 1257   Dressing/Treatment Alginate with Ag 01/13/21 1657   Offloading for Diabetic Foot Ulcers No offloading required 06/30/21 1257   Wound Length (cm) 8 cm 06/30/21 1257   Wound Width (cm) 5.6 cm 06/30/21 1257   Wound Depth (cm) 0.1 cm 06/30/21 1257   Wound Surface Area (cm^2) 44.8 cm^2 06/30/21 1257   Change in Wound Size % (l*w) 82.22 06/30/21 1257   Wound Volume (cm^3) 4.48 cm^3 06/30/21 1257   Wound Healing % 82 06/30/21 1257   Post-Procedure Length (cm) 8 cm 06/30/21 1330   Post-Procedure Width (cm) 5.6 cm 06/30/21 1330   Post-Procedure Depth (cm) 0.1 cm 06/30/21 1330   Post-Procedure Surface Area (cm^2) 44.8 cm^2 06/30/21 1330   Post-Procedure Volume (cm^3) 4.48 cm^3 06/30/21 1330   Distance Tunneling (cm) 0 cm 06/30/21 1257   Tunneling Position ___ O'Clock 0 06/30/21 1257   Undermining Starts ___ O'Clock 0 06/30/21 1257   Undermining Ends___ O'Clock 0 06/30/21 1257   Undermining Maxium Distance (cm) 0 06/30/21 1257   Wound Assessment Pink/red 06/30/21 1257   Drainage Amount Moderate 06/30/21 1257   Drainage Description Serosanguinous 06/30/21 1257   Odor None 06/30/21 1257   Cassia-wound Assessment Intact 06/30/21 1257   Margins Defined edges 06/30/21 1257   Wound Thickness Description not for Pressure Injury Full thickness 06/30/21 1257   Number of days: 273       Percent of Wound(s) Debrided: approximately 100%    Total Surface Area Debrided:  44.8 sq cm     Bleeding:  Minimal    Hemostasis Achieved:  by pressure    Procedural Pain:  0  / 10     Post Procedural Pain:  0 / 10     Response to treatment:  Well tolerated by patient. Status of wound progress and description from last visit:   Improved.       Plan:       Discharge Instructions       PHYSICIAN ORDERS AND DISCHARGE INSTRUCTIONS     NOTE: Upon discharge from the [address] [facility],[address], you will receive a patient experience survey. We would be grateful if you would take the time to fill this survey out.     Wound care order history:                 Vascular studies: Arterial studies done 9/27/20; no stenosis              Imaging:   Date               Cultures:   obtained on 11/18/2020                                 Obtained on 1/20/2021               Labs/ HbA1c:   Date               Grafts:                       #1 Apligraf on 12/16/2020                     #2 Apligraf on 12/23/2020                     #3 Apligraf on 12/30/2020                     #4 Apligraf on 1/6/2021                     # 5 Apligraf on 1/13/21                     #1 [place] 1/27/2021                     #2 [place] 2/3/2021                     #3 [place] 2/10/2021                     #4 [place] Right Medial Lower Leg 2/17/21                     #5 [place] 3/10/2021                   HBO:                Antibiotics:  Bactrim DS BID for 10 days on 11/18/2020                                   Cipro BID for 10 days ordered on 11/25/2020                                    Cipro 500 mg BID on 1/20/2021                                    Cipro 500 mg BID on 4/28/2021              Earlier Wound care treatments:                Authorizations:                        Consults:   Date                           Primary care physician:      Continuing wound care orders and information:              Residence:  private               Continue home health care with: [facility]               Your wound-care supplies will be provided by: lFora Sheffield provider:              [id number] with  Van Dryer loading:  Date               VDXWQ Medications:              KBXAJ cleansing:                           JV not scrub or use excessive force.                          Wash hands with soap and water before and after dressing changes.                           Prior to applying a clean dressing, cleanse wound with normal saline,                                wound cleanser, or mild soap and water.                           Ask the physician or nurse before getting the wound(s) wet in a shower              Daily Wound management:                          HGJF weight off wounds and reposition every 2 hours.                          BNEVD standing for long periods of time.                          ZUQBY wraps/stockings in AM and remove at bedtime.                          If swelling is present, elevate legs to the level of the heart or above for 30 minutes 4-5 times a day and/or when sitting.                                             When taking antibiotics take entire prescription as ordered by physician do not stop taking until medicine is all gone.                                           Orders for this week: 6/30/21     [place] [facility] [place]     Right dorsal foot - wash with soap and water,past dry. Cover with jennifer and optfoam border. Change with wrap change.      Right lower leg -- wash with soap and water,pat dry. Right lower leg -- wash with soap and water,pat dry. ( Apply clobetasol on proximal leg in clinic)Apply Desitin or Calmazine to sarah wound. Cover with anasept gel covered  4x4, ABD and wrap with coban 2         Homecare to change on Friday and Monday (may use Calcium alginate instead of anasept, profore to wrap)  ---      Referral to Dr Amaya Forte on 2/24/2021, appt 3/18/2021 then 4/9/2021,  first procedure scheduled on 4/27/2021                    Follow up with Dr Dameon Whitney in 1 week in the wound care center  Call [phone] for any questions or concerns.   Date__________   Time        Treatment Note      Written Patient Dismissal Instructions Given            Electronically signed by Ferna Homans, MD on 6/30/2021 at 1:46 PM

## 2021-07-01 ENCOUNTER — OFFICE VISIT (OUTPATIENT)
Dept: INTERNAL MEDICINE CLINIC | Age: 76
End: 2021-07-01
Payer: COMMERCIAL

## 2021-07-01 ENCOUNTER — TELEPHONE (OUTPATIENT)
Dept: INTERNAL MEDICINE CLINIC | Age: 76
End: 2021-07-01

## 2021-07-01 VITALS
OXYGEN SATURATION: 99 % | HEART RATE: 67 BPM | WEIGHT: 187 LBS | BODY MASS INDEX: 24.01 KG/M2 | DIASTOLIC BLOOD PRESSURE: 80 MMHG | SYSTOLIC BLOOD PRESSURE: 118 MMHG

## 2021-07-01 DIAGNOSIS — I10 ESSENTIAL HYPERTENSION: Primary | ICD-10-CM

## 2021-07-01 DIAGNOSIS — L97.919 CHRONIC VENOUS HYPERTENSION WITH ULCER AND INFLAMMATION INVOLVING RIGHT SIDE (HCC): ICD-10-CM

## 2021-07-01 DIAGNOSIS — Z23 NEED FOR PROPHYLACTIC VACCINATION AGAINST STREPTOCOCCUS PNEUMONIAE (PNEUMOCOCCUS): ICD-10-CM

## 2021-07-01 DIAGNOSIS — I87.331 CHRONIC VENOUS HYPERTENSION WITH ULCER AND INFLAMMATION INVOLVING RIGHT SIDE (HCC): ICD-10-CM

## 2021-07-01 PROCEDURE — 99213 OFFICE O/P EST LOW 20 MIN: CPT | Performed by: FAMILY MEDICINE

## 2021-07-01 PROCEDURE — G0009 ADMIN PNEUMOCOCCAL VACCINE: HCPCS | Performed by: FAMILY MEDICINE

## 2021-07-01 PROCEDURE — 90732 PPSV23 VACC 2 YRS+ SUBQ/IM: CPT | Performed by: FAMILY MEDICINE

## 2021-07-01 RX ORDER — METOPROLOL SUCCINATE 50 MG/1
50 TABLET, EXTENDED RELEASE ORAL DAILY
Qty: 30 TABLET | Refills: 5 | Status: SHIPPED | OUTPATIENT
Start: 2021-07-01 | End: 2022-01-06 | Stop reason: SDUPTHER

## 2021-07-01 SDOH — ECONOMIC STABILITY: TRANSPORTATION INSECURITY
IN THE PAST 12 MONTHS, HAS THE LACK OF TRANSPORTATION KEPT YOU FROM MEDICAL APPOINTMENTS OR FROM GETTING MEDICATIONS?: NO

## 2021-07-01 SDOH — ECONOMIC STABILITY: TRANSPORTATION INSECURITY
IN THE PAST 12 MONTHS, HAS LACK OF TRANSPORTATION KEPT YOU FROM MEETINGS, WORK, OR FROM GETTING THINGS NEEDED FOR DAILY LIVING?: NO

## 2021-07-01 SDOH — ECONOMIC STABILITY: FOOD INSECURITY: WITHIN THE PAST 12 MONTHS, THE FOOD YOU BOUGHT JUST DIDN'T LAST AND YOU DIDN'T HAVE MONEY TO GET MORE.: NEVER TRUE

## 2021-07-01 SDOH — ECONOMIC STABILITY: FOOD INSECURITY: WITHIN THE PAST 12 MONTHS, YOU WORRIED THAT YOUR FOOD WOULD RUN OUT BEFORE YOU GOT MONEY TO BUY MORE.: NEVER TRUE

## 2021-07-01 ASSESSMENT — ENCOUNTER SYMPTOMS
COUGH: 0
BACK PAIN: 0
CHEST TIGHTNESS: 0
ABDOMINAL PAIN: 0
NAUSEA: 0
SHORTNESS OF BREATH: 0

## 2021-07-01 ASSESSMENT — SOCIAL DETERMINANTS OF HEALTH (SDOH): HOW HARD IS IT FOR YOU TO PAY FOR THE VERY BASICS LIKE FOOD, HOUSING, MEDICAL CARE, AND HEATING?: NOT HARD AT ALL

## 2021-07-01 NOTE — PROGRESS NOTES
and shortness of breath. Cardiovascular: Negative for chest pain and palpitations. Gastrointestinal: Negative for abdominal pain and nausea. Genitourinary: Negative for difficulty urinating. Musculoskeletal: Negative for back pain. Neurological: Negative for dizziness and headaches. Psychiatric/Behavioral: Negative for dysphoric mood. Objective    Vitals:    07/01/21 1044   BP: 118/80   Pulse: 67   SpO2: 99%   Weight: 187 lb (84.8 kg)       Physical Exam  Vitals reviewed. Constitutional:       General: He is not in acute distress. Eyes:      Conjunctiva/sclera: Conjunctivae normal.   Cardiovascular:      Rate and Rhythm: Normal rate and regular rhythm. Pulmonary:      Effort: Pulmonary effort is normal. No respiratory distress. Breath sounds: Normal breath sounds. Abdominal:      General: Bowel sounds are normal.      Palpations: Abdomen is soft. Tenderness: There is no abdominal tenderness. Musculoskeletal:      Cervical back: Neck supple. Right lower leg: Edema present. Left lower leg: No edema. Neurological:      Mental Status: He is alert and oriented to person, place, and time. Cranial Nerves: No cranial nerve deficit. Psychiatric:         Mood and Affect: Mood normal.            An electronic signature was used to authenticate this note.     --Will January, DO

## 2021-07-01 NOTE — TELEPHONE ENCOUNTER
Mihai Melissa left   to request verbal order to recert and continue SN. Returned call and spoke to Ab Gilmore. Verbal order given to recert and continue SN. Ab Gilmore voiced understanding/thanks. No further action is needed, at this time.

## 2021-07-05 PROBLEM — Z98.890 STATUS POST ENDOVENOUS RADIOFREQUENCY ABLATION (RFA) OF SAPHENOUS VEIN: Status: ACTIVE | Noted: 2021-07-05

## 2021-07-07 ENCOUNTER — HOSPITAL ENCOUNTER (OUTPATIENT)
Dept: WOUND CARE | Age: 76
Discharge: HOME | End: 2021-07-07
Payer: COMMERCIAL

## 2021-07-07 VITALS
DIASTOLIC BLOOD PRESSURE: 81 MMHG | RESPIRATION RATE: 16 BRPM | SYSTOLIC BLOOD PRESSURE: 142 MMHG | TEMPERATURE: 99.1 F | HEART RATE: 62 BPM

## 2021-07-07 DIAGNOSIS — I87.331 CHRONIC VENOUS HYPERTENSION WITH ULCER AND INFLAMMATION INVOLVING RIGHT SIDE (HCC): Primary | ICD-10-CM

## 2021-07-07 DIAGNOSIS — I83.029 VENOUS ULCER OF LEFT LEG (HCC): ICD-10-CM

## 2021-07-07 DIAGNOSIS — L97.929 VENOUS ULCER OF LEFT LEG (HCC): ICD-10-CM

## 2021-07-07 DIAGNOSIS — L97.919 CHRONIC VENOUS HYPERTENSION WITH ULCER AND INFLAMMATION INVOLVING RIGHT SIDE (HCC): Primary | ICD-10-CM

## 2021-07-07 PROCEDURE — 11045 DBRDMT SUBQ TISS EACH ADDL: CPT | Performed by: SURGERY

## 2021-07-07 PROCEDURE — 11042 DBRDMT SUBQ TIS 1ST 20SQCM/<: CPT

## 2021-07-07 PROCEDURE — 11045 DBRDMT SUBQ TISS EACH ADDL: CPT

## 2021-07-07 PROCEDURE — 11042 DBRDMT SUBQ TIS 1ST 20SQCM/<: CPT | Performed by: SURGERY

## 2021-07-07 RX ORDER — LIDOCAINE HYDROCHLORIDE 20 MG/ML
JELLY TOPICAL ONCE
Status: CANCELLED | OUTPATIENT
Start: 2021-07-07 | End: 2021-07-07

## 2021-07-07 RX ORDER — GINSENG 100 MG
CAPSULE ORAL ONCE
Status: CANCELLED | OUTPATIENT
Start: 2021-07-07 | End: 2021-07-07

## 2021-07-07 RX ORDER — BACITRACIN, NEOMYCIN, POLYMYXIN B 400; 3.5; 5 [USP'U]/G; MG/G; [USP'U]/G
OINTMENT TOPICAL ONCE
Status: CANCELLED | OUTPATIENT
Start: 2021-07-07 | End: 2021-07-07

## 2021-07-07 RX ORDER — BACITRACIN ZINC AND POLYMYXIN B SULFATE 500; 1000 [USP'U]/G; [USP'U]/G
OINTMENT TOPICAL ONCE
Status: CANCELLED | OUTPATIENT
Start: 2021-07-07 | End: 2021-07-07

## 2021-07-07 RX ORDER — BETAMETHASONE DIPROPIONATE 0.05 %
OINTMENT (GRAM) TOPICAL ONCE
Status: CANCELLED | OUTPATIENT
Start: 2021-07-07 | End: 2021-07-07

## 2021-07-07 RX ORDER — LIDOCAINE HYDROCHLORIDE 40 MG/ML
SOLUTION TOPICAL ONCE
Status: DISCONTINUED | OUTPATIENT
Start: 2021-07-07 | End: 2021-07-08 | Stop reason: HOSPADM

## 2021-07-07 RX ORDER — GENTAMICIN SULFATE 1 MG/G
OINTMENT TOPICAL ONCE
Status: CANCELLED | OUTPATIENT
Start: 2021-07-07 | End: 2021-07-07

## 2021-07-07 RX ORDER — LIDOCAINE HYDROCHLORIDE 40 MG/ML
SOLUTION TOPICAL ONCE
Status: CANCELLED | OUTPATIENT
Start: 2021-07-07 | End: 2021-07-07

## 2021-07-07 RX ORDER — LIDOCAINE 40 MG/G
CREAM TOPICAL ONCE
Status: CANCELLED | OUTPATIENT
Start: 2021-07-07 | End: 2021-07-07

## 2021-07-07 RX ORDER — LIDOCAINE 50 MG/G
OINTMENT TOPICAL ONCE
Status: CANCELLED | OUTPATIENT
Start: 2021-07-07 | End: 2021-07-07

## 2021-07-07 RX ORDER — CLOBETASOL PROPIONATE 0.5 MG/G
OINTMENT TOPICAL ONCE
Status: CANCELLED | OUTPATIENT
Start: 2021-07-07 | End: 2021-07-07

## 2021-07-07 ASSESSMENT — PAIN SCALES - GENERAL: PAINLEVEL_OUTOF10: 0

## 2021-07-07 NOTE — PROGRESS NOTES
Multilayer Compression Wrap   (Not Unna) Below the Knee    NAME:  Constantin Munoz  YOB: 1945  MEDICAL RECORD NUMBER:  [id number]  DATE:  7/7/2021    Multilayer compression wrap: Removed old Multilayer wrap if indicated and wash leg with mild soap/water. Applied moisturizing agent to dry skin as needed. Applied primary and secondary dressing as ordered. Applied multilayered dressing below the knee to right lower leg. Instructed patient/caregiver not to remove dressing and to keep it clean and dry. Instructed patient/caregiver on complications to report to provider, such as pain, numbness in toes, heavy drainage, and slippage of dressing. Instructed patient on purpose of compression dressing and on activity and exercise recommendations.       Electronically signed by Luana Bauer RN on 7/7/2021 at 1:32 PM

## 2021-07-07 NOTE — PROGRESS NOTES
Wound Care Center Progress Note with Procedure Note      Christian George  AGE: 77 y.o. GENDER: male  : 1945  EPISODE DATE:  2021     Subjective:     Chief Complaint   Patient presents with    Wound Check         HISTORY of PRESENT ILLNESS      Christian George is a 77 y.o. male who presents today for wound evaluation of Chronic venous and traumatic wound(s) of R lower leg medial.  The wound is of moderate severity. The underlying cause of the wound is trauma. Wound Pain Timing/Severity: none  Quality of pain: N/A  Severity of pain:  0 / 10   Modifying Factors: edema and venous stasis  Associated Signs/Symptoms: edema        PAST MEDICAL HISTORY        Diagnosis Date    Chronic venous hypertension with ulcer and inflammation involving right side (HCC)     RLE    Hypertension     past    Iron deficiency     Lymphoma Dx 11-    Hx of B cell lymphoma-in remission since        PAST SURGICAL HISTORY    Past Surgical History:   Procedure Laterality Date    TUNNELED VENOUS PORT PLACEMENT      TUNNELED VENOUS PORT PLACEMENT      removed 2012       FAMILY HISTORY    Family History   Problem Relation Age of Onset    Early Death Mother 52    Diabetes Father     Heart Disease Father     Vision Loss Son         \"He wears glasses\"    Early Death Daughter 36       SOCIAL HISTORY    Social History     Tobacco Use    Smoking status: Never Smoker    Smokeless tobacco: Never Used   Substance Use Topics    Alcohol use: No    Drug use: No       ALLERGIES    No Known Allergies    MEDICATIONS    Current Outpatient Medications on File Prior to Encounter   Medication Sig Dispense Refill    metoprolol succinate (TOPROL XL) 50 MG extended release tablet Take 1 tablet by mouth daily 30 tablet 5     No current facility-administered medications on file prior to encounter. REVIEW OF SYSTEMS    Pertinent items are noted in HPI.   Constitutional: Negative for systemic symptoms including fever, chills and malaise. Objective:      BP (!) 142/81   Pulse 62   Temp 99.1 °F (37.3 °C) (Temporal)   Resp 16     PHYSICAL EXAM      General: The patient is in no acute distress. Mental status:  Patient is appropriate, is  oriented to place and plan of care. Dermatologic exam: Visual inspection of the periwound reveals the skin to be edematous  Wound exam: see wound description below in procedure note      Assessment:     Problem List Items Addressed This Visit     Chronic venous hypertension with ulcer and inflammation (HCC) - Primary    Relevant Medications    lidocaine (XYLOCAINE) 4 % external solution (Start on 7/7/2021  1:45 PM)    Other Relevant Orders    Supply: Wound Cleanser    Supply: Wound Dressings    Supply: Pack Wound    Supply: Cover and Secure    Supply: Edema Control    Venous ulcer of left leg (HCC)    Relevant Medications    lidocaine (XYLOCAINE) 4 % external solution (Start on 7/7/2021  1:45 PM)    Other Relevant Orders    Supply: Wound Cleanser    Supply: Wound Dressings    Supply: Pack Wound    Supply: Cover and Secure    Supply: Edema Control        Procedure Note    Indications:  Based on my examination of this patient's wound(s) today, sharp excision into necrotic subcutaneous tissue is required to promote healing and evaluate the extent of previous healing. Performed by: Shukri Sprague MD    Consent obtained: Yes    Time out taken:  Yes    Pain Control: topical Anesthetic  Anesthetic: 4% Lidocaine Liquid Topical     Debridement:Excisional Debridement    Using curette the wound(s) was/were sharply debrided down through and including the removal of subcutaneous tissue.         Devitalized Tissue Debrided:  slough    Pre Debridement Measurements:  Are located in the Wound Documentation Flow Sheet    All active wounds listed below with today's date are evaluated  Wound(s)    debrided this date include # : 1     Post  Debridement Measurements:  Wound 09/30/20 Wound #1 Right Medial Lower Leg Cluster (Onset 5 Years) (Active)   Wound Image   07/07/21 1305   Wound Etiology Venous 07/07/21 1305   Dressing Status New dressing applied 07/07/21 1331   Wound Cleansed Soap and water 07/07/21 1305   Dressing/Treatment Alginate with Ag 01/13/21 1657   Offloading for Diabetic Foot Ulcers No offloading required 07/07/21 1305   Wound Length (cm) 8.2 cm 07/07/21 1305   Wound Width (cm) 4.5 cm 07/07/21 1305   Wound Depth (cm) 0.1 cm 07/07/21 1305   Wound Surface Area (cm^2) 36.9 cm^2 07/07/21 1305   Change in Wound Size % (l*w) 85.36 07/07/21 1305   Wound Volume (cm^3) 3.69 cm^3 07/07/21 1305   Wound Healing % 85 07/07/21 1305   Post-Procedure Length (cm) 8.2 cm 07/07/21 1322   Post-Procedure Width (cm) 4.5 cm 07/07/21 1322   Post-Procedure Depth (cm) 0.1 cm 07/07/21 1322   Post-Procedure Surface Area (cm^2) 36.9 cm^2 07/07/21 1322   Post-Procedure Volume (cm^3) 3.69 cm^3 07/07/21 1322   Distance Tunneling (cm) 0 cm 07/07/21 1305   Tunneling Position ___ O'Clock 0 07/07/21 1305   Undermining Starts ___ O'Clock 0 07/07/21 1305   Undermining Ends___ O'Clock 0 07/07/21 1305   Undermining Maxium Distance (cm) 0 07/07/21 1305   Wound Assessment Granulation tissue 07/07/21 1305   Drainage Amount Moderate 07/07/21 1305   Drainage Description Serosanguinous 07/07/21 1305   Odor None 07/07/21 1305   Cassia-wound Assessment Intact 07/07/21 1305   Margins Defined edges 07/07/21 1305   Wound Thickness Description not for Pressure Injury Full thickness 07/07/21 1305   Number of days: 280       Percent of Wound(s) Debrided: approximately 100%    Total Surface Area Debrided:  37 sq cm     Bleeding:  Minimal    Hemostasis Achieved:  by pressure    Procedural Pain:  1  / 10     Post Procedural Pain:  0 / 10     Response to treatment:  Well tolerated by patient. Status of wound progress and description from last visit:   Improved.       Plan:       Discharge Instructions Discharge Instructions        PHYSICIAN ORDERS AND DISCHARGE INSTRUCTIONS     NOTE: Upon discharge from the [address], you will receive a patient experience survey. We would be grateful if you would take the time to fill this survey out.     Wound care order history:                 Vascular studies: Arterial studies done 9/27/20; no stenosis              Imaging:   Date               Cultures:   obtained on 11/18/2020                                 Obtained on 1/20/2021               Labs/ HbA1c:   Date               Grafts:                       #1 Apligraf on 12/16/2020                     #2 Apligraf on 12/23/2020                     #3 Apligraf on 12/30/2020                     #4 Apligraf on 1/6/2021                     # 5 Apligraf on 1/13/21                     #1 [place] 1/27/2021                     #2 [place] 2/3/2021                     #3 [place] 2/10/2021                     #4 [place] Right Medial Lower Leg 2/17/21                     #5 [place] 3/10/2021                   HBO:                Antibiotics:  Bactrim DS BID for 10 days on 11/18/2020                                   Cipro BID for 10 days ordered on 11/25/2020                                    Cipro 500 mg BID on 1/20/2021                                    Cipro 500 mg BID on 4/28/2021              Earlier Wound care treatments:                Authorizations:                        Consults:   Date                           Primary care physician:      Continuing wound care orders and information:              Residence:  private               [facility] with: [facility]               Your wound-care supplies will be provided by:  Marcell Khalil provider:              Compression with  Rudolm Manus loading:  Date               ZNBVU Medications:              OSDWY cleansing:                           OJ not scrub or use excessive force.                          Wash hands with soap and water before and after dressing changes.                         Prior to applying a clean dressing, cleanse wound with normal saline,                                wound cleanser, or mild soap and water.                           Ask the physician or nurse before getting the wound(s) wet in a shower              Daily Wound management:                          Keep weight off wounds and reposition every 2 hours.                          VMWMT standing for long periods of time.                          ELJNJ wraps/stockings in AM and remove at bedtime.                          If swelling is present, elevate legs to the level of the heart or above for 30 minutes 4-5 times a day and/or when sitting.                                             When taking antibiotics take entire prescription as ordered by physician do not stop taking until medicine is all gone.                                           Orders for this week: 6/30/21     [place] [facility] [place]     Right dorsal foot - healed      Right lower leg -- wash with soap and water,pat dry. Right lower leg -- wash with soap and water,pat dry. ( Apply clobetasol on proximal leg in clinic)Apply Desitin or Calmazine to sarah wound. Cover with anasept gel covered  4x4, ABD and wrap with coban 2         Homecare to change on Friday and Monday (may use Calcium alginate instead of anasept, profore to wrap)  ---      Referral to Dr Guardado Nurse on 2/24/2021, appt 3/18/2021 then 4/9/2021,  first procedure scheduled on 4/27/2021                    Follow up with Dr Kyra Rubio in 1 week in the wound care center  Call [phone] for any questions or concerns.   Date__________   Time          Treatment Note Wound 09/30/20 Wound #1 Right Medial Lower Leg Cluster (Onset 5 Years)-Dressing/Treatment:  (clobetasol and desitin to sarah, anasept, 4x4, abd, coban 2 )    Written Patient Dismissal Instructions Given            Electronically signed by Rajan Chong MD on 7/7/2021 at 1:36 PM

## 2021-07-14 ENCOUNTER — HOSPITAL ENCOUNTER (OUTPATIENT)
Dept: WOUND CARE | Age: 76
Discharge: HOME | End: 2021-07-14
Payer: COMMERCIAL

## 2021-07-14 VITALS
RESPIRATION RATE: 18 BRPM | DIASTOLIC BLOOD PRESSURE: 90 MMHG | HEART RATE: 72 BPM | TEMPERATURE: 99.1 F | SYSTOLIC BLOOD PRESSURE: 142 MMHG

## 2021-07-14 DIAGNOSIS — I83.029 VENOUS ULCER OF LEFT LEG (HCC): ICD-10-CM

## 2021-07-14 DIAGNOSIS — L97.929 VENOUS ULCER OF LEFT LEG (HCC): ICD-10-CM

## 2021-07-14 DIAGNOSIS — L97.919 CHRONIC VENOUS HYPERTENSION WITH ULCER AND INFLAMMATION INVOLVING RIGHT SIDE (HCC): Primary | ICD-10-CM

## 2021-07-14 DIAGNOSIS — I87.331 CHRONIC VENOUS HYPERTENSION WITH ULCER AND INFLAMMATION INVOLVING RIGHT SIDE (HCC): Primary | ICD-10-CM

## 2021-07-14 PROCEDURE — 11042 DBRDMT SUBQ TIS 1ST 20SQCM/<: CPT

## 2021-07-14 PROCEDURE — 11045 DBRDMT SUBQ TISS EACH ADDL: CPT | Performed by: SURGERY

## 2021-07-14 PROCEDURE — 11042 DBRDMT SUBQ TIS 1ST 20SQCM/<: CPT | Performed by: SURGERY

## 2021-07-14 PROCEDURE — 11045 DBRDMT SUBQ TISS EACH ADDL: CPT

## 2021-07-14 RX ORDER — GINSENG 100 MG
CAPSULE ORAL ONCE
Status: CANCELLED | OUTPATIENT
Start: 2021-07-14 | End: 2021-07-14

## 2021-07-14 RX ORDER — LIDOCAINE 50 MG/G
OINTMENT TOPICAL ONCE
Status: CANCELLED | OUTPATIENT
Start: 2021-07-14 | End: 2021-07-14

## 2021-07-14 RX ORDER — LIDOCAINE HYDROCHLORIDE 40 MG/ML
SOLUTION TOPICAL ONCE
Status: CANCELLED | OUTPATIENT
Start: 2021-07-14 | End: 2021-07-14

## 2021-07-14 RX ORDER — BETAMETHASONE DIPROPIONATE 0.05 %
OINTMENT (GRAM) TOPICAL ONCE
Status: CANCELLED | OUTPATIENT
Start: 2021-07-14 | End: 2021-07-14

## 2021-07-14 RX ORDER — CLOBETASOL PROPIONATE 0.5 MG/G
OINTMENT TOPICAL ONCE
Status: DISCONTINUED | OUTPATIENT
Start: 2021-07-14 | End: 2021-07-15 | Stop reason: HOSPADM

## 2021-07-14 RX ORDER — LIDOCAINE HYDROCHLORIDE 20 MG/ML
JELLY TOPICAL ONCE
Status: CANCELLED | OUTPATIENT
Start: 2021-07-14 | End: 2021-07-14

## 2021-07-14 RX ORDER — LIDOCAINE 40 MG/G
CREAM TOPICAL ONCE
Status: CANCELLED | OUTPATIENT
Start: 2021-07-14 | End: 2021-07-14

## 2021-07-14 RX ORDER — BACITRACIN ZINC AND POLYMYXIN B SULFATE 500; 1000 [USP'U]/G; [USP'U]/G
OINTMENT TOPICAL ONCE
Status: CANCELLED | OUTPATIENT
Start: 2021-07-14 | End: 2021-07-14

## 2021-07-14 RX ORDER — LIDOCAINE HYDROCHLORIDE 40 MG/ML
SOLUTION TOPICAL ONCE
Status: DISCONTINUED | OUTPATIENT
Start: 2021-07-14 | End: 2021-07-15 | Stop reason: HOSPADM

## 2021-07-14 RX ORDER — BACITRACIN, NEOMYCIN, POLYMYXIN B 400; 3.5; 5 [USP'U]/G; MG/G; [USP'U]/G
OINTMENT TOPICAL ONCE
Status: CANCELLED | OUTPATIENT
Start: 2021-07-14 | End: 2021-07-14

## 2021-07-14 RX ORDER — GENTAMICIN SULFATE 1 MG/G
OINTMENT TOPICAL ONCE
Status: CANCELLED | OUTPATIENT
Start: 2021-07-14 | End: 2021-07-14

## 2021-07-14 RX ORDER — CLOBETASOL PROPIONATE 0.5 MG/G
OINTMENT TOPICAL ONCE
Status: CANCELLED | OUTPATIENT
Start: 2021-07-14 | End: 2021-07-14

## 2021-07-14 NOTE — PROGRESS NOTES
Wound Care Center Progress Note with Procedure Note      Reji Bar  AGE: 77 y.o. GENDER: male  : 1945  EPISODE DATE:  2021     Subjective:     No chief complaint on file. HISTORY of PRESENT ILLNESS      Reji Bar is a 77 y.o. male who presents today for wound evaluation of Chronic venous and traumatic wound(s) of R lower leg medial.  The wound is of moderate severity. The underlying cause of the wound is trauma and venous disease. Wound Pain Timing/Severity: none  Quality of pain: N/A  Severity of pain:  0 / 10   Modifying Factors: edema and venous stasis  Associated Signs/Symptoms: none        PAST MEDICAL HISTORY        Diagnosis Date    Chronic venous hypertension with ulcer and inflammation involving right side (HCC)     RLE    Hypertension     past    Iron deficiency     Lymphoma Dx 11-    Hx of B cell lymphoma-in remission since        PAST SURGICAL HISTORY    Past Surgical History:   Procedure Laterality Date    TUNNELED VENOUS PORT PLACEMENT      TUNNELED VENOUS PORT PLACEMENT      removed 2012       FAMILY HISTORY    Family History   Problem Relation Age of Onset    Early Death Mother 52    Diabetes Father     Heart Disease Father     Vision Loss Son         \"He wears glasses\"    Early Death Daughter 36       SOCIAL HISTORY    Social History     Tobacco Use    Smoking status: Never Smoker    Smokeless tobacco: Never Used   Substance Use Topics    Alcohol use: No    Drug use: No       ALLERGIES    No Known Allergies    MEDICATIONS    Current Outpatient Medications on File Prior to Encounter   Medication Sig Dispense Refill    metoprolol succinate (TOPROL XL) 50 MG extended release tablet Take 1 tablet by mouth daily 30 tablet 5     No current facility-administered medications on file prior to encounter. REVIEW OF SYSTEMS    Pertinent items are noted in HPI.   Constitutional: Negative for systemic symptoms including fever, chills and malaise. Objective:      BP (!) 142/90   Pulse 72   Temp 99.1 °F (37.3 °C) (Temporal)   Resp 18     PHYSICAL EXAM      General: The patient is in no acute distress. Mental status:  Patient is appropriate, is  oriented to place and plan of care. Dermatologic exam: Visual inspection of the periwound reveals the skin to be edematous  Wound exam: see wound description below in procedure note      Assessment:     Problem List Items Addressed This Visit     Chronic venous hypertension with ulcer and inflammation (HCC) - Primary    Relevant Medications    lidocaine (XYLOCAINE) 4 % external solution    clobetasol (TEMOVATE) 0.05 % ointment (Start on 7/14/2021  1:45 PM)    Other Relevant Orders    Supply: Wound Cleanser    Supply: Wound Dressings    Supply: Cover and Secure    Supply: Edema Control    Venous ulcer of left leg (HCC)    Relevant Medications    lidocaine (XYLOCAINE) 4 % external solution    clobetasol (TEMOVATE) 0.05 % ointment (Start on 7/14/2021  1:45 PM)    Other Relevant Orders    Supply: Wound Cleanser    Supply: Wound Dressings    Supply: Cover and Secure    Supply: Edema Control        Procedure Note    Indications:  Based on my examination of this patient's wound(s) today, sharp excision into necrotic subcutaneous tissue is required to promote healing and evaluate the extent of previous healing. Performed by: Rajan Chong MD    Consent obtained: Yes    Time out taken:  Yes    Pain Control: topical       Debridement:Excisional Debridement    Using curette the wound(s) was/were sharply debrided down through and including the removal of subcutaneous tissue.         Devitalized Tissue Debrided:  slough    Pre Debridement Measurements:  Are located in the Wound Documentation Flow Sheet    All active wounds listed below with today's date are evaluated  Wound(s)    debrided this date include # : 1     Post  Debridement Measurements:  Wound 09/30/20 Wound #1 Right Medial Lower Leg Cluster (Onset 5 Years) (Active)   Wound Image   07/07/21 1305   Wound Etiology Venous 07/14/21 1259   Dressing Status New dressing applied;Clean;Dry; Intact 07/14/21 1328   Wound Cleansed Soap and water 07/14/21 1259   Dressing/Treatment Alginate with Ag 01/13/21 1657   Offloading for Diabetic Foot Ulcers No offloading required 07/07/21 1305   Wound Length (cm) 8 cm 07/14/21 1259   Wound Width (cm) 5 cm 07/14/21 1259   Wound Depth (cm) 0.1 cm 07/14/21 1259   Wound Surface Area (cm^2) 40 cm^2 07/14/21 1259   Change in Wound Size % (l*w) 84.13 07/14/21 1259   Wound Volume (cm^3) 4 cm^3 07/14/21 1259   Wound Healing % 84 07/14/21 1259   Post-Procedure Length (cm) 8 cm 07/14/21 1318   Post-Procedure Width (cm) 5 cm 07/14/21 1318   Post-Procedure Depth (cm) 0.1 cm 07/14/21 1318   Post-Procedure Surface Area (cm^2) 40 cm^2 07/14/21 1318   Post-Procedure Volume (cm^3) 4 cm^3 07/14/21 1318   Distance Tunneling (cm) 0 cm 07/14/21 1259   Tunneling Position ___ O'Clock 0 07/14/21 1259   Undermining Starts ___ O'Clock 0 07/14/21 1259   Undermining Ends___ O'Clock 0 07/14/21 1259   Undermining Maxium Distance (cm) 0 07/14/21 1259   Wound Assessment Granulation tissue 07/14/21 1259   Drainage Amount Moderate 07/14/21 1259   Drainage Description Serosanguinous 07/14/21 1259   Odor None 07/14/21 1259   Cassia-wound Assessment Intact 07/14/21 1259   Margins Defined edges 07/14/21 1259   Wound Thickness Description not for Pressure Injury Full thickness 07/14/21 1259   Number of days: 287       Percent of Wound(s) Debrided: approximately 100%    Total Surface Area Debrided:  40 sq cm     Bleeding:  Minimal    Hemostasis Achieved:  by pressure    Procedural Pain:  0  / 10     Post Procedural Pain:  0 / 10     Response to treatment:  Well tolerated by patient. Status of wound progress and description from last visit:   Improved.       Plan:       Discharge Instructions       PHYSICIAN ORDERS AND DISCHARGE INSTRUCTIONS     NOTE: Upon discharge from the [address], you will receive a patient experience survey. We would be grateful if you would take the time to fill this survey out.     Wound care order history:                 Vascular studies: Arterial studies done 9/27/20; no stenosis              Imaging:   Date               Cultures:   obtained on 11/18/2020                                 Obtained on 1/20/2021               Labs/ HbA1c:   Date               Grafts:                       #1 Apligraf on 12/16/2020                     #2 Apligraf on 12/23/2020                     #3 Apligraf on 12/30/2020                     #4 Apligraf on 1/6/2021                     # 5 Apligraf on 1/13/21                     #1 [place] 1/27/2021                     #2 [place] 2/3/2021                     #3 [place] 2/10/2021                     #4 [place] Right Medial Lower Leg 2/17/21                     #5 [place] 3/10/2021                   HBO:                Antibiotics:  Bactrim DS BID for 10 days on 11/18/2020                                   Cipro BID for 10 days ordered on 11/25/2020                                    Cipro 500 mg BID on 1/20/2021                                    Cipro 500 mg BID on 4/28/2021              Earlier Wound care treatments:                Authorizations:                        Consults:   Date                           Primary care physician:      Continuing wound care orders and information:              Residence:  private               [facility] home health care with: [facility]               Your wound-care supplies will be provided by: Flora Sheffield provider:              VIRAJ with  Michael Dryer loading:  Date               LYOND Medications:              NVQYS cleansing:                           BY not scrub or use excessive force.                          Wash hands with soap and water before and after dressing changes.                           Prior to applying a clean dressing, cleanse wound with normal saline,                                wound cleanser, or mild soap and water.                           Ask the physician or nurse before getting the wound(s) wet in a shower              Daily Wound management:                          Keep weight off wounds and reposition every 2 hours.                          LPGVV standing for long periods of time.                          PNDLL wraps/stockings in AM and remove at bedtime.                          If swelling is present, elevate legs to the level of the heart or above for 30 minutes 4-5 times a day and/or when sitting.                                             When taking antibiotics take entire prescription as ordered by physician do not stop taking until medicine is all gone.                                           Orders for this week: 7/14/21     Fax orders to [address]     Right dorsal foot - healed      Right lower leg -- wash with soap and water,pat dry. Right lower leg -- wash with soap and water,pat dry. ( Apply clobetasol on proximal leg in clinic)Apply Desitin or Calmazine to sarah wound. Cover with anasept gel covered  4x4, ABD and wrap with coban 2         Homecare to change on Friday and Monday (may use Calcium alginate instead of anasept, profore to wrap)  ---      Referral to Dr Rosanna August on 2/24/2021, appt 3/18/2021 then 4/9/2021,  first procedure scheduled on 4/27/2021                    Follow up with Dr Linda Finch in 1 week in the wound care center  Call [phone] for any questions or concerns.   Date__________   Time        Treatment Note Wound 09/30/20 Wound #1 Right Medial Lower Leg Cluster (Onset 5 Years)-Dressing/Treatment:  (anasept, gel, 4x4, abd, coban 2 )    Written Patient Dismissal Instructions Given            Electronically signed by Reza Naik MD on 7/14/2021 at 1:34 PM

## 2021-07-14 NOTE — PROGRESS NOTES
Multilayer Compression Wrap   (Not Unna) Below the Knee    NAME:  Serafin Fleming  YOB: 1945  MEDICAL RECORD NUMBER:  [id number]  DATE:  7/14/2021    Multilayer compression wrap: Removed old Multilayer wrap if indicated and wash leg with mild soap/water. Applied moisturizing agent to dry skin as needed. Applied primary and secondary dressing as ordered. Applied multilayered dressing below the knee to right lower leg. Instructed patient/caregiver not to remove dressing and to keep it clean and dry. Instructed patient/caregiver on complications to report to provider, such as pain, numbness in toes, heavy drainage, and slippage of dressing. Instructed patient on purpose of compression dressing and on activity and exercise recommendations.       Electronically signed by Sydnie Mercado LPN on 2/17/0262 at 4:32 PM

## 2021-07-21 ENCOUNTER — HOSPITAL ENCOUNTER (OUTPATIENT)
Dept: WOUND CARE | Age: 76
Discharge: HOME | End: 2021-07-21
Payer: COMMERCIAL

## 2021-07-21 DIAGNOSIS — L97.919 CHRONIC VENOUS HYPERTENSION WITH ULCER AND INFLAMMATION INVOLVING RIGHT SIDE (HCC): Primary | ICD-10-CM

## 2021-07-21 DIAGNOSIS — I83.029 VENOUS ULCER OF LEFT LEG (HCC): ICD-10-CM

## 2021-07-21 DIAGNOSIS — I87.331 CHRONIC VENOUS HYPERTENSION WITH ULCER AND INFLAMMATION INVOLVING RIGHT SIDE (HCC): Primary | ICD-10-CM

## 2021-07-21 DIAGNOSIS — L97.929 VENOUS ULCER OF LEFT LEG (HCC): ICD-10-CM

## 2021-07-21 PROCEDURE — 11045 DBRDMT SUBQ TISS EACH ADDL: CPT

## 2021-07-21 PROCEDURE — 11042 DBRDMT SUBQ TIS 1ST 20SQCM/<: CPT

## 2021-07-21 PROCEDURE — 11045 DBRDMT SUBQ TISS EACH ADDL: CPT | Performed by: SURGERY

## 2021-07-21 PROCEDURE — 11042 DBRDMT SUBQ TIS 1ST 20SQCM/<: CPT | Performed by: SURGERY

## 2021-07-21 RX ORDER — CLOBETASOL PROPIONATE 0.5 MG/G
OINTMENT TOPICAL ONCE
Status: CANCELLED | OUTPATIENT
Start: 2021-07-21 | End: 2021-07-21

## 2021-07-21 RX ORDER — BACITRACIN, NEOMYCIN, POLYMYXIN B 400; 3.5; 5 [USP'U]/G; MG/G; [USP'U]/G
OINTMENT TOPICAL ONCE
Status: CANCELLED | OUTPATIENT
Start: 2021-07-21 | End: 2021-07-21

## 2021-07-21 RX ORDER — GINSENG 100 MG
CAPSULE ORAL ONCE
Status: CANCELLED | OUTPATIENT
Start: 2021-07-21 | End: 2021-07-21

## 2021-07-21 RX ORDER — BACITRACIN ZINC AND POLYMYXIN B SULFATE 500; 1000 [USP'U]/G; [USP'U]/G
OINTMENT TOPICAL ONCE
Status: CANCELLED | OUTPATIENT
Start: 2021-07-21 | End: 2021-07-21

## 2021-07-21 RX ORDER — LIDOCAINE HYDROCHLORIDE 40 MG/ML
SOLUTION TOPICAL ONCE
Status: DISCONTINUED | OUTPATIENT
Start: 2021-07-21 | End: 2021-07-22 | Stop reason: HOSPADM

## 2021-07-21 RX ORDER — GENTAMICIN SULFATE 1 MG/G
OINTMENT TOPICAL ONCE
Status: CANCELLED | OUTPATIENT
Start: 2021-07-21 | End: 2021-07-21

## 2021-07-21 RX ORDER — LIDOCAINE HYDROCHLORIDE 20 MG/ML
JELLY TOPICAL ONCE
Status: CANCELLED | OUTPATIENT
Start: 2021-07-21 | End: 2021-07-21

## 2021-07-21 RX ORDER — CLOBETASOL PROPIONATE 0.5 MG/G
OINTMENT TOPICAL ONCE
Status: DISCONTINUED | OUTPATIENT
Start: 2021-07-21 | End: 2021-07-22 | Stop reason: HOSPADM

## 2021-07-21 RX ORDER — LIDOCAINE HYDROCHLORIDE 40 MG/ML
SOLUTION TOPICAL ONCE
Status: CANCELLED | OUTPATIENT
Start: 2021-07-21 | End: 2021-07-21

## 2021-07-21 RX ORDER — BETAMETHASONE DIPROPIONATE 0.05 %
OINTMENT (GRAM) TOPICAL ONCE
Status: CANCELLED | OUTPATIENT
Start: 2021-07-21 | End: 2021-07-21

## 2021-07-21 RX ORDER — LIDOCAINE 50 MG/G
OINTMENT TOPICAL ONCE
Status: CANCELLED | OUTPATIENT
Start: 2021-07-21 | End: 2021-07-21

## 2021-07-21 RX ORDER — LIDOCAINE 40 MG/G
CREAM TOPICAL ONCE
Status: CANCELLED | OUTPATIENT
Start: 2021-07-21 | End: 2021-07-21

## 2021-07-21 NOTE — PROGRESS NOTES
HPI.  Constitutional: Negative for systemic symptoms including fever, chills and malaise. Objective: There were no vitals taken for this visit. PHYSICAL EXAM      General: The patient is in no acute distress. Mental status:  Patient is appropriate, is  oriented to place and plan of care. Dermatologic exam: Visual inspection of the periwound reveals the skin to be edematous  Wound exam: see wound description below in procedure note      Assessment:     Problem List Items Addressed This Visit     Chronic venous hypertension with ulcer and inflammation (HCC) - Primary    Relevant Medications    lidocaine (XYLOCAINE) 4 % external solution    clobetasol (TEMOVATE) 0.05 % ointment    Other Relevant Orders    Supply: Wound Cleanser    Supply: Cassia Wound    Supply: Wound Dressings    Supply: Cover and Secure    Supply: Edema Control    Venous ulcer of left leg (HCC)    Relevant Medications    lidocaine (XYLOCAINE) 4 % external solution    clobetasol (TEMOVATE) 0.05 % ointment    Other Relevant Orders    Supply: Wound Cleanser    Supply: Cassia Wound    Supply: Wound Dressings    Supply: Cover and Secure    Supply: Edema Control        Procedure Note    Indications:  Based on my examination of this patient's wound(s) today, sharp excision into necrotic subcutaneous tissue is required to promote healing and evaluate the extent of previous healing. Performed by: Anai Malone MD    Consent obtained: Yes    Time out taken:  Yes    Pain Control: topical       Debridement:Excisional Debridement    Using curette the wound(s) was/were sharply debrided down through and including the removal of subcutaneous tissue.         Devitalized Tissue Debrided:  slough    Pre Debridement Measurements:  Are located in the Wound Documentation Flow Sheet    All active wounds listed below with today's date are evaluated  Wound(s)    debrided this date include # : 1     Post  Debridement Measurements:  Wound 09/30/20 Wound #1 Right Medial Lower Leg Cluster (Onset 5 Years) (Active)   Wound Image   07/21/21 1247   Wound Etiology Venous 07/14/21 1259   Dressing Status New dressing applied;Clean;Dry; Intact 07/21/21 1337   Wound Cleansed Soap and water 07/21/21 1247   Dressing/Treatment Alginate with Ag 01/13/21 1657   Offloading for Diabetic Foot Ulcers No offloading required 07/21/21 1247   Wound Length (cm) 10.8 cm 07/21/21 1247   Wound Width (cm) 5 cm 07/21/21 1247   Wound Depth (cm) 0.1 cm 07/21/21 1247   Wound Surface Area (cm^2) 54 cm^2 07/21/21 1247   Change in Wound Size % (l*w) 78.57 07/21/21 1247   Wound Volume (cm^3) 5.4 cm^3 07/21/21 1247   Wound Healing % 79 07/21/21 1247   Post-Procedure Length (cm) 10.8 cm 07/21/21 1327   Post-Procedure Width (cm) 5 cm 07/21/21 1327   Post-Procedure Depth (cm) 0.1 cm 07/21/21 1327   Post-Procedure Surface Area (cm^2) 54 cm^2 07/21/21 1327   Post-Procedure Volume (cm^3) 5.4 cm^3 07/21/21 1327   Distance Tunneling (cm) 0 cm 07/21/21 1247   Tunneling Position ___ O'Clock 0 07/21/21 1247   Undermining Starts ___ O'Clock 0 07/21/21 1247   Undermining Ends___ O'Clock 0 07/21/21 1247   Undermining Maxium Distance (cm) 0 07/21/21 1247   Wound Assessment Granulation tissue 07/21/21 1247   Drainage Amount Moderate 07/21/21 1247   Drainage Description Serosanguinous 07/21/21 1247   Odor None 07/21/21 1247   Cassia-wound Assessment Intact 07/21/21 1247   Margins Defined edges 07/21/21 1247   Wound Thickness Description not for Pressure Injury Full thickness 07/21/21 1247   Number of days: 294       Percent of Wound(s) Debrided: approximately 100%    Total Surface Area Debrided:  54 sq cm     Bleeding:  None    Hemostasis Achieved:  not needed    Procedural Pain:  0  / 10     Post Procedural Pain:  0 / 10     Response to treatment:  Well tolerated by patient. Status of wound progress and description from last visit:   Improved.       Plan:       Discharge Instructions       PHYSICIAN ORDERS AND DISCHARGE INSTRUCTIONS     NOTE: Upon discharge from the [address], you will receive a patient experience survey. We would be grateful if you would take the time to fill this survey out.     Wound care order history:                 Vascular studies: Arterial studies done 9/27/20; no stenosis              Imaging:   Date               Cultures:   obtained on 11/18/2020                                 Obtained on 1/20/2021               Labs/ HbA1c:   Date               Grafts:                       #1 Apligraf on 12/16/2020                     #2 Apligraf on 12/23/2020                     #3 Apligraf on 12/30/2020                     #4 Apligraf on 1/6/2021                     # 5 Apligraf on 1/13/21                     #1 [place] 1/27/2021                     #2 [place] 2/3/2021                     #3 [place] 2/10/2021                     #4 [place] Right Medial Lower Leg 2/17/21                     #5 [place] 3/10/2021                   HBO:                Antibiotics:  Bactrim DS BID for 10 days on 11/18/2020                                   Cipro BID for 10 days ordered on 11/25/2020                                    Cipro 500 mg BID on 1/20/2021                                    Cipro 500 mg BID on 4/28/2021              Earlier Wound care treatments:                Authorizations:                        Consults:   Date                           Primary care physician:      Continuing wound care orders and information:              Residence:  private               [facility] with: [facility]               Your wound-care supplies will be provided by: Homer Manrique provider:              JAXSON with  Lyndsey Mccain loading:  Date               TNDOY Medications:              KUBXV cleansing:                           VE not scrub or use excessive force.                          Wash hands with soap and water before and after dressing changes.                           Prior to applying a clean dressing, cleanse wound with normal saline,                                wound cleanser, or mild soap and water.                           Ask the physician or nurse before getting the wound(s) wet in a shower              Daily Wound management:                          Keep weight off wounds and reposition every 2 hours.                          UXOJY standing for long periods of time.                          RZPFV wraps/stockings in AM and remove at bedtime.                          If swelling is present, elevate legs to the level of the heart or above for 30 minutes 4-5 times a day and/or when sitting.                                             When taking antibiotics take entire prescription as ordered by physician do not stop taking until medicine is all gone.                                           Orders for this week: 7/21/21     [place] [facility]     Right dorsal foot - healed      Right lower leg -- wash with soap and water, pat dry. (Apply clobetasol on proximal leg in clinic) Apply Desitin or Calmazine to sarah wound. Cover with anasept gel covered 4x4, ABD and wrap with coban 2.       Homecare to change on Friday and Monday (may use Calcium alginate instead of anasept, profore to wrap)     Referral to Dr Camden Chávez on 2/24/2021, appt 3/18/2021 then 4/9/2021, first procedure scheduled on 4/27/2021                    Follow up with Dr Agatha Goddard in 1 week in the wound care center  Call [phone] for any questions or concerns.   Date__________   Time__________        Treatment Note Wound 09/30/20 Wound #1 Right Medial Lower Leg Cluster (Onset 5 Years)-Dressing/Treatment:  (anasept gel 4x4, ABD, conform tape )    Written Patient Dismissal Instructions Given            Electronically signed by Diamantina Goldmann, MD on 7/21/2021 at 1:51 PM

## 2021-07-28 ENCOUNTER — HOSPITAL ENCOUNTER (OUTPATIENT)
Dept: WOUND CARE | Age: 76
Discharge: HOME | End: 2021-07-28
Payer: COMMERCIAL

## 2021-07-28 DIAGNOSIS — L97.929 VENOUS ULCER OF LEFT LEG (HCC): ICD-10-CM

## 2021-07-28 DIAGNOSIS — L97.919 CHRONIC VENOUS HYPERTENSION WITH ULCER AND INFLAMMATION INVOLVING RIGHT SIDE (HCC): Primary | ICD-10-CM

## 2021-07-28 DIAGNOSIS — I87.331 CHRONIC VENOUS HYPERTENSION WITH ULCER AND INFLAMMATION INVOLVING RIGHT SIDE (HCC): Primary | ICD-10-CM

## 2021-07-28 DIAGNOSIS — I83.029 VENOUS ULCER OF LEFT LEG (HCC): ICD-10-CM

## 2021-07-28 PROCEDURE — 11042 DBRDMT SUBQ TIS 1ST 20SQCM/<: CPT

## 2021-07-28 PROCEDURE — 11045 DBRDMT SUBQ TISS EACH ADDL: CPT

## 2021-07-28 PROCEDURE — 11042 DBRDMT SUBQ TIS 1ST 20SQCM/<: CPT | Performed by: SURGERY

## 2021-07-28 PROCEDURE — 11045 DBRDMT SUBQ TISS EACH ADDL: CPT | Performed by: SURGERY

## 2021-07-28 RX ORDER — LIDOCAINE HYDROCHLORIDE 20 MG/ML
JELLY TOPICAL ONCE
Status: CANCELLED | OUTPATIENT
Start: 2021-07-28 | End: 2021-07-28

## 2021-07-28 RX ORDER — LIDOCAINE HYDROCHLORIDE 40 MG/ML
SOLUTION TOPICAL ONCE
Status: CANCELLED | OUTPATIENT
Start: 2021-07-28 | End: 2021-07-28

## 2021-07-28 RX ORDER — GENTAMICIN SULFATE 1 MG/G
OINTMENT TOPICAL ONCE
Status: CANCELLED | OUTPATIENT
Start: 2021-07-28 | End: 2021-07-28

## 2021-07-28 RX ORDER — LIDOCAINE 40 MG/G
CREAM TOPICAL ONCE
Status: CANCELLED | OUTPATIENT
Start: 2021-07-28 | End: 2021-07-28

## 2021-07-28 RX ORDER — BETAMETHASONE DIPROPIONATE 0.05 %
OINTMENT (GRAM) TOPICAL ONCE
Status: CANCELLED | OUTPATIENT
Start: 2021-07-28 | End: 2021-07-28

## 2021-07-28 RX ORDER — GINSENG 100 MG
CAPSULE ORAL ONCE
Status: CANCELLED | OUTPATIENT
Start: 2021-07-28 | End: 2021-07-28

## 2021-07-28 RX ORDER — BACITRACIN ZINC AND POLYMYXIN B SULFATE 500; 1000 [USP'U]/G; [USP'U]/G
OINTMENT TOPICAL ONCE
Status: CANCELLED | OUTPATIENT
Start: 2021-07-28 | End: 2021-07-28

## 2021-07-28 RX ORDER — LIDOCAINE 50 MG/G
OINTMENT TOPICAL ONCE
Status: CANCELLED | OUTPATIENT
Start: 2021-07-28 | End: 2021-07-28

## 2021-07-28 RX ORDER — CLOBETASOL PROPIONATE 0.5 MG/G
OINTMENT TOPICAL ONCE
Status: DISCONTINUED | OUTPATIENT
Start: 2021-07-28 | End: 2021-07-29 | Stop reason: HOSPADM

## 2021-07-28 RX ORDER — CLOBETASOL PROPIONATE 0.5 MG/G
OINTMENT TOPICAL ONCE
Status: CANCELLED | OUTPATIENT
Start: 2021-07-28 | End: 2021-07-28

## 2021-07-28 RX ORDER — BACITRACIN, NEOMYCIN, POLYMYXIN B 400; 3.5; 5 [USP'U]/G; MG/G; [USP'U]/G
OINTMENT TOPICAL ONCE
Status: CANCELLED | OUTPATIENT
Start: 2021-07-28 | End: 2021-07-28

## 2021-07-28 NOTE — PROGRESS NOTES
Multilayer Compression Wrap   (Not Unna) Below the Knee    NAME:  Gatito Longoria  YOB: 1945  MEDICAL RECORD NUMBER:  [id number]  DATE:  7/28/2021    Multilayer compression wrap: Removed old Multilayer wrap if indicated and wash leg with mild soap/water. Applied moisturizing agent to dry skin as needed. Applied primary and secondary dressing as ordered. Applied multilayered dressing below the knee to right lower leg. Instructed patient/caregiver not to remove dressing and to keep it clean and dry. Instructed patient/caregiver on complications to report to provider, such as pain, numbness in toes, heavy drainage, and slippage of dressing. Instructed patient on purpose of compression dressing and on activity and exercise recommendations.       Electronically signed by No Howard RN on 7/28/2021 at 1:40 PM

## 2021-07-28 NOTE — PROGRESS NOTES
Wound Care Center Progress Note with Procedure Note      Anders Smith  AGE: 77 y.o. GENDER: male  : 1945  EPISODE DATE:  2021     Subjective:     Chief Complaint   Patient presents with    Wound Check         HISTORY of PRESENT ILLNESS      Anders Smith is a 77 y.o. male who presents today for wound evaluation of Chronic venous and traumatic wound(s) of R lower leg medial.  The wound is of moderate severity. The underlying cause of the wound is venous disease and trauma. Wound Pain Timing/Severity: none  Quality of pain: N/A  Severity of pain:  0 / 10   Modifying Factors: venous stasis  Associated Signs/Symptoms: drainage        PAST MEDICAL HISTORY        Diagnosis Date    Chronic venous hypertension with ulcer and inflammation involving right side (HCC)     RLE    Hypertension     past    Iron deficiency     Lymphoma Dx 11-    Hx of B cell lymphoma-in remission since        PAST SURGICAL HISTORY    Past Surgical History:   Procedure Laterality Date    TUNNELED VENOUS PORT PLACEMENT      TUNNELED VENOUS PORT PLACEMENT      removed 2012       FAMILY HISTORY    Family History   Problem Relation Age of Onset    Early Death Mother 52    Diabetes Father     Heart Disease Father     Vision Loss Son         \"He wears glasses\"    Early Death Daughter 36       SOCIAL HISTORY    Social History     Tobacco Use    Smoking status: Never Smoker    Smokeless tobacco: Never Used   Substance Use Topics    Alcohol use: No    Drug use: No       ALLERGIES    No Known Allergies    MEDICATIONS    Current Outpatient Medications on File Prior to Encounter   Medication Sig Dispense Refill    metoprolol succinate (TOPROL XL) 50 MG extended release tablet Take 1 tablet by mouth daily 30 tablet 5     No current facility-administered medications on file prior to encounter. REVIEW OF SYSTEMS    Pertinent items are noted in HPI.   Constitutional: Negative for systemic symptoms including fever, chills and malaise. Objective: There were no vitals taken for this visit. PHYSICAL EXAM      General: The patient is in no acute distress. Mental status:  Patient is appropriate, is  oriented to place and plan of care. Dermatologic exam: Visual inspection of the periwound reveals the skin to be edematous  Wound exam: see wound description below in procedure note      Assessment:     Problem List Items Addressed This Visit     Chronic venous hypertension with ulcer and inflammation (HCC) - Primary    Relevant Medications    clobetasol (TEMOVATE) 0.05 % ointment (Start on 7/28/2021  2:00 PM)    Other Relevant Orders    Supply: Wound Cleanser    Supply: Cassia Wound    Supply: Wound Dressings    Supply: Cover and Secure    Supply: Edema Control    Venous ulcer of left leg (HCC)    Relevant Medications    clobetasol (TEMOVATE) 0.05 % ointment (Start on 7/28/2021  2:00 PM)    Other Relevant Orders    Supply: Wound Cleanser    Supply: Cassia Wound    Supply: Wound Dressings    Supply: Cover and Secure    Supply: Edema Control        Procedure Note    Indications:  Based on my examination of this patient's wound(s) today, sharp excision into necrotic subcutaneous tissue is required to promote healing and evaluate the extent of previous healing. Performed by: Dipika Orlando MD    Consent obtained: Yes    Time out taken:  Yes    Pain Control: topical       Debridement:Excisional Debridement    Using curette the wound(s) was/were sharply debrided down through and including the removal of subcutaneous tissue.         Devitalized Tissue Debrided:  slough    Pre Debridement Measurements:  Are located in the Wound Documentation Flow Sheet    All active wounds listed below with today's date are evaluated  Wound(s)    debrided this date include # : 1     Post  Debridement Measurements:  Wound 09/30/20 Wound #1 Right Medial Lower Leg Cluster (Onset 5 Years) (Active)   Wound Image   07/21/21 1247   Wound Etiology Venous 07/28/21 1302   Dressing Status New dressing applied;Clean;Dry; Intact 07/28/21 1339   Wound Cleansed Soap and water 07/28/21 1302   Dressing/Treatment Alginate with Ag 01/13/21 1657   Offloading for Diabetic Foot Ulcers No offloading required 07/28/21 1302   Wound Length (cm) 8.7 cm 07/28/21 1302   Wound Width (cm) 4.6 cm 07/28/21 1302   Wound Depth (cm) 0.1 cm 07/28/21 1302   Wound Surface Area (cm^2) 40.02 cm^2 07/28/21 1302   Change in Wound Size % (l*w) 84.12 07/28/21 1302   Wound Volume (cm^3) 4.002 cm^3 07/28/21 1302   Wound Healing % 84 07/28/21 1302   Post-Procedure Length (cm) 8.7 cm 07/28/21 1320   Post-Procedure Width (cm) 4.6 cm 07/28/21 1320   Post-Procedure Depth (cm) 0.1 cm 07/28/21 1320   Post-Procedure Surface Area (cm^2) 40.02 cm^2 07/28/21 1320   Post-Procedure Volume (cm^3) 4.002 cm^3 07/28/21 1320   Distance Tunneling (cm) 0 cm 07/28/21 1302   Tunneling Position ___ O'Clock 0 07/28/21 1302   Undermining Starts ___ O'Clock 0 07/28/21 1302   Undermining Ends___ O'Clock 0 07/28/21 1302   Undermining Maxium Distance (cm) 0 07/28/21 1302   Wound Assessment Granulation tissue 07/28/21 1302   Drainage Amount Moderate 07/28/21 1302   Drainage Description Serosanguinous 07/28/21 1302   Odor None 07/28/21 1302   Cassia-wound Assessment Intact 07/28/21 1302   Margins Defined edges 07/28/21 1302   Wound Thickness Description not for Pressure Injury Full thickness 07/28/21 1302   Number of days: 301       Percent of Wound(s) Debrided: approximately 100%    Total Surface Area Debrided:  40 sq cm     Bleeding:  Minimal    Hemostasis Achieved:  by pressure    Procedural Pain:  0  / 10     Post Procedural Pain:  0 / 10     Response to treatment:  Well tolerated by patient. Status of wound progress and description from last visit:   Improved.       Plan:       Discharge Instructions         Discharge Instructions        PHYSICIAN ORDERS AND DISCHARGE INSTRUCTIONS     NOTE: Upon discharge from the [address], you will receive a patient experience survey. We would be grateful if you would take the time to fill this survey out.     Wound care order history:                 Vascular studies: Arterial studies done 9/27/20; no stenosis              Imaging:   Date               Cultures:   obtained on 11/18/2020                                 Obtained on 1/20/2021               Labs/ HbA1c:   Date               Grafts:                       #1 Apligraf on 12/16/2020                     #2 Apligraf on 12/23/2020                     #3 Apligraf on 12/30/2020                     #4 Apligraf on 1/6/2021                     # 5 Apligraf on 1/13/21                     #1 [place] 1/27/2021                     #2 [place] 2/3/2021                     #3 [place] 2/10/2021                     #4 [place] Right Medial Lower Leg 2/17/21                     #5 [place] 3/10/2021                   HBO:                Antibiotics:  Bactrim DS BID for 10 days on 11/18/2020                                   Cipro BID for 10 days ordered on 11/25/2020                                    Cipro 500 mg BID on 1/20/2021                                    Cipro 500 mg BID on 4/28/2021              Earlier Wound care treatments:                Authorizations:                        Consults:   Date                           Primary care physician:      Continuing wound care orders and information:              Residence:  private               Continue [facility] health care with: [facility]               Your wound-care supplies will be provided by: Mendel Kayser provider:              GEANRO Solorzano loading:  Date               IYWDL Medications:              WBALT cleansing:                           EL not scrub or use excessive force.                          Wash hands with soap and water before and after dressing changes.                           Prior to applying a clean dressing, cleanse wound with normal saline,                                wound cleanser, or mild soap and water.                           Ask the physician or nurse before getting the wound(s) wet in a shower              Daily Wound management:                          Keep weight off wounds and reposition every 2 hours.                          BBDWM standing for long periods of time.                          TOBFG wraps/stockings in AM and remove at bedtime.                          If swelling is present, elevate legs to the level of the heart or above for 30 minutes 4-5 times a day and/or when sitting.                                             When taking antibiotics take entire prescription as ordered by physician do not stop taking until medicine is all gone.                                           Orders for this week: 7/28/21     Fax orders to [facility] [place]     Right dorsal foot - healed      Right lower leg -- wash with soap and water, pat dry. (Apply clobetasol on proximal leg in clinic) Apply Desitin or Calmazine to sarah wound. Cover with anasept gel covered 4x4, ABD and wrap with coban 2.       Homecare to change on Friday and Monday (may use Calcium alginate instead of anasept, profore to wrap)     Referral to Dr Melina Yao on 2/24/2021, appt 3/18/2021 then 4/9/2021, first procedure scheduled on 4/27/2021                    Follow up with Dr Arianna Melendez in 1 week in the wound care center  Call [phone] for any questions or concerns.   Date__________   Time__________             Treatment Note Wound 09/30/20 Wound #1 Right Medial Lower Leg Cluster (Onset 5 Years)-Dressing/Treatment:  (anasept, 4x4, abd, coban 2)    Written Patient Dismissal Instructions Given            Electronically signed by Ema Wilson MD on 7/28/2021 at 1:49 PM

## 2021-08-04 ENCOUNTER — HOSPITAL ENCOUNTER (OUTPATIENT)
Dept: WOUND CARE | Age: 76
Discharge: HOME | End: 2021-08-04
Payer: COMMERCIAL

## 2021-08-04 VITALS
DIASTOLIC BLOOD PRESSURE: 88 MMHG | RESPIRATION RATE: 18 BRPM | HEART RATE: 77 BPM | TEMPERATURE: 98.6 F | SYSTOLIC BLOOD PRESSURE: 151 MMHG

## 2021-08-04 DIAGNOSIS — I87.331 CHRONIC VENOUS HYPERTENSION WITH ULCER AND INFLAMMATION INVOLVING RIGHT SIDE (HCC): Primary | ICD-10-CM

## 2021-08-04 DIAGNOSIS — L97.929 VENOUS ULCER OF LEFT LEG (HCC): ICD-10-CM

## 2021-08-04 DIAGNOSIS — I83.029 VENOUS ULCER OF LEFT LEG (HCC): ICD-10-CM

## 2021-08-04 DIAGNOSIS — L97.919 CHRONIC VENOUS HYPERTENSION WITH ULCER AND INFLAMMATION INVOLVING RIGHT SIDE (HCC): Primary | ICD-10-CM

## 2021-08-04 PROCEDURE — 11045 DBRDMT SUBQ TISS EACH ADDL: CPT | Performed by: SURGERY

## 2021-08-04 PROCEDURE — 11042 DBRDMT SUBQ TIS 1ST 20SQCM/<: CPT | Performed by: SURGERY

## 2021-08-04 PROCEDURE — 11042 DBRDMT SUBQ TIS 1ST 20SQCM/<: CPT

## 2021-08-04 PROCEDURE — 11045 DBRDMT SUBQ TISS EACH ADDL: CPT

## 2021-08-04 RX ORDER — LIDOCAINE HYDROCHLORIDE 20 MG/ML
JELLY TOPICAL ONCE
Status: CANCELLED | OUTPATIENT
Start: 2021-08-04 | End: 2021-08-04

## 2021-08-04 RX ORDER — BACITRACIN ZINC AND POLYMYXIN B SULFATE 500; 1000 [USP'U]/G; [USP'U]/G
OINTMENT TOPICAL ONCE
Status: CANCELLED | OUTPATIENT
Start: 2021-08-04 | End: 2021-08-04

## 2021-08-04 RX ORDER — LIDOCAINE HYDROCHLORIDE 40 MG/ML
SOLUTION TOPICAL ONCE
Status: CANCELLED | OUTPATIENT
Start: 2021-08-04 | End: 2021-08-04

## 2021-08-04 RX ORDER — CLOBETASOL PROPIONATE 0.5 MG/G
OINTMENT TOPICAL ONCE
Status: CANCELLED | OUTPATIENT
Start: 2021-08-04 | End: 2021-08-04

## 2021-08-04 RX ORDER — BETAMETHASONE DIPROPIONATE 0.05 %
OINTMENT (GRAM) TOPICAL ONCE
Status: CANCELLED | OUTPATIENT
Start: 2021-08-04 | End: 2021-08-04

## 2021-08-04 RX ORDER — GENTAMICIN SULFATE 1 MG/G
OINTMENT TOPICAL ONCE
Status: CANCELLED | OUTPATIENT
Start: 2021-08-04 | End: 2021-08-04

## 2021-08-04 RX ORDER — BACITRACIN, NEOMYCIN, POLYMYXIN B 400; 3.5; 5 [USP'U]/G; MG/G; [USP'U]/G
OINTMENT TOPICAL ONCE
Status: CANCELLED | OUTPATIENT
Start: 2021-08-04 | End: 2021-08-04

## 2021-08-04 RX ORDER — LIDOCAINE 40 MG/G
CREAM TOPICAL ONCE
Status: CANCELLED | OUTPATIENT
Start: 2021-08-04 | End: 2021-08-04

## 2021-08-04 RX ORDER — LIDOCAINE 50 MG/G
OINTMENT TOPICAL ONCE
Status: CANCELLED | OUTPATIENT
Start: 2021-08-04 | End: 2021-08-04

## 2021-08-04 RX ORDER — LIDOCAINE 50 MG/G
OINTMENT TOPICAL ONCE
Status: DISCONTINUED | OUTPATIENT
Start: 2021-08-04 | End: 2021-08-05 | Stop reason: HOSPADM

## 2021-08-04 RX ORDER — GINSENG 100 MG
CAPSULE ORAL ONCE
Status: CANCELLED | OUTPATIENT
Start: 2021-08-04 | End: 2021-08-04

## 2021-08-04 ASSESSMENT — PAIN SCALES - GENERAL: PAINLEVEL_OUTOF10: 0

## 2021-08-04 NOTE — PROGRESS NOTES
Wound Care Center Progress Note with Procedure Note      Cherie Arias  AGE: 77 y.o. GENDER: male  : 1945  EPISODE DATE:  2021     Subjective:     Chief Complaint   Patient presents with    Wound Check         HISTORY of PRESENT ILLNESS      Cherie Arias is a 77 y.o. male who presents today for wound evaluation of Chronic venous wound(s) of R lower leg medial.  The wound is of moderate severity. The underlying cause of the wound is venous disease and trauma. Wound Pain Timing/Severity: none  Quality of pain: N/A  Severity of pain:  0 / 10   Modifying Factors: venous stasis  Associated Signs/Symptoms: none        PAST MEDICAL HISTORY        Diagnosis Date    Chronic venous hypertension with ulcer and inflammation involving right side (HCC)     RLE    Hypertension     past    Iron deficiency     Lymphoma Dx     Hx of B cell lymphoma-in remission since        PAST SURGICAL HISTORY    Past Surgical History:   Procedure Laterality Date    TUNNELED VENOUS PORT PLACEMENT      TUNNELED VENOUS PORT PLACEMENT      removed 2012       FAMILY HISTORY    Family History   Problem Relation Age of Onset    Early Death Mother 52    Diabetes Father     Heart Disease Father     Vision Loss Son         \"He wears glasses\"    Early Death Daughter 36       SOCIAL HISTORY    Social History     Tobacco Use    Smoking status: Never Smoker    Smokeless tobacco: Never Used   Substance Use Topics    Alcohol use: No    Drug use: No       ALLERGIES    No Known Allergies    MEDICATIONS    Current Outpatient Medications on File Prior to Encounter   Medication Sig Dispense Refill    metoprolol succinate (TOPROL XL) 50 MG extended release tablet Take 1 tablet by mouth daily 30 tablet 5     No current facility-administered medications on file prior to encounter. REVIEW OF SYSTEMS    Pertinent items are noted in HPI.   Constitutional: Negative for systemic symptoms including fever, chills and malaise. Objective:      BP (!) 151/88   Pulse 77   Temp 98.6 °F (37 °C) (Temporal)   Resp 18     PHYSICAL EXAM      General: The patient is in no acute distress. Mental status:  Patient is appropriate, is  oriented to place and plan of care. Dermatologic exam: Visual inspection of the periwound reveals the skin to be edematous  Wound exam: see wound description below in procedure note      Assessment:     Problem List Items Addressed This Visit     Chronic venous hypertension with ulcer and inflammation (HCC) - Primary    Relevant Medications    lidocaine (XYLOCAINE) 5 % ointment    Other Relevant Orders    Supply: Wound Cleanser    Supply: Wound Dressings    Supply: Cover and Secure    Supply: Edema Control    Supply: Cassia Wound    Venous ulcer of left leg (HCC)    Relevant Medications    lidocaine (XYLOCAINE) 5 % ointment    Other Relevant Orders    Supply: Wound Cleanser    Supply: Wound Dressings    Supply: Cover and Secure    Supply: Edema Control    Supply: Cassia Wound        Procedure Note    Indications:  Based on my examination of this patient's wound(s) today, sharp excision into necrotic subcutaneous tissue is required to promote healing and evaluate the extent of previous healing. Performed by: Edd Mock MD    Consent obtained: Yes    Time out taken:  Yes    Pain Control: topical Anesthetic  Anesthetic: 5% Lidocaine Ointment Topical     Debridement:Excisional Debridement    Using curette the wound(s) was/were sharply debrided down through and including the removal of subcutaneous tissue.         Devitalized Tissue Debrided:  slough    Pre Debridement Measurements:  Are located in the Wound Documentation Flow Sheet    All active wounds listed below with today's date are evaluated  Wound(s)    debrided this date include # : 1     Post  Debridement Measurements:  Wound 09/30/20 Wound #1 Right Medial Lower Leg Cluster (Onset 5 Years) (Active)   Wound Image   08/04/21 1258   Wound Etiology Venous 08/04/21 1258   Dressing Status New dressing applied;Clean;Dry; Intact 08/04/21 1325   Wound Cleansed Soap and water 08/04/21 1258   Dressing/Treatment Alginate with Ag 01/13/21 1657   Offloading for Diabetic Foot Ulcers No offloading required 08/04/21 1258   Wound Length (cm) 10.5 cm 08/04/21 1258   Wound Width (cm) 5 cm 08/04/21 1258   Wound Depth (cm) 0.1 cm 08/04/21 1258   Wound Surface Area (cm^2) 52.5 cm^2 08/04/21 1258   Change in Wound Size % (l*w) 79.17 08/04/21 1258   Wound Volume (cm^3) 5.25 cm^3 08/04/21 1258   Wound Healing % 79 08/04/21 1258   Post-Procedure Length (cm) 10.5 cm 08/04/21 1309   Post-Procedure Width (cm) 5 cm 08/04/21 1309   Post-Procedure Depth (cm) 0.1 cm 08/04/21 1309   Post-Procedure Surface Area (cm^2) 52.5 cm^2 08/04/21 1309   Post-Procedure Volume (cm^3) 5.25 cm^3 08/04/21 1309   Distance Tunneling (cm) 0 cm 08/04/21 1258   Tunneling Position ___ O'Clock 0 08/04/21 1258   Undermining Starts ___ O'Clock 0 08/04/21 1258   Undermining Ends___ O'Clock 0 08/04/21 1258   Undermining Maxium Distance (cm) 0 08/04/21 1258   Wound Assessment Granulation tissue 08/04/21 1258   Drainage Amount Moderate 08/04/21 1258   Drainage Description Serosanguinous 08/04/21 1258   Odor None 08/04/21 1258   Cassia-wound Assessment Intact 08/04/21 1258   Margins Defined edges 08/04/21 1258   Wound Thickness Description not for Pressure Injury Full thickness 08/04/21 1258   Number of days: 308       Percent of Wound(s) Debrided: approximately 80%    Total Surface Area Debrided:  40 sq cm     Bleeding:  Minimal    Hemostasis Achieved:  by pressure    Procedural Pain:  0  / 10     Post Procedural Pain:  0 / 10     Response to treatment:  Well tolerated by patient. Status of wound progress and description from last visit:   Improved.       Plan:       Discharge Instructions                   Avoid standing for long periods of time.                          SXNUK wraps/stockings in AM and remove at bedtime.                          If swelling is present, elevate legs to the level of the heart or above for 30 minutes 4-5 times a day and/or when sitting.                                             When taking antibiotics take entire prescription as ordered by physician do not stop taking until medicine is all gone.                                           Orders for this week: 8/4/21     Fax orders to [facility] [place] [place]     Right dorsal foot - healed      Right lower leg -- wash with soap and water, pat dry. (Apply clobetasol on proximal leg in clinic) Apply Desitin or Calmazine to sarah wound. Cover with anasept gel covered 4x4, ABD and wrap with coban 2.       Homecare to change on Friday and Monday (may use Calcium alginate instead of anasept, profore to wrap)     Referral to Dr Nelly Rasheed on 2/24/2021, appt 3/18/2021 then 4/9/2021, first procedure scheduled on 4/27/2021                    Follow up with Dr Althea Chang in 1 week in the wound care center  Call [phone] for any questions or concerns.   Date__________   Time__________              Treatment Note Wound 09/30/20 Wound #1 Right Medial Lower Leg Cluster (Onset 5 Years)-Dressing/Treatment:  (anasept, 4x4s, abd, coban 2)    Written Patient Dismissal Instructions Given            Electronically signed by Anuj Tomas MD on 8/4/2021 at 2:06 PM

## 2021-08-04 NOTE — PROGRESS NOTES
Multilayer Compression Wrap   (Not Unna) Below the Knee    NAME:  Haleigh Escobar  YOB: 1945  MEDICAL RECORD NUMBER:  [id number]  DATE:  8/4/2021    Multilayer compression wrap: Removed old Multilayer wrap if indicated and wash leg with mild soap/water. Applied moisturizing agent to dry skin as needed. Applied primary and secondary dressing as ordered. Applied multilayered dressing below the knee to right lower leg. Instructed patient/caregiver not to remove dressing and to keep it clean and dry. Instructed patient/caregiver on complications to report to provider, such as pain, numbness in toes, heavy drainage, and slippage of dressing. Instructed patient on purpose of compression dressing and on activity and exercise recommendations.       Electronically signed by Harshil Robertson RN on 8/4/2021 at 1:27 PM

## 2021-08-11 ENCOUNTER — TELEPHONE (OUTPATIENT)
Dept: INTERNAL MEDICINE CLINIC | Age: 76
End: 2021-08-11

## 2021-08-11 ENCOUNTER — HOSPITAL ENCOUNTER (OUTPATIENT)
Dept: WOUND CARE | Age: 76
Discharge: HOME | End: 2021-08-11
Payer: COMMERCIAL

## 2021-08-11 VITALS
RESPIRATION RATE: 16 BRPM | HEART RATE: 74 BPM | DIASTOLIC BLOOD PRESSURE: 94 MMHG | SYSTOLIC BLOOD PRESSURE: 157 MMHG | TEMPERATURE: 99.4 F

## 2021-08-11 DIAGNOSIS — I87.331 CHRONIC VENOUS HYPERTENSION WITH ULCER AND INFLAMMATION INVOLVING RIGHT SIDE (HCC): Primary | ICD-10-CM

## 2021-08-11 DIAGNOSIS — I83.029 VENOUS ULCER OF LEFT LEG (HCC): ICD-10-CM

## 2021-08-11 DIAGNOSIS — L97.919 CHRONIC VENOUS HYPERTENSION WITH ULCER AND INFLAMMATION INVOLVING RIGHT SIDE (HCC): Primary | ICD-10-CM

## 2021-08-11 DIAGNOSIS — L97.929 VENOUS ULCER OF LEFT LEG (HCC): ICD-10-CM

## 2021-08-11 PROCEDURE — 11042 DBRDMT SUBQ TIS 1ST 20SQCM/<: CPT | Performed by: SURGERY

## 2021-08-11 PROCEDURE — 11045 DBRDMT SUBQ TISS EACH ADDL: CPT | Performed by: SURGERY

## 2021-08-11 PROCEDURE — 11045 DBRDMT SUBQ TISS EACH ADDL: CPT

## 2021-08-11 PROCEDURE — 11042 DBRDMT SUBQ TIS 1ST 20SQCM/<: CPT

## 2021-08-11 RX ORDER — CLOBETASOL PROPIONATE 0.5 MG/G
OINTMENT TOPICAL ONCE
Status: CANCELLED | OUTPATIENT
Start: 2021-08-11 | End: 2021-08-11

## 2021-08-11 RX ORDER — GENTAMICIN SULFATE 1 MG/G
OINTMENT TOPICAL ONCE
Status: CANCELLED | OUTPATIENT
Start: 2021-08-11 | End: 2021-08-11

## 2021-08-11 RX ORDER — GINSENG 100 MG
CAPSULE ORAL ONCE
Status: CANCELLED | OUTPATIENT
Start: 2021-08-11 | End: 2021-08-11

## 2021-08-11 RX ORDER — BACITRACIN, NEOMYCIN, POLYMYXIN B 400; 3.5; 5 [USP'U]/G; MG/G; [USP'U]/G
OINTMENT TOPICAL ONCE
Status: CANCELLED | OUTPATIENT
Start: 2021-08-11 | End: 2021-08-11

## 2021-08-11 RX ORDER — LIDOCAINE HYDROCHLORIDE 20 MG/ML
JELLY TOPICAL ONCE
Status: CANCELLED | OUTPATIENT
Start: 2021-08-11 | End: 2021-08-11

## 2021-08-11 RX ORDER — BETAMETHASONE DIPROPIONATE 0.05 %
OINTMENT (GRAM) TOPICAL ONCE
Status: CANCELLED | OUTPATIENT
Start: 2021-08-11 | End: 2021-08-11

## 2021-08-11 RX ORDER — LIDOCAINE 40 MG/G
CREAM TOPICAL ONCE
Status: CANCELLED | OUTPATIENT
Start: 2021-08-11 | End: 2021-08-11

## 2021-08-11 RX ORDER — LIDOCAINE HYDROCHLORIDE 40 MG/ML
SOLUTION TOPICAL ONCE
Status: CANCELLED | OUTPATIENT
Start: 2021-08-11 | End: 2021-08-11

## 2021-08-11 RX ORDER — LIDOCAINE 50 MG/G
OINTMENT TOPICAL ONCE
Status: CANCELLED | OUTPATIENT
Start: 2021-08-11 | End: 2021-08-11

## 2021-08-11 RX ORDER — BACITRACIN ZINC AND POLYMYXIN B SULFATE 500; 1000 [USP'U]/G; [USP'U]/G
OINTMENT TOPICAL ONCE
Status: CANCELLED | OUTPATIENT
Start: 2021-08-11 | End: 2021-08-11

## 2021-08-11 NOTE — PROGRESS NOTES
Multilayer Compression Wrap   (Not Unna) Below the Knee    NAME:  James Carrasco  YOB: 1945  MEDICAL RECORD NUMBER:  [id number]  DATE:  8/11/2021    Multilayer compression wrap: Removed old Multilayer wrap if indicated and wash leg with mild soap/water. Applied moisturizing agent to dry skin as needed. Applied primary and secondary dressing as ordered. Applied multilayered dressing below the knee to right lower leg. Instructed patient/caregiver not to remove dressing and to keep it clean and dry. Instructed patient/caregiver on complications to report to provider, such as pain, numbness in toes, heavy drainage, and slippage of dressing. Instructed patient on purpose of compression dressing and on activity and exercise recommendations.       Electronically signed by Cecelia Coburn LPN on 1/22/3164 at 0:85 PM

## 2021-08-11 NOTE — TELEPHONE ENCOUNTER
Myriam calling from [facility] [place] ,requesting orders for Home Care. Verbal order given. Pt's last OV was 7/01/21. Myriam voiced understanding/thanks. No further action is needed, at this time.

## 2021-08-11 NOTE — PROGRESS NOTES
1325   Wound Cleansed Soap and water 08/11/21 1255   Dressing/Treatment Alginate with Ag 01/13/21 1657   Offloading for Diabetic Foot Ulcers No offloading required 08/04/21 1258   Wound Length (cm) 10.5 cm 08/11/21 1255   Wound Width (cm) 5 cm 08/11/21 1255   Wound Depth (cm) 0.1 cm 08/11/21 1255   Wound Surface Area (cm^2) 52.5 cm^2 08/11/21 1255   Change in Wound Size % (l*w) 79.17 08/11/21 1255   Wound Volume (cm^3) 5.25 cm^3 08/11/21 1255   Wound Healing % 79 08/11/21 1255   Post-Procedure Length (cm) 10.5 cm 08/04/21 1309   Post-Procedure Width (cm) 5 cm 08/04/21 1309   Post-Procedure Depth (cm) 0.1 cm 08/04/21 1309   Post-Procedure Surface Area (cm^2) 52.5 cm^2 08/04/21 1309   Post-Procedure Volume (cm^3) 5.25 cm^3 08/04/21 1309   Distance Tunneling (cm) 0 cm 08/11/21 1255   Tunneling Position ___ O'Clock 0 08/11/21 1255   Undermining Starts ___ O'Clock 0 08/11/21 1255   Undermining Ends___ O'Clock 0 08/11/21 1255   Undermining Maxium Distance (cm) 0 08/11/21 1255   Wound Assessment Granulation tissue 08/11/21 1255   Drainage Amount Moderate 08/11/21 1255   Drainage Description Serosanguinous 08/11/21 1255   Odor None 08/11/21 1255   Cassia-wound Assessment Intact 08/11/21 1255   Margins Defined edges 08/11/21 1255   Wound Thickness Description not for Pressure Injury Full thickness 08/11/21 1255   Number of days: 315       Percent of Wound(s) Debrided: approximately 100%    Total Surface Area Debrided:  40 sq cm     Bleeding:  None    Hemostasis Achieved:  not needed    Procedural Pain:  0  / 10     Post Procedural Pain:  0 / 10     Response to treatment:  Well tolerated by patient. Status of wound progress and description from last visit:   Improved. Plan:       Discharge Instructions       PHYSICIAN ORDERS AND DISCHARGE INSTRUCTIONS     NOTE: Upon discharge from the [address], you will receive a patient experience survey.  We would be grateful if you would take the time to fill this survey out.     Wound care order history:                 Vascular studies: Arterial studies done 9/27/20; no stenosis              Imaging:   Date               Cultures:   obtained on 11/18/2020                                 Obtained on 1/20/2021               Labs/ HbA1c:   Date               Grafts:                       #1 Apligraf on 12/16/2020                     #2 Apligraf on 12/23/2020                     #3 Apligraf on 12/30/2020                     #4 Apligraf on 1/6/2021                     # 5 Apligraf on 1/13/21                     #1 [place] 1/27/2021                     #2 [place] 2/3/2021                     #3 [place] 2/10/2021                     #4 [place] Right Medial Lower Leg 2/17/21                     #5 [place] 3/10/2021                   HBO:                Antibiotics:  Bactrim DS BID for 10 days on 11/18/2020                                   Cipro BID for 10 days ordered on 11/25/2020                                    Cipro 500 mg BID on 1/20/2021                                    Cipro 500 mg BID on 4/28/2021              Earlier Wound care treatments:                Authorizations:                        Consults:   Date                           Primary care physician:      Continuing wound care orders and information:              Residence:  private               [facility] care with: [facility]               Your wound-care supplies will be provided by: Karma Leija provider:              AYSE with  Kavita Stafford loading:  Date               CCFIT Medications:              VEQOF cleansing:                           UJ not scrub or use excessive force.                          Wash hands with soap and water before and after dressing changes.                           Prior to applying a clean dressing, cleanse wound with normal saline,                                wound cleanser, or mild soap and water.                           Ask the physician or nurse before getting the wound(s) wet in a shower              Daily Wound management:                          VPKR weight off wounds and reposition every 2 hours.                          KRDXM standing for long periods of time.                          NDGEJ wraps/stockings in AM and remove at bedtime.                          If swelling is present, elevate legs to the level of the heart or above for 30 minutes 4-5 times a day and/or when sitting.                                             When taking antibiotics take entire prescription as ordered by physician do not stop taking until medicine is all gone.                                           Orders for this week: 8/11/21     Fax orders to [address]     Right dorsal foot - healed      Right lower leg -- wash with soap and water, pat dry. (Apply clobetasol on proximal leg in clinic) Apply Desitin or Calmazine to sarah wound. Cover with anasept gel covered 4x4, ABD and wrap with coban 2.       Homecare to change on Friday and Monday (may use Calcium alginate instead of anasept, profore to wrap)     Referral to Dr Isa Telles on 2/24/2021, appt 3/18/2021 then 4/9/2021, first procedure scheduled on 4/27/2021                    Follow up with Dr Kaykay Epperson in 1 week in the wound care center  Call [phone] for any questions or concerns.   Date__________   Time__________        Treatment Note      Written Patient Dismissal Instructions Given            Electronically signed by Madeline Matthews MD on 8/11/2021 at 1:31 PM

## 2021-08-18 ENCOUNTER — HOSPITAL ENCOUNTER (OUTPATIENT)
Dept: WOUND CARE | Age: 76
Discharge: HOME | End: 2021-08-18
Payer: COMMERCIAL

## 2021-08-18 VITALS
TEMPERATURE: 99.2 F | DIASTOLIC BLOOD PRESSURE: 92 MMHG | HEART RATE: 86 BPM | SYSTOLIC BLOOD PRESSURE: 144 MMHG | RESPIRATION RATE: 16 BRPM

## 2021-08-18 DIAGNOSIS — I87.331 CHRONIC VENOUS HYPERTENSION WITH ULCER AND INFLAMMATION INVOLVING RIGHT SIDE (HCC): Primary | ICD-10-CM

## 2021-08-18 DIAGNOSIS — I83.029 VENOUS ULCER OF LEFT LEG (HCC): ICD-10-CM

## 2021-08-18 DIAGNOSIS — L97.919 CHRONIC VENOUS HYPERTENSION WITH ULCER AND INFLAMMATION INVOLVING RIGHT SIDE (HCC): Primary | ICD-10-CM

## 2021-08-18 DIAGNOSIS — L97.929 VENOUS ULCER OF LEFT LEG (HCC): ICD-10-CM

## 2021-08-18 PROCEDURE — 11045 DBRDMT SUBQ TISS EACH ADDL: CPT | Performed by: SURGERY

## 2021-08-18 PROCEDURE — 11045 DBRDMT SUBQ TISS EACH ADDL: CPT

## 2021-08-18 PROCEDURE — 11042 DBRDMT SUBQ TIS 1ST 20SQCM/<: CPT | Performed by: SURGERY

## 2021-08-18 PROCEDURE — 11042 DBRDMT SUBQ TIS 1ST 20SQCM/<: CPT

## 2021-08-18 RX ORDER — LIDOCAINE 50 MG/G
OINTMENT TOPICAL ONCE
Status: DISCONTINUED | OUTPATIENT
Start: 2021-08-18 | End: 2021-08-19 | Stop reason: HOSPADM

## 2021-08-18 RX ORDER — BETAMETHASONE DIPROPIONATE 0.05 %
OINTMENT (GRAM) TOPICAL ONCE
Status: CANCELLED | OUTPATIENT
Start: 2021-08-18 | End: 2021-08-18

## 2021-08-18 RX ORDER — LIDOCAINE HYDROCHLORIDE 20 MG/ML
JELLY TOPICAL ONCE
Status: CANCELLED | OUTPATIENT
Start: 2021-08-18 | End: 2021-08-18

## 2021-08-18 RX ORDER — CLOBETASOL PROPIONATE 0.5 MG/G
OINTMENT TOPICAL ONCE
Status: CANCELLED | OUTPATIENT
Start: 2021-08-18 | End: 2021-08-18

## 2021-08-18 RX ORDER — GENTAMICIN SULFATE 1 MG/G
OINTMENT TOPICAL ONCE
Status: CANCELLED | OUTPATIENT
Start: 2021-08-18 | End: 2021-08-18

## 2021-08-18 RX ORDER — LIDOCAINE 50 MG/G
OINTMENT TOPICAL ONCE
Status: CANCELLED | OUTPATIENT
Start: 2021-08-18 | End: 2021-08-18

## 2021-08-18 RX ORDER — GINSENG 100 MG
CAPSULE ORAL ONCE
Status: CANCELLED | OUTPATIENT
Start: 2021-08-18 | End: 2021-08-18

## 2021-08-18 RX ORDER — BACITRACIN, NEOMYCIN, POLYMYXIN B 400; 3.5; 5 [USP'U]/G; MG/G; [USP'U]/G
OINTMENT TOPICAL ONCE
Status: CANCELLED | OUTPATIENT
Start: 2021-08-18 | End: 2021-08-18

## 2021-08-18 RX ORDER — LIDOCAINE 40 MG/G
CREAM TOPICAL ONCE
Status: CANCELLED | OUTPATIENT
Start: 2021-08-18 | End: 2021-08-18

## 2021-08-18 RX ORDER — LIDOCAINE HYDROCHLORIDE 40 MG/ML
SOLUTION TOPICAL ONCE
Status: CANCELLED | OUTPATIENT
Start: 2021-08-18 | End: 2021-08-18

## 2021-08-18 RX ORDER — BACITRACIN ZINC AND POLYMYXIN B SULFATE 500; 1000 [USP'U]/G; [USP'U]/G
OINTMENT TOPICAL ONCE
Status: CANCELLED | OUTPATIENT
Start: 2021-08-18 | End: 2021-08-18

## 2021-08-18 ASSESSMENT — PAIN SCALES - GENERAL: PAINLEVEL_OUTOF10: 0

## 2021-08-18 NOTE — PROGRESS NOTES
Wound Care Center Progress Note with Procedure Note      Barron East  AGE: 77 y.o. GENDER: male  : 1945  EPISODE DATE:  2021     Subjective:     Chief Complaint   Patient presents with    Wound Check         HISTORY of PRESENT ILLNESS      Barron East is a 77 y.o. male who presents today for wound evaluation of Chronic venous and traumatic wound(s) of R lower leg medial.  The wound is of moderate severity. The underlying cause of the wound is venous disease and trauma. Wound Pain Timing/Severity: none  Quality of pain: N/A  Severity of pain:  0 / 10   Modifying Factors: edema and venous stasis  Associated Signs/Symptoms: none        PAST MEDICAL HISTORY        Diagnosis Date    Chronic venous hypertension with ulcer and inflammation involving right side (HCC)     RLE    Hypertension     past    Iron deficiency     Lymphoma Dx 11-    Hx of B cell lymphoma-in remission since        PAST SURGICAL HISTORY    Past Surgical History:   Procedure Laterality Date    TUNNELED VENOUS PORT PLACEMENT      TUNNELED VENOUS PORT PLACEMENT      removed 2012       FAMILY HISTORY    Family History   Problem Relation Age of Onset    Early Death Mother 52    Diabetes Father     Heart Disease Father     Vision Loss Son         \"He wears glasses\"    Early Death Daughter 36       SOCIAL HISTORY    Social History     Tobacco Use    Smoking status: Never Smoker    Smokeless tobacco: Never Used   Substance Use Topics    Alcohol use: No    Drug use: No       ALLERGIES    No Known Allergies    MEDICATIONS    Current Outpatient Medications on File Prior to Encounter   Medication Sig Dispense Refill    metoprolol succinate (TOPROL XL) 50 MG extended release tablet Take 1 tablet by mouth daily 30 tablet 5     No current facility-administered medications on file prior to encounter.        REVIEW OF SYSTEMS    Pertinent items are noted in HPI.  Constitutional: Negative for systemic symptoms including fever, chills and malaise. Objective:      BP (!) 144/92   Pulse 86   Temp 99.2 °F (37.3 °C) (Temporal)   Resp 16     PHYSICAL EXAM      General: The patient is in no acute distress. Mental status:  Patient is appropriate, is  oriented to place and plan of care. Dermatologic exam: Visual inspection of the periwound reveals the skin to be edematous  Wound exam: see wound description below in procedure note      Assessment:     Problem List Items Addressed This Visit     Chronic venous hypertension with ulcer and inflammation (HCC) - Primary    Relevant Medications    lidocaine (XYLOCAINE) 5 % ointment    Other Relevant Orders    Supply: Wound Cleanser    Supply: Wound Dressings    Supply: Cover and Secure    Supply: Edema Control    Venous ulcer of left leg (HCC)    Relevant Medications    lidocaine (XYLOCAINE) 5 % ointment    Other Relevant Orders    Supply: Wound Cleanser    Supply: Wound Dressings    Supply: Cover and Secure    Supply: Edema Control        Procedure Note    Indications:  Based on my examination of this patient's wound(s) today, sharp excision into necrotic subcutaneous tissue is required to promote healing and evaluate the extent of previous healing. Performed by: Jerardo Gee MD    Consent obtained: Yes    Time out taken:  Yes    Pain Control: topical Anesthetic  Anesthetic: 5% Lidocaine Ointment Topical     Debridement:Excisional Debridement    Using curette the wound(s) was/were sharply debrided down through and including the removal of subcutaneous tissue.         Devitalized Tissue Debrided:  slough    Pre Debridement Measurements:  Are located in the Wound Documentation Flow Sheet    All active wounds listed below with today's date are evaluated  Wound(s)    debrided this date include # : 1     Post  Debridement Measurements:  Wound 09/30/20 Wound #1 Right Medial Lower Leg Cluster (Onset 5 Years) (Active) Wound Image   08/18/21 1301   Wound Etiology Venous 08/18/21 1301   Dressing Status New dressing applied;Clean;Dry; Intact 08/18/21 1328   Wound Cleansed Soap and water 08/18/21 1301   Dressing/Treatment Alginate with Ag 01/13/21 1657   Offloading for Diabetic Foot Ulcers No offloading required 08/18/21 1301   Wound Length (cm) 10.5 cm 08/18/21 1301   Wound Width (cm) 5 cm 08/18/21 1301   Wound Depth (cm) 0.1 cm 08/18/21 1301   Wound Surface Area (cm^2) 52.5 cm^2 08/18/21 1301   Change in Wound Size % (l*w) 79.17 08/18/21 1301   Wound Volume (cm^3) 5.25 cm^3 08/18/21 1301   Wound Healing % 79 08/18/21 1301   Post-Procedure Length (cm) 10.5 cm 08/18/21 1312   Post-Procedure Width (cm) 5 cm 08/18/21 1312   Post-Procedure Depth (cm) 0.1 cm 08/18/21 1312   Post-Procedure Surface Area (cm^2) 52.5 cm^2 08/18/21 1312   Post-Procedure Volume (cm^3) 5.25 cm^3 08/18/21 1312   Distance Tunneling (cm) 0 cm 08/18/21 1301   Tunneling Position ___ O'Clock 0 08/18/21 1301   Undermining Starts ___ O'Clock 0 08/18/21 1301   Undermining Ends___ O'Clock 0 08/18/21 1301   Undermining Maxium Distance (cm) 0 08/18/21 1301   Wound Assessment Granulation tissue 08/18/21 1301   Drainage Amount Moderate 08/18/21 1301   Drainage Description Serosanguinous 08/18/21 1301   Odor None 08/18/21 1301   Cassia-wound Assessment Intact 08/18/21 1301   Margins Defined edges 08/18/21 1301   Wound Thickness Description not for Pressure Injury Full thickness 08/18/21 1301   Number of days: 322       Percent of Wound(s) Debrided: approximately 100%    Total Surface Area Debrided:  52.5 sq cm     Bleeding:  None    Hemostasis Achieved:  not needed    Procedural Pain:  0  / 10     Post Procedural Pain:  0 / 10     Response to treatment:  Well tolerated by patient. Status of wound progress and description from last visit:   Improved.       Plan:       Discharge Instructions         Discharge Instructions        PHYSICIAN ORDERS AND DISCHARGE INSTRUCTIONS     NOTE: Upon discharge from the [address], you will receive a patient experience survey. We would be grateful if you would take the time to fill this survey out.     Wound care order history:                 Vascular studies: Arterial studies done 9/27/20; no stenosis              Imaging:   Date               Cultures:   obtained on 11/18/2020                                 Obtained on 1/20/2021               Labs/ HbA1c:   Date               Grafts:                       #1 Apligraf on 12/16/2020                     #2 Apligraf on 12/23/2020                     #3 Apligraf on 12/30/2020                     #4 Apligraf on 1/6/2021                     # 5 Apligraf on 1/13/21                     #1 [place] 1/27/2021                     #2 [place] 2/3/2021                     #3 [place] 2/10/2021                     #4 [place] Right Medial Lower Leg 2/17/21                     #5 [place] 3/10/2021                   HBO:                Antibiotics:  Bactrim DS BID for 10 days on 11/18/2020                                   Cipro BID for 10 days ordered on 11/25/2020                                    Cipro 500 mg BID on 1/20/2021                                    Cipro 500 mg BID on 4/28/2021              Earlier Wound care treatments:                Authorizations:                        Consults:   Date                           Primary care physician:      Continuing wound care orders and information:              Residence:  private               [facility] with: [facility]               Your wound-care supplies will be provided by: José Manuel Molina provider:              [id number] yair Hunter loading:  Date               TRDNO Medications:              JRYDE cleansing:                           HE not scrub or use excessive force.                          Wash hands with soap and water before and after dressing changes.                           Prior to applying a clean dressing, cleanse wound with normal saline,                                wound cleanser, or mild soap and water.                           Ask the physician or nurse before getting the wound(s) wet in a shower              Daily Wound management:                          Keep weight off wounds and reposition every 2 hours.                          NAPOY standing for long periods of time.                          YLCZP wraps/stockings in AM and remove at bedtime.                          If swelling is present, elevate legs to the level of the heart or above for 30 minutes 4-5 times a day and/or when sitting.                                             When taking antibiotics take entire prescription as ordered by physician do not stop taking until medicine is all gone.                                           Orders for this week: 8/18/21     [place] [facility]     Right dorsal foot - healed      Right lower leg -- wash with soap and water, pat dry. (Apply clobetasol on proximal leg in clinic) Apply Desitin or Calmazine to sarah wound. Cover with anasept gel covered 4x4, ABD and wrap with coban 2.       Homecare to change on Friday and Monday (may use Calcium alginate instead of anasept, profore to wrap)     Referral to Dr Haritha Whitley on 2/24/2021, appt 3/18/2021 then 4/9/2021, first procedure scheduled on 4/27/2021                    Follow up with Dr Sanchez Chavez in 1 week in the wound care center  Call [phone] for any questions or concerns.   Date__________   Time__________          Treatment Note Wound 09/30/20 Wound #1 Right Medial Lower Leg Cluster (Onset 5 Years)-Dressing/Treatment:  (anasept, 4x4, ABD, coban 2 )    Written Patient Dismissal Instructions Given            Electronically signed by Morro Wang MD on 8/18/2021 at 1:54 PM

## 2021-08-18 NOTE — PROGRESS NOTES
Multilayer Compression Wrap   (Not Unna) Below the Knee    NAME:  Jocelyne Poe  YOB: 1945  MEDICAL RECORD NUMBER:  [id number]  DATE:  8/18/2021    Multilayer compression wrap: Removed old Multilayer wrap if indicated and wash leg with mild soap/water. Applied moisturizing agent to dry skin as needed. Applied primary and secondary dressing as ordered. Applied multilayered dressing below the knee to right lower leg. Instructed patient/caregiver not to remove dressing and to keep it clean and dry. Instructed patient/caregiver on complications to report to provider, such as pain, numbness in toes, heavy drainage, and slippage of dressing. Instructed patient on purpose of compression dressing and on activity and exercise recommendations.       Electronically signed by Maeve Rebollar LPN on 4/06/0319 at 8:06 PM

## 2021-08-25 ENCOUNTER — HOSPITAL ENCOUNTER (OUTPATIENT)
Dept: WOUND CARE | Age: 76
Discharge: HOME | End: 2021-08-25
Payer: COMMERCIAL

## 2021-08-25 VITALS
TEMPERATURE: 99.5 F | RESPIRATION RATE: 20 BRPM | DIASTOLIC BLOOD PRESSURE: 83 MMHG | SYSTOLIC BLOOD PRESSURE: 156 MMHG | HEART RATE: 75 BPM

## 2021-08-25 DIAGNOSIS — L97.919 CHRONIC VENOUS HYPERTENSION WITH ULCER AND INFLAMMATION INVOLVING RIGHT SIDE (HCC): Primary | ICD-10-CM

## 2021-08-25 DIAGNOSIS — I83.029 VENOUS ULCER OF LEFT LEG (HCC): ICD-10-CM

## 2021-08-25 DIAGNOSIS — L97.929 VENOUS ULCER OF LEFT LEG (HCC): ICD-10-CM

## 2021-08-25 DIAGNOSIS — I87.331 CHRONIC VENOUS HYPERTENSION WITH ULCER AND INFLAMMATION INVOLVING RIGHT SIDE (HCC): Primary | ICD-10-CM

## 2021-08-25 PROCEDURE — 11042 DBRDMT SUBQ TIS 1ST 20SQCM/<: CPT

## 2021-08-25 PROCEDURE — 11045 DBRDMT SUBQ TISS EACH ADDL: CPT | Performed by: SURGERY

## 2021-08-25 PROCEDURE — 11042 DBRDMT SUBQ TIS 1ST 20SQCM/<: CPT | Performed by: SURGERY

## 2021-08-25 PROCEDURE — 11045 DBRDMT SUBQ TISS EACH ADDL: CPT

## 2021-08-25 RX ORDER — LIDOCAINE 40 MG/G
CREAM TOPICAL ONCE
Status: CANCELLED | OUTPATIENT
Start: 2021-08-25 | End: 2021-08-25

## 2021-08-25 RX ORDER — CLOBETASOL PROPIONATE 0.5 MG/G
OINTMENT TOPICAL ONCE
Status: CANCELLED | OUTPATIENT
Start: 2021-08-25 | End: 2021-08-25

## 2021-08-25 RX ORDER — BACITRACIN, NEOMYCIN, POLYMYXIN B 400; 3.5; 5 [USP'U]/G; MG/G; [USP'U]/G
OINTMENT TOPICAL ONCE
Status: CANCELLED | OUTPATIENT
Start: 2021-08-25 | End: 2021-08-25

## 2021-08-25 RX ORDER — LIDOCAINE HYDROCHLORIDE 20 MG/ML
JELLY TOPICAL ONCE
Status: CANCELLED | OUTPATIENT
Start: 2021-08-25 | End: 2021-08-25

## 2021-08-25 RX ORDER — BETAMETHASONE DIPROPIONATE 0.05 %
OINTMENT (GRAM) TOPICAL ONCE
Status: CANCELLED | OUTPATIENT
Start: 2021-08-25 | End: 2021-08-25

## 2021-08-25 RX ORDER — GENTAMICIN SULFATE 1 MG/G
OINTMENT TOPICAL ONCE
Status: CANCELLED | OUTPATIENT
Start: 2021-08-25 | End: 2021-08-25

## 2021-08-25 RX ORDER — LIDOCAINE HYDROCHLORIDE 40 MG/ML
SOLUTION TOPICAL ONCE
Status: DISCONTINUED | OUTPATIENT
Start: 2021-08-25 | End: 2021-08-26 | Stop reason: HOSPADM

## 2021-08-25 RX ORDER — BACITRACIN ZINC AND POLYMYXIN B SULFATE 500; 1000 [USP'U]/G; [USP'U]/G
OINTMENT TOPICAL ONCE
Status: CANCELLED | OUTPATIENT
Start: 2021-08-25 | End: 2021-08-25

## 2021-08-25 RX ORDER — GINSENG 100 MG
CAPSULE ORAL ONCE
Status: CANCELLED | OUTPATIENT
Start: 2021-08-25 | End: 2021-08-25

## 2021-08-25 RX ORDER — LIDOCAINE HYDROCHLORIDE 40 MG/ML
SOLUTION TOPICAL ONCE
Status: CANCELLED | OUTPATIENT
Start: 2021-08-25 | End: 2021-08-25

## 2021-08-25 RX ORDER — LIDOCAINE 50 MG/G
OINTMENT TOPICAL ONCE
Status: CANCELLED | OUTPATIENT
Start: 2021-08-25 | End: 2021-08-25

## 2021-08-25 NOTE — PROGRESS NOTES
Multilayer Compression Wrap   (Not Unna) Below the Knee    NAME:  Nicholas Mueller  YOB: 1945  MEDICAL RECORD NUMBER:  [id number]  DATE:  8/25/2021    Multilayer compression wrap: Removed old Multilayer wrap if indicated and wash leg with mild soap/water. Applied moisturizing agent to dry skin as needed. Applied primary and secondary dressing as ordered. Applied multilayered dressing below the knee to right lower leg. Instructed patient/caregiver not to remove dressing and to keep it clean and dry. Instructed patient/caregiver on complications to report to provider, such as pain, numbness in toes, heavy drainage, and slippage of dressing. Instructed patient on purpose of compression dressing and on activity and exercise recommendations.       Electronically signed by Ca Zepeda RN on 8/25/2021 at 1:27 PM

## 2021-08-25 NOTE — PROGRESS NOTES
Venous 08/25/21 1304   Dressing Status New dressing applied;Clean;Dry; Intact 08/25/21 1326   Wound Cleansed Soap and water 08/25/21 1304   Dressing/Treatment Alginate with Ag 01/13/21 1657   Offloading for Diabetic Foot Ulcers No offloading required 08/18/21 1301   Wound Length (cm) 10.3 cm 08/25/21 1304   Wound Width (cm) 5 cm 08/25/21 1304   Wound Depth (cm) 0.1 cm 08/25/21 1304   Wound Surface Area (cm^2) 51.5 cm^2 08/25/21 1304   Change in Wound Size % (l*w) 79.56 08/25/21 1304   Wound Volume (cm^3) 5.15 cm^3 08/25/21 1304   Wound Healing % 80 08/25/21 1304   Post-Procedure Length (cm) 10.3 cm 08/25/21 1322   Post-Procedure Width (cm) 5 cm 08/25/21 1322   Post-Procedure Depth (cm) 0.1 cm 08/25/21 1322   Post-Procedure Surface Area (cm^2) 51.5 cm^2 08/25/21 1322   Post-Procedure Volume (cm^3) 5.15 cm^3 08/25/21 1322   Distance Tunneling (cm) 0 cm 08/25/21 1304   Tunneling Position ___ O'Clock 0 08/25/21 1304   Undermining Starts ___ O'Clock 0 08/25/21 1304   Undermining Ends___ O'Clock 0 08/25/21 1304   Undermining Maxium Distance (cm) 0 08/25/21 1304   Wound Assessment Granulation tissue 08/25/21 1304   Drainage Amount Moderate 08/25/21 1304   Drainage Description Serosanguinous 08/25/21 1304   Odor None 08/25/21 1304   Cassia-wound Assessment Intact 08/25/21 1304   Margins Defined edges 08/25/21 1304   Wound Thickness Description not for Pressure Injury Full thickness 08/25/21 1304   Number of days: 329       Percent of Wound(s) Debrided: approximately 100%    Total Surface Area Debrided:  55 sq cm     Bleeding:  Minimal    Hemostasis Achieved:  by pressure    Procedural Pain:  0  / 10     Post Procedural Pain:  0 / 10     Response to treatment:  Well tolerated by patient. Status of wound progress and description from last visit:   Improved.       Plan:       Discharge Instructions         Discharge Instructions        PHYSICIAN ORDERS AND DISCHARGE INSTRUCTIONS     NOTE: Upon discharge from the [address] [facility],[address], you will receive a patient experience survey. We would be grateful if you would take the time to fill this survey out.     Wound care order history:                 Vascular studies: Arterial studies done 9/27/20; no stenosis              Imaging:   Date               Cultures:   obtained on 11/18/2020                                 Obtained on 1/20/2021               Labs/ HbA1c:   Date               Grafts:                       #1 Apligraf on 12/16/2020                     #2 Apligraf on 12/23/2020                     #3 Apligraf on 12/30/2020                     #4 Apligraf on 1/6/2021                     # 5 Apligraf on 1/13/21                     #1 [place] 1/27/2021                     #2 [place] 2/3/2021                     #3 [place] 2/10/2021                     #4 [place] Right Medial Lower Leg 2/17/21                     #5 [place] 3/10/2021                   HBO:                Antibiotics:  Bactrim DS BID for 10 days on 11/18/2020                                   Cipro BID for 10 days ordered on 11/25/2020                                    Cipro 500 mg BID on 1/20/2021                                    Cipro 500 mg BID on 4/28/2021              Earlier Wound care treatments:                Authorizations:                        Consults:   Date                           Primary care physician:      Continuing wound care orders and information:              Residence:  private               [facility] home health care with: [facility]               Your wound-care supplies will be provided by: Doe Spencer provider:              MIKY with  Raul Perez loading:  Date               NZVXA Medications:              KIMUE cleansing:                           WD not scrub or use excessive force.                          Wash hands with soap and water before and after dressing changes.                           Prior to applying a clean dressing, cleanse wound with normal saline,                                wound cleanser, or mild soap and water.                           Ask the physician or nurse before getting the wound(s) wet in a shower              Daily Wound management:                          SGMV weight off wounds and reposition every 2 hours.                          VONJX standing for long periods of time.                          PFAOY wraps/stockings in AM and remove at bedtime.                          If swelling is present, elevate legs to the level of the heart or above for 30 minutes 4-5 times a day and/or when sitting.                                             When taking antibiotics take entire prescription as ordered by physician do not stop taking until medicine is all gone.                                           Orders for this week: 8/25/21     Fax orders to [facility]     Right dorsal foot - healed      Right lower leg -- wash with soap and water, pat dry. Apply A&Dto sarah wound. Cover with anasept gel covered 4x4, ABD and wrap with coban 2.       Homecare to change on Friday and Monday (may use Calcium alginate instead of anasept, profore to wrap)     Referral to Dr Camden Chávez on 2/24/2021, appt 3/18/2021 then 4/9/2021, first procedure scheduled on 4/27/2021                    Follow up with Dr Agatha Goddard in 1 week in the wound care center  Call [phone] for any questions or concerns.   Date__________   Time__________          Treatment Note Wound 09/30/20 Wound #1 Right Medial Lower Leg Cluster (Onset 5 Years)-Dressing/Treatment:  (Anasept, 4x4, ABD, Coban 2)    Written Patient Dismissal Instructions Given            Electronically signed by Diamantina Goldmann, MD on 8/25/2021 at 1:50 PM

## 2021-09-01 ENCOUNTER — HOSPITAL ENCOUNTER (OUTPATIENT)
Dept: WOUND CARE | Age: 76
Discharge: HOME | End: 2021-09-01
Payer: COMMERCIAL

## 2021-09-01 VITALS
RESPIRATION RATE: 20 BRPM | HEART RATE: 70 BPM | TEMPERATURE: 99 F | DIASTOLIC BLOOD PRESSURE: 78 MMHG | SYSTOLIC BLOOD PRESSURE: 145 MMHG

## 2021-09-01 DIAGNOSIS — L97.929 VENOUS ULCER OF LEFT LEG (HCC): ICD-10-CM

## 2021-09-01 DIAGNOSIS — I83.892 VARICOSE VEINS OF LEFT LOWER EXTREMITY WITH OTHER COMPLICATIONS: ICD-10-CM

## 2021-09-01 DIAGNOSIS — L97.919 VENOUS STASIS ULCER OF RIGHT LOWER LEG WITH EDEMA OF RIGHT LOWER LEG (HCC): ICD-10-CM

## 2021-09-01 DIAGNOSIS — I83.891 VENOUS STASIS ULCER OF RIGHT LOWER LEG WITH EDEMA OF RIGHT LOWER LEG (HCC): ICD-10-CM

## 2021-09-01 DIAGNOSIS — I83.029 VENOUS ULCER OF LEFT LEG (HCC): ICD-10-CM

## 2021-09-01 DIAGNOSIS — R60.9 VENOUS STASIS ULCER OF RIGHT LOWER LEG WITH EDEMA OF RIGHT LOWER LEG (HCC): ICD-10-CM

## 2021-09-01 DIAGNOSIS — L97.919 CHRONIC VENOUS HYPERTENSION WITH ULCER AND INFLAMMATION INVOLVING RIGHT SIDE (HCC): Primary | ICD-10-CM

## 2021-09-01 DIAGNOSIS — I83.019 VENOUS STASIS ULCER OF RIGHT LOWER LEG WITH EDEMA OF RIGHT LOWER LEG (HCC): ICD-10-CM

## 2021-09-01 DIAGNOSIS — I83.893 VARICOSE VEINS OF LOWER EXTREMITIES WITH COMPLICATIONS, BILATERAL: ICD-10-CM

## 2021-09-01 DIAGNOSIS — I87.331 CHRONIC VENOUS HYPERTENSION WITH ULCER AND INFLAMMATION INVOLVING RIGHT SIDE (HCC): Primary | ICD-10-CM

## 2021-09-01 PROCEDURE — 11045 DBRDMT SUBQ TISS EACH ADDL: CPT

## 2021-09-01 PROCEDURE — 11045 DBRDMT SUBQ TISS EACH ADDL: CPT | Performed by: NURSE PRACTITIONER

## 2021-09-01 PROCEDURE — 11042 DBRDMT SUBQ TIS 1ST 20SQCM/<: CPT

## 2021-09-01 PROCEDURE — 11042 DBRDMT SUBQ TIS 1ST 20SQCM/<: CPT | Performed by: NURSE PRACTITIONER

## 2021-09-01 RX ORDER — LIDOCAINE HYDROCHLORIDE 20 MG/ML
JELLY TOPICAL ONCE
Status: CANCELLED | OUTPATIENT
Start: 2021-09-01 | End: 2021-09-01

## 2021-09-01 RX ORDER — BACITRACIN, NEOMYCIN, POLYMYXIN B 400; 3.5; 5 [USP'U]/G; MG/G; [USP'U]/G
OINTMENT TOPICAL ONCE
Status: CANCELLED | OUTPATIENT
Start: 2021-09-01 | End: 2021-09-01

## 2021-09-01 RX ORDER — BACITRACIN ZINC AND POLYMYXIN B SULFATE 500; 1000 [USP'U]/G; [USP'U]/G
OINTMENT TOPICAL ONCE
Status: CANCELLED | OUTPATIENT
Start: 2021-09-01 | End: 2021-09-01

## 2021-09-01 RX ORDER — LIDOCAINE 40 MG/G
CREAM TOPICAL ONCE
Status: CANCELLED | OUTPATIENT
Start: 2021-09-01 | End: 2021-09-01

## 2021-09-01 RX ORDER — BETAMETHASONE DIPROPIONATE 0.05 %
OINTMENT (GRAM) TOPICAL ONCE
Status: CANCELLED | OUTPATIENT
Start: 2021-09-01 | End: 2021-09-01

## 2021-09-01 RX ORDER — GINSENG 100 MG
CAPSULE ORAL ONCE
Status: CANCELLED | OUTPATIENT
Start: 2021-09-01 | End: 2021-09-01

## 2021-09-01 RX ORDER — GENTAMICIN SULFATE 1 MG/G
OINTMENT TOPICAL ONCE
Status: CANCELLED | OUTPATIENT
Start: 2021-09-01 | End: 2021-09-01

## 2021-09-01 RX ORDER — LIDOCAINE 50 MG/G
OINTMENT TOPICAL ONCE
Status: CANCELLED | OUTPATIENT
Start: 2021-09-01 | End: 2021-09-01

## 2021-09-01 RX ORDER — LIDOCAINE HYDROCHLORIDE 40 MG/ML
SOLUTION TOPICAL ONCE
Status: CANCELLED | OUTPATIENT
Start: 2021-09-01 | End: 2021-09-01

## 2021-09-01 RX ORDER — CLOBETASOL PROPIONATE 0.5 MG/G
OINTMENT TOPICAL ONCE
Status: CANCELLED | OUTPATIENT
Start: 2021-09-01 | End: 2021-09-01

## 2021-09-01 NOTE — PROGRESS NOTES
Multilayer Compression Wrap   (Not Unna) Below the Knee    NAME:  Javier Powers  YOB: 1945  MEDICAL RECORD NUMBER:  [id number]  DATE:  9/1/2021    Multilayer compression wrap: Removed old Multilayer wrap if indicated and wash leg with mild soap/water. Applied moisturizing agent to dry skin as needed. Applied primary and secondary dressing as ordered. Applied multilayered dressing below the knee to right lower leg. Instructed patient/caregiver not to remove dressing and to keep it clean and dry. Instructed patient/caregiver on complications to report to provider, such as pain, numbness in toes, heavy drainage, and slippage of dressing. Instructed patient on purpose of compression dressing and on activity and exercise recommendations.       Electronically signed by Joycelyn Akhtar RN on 9/1/2021 at 1:43 PM

## 2021-09-01 NOTE — PROGRESS NOTES
Wound Care Center Progress Note With Procedure    Alex Madsen  AGE: 77 y.o. GENDER: male  : 1945  EPISODE DATE:  2021     Subjective:     Chief Complaint   Patient presents with    Wound Check     rt leg         HISTORY of PRESENT ILLNESS      Alex Madsen is a 77 y.o. male who presents today for wound evaluation of Chronic venous and traumatic wound(s) of R lower leg medial.  The wound is of moderate severity. The underlying cause of the wound is venous disease. Wound Pain Timing/Severity: none  Quality of pain: N/A  Severity of pain:  0 / 10   Modifying Factors: venous stasis  Associated Signs/Symptoms: none        PAST MEDICAL HISTORY        Diagnosis Date    Chronic venous hypertension with ulcer and inflammation involving right side (HCC)     RLE    Hypertension     past    Iron deficiency     Lymphoma Dx 11-    Hx of B cell lymphoma-in remission since        PAST SURGICAL HISTORY    Past Surgical History:   Procedure Laterality Date    TUNNELED VENOUS PORT PLACEMENT      TUNNELED VENOUS PORT PLACEMENT      removed 2012       FAMILY HISTORY    Family History   Problem Relation Age of Onset    Early Death Mother 52    Diabetes Father     Heart Disease Father     Vision Loss Son         \"He wears glasses\"    Early Death Daughter 36       SOCIAL HISTORY    Social History     Tobacco Use    Smoking status: Never Smoker    Smokeless tobacco: Never Used   Substance Use Topics    Alcohol use: No    Drug use: No       ALLERGIES    No Known Allergies    MEDICATIONS    Current Outpatient Medications on File Prior to Encounter   Medication Sig Dispense Refill    metoprolol succinate (TOPROL XL) 50 MG extended release tablet Take 1 tablet by mouth daily 30 tablet 5     No current facility-administered medications on file prior to encounter. REVIEW OF SYSTEMS    Pertinent items are noted in HPI.     Constitutional: Negative for systemic symptoms including fever, chills and malaise. Objective:      BP (!) 145/78   Pulse 70   Temp 99 °F (37.2 °C) (Temporal)   Resp 20     PHYSICAL EXAM      General: The patient is in no acute distress. Mental status:  Patient is appropriate, is  oriented to place and plan of care. Dermatologic exam: Visual inspection of the periwound reveals the skin to be normal in turgor and texture, dry and edematous  Wound exam: see wound description below in procedure note      Assessment:     Problem List Items Addressed This Visit     Chronic venous hypertension with ulcer and inflammation ([facility] [address] - Primary    Relevant Orders    Supply: Wound Cleanser    Supply: Wound Dressings    Supply: Cover and Secure    Supply: Edema Control    Venous ulcer of left leg (HCC)    Relevant Orders    Supply: Wound Cleanser    Supply: Wound Dressings    Supply: Cover and Secure    Supply: Edema Control    Venous stasis ulcer of right lower leg with edema of right lower leg (HCC)    Varicose veins of lower extremities with complications, bilateral    Varicose veins of left lower extremity with other complications        Procedure Note    Indications:  Based on my examination of this patient's wound(s) today, sharp excision into necrotic subcutaneous tissue is required to promote healing and evaluate the extent of previous healing. Performed by: LOLY Jacob CNP    Consent obtained: Yes    Time out taken:  Yes    Pain Control: Anesthetic  Anesthetic: 4% Lidocaine Liquid Topical     Debridement:Excisional Debridement    Using #15 blade scalpel the wound(s) was/were sharply debrided down through and including the removal of subcutaneous tissue.         Devitalized Tissue Debrided:  fibrin, biofilm and slough    Pre Debridement Measurements:  Are located in the Wound Documentation Flow Sheet    All active wounds listed below with today's date are evaluated  Wound(s)    debrided this date include # : 1     Post  Debridement Measurements:  Wound 07/29/13 Venous ulcer Ankle Right;Lateral (Active)   Number of days: 2956       Wound 09/30/20 Wound #1 Right Medial Lower Leg Cluster (Onset 5 Years) (Active)   Wound Image   09/01/21 1301   Wound Etiology Venous 09/01/21 1301   Dressing Status New dressing applied;Clean;Dry; Intact 08/25/21 1326   Wound Cleansed Wound cleanser; Soap and water 09/01/21 1301   Dressing/Treatment Alginate with Ag 01/13/21 1657   Offloading for Diabetic Foot Ulcers No offloading required 08/18/21 1301   Wound Length (cm) 10.3 cm 09/01/21 1301   Wound Width (cm) 5 cm 09/01/21 1301   Wound Depth (cm) 0.1 cm 09/01/21 1301   Wound Surface Area (cm^2) 51.5 cm^2 09/01/21 1301   Change in Wound Size % (l*w) 79.56 09/01/21 1301   Wound Volume (cm^3) 5.15 cm^3 09/01/21 1301   Wound Healing % 80 09/01/21 1301   Post-Procedure Length (cm) 10.3 cm 09/01/21 1325   Post-Procedure Width (cm) 5 cm 09/01/21 1325   Post-Procedure Depth (cm) 0.1 cm 09/01/21 1325   Post-Procedure Surface Area (cm^2) 51.5 cm^2 09/01/21 1325   Post-Procedure Volume (cm^3) 5.15 cm^3 09/01/21 1325   Distance Tunneling (cm) 0 cm 09/01/21 1301   Tunneling Position ___ O'Clock 0 09/01/21 1301   Undermining Starts ___ O'Clock 0 09/01/21 1301   Undermining Ends___ O'Clock 0 09/01/21 1301   Undermining Maxium Distance (cm) 0 09/01/21 1301   Wound Assessment Granulation tissue 09/01/21 1301   Drainage Amount Moderate 09/01/21 1301   Drainage Description Serosanguinous 09/01/21 1301   Odor None 09/01/21 1301   Cassia-wound Assessment Intact 09/01/21 1301   Margins Defined edges 09/01/21 1301   Wound Thickness Description not for Pressure Injury Full thickness 09/01/21 1301   Number of days: 336       Percent of Wound(s) Debrided: approximately 100%    Total  Area  Debrided:  51.5 sq cm     Bleeding:  Minimal    Hemostasis Achieved:  by pressure    Procedural Pain:  0  / 10     Post Procedural Pain:  0 / 10     Response to treatment:  Well tolerated by patient.      Status of wound progress and description from last visit:   Stable. Not much improvement. Patient may benefit from a ERIC vac and we will look to get this approved for use. Continue to monitor and follow up 1 week. Plan:       Discharge Instructions       PHYSICIAN ORDERS AND DISCHARGE INSTRUCTIONS     NOTE: Upon discharge from the [address], you will receive a patient experience survey. We would be grateful if you would take the time to fill this survey out.     Wound care order history:                 Vascular studies: Arterial studies done 9/27/20; no stenosis              Imaging:   Date               Cultures:   obtained on 11/18/2020                                 Obtained on 1/20/2021               Labs/ HbA1c:   Date               Grafts:                       #1 Apligraf on 12/16/2020                     #2 Apligraf on 12/23/2020                     #3 Apligraf on 12/30/2020                     #4 Apligraf on 1/6/2021                     # 5 Apligraf on 1/13/21                     #1 [place] 1/27/2021                     #2 [place] 2/3/2021                     #3 [place] 2/10/2021                     #4 [place] Right Medial Lower Leg 2/17/21                     #5 [place] 3/10/2021                   HBO:                Antibiotics:  Bactrim DS BID for 10 days on 11/18/2020                                   Cipro BID for 10 days ordered on 11/25/2020                                    Cipro 500 mg BID on 1/20/2021                                    Cipro 500 mg BID on 4/28/2021              Earlier Wound care treatments:                Authorizations:                        Consults:   Date                           Primary care physician:      Continuing wound care orders and information:              Residence:  private               Continue [facility] health care with: [facility]               Your wound-care supplies will be provided by:  Nery Terry provider:              ALBERT with              Off loading:  Date               VLHYJ Medications:              DLWKA cleansing:                           KQ not scrub or use excessive force.                          Wash hands with soap and water before and after dressing changes.                         Prior to applying a clean dressing, cleanse wound with normal saline,                                wound cleanser, or mild soap and water.                           Ask the physician or nurse before getting the wound(s) wet in a shower              Daily Wound management:                          Keep weight off wounds and reposition every 2 hours.                          XJIVW standing for long periods of time.                          QSRYK wraps/stockings in AM and remove at bedtime.                          If swelling is present, elevate legs to the level of the heart or above for 30 minutes 4-5 times a day and/or when sitting.                                             When taking antibiotics take entire prescription as ordered by physician do not stop taking until medicine is all gone.                                           Orders for this week: 9/1/21     Fax orders to [facility]!     Right dorsal foot - healed      Right lower leg -- wash with soap and water, pat dry. Apply A&Dto sarah wound. Cover with anasept gel covered 4x4, ABD and wrap with coban 2.       Homecare to change on Friday and Monday (may use Calcium alginate instead of anasept, profore to wrap)        Auth for ERIC vac 9/1/2021     Referral to Dr Camden Chávez on 2/24/2021, appt 3/18/2021 then 4/9/2021, first procedure scheduled on 4/27/2021                    Follow up with Dr Agatha Goddard in 1 week in the wound care center  Call [phone] for any questions or concerns.   Date__________   Time__________        Treatment Note Wound 09/30/20 Wound #1 Right Medial Lower Leg Cluster (Onset 5 Years)-Dressing/Treatment:  (a&d anasept moist 4x4 abd coban2)    Written Patient Dismissal Instructions Given Electronically signed by LOLY Glez CNP on 9/1/2021 at 2:08 PM

## 2021-09-08 ENCOUNTER — HOSPITAL ENCOUNTER (OUTPATIENT)
Dept: WOUND CARE | Age: 76
Discharge: HOME | End: 2021-09-08
Payer: COMMERCIAL

## 2021-09-08 VITALS
SYSTOLIC BLOOD PRESSURE: 160 MMHG | DIASTOLIC BLOOD PRESSURE: 85 MMHG | RESPIRATION RATE: 20 BRPM | TEMPERATURE: 99.2 F | HEART RATE: 72 BPM

## 2021-09-08 DIAGNOSIS — I83.029 VENOUS ULCER OF LEFT LEG (HCC): Primary | ICD-10-CM

## 2021-09-08 DIAGNOSIS — L97.929 VENOUS ULCER OF LEFT LEG (HCC): Primary | ICD-10-CM

## 2021-09-08 PROCEDURE — 11042 DBRDMT SUBQ TIS 1ST 20SQCM/<: CPT | Performed by: SURGERY

## 2021-09-08 PROCEDURE — 11045 DBRDMT SUBQ TISS EACH ADDL: CPT

## 2021-09-08 PROCEDURE — 11042 DBRDMT SUBQ TIS 1ST 20SQCM/<: CPT

## 2021-09-08 PROCEDURE — 11045 DBRDMT SUBQ TISS EACH ADDL: CPT | Performed by: SURGERY

## 2021-09-08 NOTE — PROGRESS NOTES
Multilayer Compression Wrap   (Not Unna) Below the Knee    NAME:  Christian George  YOB: 1945  MEDICAL RECORD NUMBER:  [id number]  DATE:  9/8/2021    Multilayer compression wrap: Removed old Multilayer wrap if indicated and wash leg with mild soap/water. Applied moisturizing agent to dry skin as needed. Applied primary and secondary dressing as ordered. Applied multilayered dressing below the knee to right lower leg. Instructed patient/caregiver not to remove dressing and to keep it clean and dry. Instructed patient/caregiver on complications to report to provider, such as pain, numbness in toes, heavy drainage, and slippage of dressing. Instructed patient on purpose of compression dressing and on activity and exercise recommendations.       Electronically signed by Yina Ferguson LPN on 4/5/9287 at 4:12 PM

## 2021-09-08 NOTE — PROGRESS NOTES
Wound Care Center Progress Note with Procedure Note      Dillan Fernandez  AGE: 77 y.o. GENDER: male  : 1945  EPISODE DATE:  2021     Subjective:     Chief Complaint   Patient presents with    Wound Check     right leg          HISTORY of PRESENT ILLNESS      Dillan Fernandez is a 77 y.o. male who presents today for wound evaluation of Chronic venous and traumatic wound(s) of R lower leg medial.  The wound is of moderate severity. The underlying cause of the wound is trauma and venous disease. Wound Pain Timing/Severity: none  Quality of pain: N/A  Severity of pain:  0 / 10   Modifying Factors: venous stasis  Associated Signs/Symptoms: none        PAST MEDICAL HISTORY        Diagnosis Date    Chronic venous hypertension with ulcer and inflammation involving right side (HCC)     RLE    Hypertension     past    Iron deficiency     Lymphoma Dx 11-    Hx of B cell lymphoma-in remission since        PAST SURGICAL HISTORY    Past Surgical History:   Procedure Laterality Date    TUNNELED VENOUS PORT PLACEMENT      TUNNELED VENOUS PORT PLACEMENT      removed 2012       FAMILY HISTORY    Family History   Problem Relation Age of Onset    Early Death Mother 52    Diabetes Father     Heart Disease Father     Vision Loss Son         \"He wears glasses\"    Early Death Daughter 36       SOCIAL HISTORY    Social History     Tobacco Use    Smoking status: Never Smoker    Smokeless tobacco: Never Used   Substance Use Topics    Alcohol use: No    Drug use: No       ALLERGIES    No Known Allergies    MEDICATIONS    Current Outpatient Medications on File Prior to Encounter   Medication Sig Dispense Refill    metoprolol succinate (TOPROL XL) 50 MG extended release tablet Take 1 tablet by mouth daily 30 tablet 5     No current facility-administered medications on file prior to encounter.        REVIEW OF SYSTEMS    Pertinent items are noted in HPI.  Constitutional: Negative for systemic symptoms including fever, chills and malaise. Objective:      BP (!) 160/85   Pulse 72   Temp 99.2 °F (37.3 °C) (Temporal)   Resp 20     PHYSICAL EXAM      General: The patient is in no acute distress. Mental status:  Patient is appropriate, is  oriented to place and plan of care. Dermatologic exam: Visual inspection of the periwound reveals the skin to be normal in turgor and texture  Wound exam: see wound description below in procedure note      Assessment:     Problem List Items Addressed This Visit     Venous ulcer of left leg ([address] - Primary        Procedure Note    Indications:  Based on my examination of this patient's wound(s) today, sharp excision into necrotic subcutaneous tissue is required to promote healing and evaluate the extent of previous healing. Performed by: Edd Mock MD    Consent obtained: Yes    Time out taken:  Yes    Pain Control: topical       Debridement:Excisional Debridement    Using curette the wound(s) was/were sharply debrided down through and including the removal of subcutaneous tissue. Devitalized Tissue Debrided:  slough    Pre Debridement Measurements:  Are located in the Wound Documentation Flow Sheet    All active wounds listed below with today's date are evaluated  Wound(s)    debrided this date include # : 1     Post  Debridement Measurements:  Wound 07/29/13 Venous ulcer Ankle Right;Lateral (Active)   Number of days: 2963       Wound 09/30/20 Wound #1 Right Medial Lower Leg Cluster (Onset 5 Years) (Active)   Wound Image   09/01/21 1301   Wound Etiology Venous 09/08/21 1322   Dressing Status New dressing applied;Clean;Dry; Intact 08/25/21 1326   Wound Cleansed Wound cleanser 09/08/21 1322   Dressing/Treatment Alginate with Ag 01/13/21 1657   Offloading for Diabetic Foot Ulcers No offloading required 08/18/21 1301   Wound Length (cm) 10.3 cm 09/08/21 1322   Wound Width (cm) 4.8 cm 09/08/21 1322 Wound Depth (cm) 0.1 cm 09/08/21 1322   Wound Surface Area (cm^2) 49.44 cm^2 09/08/21 1322   Change in Wound Size % (l*w) 80.38 09/08/21 1322   Wound Volume (cm^3) 4.944 cm^3 09/08/21 1322   Wound Healing % 80 09/08/21 1322   Post-Procedure Length (cm) 10.3 cm 09/01/21 1325   Post-Procedure Width (cm) 5 cm 09/01/21 1325   Post-Procedure Depth (cm) 0.1 cm 09/01/21 1325   Post-Procedure Surface Area (cm^2) 51.5 cm^2 09/01/21 1325   Post-Procedure Volume (cm^3) 5.15 cm^3 09/01/21 1325   Distance Tunneling (cm) 0 cm 09/08/21 1322   Tunneling Position ___ O'Clock 0 09/08/21 1322   Undermining Starts ___ O'Clock 0 09/08/21 1322   Undermining Ends___ O'Clock 0 09/08/21 1322   Undermining Maxium Distance (cm) 0 09/08/21 1322   Wound Assessment Granulation tissue 09/08/21 1322   Drainage Amount Moderate 09/08/21 1322   Drainage Description Serosanguinous 09/08/21 1322   Odor None 09/08/21 1322   Cassia-wound Assessment Intact 09/08/21 1322   Margins Defined edges 09/08/21 1322   Wound Thickness Description not for Pressure Injury Full thickness 09/08/21 1322   Number of days: 343       Percent of Wound(s) Debrided: approximately 100%    Total Surface Area Debrided:  49 sq cm     Bleeding:  Minimal    Hemostasis Achieved:  by pressure    Procedural Pain:  0  / 10     Post Procedural Pain:  0 / 10     Response to treatment:  Well tolerated by patient. Status of wound progress and description from last visit:   Improved. Plan:       Discharge Instructions         Discharge Instructions        PHYSICIAN ORDERS AND DISCHARGE INSTRUCTIONS     NOTE: [facility] [address] [facility] will receive a patient experience survey.  We would be grateful if you would take the time to fill this survey out.     Wound care order history:                 Vascular studies: Arterial studies done 9/27/20; no stenosis              Imaging:   Date               Cultures:   obtained on 11/18/2020                                 Obtained on 1/20/2021               Labs/ HbA1c:   Date               Grafts:                       #1 Apligraf on 12/16/2020                     #2 Apligraf on 12/23/2020                     #3 Apligraf on 12/30/2020                     #4 Apligraf on 1/6/2021                     # 5 Apligraf on 1/13/21                     #1 [place] 1/27/2021                     #2 [place] 2/3/2021                     #3 [place] 2/10/2021                     #4 [place] Right Medial Lower Leg 2/17/21                     #5 [place] 3/10/2021                   HBO:                Antibiotics:  Bactrim DS BID for 10 days on 11/18/2020                                   Cipro BID for 10 days ordered on 11/25/2020                                    Cipro 500 mg BID on 1/20/2021                                    Cipro 500 mg BID on 4/28/2021              Earlier Wound care treatments:                Authorizations:                        Consults:   Date                           Primary care physician:      Continuing wound care orders and information:              Residence:  private               Continue [facility] health care with: [facility]               Your wound-care supplies will be provided by: Adelaida Jimenez provider:              [id number] with  Phyllis Lainez loading:  Date               ZZKAT Medications:              BXWYP cleansing:                           ZQ not scrub or use excessive force.                          Wash hands with soap and water before and after dressing changes.                           Prior to applying a clean dressing, cleanse wound with normal saline,                                wound cleanser, or mild soap and water.                           Ask the physician or nurse before getting the wound(s) wet in a shower              Daily Wound management:                          Keep weight off wounds and reposition every 2 hours.                          FYUER standing for long periods of time.                          GFOVX wraps/stockings in AM and remove at bedtime.                          If swelling is present, elevate legs to the level of the heart or above for 30 minutes 4-5 times a day and/or when sitting.                                             When taking antibiotics take entire prescription as ordered by physician do not stop taking until medicine is all gone.                                           Orders for this week: 9/8/21     Fax orders to [facility]!     Right dorsal foot - healed      Right lower leg -- wash with soap and water, pat dry.  Apply A&Dto sarah wound. Cover with anasept gel and stimulin powder covered with  4x4, ABD and wrap with coban 2.       Homecare to change on Friday and Monday (may use Calcium alginate instead of anasept, profore to wrap)           Auth for ERIC vac 9/1/2021     Referral to Dr Lena Helm on 2/24/2021, appt 3/18/2021 then 4/9/2021, first procedure scheduled on 4/27/2021                    Follow up with Dr Henok Bennett in 1 week in the wound care center  Call [phone] for any questions or concerns.   Date__________   Time__________             Treatment Note      Written Patient Dismissal Instructions Given            Electronically signed by Kaylynn Blackmon MD on 9/8/2021 at 1:30 PM

## 2021-09-15 ENCOUNTER — HOSPITAL ENCOUNTER (OUTPATIENT)
Dept: WOUND CARE | Age: 76
Discharge: HOME | End: 2021-09-15
Payer: COMMERCIAL

## 2021-09-15 VITALS
SYSTOLIC BLOOD PRESSURE: 145 MMHG | TEMPERATURE: 98.8 F | DIASTOLIC BLOOD PRESSURE: 89 MMHG | HEART RATE: 70 BPM | RESPIRATION RATE: 18 BRPM

## 2021-09-15 DIAGNOSIS — R60.9 VENOUS STASIS ULCER OF RIGHT LOWER LEG WITH EDEMA OF RIGHT LOWER LEG (HCC): Primary | ICD-10-CM

## 2021-09-15 DIAGNOSIS — I83.019 VENOUS STASIS ULCER OF RIGHT LOWER LEG WITH EDEMA OF RIGHT LOWER LEG (HCC): Primary | ICD-10-CM

## 2021-09-15 DIAGNOSIS — I83.892 VARICOSE VEINS OF LEFT LOWER EXTREMITY WITH OTHER COMPLICATIONS: ICD-10-CM

## 2021-09-15 DIAGNOSIS — I87.331 CHRONIC VENOUS HYPERTENSION WITH ULCER AND INFLAMMATION INVOLVING RIGHT SIDE (HCC): ICD-10-CM

## 2021-09-15 DIAGNOSIS — L97.919 VENOUS STASIS ULCER OF RIGHT LOWER LEG WITH EDEMA OF RIGHT LOWER LEG (HCC): Primary | ICD-10-CM

## 2021-09-15 DIAGNOSIS — I83.893 VARICOSE VEINS OF LOWER EXTREMITIES WITH COMPLICATIONS, BILATERAL: ICD-10-CM

## 2021-09-15 DIAGNOSIS — L97.919 CHRONIC VENOUS HYPERTENSION WITH ULCER AND INFLAMMATION INVOLVING RIGHT SIDE (HCC): ICD-10-CM

## 2021-09-15 DIAGNOSIS — I83.029 VENOUS ULCER OF LEFT LEG (HCC): ICD-10-CM

## 2021-09-15 DIAGNOSIS — I83.891 VENOUS STASIS ULCER OF RIGHT LOWER LEG WITH EDEMA OF RIGHT LOWER LEG (HCC): Primary | ICD-10-CM

## 2021-09-15 DIAGNOSIS — L97.929 VENOUS ULCER OF LEFT LEG (HCC): ICD-10-CM

## 2021-09-15 PROCEDURE — 11042 DBRDMT SUBQ TIS 1ST 20SQCM/<: CPT

## 2021-09-15 PROCEDURE — 11042 DBRDMT SUBQ TIS 1ST 20SQCM/<: CPT | Performed by: NURSE PRACTITIONER

## 2021-09-15 PROCEDURE — 11045 DBRDMT SUBQ TISS EACH ADDL: CPT | Performed by: NURSE PRACTITIONER

## 2021-09-15 PROCEDURE — 11045 DBRDMT SUBQ TISS EACH ADDL: CPT

## 2021-09-15 RX ORDER — BACITRACIN ZINC AND POLYMYXIN B SULFATE 500; 1000 [USP'U]/G; [USP'U]/G
OINTMENT TOPICAL ONCE
Status: CANCELLED | OUTPATIENT
Start: 2021-09-15 | End: 2021-09-15

## 2021-09-15 RX ORDER — LIDOCAINE HYDROCHLORIDE 40 MG/ML
SOLUTION TOPICAL ONCE
Status: DISCONTINUED | OUTPATIENT
Start: 2021-09-15 | End: 2021-09-16 | Stop reason: HOSPADM

## 2021-09-15 RX ORDER — LIDOCAINE 50 MG/G
OINTMENT TOPICAL ONCE
Status: CANCELLED | OUTPATIENT
Start: 2021-09-15 | End: 2021-09-15

## 2021-09-15 RX ORDER — BETAMETHASONE DIPROPIONATE 0.05 %
OINTMENT (GRAM) TOPICAL ONCE
Status: CANCELLED | OUTPATIENT
Start: 2021-09-15 | End: 2021-09-15

## 2021-09-15 RX ORDER — LIDOCAINE HYDROCHLORIDE 20 MG/ML
JELLY TOPICAL ONCE
Status: CANCELLED | OUTPATIENT
Start: 2021-09-15 | End: 2021-09-15

## 2021-09-15 RX ORDER — CLOBETASOL PROPIONATE 0.5 MG/G
OINTMENT TOPICAL ONCE
Status: CANCELLED | OUTPATIENT
Start: 2021-09-15 | End: 2021-09-15

## 2021-09-15 RX ORDER — GENTAMICIN SULFATE 1 MG/G
OINTMENT TOPICAL ONCE
Status: CANCELLED | OUTPATIENT
Start: 2021-09-15 | End: 2021-09-15

## 2021-09-15 RX ORDER — LIDOCAINE 40 MG/G
CREAM TOPICAL ONCE
Status: CANCELLED | OUTPATIENT
Start: 2021-09-15 | End: 2021-09-15

## 2021-09-15 RX ORDER — LIDOCAINE HYDROCHLORIDE 40 MG/ML
SOLUTION TOPICAL ONCE
Status: CANCELLED | OUTPATIENT
Start: 2021-09-15 | End: 2021-09-15

## 2021-09-15 RX ORDER — GINSENG 100 MG
CAPSULE ORAL ONCE
Status: CANCELLED | OUTPATIENT
Start: 2021-09-15 | End: 2021-09-15

## 2021-09-15 RX ORDER — BACITRACIN, NEOMYCIN, POLYMYXIN B 400; 3.5; 5 [USP'U]/G; MG/G; [USP'U]/G
OINTMENT TOPICAL ONCE
Status: CANCELLED | OUTPATIENT
Start: 2021-09-15 | End: 2021-09-15

## 2021-09-15 ASSESSMENT — PAIN SCALES - GENERAL: PAINLEVEL_OUTOF10: 0

## 2021-09-15 NOTE — PROGRESS NOTES
Wound Care Center Progress Note With Procedure    Clearance Swati  AGE: 77 y.o. GENDER: male  : 1945  EPISODE DATE:  9/15/2021     Subjective:     Chief Complaint   Patient presents with    Wound Check     right leg          HISTORY of PRESENT ILLNESS      Clearance Swati is a 77 y.o. male who presents today for wound evaluation of Chronic venous and traumatic wound(s) of R lower leg medial.  The wound is of moderate severity. The underlying cause of the wound is trauma and venous disease. Wound Pain Timing/Severity: none  Quality of pain: N/A  Severity of pain:  0 / 10   Modifying Factors: venous stasis  Associated Signs/Symptoms: none        PAST MEDICAL HISTORY        Diagnosis Date    Chronic venous hypertension with ulcer and inflammation involving right side (HCC)     RLE    Hypertension     past    Iron deficiency     Lymphoma Dx 11-    Hx of B cell lymphoma-in remission since        PAST SURGICAL HISTORY    Past Surgical History:   Procedure Laterality Date    TUNNELED VENOUS PORT PLACEMENT      TUNNELED VENOUS PORT PLACEMENT      removed 2012       FAMILY HISTORY    Family History   Problem Relation Age of Onset    Early Death Mother 52    Diabetes Father     Heart Disease Father     Vision Loss Son         \"He wears glasses\"    Early Death Daughter 36       SOCIAL HISTORY    Social History     Tobacco Use    Smoking status: Never Smoker    Smokeless tobacco: Never Used   Substance Use Topics    Alcohol use: No    Drug use: No       ALLERGIES    No Known Allergies    MEDICATIONS    Current Outpatient Medications on File Prior to Encounter   Medication Sig Dispense Refill    metoprolol succinate (TOPROL XL) 50 MG extended release tablet Take 1 tablet by mouth daily 30 tablet 5     No current facility-administered medications on file prior to encounter. REVIEW OF SYSTEMS    Pertinent items are noted in HPI.     Constitutional: Negative for systemic symptoms including fever, chills and malaise. Objective:      BP (!) 145/89   Pulse 70   Temp 98.8 °F (37.1 °C)   Resp 18     PHYSICAL EXAM      General: The patient is in no acute distress. Mental status:  Patient is appropriate, is  oriented to place and plan of care. Dermatologic exam: Visual inspection of the periwound reveals the skin to be normal in turgor and texture, dry and slack  Wound exam: see wound description below in procedure note      Assessment:     Problem List Items Addressed This Visit     Chronic venous hypertension with ulcer and inflammation (HCC)    Relevant Medications    lidocaine (XYLOCAINE) 4 % external solution (Start on 9/15/2021  1:45 PM)    Other Relevant Orders    Supply: Wound Cleanser    Supply: Wound Dressings    Supply: Cover and Secure    Venous ulcer of left leg (HCC)    Relevant Medications    lidocaine (XYLOCAINE) 4 % external solution (Start on 9/15/2021  1:45 PM)    Other Relevant Orders    Supply: Wound Cleanser    Supply: Wound Dressings    Supply: Cover and Secure    Venous stasis ulcer of right lower leg with edema of right lower leg (HCC) - Primary    Varicose veins of lower extremities with complications, bilateral    Varicose veins of left lower extremity with other complications        Procedure Note    Indications:  Based on my examination of this patient's wound(s) today, sharp excision into necrotic subcutaneous tissue is required to promote healing and evaluate the extent of previous healing. Performed by: LOLY Alonzo - CNP    Consent obtained: Yes    Time out taken:  Yes    Pain Control: Anesthetic  Anesthetic: 4% Lidocaine Liquid Topical     Debridement:Excisional Debridement    Using #15 blade scalpel the wound(s) was/were sharply debrided down through and including the removal of subcutaneous tissue.         Devitalized Tissue Debrided:  fibrin, biofilm, slough, exudate and callus    Pre Debridement Measurements:  Are located in the Wound Documentation Flow Sheet    All active wounds listed below with today's date are evaluated  Wound(s)    debrided this date include # : 1     Post  Debridement Measurements:  Wound 07/29/13 Venous ulcer Ankle Right;Lateral (Active)   Number of days: 2970       Wound 09/30/20 Wound #1 Right Medial Lower Leg Cluster (Onset 5 Years) (Active)   Wound Image   09/01/21 1301   Wound Etiology Venous 09/08/21 1322   Dressing Status New dressing applied 09/08/21 1350   Wound Cleansed Soap and water 09/15/21 1256   Dressing/Treatment Alginate with Ag 01/13/21 1657   Offloading for Diabetic Foot Ulcers No offloading required 09/15/21 1256   Wound Length (cm) 6.5 cm 09/15/21 1256   Wound Width (cm) 4.4 cm 09/15/21 1256   Wound Depth (cm) 0.1 cm 09/15/21 1256   Wound Surface Area (cm^2) 28.6 cm^2 09/15/21 1256   Change in Wound Size % (l*w) 88.65 09/15/21 1256   Wound Volume (cm^3) 2.86 cm^3 09/15/21 1256   Wound Healing % 89 09/15/21 1256   Post-Procedure Length (cm) 6.5 cm 09/15/21 1312   Post-Procedure Width (cm) 4.4 cm 09/15/21 1312   Post-Procedure Depth (cm) 0.1 cm 09/15/21 1312   Post-Procedure Surface Area (cm^2) 28.6 cm^2 09/15/21 1312   Post-Procedure Volume (cm^3) 2.86 cm^3 09/15/21 1312   Distance Tunneling (cm) 0 cm 09/15/21 1256   Tunneling Position ___ O'Clock 0 09/15/21 1256   Undermining Starts ___ O'Clock 0 09/15/21 1256   Undermining Ends___ O'Clock 0 09/15/21 1256   Undermining Maxium Distance (cm) 0 09/15/21 1256   Wound Assessment Granulation tissue 09/15/21 1256   Drainage Amount Moderate 09/15/21 1256   Drainage Description Serosanguinous 09/15/21 1256   Odor None 09/15/21 1256   Cassia-wound Assessment Intact 09/15/21 1256   Margins Defined edges 09/15/21 1256   Wound Thickness Description not for Pressure Injury Full thickness 09/15/21 1256   Number of days: 350       Percent of Wound(s) Debrided: approximately 100%    Total  Area  Debrided:  28.6 sq cm     Bleeding:  Minimal    Hemostasis Achieved:  by pressure    Procedural Pain:  0  / 10     Post Procedural Pain:  0 / 10     Response to treatment:  Well tolerated by patient. Status of wound progress and description from last visit:   Improved. Continue to monitor and follow up 1 week. No ERIC vac per Dr. Henok Bennett preference. Plan:       Discharge Instructions         Discharge Instructions        PHYSICIAN ORDERS AND DISCHARGE INSTRUCTIONS     NOTE: Upon discharge from the [address], you will receive a patient experience survey.  We would be grateful if you would take the time to fill this survey out.     Wound care order history:                 Vascular studies: Arterial studies done 9/27/20; no stenosis              Imaging:   Date               Cultures:   obtained on 11/18/2020                                 Obtained on 1/20/2021               Labs/ HbA1c:   Date               Grafts:                       #1 Apligraf on 12/16/2020                     #2 Apligraf on 12/23/2020                     #3 Apligraf on 12/30/2020                     #4 Apligraf on 1/6/2021                     # 5 Apligraf on 1/13/21                     #1 [place] 1/27/2021                     #2 [place] 2/3/2021                     #3 [place] 2/10/2021                     #4 [place] Right Medial Lower Leg 2/17/21                     #5 [place] 3/10/2021                   HBO:                Antibiotics:  Bactrim DS BID for 10 days on 11/18/2020                                   Cipro BID for 10 days ordered on 11/25/2020                                    Cipro 500 mg BID on 1/20/2021                                    Cipro 500 mg BID on 4/28/2021              Earlier Wound care treatments:                Authorizations:                        Consults:   Date                           Primary care physician:      Continuing wound care orders and information:              Residence:  private               [facility] home health care with: [facility] wound-care supplies will be provided by: Hugh Monroe provider:              SUSHILA Rodriguez Cost loading:  Date               ADLEU Medications:              YHOJY cleansing:                           WI not scrub or use excessive force.                          Wash hands with soap and water before and after dressing changes.                         Prior to applying a clean dressing, cleanse wound with normal saline,                                wound cleanser, or mild soap and water.                           Ask the physician or nurse before getting the wound(s) wet in a shower              Daily Wound management:                          Keep weight off wounds and reposition every 2 hours.                          HMGRU standing for long periods of time.                          AGMCM wraps/stockings in AM and remove at bedtime.                          If swelling is present, elevate legs to the level of the heart or above for 30 minutes 4-5 times a day and/or when sitting.                                             When taking antibiotics take entire prescription as ordered by physician do not stop taking until medicine is all gone.                                           Orders for this week: 9/15/21     Fax orders to [facility]     Right dorsal foot - healed      Right lower leg -- wash with soap and water, pat dry.  Apply A&Dto sarah wound. Cover with anasept gel and stimulin powder covered with  4x4, ABD and wrap with coban 2.       Homecare to change on Friday and Monday (may use Calcium alginate instead of anasept, profore to wrap)           Auth for ERIC vac 9/1/2021     Referral to Dr Ammon Martinez on 2/24/2021, appt 3/18/2021 then 4/9/2021, first procedure scheduled on 4/27/2021                    Follow up with Dr Nelly Simons in 1 week in the wound care center  Call [phone] for any questions or concerns.   Date__________   Time__________             Treatment Note      Written Patient Dismissal Instructions Given            Electronically signed by LOLY Resendez CNP on 9/15/2021 at 1:19 PM

## 2021-09-15 NOTE — PROGRESS NOTES
Multilayer Compression Wrap   (Not Unna) Below the Knee    NAME:  Haleigh Escobar  YOB: 1945  MEDICAL RECORD NUMBER:  [id number]  DATE:  9/15/2021    Multilayer compression wrap: Removed old Multilayer wrap if indicated and wash leg with mild soap/water. Applied moisturizing agent to dry skin as needed. Applied primary and secondary dressing as ordered. Applied multilayered dressing below the knee to right lower leg. Instructed patient/caregiver not to remove dressing and to keep it clean and dry. Instructed patient/caregiver on complications to report to provider, such as pain, numbness in toes, heavy drainage, and slippage of dressing. Instructed patient on purpose of compression dressing and on activity and exercise recommendations.       Electronically signed by Vaibhav Valenzuela RN on 9/15/2021 at 1:25 PM

## 2021-09-15 NOTE — DISCHARGE INSTR - COC
Continuity of Care Form    Patient Name: Serafin Fleming   :  1945  MRN:  [id number]    Admit date:  (Not on file)  Discharge date:  ***    Code Status Order: Prior   Advance Directives:     Admitting Physician:  No admitting provider for patient encounter. PCP: Courtney Bowens DO    Discharging Nurse: [facility] Unit/Room#: No information available for this encounter.   Discharging Unit Phone Number: ***    Emergency Contact:   Extended Emergency Contact Information  Primary Emergency Contact: Jil Daley  Address: [address] [place] [address],[address], [address] Phone: [phone]  Relation: Spouse    Past Surgical History:  Past Surgical History:   Procedure Laterality Date    TUNNELED VENOUS PORT PLACEMENT      TUNNELED VENOUS PORT PLACEMENT      removed 2012       Immunization History:   Immunization History   Administered Date(s) Administered    COVID-19, Pfizer, PF, 30mcg/0.3mL 2021, 2021    Influenza, Quadv, adjuvanted, 65 yrs +, IM, PF (Fluad) 2020    Pneumococcal Polysaccharide (Qcivzyvxy14) 2021       Active Problems:  Patient Active Problem List   Diagnosis Code    Chronic venous hypertension with ulcer and inflammation ([facility] [address] I87.339, L97.909    Leg ulcer ([facility] [address] L97.909    Venous ulcer of left leg ([facility] [address] I83.029, L97.929    Acute blood loss anemia D62    Iron deficiency E61.1    Essential hypertension I10    Venous stasis ulcer of right lower leg with edema of right lower leg (HCC) I83.019, I83.891, L97.919, R60.9    Varicose veins of lower extremities with complications, bilateral I83.893    Varicose veins of left lower extremity with other complications U57.839    Status post endovenous radiofrequency ablation (RFA) of saphenous vein Z98.890       Isolation/Infection:   Isolation          No Isolation        Patient Infection Status     Infection Onset Added Last Indicated Last Indicated By Review Planned Expiration Resolved Resolved By    None active    Resolved    MRSA  12 Estela Stephens RN   13 Estela Stephens RN    cath tip 12    MDRO (multi-drug resistant organism)  12 Estela Stephens RN   12 Estela Stephens RN    Stenotroph maltophilia wound cx 12, Review 12          Nurse Assessment:  Last Vital Signs: There were no vitals taken for this visit.     Last documented pain score (0-10 scale):    Last Weight:   Wt Readings from Last 1 Encounters:   21 182 lb (82.6 kg)     Mental Status:  {IP PT MENTAL STATUS:}    IV Access:  { ZECHARIAH IV ACCESS:545216717}    Nursing Mobility/ADLs:  Walking   {CHP DME IDWO:138743991}  Transfer  {CHP DME NILH:669639214}  Bathing  {CHP DME CUPD:532855860}  Dressing  {CHP DME BVWY:676112395}  Toileting  {CHP DME KRR}  Feeding  {CHP DME KPCL:902895264}  Med Admin  {P DME OLZP:825718159}  Med Delivery   { ZECHARIAH MED Delivery:792672534}    Wound Care Documentation and Therapy:  Wound 13 Venous ulcer Ankle Right;Lateral (Active)   Number of days: 2969       Wound 20 Wound #1 Right Medial Lower Leg Cluster (Onset 5 Years) (Active)   Wound Image   21 1301   Wound Etiology Venous 21 1322   Dressing Status New dressing applied 21 1350   Wound Cleansed Wound cleanser 21 1322   Offloading for Diabetic Foot Ulcers No offloading required 21 1350   Wound Length (cm) 10.3 cm 21 1322   Wound Width (cm) 4.8 cm 21 1322   Wound Depth (cm) 0.1 cm 21 1322   Wound Surface Area (cm^2) 49.44 cm^2 21 1322   Change in Wound Size % (l*w) 80.38 21 1322   Wound Volume (cm^3) 4.944 cm^3 21 1322   Wound Healing % 80 21 1322   Post-Procedure Length (cm) 10.3 cm 21 1324   Post-Procedure Width (cm) 4.8 cm 21 1324   Post-Procedure Depth (cm) 0.1 cm 21 1324   Post-Procedure Surface Area (cm^2) 49.44 cm^2 21 1324   Post-Procedure Volume (cm^3) 4.944 cm^3 21 1324   Distance Tunneling (cm) 0 cm 21 1322   Tunneling Position ___ O'Clock 0 21 1322   Undermining Starts ___ O'Clock 0 21 1322   Undermining Ends___ O'Clock 0 21 1322   Undermining Maxium Distance (cm) 0 21 1322   Wound Assessment Granulation tissue 21 1322   Drainage Amount Moderate 21 1322   Drainage Description Serosanguinous 21 1322   Odor None 21 1322   Cassia-wound Assessment Intact 21 1322   Margins Defined edges 21 1322   Wound Thickness Description not for Pressure Injury Full thickness 21 1322   Number of days: 349        Elimination:  Continence:   · Bowel: {YES / MT:50652}  · Bladder: {YES / KW:68897}  Urinary Catheter: {Urinary Catheter:344188514}   Colostomy/Ileostomy/Ileal Conduit: {YES / FO:84621}       Date of Last BM: ***  No intake or output data in the 24 hours ending 09/15/21 1009  No intake/output data recorded.     Safety Concerns:     [address] [employer] Safety Concerns:712699085}    Impairments/Disabilities:      [address] [employer] Impairments/Disabilities:154856916}    Nutrition Therapy:  Current Nutrition Therapy:   [address] [employer] Diet List:749228307}    Routes of Feeding: {CHP DME Other Feedings:179935781}  Liquids: {Slp liquid thickness:20435}  Daily Fluid Restriction: {CHP DME Yes amt example:566365316}  Last Modified Barium Swallow with Video (Video Swallowing Test): {Done Not Done JCRO:591426113}    Treatments at the Time of Hospital Discharge:   Respiratory Treatments: ***  Oxygen Therapy:  {Therapy; copd oxygen:77319}  Ventilator:    { CC Vent EAMV:693705349}    Rehab Therapies: {THERAPEUTIC INTERVENTION:3536881185}  Weight Bearing Status/Restrictions: [employer]  Weight Bearin}  Other Medical Equipment (for information only, NOT a DME order):  {EQUIPMENT:983778662}  Other Treatments: ***    Patient's personal belongings (please select all that are sent with patient):  {CHP DME Belongings:967092621}    RN SIGNATURE: {Esignature:800038010}    CASE MANAGEMENT/SOCIAL WORK SECTION    Inpatient Status Date: ***    Readmission Risk Assessment Score:  Readmission Risk              Risk of Unplanned Readmission:  0           Discharging to Facility/ Agency   · Name:   · Address:  · Phone:  · Fax:    Dialysis Facility (if applicable)   · Name:  · Address:  · Dialysis Schedule:  · Phone:  · Fax:    / signature: {Esignature:253280457}    PHYSICIAN SECTION    Prognosis: {Prognosis:5192357769}    Condition at Discharge: [address] [place] [address] Patient Condition:798704698}    Rehab Potential (if transferring to Rehab): {Prognosis:6680496282}    Recommended Labs or Other Treatments After Discharge: ***    Physician Certification: I certify the above information and transfer of Reji Bar  is necessary for the continuing treatment of the diagnosis listed and that he requires {Admit to Appropriate Level of Care:27883} for {GREATER/LESS:491849561} 30 days.      Update Admission H&P: {CHP DME Changes in KBTDK:900779294}    PHYSICIAN SIGNATURE:  {Esignature:149693387}

## 2021-09-22 ENCOUNTER — HOSPITAL ENCOUNTER (OUTPATIENT)
Dept: WOUND CARE | Age: 76
Discharge: HOME | End: 2021-09-22
Payer: COMMERCIAL

## 2021-09-22 VITALS
RESPIRATION RATE: 18 BRPM | TEMPERATURE: 99.4 F | DIASTOLIC BLOOD PRESSURE: 89 MMHG | SYSTOLIC BLOOD PRESSURE: 149 MMHG | HEART RATE: 80 BPM

## 2021-09-22 DIAGNOSIS — L97.919 CHRONIC VENOUS HYPERTENSION WITH ULCER AND INFLAMMATION INVOLVING RIGHT SIDE (HCC): Primary | ICD-10-CM

## 2021-09-22 DIAGNOSIS — I83.029 VENOUS ULCER OF LEFT LEG (HCC): ICD-10-CM

## 2021-09-22 DIAGNOSIS — L97.929 VENOUS ULCER OF LEFT LEG (HCC): ICD-10-CM

## 2021-09-22 DIAGNOSIS — I87.331 CHRONIC VENOUS HYPERTENSION WITH ULCER AND INFLAMMATION INVOLVING RIGHT SIDE (HCC): Primary | ICD-10-CM

## 2021-09-22 PROCEDURE — 11045 DBRDMT SUBQ TISS EACH ADDL: CPT | Performed by: SURGERY

## 2021-09-22 PROCEDURE — 11042 DBRDMT SUBQ TIS 1ST 20SQCM/<: CPT

## 2021-09-22 PROCEDURE — 11042 DBRDMT SUBQ TIS 1ST 20SQCM/<: CPT | Performed by: SURGERY

## 2021-09-22 PROCEDURE — 11045 DBRDMT SUBQ TISS EACH ADDL: CPT

## 2021-09-22 RX ORDER — GINSENG 100 MG
CAPSULE ORAL ONCE
Status: CANCELLED | OUTPATIENT
Start: 2021-09-22 | End: 2021-09-22

## 2021-09-22 RX ORDER — LIDOCAINE HYDROCHLORIDE 20 MG/ML
JELLY TOPICAL ONCE
Status: CANCELLED | OUTPATIENT
Start: 2021-09-22 | End: 2021-09-22

## 2021-09-22 RX ORDER — BACITRACIN, NEOMYCIN, POLYMYXIN B 400; 3.5; 5 [USP'U]/G; MG/G; [USP'U]/G
OINTMENT TOPICAL ONCE
Status: CANCELLED | OUTPATIENT
Start: 2021-09-22 | End: 2021-09-22

## 2021-09-22 RX ORDER — BETAMETHASONE DIPROPIONATE 0.05 %
OINTMENT (GRAM) TOPICAL ONCE
Status: CANCELLED | OUTPATIENT
Start: 2021-09-22 | End: 2021-09-22

## 2021-09-22 RX ORDER — GENTAMICIN SULFATE 1 MG/G
OINTMENT TOPICAL ONCE
Status: CANCELLED | OUTPATIENT
Start: 2021-09-22 | End: 2021-09-22

## 2021-09-22 RX ORDER — BACITRACIN ZINC AND POLYMYXIN B SULFATE 500; 1000 [USP'U]/G; [USP'U]/G
OINTMENT TOPICAL ONCE
Status: CANCELLED | OUTPATIENT
Start: 2021-09-22 | End: 2021-09-22

## 2021-09-22 RX ORDER — LIDOCAINE HYDROCHLORIDE 40 MG/ML
SOLUTION TOPICAL ONCE
Status: DISCONTINUED | OUTPATIENT
Start: 2021-09-22 | End: 2021-09-23 | Stop reason: HOSPADM

## 2021-09-22 RX ORDER — LIDOCAINE 40 MG/G
CREAM TOPICAL ONCE
Status: CANCELLED | OUTPATIENT
Start: 2021-09-22 | End: 2021-09-22

## 2021-09-22 RX ORDER — CLOBETASOL PROPIONATE 0.5 MG/G
OINTMENT TOPICAL ONCE
Status: CANCELLED | OUTPATIENT
Start: 2021-09-22 | End: 2021-09-22

## 2021-09-22 RX ORDER — LIDOCAINE 50 MG/G
OINTMENT TOPICAL ONCE
Status: CANCELLED | OUTPATIENT
Start: 2021-09-22 | End: 2021-09-22

## 2021-09-22 RX ORDER — LIDOCAINE HYDROCHLORIDE 40 MG/ML
SOLUTION TOPICAL ONCE
Status: CANCELLED | OUTPATIENT
Start: 2021-09-22 | End: 2021-09-22

## 2021-09-22 ASSESSMENT — PAIN SCALES - GENERAL: PAINLEVEL_OUTOF10: 0

## 2021-09-22 NOTE — PROGRESS NOTES
Wound Care Center Progress Note with Procedure Note      Pam Gutierrez  AGE: 77 y.o. GENDER: male  : 1945  EPISODE DATE:  2021     Subjective:     Chief Complaint   Patient presents with    Wound Check         HISTORY of PRESENT ILLNESS      Pam Gutierrez is a 77 y.o. male who presents today for wound evaluation of Chronic venous and traumatic wound(s) of R lower leg medial.  The wound is of moderate severity. The underlying cause of the wound is trauma and venous disease. Wound Pain Timing/Severity: none  Quality of pain: N/A  Severity of pain:  0 / 10   Modifying Factors: edema and venous stasis  Associated Signs/Symptoms: none        PAST MEDICAL HISTORY        Diagnosis Date    Chronic venous hypertension with ulcer and inflammation involving right side (HCC)     RLE    Hypertension     past    Iron deficiency     Lymphoma Dx 11-    Hx of B cell lymphoma-in remission since        PAST SURGICAL HISTORY    Past Surgical History:   Procedure Laterality Date    TUNNELED VENOUS PORT PLACEMENT      TUNNELED VENOUS PORT PLACEMENT      removed 2012       FAMILY HISTORY    Family History   Problem Relation Age of Onset    Early Death Mother 52    Diabetes Father     Heart Disease Father     Vision Loss Son         \"He wears glasses\"    Early Death Daughter 36       SOCIAL HISTORY    Social History     Tobacco Use    Smoking status: Never Smoker    Smokeless tobacco: Never Used   Substance Use Topics    Alcohol use: No    Drug use: No       ALLERGIES    No Known Allergies    MEDICATIONS    Current Outpatient Medications on File Prior to Encounter   Medication Sig Dispense Refill    metoprolol succinate (TOPROL XL) 50 MG extended release tablet Take 1 tablet by mouth daily 30 tablet 5     No current facility-administered medications on file prior to encounter.        REVIEW OF SYSTEMS    Pertinent items are noted in HPI.  Constitutional: Negative for systemic symptoms including fever, chills and malaise. Objective:      BP (!) 149/89   Pulse 80   Temp 99.4 °F (37.4 °C) (Temporal)   Resp 18     PHYSICAL EXAM      General: The patient is in no acute distress. Mental status:  Patient is appropriate, is  oriented to place and plan of care. Dermatologic exam: Visual inspection of the periwound reveals the skin to be edematous  Wound exam: see wound description below in procedure note      Assessment:     Problem List Items Addressed This Visit     Chronic venous hypertension with ulcer and inflammation (HCC) - Primary    Relevant Medications    lidocaine (XYLOCAINE) 4 % external solution (Start on 9/22/2021  1:45 PM)    Other Relevant Orders    Supply: Wound Cleanser    Supply: Wound Dressings    Supply: Cover and Secure    Supply: Edema Control    Venous ulcer of left leg (HCC)    Relevant Medications    lidocaine (XYLOCAINE) 4 % external solution (Start on 9/22/2021  1:45 PM)    Other Relevant Orders    Supply: Wound Cleanser    Supply: Wound Dressings    Supply: Cover and Secure    Supply: Edema Control        Procedure Note    Indications:  Based on my examination of this patient's wound(s) today, sharp excision into necrotic subcutaneous tissue is required to promote healing and evaluate the extent of previous healing. Performed by: Urszula Luong MD    Consent obtained: Yes    Time out taken:  Yes    Pain Control: topical Anesthetic  Anesthetic: 4% Lidocaine Liquid Topical     Debridement:Excisional Debridement    Using curette the wound(s) was/were sharply debrided down through and including the removal of subcutaneous tissue.         Devitalized Tissue Debrided:  slough    Pre Debridement Measurements:  Are located in the Wound Documentation Flow Sheet    All active wounds listed below with today's date are evaluated  Wound(s)    debrided this date include # : 1     Post  Debridement Measurements:  Wound 09/30/20 Wound #1 Right Medial Lower Leg Cluster (Onset 5 Years) (Active)   Wound Image   09/01/21 1301   Wound Etiology Venous 09/22/21 1305   Dressing Status New dressing applied 09/15/21 1323   Wound Cleansed Soap and water 09/22/21 1305   Dressing/Treatment Alginate with Ag 01/13/21 1657   Offloading for Diabetic Foot Ulcers No offloading required 09/22/21 1305   Wound Length (cm) 6.3 cm 09/22/21 1305   Wound Width (cm) 4 cm 09/22/21 1305   Wound Depth (cm) 0.1 cm 09/22/21 1305   Wound Surface Area (cm^2) 25.2 cm^2 09/22/21 1305   Change in Wound Size % (l*w) 90 09/22/21 1305   Wound Volume (cm^3) 2.52 cm^3 09/22/21 1305   Wound Healing % 90 09/22/21 1305   Post-Procedure Length (cm) 6.3 cm 09/22/21 1327   Post-Procedure Width (cm) 4 cm 09/22/21 1327   Post-Procedure Depth (cm) 0.1 cm 09/22/21 1327   Post-Procedure Surface Area (cm^2) 25.2 cm^2 09/22/21 1327   Post-Procedure Volume (cm^3) 2.52 cm^3 09/22/21 1327   Distance Tunneling (cm) 0 cm 09/22/21 1305   Tunneling Position ___ O'Clock 0 09/22/21 1305   Undermining Starts ___ O'Clock 0 09/22/21 1305   Undermining Ends___ O'Clock 0 09/22/21 1305   Undermining Maxium Distance (cm) 0 09/22/21 1305   Wound Assessment Granulation tissue 09/22/21 1305   Drainage Amount Moderate 09/22/21 1305   Drainage Description Serosanguinous 09/22/21 1305   Odor None 09/22/21 1305   Cassia-wound Assessment Intact 09/22/21 1305   Margins Defined edges 09/22/21 1305   Wound Thickness Description not for Pressure Injury Full thickness 09/22/21 1305   Number of days: 357       Percent of Wound(s) Debrided: approximately 100%    Total Surface Area Debrided:  25.2 sq cm     Bleeding:  Minimal    Hemostasis Achieved:  by pressure    Procedural Pain:  0  / 10     Post Procedural Pain:  0 / 10     Response to treatment:  Well tolerated by patient. Status of wound progress and description from last visit:   Improved.       Plan:       Discharge Instructions Discharge Instructions        PHYSICIAN ORDERS AND DISCHARGE INSTRUCTIONS     NOTE: Upon discharge from the [address], you will receive a patient experience survey. We would be grateful if you would take the time to fill this survey out.     Wound care order history:                 Vascular studies: Arterial studies done 9/27/20; no stenosis              Imaging:   Date               Cultures:   obtained on 11/18/2020                                 Obtained on 1/20/2021               Labs/ HbA1c:   Date               Grafts:                       #1 Apligraf on 12/16/2020                     #2 Apligraf on 12/23/2020                     #3 Apligraf on 12/30/2020                     #4 Apligraf on 1/6/2021                     # 5 Apligraf on 1/13/21                     #1 [place] 1/27/2021                     #2 [place] 2/3/2021                     #3 [place] 2/10/2021                     #4 [place] Right Medial Lower Leg 2/17/21                     #5 [place] 3/10/2021                   HBO:                Antibiotics:  Bactrim DS BID for 10 days on 11/18/2020                                   Cipro BID for 10 days ordered on 11/25/2020                                    Cipro 500 mg BID on 1/20/2021                                    Cipro 500 mg BID on 4/28/2021              Earlier Wound care treatments:                Authorizations:                        Consults:   Date                           Primary care physician:      Continuing wound care orders and information:              Residence:  private               Continue [facility] health care with: [facility]               Your wound-care supplies will be provided by:  José Manuel Molina provider:              Compression with  Yvonnie Bacca loading:  Date               GXPOP Medications:              CXZQX cleansing:                           RW not scrub or use excessive force.                          Wash hands with soap and water before and after dressing changes.                         Prior to applying a clean dressing, cleanse wound with normal saline,                                wound cleanser, or mild soap and water.                           Ask the physician or nurse before getting the wound(s) wet in a shower              Daily Wound management:                          Keep weight off wounds and reposition every 2 hours.                          OVGMW standing for long periods of time.                          KWPLC wraps/stockings in AM and remove at bedtime.                          If swelling is present, elevate legs to the level of the heart or above for 30 minutes 4-5 times a day and/or when sitting.                                             When taking antibiotics take entire prescription as ordered by physician do not stop taking until medicine is all gone.                                           Orders for this week: 9/15/21     Fax orders to [facility] [place]!     Right dorsal foot - healed      Right lower leg -- wash with soap and water, pat dry. Foam to anterior blister area may stay in place for 1 week. Then:  Apply A&Dto sarah wound. Cover with anasept gel and stimulin powder covered with  4x4, ABD and wrap with coban 2.       Homecare to change on Friday and Monday (may use Calcium alginate instead of anasept, profore to wrap)           Auth for ERIC vac 9/1/2021     Referral to Dr Nir Núñez on 2/24/2021, appt 3/18/2021 then 4/9/2021, first procedure scheduled on 4/27/2021                    Follow up with Dr Isreal Alcantara in 1 week in the wound care center  Call [phone] for any questions or concerns.   Date__________   Time__________                Treatment Note      Written Patient Dismissal Instructions Given            Electronically signed by Callie Macias MD on 9/22/2021 at 1:37 PM      w

## 2021-09-22 NOTE — PROGRESS NOTES
Multilayer Compression Wrap   (Not Unna) Below the Knee    NAME:  Sydnie Alvarez  YOB: 1945  MEDICAL RECORD NUMBER:  [id number]  DATE:  9/22/2021    Multilayer compression wrap: Removed old Multilayer wrap if indicated and wash leg with mild soap/water. Applied moisturizing agent to dry skin as needed. Applied primary and secondary dressing as ordered. Applied multilayered dressing below the knee to right lower leg. Instructed patient/caregiver not to remove dressing and to keep it clean and dry. Instructed patient/caregiver on complications to report to provider, such as pain, numbness in toes, heavy drainage, and slippage of dressing. Instructed patient on purpose of compression dressing and on activity and exercise recommendations.       Electronically signed by Jennifer Amato LPN on 2/21/4288 at 8:70 PM

## 2021-09-29 ENCOUNTER — HOSPITAL ENCOUNTER (OUTPATIENT)
Dept: WOUND CARE | Age: 76
Discharge: HOME | End: 2021-09-29
Payer: COMMERCIAL

## 2021-09-29 VITALS
TEMPERATURE: 98.5 F | SYSTOLIC BLOOD PRESSURE: 159 MMHG | HEART RATE: 68 BPM | DIASTOLIC BLOOD PRESSURE: 90 MMHG | RESPIRATION RATE: 18 BRPM

## 2021-09-29 DIAGNOSIS — L97.929 VENOUS ULCER OF LEFT LEG (HCC): ICD-10-CM

## 2021-09-29 DIAGNOSIS — L97.919 CHRONIC VENOUS HYPERTENSION WITH ULCER AND INFLAMMATION INVOLVING RIGHT SIDE (HCC): Primary | ICD-10-CM

## 2021-09-29 DIAGNOSIS — I87.331 CHRONIC VENOUS HYPERTENSION WITH ULCER AND INFLAMMATION INVOLVING RIGHT SIDE (HCC): Primary | ICD-10-CM

## 2021-09-29 DIAGNOSIS — I83.029 VENOUS ULCER OF LEFT LEG (HCC): ICD-10-CM

## 2021-09-29 PROCEDURE — 11045 DBRDMT SUBQ TISS EACH ADDL: CPT

## 2021-09-29 PROCEDURE — 11045 DBRDMT SUBQ TISS EACH ADDL: CPT | Performed by: SURGERY

## 2021-09-29 PROCEDURE — 11042 DBRDMT SUBQ TIS 1ST 20SQCM/<: CPT | Performed by: SURGERY

## 2021-09-29 PROCEDURE — 11042 DBRDMT SUBQ TIS 1ST 20SQCM/<: CPT

## 2021-09-29 RX ORDER — BACITRACIN ZINC AND POLYMYXIN B SULFATE 500; 1000 [USP'U]/G; [USP'U]/G
OINTMENT TOPICAL ONCE
Status: CANCELLED | OUTPATIENT
Start: 2021-09-29 | End: 2021-09-29

## 2021-09-29 RX ORDER — LIDOCAINE 40 MG/G
CREAM TOPICAL ONCE
Status: CANCELLED | OUTPATIENT
Start: 2021-09-29 | End: 2021-09-29

## 2021-09-29 RX ORDER — BETAMETHASONE DIPROPIONATE 0.05 %
OINTMENT (GRAM) TOPICAL ONCE
Status: CANCELLED | OUTPATIENT
Start: 2021-09-29 | End: 2021-09-29

## 2021-09-29 RX ORDER — GINSENG 100 MG
CAPSULE ORAL ONCE
Status: CANCELLED | OUTPATIENT
Start: 2021-09-29 | End: 2021-09-29

## 2021-09-29 RX ORDER — LIDOCAINE HYDROCHLORIDE 20 MG/ML
JELLY TOPICAL ONCE
Status: CANCELLED | OUTPATIENT
Start: 2021-09-29 | End: 2021-09-29

## 2021-09-29 RX ORDER — LIDOCAINE 50 MG/G
OINTMENT TOPICAL ONCE
Status: CANCELLED | OUTPATIENT
Start: 2021-09-29 | End: 2021-09-29

## 2021-09-29 RX ORDER — GENTAMICIN SULFATE 1 MG/G
OINTMENT TOPICAL ONCE
Status: CANCELLED | OUTPATIENT
Start: 2021-09-29 | End: 2021-09-29

## 2021-09-29 RX ORDER — BACITRACIN, NEOMYCIN, POLYMYXIN B 400; 3.5; 5 [USP'U]/G; MG/G; [USP'U]/G
OINTMENT TOPICAL ONCE
Status: CANCELLED | OUTPATIENT
Start: 2021-09-29 | End: 2021-09-29

## 2021-09-29 RX ORDER — LIDOCAINE HYDROCHLORIDE 40 MG/ML
SOLUTION TOPICAL ONCE
Status: CANCELLED | OUTPATIENT
Start: 2021-09-29 | End: 2021-09-29

## 2021-09-29 RX ORDER — CLOBETASOL PROPIONATE 0.5 MG/G
OINTMENT TOPICAL ONCE
Status: CANCELLED | OUTPATIENT
Start: 2021-09-29 | End: 2021-09-29

## 2021-09-29 RX ORDER — LIDOCAINE HYDROCHLORIDE 40 MG/ML
SOLUTION TOPICAL ONCE
Status: DISCONTINUED | OUTPATIENT
Start: 2021-09-29 | End: 2021-09-30 | Stop reason: HOSPADM

## 2021-09-29 ASSESSMENT — PAIN SCALES - GENERAL: PAINLEVEL_OUTOF10: 0

## 2021-09-29 NOTE — PROGRESS NOTES
Multilayer Compression Wrap   (Not Unna) Below the Knee    NAME:  Vania Em  YOB: 1945  MEDICAL RECORD NUMBER:  [id number]  DATE:  9/29/2021    Multilayer compression wrap: Removed old Multilayer wrap if indicated and wash leg with mild soap/water. Applied moisturizing agent to dry skin as needed. Applied primary and secondary dressing as ordered. Applied multilayered dressing below the knee to right lower leg. Instructed patient/caregiver not to remove dressing and to keep it clean and dry. Instructed patient/caregiver on complications to report to provider, such as pain, numbness in toes, heavy drainage, and slippage of dressing. Instructed patient on purpose of compression dressing and on activity and exercise recommendations.       Electronically signed by Audra Connolly LPN on 4/46/4163 at 4:75 PM

## 2021-09-29 NOTE — PROGRESS NOTES
Wound Care Center Progress Note with Procedure Note      Barron East  AGE: 77 y.o. GENDER: male  : 1945  EPISODE DATE:  2021     Subjective:     Chief Complaint   Patient presents with    Wound Check         HISTORY of PRESENT ILLNESS      Barron East is a 77 y.o. male who presents today for wound evaluation of Chronic venous wound(s) of R lower leg medial.  The wound is of moderate severity. The underlying cause of the wound is venous disease and trauma. Wound Pain Timing/Severity: none  Quality of pain: N/A  Severity of pain:  0 / 10   Modifying Factors: edema and venous stasis  Associated Signs/Symptoms: none        PAST MEDICAL HISTORY        Diagnosis Date    Chronic venous hypertension with ulcer and inflammation involving right side (HCC)     RLE    Hypertension     past    Iron deficiency     Lymphoma Dx 11-    Hx of B cell lymphoma-in remission since        PAST SURGICAL HISTORY    Past Surgical History:   Procedure Laterality Date    TUNNELED VENOUS PORT PLACEMENT      TUNNELED VENOUS PORT PLACEMENT      removed 2012       FAMILY HISTORY    Family History   Problem Relation Age of Onset    Early Death Mother 52    Diabetes Father     Heart Disease Father     Vision Loss Son         \"He wears glasses\"    Early Death Daughter 36       SOCIAL HISTORY    Social History     Tobacco Use    Smoking status: Never Smoker    Smokeless tobacco: Never Used   Substance Use Topics    Alcohol use: No    Drug use: No       ALLERGIES    No Known Allergies    MEDICATIONS    Current Outpatient Medications on File Prior to Encounter   Medication Sig Dispense Refill    metoprolol succinate (TOPROL XL) 50 MG extended release tablet Take 1 tablet by mouth daily 30 tablet 5     No current facility-administered medications on file prior to encounter. REVIEW OF SYSTEMS    Pertinent items are noted in HPI.   Constitutional: Negative for systemic symptoms including fever, chills and malaise. Objective:      BP (!) 159/90   Pulse 68   Temp 98.5 °F (36.9 °C) (Temporal)   Resp 18     PHYSICAL EXAM      General: The patient is in no acute distress. Mental status:  Patient is appropriate, is  oriented to place and plan of care. Dermatologic exam: Visual inspection of the periwound reveals the skin to be edematous  Wound exam: see wound description below in procedure note      Assessment:     Problem List Items Addressed This Visit     Chronic venous hypertension with ulcer and inflammation (HCC) - Primary    Relevant Medications    lidocaine (XYLOCAINE) 4 % external solution    Other Relevant Orders    Supply: Wound Cleanser    Supply: Wound Dressings    Supply: Cover and Secure    Supply: Edema Control    Venous ulcer of left leg (HCC)    Relevant Medications    lidocaine (XYLOCAINE) 4 % external solution    Other Relevant Orders    Supply: Wound Cleanser    Supply: Wound Dressings    Supply: Cover and Secure    Supply: Edema Control        Procedure Note    Indications:  Based on my examination of this patient's wound(s) today, sharp excision into necrotic subcutaneous tissue is required to promote healing and evaluate the extent of previous healing. Performed by: Phyllis Castillo MD    Consent obtained: Yes    Time out taken:  Yes    Pain Control: topical Anesthetic  Anesthetic: 4% Lidocaine Liquid Topical     Debridement:Excisional Debridement    Using curette the wound(s) was/were sharply debrided down through and including the removal of subcutaneous tissue.         Devitalized Tissue Debrided:  slough    Pre Debridement Measurements:  Are located in the Wound Documentation Flow Sheet    All active wounds listed below with today's date are evaluated  Wound(s)    debrided this date include # : 1     Post  Debridement Measurements:  Wound 09/30/20 Wound #1 Right Medial Lower Leg Cluster (Onset 5 Years) (Active) Wound Image   09/29/21 1303   Wound Etiology Venous 09/29/21 1303   Dressing Status New dressing applied 09/22/21 1355   Wound Cleansed Soap and water 09/29/21 1303   Dressing/Treatment Alginate with Ag 01/13/21 1657   Offloading for Diabetic Foot Ulcers No offloading required 09/29/21 1303   Wound Length (cm) 6.3 cm 09/29/21 1303   Wound Width (cm) 4 cm 09/29/21 1303   Wound Depth (cm) 0.1 cm 09/29/21 1303   Wound Surface Area (cm^2) 25.2 cm^2 09/29/21 1303   Change in Wound Size % (l*w) 90 09/29/21 1303   Wound Volume (cm^3) 2.52 cm^3 09/29/21 1303   Wound Healing % 90 09/29/21 1303   Post-Procedure Length (cm) 6.3 cm 09/29/21 1326   Post-Procedure Width (cm) 4 cm 09/29/21 1326   Post-Procedure Depth (cm) 0.1 cm 09/29/21 1326   Post-Procedure Surface Area (cm^2) 25.2 cm^2 09/29/21 1326   Post-Procedure Volume (cm^3) 2.52 cm^3 09/29/21 1326   Distance Tunneling (cm) 0 cm 09/29/21 1303   Tunneling Position ___ O'Clock 0 09/29/21 1303   Undermining Starts ___ O'Clock 0 09/29/21 1303   Undermining Ends___ O'Clock 0 09/29/21 1303   Undermining Maxium Distance (cm) 0 09/29/21 1303   Wound Assessment Granulation tissue 09/29/21 1303   Drainage Amount Moderate 09/29/21 1303   Drainage Description Serosanguinous 09/29/21 1303   Odor None 09/29/21 1303   Cassia-wound Assessment Intact 09/29/21 1303   Margins Defined edges 09/29/21 1303   Wound Thickness Description not for Pressure Injury Full thickness 09/22/21 1305   Number of days: 364       Percent of Wound(s) Debrided: approximately 100%    Total Surface Area Debrided:  25 sq cm     Bleeding:  Minimal    Hemostasis Achieved:  by pressure    Procedural Pain:  0  / 10     Post Procedural Pain:  0 / 10     Response to treatment:  Well tolerated by patient. Status of wound progress and description from last visit:   Improved.       Plan:       Discharge Instructions         Discharge Instructions        PHYSICIAN ORDERS AND DISCHARGE INSTRUCTIONS     NOTE: Upon discharge from the [address] [facility],[address], you will receive a patient experience survey. We would be grateful if you would take the time to fill this survey out.     Wound care order history:                 Vascular studies: Arterial studies done 9/27/20; no stenosis              Imaging:   Date               Cultures:   obtained on 11/18/2020                                 Obtained on 1/20/2021               Labs/ HbA1c:   Date               Grafts:                       #1 Apligraf on 12/16/2020                     #2 Apligraf on 12/23/2020                     #3 Apligraf on 12/30/2020                     #4 Apligraf on 1/6/2021                     # 5 Apligraf on 1/13/21                     #1 [place] 1/27/2021                     #2 [place] 2/3/2021                     #3 [place] 2/10/2021                     #4 [place] Right Medial Lower Leg 2/17/21                     #5 [place] 3/10/2021                   HBO:                Antibiotics:  Bactrim DS BID for 10 days on 11/18/2020                                   Cipro BID for 10 days ordered on 11/25/2020                                    Cipro 500 mg BID on 1/20/2021                                    Cipro 500 mg BID on 4/28/2021              Earlier Wound care treatments:                Authorizations:                        Consults:   Date                           Primary care physician:      Continuing wound care orders and information:              Residence:  private               Continue home health care with: [facility]               Your wound-care supplies will be provided by: Melanie Mariee provider:              GIO with  Elvis Cheney loading:  Date               ZEQBE Medications:              CWVDU cleansing:                           EW not scrub or use excessive force.                          Wash hands with soap and water before and after dressing changes.                           Prior to applying a clean dressing, cleanse wound with normal saline,                                wound cleanser, or mild soap and water.                           Ask the physician or nurse before getting the wound(s) wet in a shower              Daily Wound management:                          UXEL weight off wounds and reposition every 2 hours.                          HRVZO standing for long periods of time.                          ERYXW wraps/stockings in AM and remove at bedtime.                          If swelling is present, elevate legs to the level of the heart or above for 30 minutes 4-5 times a day and/or when sitting.                                             When taking antibiotics take entire prescription as ordered by physician do not stop taking until medicine is all gone.                                           Orders for this week: 9/15/21     Fax orders to [facility] [place]!     Right dorsal foot - healed      Right lower leg -- wash with soap and water, pat dry. Foam to anterior blister area may stay in place for 1 week. Then:  Apply A&Dto sarah wound. Cover with anasept gel and stimulin powder covered with  4x4, ABD and wrap with coban 2.       Homecare to change on Friday and Monday (may use Calcium alginate instead of anasept, profore to wrap)           Auth for ERIC vac 9/1/2021     Referral to Dr Ivelisse Sanchez on 2/24/2021, appt 3/18/2021 then 4/9/2021, first procedure scheduled on 4/27/2021                    Follow up with Dr Abel Skelton in 1 week in the wound care center  Call [phone] for any questions or concerns.   Date__________   Time__________                   Treatment Note      Written Patient Dismissal Instructions Given            Electronically signed by Rebecca Gan MD on 9/29/2021 at 1:36 PM

## 2021-10-01 ENCOUNTER — TELEPHONE (OUTPATIENT)
Dept: WOUND CARE | Age: 76
End: 2021-10-01

## 2021-10-01 NOTE — TELEPHONE ENCOUNTER
[place] [facility] [place] that they do not have stimulen powder available. Will have to apply dressing without it this week, no collagen substitutes available.     Electronically signed by Ashish Spencer RN on 10/1/2021 at 9:56 AM

## 2021-10-06 ENCOUNTER — HOSPITAL ENCOUNTER (OUTPATIENT)
Dept: WOUND CARE | Age: 76
Discharge: HOME | End: 2021-10-06
Payer: COMMERCIAL

## 2021-10-06 VITALS
TEMPERATURE: 97.6 F | DIASTOLIC BLOOD PRESSURE: 94 MMHG | RESPIRATION RATE: 18 BRPM | SYSTOLIC BLOOD PRESSURE: 167 MMHG | HEART RATE: 74 BPM

## 2021-10-06 DIAGNOSIS — L97.919 CHRONIC VENOUS HYPERTENSION WITH ULCER AND INFLAMMATION INVOLVING RIGHT SIDE (HCC): Primary | ICD-10-CM

## 2021-10-06 DIAGNOSIS — L97.929 VENOUS ULCER OF LEFT LEG (HCC): ICD-10-CM

## 2021-10-06 DIAGNOSIS — I87.331 CHRONIC VENOUS HYPERTENSION WITH ULCER AND INFLAMMATION INVOLVING RIGHT SIDE (HCC): Primary | ICD-10-CM

## 2021-10-06 DIAGNOSIS — I83.029 VENOUS ULCER OF LEFT LEG (HCC): ICD-10-CM

## 2021-10-06 PROCEDURE — 11042 DBRDMT SUBQ TIS 1ST 20SQCM/<: CPT | Performed by: SURGERY

## 2021-10-06 PROCEDURE — 11042 DBRDMT SUBQ TIS 1ST 20SQCM/<: CPT

## 2021-10-06 RX ORDER — LIDOCAINE HYDROCHLORIDE 20 MG/ML
JELLY TOPICAL ONCE
Status: CANCELLED | OUTPATIENT
Start: 2021-10-06 | End: 2021-10-06

## 2021-10-06 RX ORDER — BETAMETHASONE DIPROPIONATE 0.05 %
OINTMENT (GRAM) TOPICAL ONCE
Status: CANCELLED | OUTPATIENT
Start: 2021-10-06 | End: 2021-10-06

## 2021-10-06 RX ORDER — BACITRACIN, NEOMYCIN, POLYMYXIN B 400; 3.5; 5 [USP'U]/G; MG/G; [USP'U]/G
OINTMENT TOPICAL ONCE
Status: CANCELLED | OUTPATIENT
Start: 2021-10-06 | End: 2021-10-06

## 2021-10-06 RX ORDER — BACITRACIN ZINC AND POLYMYXIN B SULFATE 500; 1000 [USP'U]/G; [USP'U]/G
OINTMENT TOPICAL ONCE
Status: CANCELLED | OUTPATIENT
Start: 2021-10-06 | End: 2021-10-06

## 2021-10-06 RX ORDER — CLOBETASOL PROPIONATE 0.5 MG/G
OINTMENT TOPICAL ONCE
Status: CANCELLED | OUTPATIENT
Start: 2021-10-06 | End: 2021-10-06

## 2021-10-06 RX ORDER — GINSENG 100 MG
CAPSULE ORAL ONCE
Status: CANCELLED | OUTPATIENT
Start: 2021-10-06 | End: 2021-10-06

## 2021-10-06 RX ORDER — GENTAMICIN SULFATE 1 MG/G
OINTMENT TOPICAL ONCE
Status: CANCELLED | OUTPATIENT
Start: 2021-10-06 | End: 2021-10-06

## 2021-10-06 RX ORDER — LIDOCAINE 50 MG/G
OINTMENT TOPICAL ONCE
Status: CANCELLED | OUTPATIENT
Start: 2021-10-06 | End: 2021-10-06

## 2021-10-06 RX ORDER — LIDOCAINE HYDROCHLORIDE 40 MG/ML
SOLUTION TOPICAL ONCE
Status: DISCONTINUED | OUTPATIENT
Start: 2021-10-06 | End: 2021-10-07 | Stop reason: HOSPADM

## 2021-10-06 RX ORDER — LIDOCAINE 40 MG/G
CREAM TOPICAL ONCE
Status: CANCELLED | OUTPATIENT
Start: 2021-10-06 | End: 2021-10-06

## 2021-10-06 RX ORDER — LIDOCAINE HYDROCHLORIDE 40 MG/ML
SOLUTION TOPICAL ONCE
Status: CANCELLED | OUTPATIENT
Start: 2021-10-06 | End: 2021-10-06

## 2021-10-06 ASSESSMENT — PAIN SCALES - GENERAL: PAINLEVEL_OUTOF10: 0

## 2021-10-06 NOTE — PROGRESS NOTES
Wound Care Center Progress Note with Procedure Note      Xiomy Samson  AGE: 77 y.o. GENDER: male  : 1945  EPISODE DATE:  10/6/2021     Subjective:     Chief Complaint   Patient presents with    Wound Check     right leg          HISTORY of PRESENT ILLNESS      Xiomy Samson is a 77 y.o. male who presents today for wound evaluation of Chronic venous wound(s) of R lower leg medial.  The wound is of moderate severity. The underlying cause of the wound is venous disease and trauma. Wound Pain Timing/Severity: none  Quality of pain: N/A  Severity of pain:  0 / 10   Modifying Factors: venous stasis  Associated Signs/Symptoms: none        PAST MEDICAL HISTORY        Diagnosis Date    Chronic venous hypertension with ulcer and inflammation involving right side (HCC)     RLE    Hypertension     past    Iron deficiency     Lymphoma Dx 11-    Hx of B cell lymphoma-in remission since        PAST SURGICAL HISTORY    Past Surgical History:   Procedure Laterality Date    TUNNELED VENOUS PORT PLACEMENT      TUNNELED VENOUS PORT PLACEMENT      removed 2012       FAMILY HISTORY    Family History   Problem Relation Age of Onset    Early Death Mother 52    Diabetes Father     Heart Disease Father     Vision Loss Son         \"He wears glasses\"    Early Death Daughter 36       SOCIAL HISTORY    Social History     Tobacco Use    Smoking status: Never Smoker    Smokeless tobacco: Never Used   Substance Use Topics    Alcohol use: No    Drug use: No       ALLERGIES    No Known Allergies    MEDICATIONS    Current Outpatient Medications on File Prior to Encounter   Medication Sig Dispense Refill    metoprolol succinate (TOPROL XL) 50 MG extended release tablet Take 1 tablet by mouth daily 30 tablet 5     No current facility-administered medications on file prior to encounter. REVIEW OF SYSTEMS    Pertinent items are noted in HPI.   Constitutional: Negative for systemic symptoms including fever, chills and malaise. Objective:      BP (!) 167/94   Pulse 74   Temp 97.6 °F (36.4 °C) (Temporal)   Resp 18     PHYSICAL EXAM      General: The patient is in no acute distress. Mental status:  Patient is appropriate, is  oriented to place and plan of care. Dermatologic exam: Visual inspection of the periwound reveals the skin to be normal in turgor and texture  Wound exam: see wound description below in procedure note      Assessment:     Problem List Items Addressed This Visit     Chronic venous hypertension with ulcer and inflammation (HCC) - Primary    Relevant Medications    lidocaine (XYLOCAINE) 4 % external solution (Start on 10/6/2021  1:30 PM)    Other Relevant Orders    Supply: Wound Cleanser    Supply: Wound Dressings    Supply: Edema Control    Venous ulcer of left leg (HCC)    Relevant Medications    lidocaine (XYLOCAINE) 4 % external solution (Start on 10/6/2021  1:30 PM)    Other Relevant Orders    Supply: Wound Cleanser    Supply: Wound Dressings    Supply: Edema Control        Procedure Note    Indications:  Based on my examination of this patient's wound(s) today, sharp excision into necrotic subcutaneous tissue is required to promote healing and evaluate the extent of previous healing. Performed by: Raisa Root MD    Consent obtained: Yes    Time out taken:  Yes    Pain Control: topical Anesthetic  Anesthetic: 4% Lidocaine Liquid Topical     Debridement:Excisional Debridement    Using curette the wound(s) was/were sharply debrided down through and including the removal of subcutaneous tissue.         Devitalized Tissue Debrided:  slough    Pre Debridement Measurements:  Are located in the Wound Documentation Flow Sheet    All active wounds listed below with today's date are evaluated  Wound(s)    debrided this date include # : 1     Post  Debridement Measurements:  Wound 09/30/20 Wound #1 Right Medial Lower Leg Cluster (Onset 5 Years) (Active)   Wound Image   09/29/21 1303   Wound Etiology Venous 10/06/21 1303   Dressing Status New dressing applied 09/29/21 1350   Wound Cleansed Soap and water 10/06/21 1303   Dressing/Treatment Alginate with Ag 01/13/21 1657   Offloading for Diabetic Foot Ulcers No offloading required 10/06/21 1303   Wound Length (cm) 5.5 cm 10/06/21 1303   Wound Width (cm) 3.5 cm 10/06/21 1303   Wound Depth (cm) 0.1 cm 10/06/21 1303   Wound Surface Area (cm^2) 19.25 cm^2 10/06/21 1303   Change in Wound Size % (l*w) 92.36 10/06/21 1303   Wound Volume (cm^3) 1.925 cm^3 10/06/21 1303   Wound Healing % 92 10/06/21 1303   Post-Procedure Length (cm) 5.5 cm 10/06/21 1311   Post-Procedure Width (cm) 3.5 cm 10/06/21 1311   Post-Procedure Depth (cm) 0.1 cm 10/06/21 1311   Post-Procedure Surface Area (cm^2) 19.25 cm^2 10/06/21 1311   Post-Procedure Volume (cm^3) 1.925 cm^3 10/06/21 1311   Distance Tunneling (cm) 0 cm 10/06/21 1303   Tunneling Position ___ O'Clock 0 10/06/21 1303   Undermining Starts ___ O'Clock 0 10/06/21 1303   Undermining Ends___ O'Clock 0 10/06/21 1303   Undermining Maxium Distance (cm) 0 10/06/21 1303   Wound Assessment Granulation tissue 10/06/21 1303   Drainage Amount Moderate 10/06/21 1303   Drainage Description Serosanguinous 10/06/21 1303   Odor None 10/06/21 1303   Cassia-wound Assessment Intact 10/06/21 1303   Margins Defined edges 10/06/21 1303   Wound Thickness Description not for Pressure Injury Full thickness 10/06/21 1303   Number of days: 371       Percent of Wound(s) Debrided: approximately 100%    Total Surface Area Debrided:  20 sq cm     Bleeding:  Minimal    Hemostasis Achieved:  by pressure    Procedural Pain:  0  / 10     Post Procedural Pain:  0 / 10     Response to treatment:  Well tolerated by patient. Status of wound progress and description from last visit:   Improved.       Plan:       Discharge Instructions         Discharge Instructions        PHYSICIAN ORDERS AND DISCHARGE INSTRUCTIONS     NOTE: Upon discharge from the [address], you will receive a patient experience survey. We would be grateful if you would take the time to fill this survey out.     Wound care order history:                 Vascular studies: Arterial studies done 9/27/20; no stenosis              Imaging:   Date               Cultures:   obtained on 11/18/2020                                 Obtained on 1/20/2021               Labs/ HbA1c:   Date               Grafts:                       #1 Apligraf on 12/16/2020                     #2 Apligraf on 12/23/2020                     #3 Apligraf on 12/30/2020                     #4 Apligraf on 1/6/2021                     # 5 Apligraf on 1/13/21                     #1 [place] 1/27/2021                     #2 [place] 2/3/2021                     #3 [place] 2/10/2021                     #4 [place] Right Medial Lower Leg 2/17/21                     #5 [place] 3/10/2021                   HBO:                Antibiotics:  Bactrim DS BID for 10 days on 11/18/2020                                   Cipro BID for 10 days ordered on 11/25/2020                                    Cipro 500 mg BID on 1/20/2021                                    Cipro 500 mg BID on 4/28/2021              Earlier Wound care treatments:                Authorizations:                        Consults:   Date                           Primary care physician:      Continuing wound care orders and information:              Residence:  private               Continue home health care with: [facility]               Your wound-care supplies will be provided by: Bethany Worthington provider:              NIKA Alvarado  loading:  Date               KWIF Medications:              DBQZF cleansing:                           XX not scrub or use excessive force.                          Wash hands with soap and water before and after dressing changes.                           Prior to applying a clean dressing, cleanse wound with normal saline,                                wound cleanser, or mild soap and water.                           Ask the physician or nurse before getting the wound(s) wet in a shower              Daily Wound management:                          Keep weight off wounds and reposition every 2 hours.                          OHFRZ standing for long periods of time.                          VMDAP wraps/stockings in AM and remove at bedtime.                          If swelling is present, elevate legs to the level of the heart or above for 30 minutes 4-5 times a day and/or when sitting.                                             When taking antibiotics take entire prescription as ordered by physician do not stop taking until medicine is all gone.                                           Orders for this week:10/6/21     Fax orders to [address]!     Right dorsal foot - healed      Right lower leg -- wash with soap and water, pat dry. Foam to anterior blister area may stay in place for 1 week. Then:  Apply A&Dto sarah wound. Cover with anasept gel and stimulin powder covered with  4x4, ABD and wrap with coban 2.       Homecare to change on Friday and Monday (may use Calcium alginate instead of anasept, profore to wrap)           Auth for ERIC vac 9/1/2021     Referral to Dr Claudia Morgan on 2/24/2021, appt 3/18/2021 then 4/9/2021, first procedure scheduled on 4/27/2021                    Follow up with Dr Rosy Rutherford in 1 week in the wound care center  Call [phone] for any questions or concerns.   Date__________   Time__________                Treatment Note      Written Patient Dismissal Instructions Given            Electronically signed by Jumana Bowden MD on 10/6/2021 at 1:15 PM

## 2021-10-06 NOTE — PROGRESS NOTES
Multilayer Compression Wrap   (Not Unna) Below the Knee    NAME:  Chinyere Steel  YOB: 1945  MEDICAL RECORD NUMBER:  [id number]  DATE:  10/6/2021    Multilayer compression wrap: Removed old Multilayer wrap if indicated and wash leg with mild soap/water. Applied moisturizing agent to dry skin as needed. Applied primary and secondary dressing as ordered. Applied multilayered dressing below the knee to right lower leg. Instructed patient/caregiver not to remove dressing and to keep it clean and dry. Instructed patient/caregiver on complications to report to provider, such as pain, numbness in toes, heavy drainage, and slippage of dressing. Instructed patient on purpose of compression dressing and on activity and exercise recommendations.       Electronically signed by Terence Schmidt RN on 10/6/2021 at 1:40 PM

## 2021-10-11 ENCOUNTER — TELEPHONE (OUTPATIENT)
Dept: INTERNAL MEDICINE CLINIC | Age: 76
End: 2021-10-11

## 2021-10-11 NOTE — TELEPHONE ENCOUNTER
Needs verbal order for wound care.  Alexandra Morejon from [facility] at [phone]

## 2021-10-13 ENCOUNTER — HOSPITAL ENCOUNTER (OUTPATIENT)
Dept: WOUND CARE | Age: 76
Discharge: HOME | End: 2021-10-13
Payer: COMMERCIAL

## 2021-10-13 VITALS
DIASTOLIC BLOOD PRESSURE: 86 MMHG | RESPIRATION RATE: 18 BRPM | SYSTOLIC BLOOD PRESSURE: 159 MMHG | HEART RATE: 69 BPM | TEMPERATURE: 98.7 F

## 2021-10-13 DIAGNOSIS — L97.919 CHRONIC VENOUS HYPERTENSION WITH ULCER AND INFLAMMATION INVOLVING RIGHT SIDE (HCC): Primary | ICD-10-CM

## 2021-10-13 DIAGNOSIS — I87.331 CHRONIC VENOUS HYPERTENSION WITH ULCER AND INFLAMMATION INVOLVING RIGHT SIDE (HCC): Primary | ICD-10-CM

## 2021-10-13 DIAGNOSIS — L97.929 VENOUS ULCER OF LEFT LEG (HCC): ICD-10-CM

## 2021-10-13 DIAGNOSIS — I83.029 VENOUS ULCER OF LEFT LEG (HCC): ICD-10-CM

## 2021-10-13 PROCEDURE — 11042 DBRDMT SUBQ TIS 1ST 20SQCM/<: CPT

## 2021-10-13 PROCEDURE — 11042 DBRDMT SUBQ TIS 1ST 20SQCM/<: CPT | Performed by: SURGERY

## 2021-10-13 RX ORDER — LIDOCAINE HYDROCHLORIDE 40 MG/ML
SOLUTION TOPICAL ONCE
Status: DISCONTINUED | OUTPATIENT
Start: 2021-10-13 | End: 2021-10-14 | Stop reason: HOSPADM

## 2021-10-13 RX ORDER — GINSENG 100 MG
CAPSULE ORAL ONCE
Status: CANCELLED | OUTPATIENT
Start: 2021-10-13 | End: 2021-10-13

## 2021-10-13 RX ORDER — BACITRACIN ZINC AND POLYMYXIN B SULFATE 500; 1000 [USP'U]/G; [USP'U]/G
OINTMENT TOPICAL ONCE
Status: CANCELLED | OUTPATIENT
Start: 2021-10-13 | End: 2021-10-13

## 2021-10-13 RX ORDER — BETAMETHASONE DIPROPIONATE 0.05 %
OINTMENT (GRAM) TOPICAL ONCE
Status: CANCELLED | OUTPATIENT
Start: 2021-10-13 | End: 2021-10-13

## 2021-10-13 RX ORDER — CLOBETASOL PROPIONATE 0.5 MG/G
OINTMENT TOPICAL ONCE
Status: CANCELLED | OUTPATIENT
Start: 2021-10-13 | End: 2021-10-13

## 2021-10-13 RX ORDER — LIDOCAINE 50 MG/G
OINTMENT TOPICAL ONCE
Status: CANCELLED | OUTPATIENT
Start: 2021-10-13 | End: 2021-10-13

## 2021-10-13 RX ORDER — LIDOCAINE HYDROCHLORIDE 20 MG/ML
JELLY TOPICAL ONCE
Status: CANCELLED | OUTPATIENT
Start: 2021-10-13 | End: 2021-10-13

## 2021-10-13 RX ORDER — LIDOCAINE HYDROCHLORIDE 40 MG/ML
SOLUTION TOPICAL ONCE
Status: CANCELLED | OUTPATIENT
Start: 2021-10-13 | End: 2021-10-13

## 2021-10-13 RX ORDER — GENTAMICIN SULFATE 1 MG/G
OINTMENT TOPICAL ONCE
Status: CANCELLED | OUTPATIENT
Start: 2021-10-13 | End: 2021-10-13

## 2021-10-13 RX ORDER — LIDOCAINE 40 MG/G
CREAM TOPICAL ONCE
Status: CANCELLED | OUTPATIENT
Start: 2021-10-13 | End: 2021-10-13

## 2021-10-13 RX ORDER — BACITRACIN, NEOMYCIN, POLYMYXIN B 400; 3.5; 5 [USP'U]/G; MG/G; [USP'U]/G
OINTMENT TOPICAL ONCE
Status: CANCELLED | OUTPATIENT
Start: 2021-10-13 | End: 2021-10-13

## 2021-10-13 ASSESSMENT — PAIN SCALES - GENERAL: PAINLEVEL_OUTOF10: 0

## 2021-10-13 NOTE — PROGRESS NOTES
Multilayer Compression Wrap   (Not Unna) Below the Knee    NAME:  Aditi Lange  YOB: 1945  MEDICAL RECORD NUMBER:  [id number]  DATE:  10/13/2021    Multilayer compression wrap: Removed old Multilayer wrap if indicated and wash leg with mild soap/water. Applied moisturizing agent to dry skin as needed. Applied primary and secondary dressing as ordered. Applied multilayered dressing below the knee to right lower leg. Instructed patient/caregiver not to remove dressing and to keep it clean and dry. Instructed patient/caregiver on complications to report to provider, such as pain, numbness in toes, heavy drainage, and slippage of dressing. Instructed patient on purpose of compression dressing and on activity and exercise recommendations.       Electronically signed by Byron Pittman LPN on 90/84/5533 at 1:31 PM

## 2021-10-13 NOTE — PROGRESS NOTES
Wound Care Center Progress Note with Procedure Note      Katie Bermudez  AGE: 77 y.o. GENDER: male  : 1945  EPISODE DATE:  10/13/2021     Subjective:     Chief Complaint   Patient presents with    Wound Check         HISTORY of PRESENT ILLNESS      Katie Bermudez is a 77 y.o. male who presents today for wound evaluation of Chronic venous wound(s) of R lower leg medial.  The wound is of moderate severity. The underlying cause of the wound is venous disease and trauma. Wound Pain Timing/Severity: none  Quality of pain: N/A  Severity of pain:  0 / 10   Modifying Factors: venous stasis  Associated Signs/Symptoms: none        PAST MEDICAL HISTORY        Diagnosis Date    Chronic venous hypertension with ulcer and inflammation involving right side (HCC)     RLE    Hypertension     past    Iron deficiency     Lymphoma Dx 11-    Hx of B cell lymphoma-in remission since        PAST SURGICAL HISTORY    Past Surgical History:   Procedure Laterality Date    TUNNELED VENOUS PORT PLACEMENT      TUNNELED VENOUS PORT PLACEMENT      removed 2012       FAMILY HISTORY    Family History   Problem Relation Age of Onset    Early Death Mother 52    Diabetes Father     Heart Disease Father     Vision Loss Son         \"He wears glasses\"    Early Death Daughter 36       SOCIAL HISTORY    Social History     Tobacco Use    Smoking status: Never Smoker    Smokeless tobacco: Never Used   Substance Use Topics    Alcohol use: No    Drug use: No       ALLERGIES    No Known Allergies    MEDICATIONS    Current Outpatient Medications on File Prior to Encounter   Medication Sig Dispense Refill    metoprolol succinate (TOPROL XL) 50 MG extended release tablet Take 1 tablet by mouth daily 30 tablet 5     No current facility-administered medications on file prior to encounter.        REVIEW OF SYSTEMS      Constitutional: Negative for systemic symptoms including fever, chills and malaise. Objective:      BP (!) 159/86   Pulse 69   Temp 98.7 °F (37.1 °C) (Temporal)   Resp 18     PHYSICAL EXAM      General: The patient is in no acute distress. Mental status:  Patient is appropriate, is  oriented to place and plan of care. Dermatologic exam: Visual inspection of the periwound reveals the skin to be normal in turgor and texture  Wound exam: see wound description below in procedure note      Assessment:     Problem List Items Addressed This Visit     Chronic venous hypertension with ulcer and inflammation (HCC) - Primary    Relevant Medications    lidocaine (XYLOCAINE) 4 % external solution    Other Relevant Orders    Supply: Wound Cleanser    Supply: Wound Dressings    Supply: Cover and Secure    Supply: Edema Control    Venous ulcer of left leg (HCC)    Relevant Medications    lidocaine (XYLOCAINE) 4 % external solution    Other Relevant Orders    Supply: Wound Cleanser    Supply: Wound Dressings    Supply: Cover and Secure    Supply: Edema Control        Procedure Note    Indications:  Based on my examination of this patient's wound(s) today, sharp excision into necrotic subcutaneous tissue is required to promote healing and evaluate the extent of previous healing. Performed by: Diana Hilton MD    Consent obtained: Yes    Time out taken:  Yes    Pain Control: topical Anesthetic  Anesthetic: 4% Lidocaine Liquid Topical     Debridement:Excisional Debridement    Using curette the wound(s) was/were sharply debrided down through and including the removal of subcutaneous tissue.         Devitalized Tissue Debrided:  slough    Pre Debridement Measurements:  Are located in the Wound Documentation Flow Sheet    All active wounds listed below with today's date are evaluated  Wound(s)    debrided this date include # : 1     Post  Debridement Measurements:  Wound 09/30/20 Wound #1 Right Medial Lower Leg Cluster (Onset 5 Years) (Active)   Wound Image   09/29/21 1304 Wound Etiology Venous 10/13/21 1254   Dressing Status New dressing applied 10/13/21 1329   Wound Cleansed Soap and water 10/13/21 1254   Dressing/Treatment Alginate with Ag 01/13/21 1657   Offloading for Diabetic Foot Ulcers No offloading required 10/13/21 1329   Wound Length (cm) 5.5 cm 10/13/21 1254   Wound Width (cm) 3.2 cm 10/13/21 1254   Wound Depth (cm) 0.1 cm 10/13/21 1254   Wound Surface Area (cm^2) 17.6 cm^2 10/13/21 1254   Change in Wound Size % (l*w) 93.02 10/13/21 1254   Wound Volume (cm^3) 1.76 cm^3 10/13/21 1254   Wound Healing % 93 10/13/21 1254   Post-Procedure Length (cm) 5.5 cm 10/13/21 1322   Post-Procedure Width (cm) 3.2 cm 10/13/21 1322   Post-Procedure Depth (cm) 0.1 cm 10/13/21 1322   Post-Procedure Surface Area (cm^2) 17.6 cm^2 10/13/21 1322   Post-Procedure Volume (cm^3) 1.76 cm^3 10/13/21 1322   Distance Tunneling (cm) 0 cm 10/13/21 1254   Tunneling Position ___ O'Clock 0 10/13/21 1254   Undermining Starts ___ O'Clock 0 10/13/21 1254   Undermining Ends___ O'Clock 0 10/13/21 1254   Undermining Maxium Distance (cm) 0 10/13/21 1254   Wound Assessment Granulation tissue 10/13/21 1254   Drainage Amount Moderate 10/13/21 1254   Drainage Description Serosanguinous 10/13/21 1254   Odor None 10/13/21 1254   Cassia-wound Assessment Intact 10/13/21 1254   Margins Defined edges 10/13/21 1254   Wound Thickness Description not for Pressure Injury Full thickness 10/13/21 1254   Number of days: 378       Percent of Wound(s) Debrided: approximately 100%    Total Surface Area Debrided:  17.6 sq cm     Bleeding:  Minimal    Hemostasis Achieved:  by pressure    Procedural Pain:  0  / 10     Post Procedural Pain:  0 / 10     Response to treatment:  Well tolerated by patient. Status of wound progress and description from last visit:   Improved.       Plan:       Discharge Instructions         Discharge Instructions        PHYSICIAN ORDERS AND DISCHARGE INSTRUCTIONS     NOTE: Upon discharge from the Wound Center, you will receive a patient experience survey. We would be grateful if you would take the time to fill this survey out.     Wound care order history:                 Vascular studies: Arterial studies done 9/27/20; no stenosis              Imaging:   Date               Cultures:   obtained on 11/18/2020                                 Obtained on 1/20/2021               Labs/ HbA1c:   Date               Grafts:                       #1 Apligraf on 12/16/2020                     #2 Apligraf on 12/23/2020                     #3 Apligraf on 12/30/2020                     #4 Apligraf on 1/6/2021                     # 5 Apligraf on 1/13/21                     #1 [place] 1/27/2021                     #2 [place] 2/3/2021                     #3 [place] 2/10/2021                     #4 [place] Right Medial Lower Leg 2/17/21                     #5 [place] 3/10/2021                   HBO:                Antibiotics:  Bactrim DS BID for 10 days on 11/18/2020                                   Cipro BID for 10 days ordered on 11/25/2020                                    Cipro 500 mg BID on 1/20/2021                                    Cipro 500 mg BID on 4/28/2021              Earlier Wound care treatments:                Authorizations:                        Consults:   Date                           Primary care physician:      Continuing wound care orders and information:              Residence:  private               Continue home health care with: [facility]               Your wound-care supplies will be provided by: Paulina Uribe provider:              FACUNDO with  Lenora Acosta loading:  Date               WCGNK Medications:              ZTBAE cleansing:                           ZU not scrub or use excessive force.                          Wash hands with soap and water before and after dressing changes.                           Prior to applying a clean dressing, cleanse wound with normal saline,                                wound cleanser, or mild soap and water.                           Ask the physician or nurse before getting the wound(s) wet in a shower              Daily Wound management:                          BWWM weight off wounds and reposition every 2 hours.                          RLHQN standing for long periods of time.                          TTLQP wraps/stockings in AM and remove at bedtime.                          If swelling is present, elevate legs to the level of the heart or above for 30 minutes 4-5 times a day and/or when sitting.                                             When taking antibiotics take entire prescription as ordered by physician do not stop taking until medicine is all gone.                                           Orders for this week:10/13/21     Fax orders to [facility]!     Right dorsal foot - healed      Right lower leg -- wash with soap and water, pat dry. Foam to anterior blister area may stay in place for 1 week. Then:  Apply A&Dto sarah wound. Cover with anasept gel and stimulin powder covered with  ABD and wrap with coban 2.       Homecare to change on Friday and Monday (may use Calcium alginate instead of anasept, profore to wrap)           Auth for ERIC vac 9/1/2021     Referral to Dr John Pineda on 2/24/2021, appt 3/18/2021 then 4/9/2021, first procedure scheduled on 4/27/2021                    Follow up with Dr Maribel Samuel in 1 week in the wound care center  Call [phone] for any questions or concerns.   Date__________   Time__________          Treatment Note Wound 09/30/20 Wound #1 Right Medial Lower Leg Cluster (Onset 5 Years)-Dressing/Treatment:  (a&d anasept stimulin 4x4 abd coban2)    Written Patient Dismissal Instructions Given            Electronically signed by Mackenzie Wilson MD on 10/13/2021 at 1:43 PM

## 2021-10-20 ENCOUNTER — HOSPITAL ENCOUNTER (OUTPATIENT)
Dept: WOUND CARE | Age: 76
Discharge: HOME | End: 2021-10-20
Payer: COMMERCIAL

## 2021-10-20 VITALS
SYSTOLIC BLOOD PRESSURE: 159 MMHG | TEMPERATURE: 99 F | HEART RATE: 75 BPM | DIASTOLIC BLOOD PRESSURE: 96 MMHG | RESPIRATION RATE: 18 BRPM

## 2021-10-20 DIAGNOSIS — I83.029 VENOUS ULCER OF LEFT LEG (HCC): Primary | ICD-10-CM

## 2021-10-20 DIAGNOSIS — L97.929 VENOUS ULCER OF LEFT LEG (HCC): Primary | ICD-10-CM

## 2021-10-20 DIAGNOSIS — L97.919 CHRONIC VENOUS HYPERTENSION WITH ULCER AND INFLAMMATION INVOLVING RIGHT SIDE (HCC): ICD-10-CM

## 2021-10-20 DIAGNOSIS — I87.331 CHRONIC VENOUS HYPERTENSION WITH ULCER AND INFLAMMATION INVOLVING RIGHT SIDE (HCC): ICD-10-CM

## 2021-10-20 PROCEDURE — 11042 DBRDMT SUBQ TIS 1ST 20SQCM/<: CPT

## 2021-10-20 PROCEDURE — 11045 DBRDMT SUBQ TISS EACH ADDL: CPT

## 2021-10-20 PROCEDURE — 11042 DBRDMT SUBQ TIS 1ST 20SQCM/<: CPT | Performed by: SURGERY

## 2021-10-20 PROCEDURE — 11045 DBRDMT SUBQ TISS EACH ADDL: CPT | Performed by: SURGERY

## 2021-10-20 RX ORDER — CLOBETASOL PROPIONATE 0.5 MG/G
OINTMENT TOPICAL ONCE
Status: CANCELLED | OUTPATIENT
Start: 2021-10-20 | End: 2021-10-20

## 2021-10-20 RX ORDER — BETAMETHASONE DIPROPIONATE 0.05 %
OINTMENT (GRAM) TOPICAL ONCE
Status: CANCELLED | OUTPATIENT
Start: 2021-10-20 | End: 2021-10-20

## 2021-10-20 RX ORDER — LIDOCAINE HYDROCHLORIDE 20 MG/ML
JELLY TOPICAL ONCE
Status: CANCELLED | OUTPATIENT
Start: 2021-10-20 | End: 2021-10-20

## 2021-10-20 RX ORDER — BACITRACIN ZINC AND POLYMYXIN B SULFATE 500; 1000 [USP'U]/G; [USP'U]/G
OINTMENT TOPICAL ONCE
Status: CANCELLED | OUTPATIENT
Start: 2021-10-20 | End: 2021-10-20

## 2021-10-20 RX ORDER — GINSENG 100 MG
CAPSULE ORAL ONCE
Status: CANCELLED | OUTPATIENT
Start: 2021-10-20 | End: 2021-10-20

## 2021-10-20 RX ORDER — LIDOCAINE 50 MG/G
OINTMENT TOPICAL ONCE
Status: CANCELLED | OUTPATIENT
Start: 2021-10-20 | End: 2021-10-20

## 2021-10-20 RX ORDER — BACITRACIN, NEOMYCIN, POLYMYXIN B 400; 3.5; 5 [USP'U]/G; MG/G; [USP'U]/G
OINTMENT TOPICAL ONCE
Status: CANCELLED | OUTPATIENT
Start: 2021-10-20 | End: 2021-10-20

## 2021-10-20 RX ORDER — LIDOCAINE 40 MG/G
CREAM TOPICAL ONCE
Status: CANCELLED | OUTPATIENT
Start: 2021-10-20 | End: 2021-10-20

## 2021-10-20 RX ORDER — LIDOCAINE HYDROCHLORIDE 40 MG/ML
SOLUTION TOPICAL ONCE
Status: CANCELLED | OUTPATIENT
Start: 2021-10-20 | End: 2021-10-20

## 2021-10-20 RX ORDER — GENTAMICIN SULFATE 1 MG/G
OINTMENT TOPICAL ONCE
Status: CANCELLED | OUTPATIENT
Start: 2021-10-20 | End: 2021-10-20

## 2021-10-20 ASSESSMENT — PAIN SCALES - GENERAL: PAINLEVEL_OUTOF10: 0

## 2021-10-20 NOTE — PROGRESS NOTES
Multilayer Compression Wrap   (Not Unna) Below the Knee    NAME:  Ceasar Sandhu  YOB: 1945  MEDICAL RECORD NUMBER:  [id number]  DATE:  10/20/2021    Multilayer compression wrap: Removed old Multilayer wrap if indicated and wash leg with mild soap/water. Applied moisturizing agent to dry skin as needed. Applied primary and secondary dressing as ordered. Applied multilayered dressing below the knee to right lower leg. Instructed patient/caregiver not to remove dressing and to keep it clean and dry. Instructed patient/caregiver on complications to report to provider, such as pain, numbness in toes, heavy drainage, and slippage of dressing. Instructed patient on purpose of compression dressing and on activity and exercise recommendations.       Electronically signed by Kenneth Umanzor LPN on 98/17/3973 at 1:49 PM

## 2021-10-20 NOTE — PROGRESS NOTES
Negative for systemic symptoms including fever, chills and malaise. Objective:      BP (!) 159/96   Pulse 75   Temp 99 °F (37.2 °C) (Temporal)   Resp 18     PHYSICAL EXAM      General: The patient is in no acute distress. Mental status:  Patient is appropriate, is  oriented to place and plan of care. Dermatologic exam: Visual inspection of the periwound reveals the skin to be edematous  Wound exam: see wound description below in procedure note      Assessment:     Problem List Items Addressed This Visit     Chronic venous hypertension with ulcer and inflammation ([address]    Venous ulcer of left leg ([address]        Procedure Note    Indications:  Based on my examination of this patient's wound(s) today, sharp excision into necrotic subcutaneous tissue is required to promote healing and evaluate the extent of previous healing. Performed by: Milton Leija MD    Consent obtained: Yes    Time out taken:  Yes    Pain Control: topical Anesthetic  Anesthetic: 4% Lidocaine Liquid Topical     Debridement:Excisional Debridement    Using curette and forceps the wound(s) was/were sharply debrided down through and including the removal of subcutaneous tissue.         Devitalized Tissue Debrided:  slough    Pre Debridement Measurements:  Are located in the Wound Documentation Flow Sheet    All active wounds listed below with today's date are evaluated  Wound(s)    debrided this date include # : 1     Post  Debridement Measurements:  Wound 09/30/20 Wound #1 Right Medial Lower Leg Cluster (Onset 5 Years) (Active)   Wound Image   10/20/21 1304   Wound Etiology Venous 10/20/21 1304   Dressing Status New dressing applied 10/13/21 1329   Wound Cleansed Soap and water 10/20/21 1304   Dressing/Treatment Alginate with Ag 01/13/21 1657   Offloading for Diabetic Foot Ulcers No offloading required 10/20/21 1304   Wound Length (cm) 7 cm 10/20/21 1304   Wound Width (cm) 5 cm 10/20/21 1304   Wound Depth (cm) 0.1 cm 10/20/21 1304   Date               Grafts:                       #1 Apligraf on 12/16/2020                     #2 Apligraf on 12/23/2020                     #3 Apligraf on 12/30/2020                     #4 Apligraf on 1/6/2021                     # 5 Apligraf on 1/13/21                     #1 [place] 1/27/2021                     #2 [place] 2/3/2021                     #3 [place] 2/10/2021                     #4 [place] Right Medial Lower Leg 2/17/21                     #5 [place] 3/10/2021                   HBO:                Antibiotics:  Bactrim DS BID for 10 days on 11/18/2020                                   Cipro BID for 10 days ordered on 11/25/2020                                    Cipro 500 mg BID on 1/20/2021                                    Cipro 500 mg BID on 4/28/2021              Earlier Wound care treatments:                Authorizations:                        Consults:   Date                           Primary care physician:      Continuing wound care orders and information:              Residence:  private               Continue [facility] health care with: [facility]               Your wound-care supplies will be provided by: Libia Sanchez provider:              BENOIT with  Renu Rubi loading:  Date               RJRSJ Medications:              WPYMU cleansing:                           VN not scrub or use excessive force.                          Wash hands with soap and water before and after dressing changes.                           Prior to applying a clean dressing, cleanse wound with normal saline,                                wound cleanser, or mild soap and water.                           Ask the physician or nurse before getting the wound(s) wet in a shower              Daily Wound management:                          Keep weight off wounds and reposition every 2 hours.                          ZLDIQ standing for long periods of time.                          VRBKF wraps/stockings in AM and remove at bedtime.                          If swelling is present, elevate legs to the level of the heart or above for 30 minutes 4-5 times a day and/or when sitting.                                             When taking antibiotics take entire prescription as ordered by physician do not stop taking until medicine is all gone.                                           Orders for this week:10/20/21     Fax orders to [facility]     Right dorsal foot - healed      Right lower leg -- wash with soap and water, pat dry. Foam to anterior blister area may stay in place for 1 week. Then:  Apply A&Dto sarah wound. Cover with anasept gel and stimulin powder covered with  ABD and wrap with coban 2.       Homecare to change on Friday and Monday (may use Calcium alginate instead of anasept, profore to wrap)           Auth for ERIC vac 9/1/2021     Referral to Dr Loreta White on 2/24/2021, appt 3/18/2021 then 4/9/2021, first procedure scheduled on 4/27/2021                    Follow up with Dr Ladarius Gr in 1 week in the wound care center  Call [phone] for any questions or concerns.   Date__________   Time__________          Treatment Note      Written Patient Dismissal Instructions Given            Electronically signed by Israel Moore MD on 10/20/2021 at 1:29 PM

## 2021-10-27 ENCOUNTER — HOSPITAL ENCOUNTER (OUTPATIENT)
Dept: WOUND CARE | Age: 76
Discharge: HOME | End: 2021-10-27
Payer: COMMERCIAL

## 2021-10-27 VITALS
HEART RATE: 65 BPM | DIASTOLIC BLOOD PRESSURE: 85 MMHG | TEMPERATURE: 98.2 F | SYSTOLIC BLOOD PRESSURE: 161 MMHG | RESPIRATION RATE: 18 BRPM

## 2021-10-27 DIAGNOSIS — L97.919 CHRONIC VENOUS HYPERTENSION WITH ULCER AND INFLAMMATION INVOLVING RIGHT SIDE (HCC): Primary | ICD-10-CM

## 2021-10-27 DIAGNOSIS — I83.029 VENOUS ULCER OF LEFT LEG (HCC): ICD-10-CM

## 2021-10-27 DIAGNOSIS — L97.929 VENOUS ULCER OF LEFT LEG (HCC): ICD-10-CM

## 2021-10-27 DIAGNOSIS — I87.331 CHRONIC VENOUS HYPERTENSION WITH ULCER AND INFLAMMATION INVOLVING RIGHT SIDE (HCC): Primary | ICD-10-CM

## 2021-10-27 PROCEDURE — 11045 DBRDMT SUBQ TISS EACH ADDL: CPT | Performed by: SURGERY

## 2021-10-27 PROCEDURE — 11045 DBRDMT SUBQ TISS EACH ADDL: CPT

## 2021-10-27 PROCEDURE — 11042 DBRDMT SUBQ TIS 1ST 20SQCM/<: CPT | Performed by: SURGERY

## 2021-10-27 PROCEDURE — 11042 DBRDMT SUBQ TIS 1ST 20SQCM/<: CPT

## 2021-10-27 RX ORDER — LIDOCAINE 40 MG/G
CREAM TOPICAL ONCE
Status: CANCELLED | OUTPATIENT
Start: 2021-10-27 | End: 2021-10-27

## 2021-10-27 RX ORDER — LIDOCAINE HYDROCHLORIDE 20 MG/ML
JELLY TOPICAL ONCE
Status: CANCELLED | OUTPATIENT
Start: 2021-10-27 | End: 2021-10-27

## 2021-10-27 RX ORDER — GENTAMICIN SULFATE 1 MG/G
OINTMENT TOPICAL ONCE
Status: CANCELLED | OUTPATIENT
Start: 2021-10-27 | End: 2021-10-27

## 2021-10-27 RX ORDER — GINSENG 100 MG
CAPSULE ORAL ONCE
Status: CANCELLED | OUTPATIENT
Start: 2021-10-27 | End: 2021-10-27

## 2021-10-27 RX ORDER — BACITRACIN, NEOMYCIN, POLYMYXIN B 400; 3.5; 5 [USP'U]/G; MG/G; [USP'U]/G
OINTMENT TOPICAL ONCE
Status: CANCELLED | OUTPATIENT
Start: 2021-10-27 | End: 2021-10-27

## 2021-10-27 RX ORDER — BACITRACIN ZINC AND POLYMYXIN B SULFATE 500; 1000 [USP'U]/G; [USP'U]/G
OINTMENT TOPICAL ONCE
Status: CANCELLED | OUTPATIENT
Start: 2021-10-27 | End: 2021-10-27

## 2021-10-27 RX ORDER — LIDOCAINE HYDROCHLORIDE 40 MG/ML
SOLUTION TOPICAL ONCE
Status: CANCELLED | OUTPATIENT
Start: 2021-10-27 | End: 2021-10-27

## 2021-10-27 RX ORDER — LIDOCAINE HYDROCHLORIDE 40 MG/ML
SOLUTION TOPICAL ONCE
Status: DISCONTINUED | OUTPATIENT
Start: 2021-10-27 | End: 2021-10-28 | Stop reason: HOSPADM

## 2021-10-27 RX ORDER — CLOBETASOL PROPIONATE 0.5 MG/G
OINTMENT TOPICAL ONCE
Status: CANCELLED | OUTPATIENT
Start: 2021-10-27 | End: 2021-10-27

## 2021-10-27 RX ORDER — LIDOCAINE 50 MG/G
OINTMENT TOPICAL ONCE
Status: CANCELLED | OUTPATIENT
Start: 2021-10-27 | End: 2021-10-27

## 2021-10-27 RX ORDER — BETAMETHASONE DIPROPIONATE 0.05 %
OINTMENT (GRAM) TOPICAL ONCE
Status: CANCELLED | OUTPATIENT
Start: 2021-10-27 | End: 2021-10-27

## 2021-10-27 ASSESSMENT — PAIN SCALES - GENERAL: PAINLEVEL_OUTOF10: 0

## 2021-10-27 NOTE — PROGRESS NOTES
HPI.  Constitutional: Negative for systemic symptoms including fever, chills and malaise. Objective:      BP (!) 161/85   Pulse 65   Temp 98.2 °F (36.8 °C) (Temporal)   Resp 18     PHYSICAL EXAM      General: The patient is in no acute distress. Mental status:  Patient is appropriate, is  oriented to place and plan of care. Dermatologic exam: Visual inspection of the periwound reveals the skin to be edematous  Wound exam: see wound description below in procedure note      Assessment:     Problem List Items Addressed This Visit     Chronic venous hypertension with ulcer and inflammation (HCC) - Primary    Relevant Medications    lidocaine (XYLOCAINE) 4 % external solution    Other Relevant Orders    Initiate Outpatient Wound Care Protocol    Venous ulcer of left leg (HCC)    Relevant Medications    lidocaine (XYLOCAINE) 4 % external solution    Other Relevant Orders    Initiate Outpatient Wound Care Protocol        Procedure Note    Indications:  Based on my examination of this patient's wound(s) today, sharp excision into necrotic dermis and subcutaneous tissue is required to promote healing and evaluate the extent of previous healing. Performed by: Jumana Bowden MD    Consent obtained: Yes    Time out taken:  Yes    Pain Control: topical Anesthetic  Anesthetic: 4% Lidocaine Liquid Topical     Debridement:Excisional Debridement    Using curette and forceps the wound(s) was/were sharply debrided down through and including the removal of dermis and subcutaneous tissue.         Devitalized Tissue Debrided:  slough    Pre Debridement Measurements:  Are located in the Wound Documentation Flow Sheet    All active wounds listed below with today's date are evaluated  Wound(s)    debrided this date include # : 1     Post  Debridement Measurements:  Wound 09/30/20 Wound #1 Right Medial Lower Leg Cluster (Onset 5 Years) (Active)   Wound Image   10/20/21 1304   Wound Etiology Venous 10/27/21 1302 Dressing Status New dressing applied 10/20/21 1345   Wound Cleansed Soap and water 10/27/21 1302   Dressing/Treatment Alginate with Ag 01/13/21 1657   Offloading for Diabetic Foot Ulcers No offloading required 10/27/21 1302   Wound Length (cm) 7 cm 10/27/21 1302   Wound Width (cm) 3.5 cm 10/27/21 1302   Wound Depth (cm) 0.1 cm 10/27/21 1302   Wound Surface Area (cm^2) 24.5 cm^2 10/27/21 1302   Change in Wound Size % (l*w) 90.28 10/27/21 1302   Wound Volume (cm^3) 2.45 cm^3 10/27/21 1302   Wound Healing % 90 10/27/21 1302   Post-Procedure Length (cm) 7 cm 10/27/21 1321   Post-Procedure Width (cm) 3.5 cm 10/27/21 1321   Post-Procedure Depth (cm) 0.1 cm 10/27/21 1321   Post-Procedure Surface Area (cm^2) 24.5 cm^2 10/27/21 1321   Post-Procedure Volume (cm^3) 2.45 cm^3 10/27/21 1321   Distance Tunneling (cm) 0 cm 10/27/21 1302   Tunneling Position ___ O'Clock 0 10/27/21 1302   Undermining Starts ___ O'Clock 0 10/27/21 1302   Undermining Ends___ O'Clock 0 10/27/21 1302   Undermining Maxium Distance (cm) 0 10/27/21 1302   Wound Assessment Granulation tissue 10/27/21 1302   Drainage Amount Moderate 10/27/21 1302   Drainage Description Serosanguinous 10/27/21 1302   Odor None 10/27/21 1302   Cassia-wound Assessment Intact 10/27/21 1302   Margins Defined edges 10/27/21 1302   Wound Thickness Description not for Pressure Injury Full thickness 10/27/21 1302   Number of days: 392       Percent of Wound(s) Debrided: approximately 100%    Total Surface Area Debrided:  24.5 sq cm     Bleeding:  Minimal    Hemostasis Achieved:  by pressure    Procedural Pain:  0  / 10     Post Procedural Pain:  0 / 10     Response to treatment:  Well tolerated by patient. Status of wound progress and description from last visit:   Improved. Plan:       Discharge Instructions       PHYSICIAN ORDERS AND DISCHARGE INSTRUCTIONS     NOTE: Upon discharge from the [address], you will receive a patient experience survey.  We would be grateful if you would take the time to fill this survey out.     Wound care order history:                 Vascular studies: Arterial studies done 9/27/20; no stenosis              Imaging:   Date               Cultures:   obtained on 11/18/2020                                 Obtained on 1/20/2021               Labs/ HbA1c:   Date               Grafts:                       #1 Apligraf on 12/16/2020                     #2 Apligraf on 12/23/2020                     #3 Apligraf on 12/30/2020                     #4 Apligraf on 1/6/2021                     # 5 Apligraf on 1/13/21                     #1 [place] 1/27/2021                     #2 [place] 2/3/2021                     #3 [place] 2/10/2021                     #4 [place] Right Medial Lower Leg 2/17/21                     #5 [place] 3/10/2021                   HBO:                Antibiotics:  Bactrim DS BID for 10 days on 11/18/2020                                   Cipro BID for 10 days ordered on 11/25/2020                                    Cipro 500 mg BID on 1/20/2021                                    Cipro 500 mg BID on 4/28/2021              Earlier Wound care treatments:                Authorizations:                        Consults:   Date                           Primary care physician:      Continuing wound care orders and information:              Residence:  private               [facility] with: [facility]               Your wound-care supplies will be provided by: Mahi Sellers provider:              IRVIN with  Yakelin Abernathy loading:  Date               DBXBZ Medications:              YCUCR cleansing:                           EK not scrub or use excessive force.                          Wash hands with soap and water before and after dressing changes.                         Prior to applying a clean dressing, cleanse wound with normal saline,                                wound cleanser, or mild soap and water.                         TGO the physician or nurse before getting the wound(s) wet in a shower              Daily Wound management:                          Keep weight off wounds and reposition every 2 hours.                          YQWFN standing for long periods of time.                          Apply wraps/stockings in AM and remove at bedtime.                          If swelling is present, elevate legs to the level of the heart or above for 30 minutes 4-5 times a day and/or when sitting.                                             When taking antibiotics take entire prescription as ordered by physician do not stop taking until medicine is all gone.                                           Orders for this week: 10/27/21     Fax orders to [facility]!     Right dorsal foot - healed      Right lower leg -- wash with soap and water, pat dry. Foam to anterior blister area may stay in place for 1 week. Then: Apply A&D to sarah wound. Cover with anasept gel and stimulin powder covered with ABD and wrap with Coban 2.       Homecare to change on Friday and Monday (may use Calcium alginate instead of anasept, profore to wrap)           Auth for ERIC vac 9/1/2021     Referral to Dr Pily Liang on 2/24/2021, appt 3/18/2021 then 4/9/2021, first procedure scheduled on 4/27/2021                    Follow up with Dr Karin Tuttle in 1 week in the wound care center  Call [phone] for any questions or concerns.   Date__________   Time__________        Treatment Note      Written Patient Dismissal Instructions Given            Electronically signed by Anthony Castro MD on 10/27/2021 at 1:36 PM

## 2021-10-27 NOTE — PROGRESS NOTES
Multilayer Compression Wrap   (Not Unna) Below the Knee    NAME:  Krystina Ivy  YOB: 1945  MEDICAL RECORD NUMBER:  [id number]  DATE:  10/27/2021    Multilayer compression wrap: Removed old Multilayer wrap if indicated and wash leg with mild soap/water. Applied moisturizing agent to dry skin as needed. Applied primary and secondary dressing as ordered. Applied multilayered dressing below the knee to right lower leg. Instructed patient/caregiver not to remove dressing and to keep it clean and dry. Instructed patient/caregiver on complications to report to provider, such as pain, numbness in toes, heavy drainage, and slippage of dressing. Instructed patient on purpose of compression dressing and on activity and exercise recommendations.       Electronically signed by Donald Cummings LPN on 80/01/2006 at 2:02 PM

## 2021-10-29 PROBLEM — I83.891 VARICOSE VEINS OF LOWER EXTREMITIES WITH COMPLICATIONS, RIGHT: Status: ACTIVE | Noted: 2021-10-29

## 2021-11-03 ENCOUNTER — HOSPITAL ENCOUNTER (OUTPATIENT)
Dept: WOUND CARE | Age: 76
Discharge: HOME | End: 2021-11-03
Payer: COMMERCIAL

## 2021-11-03 VITALS
RESPIRATION RATE: 16 BRPM | SYSTOLIC BLOOD PRESSURE: 142 MMHG | DIASTOLIC BLOOD PRESSURE: 83 MMHG | TEMPERATURE: 98.8 F | HEART RATE: 72 BPM

## 2021-11-03 DIAGNOSIS — I87.331 CHRONIC VENOUS HYPERTENSION WITH ULCER AND INFLAMMATION INVOLVING RIGHT SIDE (HCC): Primary | ICD-10-CM

## 2021-11-03 DIAGNOSIS — L97.919 CHRONIC VENOUS HYPERTENSION WITH ULCER AND INFLAMMATION INVOLVING RIGHT SIDE (HCC): Primary | ICD-10-CM

## 2021-11-03 DIAGNOSIS — I83.029 VENOUS ULCER OF LEFT LEG (HCC): ICD-10-CM

## 2021-11-03 DIAGNOSIS — L97.929 VENOUS ULCER OF LEFT LEG (HCC): ICD-10-CM

## 2021-11-03 PROCEDURE — 11042 DBRDMT SUBQ TIS 1ST 20SQCM/<: CPT

## 2021-11-03 PROCEDURE — 11042 DBRDMT SUBQ TIS 1ST 20SQCM/<: CPT | Performed by: SURGERY

## 2021-11-03 RX ORDER — CLOBETASOL PROPIONATE 0.5 MG/G
OINTMENT TOPICAL ONCE
Status: CANCELLED | OUTPATIENT
Start: 2021-11-03 | End: 2021-11-03

## 2021-11-03 RX ORDER — BACITRACIN ZINC AND POLYMYXIN B SULFATE 500; 1000 [USP'U]/G; [USP'U]/G
OINTMENT TOPICAL ONCE
Status: CANCELLED | OUTPATIENT
Start: 2021-11-03 | End: 2021-11-03

## 2021-11-03 RX ORDER — GINSENG 100 MG
CAPSULE ORAL ONCE
Status: CANCELLED | OUTPATIENT
Start: 2021-11-03 | End: 2021-11-03

## 2021-11-03 RX ORDER — LIDOCAINE HYDROCHLORIDE 40 MG/ML
SOLUTION TOPICAL ONCE
Status: CANCELLED | OUTPATIENT
Start: 2021-11-03 | End: 2021-11-03

## 2021-11-03 RX ORDER — LIDOCAINE HYDROCHLORIDE 40 MG/ML
SOLUTION TOPICAL ONCE
Status: DISCONTINUED | OUTPATIENT
Start: 2021-11-03 | End: 2021-11-04 | Stop reason: HOSPADM

## 2021-11-03 RX ORDER — LIDOCAINE HYDROCHLORIDE 20 MG/ML
JELLY TOPICAL ONCE
Status: CANCELLED | OUTPATIENT
Start: 2021-11-03 | End: 2021-11-03

## 2021-11-03 RX ORDER — BACITRACIN, NEOMYCIN, POLYMYXIN B 400; 3.5; 5 [USP'U]/G; MG/G; [USP'U]/G
OINTMENT TOPICAL ONCE
Status: CANCELLED | OUTPATIENT
Start: 2021-11-03 | End: 2021-11-03

## 2021-11-03 RX ORDER — GENTAMICIN SULFATE 1 MG/G
OINTMENT TOPICAL ONCE
Status: CANCELLED | OUTPATIENT
Start: 2021-11-03 | End: 2021-11-03

## 2021-11-03 RX ORDER — BETAMETHASONE DIPROPIONATE 0.05 %
OINTMENT (GRAM) TOPICAL ONCE
Status: CANCELLED | OUTPATIENT
Start: 2021-11-03 | End: 2021-11-03

## 2021-11-03 RX ORDER — LIDOCAINE 50 MG/G
OINTMENT TOPICAL ONCE
Status: CANCELLED | OUTPATIENT
Start: 2021-11-03 | End: 2021-11-03

## 2021-11-03 RX ORDER — LIDOCAINE 40 MG/G
CREAM TOPICAL ONCE
Status: CANCELLED | OUTPATIENT
Start: 2021-11-03 | End: 2021-11-03

## 2021-11-03 ASSESSMENT — PAIN SCALES - GENERAL: PAINLEVEL_OUTOF10: 0

## 2021-11-03 NOTE — PROGRESS NOTES
Wound Care Center Progress Note with Procedure Note      Maria Fernanda Hebert  AGE: 77 y.o. GENDER: male  : 1945  EPISODE DATE:  11/3/2021     Subjective:     Chief Complaint   Patient presents with    Wound Check         HISTORY of PRESENT ILLNESS      Maria Fernanda Hebert is a 77 y.o. male who presents today for wound evaluation of Chronic venous and traumatic wound(s) of R lower leg medial.  The wound is of moderate severity. The underlying cause of the wound is venous disease and trauma. He had his open veins treated last week    Wound Pain Timing/Severity: none  Quality of pain: N/A  Severity of pain:  0 / 10   Modifying Factors: venous stasis  Associated Signs/Symptoms: none        PAST MEDICAL HISTORY        Diagnosis Date    Chronic venous hypertension with ulcer and inflammation involving right side (HCC)     RLE    Hypertension     past    Iron deficiency     Lymphoma Dx 11    Hx of B cell lymphoma-in remission since        PAST SURGICAL HISTORY    Past Surgical History:   Procedure Laterality Date    TUNNELED VENOUS PORT PLACEMENT      TUNNELED VENOUS PORT PLACEMENT      removed 2012       FAMILY HISTORY    Family History   Problem Relation Age of Onset    Early Death Mother 52    Diabetes Father     Heart Disease Father     Vision Loss Son         \"He wears glasses\"    Early Death Daughter 36       SOCIAL HISTORY    Social History     Tobacco Use    Smoking status: Never Smoker    Smokeless tobacco: Never Used   Substance Use Topics    Alcohol use: No    Drug use: No       ALLERGIES    No Known Allergies    MEDICATIONS    Current Outpatient Medications on File Prior to Encounter   Medication Sig Dispense Refill    metoprolol succinate (TOPROL XL) 50 MG extended release tablet Take 1 tablet by mouth daily 30 tablet 5     No current facility-administered medications on file prior to encounter.        REVIEW OF SYSTEMS    Pertinent items are noted in HPI. Constitutional: Negative for systemic symptoms including fever, chills and malaise. Objective:      BP (!) 142/83   Pulse 72   Temp 98.8 °F (37.1 °C) (Temporal)   Resp 16     PHYSICAL EXAM      General: The patient is in no acute distress. Mental status:  Patient is appropriate, is  oriented to place and plan of care. Dermatologic exam: Visual inspection of the periwound reveals the skin to be atrophic and edematous  Wound exam: see wound description below in procedure note      Assessment:     Problem List Items Addressed This Visit     Chronic venous hypertension with ulcer and inflammation ([facility] [address] - Primary    Relevant Medications    lidocaine (XYLOCAINE) 4 % external solution (Start on 11/3/2021  1:30 PM)    Other Relevant Orders    Initiate Outpatient Wound Care Protocol    Venous ulcer of left leg (HCC)    Relevant Medications    lidocaine (XYLOCAINE) 4 % external solution (Start on 11/3/2021  1:30 PM)    Other Relevant Orders    Initiate Outpatient Wound Care Protocol        Procedure Note    Indications:  Based on my examination of this patient's wound(s) today, sharp excision into necrotic dermis and subcutaneous tissue is required to promote healing and evaluate the extent of previous healing. Performed by: Jarrett Sen MD    Consent obtained: Yes    Time out taken:  Yes    Pain Control: topical Anesthetic  Anesthetic: 4% Lidocaine Liquid Topical     Debridement:Excisional Debridement    Using curette and forceps the wound(s) was/were sharply debrided down through and including the removal of dermis and subcutaneous tissue.         Devitalized Tissue Debrided:  slough    Pre Debridement Measurements:  Are located in the Wound Documentation Flow Sheet    All active wounds listed below with today's date are evaluated  Wound(s)    debrided this date include # : 1     Post  Debridement Measurements:  Wound 09/30/20 Wound #1 Right Medial Lower Leg Cluster (Onset 5 Years) (Active)   Wound Image   11/03/21 1258   Wound Etiology Venous 11/03/21 1258   Dressing Status New dressing applied;Clean;Dry; Intact 10/27/21 1402   Wound Cleansed Soap and water 11/03/21 1258   Dressing/Treatment Alginate with Ag 01/13/21 1657   Offloading for Diabetic Foot Ulcers No offloading required 11/03/21 1258   Wound Length (cm) 6.6 cm 11/03/21 1258   Wound Width (cm) 3 cm 11/03/21 1258   Wound Depth (cm) 0.1 cm 11/03/21 1258   Wound Surface Area (cm^2) 19.8 cm^2 11/03/21 1258   Change in Wound Size % (l*w) 92.14 11/03/21 1258   Wound Volume (cm^3) 1.98 cm^3 11/03/21 1258   Wound Healing % 92 11/03/21 1258   Post-Procedure Length (cm) 6.6 cm 11/03/21 1312   Post-Procedure Width (cm) 3 cm 11/03/21 1312   Post-Procedure Depth (cm) 0.1 cm 11/03/21 1312   Post-Procedure Surface Area (cm^2) 19.8 cm^2 11/03/21 1312   Post-Procedure Volume (cm^3) 1.98 cm^3 11/03/21 1312   Distance Tunneling (cm) 0 cm 11/03/21 1258   Tunneling Position ___ O'Clock 0 11/03/21 1258   Undermining Starts ___ O'Clock 0 11/03/21 1258   Undermining Ends___ O'Clock 0 11/03/21 1258   Undermining Maxium Distance (cm) 0 11/03/21 1258   Wound Assessment Granulation tissue 11/03/21 1258   Drainage Amount Moderate 11/03/21 1258   Drainage Description Serosanguinous 11/03/21 1258   Odor None 11/03/21 1258   Cassia-wound Assessment Intact 11/03/21 1258   Margins Defined edges 11/03/21 1258   Wound Thickness Description not for Pressure Injury Full thickness 11/03/21 1258   Number of days: 399       Percent of Wound(s) Debrided: approximately 100%    Total Surface Area Debrided:  19.8 sq cm     Bleeding:  Minimal    Hemostasis Achieved:  by pressure    Procedural Pain:  0  / 10     Post Procedural Pain:  0 / 10     Response to treatment:  Well tolerated by patient. Status of wound progress and description from last visit:   Improved.       Plan:       Discharge Instructions         Discharge Instructions        PHYSICIAN ORDERS AND DISCHARGE INSTRUCTIONS     NOTE: Upon discharge from the [address], you will receive a patient experience survey. We would be grateful if you would take the time to fill this survey out.     Wound care order history:                 Vascular studies: Arterial studies done 9/27/20; no stenosis              Imaging:   Date               Cultures:   obtained on 11/18/2020                                 Obtained on 1/20/2021               Labs/ HbA1c:   Date               Grafts:                       #1 Apligraf on 12/16/2020                     #2 Apligraf on 12/23/2020                     #3 Apligraf on 12/30/2020                     #4 Apligraf on 1/6/2021                     # 5 Apligraf on 1/13/21                     #1 [place] 1/27/2021                     #2 [place] 2/3/2021                     #3 [place] 2/10/2021                     #4 [place] Right Medial Lower Leg 2/17/21                     #5 [place] 3/10/2021                   HBO:                Antibiotics:  Bactrim DS BID for 10 days on 11/18/2020                                   Cipro BID for 10 days ordered on 11/25/2020                                    Cipro 500 mg BID on 1/20/2021                                    Cipro 500 mg BID on 4/28/2021              Earlier Wound care treatments:                Authorizations:                        Consults:   Date                           Primary care physician:      Continuing wound care orders and information:              Residence:  private               [facility] care with: [facility]               Your wound-care supplies will be provided by: Sam Amaya provider:              BLANCA with  Miguelina Pace loading:  Date               [facility] Medications:              TWCFZ cleansing:                           BW not scrub or use excessive force.                          Wash hands with soap and water before and after dressing changes.                           Prior to applying a clean dressing, cleanse wound with normal saline,                                wound cleanser, or mild soap and water.                           Ask the physician or nurse before getting the wound(s) wet in a shower              Daily Wound management:                          Keep weight off wounds and reposition every 2 hours.                          QFDEQ standing for long periods of time.                          CVLJM wraps/stockings in AM and remove at bedtime.                          If swelling is present, elevate legs to the level of the heart or above for 30 minutes 4-5 times a day and/or when sitting.                                             When taking antibiotics take entire prescription as ordered by physician do not stop taking until medicine is all gone.                                           Orders for this week: 11/3/21     [place] [facility]     Right dorsal foot - healed      Right lower leg -- wash with soap and water, pat dry. Foam to anterior blister area may stay in place for 1 week. Then: Apply A&D to sarah wound. Cover with anasept gel and stimulin powder covered with ABD and wrap with Coban 2.       Homecare to change on Friday and Monday (may use Calcium alginate instead of anasept, profore to wrap)           Auth for ERIC vac 9/1/2021     Referral to Dr René Monet on 2/24/2021, appt 3/18/2021 then 4/9/2021, first procedure scheduled on 4/27/2021                    Follow up with Dr Laurie Rosado in 1 week in the wound care center  Call [phone] for any questions or concerns.   Date__________   Time__________          Treatment Note      Written Patient Dismissal Instructions Given            Electronically signed by Norberto Solorio MD on 11/3/2021 at 1:24 PM

## 2021-11-10 ENCOUNTER — HOSPITAL ENCOUNTER (OUTPATIENT)
Dept: WOUND CARE | Age: 76
Discharge: HOME | End: 2021-11-10
Payer: COMMERCIAL

## 2021-11-10 VITALS
DIASTOLIC BLOOD PRESSURE: 83 MMHG | HEART RATE: 71 BPM | RESPIRATION RATE: 16 BRPM | TEMPERATURE: 99.1 F | SYSTOLIC BLOOD PRESSURE: 147 MMHG

## 2021-11-10 DIAGNOSIS — I83.029 VENOUS ULCER OF LEFT LEG (HCC): ICD-10-CM

## 2021-11-10 DIAGNOSIS — L97.919 CHRONIC VENOUS HYPERTENSION WITH ULCER AND INFLAMMATION INVOLVING RIGHT SIDE (HCC): Primary | ICD-10-CM

## 2021-11-10 DIAGNOSIS — I87.331 CHRONIC VENOUS HYPERTENSION WITH ULCER AND INFLAMMATION INVOLVING RIGHT SIDE (HCC): Primary | ICD-10-CM

## 2021-11-10 DIAGNOSIS — L97.929 VENOUS ULCER OF LEFT LEG (HCC): ICD-10-CM

## 2021-11-10 PROCEDURE — 11042 DBRDMT SUBQ TIS 1ST 20SQCM/<: CPT

## 2021-11-10 PROCEDURE — 11042 DBRDMT SUBQ TIS 1ST 20SQCM/<: CPT | Performed by: SURGERY

## 2021-11-10 RX ORDER — LIDOCAINE HYDROCHLORIDE 20 MG/ML
JELLY TOPICAL ONCE
Status: CANCELLED | OUTPATIENT
Start: 2021-11-10 | End: 2021-11-10

## 2021-11-10 RX ORDER — GINSENG 100 MG
CAPSULE ORAL ONCE
Status: CANCELLED | OUTPATIENT
Start: 2021-11-10 | End: 2021-11-10

## 2021-11-10 RX ORDER — BETAMETHASONE DIPROPIONATE 0.05 %
OINTMENT (GRAM) TOPICAL ONCE
Status: CANCELLED | OUTPATIENT
Start: 2021-11-10 | End: 2021-11-10

## 2021-11-10 RX ORDER — LIDOCAINE 40 MG/G
CREAM TOPICAL ONCE
Status: CANCELLED | OUTPATIENT
Start: 2021-11-10 | End: 2021-11-10

## 2021-11-10 RX ORDER — CLOBETASOL PROPIONATE 0.5 MG/G
OINTMENT TOPICAL ONCE
Status: CANCELLED | OUTPATIENT
Start: 2021-11-10 | End: 2021-11-10

## 2021-11-10 RX ORDER — BACITRACIN ZINC AND POLYMYXIN B SULFATE 500; 1000 [USP'U]/G; [USP'U]/G
OINTMENT TOPICAL ONCE
Status: CANCELLED | OUTPATIENT
Start: 2021-11-10 | End: 2021-11-10

## 2021-11-10 RX ORDER — LIDOCAINE HYDROCHLORIDE 40 MG/ML
SOLUTION TOPICAL ONCE
Status: CANCELLED | OUTPATIENT
Start: 2021-11-10 | End: 2021-11-10

## 2021-11-10 RX ORDER — LIDOCAINE HYDROCHLORIDE 40 MG/ML
SOLUTION TOPICAL ONCE
Status: DISCONTINUED | OUTPATIENT
Start: 2021-11-10 | End: 2021-11-11 | Stop reason: HOSPADM

## 2021-11-10 RX ORDER — LIDOCAINE 50 MG/G
OINTMENT TOPICAL ONCE
Status: CANCELLED | OUTPATIENT
Start: 2021-11-10 | End: 2021-11-10

## 2021-11-10 RX ORDER — GENTAMICIN SULFATE 1 MG/G
OINTMENT TOPICAL ONCE
Status: CANCELLED | OUTPATIENT
Start: 2021-11-10 | End: 2021-11-10

## 2021-11-10 RX ORDER — BACITRACIN, NEOMYCIN, POLYMYXIN B 400; 3.5; 5 [USP'U]/G; MG/G; [USP'U]/G
OINTMENT TOPICAL ONCE
Status: CANCELLED | OUTPATIENT
Start: 2021-11-10 | End: 2021-11-10

## 2021-11-10 NOTE — PROGRESS NOTES
Multilayer Compression Wrap   (Not Unna) Below the Knee    NAME:  Elmer Matson  YOB: 1945  MEDICAL RECORD NUMBER:  [id number]  DATE:  11/10/2021    Multilayer compression wrap: Removed old Multilayer wrap if indicated and wash leg with mild soap/water. Applied moisturizing agent to dry skin as needed. Applied primary and secondary dressing as ordered. Applied multilayered dressing below the knee to right lower leg. Instructed patient/caregiver not to remove dressing and to keep it clean and dry. Instructed patient/caregiver on complications to report to provider, such as pain, numbness in toes, heavy drainage, and slippage of dressing. Instructed patient on purpose of compression dressing and on activity and exercise recommendations.       Electronically signed by Karen Tian RN on 11/10/2021 at 1:58 PM

## 2021-11-10 NOTE — PROGRESS NOTES
Wound Care Center Progress Note with Procedure Note      Lester Ahmadi  AGE: 77 y.o. GENDER: male  : 1945  EPISODE DATE:  11/10/2021     Subjective:     Chief Complaint   Patient presents with    Wound Check     rt leg         HISTORY of PRESENT ILLNESS      Lester Ahmadi is a 77 y.o. male who presents today for wound evaluation of Chronic venous and traumatic wound(s) of R lower leg medial.  The wound is of moderate severity. The underlying cause of the wound is venous disease and trauma. Wound Pain Timing/Severity: none  Quality of pain: N/A  Severity of pain:  0 / 10   Modifying Factors: venous stasis  Associated Signs/Symptoms: none        PAST MEDICAL HISTORY        Diagnosis Date    Chronic venous hypertension with ulcer and inflammation involving right side (HCC)     RLE    Hypertension     past    Iron deficiency     Lymphoma Dx 11-    Hx of B cell lymphoma-in remission since        PAST SURGICAL HISTORY    Past Surgical History:   Procedure Laterality Date    TUNNELED VENOUS PORT PLACEMENT      TUNNELED VENOUS PORT PLACEMENT      removed 2012       FAMILY HISTORY    Family History   Problem Relation Age of Onset    Early Death Mother 52    Diabetes Father     Heart Disease Father     Vision Loss Son         \"He wears glasses\"    Early Death Daughter 36       SOCIAL HISTORY    Social History     Tobacco Use    Smoking status: Never Smoker    Smokeless tobacco: Never Used   Substance Use Topics    Alcohol use: No    Drug use: No       ALLERGIES    No Known Allergies    MEDICATIONS    Current Outpatient Medications on File Prior to Encounter   Medication Sig Dispense Refill    metoprolol succinate (TOPROL XL) 50 MG extended release tablet Take 1 tablet by mouth daily 30 tablet 5     No current facility-administered medications on file prior to encounter.        REVIEW OF SYSTEMS    Pertinent items are noted in HPI.  Constitutional: Negative for systemic symptoms including fever, chills and malaise. Objective:      BP (!) 147/83   Pulse 71   Temp 99.1 °F (37.3 °C) (Temporal)   Resp 16     PHYSICAL EXAM      General: The patient is in no acute distress. Mental status:  Patient is appropriate, is  oriented to place and plan of care. Dermatologic exam: Visual inspection of the periwound reveals the skin to be edematous  Wound exam: see wound description below in procedure note      Assessment:     Problem List Items Addressed This Visit     Chronic venous hypertension with ulcer and inflammation ([facility] [address] - Primary    Relevant Medications    lidocaine (XYLOCAINE) 4 % external solution (Start on 11/10/2021  1:45 PM)    Other Relevant Orders    Initiate Outpatient Wound Care Protocol    Venous ulcer of left leg (HCC)    Relevant Medications    lidocaine (XYLOCAINE) 4 % external solution (Start on 11/10/2021  1:45 PM)    Other Relevant Orders    Initiate Outpatient Wound Care Protocol        Procedure Note    Indications:  Based on my examination of this patient's wound(s) today, sharp excision into necrotic subcutaneous tissue is required to promote healing and evaluate the extent of previous healing. Performed by: Italia Ramires MD    Consent obtained: Yes    Time out taken:  Yes    Pain Control: topical       Debridement:Excisional Debridement    Using curette and forceps the wound(s) was/were sharply debrided down through and including the removal of subcutaneous tissue.         Devitalized Tissue Debrided:  slough    Pre Debridement Measurements:  Are located in the Wound Documentation Flow Sheet    All active wounds listed below with today's date are evaluated  Wound(s)    debrided this date include # : 1     Post  Debridement Measurements:  Wound 09/30/20 Wound #1 Right Medial Lower Leg Cluster (Onset 5 Years) (Active)   Wound Image   11/03/21 1258   Wound Etiology Venous 11/10/21 1305   Dressing Status New dressing applied; Clean; Dry; Intact 10/27/21 1402   Wound Cleansed Soap and water 11/10/21 1305   Dressing/Treatment Alginate with Ag 01/13/21 1657   Offloading for Diabetic Foot Ulcers No offloading required 11/03/21 1258   Wound Length (cm) 6.6 cm 11/10/21 1305   Wound Width (cm) 2.9 cm 11/10/21 1305   Wound Depth (cm) 0.1 cm 11/10/21 1305   Wound Surface Area (cm^2) 19.14 cm^2 11/10/21 1305   Change in Wound Size % (l*w) 92.4 11/10/21 1305   Wound Volume (cm^3) 1.914 cm^3 11/10/21 1305   Wound Healing % 92 11/10/21 1305   Post-Procedure Length (cm) 6.6 cm 11/10/21 1321   Post-Procedure Width (cm) 2.9 cm 11/10/21 1321   Post-Procedure Depth (cm) 0.1 cm 11/10/21 1321   Post-Procedure Surface Area (cm^2) 19.14 cm^2 11/10/21 1321   Post-Procedure Volume (cm^3) 1.914 cm^3 11/10/21 1321   Distance Tunneling (cm) 0 cm 11/10/21 1305   Tunneling Position ___ O'Clock 0 11/10/21 1305   Undermining Starts ___ O'Clock 0 11/10/21 1305   Undermining Ends___ O'Clock 0 11/10/21 1305   Undermining Maxium Distance (cm) 0 11/10/21 1305   Wound Assessment Granulation tissue 11/10/21 1305   Drainage Amount Moderate 11/10/21 1305   Drainage Description Serosanguinous 11/10/21 1305   Odor None 11/10/21 1305   Cassia-wound Assessment Intact 11/03/21 1258   Margins Defined edges 11/10/21 1305   Wound Thickness Description not for Pressure Injury Full thickness 11/10/21 1305   Number of days: 406       Percent of Wound(s) Debrided: approximately 100%    Total Surface Area Debrided:  19 sq cm     Bleeding:  Minimal    Hemostasis Achieved:  by pressure    Procedural Pain:  0  / 10     Post Procedural Pain:  0 / 10     Response to treatment:  Well tolerated by patient. Status of wound progress and description from last visit:   Improved.       Plan:       Discharge Instructions         Discharge Instructions        PHYSICIAN ORDERS AND DISCHARGE INSTRUCTIONS     NOTE: Upon discharge from the [address], you will receive a patient experience survey. We would be grateful if you would take the time to fill this survey out.     Wound care order history:                 Vascular studies: Arterial studies done 9/27/20; no stenosis              Imaging:   Date               Cultures:   obtained on 11/18/2020                                 Obtained on 1/20/2021               Labs/ HbA1c:   Date               Grafts:                       #1 Apligraf on 12/16/2020                     #2 Apligraf on 12/23/2020                     #3 Apligraf on 12/30/2020                     #4 Apligraf on 1/6/2021                     # 5 Apligraf on 1/13/21                     #1 [place] 1/27/2021                     #2 [place] 2/3/2021                     #3 [place] 2/10/2021                     #4 [place] Right Medial Lower Leg 2/17/21                     #5 [place] 3/10/2021                   HBO:                Antibiotics:  Bactrim DS BID for 10 days on 11/18/2020                                   Cipro BID for 10 days ordered on 11/25/2020                                    Cipro 500 mg BID on 1/20/2021                                    Cipro 500 mg BID on 4/28/2021              Earlier Wound care treatments:                Authorizations:                        Consults:   Date                           Primary care physician:      Continuing wound care orders and information:              Residence:  private               [facility] care with: [facility]               Your wound-care supplies will be provided by: Ilda Holliday provider:              KYRA with  Eugenia Halsted loading:  Date               PXIHV Medications:              HHHBV cleansing:                           DH not scrub or use excessive force.                          Wash hands with soap and water before and after dressing changes.                           Prior to applying a clean dressing, cleanse wound with normal saline,                                LPMFD cleanser, or mild soap and water.                           Ask the physician or nurse before getting the wound(s) wet in a shower              Daily Wound management:                          TSDV weight off wounds and reposition every 2 hours.                          WOEDQ standing for long periods of time.                          XZATR wraps/stockings in AM and remove at bedtime.                          If swelling is present, elevate legs to the level of the heart or above for 30 minutes 4-5 times a day and/or when sitting.                                             When taking antibiotics take entire prescription as ordered by physician do not stop taking until medicine is all gone.                                           Orders for this week: 11/10/21     Fax orders to [facility]!     Right dorsal foot - healed      Right lower leg -- wash with soap and water, pat dry. Foam to anterior blister area may stay in place for 1 week. Then: Apply A&D to sarah wound. Cover with anasept gel and stimulin powder covered with ABD and wrap with Coban 2.       Homecare to change on Friday and Monday (may use Calcium alginate instead of anasept, profore to wrap)           Auth for ERIC vac 9/1/2021     Referral to Dr Marlen Esparza on 2/24/2021, appt 3/18/2021 then 4/9/2021, first procedure scheduled on 4/27/2021                    Follow up with Dr Elisabet Prather in 1 week in the wound care center  Call [phone] for any questions or concerns.   Date__________   Time__________            Treatment Note      Written Patient Dismissal Instructions Given            Electronically signed by Raisa Root MD on 11/10/2021 at 1:42 PM

## 2021-11-17 ENCOUNTER — HOSPITAL ENCOUNTER (OUTPATIENT)
Dept: WOUND CARE | Age: 76
Discharge: HOME | End: 2021-11-17
Payer: COMMERCIAL

## 2021-11-17 VITALS
SYSTOLIC BLOOD PRESSURE: 159 MMHG | RESPIRATION RATE: 16 BRPM | DIASTOLIC BLOOD PRESSURE: 80 MMHG | HEART RATE: 84 BPM | TEMPERATURE: 98.1 F

## 2021-11-17 DIAGNOSIS — I87.331 CHRONIC VENOUS HYPERTENSION WITH ULCER AND INFLAMMATION INVOLVING RIGHT SIDE (HCC): Primary | ICD-10-CM

## 2021-11-17 DIAGNOSIS — L97.919 CHRONIC VENOUS HYPERTENSION WITH ULCER AND INFLAMMATION INVOLVING RIGHT SIDE (HCC): Primary | ICD-10-CM

## 2021-11-17 DIAGNOSIS — L97.929 VENOUS ULCER OF LEFT LEG (HCC): ICD-10-CM

## 2021-11-17 DIAGNOSIS — I83.029 VENOUS ULCER OF LEFT LEG (HCC): ICD-10-CM

## 2021-11-17 PROCEDURE — 11042 DBRDMT SUBQ TIS 1ST 20SQCM/<: CPT

## 2021-11-17 PROCEDURE — 11042 DBRDMT SUBQ TIS 1ST 20SQCM/<: CPT | Performed by: SURGERY

## 2021-11-17 RX ORDER — LIDOCAINE 40 MG/G
CREAM TOPICAL ONCE
Status: CANCELLED | OUTPATIENT
Start: 2021-11-17 | End: 2021-11-17

## 2021-11-17 RX ORDER — LIDOCAINE HYDROCHLORIDE 20 MG/ML
JELLY TOPICAL ONCE
Status: CANCELLED | OUTPATIENT
Start: 2021-11-17 | End: 2021-11-17

## 2021-11-17 RX ORDER — BACITRACIN, NEOMYCIN, POLYMYXIN B 400; 3.5; 5 [USP'U]/G; MG/G; [USP'U]/G
OINTMENT TOPICAL ONCE
Status: CANCELLED | OUTPATIENT
Start: 2021-11-17 | End: 2021-11-17

## 2021-11-17 RX ORDER — GENTAMICIN SULFATE 1 MG/G
OINTMENT TOPICAL ONCE
Status: CANCELLED | OUTPATIENT
Start: 2021-11-17 | End: 2021-11-17

## 2021-11-17 RX ORDER — LIDOCAINE 50 MG/G
OINTMENT TOPICAL ONCE
Status: CANCELLED | OUTPATIENT
Start: 2021-11-17 | End: 2021-11-17

## 2021-11-17 RX ORDER — BETAMETHASONE DIPROPIONATE 0.05 %
OINTMENT (GRAM) TOPICAL ONCE
Status: CANCELLED | OUTPATIENT
Start: 2021-11-17 | End: 2021-11-17

## 2021-11-17 RX ORDER — LIDOCAINE 50 MG/G
OINTMENT TOPICAL ONCE
Status: DISCONTINUED | OUTPATIENT
Start: 2021-11-17 | End: 2021-11-18 | Stop reason: HOSPADM

## 2021-11-17 RX ORDER — BACITRACIN ZINC AND POLYMYXIN B SULFATE 500; 1000 [USP'U]/G; [USP'U]/G
OINTMENT TOPICAL ONCE
Status: CANCELLED | OUTPATIENT
Start: 2021-11-17 | End: 2021-11-17

## 2021-11-17 RX ORDER — LIDOCAINE HYDROCHLORIDE 40 MG/ML
SOLUTION TOPICAL ONCE
Status: CANCELLED | OUTPATIENT
Start: 2021-11-17 | End: 2021-11-17

## 2021-11-17 RX ORDER — CLOBETASOL PROPIONATE 0.5 MG/G
OINTMENT TOPICAL ONCE
Status: CANCELLED | OUTPATIENT
Start: 2021-11-17 | End: 2021-11-17

## 2021-11-17 RX ORDER — GINSENG 100 MG
CAPSULE ORAL ONCE
Status: CANCELLED | OUTPATIENT
Start: 2021-11-17 | End: 2021-11-17

## 2021-11-17 NOTE — PROGRESS NOTES
Wound Care Center Progress Note with Procedure Note      Annetta Nguyen  AGE: 77 y.o. GENDER: male  : 1945  EPISODE DATE:  2021     Subjective:     Chief Complaint   Patient presents with    Wound Check     Right lower leg         HISTORY of PRESENT ILLNESS      Annetta Nguyen is a 77 y.o. male who presents today for wound evaluation of Chronic venous wound(s) of R lower leg medial.  The wound is of moderate severity. The underlying cause of the wound is venous disease and trauma. Wound Pain Timing/Severity: none  Quality of pain: N/A  Severity of pain:  0 / 10   Modifying Factors: venous stasis  Associated Signs/Symptoms: none        PAST MEDICAL HISTORY        Diagnosis Date    Chronic venous hypertension with ulcer and inflammation involving right side (HCC)     RLE    Hypertension     past    Iron deficiency     Lymphoma Dx 11-    Hx of B cell lymphoma-in remission since        PAST SURGICAL HISTORY    Past Surgical History:   Procedure Laterality Date    TUNNELED VENOUS PORT PLACEMENT      TUNNELED VENOUS PORT PLACEMENT      removed 2012       FAMILY HISTORY    Family History   Problem Relation Age of Onset    Early Death Mother 52    Diabetes Father     Heart Disease Father     Vision Loss Son         \"He wears glasses\"    Early Death Daughter 36       SOCIAL HISTORY    Social History     Tobacco Use    Smoking status: Never Smoker    Smokeless tobacco: Never Used   Substance Use Topics    Alcohol use: No    Drug use: No       ALLERGIES    No Known Allergies    MEDICATIONS    Current Outpatient Medications on File Prior to Encounter   Medication Sig Dispense Refill    metoprolol succinate (TOPROL XL) 50 MG extended release tablet Take 1 tablet by mouth daily 30 tablet 5     No current facility-administered medications on file prior to encounter.        REVIEW OF SYSTEMS    Pertinent items are noted in HPI.  Constitutional: Negative for systemic symptoms including fever, chills and malaise. Objective:      BP (!) 159/80   Pulse 84   Temp 98.1 °F (36.7 °C) (Temporal)   Resp 16     PHYSICAL EXAM      General: The patient is in no acute distress. Mental status:  Patient is appropriate, is  oriented to place and plan of care. Dermatologic exam: Visual inspection of the periwound reveals the skin to be normal in turgor and texture  Wound exam: see wound description below in procedure note      Assessment:     Problem List Items Addressed This Visit     Chronic venous hypertension with ulcer and inflammation (HCC) - Primary    Relevant Medications    lidocaine (XYLOCAINE) 5 % ointment    Other Relevant Orders    Initiate Outpatient Wound Care Protocol    Venous ulcer of left leg (HCC)    Relevant Medications    lidocaine (XYLOCAINE) 5 % ointment    Other Relevant Orders    Initiate Outpatient Wound Care Protocol        Procedure Note    Indications:  Based on my examination of this patient's wound(s) today, sharp excision into necrotic dermis and subcutaneous tissue is required to promote healing and evaluate the extent of previous healing. Performed by: Caleb Mcintosh MD    Consent obtained: Yes    Time out taken:  Yes    Pain Control: topical       Debridement:Excisional Debridement    Using curette and forceps the wound(s) was/were sharply debrided down through and including the removal of dermis and subcutaneous tissue.         Devitalized Tissue Debrided:  slough and callus    Pre Debridement Measurements:  Are located in the Wound Documentation Flow Sheet    All active wounds listed below with today's date are evaluated  Wound(s)    debrided this date include # : 1     Post  Debridement Measurements:  Wound 09/30/20 Wound #1 Right Medial Lower Leg Cluster (Onset 5 Years) (Active)   Wound Image   11/17/21 1255   Wound Etiology Venous 11/17/21 1314   Dressing Status New dressing applied; Clean; Dry; Intact 10/27/21 1402   Wound Cleansed Soap and water; Vashe 11/17/21 1255   Dressing/Treatment Alginate with Ag 01/13/21 1657   Offloading for Diabetic Foot Ulcers No offloading required 11/17/21 1314   Wound Length (cm) 6.5 cm 11/17/21 1255   Wound Width (cm) 2.6 cm 11/17/21 1255   Wound Depth (cm) 0.1 cm 11/17/21 1255   Wound Surface Area (cm^2) 16.9 cm^2 11/17/21 1255   Change in Wound Size % (l*w) 93.29 11/17/21 1255   Wound Volume (cm^3) 1.69 cm^3 11/17/21 1255   Wound Healing % 93 11/17/21 1255   Post-Procedure Length (cm) 6.6 cm 11/10/21 1321   Post-Procedure Width (cm) 2.9 cm 11/10/21 1321   Post-Procedure Depth (cm) 0.1 cm 11/10/21 1321   Post-Procedure Surface Area (cm^2) 19.14 cm^2 11/10/21 1321   Post-Procedure Volume (cm^3) 1.914 cm^3 11/10/21 1321   Distance Tunneling (cm) 0 cm 11/17/21 1255   Tunneling Position ___ O'Clock 0 11/17/21 1255   Undermining Starts ___ O'Clock 0 11/17/21 1255   Undermining Ends___ O'Clock 0 11/17/21 1255   Undermining Maxium Distance (cm) 0 11/17/21 1255   Wound Assessment Granulation tissue 11/17/21 1255   Drainage Amount Moderate 11/17/21 1255   Drainage Description Serosanguinous 11/17/21 1255   Odor None 11/17/21 1255   Cassia-wound Assessment Dry/flaky 11/17/21 1255   Margins Defined edges 11/17/21 1255   Wound Thickness Description not for Pressure Injury Full thickness 11/17/21 1255   Number of days: 413       Percent of Wound(s) Debrided: approximately 100%    Total Surface Area Debrided:  16.9 sq cm     Bleeding:  Minimal    Hemostasis Achieved:  by pressure    Procedural Pain:  0  / 10     Post Procedural Pain:  0 / 10     Response to treatment:  Well tolerated by patient. Status of wound progress and description from last visit:   Improved. Plan:       Discharge Instructions         Discharge Instructions        PHYSICIAN ORDERS AND DISCHARGE INSTRUCTIONS     NOTE: Upon discharge from the [address], you will receive a patient experience survey.  We would be grateful if you would take the time to fill this survey out.     Wound care order history:                 Vascular studies: Arterial studies done 9/27/20; no stenosis              Imaging:   Date               Cultures:   obtained on 11/18/2020                                 Obtained on 1/20/2021               Labs/ HbA1c:   Date               Grafts:                       #1 Apligraf on 12/16/2020                     #2 Apligraf on 12/23/2020                     #3 Apligraf on 12/30/2020                     #4 Apligraf on 1/6/2021                     # 5 Apligraf on 1/13/21                     #1 [place] 1/27/2021                     #2 [place] 2/3/2021                     #3 [place] 2/10/2021                     #4 [place] Right Medial Lower Leg 2/17/21                     #5 [place] 3/10/2021                   HBO:                Antibiotics:  Bactrim DS BID for 10 days on 11/18/2020                                   Cipro BID for 10 days ordered on 11/25/2020                                    Cipro 500 mg BID on 1/20/2021                                    Cipro 500 mg BID on 4/28/2021              Earlier Wound care treatments:                Authorizations:                        Consults:   Date                           Primary care physician:      Continuing wound care orders and information:              Residence:  private               [facility] care with: [facility]               Your wound-care supplies will be provided by: Suzanna Chavez provider:              RICKEY with  Carlie Alcantar loading:  Date               BVZUP Medications:              WXLAV cleansing:                           RJ not scrub or use excessive force.                          Wash hands with soap and water before and after dressing changes.                           Prior to applying a clean dressing, cleanse wound with normal saline,                                wound cleanser, or mild soap and water.                           PEX the physician or nurse before getting the wound(s) wet in a shower              Daily Wound management:                          PGQX weight off wounds and reposition every 2 hours.                          QTXAT standing for long periods of time.                          VLZIS wraps/stockings in AM and remove at bedtime.                          If swelling is present, elevate legs to the level of the heart or above for 30 minutes 4-5 times a day and/or when sitting.                                             When taking antibiotics take entire prescription as ordered by physician do not stop taking until medicine is all gone.                                           Orders for this week: 11/17/21     Fax orders to [facility]     Right dorsal foot - healed      Right lower leg -- wash with soap and water, pat dry. Foam to anterior blister area may stay in place for 1 week. Then: Apply A&D to sarah wound. Cover with anasept gel and stimulin powder covered with ABD and wrap with Coban 2.       Homecare to change on Friday and Monday (may use Calcium alginate instead of anasept, profore to wrap)           Auth for ERIC vac 9/1/2021     Referral to Dr Sergio Leon on first procedure scheduled on 4/27/2021                    Follow up with Dr Jcarlos Huizar in 1 week in the wound care center  Call [phone] for any questions or concerns.   Date__________   Time__________                     Treatment Note      Written Patient Dismissal Instructions Given            Electronically signed by Alyx Kang MD on 11/17/2021 at 1:38 PM

## 2021-11-24 ENCOUNTER — HOSPITAL ENCOUNTER (OUTPATIENT)
Dept: WOUND CARE | Age: 76
Discharge: HOME | End: 2021-11-24
Payer: COMMERCIAL

## 2021-11-24 VITALS
HEART RATE: 66 BPM | RESPIRATION RATE: 16 BRPM | TEMPERATURE: 97 F | DIASTOLIC BLOOD PRESSURE: 88 MMHG | SYSTOLIC BLOOD PRESSURE: 158 MMHG

## 2021-11-24 DIAGNOSIS — I83.892 VARICOSE VEINS OF LEFT LOWER EXTREMITY WITH OTHER COMPLICATIONS: ICD-10-CM

## 2021-11-24 DIAGNOSIS — Z98.890 STATUS POST ENDOVENOUS RADIOFREQUENCY ABLATION (RFA) OF SAPHENOUS VEIN: ICD-10-CM

## 2021-11-24 DIAGNOSIS — I87.331 CHRONIC VENOUS HYPERTENSION WITH ULCER AND INFLAMMATION INVOLVING RIGHT SIDE (HCC): ICD-10-CM

## 2021-11-24 DIAGNOSIS — L97.919 VENOUS STASIS ULCER OF RIGHT LOWER LEG WITH EDEMA OF RIGHT LOWER LEG (HCC): Primary | ICD-10-CM

## 2021-11-24 DIAGNOSIS — L97.919 CHRONIC VENOUS HYPERTENSION WITH ULCER AND INFLAMMATION INVOLVING RIGHT SIDE (HCC): ICD-10-CM

## 2021-11-24 DIAGNOSIS — L97.929 VENOUS ULCER OF LEFT LEG (HCC): ICD-10-CM

## 2021-11-24 DIAGNOSIS — I83.893 VARICOSE VEINS OF LOWER EXTREMITIES WITH COMPLICATIONS, BILATERAL: ICD-10-CM

## 2021-11-24 DIAGNOSIS — I83.019 VENOUS STASIS ULCER OF RIGHT LOWER LEG WITH EDEMA OF RIGHT LOWER LEG (HCC): Primary | ICD-10-CM

## 2021-11-24 DIAGNOSIS — I83.029 VENOUS ULCER OF LEFT LEG (HCC): ICD-10-CM

## 2021-11-24 DIAGNOSIS — I83.891 VENOUS STASIS ULCER OF RIGHT LOWER LEG WITH EDEMA OF RIGHT LOWER LEG (HCC): Primary | ICD-10-CM

## 2021-11-24 DIAGNOSIS — R60.9 VENOUS STASIS ULCER OF RIGHT LOWER LEG WITH EDEMA OF RIGHT LOWER LEG (HCC): Primary | ICD-10-CM

## 2021-11-24 DIAGNOSIS — I83.891 VARICOSE VEINS OF LOWER EXTREMITIES WITH COMPLICATIONS, RIGHT: ICD-10-CM

## 2021-11-24 PROCEDURE — 11042 DBRDMT SUBQ TIS 1ST 20SQCM/<: CPT | Performed by: NURSE PRACTITIONER

## 2021-11-24 PROCEDURE — 11042 DBRDMT SUBQ TIS 1ST 20SQCM/<: CPT

## 2021-11-24 RX ORDER — LIDOCAINE HYDROCHLORIDE 40 MG/ML
SOLUTION TOPICAL ONCE
Status: DISCONTINUED | OUTPATIENT
Start: 2021-11-24 | End: 2021-11-25 | Stop reason: HOSPADM

## 2021-11-24 RX ORDER — LIDOCAINE 40 MG/G
CREAM TOPICAL ONCE
Status: CANCELLED | OUTPATIENT
Start: 2021-11-24 | End: 2021-11-24

## 2021-11-24 RX ORDER — LIDOCAINE 50 MG/G
OINTMENT TOPICAL ONCE
Status: CANCELLED | OUTPATIENT
Start: 2021-11-24 | End: 2021-11-24

## 2021-11-24 RX ORDER — BETAMETHASONE DIPROPIONATE 0.05 %
OINTMENT (GRAM) TOPICAL ONCE
Status: CANCELLED | OUTPATIENT
Start: 2021-11-24 | End: 2021-11-24

## 2021-11-24 RX ORDER — LIDOCAINE HYDROCHLORIDE 20 MG/ML
JELLY TOPICAL ONCE
Status: CANCELLED | OUTPATIENT
Start: 2021-11-24 | End: 2021-11-24

## 2021-11-24 RX ORDER — BACITRACIN ZINC AND POLYMYXIN B SULFATE 500; 1000 [USP'U]/G; [USP'U]/G
OINTMENT TOPICAL ONCE
Status: CANCELLED | OUTPATIENT
Start: 2021-11-24 | End: 2021-11-24

## 2021-11-24 RX ORDER — CLOBETASOL PROPIONATE 0.5 MG/G
OINTMENT TOPICAL ONCE
Status: CANCELLED | OUTPATIENT
Start: 2021-11-24 | End: 2021-11-24

## 2021-11-24 RX ORDER — LIDOCAINE HYDROCHLORIDE 40 MG/ML
SOLUTION TOPICAL ONCE
Status: CANCELLED | OUTPATIENT
Start: 2021-11-24 | End: 2021-11-24

## 2021-11-24 RX ORDER — BACITRACIN, NEOMYCIN, POLYMYXIN B 400; 3.5; 5 [USP'U]/G; MG/G; [USP'U]/G
OINTMENT TOPICAL ONCE
Status: CANCELLED | OUTPATIENT
Start: 2021-11-24 | End: 2021-11-24

## 2021-11-24 RX ORDER — GENTAMICIN SULFATE 1 MG/G
OINTMENT TOPICAL ONCE
Status: CANCELLED | OUTPATIENT
Start: 2021-11-24 | End: 2021-11-24

## 2021-11-24 RX ORDER — GINSENG 100 MG
CAPSULE ORAL ONCE
Status: CANCELLED | OUTPATIENT
Start: 2021-11-24 | End: 2021-11-24

## 2021-11-24 ASSESSMENT — PAIN SCALES - GENERAL: PAINLEVEL_OUTOF10: 0

## 2021-11-24 NOTE — PROGRESS NOTES
Multilayer Compression Wrap   (Not Unna) Below the Knee    NAME:  Fahad Jerry  YOB: 1945  MEDICAL RECORD NUMBER:  [id number]  DATE:  11/24/2021    Multilayer compression wrap: Removed old Multilayer wrap if indicated and wash leg with mild soap/water. Applied moisturizing agent to dry skin as needed. Applied primary and secondary dressing as ordered. Applied multilayered dressing below the knee to right lower leg. Instructed patient/caregiver not to remove dressing and to keep it clean and dry. Instructed patient/caregiver on complications to report to provider, such as pain, numbness in toes, heavy drainage, and slippage of dressing. Instructed patient on purpose of compression dressing and on activity and exercise recommendations.       Electronically signed by Sharda Escobedo RN on 11/24/2021 at 1:30 PM

## 2021-11-24 NOTE — PROGRESS NOTES
Wound Care Center Progress Note With Procedure    Eugene Faulkner  AGE: 77 y.o. GENDER: male  : 1945  EPISODE DATE:  2021     Subjective:     Chief Complaint   Patient presents with    Wound Check         HISTORY of PRESENT ILLNESS      Eugene Faulkner is a 77 y.o. male who presents today for wound evaluation of Chronic venous wound(s) of R lower leg medial.  The wound is of moderate severity. The underlying cause of the wound is venous disease and trauma. Patient is healing and improving in size. He is doing much better with his venous interventions complete. Wound Pain Timing/Severity: none  Quality of pain: N/A  Severity of pain:  0 / 10   Modifying Factors: venous stasis  Associated Signs/Symptoms: none        PAST MEDICAL HISTORY        Diagnosis Date    Chronic venous hypertension with ulcer and inflammation involving right side (HCC)     RLE    Hypertension     past    Iron deficiency     Lymphoma Dx     Hx of B cell lymphoma-in remission since        PAST SURGICAL HISTORY    Past Surgical History:   Procedure Laterality Date    TUNNELED VENOUS PORT PLACEMENT      TUNNELED VENOUS PORT PLACEMENT      removed 2012       FAMILY HISTORY    Family History   Problem Relation Age of Onset    Early Death Mother 52    Diabetes Father     Heart Disease Father     Vision Loss Son         \"He wears glasses\"    Early Death Daughter 36       SOCIAL HISTORY    Social History     Tobacco Use    Smoking status: Never Smoker    Smokeless tobacco: Never Used   Substance Use Topics    Alcohol use: No    Drug use: No       ALLERGIES    No Known Allergies    MEDICATIONS    Current Outpatient Medications on File Prior to Encounter   Medication Sig Dispense Refill    metoprolol succinate (TOPROL XL) 50 MG extended release tablet Take 1 tablet by mouth daily 30 tablet 5     No current facility-administered medications on file prior to encounter.        REVIEW OF SYSTEMS    Pertinent items are noted in HPI. Constitutional: Negative for systemic symptoms including fever, chills and malaise. Objective:      BP (!) 158/88   Pulse 66   Temp 97 °F (36.1 °C) (Bladder)   Resp 16     PHYSICAL EXAM      General: The patient is in no acute distress. Mental status:  Patient is appropriate, is  oriented to place and plan of care. Dermatologic exam: Visual inspection of the periwound reveals the skin to be normal in turgor and texture and dry  Wound exam: see wound description below in procedure note      Assessment:     Problem List Items Addressed This Visit     Chronic venous hypertension with ulcer and inflammation (HCC)    Relevant Medications    lidocaine (XYLOCAINE) 4 % external solution (Start on 11/24/2021  1:30 PM)    Other Relevant Orders    Initiate Outpatient Wound Care Protocol    Venous ulcer of left leg (HCC)    Relevant Medications    lidocaine (XYLOCAINE) 4 % external solution (Start on 11/24/2021  1:30 PM)    Other Relevant Orders    Initiate Outpatient Wound Care Protocol    Venous stasis ulcer of right lower leg with edema of right lower leg (HCC) - Primary    Varicose veins of lower extremities with complications, bilateral    Varicose veins of left lower extremity with other complications    Status post endovenous radiofrequency ablation (RFA) of saphenous vein    Varicose veins of lower extremities with complications, right        Procedure Note    Indications:  Based on my examination of this patient's wound(s) today, sharp excision into necrotic subcutaneous tissue is required to promote healing and evaluate the extent of previous healing.     Performed by: LOLY Conway CNP    Consent obtained: Yes    Time out taken:  Yes    Pain Control: Anesthetic  Anesthetic: 4% Lidocaine Liquid Topical     Debridement:Excisional Debridement    Using #15 blade scalpel the wound(s) was/were sharply debrided down through and including the removal of physician:      Continuing wound care orders and information:              Residence:  private               Continue home health care with: [facility] wound-care supplies will be provided by: Juan Dillon provider:              FRED with  Anabell Casanova loading:  Date               ADYYK Medications:              BMXMA cleansing:                           MR not scrub or use excessive force.                          Wash hands with soap and water before and after dressing changes.                         Prior to applying a clean dressing, cleanse wound with normal saline,                                wound cleanser, or mild soap and water.                           Ask the physician or nurse before getting the wound(s) wet in a shower              Daily Wound management:                          Keep weight off wounds and reposition every 2 hours.                          XXCAF standing for long periods of time.                          KJHPO wraps/stockings in AM and remove at bedtime.                          If swelling is present, elevate legs to the level of the heart or above for 30 minutes 4-5 times a day and/or when sitting.                                             When taking antibiotics take entire prescription as ordered by physician do not stop taking until medicine is all gone.                                           Orders for this week: 11/24/21     Fax orders to [facility] [place]     Right dorsal foot - healed      Right lower leg -- wash with soap and water, pat dry. Foam to anterior blister area may stay in place for 1 week. Then: Apply A&D to sarah wound.  Cover with anasept gel and stimulin powder covered with ABD and wrap with Coban 2.       Homecare to change on Friday and Monday (may use Calcium alginate instead of anasept, profore to wrap)           Auth for ERIC vac 9/1/2021     Referral to Dr David Rodriguez on first procedure scheduled on 4/27/2021 and 11/8/2021                    Follow up with Dr Manolo Madsen in 1 week in the wound care center  Call [phone] for any questions or concerns.   Date__________   Time__________                  Treatment Note      Written Patient Dismissal Instructions Given            Electronically signed by LOLY Fairbanks CNP on 11/24/2021 at 1:11 PM

## 2021-11-27 PROBLEM — I83.891 VARICOSE VEINS OF RIGHT LOWER EXTREMITY WITH OTHER COMPLICATIONS: Status: ACTIVE | Noted: 2021-11-27

## 2021-12-01 ENCOUNTER — HOSPITAL ENCOUNTER (OUTPATIENT)
Dept: WOUND CARE | Age: 76
Discharge: HOME | End: 2021-12-01
Payer: COMMERCIAL

## 2021-12-01 VITALS
TEMPERATURE: 97.7 F | RESPIRATION RATE: 18 BRPM | HEART RATE: 68 BPM | SYSTOLIC BLOOD PRESSURE: 168 MMHG | DIASTOLIC BLOOD PRESSURE: 94 MMHG

## 2021-12-01 DIAGNOSIS — I83.891 VARICOSE VEINS OF LOWER EXTREMITIES WITH COMPLICATIONS, RIGHT: ICD-10-CM

## 2021-12-01 DIAGNOSIS — I83.893 VARICOSE VEINS OF LOWER EXTREMITIES WITH COMPLICATIONS, BILATERAL: ICD-10-CM

## 2021-12-01 DIAGNOSIS — I83.029 VENOUS ULCER OF LEFT LEG (HCC): ICD-10-CM

## 2021-12-01 DIAGNOSIS — I83.891 VARICOSE VEINS OF RIGHT LOWER EXTREMITY WITH OTHER COMPLICATIONS: ICD-10-CM

## 2021-12-01 DIAGNOSIS — I87.331 CHRONIC VENOUS HYPERTENSION WITH ULCER AND INFLAMMATION INVOLVING RIGHT SIDE (HCC): ICD-10-CM

## 2021-12-01 DIAGNOSIS — L97.929 VENOUS ULCER OF LEFT LEG (HCC): ICD-10-CM

## 2021-12-01 DIAGNOSIS — I83.019 VENOUS STASIS ULCER OF RIGHT LOWER LEG WITH EDEMA OF RIGHT LOWER LEG (HCC): ICD-10-CM

## 2021-12-01 DIAGNOSIS — R60.9 VENOUS STASIS ULCER OF RIGHT LOWER LEG WITH EDEMA OF RIGHT LOWER LEG (HCC): ICD-10-CM

## 2021-12-01 DIAGNOSIS — L97.919 VENOUS STASIS ULCER OF RIGHT LOWER LEG WITH EDEMA OF RIGHT LOWER LEG (HCC): ICD-10-CM

## 2021-12-01 DIAGNOSIS — I83.892 VARICOSE VEINS OF LEFT LOWER EXTREMITY WITH OTHER COMPLICATIONS: ICD-10-CM

## 2021-12-01 DIAGNOSIS — L97.919 CHRONIC VENOUS HYPERTENSION WITH ULCER AND INFLAMMATION INVOLVING RIGHT SIDE (HCC): ICD-10-CM

## 2021-12-01 DIAGNOSIS — I83.891 VENOUS STASIS ULCER OF RIGHT LOWER LEG WITH EDEMA OF RIGHT LOWER LEG (HCC): ICD-10-CM

## 2021-12-01 DIAGNOSIS — Z98.890 STATUS POST ENDOVENOUS RADIOFREQUENCY ABLATION (RFA) OF SAPHENOUS VEIN: Primary | ICD-10-CM

## 2021-12-01 PROCEDURE — 11042 DBRDMT SUBQ TIS 1ST 20SQCM/<: CPT | Performed by: NURSE PRACTITIONER

## 2021-12-01 PROCEDURE — 11042 DBRDMT SUBQ TIS 1ST 20SQCM/<: CPT

## 2021-12-01 RX ORDER — GENTAMICIN SULFATE 1 MG/G
OINTMENT TOPICAL ONCE
Status: CANCELLED | OUTPATIENT
Start: 2021-12-01 | End: 2021-12-01

## 2021-12-01 RX ORDER — LIDOCAINE 50 MG/G
OINTMENT TOPICAL ONCE
Status: DISCONTINUED | OUTPATIENT
Start: 2021-12-01 | End: 2021-12-02 | Stop reason: HOSPADM

## 2021-12-01 RX ORDER — LIDOCAINE HYDROCHLORIDE 20 MG/ML
JELLY TOPICAL ONCE
Status: CANCELLED | OUTPATIENT
Start: 2021-12-01 | End: 2021-12-01

## 2021-12-01 RX ORDER — BETAMETHASONE DIPROPIONATE 0.05 %
OINTMENT (GRAM) TOPICAL ONCE
Status: CANCELLED | OUTPATIENT
Start: 2021-12-01 | End: 2021-12-01

## 2021-12-01 RX ORDER — LIDOCAINE 40 MG/G
CREAM TOPICAL ONCE
Status: CANCELLED | OUTPATIENT
Start: 2021-12-01 | End: 2021-12-01

## 2021-12-01 RX ORDER — LIDOCAINE 50 MG/G
OINTMENT TOPICAL ONCE
Status: CANCELLED | OUTPATIENT
Start: 2021-12-01 | End: 2021-12-01

## 2021-12-01 RX ORDER — GINSENG 100 MG
CAPSULE ORAL ONCE
Status: CANCELLED | OUTPATIENT
Start: 2021-12-01 | End: 2021-12-01

## 2021-12-01 RX ORDER — LIDOCAINE HYDROCHLORIDE 40 MG/ML
SOLUTION TOPICAL ONCE
Status: CANCELLED | OUTPATIENT
Start: 2021-12-01 | End: 2021-12-01

## 2021-12-01 RX ORDER — BACITRACIN, NEOMYCIN, POLYMYXIN B 400; 3.5; 5 [USP'U]/G; MG/G; [USP'U]/G
OINTMENT TOPICAL ONCE
Status: CANCELLED | OUTPATIENT
Start: 2021-12-01 | End: 2021-12-01

## 2021-12-01 RX ORDER — CLOBETASOL PROPIONATE 0.5 MG/G
OINTMENT TOPICAL ONCE
Status: CANCELLED | OUTPATIENT
Start: 2021-12-01 | End: 2021-12-01

## 2021-12-01 RX ORDER — BACITRACIN ZINC AND POLYMYXIN B SULFATE 500; 1000 [USP'U]/G; [USP'U]/G
OINTMENT TOPICAL ONCE
Status: CANCELLED | OUTPATIENT
Start: 2021-12-01 | End: 2021-12-01

## 2021-12-01 ASSESSMENT — PAIN SCALES - GENERAL: PAINLEVEL_OUTOF10: 0

## 2021-12-01 NOTE — PROGRESS NOTES
Multilayer Compression Wrap   (Not Unna) Below the Knee    NAME:  Annetta Nguyen  YOB: 1945  MEDICAL RECORD NUMBER:  [id number]  DATE:  12/1/2021    Multilayer compression wrap: Removed old Multilayer wrap if indicated and wash leg with mild soap/water. Applied moisturizing agent to dry skin as needed. Applied primary and secondary dressing as ordered. Applied multilayered dressing below the knee to right lower leg. Instructed patient/caregiver not to remove dressing and to keep it clean and dry. Instructed patient/caregiver on complications to report to provider, such as pain, numbness in toes, heavy drainage, and slippage of dressing. Instructed patient on purpose of compression dressing and on activity and exercise recommendations.       Electronically signed by Jonathan Renteria LPN on 92/5/1682 at 9:54 PM

## 2021-12-01 NOTE — PROGRESS NOTES
Wound Care Center Progress Note With Procedure    Aruna Sanders  AGE: 77 y.o. GENDER: male  : 1945  EPISODE DATE:  2021     Subjective:     Chief Complaint   Patient presents with    Wound Check     right leg          HISTORY of PRESENT ILLNESS      Fuentes Daley is a 77 y. o. male who presents today for wound evaluation of Chronic venous wound(s) of R lower leg medial.  The wound is of moderate severity.  The underlying cause of the wound is venous disease and trauma.       We would like for patient to go a week between dressing changes.  He states he is having very little drainage and no other issues to report     Wound Pain Timing/Severity: none  Quality of pain: N/A  Severity of pain:  0 / 10   Modifying Factors: venous stasis  Associated Signs/Symptoms: none        PAST MEDICAL HISTORY        Diagnosis Date    Chronic venous hypertension with ulcer and inflammation involving right side (HCC)     RLE    Hypertension     past    Iron deficiency     Lymphoma Dx 11-11    Hx of B cell lymphoma-in remission since        PAST SURGICAL HISTORY    Past Surgical History:   Procedure Laterality Date    TUNNELED VENOUS PORT PLACEMENT      TUNNELED VENOUS PORT PLACEMENT      removed 2012       FAMILY HISTORY    Family History   Problem Relation Age of Onset    Early Death Mother 52    Diabetes Father     Heart Disease Father     Vision Loss Son         \"He wears glasses\"    Early Death Daughter 36       SOCIAL HISTORY    Social History     Tobacco Use    Smoking status: Never Smoker    Smokeless tobacco: Never Used   Substance Use Topics    Alcohol use: No    Drug use: No       ALLERGIES    No Known Allergies    MEDICATIONS    Current Outpatient Medications on File Prior to Encounter   Medication Sig Dispense Refill    metoprolol succinate (TOPROL XL) 50 MG extended release tablet Take 1 tablet by mouth daily 30 tablet 5     No current facility-administered medications on file prior to encounter. REVIEW OF SYSTEMS    Pertinent items are noted in HPI. Constitutional: Negative for systemic symptoms including fever, chills and malaise. Objective:      BP (!) 168/94   Pulse 68   Temp 97.7 °F (36.5 °C) (Temporal)   Resp 18     PHYSICAL EXAM      General: The patient is in no acute distress. Mental status:  Patient is appropriate, is  oriented to place and plan of care. Dermatologic exam: Visual inspection of the periwound reveals the skin to be normal in turgor and texture, dry and slack  Wound exam: see wound description below in procedure note      Assessment:     Problem List Items Addressed This Visit     Chronic venous hypertension with ulcer and inflammation (HCC)    Relevant Medications    lidocaine (XYLOCAINE) 5 % ointment (Start on 12/1/2021  2:00 PM)    Other Relevant Orders    Initiate Outpatient Wound Care Protocol    Venous ulcer of left leg (HCC)    Relevant Medications    lidocaine (XYLOCAINE) 5 % ointment (Start on 12/1/2021  2:00 PM)    Other Relevant Orders    Initiate Outpatient Wound Care Protocol    Venous stasis ulcer of right lower leg with edema of right lower leg (HCC)    Varicose veins of lower extremities with complications, bilateral    Varicose veins of left lower extremity with other complications    Status post endovenous radiofrequency ablation (RFA) of saphenous vein - Primary    Varicose veins of lower extremities with complications, right    Varicose veins of right lower extremity with other complications        Procedure Note    Indications:  Based on my examination of this patient's wound(s) today, sharp excision into necrotic subcutaneous tissue is required to promote healing and evaluate the extent of previous healing.     Performed by: Teagan Monroe, LOLY - CNP    Consent obtained: Yes    Time out taken:  Yes    Pain Control: Anesthetic  Anesthetic: 5% Lidocaine Ointment Topical     Debridement:Excisional Debridement    Using #15 blade scalpel the wound(s) was/were sharply debrided down through and including the removal of subcutaneous tissue.         Devitalized Tissue Debrided:  fibrin, biofilm, slough and exudate    Pre Debridement Measurements:  Are located in the Wound Documentation Flow Sheet    All active wounds listed below with today's date are evaluated  Wound(s)    debrided this date include # : 1     Post  Debridement Measurements:  Wound 07/29/13 Venous ulcer Ankle Right;Lateral (Active)   Number of days: 3047       Wound 09/30/20 Wound #1 Right Medial Lower Leg Cluster (Onset 5 Years) (Active)   Wound Image   11/17/21 1255   Wound Etiology Venous 12/01/21 1325   Dressing Status New dressing applied; Clean; Dry; Intact 11/24/21 1330   Wound Cleansed Soap and water 12/01/21 1325   Dressing/Treatment Alginate with Ag 01/13/21 1657   Offloading for Diabetic Foot Ulcers No offloading required 12/01/21 1325   Wound Length (cm) 6 cm 12/01/21 1325   Wound Width (cm) 2.5 cm 12/01/21 1325   Wound Depth (cm) 0.1 cm 12/01/21 1325   Wound Surface Area (cm^2) 15 cm^2 12/01/21 1325   Change in Wound Size % (l*w) 94.05 12/01/21 1325   Wound Volume (cm^3) 1.5 cm^3 12/01/21 1325   Wound Healing % 94 12/01/21 1325   Post-Procedure Length (cm) 6 cm 12/01/21 1354   Post-Procedure Width (cm) 2.5 cm 12/01/21 1354   Post-Procedure Depth (cm) 0.1 cm 12/01/21 1354   Post-Procedure Surface Area (cm^2) 15 cm^2 12/01/21 1354   Post-Procedure Volume (cm^3) 1.5 cm^3 12/01/21 1354   Distance Tunneling (cm) 0 cm 12/01/21 1325   Tunneling Position ___ O'Clock 0 12/01/21 1325   Undermining Starts ___ O'Clock 0 12/01/21 1325   Undermining Ends___ O'Clock 0 12/01/21 1325   Undermining Maxium Distance (cm) 0 12/01/21 1325   Wound Assessment Granulation tissue 12/01/21 1325   Drainage Amount Moderate 12/01/21 1325   Drainage Description Serosanguinous 12/01/21 1325   Odor None 12/01/21 1325   Cassia-wound Assessment Dry/flaky 12/01/21 1325   Margins Defined edges 12/01/21 1325   Wound Thickness Description not for Pressure Injury Full thickness 12/01/21 1325   Number of days: 427       Percent of Wound(s) Debrided: approximately 100%    Total  Area  Debrided:  15 sq cm     Bleeding:  Minimal    Hemostasis Achieved:  by pressure    Procedural Pain:  0  / 10     Post Procedural Pain:  0 / 10     Response to treatment:  Well tolerated by patient. Status of wound progress and description from last visit:   About the same size. We will attempt to leave wrap on for 1 full week. This will give us more flexibility and lessen burden on patient       Plan:       Discharge Instructions       [address]     NOTE: Upon discharge from the [address], you will receive a patient experience survey.  We would be grateful if you would take the time to fill this survey out.     Wound care order history:                 Vascular studies: Arterial studies done 9/27/20; no stenosis              Imaging:   Date               Cultures:   obtained on 11/18/2020                                 Obtained on 1/20/2021               Labs/ HbA1c:   Date               Grafts:                       #1 Apligraf on 12/16/2020                     #2 Apligraf on 12/23/2020                     #3 Apligraf on 12/30/2020                     #4 Apligraf on 1/6/2021                     # 5 Apligraf on 1/13/21                     #1 [place] 1/27/2021                     #2 [place] 2/3/2021                     #3 [place] 2/10/2021                     #4 [place] Right Medial Lower Leg 2/17/21                     #5 [place] 3/10/2021                   HBO:                Antibiotics:  Bactrim DS BID for 10 days on 11/18/2020                                   Cipro BID for 10 days ordered on 11/25/2020                                    Cipro 500 mg BID on 1/20/2021                                    Cipro 500 mg BID on 4/28/2021              Earlier Wound care treatments:                Authorizations:                        Consults:   Date                           Primary care physician:      Continuing wound care orders and information:              Residence:  private               Continue home health care with: [facility] wound-care supplies will be provided by: Wesly Ponce provider:              [id number] with  Wallace Mazariegos loading:  Date               TCTQ Medications:              AUUYK cleansing:                           QB not scrub or use excessive force.                          Wash hands with soap and water before and after dressing changes.                         Prior to applying a clean dressing, cleanse wound with normal saline,                                wound cleanser, or mild soap and water.                           Ask the physician or nurse before getting the wound(s) wet in a shower              Daily Wound management:                          Keep weight off wounds and reposition every 2 hours.                          HIXNS standing for long periods of time.                          WVUIE wraps/stockings in AM and remove at bedtime.                          If swelling is present, elevate legs to the level of the heart or above for 30 minutes 4-5 times a day and/or when sitting.                                             When taking antibiotics take entire prescription as ordered by physician do not stop taking until medicine is all gone.                                           Orders for this week: 12/1/21     Fax orders to [address]     Right dorsal foot - healed      Right lower leg -- wash with soap and water, pat dry. Foam to anterior blister area may stay in place for 1 week. Then: Apply A&D to sarah wound. Cover with anasept gel and stimulin powder covered with Ca Alginate and ABD and wrap with Coban 2.   Leave in place for 1 week.                  Auth for ERIC vac 9/1/2021     Referral to Dr René Monet on first procedure scheduled on 4/27/2021 and 11/8/2021                    Follow up with Bernis Snellen in 1 week in the wound care center  Call [phone] for any questions or concerns.   Date__________   Time__________        Treatment Note      Written Patient Dismissal Instructions Given            Electronically signed by LOLY Escamilla CNP on 12/1/2021 at 1:58 PM

## 2021-12-08 ENCOUNTER — HOSPITAL ENCOUNTER (OUTPATIENT)
Dept: WOUND CARE | Age: 76
Discharge: HOME | End: 2021-12-08
Payer: COMMERCIAL

## 2021-12-08 VITALS
RESPIRATION RATE: 18 BRPM | SYSTOLIC BLOOD PRESSURE: 165 MMHG | TEMPERATURE: 97.4 F | DIASTOLIC BLOOD PRESSURE: 96 MMHG | HEART RATE: 72 BPM

## 2021-12-08 DIAGNOSIS — I87.331 CHRONIC VENOUS HYPERTENSION WITH ULCER AND INFLAMMATION INVOLVING RIGHT SIDE (HCC): Primary | ICD-10-CM

## 2021-12-08 DIAGNOSIS — I83.029 VENOUS ULCER OF LEFT LEG (HCC): ICD-10-CM

## 2021-12-08 DIAGNOSIS — L97.929 VENOUS ULCER OF LEFT LEG (HCC): ICD-10-CM

## 2021-12-08 DIAGNOSIS — L97.919 CHRONIC VENOUS HYPERTENSION WITH ULCER AND INFLAMMATION INVOLVING RIGHT SIDE (HCC): Primary | ICD-10-CM

## 2021-12-08 PROCEDURE — 11042 DBRDMT SUBQ TIS 1ST 20SQCM/<: CPT | Performed by: NURSE PRACTITIONER

## 2021-12-08 PROCEDURE — 11042 DBRDMT SUBQ TIS 1ST 20SQCM/<: CPT

## 2021-12-08 RX ORDER — LIDOCAINE 40 MG/G
CREAM TOPICAL ONCE
Status: CANCELLED | OUTPATIENT
Start: 2021-12-08 | End: 2021-12-08

## 2021-12-08 RX ORDER — CLOBETASOL PROPIONATE 0.5 MG/G
OINTMENT TOPICAL ONCE
Status: CANCELLED | OUTPATIENT
Start: 2021-12-08 | End: 2021-12-08

## 2021-12-08 RX ORDER — LIDOCAINE HYDROCHLORIDE 20 MG/ML
JELLY TOPICAL ONCE
Status: CANCELLED | OUTPATIENT
Start: 2021-12-08 | End: 2021-12-08

## 2021-12-08 RX ORDER — BETAMETHASONE DIPROPIONATE 0.05 %
OINTMENT (GRAM) TOPICAL ONCE
Status: CANCELLED | OUTPATIENT
Start: 2021-12-08 | End: 2021-12-08

## 2021-12-08 RX ORDER — LIDOCAINE 50 MG/G
OINTMENT TOPICAL ONCE
Status: DISCONTINUED | OUTPATIENT
Start: 2021-12-08 | End: 2021-12-09 | Stop reason: HOSPADM

## 2021-12-08 RX ORDER — LIDOCAINE HYDROCHLORIDE 40 MG/ML
SOLUTION TOPICAL ONCE
Status: CANCELLED | OUTPATIENT
Start: 2021-12-08 | End: 2021-12-08

## 2021-12-08 RX ORDER — BACITRACIN ZINC AND POLYMYXIN B SULFATE 500; 1000 [USP'U]/G; [USP'U]/G
OINTMENT TOPICAL ONCE
Status: CANCELLED | OUTPATIENT
Start: 2021-12-08 | End: 2021-12-08

## 2021-12-08 RX ORDER — BACITRACIN, NEOMYCIN, POLYMYXIN B 400; 3.5; 5 [USP'U]/G; MG/G; [USP'U]/G
OINTMENT TOPICAL ONCE
Status: CANCELLED | OUTPATIENT
Start: 2021-12-08 | End: 2021-12-08

## 2021-12-08 RX ORDER — GENTAMICIN SULFATE 1 MG/G
OINTMENT TOPICAL ONCE
Status: CANCELLED | OUTPATIENT
Start: 2021-12-08 | End: 2021-12-08

## 2021-12-08 RX ORDER — LIDOCAINE 50 MG/G
OINTMENT TOPICAL ONCE
Status: CANCELLED | OUTPATIENT
Start: 2021-12-08 | End: 2021-12-08

## 2021-12-08 RX ORDER — GINSENG 100 MG
CAPSULE ORAL ONCE
Status: CANCELLED | OUTPATIENT
Start: 2021-12-08 | End: 2021-12-08

## 2021-12-08 ASSESSMENT — PAIN SCALES - GENERAL: PAINLEVEL_OUTOF10: 0

## 2021-12-08 NOTE — PROGRESS NOTES
Multilayer Compression Wrap   (Not Unna) Below the Knee    NAME:  Daniel Jennings  YOB: 1945  MEDICAL RECORD NUMBER:  [id number]  DATE:  12/8/2021    Multilayer compression wrap: Removed old Multilayer wrap if indicated and wash leg with mild soap/water. Applied moisturizing agent to dry skin as needed. Applied primary and secondary dressing as ordered. Applied multilayered dressing below the knee to right lower leg. Instructed patient/caregiver not to remove dressing and to keep it clean and dry. Instructed patient/caregiver on complications to report to provider, such as pain, numbness in toes, heavy drainage, and slippage of dressing. Instructed patient on purpose of compression dressing and on activity and exercise recommendations.       Electronically signed by Shavon Carrizales RN on 12/8/2021 at 2:08 PM

## 2021-12-08 NOTE — PROGRESS NOTES
Wound Care Center Progress Note With Procedure    Ernestine Oliva  AGE: 77 y.o. GENDER: male  : 1945  EPISODE DATE:  2021     Subjective:     Chief Complaint   Patient presents with    Wound Check     right leg          HISTORY of PRESENT ILLNESS         Fuentes Daley is a 77 y. o. male who presents today for wound evaluation of Chronic venous wound(s) of R lower leg medial.  The wound is of moderate severity.  The underlying cause of the wound is venous disease and trauma.       Good improvement this week. We like our current plan of care and likely continue as long as patient agrees    Wound Pain Timing/Severity: none  Quality of pain: N/A  Severity of pain:  0 / 10   Modifying Factors: venous stasis  Associated Signs/Symptoms: none        PAST MEDICAL HISTORY        Diagnosis Date    Chronic venous hypertension with ulcer and inflammation involving right side (HCC)     RLE    Hypertension     past    Iron deficiency     Lymphoma Dx 11-    Hx of B cell lymphoma-in remission since        PAST SURGICAL HISTORY    Past Surgical History:   Procedure Laterality Date    TUNNELED VENOUS PORT PLACEMENT      TUNNELED VENOUS PORT PLACEMENT      removed 2012       FAMILY HISTORY    Family History   Problem Relation Age of Onset    Early Death Mother 52    Diabetes Father     Heart Disease Father     Vision Loss Son         \"He wears glasses\"    Early Death Daughter 36       SOCIAL HISTORY    Social History     Tobacco Use    Smoking status: Never Smoker    Smokeless tobacco: Never Used   Substance Use Topics    Alcohol use: No    Drug use: No       ALLERGIES    No Known Allergies    MEDICATIONS    Current Outpatient Medications on File Prior to Encounter   Medication Sig Dispense Refill    metoprolol succinate (TOPROL XL) 50 MG extended release tablet Take 1 tablet by mouth daily 30 tablet 5     No current facility-administered medications on file prior to encounter.        REVIEW OF SYSTEMS    Pertinent items are noted in HPI. Constitutional: Negative for systemic symptoms including fever, chills and malaise. Objective:      BP (!) 165/96   Pulse 72   Temp 97.4 °F (36.3 °C) (Temporal)   Resp 18     PHYSICAL EXAM      General: The patient is in no acute distress. Mental status:  Patient is appropriate, is  oriented to place and plan of care. Dermatologic exam: Visual inspection of the periwound reveals the skin to be normal in turgor and texture and dry  Wound exam: see wound description below in procedure note      Assessment:     Problem List Items Addressed This Visit     Chronic venous hypertension with ulcer and inflammation ([facility] [address] - Primary    Relevant Medications    lidocaine (XYLOCAINE) 5 % ointment (Start on 12/8/2021  2:00 PM)    Other Relevant Orders    Initiate Outpatient Wound Care Protocol    Venous ulcer of left leg (HCC)    Relevant Medications    lidocaine (XYLOCAINE) 5 % ointment (Start on 12/8/2021  2:00 PM)    Other Relevant Orders    Initiate Outpatient Wound Care Protocol        Procedure Note    Indications:  Based on my examination of this patient's wound(s) today, sharp excision into necrotic subcutaneous tissue is required to promote healing and evaluate the extent of previous healing. Performed by: LOLY Puga CNP    Consent obtained: Yes    Time out taken:  Yes    Pain Control: Anesthetic  Anesthetic: 5% Lidocaine Ointment Topical     Debridement:Excisional Debridement    Using #15 blade scalpel the wound(s) was/were sharply debrided down through and including the removal of subcutaneous tissue.         Devitalized Tissue Debrided:  fibrin, biofilm and slough    Pre Debridement Measurements:  Are located in the Wound Documentation Flow Sheet    All active wounds listed below with today's date are evaluated  Wound(s)    debrided this date include # : 1     Post  Debridement Measurements:  Wound 07/29/13 Venous ulcer Ankle Right;Lateral (Active)   Number of days: 9777       Wound 09/30/20 Wound #1 Right Medial Lower Leg Cluster (Onset 5 Years) (Active)   Wound Image   12/08/21 1329   Wound Etiology Venous 12/08/21 1329   Dressing Status New dressing applied; Clean; Dry; Intact 12/01/21 1409   Wound Cleansed Soap and water 12/08/21 1329   Dressing/Treatment Alginate with Ag 01/13/21 1657   Offloading for Diabetic Foot Ulcers No offloading required 12/08/21 1329   Wound Length (cm) 5 cm 12/08/21 1329   Wound Width (cm) 2.3 cm 12/08/21 1329   Wound Depth (cm) 0.1 cm 12/08/21 1329   Wound Surface Area (cm^2) 11.5 cm^2 12/08/21 1329   Change in Wound Size % (l*w) 95.44 12/08/21 1329   Wound Volume (cm^3) 1.15 cm^3 12/08/21 1329   Wound Healing % 95 12/08/21 1329   Post-Procedure Length (cm) 5 cm 12/08/21 1347   Post-Procedure Width (cm) 2.3 cm 12/08/21 1347   Post-Procedure Depth (cm) 0.1 cm 12/08/21 1347   Post-Procedure Surface Area (cm^2) 11.5 cm^2 12/08/21 1347   Post-Procedure Volume (cm^3) 1.15 cm^3 12/08/21 1347   Distance Tunneling (cm) 0 cm 12/08/21 1329   Tunneling Position ___ O'Clock 0 12/08/21 1329   Undermining Starts ___ O'Clock 0 12/08/21 1329   Undermining Ends___ O'Clock 0 12/08/21 1329   Undermining Maxium Distance (cm) 0 12/08/21 1329   Wound Assessment Granulation tissue 12/08/21 1329   Drainage Amount Moderate 12/08/21 1329   Drainage Description Serosanguinous 12/08/21 1329   Odor None 12/08/21 1329   Csasia-wound Assessment Dry/flaky 12/08/21 1329   Margins Defined edges 12/08/21 1329   Wound Thickness Description not for Pressure Injury Full thickness 12/08/21 1329   Number of days: 434       Percent of Wound(s) Debrided: approximately 100%    Total  Area  Debrided:  11.5 sq cm     Bleeding:  Minimal    Hemostasis Achieved:  by pressure    Procedural Pain:  0  / 10     Post Procedural Pain:  0 / 10     Response to treatment:  Well tolerated by patient.      Status of wound progress and description from last visit:   Continued improvement. Continue to monitor and continue plan of care for now       Plan:       Discharge Instructions         Discharge Instructions        PHYSICIAN ORDERS AND DISCHARGE INSTRUCTIONS     NOTE: Upon discharge from the [address], you will receive a patient experience survey. We would be grateful if you would take the time to fill this survey out.     Wound care order history:                 Vascular studies: Arterial studies done 9/27/20; no stenosis              Imaging:   Date               Cultures:   obtained on 11/18/2020                                 Obtained on 1/20/2021               Labs/ HbA1c:   Date               Grafts:                       #1 Apligraf on 12/16/2020                     #2 Apligraf on 12/23/2020                     #3 Apligraf on 12/30/2020                     #4 Apligraf on 1/6/2021                     # 5 Apligraf on 1/13/21                     #1 [place] 1/27/2021                     #2 [place] 2/3/2021                     #3 [place] 2/10/2021                     #4 [place] Right Medial Lower Leg 2/17/21                     #5 [place] 3/10/2021                   HBO:                Antibiotics:  Bactrim DS BID for 10 days on 11/18/2020                                   Cipro BID for 10 days ordered on 11/25/2020                                    Cipro 500 mg BID on 1/20/2021                                    Cipro 500 mg BID on 4/28/2021              Earlier Wound care treatments:                Authorizations:                        Consults:   Date                           Primary care physician:      Continuing wound care orders and information:              Residence:  private               Continue [facility] health care with: [facility]               Your wound-care supplies will be provided by:  Suzanna Chavez provider:              Compression with              Off loading:  Date               [facility] Medications:              WGGDY cleansing:                         ER not scrub or use excessive force.                          Wash hands with soap and water before and after dressing changes.                         Prior to applying a clean dressing, cleanse wound with normal saline,                                wound cleanser, or mild soap and water.                           Ask the physician or nurse before getting the wound(s) wet in a shower              Daily Wound management:                          Keep weight off wounds and reposition every 2 hours.                          VDBFN standing for long periods of time.                          MLTJB wraps/stockings in AM and remove at bedtime.                          If swelling is present, elevate legs to the level of the heart or above for 30 minutes 4-5 times a day and/or when sitting.                                             When taking antibiotics take entire prescription as ordered by physician do not stop taking until medicine is all gone.                                           Orders for this week: 12/8/21     Fax orders to [facility] [place]     Right dorsal foot - healed      Right lower leg -- wash with soap and water, pat dry. Foam to anterior blister area may stay in place for 1 week. Then: Apply A&D to sarah wound. Cover with anasept gel and stimulin powder covered with Ca Alginate and ABD and wrap with Coban 2. Leave in place for 1 week.                   Auth for ERIC vac 9/1/2021     Referral to Dr Lele Schneider on first procedure scheduled on 4/27/2021 and 11/8/2021                    Follow up with Ronaldo Dowell CNP in 1 week in the wound care center  Call [phone] for any questions or concerns.   Date__________   Time__________             Treatment Note      Written Patient Dismissal Instructions Given            Electronically signed by LOLY Gray CNP on 12/8/2021 at 1:49 PM

## 2021-12-09 ENCOUNTER — TELEPHONE (OUTPATIENT)
Dept: INTERNAL MEDICINE CLINIC | Age: 76
End: 2021-12-09

## 2021-12-15 ENCOUNTER — HOSPITAL ENCOUNTER (OUTPATIENT)
Dept: WOUND CARE | Age: 76
Discharge: HOME | End: 2021-12-15
Payer: COMMERCIAL

## 2021-12-15 VITALS
SYSTOLIC BLOOD PRESSURE: 161 MMHG | RESPIRATION RATE: 18 BRPM | DIASTOLIC BLOOD PRESSURE: 85 MMHG | TEMPERATURE: 98 F | HEART RATE: 72 BPM

## 2021-12-15 DIAGNOSIS — L97.929 VENOUS ULCER OF LEFT LEG (HCC): ICD-10-CM

## 2021-12-15 DIAGNOSIS — I87.331 CHRONIC VENOUS HYPERTENSION WITH ULCER AND INFLAMMATION INVOLVING RIGHT SIDE (HCC): Primary | ICD-10-CM

## 2021-12-15 DIAGNOSIS — L97.919 CHRONIC VENOUS HYPERTENSION WITH ULCER AND INFLAMMATION INVOLVING RIGHT SIDE (HCC): Primary | ICD-10-CM

## 2021-12-15 DIAGNOSIS — I83.029 VENOUS ULCER OF LEFT LEG (HCC): ICD-10-CM

## 2021-12-15 PROCEDURE — 11042 DBRDMT SUBQ TIS 1ST 20SQCM/<: CPT | Performed by: NURSE PRACTITIONER

## 2021-12-15 PROCEDURE — 11042 DBRDMT SUBQ TIS 1ST 20SQCM/<: CPT

## 2021-12-15 RX ORDER — BACITRACIN, NEOMYCIN, POLYMYXIN B 400; 3.5; 5 [USP'U]/G; MG/G; [USP'U]/G
OINTMENT TOPICAL ONCE
Status: CANCELLED | OUTPATIENT
Start: 2021-12-15 | End: 2021-12-15

## 2021-12-15 RX ORDER — BETAMETHASONE DIPROPIONATE 0.05 %
OINTMENT (GRAM) TOPICAL ONCE
Status: CANCELLED | OUTPATIENT
Start: 2021-12-15 | End: 2021-12-15

## 2021-12-15 RX ORDER — LIDOCAINE HYDROCHLORIDE 20 MG/ML
JELLY TOPICAL ONCE
Status: CANCELLED | OUTPATIENT
Start: 2021-12-15 | End: 2021-12-15

## 2021-12-15 RX ORDER — GENTAMICIN SULFATE 1 MG/G
OINTMENT TOPICAL ONCE
Status: CANCELLED | OUTPATIENT
Start: 2021-12-15 | End: 2021-12-15

## 2021-12-15 RX ORDER — LIDOCAINE 50 MG/G
OINTMENT TOPICAL ONCE
Status: DISCONTINUED | OUTPATIENT
Start: 2021-12-15 | End: 2021-12-16 | Stop reason: HOSPADM

## 2021-12-15 RX ORDER — LIDOCAINE HYDROCHLORIDE 40 MG/ML
SOLUTION TOPICAL ONCE
Status: CANCELLED | OUTPATIENT
Start: 2021-12-15 | End: 2021-12-15

## 2021-12-15 RX ORDER — LIDOCAINE 50 MG/G
OINTMENT TOPICAL ONCE
Status: CANCELLED | OUTPATIENT
Start: 2021-12-15 | End: 2021-12-15

## 2021-12-15 RX ORDER — CLOBETASOL PROPIONATE 0.5 MG/G
OINTMENT TOPICAL ONCE
Status: CANCELLED | OUTPATIENT
Start: 2021-12-15 | End: 2021-12-15

## 2021-12-15 RX ORDER — GINSENG 100 MG
CAPSULE ORAL ONCE
Status: CANCELLED | OUTPATIENT
Start: 2021-12-15 | End: 2021-12-15

## 2021-12-15 RX ORDER — BACITRACIN ZINC AND POLYMYXIN B SULFATE 500; 1000 [USP'U]/G; [USP'U]/G
OINTMENT TOPICAL ONCE
Status: CANCELLED | OUTPATIENT
Start: 2021-12-15 | End: 2021-12-15

## 2021-12-15 RX ORDER — LIDOCAINE 40 MG/G
CREAM TOPICAL ONCE
Status: CANCELLED | OUTPATIENT
Start: 2021-12-15 | End: 2021-12-15

## 2021-12-15 ASSESSMENT — PAIN SCALES - GENERAL: PAINLEVEL_OUTOF10: 0

## 2021-12-15 NOTE — PROGRESS NOTES
Wound Care Center Progress Note With Procedure    Lewis German  AGE: 77 y.o. GENDER: male  : 1945  EPISODE DATE:  12/15/2021     Subjective:     Chief Complaint   Patient presents with    Wound Check         HISTORY of PRESENT ILLNESS      Fuentes Daley is a 77 y. o. male who presents today for wound evaluation of Chronic venous wound(s) of R lower leg medial.  The wound is of moderate severity.  The underlying cause of the wound is venous disease and trauma.       Patient continues to slowly improve. It is slightly smaller this week. Good, granulating tissue to wound bed. Slightly hypergranulated this week.    No other issues to report.      Wound Pain Timing/Severity: none  Quality of pain: N/A  Severity of pain:  0 / 10   Modifying Factors: venous stasis  Associated Signs/Symptoms: none      PAST MEDICAL HISTORY        Diagnosis Date    Chronic venous hypertension with ulcer and inflammation involving right side (HCC)     RLE    Hypertension     past    Iron deficiency     Lymphoma Dx 11-11    Hx of B cell lymphoma-in remission since        PAST SURGICAL HISTORY    Past Surgical History:   Procedure Laterality Date    TUNNELED VENOUS PORT PLACEMENT      TUNNELED VENOUS PORT PLACEMENT      removed 2012       FAMILY HISTORY    Family History   Problem Relation Age of Onset    Early Death Mother 52    Diabetes Father     Heart Disease Father     Vision Loss Son         \"He wears glasses\"    Early Death Daughter 36       SOCIAL HISTORY    Social History     Tobacco Use    Smoking status: Never Smoker    Smokeless tobacco: Never Used   Substance Use Topics    Alcohol use: No    Drug use: No       ALLERGIES    No Known Allergies    MEDICATIONS    Current Outpatient Medications on File Prior to Encounter   Medication Sig Dispense Refill    metoprolol succinate (TOPROL XL) 50 MG extended release tablet Take 1 tablet by mouth daily 30 tablet 5     No current facility-administered medications on file prior to encounter. REVIEW OF SYSTEMS    Pertinent items are noted in HPI. Constitutional: Negative for systemic symptoms including fever, chills and malaise. Objective:      BP (!) 161/85   Pulse 72   Temp 98 °F (36.7 °C) (Temporal)   Resp 18     PHYSICAL EXAM      General: The patient is in no acute distress. Mental status:  Patient is appropriate, is  oriented to place and plan of care. Dermatologic exam: Visual inspection of the periwound reveals the skin to be normal in turgor and texture, dry and scaly  Wound exam: see wound description below in procedure note      Assessment:     Problem List Items Addressed This Visit     Chronic venous hypertension with ulcer and inflammation ([facility] [address] - Primary    Relevant Medications    lidocaine (XYLOCAINE) 5 % ointment (Start on 12/15/2021  2:15 PM)    Other Relevant Orders    Initiate Outpatient Wound Care Protocol    Venous ulcer of left leg (HCC)    Relevant Medications    lidocaine (XYLOCAINE) 5 % ointment (Start on 12/15/2021  2:15 PM)    Other Relevant Orders    Initiate Outpatient Wound Care Protocol        Procedure Note    Indications:  Based on my examination of this patient's wound(s) today, sharp excision into necrotic subcutaneous tissue is required to promote healing and evaluate the extent of previous healing. Performed by: Agatha Fothergill, APRN - CNP    Consent obtained: Yes    Time out taken:  Yes    Pain Control: Anesthetic  Anesthetic: 5% Lidocaine Ointment Topical     Debridement:Excisional Debridement    Using #15 blade scalpel the wound(s) was/were sharply debrided down through and including the removal of subcutaneous tissue.         Devitalized Tissue Debrided:  slough, necrotic/eschar and exudate    Pre Debridement Measurements:  Are located in the Wound Documentation Flow Sheet    All active wounds listed below with today's date are evaluated  Wound(s)    debrided this date include # : 1     Post Debridement Measurements:  Wound 07/29/13 Venous ulcer Ankle Right;Lateral (Active)   Number of days: 3061       Wound 09/30/20 Wound #1 Right Medial Lower Leg Cluster (Onset 5 Years) (Active)   Wound Image   12/08/21 1329   Wound Etiology Venous 12/15/21 1338   Dressing Status New dressing applied 12/08/21 1407   Wound Cleansed Soap and water 12/15/21 1338   Dressing/Treatment Alginate with Ag 01/13/21 1657   Offloading for Diabetic Foot Ulcers No offloading required 12/08/21 1329   Wound Length (cm) 4.6 cm 12/15/21 1338   Wound Width (cm) 2.4 cm 12/15/21 1338   Wound Depth (cm) 0.1 cm 12/15/21 1338   Wound Surface Area (cm^2) 11.04 cm^2 12/15/21 1338   Change in Wound Size % (l*w) 95.62 12/15/21 1338   Wound Volume (cm^3) 1.104 cm^3 12/15/21 1338   Wound Healing % 96 12/15/21 1338   Post-Procedure Length (cm) 4.6 cm 12/15/21 1348   Post-Procedure Width (cm) 2.4 cm 12/15/21 1348   Post-Procedure Depth (cm) 0.1 cm 12/15/21 1348   Post-Procedure Surface Area (cm^2) 11.04 cm^2 12/15/21 1348   Post-Procedure Volume (cm^3) 1.104 cm^3 12/15/21 1348   Distance Tunneling (cm) 0 cm 12/15/21 1338   Tunneling Position ___ O'Clock 0 12/15/21 1338   Undermining Starts ___ O'Clock 0 12/15/21 1338   Undermining Ends___ O'Clock 0 12/15/21 1338   Undermining Maxium Distance (cm) 0 12/15/21 1338   Wound Assessment Granulation tissue 12/15/21 1338   Drainage Amount Moderate 12/15/21 1338   Drainage Description Serosanguinous 12/15/21 1338   Odor None 12/15/21 1338   Cassia-wound Assessment Intact 12/15/21 1338   Margins Defined edges 12/15/21 1338   Wound Thickness Description not for Pressure Injury Full thickness 12/15/21 1338   Number of days: 441       Percent of Wound(s) Debrided: approximately 100%    Total  Area  Debrided:  11.04 sq cm     Bleeding:  Minimal    Hemostasis Achieved:  by pressure and by silver nitrate stick    Procedural Pain:  0  / 10     Post Procedural Pain:  0 / 10     Response to treatment:  Well tolerated by patient. Status of wound progress and description from last visit:   Improved, but we applied silver nitrate for some slight hypergranulation. Continue plan of care for now and follow up 1 week. Continue to monitor. Patient is slowly improving       Plan:       Discharge Instructions         Discharge Instructions        PHYSICIAN ORDERS AND DISCHARGE INSTRUCTIONS     NOTE: Upon discharge from the [address], you will receive a patient experience survey.  We would be grateful if you would take the time to fill this survey out.     Wound care order history:                 Vascular studies: Arterial studies done 9/27/20; no stenosis              Imaging:   Date               Cultures:   obtained on 11/18/2020                                 Obtained on 1/20/2021               Labs/ HbA1c:   Date               Grafts:                       #1 Apligraf on 12/16/2020                     #2 Apligraf on 12/23/2020                     #3 Apligraf on 12/30/2020                     #4 Apligraf on 1/6/2021                     # 5 Apligraf on 1/13/21                     #1 [place] 1/27/2021                     #2 [place] 2/3/2021                     #3 [place] 2/10/2021                     #4 [place] Right Medial Lower Leg 2/17/21                     #5 [place] 3/10/2021                   HBO:                Antibiotics:  Bactrim DS BID for 10 days on 11/18/2020                                   Cipro BID for 10 days ordered on 11/25/2020                                    Cipro 500 mg BID on 1/20/2021                                    Cipro 500 mg BID on 4/28/2021              Earlier Wound care treatments:                Authorizations:                        Consults:   Date                           Primary care physician:      Continuing wound care orders and information:              Residence:  private               Continue home health care with: [facility] wound-care supplies will be provided by: Erasto Johnson provider:              [id number] with  Gita Quiñones loading:  Date               DWUEJ Medications:              PQIZJ cleansing:                           QB not scrub or use excessive force.                          Wash hands with soap and water before and after dressing changes.                         Prior to applying a clean dressing, cleanse wound with normal saline,                                wound cleanser, or mild soap and water.                           Ask the physician or nurse before getting the wound(s) wet in a shower              Daily Wound management:                          Keep weight off wounds and reposition every 2 hours.                          XZPKH standing for long periods of time.                          OYRUG wraps/stockings in AM and remove at bedtime.                          If swelling is present, elevate legs to the level of the heart or above for 30 minutes 4-5 times a day and/or when sitting.                                             When taking antibiotics take entire prescription as ordered by physician do not stop taking until medicine is all gone.                                           Orders for this week: 12/15/21     [place] [facility] [place]     Right dorsal foot - healed      Right lower leg -- wash with soap and water, pat dry. Foam to anterior blister area may stay in place for 1 week. Then: Apply A&D to sarah wound. Cover with anasept gel and stimulin powder covered with Ca Alginate and ABD and wrap with Coban 2.  Leave in place for 1 week.             Auth for ERIC vac 9/1/2021     Referral to Dr Estela Kim on first procedure scheduled on 4/27/2021 and 11/8/2021                    Follow up with Estee Chinchilla CNP in 1 week in the wound care center  Call [phone] for any questions or concerns.   Date__________   Time__________               Treatment Note      Written Patient Dismissal Instructions Given Electronically signed by LOLY Whitaker CNP on 12/15/2021 at 1:54 PM

## 2021-12-15 NOTE — PROGRESS NOTES
Multilayer Compression Wrap   (Not Unna) Below the Knee    NAME:  Ifrah Trujillo  YOB: 1945  MEDICAL RECORD NUMBER:  [id number]  DATE:  12/15/2021    Multilayer compression wrap: Removed old Multilayer wrap if indicated and wash leg with mild soap/water. Applied moisturizing agent to dry skin as needed. Applied primary and secondary dressing as ordered. Applied multilayered dressing below the knee to right lower leg. Instructed patient/caregiver not to remove dressing and to keep it clean and dry. Instructed patient/caregiver on complications to report to provider, such as pain, numbness in toes, heavy drainage, and slippage of dressing. Instructed patient on purpose of compression dressing and on activity and exercise recommendations.       Electronically signed by Mariam Pascal RN on 12/15/2021 at 2:11 PM

## 2021-12-22 ENCOUNTER — HOSPITAL ENCOUNTER (OUTPATIENT)
Dept: WOUND CARE | Age: 76
Discharge: HOME | End: 2021-12-22
Payer: COMMERCIAL

## 2021-12-22 VITALS
DIASTOLIC BLOOD PRESSURE: 84 MMHG | RESPIRATION RATE: 20 BRPM | HEART RATE: 71 BPM | SYSTOLIC BLOOD PRESSURE: 159 MMHG | TEMPERATURE: 98.9 F

## 2021-12-22 DIAGNOSIS — L97.919 CHRONIC VENOUS HYPERTENSION WITH ULCER AND INFLAMMATION INVOLVING RIGHT SIDE (HCC): ICD-10-CM

## 2021-12-22 DIAGNOSIS — I83.891 VENOUS STASIS ULCER OF RIGHT LOWER LEG WITH EDEMA OF RIGHT LOWER LEG (HCC): ICD-10-CM

## 2021-12-22 DIAGNOSIS — I83.892 VARICOSE VEINS OF LEFT LOWER EXTREMITY WITH OTHER COMPLICATIONS: ICD-10-CM

## 2021-12-22 DIAGNOSIS — I83.891 VARICOSE VEINS OF RIGHT LOWER EXTREMITY WITH OTHER COMPLICATIONS: ICD-10-CM

## 2021-12-22 DIAGNOSIS — L97.919 VENOUS STASIS ULCER OF RIGHT LOWER LEG WITH EDEMA OF RIGHT LOWER LEG (HCC): ICD-10-CM

## 2021-12-22 DIAGNOSIS — I83.893 VARICOSE VEINS OF LOWER EXTREMITIES WITH COMPLICATIONS, BILATERAL: ICD-10-CM

## 2021-12-22 DIAGNOSIS — I83.891 VARICOSE VEINS OF LOWER EXTREMITIES WITH COMPLICATIONS, RIGHT: ICD-10-CM

## 2021-12-22 DIAGNOSIS — R60.9 VENOUS STASIS ULCER OF RIGHT LOWER LEG WITH EDEMA OF RIGHT LOWER LEG (HCC): ICD-10-CM

## 2021-12-22 DIAGNOSIS — I83.019 VENOUS STASIS ULCER OF RIGHT LOWER LEG WITH EDEMA OF RIGHT LOWER LEG (HCC): ICD-10-CM

## 2021-12-22 DIAGNOSIS — Z98.890 STATUS POST ENDOVENOUS RADIOFREQUENCY ABLATION (RFA) OF SAPHENOUS VEIN: Primary | ICD-10-CM

## 2021-12-22 DIAGNOSIS — I83.029 VENOUS ULCER OF LEFT LEG (HCC): ICD-10-CM

## 2021-12-22 DIAGNOSIS — I87.331 CHRONIC VENOUS HYPERTENSION WITH ULCER AND INFLAMMATION INVOLVING RIGHT SIDE (HCC): ICD-10-CM

## 2021-12-22 DIAGNOSIS — L97.929 VENOUS ULCER OF LEFT LEG (HCC): ICD-10-CM

## 2021-12-22 PROCEDURE — 11042 DBRDMT SUBQ TIS 1ST 20SQCM/<: CPT | Performed by: NURSE PRACTITIONER

## 2021-12-22 PROCEDURE — 11042 DBRDMT SUBQ TIS 1ST 20SQCM/<: CPT

## 2021-12-22 RX ORDER — BACITRACIN ZINC AND POLYMYXIN B SULFATE 500; 1000 [USP'U]/G; [USP'U]/G
OINTMENT TOPICAL ONCE
Status: CANCELLED | OUTPATIENT
Start: 2021-12-22 | End: 2021-12-22

## 2021-12-22 RX ORDER — LIDOCAINE 40 MG/G
CREAM TOPICAL ONCE
Status: CANCELLED | OUTPATIENT
Start: 2021-12-22 | End: 2021-12-22

## 2021-12-22 RX ORDER — BETAMETHASONE DIPROPIONATE 0.05 %
OINTMENT (GRAM) TOPICAL ONCE
Status: CANCELLED | OUTPATIENT
Start: 2021-12-22 | End: 2021-12-22

## 2021-12-22 RX ORDER — LIDOCAINE HYDROCHLORIDE 20 MG/ML
JELLY TOPICAL ONCE
Status: CANCELLED | OUTPATIENT
Start: 2021-12-22 | End: 2021-12-22

## 2021-12-22 RX ORDER — CLOBETASOL PROPIONATE 0.5 MG/G
OINTMENT TOPICAL ONCE
Status: CANCELLED | OUTPATIENT
Start: 2021-12-22 | End: 2021-12-22

## 2021-12-22 RX ORDER — LIDOCAINE 50 MG/G
OINTMENT TOPICAL ONCE
Status: CANCELLED | OUTPATIENT
Start: 2021-12-22 | End: 2021-12-22

## 2021-12-22 RX ORDER — GENTAMICIN SULFATE 1 MG/G
OINTMENT TOPICAL ONCE
Status: CANCELLED | OUTPATIENT
Start: 2021-12-22 | End: 2021-12-22

## 2021-12-22 RX ORDER — LIDOCAINE HYDROCHLORIDE 40 MG/ML
SOLUTION TOPICAL ONCE
Status: CANCELLED | OUTPATIENT
Start: 2021-12-22 | End: 2021-12-22

## 2021-12-22 RX ORDER — GINSENG 100 MG
CAPSULE ORAL ONCE
Status: CANCELLED | OUTPATIENT
Start: 2021-12-22 | End: 2021-12-22

## 2021-12-22 RX ORDER — BACITRACIN, NEOMYCIN, POLYMYXIN B 400; 3.5; 5 [USP'U]/G; MG/G; [USP'U]/G
OINTMENT TOPICAL ONCE
Status: CANCELLED | OUTPATIENT
Start: 2021-12-22 | End: 2021-12-22

## 2021-12-22 RX ORDER — LIDOCAINE 50 MG/G
OINTMENT TOPICAL ONCE
Status: DISCONTINUED | OUTPATIENT
Start: 2021-12-22 | End: 2021-12-23 | Stop reason: HOSPADM

## 2021-12-22 NOTE — PROGRESS NOTES
Multilayer Compression Wrap   (Not Unna) Below the Knee    NAME:  Lewis German  YOB: 1945  MEDICAL RECORD NUMBER:  [id number]  DATE:  12/22/2021    Multilayer compression wrap: Removed old Multilayer wrap if indicated and wash leg with mild soap/water. Applied moisturizing agent to dry skin as needed. Applied primary and secondary dressing as ordered. Applied multilayered dressing below the knee to right lower leg. Instructed patient/caregiver not to remove dressing and to keep it clean and dry. Instructed patient/caregiver on complications to report to provider, such as pain, numbness in toes, heavy drainage, and slippage of dressing. Instructed patient on purpose of compression dressing and on activity and exercise recommendations.       Electronically signed by Yandel Aponte RN on 12/22/2021 at 2:22 PM

## 2021-12-22 NOTE — PROGRESS NOTES
Wound Care Center Progress Note With Procedure    Daniel Jennings  AGE: 77 y.o. GENDER: male  : 1945  EPISODE DATE:  2021     Subjective:     Chief Complaint   Patient presents with    Wound Check     rt lrg         HISTORY of PRESENT ILLNESS      Fuentes Daley is a 77 y. o. male who presents today for wound evaluation of Chronic venous wound(s) of R lower leg medial.  The wound is of moderate severity.  The underlying cause of the wound is venous disease and trauma.       We used silver nitrate stick last week, and hypergranulation is improved. They state wound looks better and wound is smaller.  Tissue looks more healthy and granulating     Wound Pain Timing/Severity: none  Quality of pain: N/A  Severity of pain:  0 / 10   Modifying Factors: venous stasis  Associated Signs/Symptoms: none        PAST MEDICAL HISTORY        Diagnosis Date    Chronic venous hypertension with ulcer and inflammation involving right side (HCC)     RLE    Hypertension     past    Iron deficiency     Lymphoma Dx 11-11    Hx of B cell lymphoma-in remission since        PAST SURGICAL HISTORY    Past Surgical History:   Procedure Laterality Date    TUNNELED VENOUS PORT PLACEMENT      TUNNELED VENOUS PORT PLACEMENT      removed 2012       FAMILY HISTORY    Family History   Problem Relation Age of Onset    Early Death Mother 52    Diabetes Father     Heart Disease Father     Vision Loss Son         \"He wears glasses\"    Early Death Daughter 36       SOCIAL HISTORY    Social History     Tobacco Use    Smoking status: Never Smoker    Smokeless tobacco: Never Used   Substance Use Topics    Alcohol use: No    Drug use: No       ALLERGIES    No Known Allergies    MEDICATIONS    Current Outpatient Medications on File Prior to Encounter   Medication Sig Dispense Refill    metoprolol succinate (TOPROL XL) 50 MG extended release tablet Take 1 tablet by mouth daily 30 tablet 5     No current facility-administered medications on file prior to encounter. REVIEW OF SYSTEMS    Pertinent items are noted in HPI. Constitutional: Negative for systemic symptoms including fever, chills and malaise. Objective:      BP (!) 159/84   Pulse 71   Temp 98.9 °F (37.2 °C) (Temporal)   Resp 20     PHYSICAL EXAM      General: The patient is in no acute distress. Mental status:  Patient is appropriate, is  oriented to place and plan of care. Dermatologic exam: Visual inspection of the periwound reveals the skin to be normal in turgor and texture, dry and coarse  Wound exam: see wound description below in procedure note      Assessment:     Problem List Items Addressed This Visit     Chronic venous hypertension with ulcer and inflammation (HCC)    Relevant Medications    lidocaine (XYLOCAINE) 5 % ointment (Start on 12/22/2021  2:15 PM)    Other Relevant Orders    Initiate Outpatient Wound Care Protocol    Venous ulcer of left leg (HCC)    Relevant Medications    lidocaine (XYLOCAINE) 5 % ointment (Start on 12/22/2021  2:15 PM)    Other Relevant Orders    Initiate Outpatient Wound Care Protocol    Venous stasis ulcer of right lower leg with edema of right lower leg (HCC)    Varicose veins of lower extremities with complications, bilateral    Varicose veins of left lower extremity with other complications    Status post endovenous radiofrequency ablation (RFA) of saphenous vein - Primary    Varicose veins of lower extremities with complications, right    Varicose veins of right lower extremity with other complications        Procedure Note    Indications:  Based on my examination of this patient's wound(s) today, sharp excision into necrotic subcutaneous tissue is required to promote healing and evaluate the extent of previous healing.     Performed by: LOLY Corley CNP    Consent obtained: Yes    Time out taken:  Yes    Pain Control: Anesthetic  Anesthetic: 5% Lidocaine Ointment Topical Debridement:Excisional Debridement    Using #15 blade scalpel the wound(s) was/were sharply debrided down through and including the removal of subcutaneous tissue.         Devitalized Tissue Debrided:  fibrin, biofilm, slough, exudate and callus    Pre Debridement Measurements:  Are located in the Wound Documentation Flow Sheet    All active wounds listed below with today's date are evaluated  Wound(s)    debrided this date include # : 1     Post  Debridement Measurements:  Wound 07/29/13 Venous ulcer Ankle Right;Lateral (Active)   Number of days: 3068       Wound 09/30/20 Wound #1 Right Medial Lower Leg Cluster (Onset 5 Years) (Active)   Wound Image   12/22/21 1334   Wound Etiology Venous 12/22/21 1334   Dressing Status New dressing applied 12/08/21 1407   Wound Cleansed Soap and water 12/22/21 1334   Dressing/Treatment Alginate with Ag 01/13/21 1657   Offloading for Diabetic Foot Ulcers No offloading required 12/08/21 1329   Wound Length (cm) 4.6 cm 12/22/21 1334   Wound Width (cm) 3.5 cm 12/22/21 1334   Wound Depth (cm) 0.1 cm 12/22/21 1334   Wound Surface Area (cm^2) 16.1 cm^2 12/22/21 1334   Change in Wound Size % (l*w) 93.61 12/22/21 1334   Wound Volume (cm^3) 1.61 cm^3 12/22/21 1334   Wound Healing % 94 12/22/21 1334   Post-Procedure Length (cm) 4.6 cm 12/22/21 1355   Post-Procedure Width (cm) 3.5 cm 12/22/21 1355   Post-Procedure Depth (cm) 0.1 cm 12/22/21 1355   Post-Procedure Surface Area (cm^2) 16.1 cm^2 12/22/21 1355   Post-Procedure Volume (cm^3) 1.61 cm^3 12/22/21 1355   Distance Tunneling (cm) 0 cm 12/22/21 1334   Tunneling Position ___ O'Clock 0 12/22/21 1334   Undermining Starts ___ O'Clock 0 12/22/21 1334   Undermining Ends___ O'Clock 0 12/22/21 1334   Undermining Maxium Distance (cm) 0 12/22/21 1334   Wound Assessment Granulation tissue 12/22/21 1334   Drainage Amount Moderate 12/22/21 1334   Drainage Description Serosanguinous 12/22/21 1334   Odor None 12/22/21 1334   Cassia-wound Assessment Intact 12/22/21 1334   Margins Defined edges 12/22/21 1334   Wound Thickness Description not for Pressure Injury Full thickness 12/22/21 1334   Number of days: 448       Percent of Wound(s) Debrided: approximately 100%    Total  Area  Debrided:  16.1 sq cm     Bleeding:  Minimal    Hemostasis Achieved:  by pressure and by silver nitrate stick    Procedural Pain:  0  / 10     Post Procedural Pain:  0 / 10     Response to treatment:  Well tolerated by patient. Status of wound progress and description from last visit:   Improving. We may consider adding liquid silver nitrate and scheduling a home health dressing change. Silver nitrate stick used this week again. Will evaluate at next appointment for improvement. Continue to monitor and follow up 1 week       Plan:       Discharge Instructions       [address]  NOTE: Upon discharge from the [address], you will receive a patient experience survey.  We would be grateful if you would take the time to fill this survey out.     Wound care order history:                 Vascular studies: Arterial studies done 9/27/20; no stenosis              Imaging:   Date               Cultures:   obtained on 11/18/2020                                 Obtained on 1/20/2021               Labs/ HbA1c:   Date               Grafts:                       #1 Apligraf on 12/16/2020                     #2 Apligraf on 12/23/2020                     #3 Apligraf on 12/30/2020                     #4 Apligraf on 1/6/2021                     # 5 Apligraf on 1/13/21                     #1 [place] 1/27/2021                     #2 [place] 2/3/2021                     #3 [place] 2/10/2021                     #4 [place] Right Medial Lower Leg 2/17/21                     #5 [place] 3/10/2021                   HBO:                Antibiotics:  Bactrim DS BID for 10 days on 11/18/2020                                   Cipro BID for 10 days ordered on Coban 2.  Leave in place for 1 week.             Auth for ERIC vac 9/1/2021     Referral to Dr Kylie Zeng on first procedure scheduled on 4/27/2021 and 11/8/2021                    Follow up with Alfonso Mendoza CNP in 1 week in the wound care center  Call [phone] for any questions or concerns.   Date__________   Time__________                  Treatment Note      Written Patient Dismissal Instructions Given            Electronically signed by LOLY Jaramillo CNP on 12/22/2021 at 1:59 PM

## 2021-12-29 ENCOUNTER — HOSPITAL ENCOUNTER (OUTPATIENT)
Dept: WOUND CARE | Age: 76
Discharge: HOME | End: 2021-12-29
Payer: COMMERCIAL

## 2021-12-29 VITALS
HEART RATE: 70 BPM | SYSTOLIC BLOOD PRESSURE: 163 MMHG | TEMPERATURE: 98.8 F | DIASTOLIC BLOOD PRESSURE: 83 MMHG | RESPIRATION RATE: 16 BRPM

## 2021-12-29 DIAGNOSIS — L97.919 VENOUS STASIS ULCER OF RIGHT LOWER LEG WITH EDEMA OF RIGHT LOWER LEG (HCC): Primary | ICD-10-CM

## 2021-12-29 DIAGNOSIS — I83.891 VARICOSE VEINS OF RIGHT LOWER EXTREMITY WITH OTHER COMPLICATIONS: ICD-10-CM

## 2021-12-29 DIAGNOSIS — I83.891 VARICOSE VEINS OF LOWER EXTREMITIES WITH COMPLICATIONS, RIGHT: ICD-10-CM

## 2021-12-29 DIAGNOSIS — I87.331 CHRONIC VENOUS HYPERTENSION WITH ULCER AND INFLAMMATION INVOLVING RIGHT SIDE (HCC): ICD-10-CM

## 2021-12-29 DIAGNOSIS — I83.893 VARICOSE VEINS OF LOWER EXTREMITIES WITH COMPLICATIONS, BILATERAL: ICD-10-CM

## 2021-12-29 DIAGNOSIS — L97.929 VENOUS ULCER OF LEFT LEG (HCC): ICD-10-CM

## 2021-12-29 DIAGNOSIS — I83.019 VENOUS STASIS ULCER OF RIGHT LOWER LEG WITH EDEMA OF RIGHT LOWER LEG (HCC): Primary | ICD-10-CM

## 2021-12-29 DIAGNOSIS — I83.029 VENOUS ULCER OF LEFT LEG (HCC): ICD-10-CM

## 2021-12-29 DIAGNOSIS — I83.891 VENOUS STASIS ULCER OF RIGHT LOWER LEG WITH EDEMA OF RIGHT LOWER LEG (HCC): Primary | ICD-10-CM

## 2021-12-29 DIAGNOSIS — L97.919 CHRONIC VENOUS HYPERTENSION WITH ULCER AND INFLAMMATION INVOLVING RIGHT SIDE (HCC): ICD-10-CM

## 2021-12-29 DIAGNOSIS — R60.9 VENOUS STASIS ULCER OF RIGHT LOWER LEG WITH EDEMA OF RIGHT LOWER LEG (HCC): Primary | ICD-10-CM

## 2021-12-29 DIAGNOSIS — I83.892 VARICOSE VEINS OF LEFT LOWER EXTREMITY WITH OTHER COMPLICATIONS: ICD-10-CM

## 2021-12-29 DIAGNOSIS — Z98.890 STATUS POST ENDOVENOUS RADIOFREQUENCY ABLATION (RFA) OF SAPHENOUS VEIN: ICD-10-CM

## 2021-12-29 PROCEDURE — 11045 DBRDMT SUBQ TISS EACH ADDL: CPT | Performed by: NURSE PRACTITIONER

## 2021-12-29 PROCEDURE — 11042 DBRDMT SUBQ TIS 1ST 20SQCM/<: CPT | Performed by: NURSE PRACTITIONER

## 2021-12-29 PROCEDURE — 11045 DBRDMT SUBQ TISS EACH ADDL: CPT

## 2021-12-29 PROCEDURE — 11042 DBRDMT SUBQ TIS 1ST 20SQCM/<: CPT

## 2021-12-29 RX ORDER — LIDOCAINE HYDROCHLORIDE 20 MG/ML
JELLY TOPICAL ONCE
Status: CANCELLED | OUTPATIENT
Start: 2021-12-29 | End: 2021-12-29

## 2021-12-29 RX ORDER — BETAMETHASONE DIPROPIONATE 0.05 %
OINTMENT (GRAM) TOPICAL ONCE
Status: CANCELLED | OUTPATIENT
Start: 2021-12-29 | End: 2021-12-29

## 2021-12-29 RX ORDER — GENTAMICIN SULFATE 1 MG/G
OINTMENT TOPICAL ONCE
Status: CANCELLED | OUTPATIENT
Start: 2021-12-29 | End: 2021-12-29

## 2021-12-29 RX ORDER — LIDOCAINE HYDROCHLORIDE 40 MG/ML
SOLUTION TOPICAL ONCE
Status: CANCELLED | OUTPATIENT
Start: 2021-12-29 | End: 2021-12-29

## 2021-12-29 RX ORDER — LIDOCAINE 50 MG/G
OINTMENT TOPICAL ONCE
Status: CANCELLED | OUTPATIENT
Start: 2021-12-29 | End: 2021-12-29

## 2021-12-29 RX ORDER — BACITRACIN ZINC AND POLYMYXIN B SULFATE 500; 1000 [USP'U]/G; [USP'U]/G
OINTMENT TOPICAL ONCE
Status: CANCELLED | OUTPATIENT
Start: 2021-12-29 | End: 2021-12-29

## 2021-12-29 RX ORDER — BACITRACIN, NEOMYCIN, POLYMYXIN B 400; 3.5; 5 [USP'U]/G; MG/G; [USP'U]/G
OINTMENT TOPICAL ONCE
Status: CANCELLED | OUTPATIENT
Start: 2021-12-29 | End: 2021-12-29

## 2021-12-29 RX ORDER — LIDOCAINE 40 MG/G
CREAM TOPICAL ONCE
Status: CANCELLED | OUTPATIENT
Start: 2021-12-29 | End: 2021-12-29

## 2021-12-29 RX ORDER — GINSENG 100 MG
CAPSULE ORAL ONCE
Status: CANCELLED | OUTPATIENT
Start: 2021-12-29 | End: 2021-12-29

## 2021-12-29 RX ORDER — CLOBETASOL PROPIONATE 0.5 MG/G
OINTMENT TOPICAL ONCE
Status: CANCELLED | OUTPATIENT
Start: 2021-12-29 | End: 2021-12-29

## 2021-12-29 RX ORDER — LIDOCAINE 50 MG/G
OINTMENT TOPICAL ONCE
Status: DISCONTINUED | OUTPATIENT
Start: 2021-12-29 | End: 2021-12-30 | Stop reason: HOSPADM

## 2021-12-29 ASSESSMENT — PAIN SCALES - GENERAL: PAINLEVEL_OUTOF10: 0

## 2021-12-29 NOTE — PROGRESS NOTES
Wound Care Center Progress Note With Procedure    Xiomy Samson  AGE: 77 y.o. GENDER: male  : 1945  EPISODE DATE:  2021     Subjective:     Chief Complaint   Patient presents with    Wound Check         HISTORY of PRESENT ILLNESS      Fuentes Daley is a 77 y. o. male who presents today for wound evaluation of Chronic venous wound(s) of R lower leg medial.  The wound is of moderate severity.  The underlying cause of the wound is venous disease and trauma.       Wound is stable. Clean and dry.  Silver nitrate seems to be helping with hypergranulation and patient is happy with progress.      Wound Pain Timing/Severity: none  Quality of pain: N/A  Severity of pain:  0 / 10   Modifying Factors: venous stasis  Associated Signs/Symptoms: none                                                PAST MEDICAL HISTORY        Diagnosis Date    Chronic venous hypertension with ulcer and inflammation involving right side (HCC)     RLE    Hypertension     past    Iron deficiency     Lymphoma Dx 11-11    Hx of B cell lymphoma-in remission since        PAST SURGICAL HISTORY    Past Surgical History:   Procedure Laterality Date    TUNNELED VENOUS PORT PLACEMENT      TUNNELED VENOUS PORT PLACEMENT      removed 2012       FAMILY HISTORY    Family History   Problem Relation Age of Onset    Early Death Mother 52    Diabetes Father     Heart Disease Father     Vision Loss Son         \"He wears glasses\"    Early Death Daughter 36       SOCIAL HISTORY    Social History     Tobacco Use    Smoking status: Never Smoker    Smokeless tobacco: Never Used   Substance Use Topics    Alcohol use: No    Drug use: No       ALLERGIES    No Known Allergies    MEDICATIONS    Current Outpatient Medications on File Prior to Encounter   Medication Sig Dispense Refill    metoprolol succinate (TOPROL XL) 50 MG extended release tablet Take 1 tablet by mouth daily 30 tablet 5     No current facility-administered medications on file prior to encounter. REVIEW OF SYSTEMS    Pertinent items are noted in HPI. Constitutional: Negative for systemic symptoms including fever, chills and malaise. Objective:      BP (!) 163/83   Pulse 70   Temp 98.8 °F (37.1 °C) (Temporal)   Resp 16     PHYSICAL EXAM      General: The patient is in no acute distress. Mental status:  Patient is appropriate, is  oriented to place and plan of care. Dermatologic exam: Visual inspection of the periwound reveals the skin to be normal in turgor and texture, dry and slack  Wound exam: see wound description below in procedure note      Assessment:     Problem List Items Addressed This Visit     Chronic venous hypertension with ulcer and inflammation (HCC)    Relevant Medications    lidocaine (XYLOCAINE) 5 % ointment (Start on 12/29/2021  2:15 PM)    Other Relevant Orders    Initiate Outpatient Wound Care Protocol    Venous ulcer of left leg (HCC)    Relevant Medications    lidocaine (XYLOCAINE) 5 % ointment (Start on 12/29/2021  2:15 PM)    Other Relevant Orders    Initiate Outpatient Wound Care Protocol    Venous stasis ulcer of right lower leg with edema of right lower leg (HCC) - Primary    Varicose veins of lower extremities with complications, bilateral    Varicose veins of left lower extremity with other complications    Status post endovenous radiofrequency ablation (RFA) of saphenous vein    Varicose veins of lower extremities with complications, right    Varicose veins of right lower extremity with other complications        Procedure Note    Indications:  Based on my examination of this patient's wound(s) today, sharp excision into necrotic subcutaneous tissue is required to promote healing and evaluate the extent of previous healing.     Performed by: LOLY Ibrahim - CNP    Consent obtained: Yes    Time out taken:  Yes    Pain Control: Anesthetic  Anesthetic: 5% Lidocaine Ointment Topical     Debridement:Excisional Debridement    Using curette the wound(s) was/were sharply debrided down through and including the removal of subcutaneous tissue.         Devitalized Tissue Debrided:  fibrin, biofilm, slough and exudate    Pre Debridement Measurements:  Are located in the Wound Documentation Flow Sheet    All active wounds listed below with today's date are evaluated  Wound(s)    debrided this date include # : 1     Post  Debridement Measurements:  Wound 07/29/13 Venous ulcer Ankle Right;Lateral (Active)   Number of days: 3075       Wound 09/30/20 Wound #1 Right Medial Lower Leg Cluster (Onset 5 Years) (Active)   Wound Image   12/22/21 1334   Wound Etiology Venous 12/29/21 1338   Dressing Status New dressing applied 12/08/21 1407   Wound Cleansed Soap and water 12/29/21 1338   Dressing/Treatment Alginate with Ag 01/13/21 1657   Offloading for Diabetic Foot Ulcers No offloading required 12/29/21 1338   Wound Length (cm) 6 cm 12/29/21 1338   Wound Width (cm) 4 cm 12/29/21 1338   Wound Depth (cm) 0.1 cm 12/29/21 1338   Wound Surface Area (cm^2) 24 cm^2 12/29/21 1338   Change in Wound Size % (l*w) 90.48 12/29/21 1338   Wound Volume (cm^3) 2.4 cm^3 12/29/21 1338   Wound Healing % 90 12/29/21 1338   Post-Procedure Length (cm) 6 cm 12/29/21 1351   Post-Procedure Width (cm) 4 cm 12/29/21 1351   Post-Procedure Depth (cm) 0.1 cm 12/29/21 1351   Post-Procedure Surface Area (cm^2) 24 cm^2 12/29/21 1351   Post-Procedure Volume (cm^3) 2.4 cm^3 12/29/21 1351   Distance Tunneling (cm) 0 cm 12/29/21 1338   Tunneling Position ___ O'Clock 0 12/29/21 1338   Undermining Starts ___ O'Clock 0 12/29/21 1338   Undermining Ends___ O'Clock 0 12/29/21 1338   Undermining Maxium Distance (cm) 0 12/29/21 1338   Wound Assessment Granulation tissue 12/29/21 1338   Drainage Amount Moderate 12/29/21 1338   Drainage Description Serosanguinous 12/29/21 1338   Odor None 12/29/21 1338   Cassia-wound Assessment Intact 12/29/21 1338   Margins Defined edges 12/29/21 1338 Wound Thickness Description not for Pressure Injury Full thickness 12/29/21 1338   Number of days: 455       Percent of Wound(s) Debrided: approximately 100%    Total  Area  Debrided:  24 sq cm     Bleeding:  Minimal    Hemostasis Achieved:  by pressure and by silver nitrate stick    Procedural Pain:  0  / 10     Post Procedural Pain:  0 / 10     Response to treatment:  Well tolerated by patient. Status of wound progress and description from last visit:   Stable. Plan:       Discharge Instructions       PHYSICIAN ORDERS AND DISCHARGE INSTRUCTIONS   NOTE: Upon discharge from the [address], you will receive a patient experience survey.  We would be grateful if you would take the time to fill this survey out.     Wound care order history:                 Vascular studies: Arterial studies done 9/27/20; no stenosis              Imaging:   Date               Cultures:   obtained on 11/18/2020                                 Obtained on 1/20/2021               Labs/ HbA1c:   Date               Grafts:                       #1 Apligraf on 12/16/2020                     #2 Apligraf on 12/23/2020                     #3 Apligraf on 12/30/2020                     #4 Apligraf on 1/6/2021                     # 5 Apligraf on 1/13/21                     #1 [place] 1/27/2021                     #2 [place] 2/3/2021                     #3 [place] 2/10/2021                     #4 [place] Right Medial Lower Leg 2/17/21                     #5 [place] 3/10/2021                   HBO:                Antibiotics:  Bactrim DS BID for 10 days on 11/18/2020                                   Cipro BID for 10 days ordered on 11/25/2020                                    Cipro 500 mg BID on 1/20/2021                                    Cipro 500 mg BID on 4/28/2021              Earlier Wound care treatments:                Authorizations:                        Consults:   Date                           Primary care physician:    Continuing wound care orders and information:              Residence:  private               Continue home health care with: [facility]               [facility] wound-care supplies will be provided by: Isela Sinha provider:              DAVID Perez loading:  Date               ASHKE Medications:              RHJUA cleansing:                           KQ not scrub or use excessive force.                          Wash hands with soap and water before and after dressing changes.                         Prior to applying a clean dressing, cleanse wound with normal saline,                                wound cleanser, or mild soap and water.                           Ask the physician or nurse before getting the wound(s) wet in a shower              Daily Wound management:                          Keep weight off wounds and reposition every 2 hours.                          QSXRN standing for long periods of time.                          XMIRE wraps/stockings in AM and remove at bedtime.                          If swelling is present, elevate legs to the level of the heart or above for 30 minutes 4-5 times a day and/or when sitting.                                             When taking antibiotics take entire prescription as ordered by physician do not stop taking until medicine is all gone.                                           Orders for this week: 12/29/21     [place] [facility] [place]     [place] dorsal foot - healed      Right lower leg -- wash with soap and water, pat dry. Apply A&D to sarah wound. Cover with anasept gel and stimulin powder covered with Ca Alginate and ABD and wrap with Coban 2.  Leave in place for 1 week.             Auth for ERIC vac 9/1/2021     Referral to Dr Xavier Parr on first procedure scheduled on 4/27/2021 and 11/8/2021                    Follow up with Nayely Altamirano CNP in 1 week in the wound care center  Call [phone] for any questions or concerns.   Date__________   Time__________        Treatment Note Wound 09/30/20 Wound #1 Right Medial Lower Leg Cluster (Onset 5 Years)-Dressing/Treatment:  (silver nitrate per NP)    Written Patient Dismissal Instructions Given            Electronically signed by LOLY Gray CNP on 12/29/2021 at 1:58 PM

## 2021-12-29 NOTE — PROGRESS NOTES
Multilayer Compression Wrap   (Not Unna) Below the Knee    NAME:  Kashmir Casillas  YOB: 1945  MEDICAL RECORD NUMBER:  [id number]  DATE:  12/29/2021    Multilayer compression wrap: Removed old Multilayer wrap if indicated and wash leg with mild soap/water. Applied moisturizing agent to dry skin as needed. Applied primary and secondary dressing as ordered. Applied multilayered dressing below the knee to right lower leg. Instructed patient/caregiver not to remove dressing and to keep it clean and dry. Instructed patient/caregiver on complications to report to provider, such as pain, numbness in toes, heavy drainage, and slippage of dressing. Instructed patient on purpose of compression dressing and on activity and exercise recommendations.       Electronically signed by Page Antony RN on 12/29/2021 at 3:04 PM

## 2022-01-05 ENCOUNTER — HOSPITAL ENCOUNTER (OUTPATIENT)
Dept: WOUND CARE | Age: 77
Discharge: HOME | End: 2022-01-05
Payer: COMMERCIAL

## 2022-01-05 VITALS
TEMPERATURE: 98.4 F | SYSTOLIC BLOOD PRESSURE: 167 MMHG | HEART RATE: 76 BPM | RESPIRATION RATE: 16 BRPM | DIASTOLIC BLOOD PRESSURE: 93 MMHG

## 2022-01-05 DIAGNOSIS — L97.919 CHRONIC VENOUS HYPERTENSION WITH ULCER AND INFLAMMATION INVOLVING RIGHT SIDE (HCC): Primary | ICD-10-CM

## 2022-01-05 DIAGNOSIS — L97.929 VENOUS ULCER OF LEFT LEG (HCC): ICD-10-CM

## 2022-01-05 DIAGNOSIS — I83.029 VENOUS ULCER OF LEFT LEG (HCC): ICD-10-CM

## 2022-01-05 DIAGNOSIS — I87.331 CHRONIC VENOUS HYPERTENSION WITH ULCER AND INFLAMMATION INVOLVING RIGHT SIDE (HCC): Primary | ICD-10-CM

## 2022-01-05 PROCEDURE — 11045 DBRDMT SUBQ TISS EACH ADDL: CPT

## 2022-01-05 PROCEDURE — 11042 DBRDMT SUBQ TIS 1ST 20SQCM/<: CPT | Performed by: NURSE PRACTITIONER

## 2022-01-05 PROCEDURE — 11045 DBRDMT SUBQ TISS EACH ADDL: CPT | Performed by: NURSE PRACTITIONER

## 2022-01-05 PROCEDURE — 11042 DBRDMT SUBQ TIS 1ST 20SQCM/<: CPT

## 2022-01-05 RX ORDER — CLOBETASOL PROPIONATE 0.5 MG/G
OINTMENT TOPICAL ONCE
Status: CANCELLED | OUTPATIENT
Start: 2022-01-05 | End: 2022-01-05

## 2022-01-05 RX ORDER — GENTAMICIN SULFATE 1 MG/G
OINTMENT TOPICAL ONCE
Status: CANCELLED | OUTPATIENT
Start: 2022-01-05 | End: 2022-01-05

## 2022-01-05 RX ORDER — LIDOCAINE 40 MG/G
CREAM TOPICAL ONCE
Status: CANCELLED | OUTPATIENT
Start: 2022-01-05 | End: 2022-01-05

## 2022-01-05 RX ORDER — LIDOCAINE 50 MG/G
OINTMENT TOPICAL ONCE
Status: CANCELLED | OUTPATIENT
Start: 2022-01-05 | End: 2022-01-05

## 2022-01-05 RX ORDER — GINSENG 100 MG
CAPSULE ORAL ONCE
Status: CANCELLED | OUTPATIENT
Start: 2022-01-05 | End: 2022-01-05

## 2022-01-05 RX ORDER — LIDOCAINE HYDROCHLORIDE 40 MG/ML
SOLUTION TOPICAL ONCE
Status: CANCELLED | OUTPATIENT
Start: 2022-01-05 | End: 2022-01-05

## 2022-01-05 RX ORDER — LIDOCAINE HYDROCHLORIDE 20 MG/ML
JELLY TOPICAL ONCE
Status: CANCELLED | OUTPATIENT
Start: 2022-01-05 | End: 2022-01-05

## 2022-01-05 RX ORDER — LIDOCAINE 50 MG/G
OINTMENT TOPICAL ONCE
Status: DISCONTINUED | OUTPATIENT
Start: 2022-01-05 | End: 2022-01-06 | Stop reason: HOSPADM

## 2022-01-05 RX ORDER — BETAMETHASONE DIPROPIONATE 0.05 %
OINTMENT (GRAM) TOPICAL ONCE
Status: CANCELLED | OUTPATIENT
Start: 2022-01-05 | End: 2022-01-05

## 2022-01-05 RX ORDER — BACITRACIN ZINC AND POLYMYXIN B SULFATE 500; 1000 [USP'U]/G; [USP'U]/G
OINTMENT TOPICAL ONCE
Status: CANCELLED | OUTPATIENT
Start: 2022-01-05 | End: 2022-01-05

## 2022-01-05 RX ORDER — BACITRACIN, NEOMYCIN, POLYMYXIN B 400; 3.5; 5 [USP'U]/G; MG/G; [USP'U]/G
OINTMENT TOPICAL ONCE
Status: CANCELLED | OUTPATIENT
Start: 2022-01-05 | End: 2022-01-05

## 2022-01-05 ASSESSMENT — PAIN SCALES - GENERAL: PAINLEVEL_OUTOF10: 0

## 2022-01-05 NOTE — PROGRESS NOTES
Wound Care Center Progress Note With Procedure    Loni Clark  AGE: 77 y.o. GENDER: male  : 1945  EPISODE DATE:  2022     Subjective:     Chief Complaint   Patient presents with    Wound Check         HISTORY of PRESENT ILLNESS      Fuentes Daley is a 77 y. o. male who presents today for wound evaluation of Chronic venous wound(s) of R lower leg medial.  The wound is of moderate severity.  The underlying cause of the wound is venous disease and trauma.       Hypergranulation is improved. No need for silver nitrate today. Clean and dry. Somewhat stalled on progress.      Wound Pain Timing/Severity: none  Quality of pain: N/A  Severity of pain:  0 / 10   Modifying Factors: venous stasis  Associated Signs/Symptoms: none        PAST MEDICAL HISTORY        Diagnosis Date    Chronic venous hypertension with ulcer and inflammation involving right side (HCC)     RLE    Hypertension     past    Iron deficiency     Lymphoma Dx 11-11    Hx of B cell lymphoma-in remission since        PAST SURGICAL HISTORY    Past Surgical History:   Procedure Laterality Date    TUNNELED VENOUS PORT PLACEMENT      TUNNELED VENOUS PORT PLACEMENT      removed 2012       FAMILY HISTORY    Family History   Problem Relation Age of Onset    Early Death Mother 52    Diabetes Father     Heart Disease Father     Vision Loss Son         \"He wears glasses\"    Early Death Daughter 36       SOCIAL HISTORY    Social History     Tobacco Use    Smoking status: Never Smoker    Smokeless tobacco: Never Used   Substance Use Topics    Alcohol use: No    Drug use: No       ALLERGIES    No Known Allergies    MEDICATIONS    Current Outpatient Medications on File Prior to Encounter   Medication Sig Dispense Refill    metoprolol succinate (TOPROL XL) 50 MG extended release tablet Take 1 tablet by mouth daily 30 tablet 5     No current facility-administered medications on file prior to encounter.        REVIEW OF SYSTEMS    Pertinent items are noted in HPI. Constitutional: Negative for systemic symptoms including fever, chills and malaise. Objective:      BP (!) 167/93   Pulse 76   Temp 98.4 °F (36.9 °C) (Temporal)   Resp 16     PHYSICAL EXAM      General: The patient is in no acute distress. Mental status:  Patient is appropriate, is  oriented to place and plan of care. Dermatologic exam: Visual inspection of the periwound reveals the skin to be normal in turgor and texture, dry and slack  Wound exam: see wound description below in procedure note      Assessment:     Problem List Items Addressed This Visit     Chronic venous hypertension with ulcer and inflammation ([facility] [address] - Primary    Relevant Medications    lidocaine (XYLOCAINE) 5 % ointment (Start on 1/5/2022  2:00 PM)    Other Relevant Orders    Initiate Outpatient Wound Care Protocol    Venous ulcer of left leg (HCC)    Relevant Medications    lidocaine (XYLOCAINE) 5 % ointment (Start on 1/5/2022  2:00 PM)    Other Relevant Orders    Initiate Outpatient Wound Care Protocol        Procedure Note    Indications:  Based on my examination of this patient's wound(s) today, sharp excision into necrotic subcutaneous tissue is required to promote healing and evaluate the extent of previous healing. Performed by: LOLY Hawk CNP    Consent obtained: Yes    Time out taken:  Yes    Pain Control: Anesthetic  Anesthetic: 5% Lidocaine Ointment Topical     Debridement:Excisional Debridement    Using #15 blade scalpel the wound(s) was/were sharply debrided down through and including the removal of subcutaneous tissue.         Devitalized Tissue Debrided:  fibrin, biofilm and slough    Pre Debridement Measurements:  Are located in the Wound Documentation Flow Sheet    All active wounds listed below with today's date are evaluated  Wound(s)    debrided this date include # : 1     Post  Debridement Measurements:  Wound 07/29/13 Venous ulcer Ankle Right;Lateral (Active)   Number of days: 3082       Wound 09/30/20 Wound #1 Right Medial Lower Leg Cluster (Onset 5 Years) (Active)   Wound Image   01/05/22 1336   Wound Etiology Venous 01/05/22 1336   Dressing Status New dressing applied 12/29/21 1503   Wound Cleansed Soap and water 01/05/22 1336   Dressing/Treatment Alginate with Ag 01/13/21 1657   Offloading for Diabetic Foot Ulcers No offloading required 01/05/22 1336   Wound Length (cm) 6.3 cm 01/05/22 1336   Wound Width (cm) 4 cm 01/05/22 1336   Wound Depth (cm) 0.1 cm 01/05/22 1336   Wound Surface Area (cm^2) 25.2 cm^2 01/05/22 1336   Change in Wound Size % (l*w) 90 01/05/22 1336   Wound Volume (cm^3) 2.52 cm^3 01/05/22 1336   Wound Healing % 90 01/05/22 1336   Post-Procedure Length (cm) 6.3 cm 01/05/22 1349   Post-Procedure Width (cm) 4 cm 01/05/22 1349   Post-Procedure Depth (cm) 0.1 cm 01/05/22 1349   Post-Procedure Surface Area (cm^2) 25.2 cm^2 01/05/22 1349   Post-Procedure Volume (cm^3) 2.52 cm^3 01/05/22 1349   Distance Tunneling (cm) 0 cm 01/05/22 1336   Tunneling Position ___ O'Clock 0 01/05/22 1336   Undermining Starts ___ O'Clock 0 01/05/22 1336   Undermining Ends___ O'Clock 0 01/05/22 1336   Undermining Maxium Distance (cm) 0 01/05/22 1336   Wound Assessment Granulation tissue 01/05/22 1336   Drainage Amount Moderate 01/05/22 1336   Drainage Description Serosanguinous 01/05/22 1336   Odor None 01/05/22 1336   Cassia-wound Assessment Intact 01/05/22 1336   Margins Defined edges 01/05/22 1336   Wound Thickness Description not for Pressure Injury Full thickness 01/05/22 1336   Number of days: 462       Percent of Wound(s) Debrided: approximately 100%    Total  Area  Debrided:  25.2 sq cm     Bleeding:  Minimal    Hemostasis Achieved:  by pressure    Procedural Pain:  0  / 10     Post Procedural Pain:  0 / 10     Response to treatment:  Well tolerated by patient. Status of wound progress and description from last visit:   Stable.  Will remove anasept as wound is clean, and see if a dryer collagen helps build tissue. Continue plan of care otherwise and follow up 1 week       Plan:       Discharge Instructions         Discharge Instructions        [address] [place]  NOTE: Upon discharge from the [address], you will receive a patient experience survey. We would be grateful if you would take the time to fill this survey out.     Wound care order history:                 Vascular studies: Arterial studies done 9/27/20; no stenosis              Imaging:   Date               Cultures:   obtained on 11/18/2020                                 Obtained on 1/20/2021               Labs/ HbA1c:   Date               Grafts:                       #1 Apligraf on 12/16/2020                     #2 Apligraf on 12/23/2020                     #3 Apligraf on 12/30/2020                     #4 Apligraf on 1/6/2021                     # 5 Apligraf on 1/13/21                     #1 [place] 1/27/2021                     #2 [place] 2/3/2021                     #3 [place] 2/10/2021                     #4 [place] Right Medial Lower Leg 2/17/21                     #5 [place] 3/10/2021                   HBO:                Antibiotics:  Bactrim DS BID for 10 days on 11/18/2020                                   Cipro BID for 10 days ordered on 11/25/2020                                    Cipro 500 mg BID on 1/20/2021                                    Cipro 500 mg BID on 4/28/2021              Earlier Wound care treatments:                Authorizations:                        Consults:   Date                           Primary care physician:      Continuing wound care orders and information:              Residence:  private               Continue home health care with: [facility]               Your wound-care supplies will be provided by:  Abelardo Baer provider:  Yesica Reddy with              ORK loading:  Date               XGXGS

## 2022-01-05 NOTE — PROGRESS NOTES
Multilayer Compression Wrap   (Not Unna) Below the Knee    NAME:  Aldair Gorman  YOB: 1945  MEDICAL RECORD NUMBER:  [id number]  DATE:  1/5/2022    Multilayer compression wrap: Removed old Multilayer wrap if indicated and wash leg with mild soap/water. Applied moisturizing agent to dry skin as needed. Applied primary and secondary dressing as ordered. Applied multilayered dressing below the knee to right lower leg. Instructed patient/caregiver not to remove dressing and to keep it clean and dry. Instructed patient/caregiver on complications to report to provider, such as pain, numbness in toes, heavy drainage, and slippage of dressing. Instructed patient on purpose of compression dressing and on activity and exercise recommendations.       Electronically signed by Leyda Mendez LPN on 6/6/9165 at 8:80 PM

## 2022-01-06 ENCOUNTER — OFFICE VISIT (OUTPATIENT)
Dept: INTERNAL MEDICINE CLINIC | Age: 77
End: 2022-01-06
Payer: COMMERCIAL

## 2022-01-06 VITALS
HEART RATE: 79 BPM | WEIGHT: 192 LBS | TEMPERATURE: 96.8 F | OXYGEN SATURATION: 98 % | DIASTOLIC BLOOD PRESSURE: 80 MMHG | BODY MASS INDEX: 23.87 KG/M2 | HEIGHT: 75 IN | SYSTOLIC BLOOD PRESSURE: 140 MMHG

## 2022-01-06 DIAGNOSIS — Z12.5 SCREENING FOR PROSTATE CANCER: ICD-10-CM

## 2022-01-06 DIAGNOSIS — I10 ESSENTIAL HYPERTENSION: Primary | ICD-10-CM

## 2022-01-06 DIAGNOSIS — L97.919 CHRONIC VENOUS HYPERTENSION WITH ULCER AND INFLAMMATION INVOLVING RIGHT SIDE (HCC): ICD-10-CM

## 2022-01-06 DIAGNOSIS — I87.331 CHRONIC VENOUS HYPERTENSION WITH ULCER AND INFLAMMATION INVOLVING RIGHT SIDE (HCC): ICD-10-CM

## 2022-01-06 LAB — PROSTATE SPECIFIC ANTIGEN: 0.39 NG/ML (ref 0–4)

## 2022-01-06 PROCEDURE — 36415 COLL VENOUS BLD VENIPUNCTURE: CPT | Performed by: FAMILY MEDICINE

## 2022-01-06 PROCEDURE — 99213 OFFICE O/P EST LOW 20 MIN: CPT | Performed by: FAMILY MEDICINE

## 2022-01-06 RX ORDER — METOPROLOL SUCCINATE 50 MG/1
50 TABLET, EXTENDED RELEASE ORAL DAILY
Qty: 30 TABLET | Refills: 5 | Status: SHIPPED | OUTPATIENT
Start: 2022-01-06 | End: 2022-07-12 | Stop reason: SDUPTHER

## 2022-01-06 ASSESSMENT — ENCOUNTER SYMPTOMS
COUGH: 0
ABDOMINAL PAIN: 0
NAUSEA: 0
BACK PAIN: 0
SHORTNESS OF BREATH: 0

## 2022-01-06 NOTE — PROGRESS NOTES
Moe Delgado (:  1945) is a 77 y.o. male,Established patient, here for evaluation of the following chief complaint(s):  Hypertension and 6 Month Follow-Up         ASSESSMENT/PLAN:  1. Essential hypertension, chronic  -     metoprolol succinate (TOPROL XL) 50 MG extended release tablet; Take 1 tablet by mouth daily, Disp-30 tablet, R-5D/C Metoprolol 25Normal  Monitor BP  2. Chronic venous hypertension with ulcer and inflammation involving right side ([facility]  3. Screening for prostate cancer  -     Psa screening    Refused all colon cancer screening  On this date 2022 I have spent 20 minutes reviewing previous notes, test results and face to face with the patient discussing the diagnosis and importance of compliance with the treatment plan as well as documenting on the day of the visit. Return in about 5 months (around 2022) for HTN.      Lab Results   Component Value Date     2021    K 4.5 2021     2021    CO2 28 2021    BUN 14 2021    CREATININE 0.6 2021    GLUCOSE 95 2021    CALCIUM 9.5 2021      Lab Results   Component Value Date    WBC 6.6 2021    HGB 15.2 2021    HCT 44.7 2021    MCV 92.0 2021     2021       Subjective   SUBJECTIVE/OBJECTIVE:  HPI: This 78 yo M here for the following:  Patient Active Problem List    Diagnosis Date Noted    Chronic venous hypertension with ulcer and inflammation ([facility] 2013    Varicose veins of right lower extremity with other complications     Varicose veins of lower extremities with complications, right     Status post endovenous radiofrequency ablation (RFA) of saphenous vein 2021    Varicose veins of left lower extremity with other complications     Venous stasis ulcer of right lower leg with edema of right lower leg (HCC) 2021    Varicose veins of lower extremities with complications, bilateral 2021  Essential hypertension 03/01/2021    Acute blood loss anemia 10/14/2020    Iron deficiency 10/14/2020    Venous ulcer of left leg ([facility] [address] 09/26/2020    Leg ulcer ([facility] [address] 08/23/2013     HTN-  On Metoprolol. Bp slightly up today, but better than previous readings  CVI- improving. He had intervention with Dr. Martin Lab    Review of Systems   Constitutional: Negative for diaphoresis and fever. Respiratory: Negative for cough and shortness of breath. Cardiovascular: Negative for chest pain and palpitations. Gastrointestinal: Negative for abdominal pain and nausea. Genitourinary: Negative for dysuria. Musculoskeletal: Negative for back pain. Neurological: Negative for dizziness and headaches. No Known Allergies  Current Outpatient Medications   Medication Sig Dispense Refill    metoprolol succinate (TOPROL XL) 50 MG extended release tablet Take 1 tablet by mouth daily 30 tablet 5     No current facility-administered medications for this visit. Vitals:    01/06/22 1259   BP: (!) 140/80   Site: Left Upper Arm   Position: Sitting   Cuff Size: Medium Adult   Pulse: 79   Temp: 96.8 °F (36 °C)   SpO2: 98%   Weight: 192 lb (87.1 kg)   Height: 6' 3\" (1.905 m)     Objective   Physical Exam  Vitals reviewed. Constitutional:       General: He is not in acute distress. Eyes:      Extraocular Movements: Extraocular movements intact. Conjunctiva/sclera: Conjunctivae normal.   Cardiovascular:      Rate and Rhythm: Normal rate and regular rhythm. Pulmonary:      Effort: Pulmonary effort is normal. No respiratory distress. Breath sounds: Normal breath sounds. Abdominal:      General: Bowel sounds are normal.      Palpations: Abdomen is soft. Tenderness: There is no abdominal tenderness. Musculoskeletal:      Cervical back: Neck supple. Comments: RLE wrapped   Neurological:      Mental Status: He is alert and oriented to person, place, and time.    Psychiatric:         Mood and Affect: Mood normal.                An electronic signature was used to authenticate this note.     --Barbara Chavez, DO

## 2022-01-12 ENCOUNTER — HOSPITAL ENCOUNTER (OUTPATIENT)
Dept: WOUND CARE | Age: 77
Discharge: HOME | End: 2022-01-12
Payer: COMMERCIAL

## 2022-01-12 VITALS
SYSTOLIC BLOOD PRESSURE: 156 MMHG | RESPIRATION RATE: 18 BRPM | DIASTOLIC BLOOD PRESSURE: 93 MMHG | TEMPERATURE: 97.7 F | HEART RATE: 84 BPM

## 2022-01-12 DIAGNOSIS — L97.919 CHRONIC VENOUS HYPERTENSION WITH ULCER AND INFLAMMATION INVOLVING RIGHT SIDE (HCC): Primary | ICD-10-CM

## 2022-01-12 DIAGNOSIS — L97.929 VENOUS ULCER OF LEFT LEG (HCC): ICD-10-CM

## 2022-01-12 DIAGNOSIS — I87.331 CHRONIC VENOUS HYPERTENSION WITH ULCER AND INFLAMMATION INVOLVING RIGHT SIDE (HCC): Primary | ICD-10-CM

## 2022-01-12 DIAGNOSIS — I83.029 VENOUS ULCER OF LEFT LEG (HCC): ICD-10-CM

## 2022-01-12 PROCEDURE — 11042 DBRDMT SUBQ TIS 1ST 20SQCM/<: CPT

## 2022-01-12 PROCEDURE — 11045 DBRDMT SUBQ TISS EACH ADDL: CPT | Performed by: NURSE PRACTITIONER

## 2022-01-12 PROCEDURE — 11042 DBRDMT SUBQ TIS 1ST 20SQCM/<: CPT | Performed by: NURSE PRACTITIONER

## 2022-01-12 PROCEDURE — 11045 DBRDMT SUBQ TISS EACH ADDL: CPT

## 2022-01-12 RX ORDER — BACITRACIN ZINC AND POLYMYXIN B SULFATE 500; 1000 [USP'U]/G; [USP'U]/G
OINTMENT TOPICAL ONCE
Status: CANCELLED | OUTPATIENT
Start: 2022-01-12 | End: 2022-01-12

## 2022-01-12 RX ORDER — GINSENG 100 MG
CAPSULE ORAL ONCE
Status: CANCELLED | OUTPATIENT
Start: 2022-01-12 | End: 2022-01-12

## 2022-01-12 RX ORDER — LIDOCAINE HYDROCHLORIDE 40 MG/ML
SOLUTION TOPICAL ONCE
Status: CANCELLED | OUTPATIENT
Start: 2022-01-12 | End: 2022-01-12

## 2022-01-12 RX ORDER — GENTAMICIN SULFATE 1 MG/G
OINTMENT TOPICAL ONCE
Status: CANCELLED | OUTPATIENT
Start: 2022-01-12 | End: 2022-01-12

## 2022-01-12 RX ORDER — LIDOCAINE 50 MG/G
OINTMENT TOPICAL ONCE
Status: CANCELLED | OUTPATIENT
Start: 2022-01-12 | End: 2022-01-12

## 2022-01-12 RX ORDER — LIDOCAINE HYDROCHLORIDE 20 MG/ML
JELLY TOPICAL ONCE
Status: CANCELLED | OUTPATIENT
Start: 2022-01-12 | End: 2022-01-12

## 2022-01-12 RX ORDER — CLOBETASOL PROPIONATE 0.5 MG/G
OINTMENT TOPICAL ONCE
Status: CANCELLED | OUTPATIENT
Start: 2022-01-12 | End: 2022-01-12

## 2022-01-12 RX ORDER — LIDOCAINE 40 MG/G
CREAM TOPICAL ONCE
Status: CANCELLED | OUTPATIENT
Start: 2022-01-12 | End: 2022-01-12

## 2022-01-12 RX ORDER — BACITRACIN, NEOMYCIN, POLYMYXIN B 400; 3.5; 5 [USP'U]/G; MG/G; [USP'U]/G
OINTMENT TOPICAL ONCE
Status: CANCELLED | OUTPATIENT
Start: 2022-01-12 | End: 2022-01-12

## 2022-01-12 RX ORDER — BETAMETHASONE DIPROPIONATE 0.05 %
OINTMENT (GRAM) TOPICAL ONCE
Status: CANCELLED | OUTPATIENT
Start: 2022-01-12 | End: 2022-01-12

## 2022-01-12 ASSESSMENT — PAIN DESCRIPTION - PAIN TYPE: TYPE: ACUTE PAIN

## 2022-01-12 ASSESSMENT — PAIN DESCRIPTION - ORIENTATION: ORIENTATION: RIGHT

## 2022-01-12 ASSESSMENT — PAIN DESCRIPTION - LOCATION: LOCATION: LEG

## 2022-01-12 NOTE — PROGRESS NOTES
Multilayer Compression Wrap   (Not Unna) Below the Knee    NAME:  Doc Sanders  YOB: 1945  MEDICAL RECORD NUMBER:  [id number]  DATE:  1/12/2022    Multilayer compression wrap: Removed old Multilayer wrap if indicated and wash leg with mild soap/water. Applied moisturizing agent to dry skin as needed. Applied primary and secondary dressing as ordered. Applied multilayered dressing below the knee to right lower leg. Instructed patient/caregiver not to remove dressing and to keep it clean and dry. Instructed patient/caregiver on complications to report to provider, such as pain, numbness in toes, heavy drainage, and slippage of dressing. Instructed patient on purpose of compression dressing and on activity and exercise recommendations.       Electronically signed by Travis Ritchie LPN on 5/31/2707 at 2:93 PM

## 2022-01-12 NOTE — PROGRESS NOTES
Wound Care Center Progress Note With Procedure    Vinny Jerez  AGE: 77 y.o. GENDER: male  : 1945  EPISODE DATE:  2022     Subjective:     Chief Complaint   Patient presents with    Wound Check     RLE         HISTORY of PRESENT ILLNESS      Fuentes Daley is a 77 y. o. male who presents today for wound evaluation of Chronic venous wound(s) of R lower leg medial.  The wound is of moderate severity.  The underlying cause of the wound is venous disease and trauma.       Stable this week. No issues to report. Slightly improved after adding a dryer collagen and removing anasept.  No signs or symptoms  Of infection      Wound Pain Timing/Severity: none  Quality of pain: N/A  Severity of pain:  0 / 10   Modifying Factors: venous stasis  Associated Signs/Symptoms: none        PAST MEDICAL HISTORY        Diagnosis Date    Chronic venous hypertension with ulcer and inflammation involving right side (HCC)     RLE    Hypertension     past    Iron deficiency     Lymphoma Dx 11-    Hx of B cell lymphoma-in remission since        PAST SURGICAL HISTORY    Past Surgical History:   Procedure Laterality Date    TUNNELED VENOUS PORT PLACEMENT      TUNNELED VENOUS PORT PLACEMENT      removed 2012       FAMILY HISTORY    Family History   Problem Relation Age of Onset    Early Death Mother 52    Diabetes Father     Heart Disease Father     Vision Loss Son         \"He wears glasses\"    Early Death Daughter 36       SOCIAL HISTORY    Social History     Tobacco Use    Smoking status: Never Smoker    Smokeless tobacco: Never Used   Substance Use Topics    Alcohol use: No    Drug use: No       ALLERGIES    No Known Allergies    MEDICATIONS    Current Outpatient Medications on File Prior to Encounter   Medication Sig Dispense Refill    metoprolol succinate (TOPROL XL) 50 MG extended release tablet Take 1 tablet by mouth daily 30 tablet 5     No current facility-administered medications on file prior to encounter. REVIEW OF SYSTEMS    Pertinent items are noted in HPI. Constitutional: Negative for systemic symptoms including fever, chills and malaise. Objective:      BP (!) 156/93   Pulse 84   Temp 97.7 °F (36.5 °C) (Temporal)   Resp 18     PHYSICAL EXAM      General: The patient is in no acute distress. Mental status:  Patient is appropriate, is  oriented to place and plan of care. Dermatologic exam: Visual inspection of the periwound reveals the skin to be normal in turgor and texture, dry and slack  Wound exam: see wound description below in procedure note      Assessment:     Problem List Items Addressed This Visit     Chronic venous hypertension with ulcer and inflammation ([address] - Primary    Relevant Orders    Initiate Outpatient Wound Care Protocol    Venous ulcer of left leg ([facility] [address]    Relevant Orders    Initiate Outpatient Wound Care Protocol        Procedure Note    Indications:  Based on my examination of this patient's wound(s) today, sharp excision into necrotic subcutaneous tissue is required to promote healing and evaluate the extent of previous healing. Performed by: LOLY Multani - MOMO    Consent obtained: Yes    Time out taken:  Yes    Pain Control: N/A       Debridement:Excisional Debridement    Using #15 blade scalpel the wound(s) was/were sharply debrided down through and including the removal of subcutaneous tissue.         Devitalized Tissue Debrided:  fibrin, biofilm, slough and exudate    Pre Debridement Measurements:  Are located in the Wound Documentation Flow Sheet    All active wounds listed below with today's date are evaluated  Wound(s)    debrided this date include # : 1     Post  Debridement Measurements:  Wound 07/29/13 Venous ulcer Ankle Right;Lateral (Active)   Number of days: 3089       Wound 09/30/20 Wound #1 Right Medial Lower Leg Cluster (Onset 5 Years) (Active)   Wound Image   01/05/22 1336   Wound Etiology Venous 01/12/22 1331   Dressing Status New dressing applied 01/05/22 1414   Wound Cleansed Soap and water 01/12/22 1331   Dressing/Treatment Alginate with Ag 01/13/21 1657   Offloading for Diabetic Foot Ulcers No offloading required 01/12/22 1331   Wound Length (cm) 6.3 cm 01/12/22 1331   Wound Width (cm) 4.5 cm 01/12/22 1331   Wound Depth (cm) 0.1 cm 01/12/22 1331   Wound Surface Area (cm^2) 28.35 cm^2 01/12/22 1331   Change in Wound Size % (l*w) 88.75 01/12/22 1331   Wound Volume (cm^3) 2.835 cm^3 01/12/22 1331   Wound Healing % 89 01/12/22 1331   Post-Procedure Length (cm) 6.3 cm 01/12/22 1335   Post-Procedure Width (cm) 4.5 cm 01/12/22 1335   Post-Procedure Depth (cm) 0.1 cm 01/12/22 1335   Post-Procedure Surface Area (cm^2) 28.35 cm^2 01/12/22 1335   Post-Procedure Volume (cm^3) 2.835 cm^3 01/12/22 1335   Distance Tunneling (cm) 0 cm 01/12/22 1331   Tunneling Position ___ O'Clock 0 01/12/22 1331   Undermining Starts ___ O'Clock 0 01/12/22 1331   Undermining Ends___ O'Clock 0 01/12/22 1331   Undermining Maxium Distance (cm) 0 01/12/22 1331   Wound Assessment Hyper granulation tissue 01/12/22 1331   Drainage Amount Moderate 01/12/22 1331   Drainage Description Serosanguinous 01/12/22 1331   Odor None 01/12/22 1331   Cassia-wound Assessment Intact 01/12/22 1331   Margins Defined edges 01/12/22 1331   Wound Thickness Description not for Pressure Injury Full thickness 01/12/22 1331   Number of days: 469       Percent of Wound(s) Debrided: approximately 100%    Total  Area  Debrided:  28.35 sq cm     Bleeding:  Minimal    Hemostasis Achieved:  by pressure    Procedural Pain:  0  / 10     Post Procedural Pain:  0 / 10     Response to treatment:  Well tolerated by patient. Status of wound progress and description from last visit:   Stable. Plan:       Discharge Instructions         Discharge Instructions        PHYSICIAN ORDERS AND DISCHARGE INSTRUCTIONS   NOTE: Upon discharge from the [address], you will receive a patient experience survey.  We would be grateful if you would take the time to fill this survey out.     Wound care order history:                 Vascular studies: Arterial studies done 9/27/20; no stenosis              Imaging:   Date               Cultures:   obtained on 11/18/2020                                 Obtained on 1/20/2021               Labs/ HbA1c:   Date               Grafts:                       #1 Apligraf on 12/16/2020                     #2 Apligraf on 12/23/2020                     #3 Apligraf on 12/30/2020                     #4 Apligraf on 1/6/2021                     # 5 Apligraf on 1/13/21                     #1 [place] 1/27/2021                     #2 [place] 2/3/2021                     #3 [place] 2/10/2021                     #4 [place] Right Medial Lower Leg 2/17/21                     #5 [place] 3/10/2021                   HBO:                Antibiotics:  Bactrim DS BID for 10 days on 11/18/2020                                   Cipro BID for 10 days ordered on 11/25/2020                                    Cipro 500 mg BID on 1/20/2021                                    Cipro 500 mg BID on 4/28/2021              Earlier Wound care treatments:                Authorizations:                        Consults:   Date                           Primary care physician:      Continuing wound care orders and information:              Residence:  [facility] care with: [facility]               Your wound-care supplies will be provided by: Brent Moeller provider:              [place] with  Stefani Varela loading:  Date               [facility] Medications:              EQNBO cleansing:                           TN not scrub or use excessive force.                          Wash hands with soap and water before and after dressing changes.                           Prior to applying a clean dressing, cleanse wound with normal saline,                                wound cleanser, or mild soap and water.                           ZIL the physician or nurse before getting the wound(s) wet in a shower              Daily Wound management:                          KTZB weight off wounds and reposition every 2 hours.                          GBRUK standing for long periods of time.                          IVRPI wraps/stockings in AM and remove at bedtime.                          If swelling is present, elevate legs to the level of the heart or above for 30 minutes 4-5 times a day and/or when sitting.                                             When taking antibiotics take entire prescription as ordered by physician do not stop taking until medicine is all gone.                                           Orders for this week: 1/12/2022     [place] [facility]     Right dorsal foot - healed      Right lower leg -- wash with soap and water, pat dry.  Apply A&D to sarah wound. Cover with stimulin powder covered with Ca Alginate and ABD and wrap with Coban 2.  Leave in place for 1 week.             Auth for ERIC vac 9/1/2021     Referral to Dr Britta Lyman on first procedure scheduled on 4/27/2021 and 11/8/2021                    Follow up with Anju Milian CNP in 1 week in the wound care center  Call [phone] for any questions or concerns.   Date__________   Time__________               Treatment Note      Written Patient Dismissal Instructions Given            Electronically signed by LOLY Leigh CNP on 1/12/2022 at 2:01 PM

## 2022-01-19 ENCOUNTER — HOSPITAL ENCOUNTER (OUTPATIENT)
Dept: WOUND CARE | Age: 77
Discharge: HOME | End: 2022-01-19
Payer: COMMERCIAL

## 2022-01-19 VITALS
RESPIRATION RATE: 20 BRPM | DIASTOLIC BLOOD PRESSURE: 89 MMHG | TEMPERATURE: 98 F | SYSTOLIC BLOOD PRESSURE: 166 MMHG | HEART RATE: 76 BPM

## 2022-01-19 DIAGNOSIS — I87.331 CHRONIC VENOUS HYPERTENSION WITH ULCER AND INFLAMMATION INVOLVING RIGHT SIDE (HCC): Primary | ICD-10-CM

## 2022-01-19 DIAGNOSIS — L97.929 VENOUS ULCER OF LEFT LEG (HCC): ICD-10-CM

## 2022-01-19 DIAGNOSIS — I83.029 VENOUS ULCER OF LEFT LEG (HCC): ICD-10-CM

## 2022-01-19 DIAGNOSIS — L97.919 CHRONIC VENOUS HYPERTENSION WITH ULCER AND INFLAMMATION INVOLVING RIGHT SIDE (HCC): Primary | ICD-10-CM

## 2022-01-19 PROCEDURE — 11042 DBRDMT SUBQ TIS 1ST 20SQCM/<: CPT | Performed by: NURSE PRACTITIONER

## 2022-01-19 PROCEDURE — 11042 DBRDMT SUBQ TIS 1ST 20SQCM/<: CPT

## 2022-01-19 PROCEDURE — 11045 DBRDMT SUBQ TISS EACH ADDL: CPT

## 2022-01-19 PROCEDURE — 11045 DBRDMT SUBQ TISS EACH ADDL: CPT | Performed by: NURSE PRACTITIONER

## 2022-01-19 RX ORDER — LIDOCAINE HYDROCHLORIDE 20 MG/ML
JELLY TOPICAL ONCE
Status: CANCELLED | OUTPATIENT
Start: 2022-01-19 | End: 2022-01-19

## 2022-01-19 RX ORDER — GENTAMICIN SULFATE 1 MG/G
OINTMENT TOPICAL ONCE
Status: CANCELLED | OUTPATIENT
Start: 2022-01-19 | End: 2022-01-19

## 2022-01-19 RX ORDER — LIDOCAINE HYDROCHLORIDE 40 MG/ML
SOLUTION TOPICAL ONCE
Status: CANCELLED | OUTPATIENT
Start: 2022-01-19 | End: 2022-01-19

## 2022-01-19 RX ORDER — LIDOCAINE 40 MG/G
CREAM TOPICAL ONCE
Status: CANCELLED | OUTPATIENT
Start: 2022-01-19 | End: 2022-01-19

## 2022-01-19 RX ORDER — CLOBETASOL PROPIONATE 0.5 MG/G
OINTMENT TOPICAL ONCE
Status: CANCELLED | OUTPATIENT
Start: 2022-01-19 | End: 2022-01-19

## 2022-01-19 RX ORDER — BETAMETHASONE DIPROPIONATE 0.05 %
OINTMENT (GRAM) TOPICAL ONCE
Status: CANCELLED | OUTPATIENT
Start: 2022-01-19 | End: 2022-01-19

## 2022-01-19 RX ORDER — LIDOCAINE 50 MG/G
OINTMENT TOPICAL ONCE
Status: CANCELLED | OUTPATIENT
Start: 2022-01-19 | End: 2022-01-19

## 2022-01-19 RX ORDER — LIDOCAINE HYDROCHLORIDE 40 MG/ML
SOLUTION TOPICAL ONCE
Status: DISCONTINUED | OUTPATIENT
Start: 2022-01-19 | End: 2022-01-20 | Stop reason: HOSPADM

## 2022-01-19 RX ORDER — BACITRACIN, NEOMYCIN, POLYMYXIN B 400; 3.5; 5 [USP'U]/G; MG/G; [USP'U]/G
OINTMENT TOPICAL ONCE
Status: CANCELLED | OUTPATIENT
Start: 2022-01-19 | End: 2022-01-19

## 2022-01-19 RX ORDER — GINSENG 100 MG
CAPSULE ORAL ONCE
Status: CANCELLED | OUTPATIENT
Start: 2022-01-19 | End: 2022-01-19

## 2022-01-19 RX ORDER — BACITRACIN ZINC AND POLYMYXIN B SULFATE 500; 1000 [USP'U]/G; [USP'U]/G
OINTMENT TOPICAL ONCE
Status: CANCELLED | OUTPATIENT
Start: 2022-01-19 | End: 2022-01-19

## 2022-01-19 NOTE — PROGRESS NOTES
Wound Care Center Progress Note With Procedure    Janet Vance  AGE: 77 y.o. GENDER: male  : 1945  EPISODE DATE:  2022     Subjective:     No chief complaint on file. HISTORY of PRESENT ILLNESS      Fuentes Daley is a 77 y. o. male who presents today for wound evaluation of Chronic venous wound(s) of R lower leg medial.  The wound is of moderate severity.  The underlying cause of the wound is venous disease and trauma.      Patient has stalled out. We will debride wound and make some changes to wound this week to try to get is healing again     Wound Pain Timing/Severity: none  Quality of pain: N/A  Severity of pain:  0 / 10   Modifying Factors: venous stasis  Associated Signs/Symptoms: none        PAST MEDICAL HISTORY        Diagnosis Date    Chronic venous hypertension with ulcer and inflammation involving right side (HCC)     RLE    Hypertension     past    Iron deficiency     Lymphoma Dx 11-11    Hx of B cell lymphoma-in remission since        PAST SURGICAL HISTORY    Past Surgical History:   Procedure Laterality Date    TUNNELED VENOUS PORT PLACEMENT      TUNNELED VENOUS PORT PLACEMENT      removed 2012       FAMILY HISTORY    Family History   Problem Relation Age of Onset    Early Death Mother 52    Diabetes Father     Heart Disease Father     Vision Loss Son         \"He wears glasses\"    Early Death Daughter 36       SOCIAL HISTORY    Social History     Tobacco Use    Smoking status: Never Smoker    Smokeless tobacco: Never Used   Substance Use Topics    Alcohol use: No    Drug use: No       ALLERGIES    No Known Allergies    MEDICATIONS    Current Outpatient Medications on File Prior to Encounter   Medication Sig Dispense Refill    metoprolol succinate (TOPROL XL) 50 MG extended release tablet Take 1 tablet by mouth daily 30 tablet 5     No current facility-administered medications on file prior to encounter.        REVIEW OF SYSTEMS    Pertinent items are noted in HPI. Constitutional: Negative for systemic symptoms including fever, chills and malaise. Objective:      BP (!) 166/89   Pulse 76   Temp 98 °F (36.7 °C) (Temporal)   Resp 20     PHYSICAL EXAM      General: The patient is in no acute distress. Mental status:  Patient is appropriate, is  oriented to place and plan of care. Dermatologic exam: Visual inspection of the periwound reveals the skin to be normal in turgor and texture, dry and slack  Wound exam: see wound description below in procedure note      Assessment:     Problem List Items Addressed This Visit     Chronic venous hypertension with ulcer and inflammation ([address] - Primary    Relevant Medications    lidocaine (XYLOCAINE) 4 % external solution (Start on 1/19/2022  2:00 PM)    Other Relevant Orders    Initiate Outpatient Wound Care Protocol    Venous ulcer of left leg (HCC)    Relevant Medications    lidocaine (XYLOCAINE) 4 % external solution (Start on 1/19/2022  2:00 PM)    Other Relevant Orders    Initiate Outpatient Wound Care Protocol        Procedure Note    Indications:  Based on my examination of this patient's wound(s) today, sharp excision into necrotic subcutaneous tissue is required to promote healing and evaluate the extent of previous healing. Performed by: LOLY Sanz - CNP    Consent obtained: Yes    Time out taken:  Yes    Pain Control: Anesthetic  Anesthetic: 5% Lidocaine Ointment Topical     Debridement:Excisional Debridement    Using # 10 blade scalpel the wound(s) was/were sharply debrided down through and including the removal of subcutaneous tissue.         Devitalized Tissue Debrided:  fibrin, biofilm, slough and exudate    Pre Debridement Measurements:  Are located in the Wound Documentation Flow Sheet    All active wounds listed below with today's date are evaluated  Wound(s)    debrided this date include # : 1     Post  Debridement Measurements:  Wound 07/29/13 Venous ulcer Ankle Right;Lateral (Active)   Number of days: 3096       Wound 09/30/20 Wound #1 Right Medial Lower Leg Cluster (Onset 5 Years) (Active)   Wound Image   01/19/22 1325   Wound Etiology Venous 01/19/22 1325   Dressing Status New dressing applied 01/12/22 1429   Wound Cleansed Soap and water 01/19/22 1325   Dressing/Treatment Alginate with Ag 01/13/21 1657   Offloading for Diabetic Foot Ulcers Felt or foam 01/12/22 1429   Wound Length (cm) 6.3 cm 01/19/22 1325   Wound Width (cm) 4.5 cm 01/19/22 1325   Wound Depth (cm) 0.1 cm 01/19/22 1325   Wound Surface Area (cm^2) 28.35 cm^2 01/19/22 1325   Change in Wound Size % (l*w) 88.75 01/19/22 1325   Wound Volume (cm^3) 2.835 cm^3 01/19/22 1325   Wound Healing % 89 01/19/22 1325   Post-Procedure Length (cm) 6.3 cm 01/19/22 1352   Post-Procedure Width (cm) 4.5 cm 01/19/22 1352   Post-Procedure Depth (cm) 0.1 cm 01/19/22 1352   Post-Procedure Surface Area (cm^2) 28.35 cm^2 01/19/22 1352   Post-Procedure Volume (cm^3) 2.835 cm^3 01/19/22 1352   Distance Tunneling (cm) 0 cm 01/19/22 1325   Tunneling Position ___ O'Clock 0 01/19/22 1325   Undermining Starts ___ O'Clock 0 01/19/22 1325   Undermining Ends___ O'Clock 0 01/19/22 1325   Undermining Maxium Distance (cm) 0 01/19/22 1325   Wound Assessment Hyper granulation tissue 01/19/22 1325   Drainage Amount Moderate 01/19/22 1325   Drainage Description Serosanguinous 01/19/22 1325   Odor None 01/19/22 1325   Cassia-wound Assessment Intact 01/19/22 1325   Margins Defined edges 01/19/22 1325   Wound Thickness Description not for Pressure Injury Full thickness 01/19/22 1325   Number of days: 476       Percent of Wound(s) Debrided: approximately 100%    Total  Area  Debrided:  28.35 sq cm     Bleeding:  Minimal    Hemostasis Achieved:  by pressure    Procedural Pain:  0  / 10     Post Procedural Pain:  0 / 10     Response to treatment:  Well tolerated by patient.      Status of wound progress and description from last visit:   We will try to treat with hydrofera catie this week to try to knock hypergranulation and thick, non granulating wound bed. Continue to monitor and follow up 1 week       Plan:       Discharge Instructions         Discharge Instructions        PHYSICIAN ORDERS AND DISCHARGE INSTRUCTIONS   NOTE: Upon discharge from the [address], you will receive a patient experience survey. We would be grateful if you would take the time to fill this survey out.     Wound care order history:                 Vascular studies: Arterial studies done 9/27/20; no stenosis              Imaging:   Date               Cultures:   obtained on 11/18/2020                                 Obtained on 1/20/2021               Labs/ HbA1c:   Date               Grafts:                       #1 Apligraf on 12/16/2020                     #2 Apligraf on 12/23/2020                     #3 Apligraf on 12/30/2020                     #4 Apligraf on 1/6/2021                     # 5 Apligraf on 1/13/21                     #1 [place] 1/27/2021                     #2 [place] 2/3/2021                     #3 [place] 2/10/2021                     #4 [place] Right Medial Lower Leg 2/17/21                     #5 [place] 3/10/2021                   HBO:                Antibiotics:  Bactrim DS BID for 10 days on 11/18/2020                                   Cipro BID for 10 days ordered on 11/25/2020                                    Cipro 500 mg BID on 1/20/2021                                    Cipro 500 mg BID on 4/28/2021              Earlier Wound care treatments:                Authorizations:                        Consults:   Date                           Primary care physician:      Continuing wound care orders and information:              Residence:  private               Continue home health care with: [facility]               Your wound-care supplies will be provided by:  Wilian Barone provider:              DIOMEDES with              Off loading:  Date             DWTEA Medications:              XJNFU cleansing:                           NO not scrub or use excessive force.                          Wash hands with soap and water before and after dressing changes.                         Prior to applying a clean dressing, cleanse wound with normal saline,                                wound cleanser, or mild soap and water.                           Ask the physician or nurse before getting the wound(s) wet in a shower              Daily Wound management:                          Keep weight off wounds and reposition every 2 hours.                          QCFOT standing for long periods of time.                          POCNO wraps/stockings in AM and remove at bedtime.                          If swelling is present, elevate legs to the level of the heart or above for 30 minutes 4-5 times a day and/or when sitting.                                             When taking antibiotics take entire prescription as ordered by physician do not stop taking until medicine is all gone.                                           Orders for this week: 1/19/2022     Fax orders to [facility] [place]     Right dorsal foot - healed      Right lower leg -- wash with soap and water, pat dry.  Apply A&D to sarah wound. Cover with hydrofera blue dampened with saline and cut to size, and ABD Then wrap with Coban 2.  Leave in place for 1 week.             Auth for ERIC vac 9/1/2021     Referral to Dr Apurva Parish on first procedure scheduled on 4/27/2021 and 11/8/2021                    Follow up with Celestino Sanabria CNP in 1 week in the wound care center  Call [phone] for any questions or concerns.   Date__________   Time__________                     Treatment Note      Written Patient Dismissal Instructions Given            Electronically signed by LOLY Avila CNP on 1/19/2022 at 1:58 PM

## 2022-01-19 NOTE — PROGRESS NOTES
Multilayer Compression Wrap   (Not Unna) Below the Knee    NAME:  Aleksandar Patetrson  YOB: 1945  MEDICAL RECORD NUMBER:  [id number]  DATE:  1/19/2022    Multilayer compression wrap: Removed old Multilayer wrap if indicated and wash leg with mild soap/water. Applied moisturizing agent to dry skin as needed. Applied primary and secondary dressing as ordered. Applied multilayered dressing below the knee to right lower leg. Instructed patient/caregiver not to remove dressing and to keep it clean and dry. Instructed patient/caregiver on complications to report to provider, such as pain, numbness in toes, heavy drainage, and slippage of dressing. Instructed patient on purpose of compression dressing and on activity and exercise recommendations.       Electronically signed by Cj Mohr RN on 1/19/2022 at 2:24 PM

## 2022-01-26 ENCOUNTER — HOSPITAL ENCOUNTER (OUTPATIENT)
Dept: WOUND CARE | Age: 77
Discharge: HOME | End: 2022-01-26
Payer: COMMERCIAL

## 2022-01-26 VITALS
SYSTOLIC BLOOD PRESSURE: 171 MMHG | TEMPERATURE: 97.9 F | RESPIRATION RATE: 18 BRPM | HEART RATE: 75 BPM | DIASTOLIC BLOOD PRESSURE: 93 MMHG

## 2022-01-26 DIAGNOSIS — L97.929 VENOUS ULCER OF LEFT LEG (HCC): ICD-10-CM

## 2022-01-26 DIAGNOSIS — L97.919 CHRONIC VENOUS HYPERTENSION WITH ULCER AND INFLAMMATION INVOLVING RIGHT SIDE (HCC): Primary | ICD-10-CM

## 2022-01-26 DIAGNOSIS — I87.331 CHRONIC VENOUS HYPERTENSION WITH ULCER AND INFLAMMATION INVOLVING RIGHT SIDE (HCC): Primary | ICD-10-CM

## 2022-01-26 DIAGNOSIS — I83.029 VENOUS ULCER OF LEFT LEG (HCC): ICD-10-CM

## 2022-01-26 PROCEDURE — 11045 DBRDMT SUBQ TISS EACH ADDL: CPT

## 2022-01-26 PROCEDURE — 11042 DBRDMT SUBQ TIS 1ST 20SQCM/<: CPT

## 2022-01-26 PROCEDURE — 11045 DBRDMT SUBQ TISS EACH ADDL: CPT | Performed by: NURSE PRACTITIONER

## 2022-01-26 PROCEDURE — 11042 DBRDMT SUBQ TIS 1ST 20SQCM/<: CPT | Performed by: NURSE PRACTITIONER

## 2022-01-26 RX ORDER — CLOBETASOL PROPIONATE 0.5 MG/G
OINTMENT TOPICAL ONCE
Status: CANCELLED | OUTPATIENT
Start: 2022-01-26 | End: 2022-01-26

## 2022-01-26 RX ORDER — GINSENG 100 MG
CAPSULE ORAL ONCE
Status: CANCELLED | OUTPATIENT
Start: 2022-01-26 | End: 2022-01-26

## 2022-01-26 RX ORDER — LIDOCAINE HYDROCHLORIDE 40 MG/ML
SOLUTION TOPICAL ONCE
Status: CANCELLED | OUTPATIENT
Start: 2022-01-26 | End: 2022-01-26

## 2022-01-26 RX ORDER — LIDOCAINE HYDROCHLORIDE 20 MG/ML
JELLY TOPICAL ONCE
Status: CANCELLED | OUTPATIENT
Start: 2022-01-26 | End: 2022-01-26

## 2022-01-26 RX ORDER — LIDOCAINE 40 MG/G
CREAM TOPICAL ONCE
Status: CANCELLED | OUTPATIENT
Start: 2022-01-26 | End: 2022-01-26

## 2022-01-26 RX ORDER — BETAMETHASONE DIPROPIONATE 0.05 %
OINTMENT (GRAM) TOPICAL ONCE
Status: CANCELLED | OUTPATIENT
Start: 2022-01-26 | End: 2022-01-26

## 2022-01-26 RX ORDER — BACITRACIN, NEOMYCIN, POLYMYXIN B 400; 3.5; 5 [USP'U]/G; MG/G; [USP'U]/G
OINTMENT TOPICAL ONCE
Status: CANCELLED | OUTPATIENT
Start: 2022-01-26 | End: 2022-01-26

## 2022-01-26 RX ORDER — BACITRACIN ZINC AND POLYMYXIN B SULFATE 500; 1000 [USP'U]/G; [USP'U]/G
OINTMENT TOPICAL ONCE
Status: CANCELLED | OUTPATIENT
Start: 2022-01-26 | End: 2022-01-26

## 2022-01-26 RX ORDER — LIDOCAINE 50 MG/G
OINTMENT TOPICAL ONCE
Status: CANCELLED | OUTPATIENT
Start: 2022-01-26 | End: 2022-01-26

## 2022-01-26 RX ORDER — GENTAMICIN SULFATE 1 MG/G
OINTMENT TOPICAL ONCE
Status: CANCELLED | OUTPATIENT
Start: 2022-01-26 | End: 2022-01-26

## 2022-01-26 ASSESSMENT — PAIN DESCRIPTION - ORIENTATION: ORIENTATION: RIGHT

## 2022-01-26 ASSESSMENT — PAIN DESCRIPTION - LOCATION: LOCATION: LEG

## 2022-01-26 ASSESSMENT — PAIN DESCRIPTION - PAIN TYPE: TYPE: ACUTE PAIN

## 2022-01-26 NOTE — PROGRESS NOTES
Wound Care Center Progress Note With Procedure    Natalie Alvarez  AGE: 77 y.o. GENDER: male  : 1945  EPISODE DATE:  2022     Subjective:     Chief Complaint   Patient presents with    Wound Check     RLE         HISTORY of PRESENT ILLNESS      Fuentes Daley is a 77 y. o. male who presents today for wound evaluation of Chronic venous wound(s) of R lower leg medial.  The wound is of moderate severity.  The underlying cause of the wound is venous disease and trauma.       Changes to dressing orders last week seemed to help. Wound is filling in nicely and patient and family are happy with progress.    No other issues to report otherwise     Wound Pain Timing/Severity: none  Quality of pain: N/A  Severity of pain:  0 / 10   Modifying Factors: venous stasis  Associated Signs/Symptoms: none    PAST MEDICAL HISTORY        Diagnosis Date    Chronic venous hypertension with ulcer and inflammation involving right side (HCC)     RLE    Hypertension     past    Iron deficiency     Lymphoma Dx 11-    Hx of B cell lymphoma-in remission since        PAST SURGICAL HISTORY    Past Surgical History:   Procedure Laterality Date    TUNNELED VENOUS PORT PLACEMENT      TUNNELED VENOUS PORT PLACEMENT      removed 2012       FAMILY HISTORY    Family History   Problem Relation Age of Onset    Early Death Mother 52    Diabetes Father     Heart Disease Father     Vision Loss Son         \"He wears glasses\"    Early Death Daughter 36       SOCIAL HISTORY    Social History     Tobacco Use    Smoking status: Never Smoker    Smokeless tobacco: Never Used   Substance Use Topics    Alcohol use: No    Drug use: No       ALLERGIES    No Known Allergies    MEDICATIONS    Current Outpatient Medications on File Prior to Encounter   Medication Sig Dispense Refill    metoprolol succinate (TOPROL XL) 50 MG extended release tablet Take 1 tablet by mouth daily 30 tablet 5     No current facility-administered medications on file prior to encounter. REVIEW OF SYSTEMS    Pertinent items are noted in HPI. Constitutional: Negative for systemic symptoms including fever, chills and malaise. Objective:      BP (!) 171/93   Pulse 75   Temp 97.9 °F (36.6 °C) (Temporal)   Resp 18     PHYSICAL EXAM      General: The patient is in no acute distress. Mental status:  Patient is appropriate, is  oriented to place and plan of care. Dermatologic exam: Visual inspection of the periwound reveals the skin to be normal in turgor and texture, dry and coarse  Wound exam: see wound description below in procedure note      Assessment:     Problem List Items Addressed This Visit     Chronic venous hypertension with ulcer and inflammation ([facility] [address] - Primary    Relevant Orders    Initiate Outpatient Wound Care Protocol    Venous ulcer of left leg ([facility] [address]    Relevant Orders    Initiate Outpatient Wound Care Protocol        Procedure Note    Indications:  Based on my examination of this patient's wound(s) today, sharp excision into necrotic subcutaneous tissue is required to promote healing and evaluate the extent of previous healing. Performed by: LOLY Solano - CNP    Consent obtained: Yes    Time out taken:  Yes    Pain Control: N/A       Debridement:Excisional Debridement    Using #15 blade scalpel the wound(s) was/were sharply debrided down through and including the removal of subcutaneous tissue.         Devitalized Tissue Debrided:  fibrin, biofilm, slough and exudate    Pre Debridement Measurements:  Are located in the Wound Documentation Flow Sheet    All active wounds listed below with today's date are evaluated  Wound(s)    debrided this date include # : 1     Post  Debridement Measurements:  Wound 07/29/13 Venous ulcer Ankle Right;Lateral (Active)   Number of days: 3103       Wound 09/30/20 Wound #1 Right Medial Lower Leg Cluster (Onset 5 Years) (Active)   Wound Image   01/19/22 1325   Wound Etiology Venous 01/26/22 1356   Dressing Status New dressing applied 01/26/22 1422   Wound Cleansed Soap and water 01/26/22 1356   Dressing/Treatment Alginate with Ag 01/13/21 1657   Offloading for Diabetic Foot Ulcers Felt or foam 01/26/22 1422   Wound Length (cm) 6 cm 01/26/22 1356   Wound Width (cm) 4.5 cm 01/26/22 1356   Wound Depth (cm) 0.1 cm 01/26/22 1356   Wound Surface Area (cm^2) 27 cm^2 01/26/22 1356   Change in Wound Size % (l*w) 89.29 01/26/22 1356   Wound Volume (cm^3) 2.7 cm^3 01/26/22 1356   Wound Healing % 89 01/26/22 1356   Post-Procedure Length (cm) 6 cm 01/26/22 1413   Post-Procedure Width (cm) 4.5 cm 01/26/22 1413   Post-Procedure Depth (cm) 0.1 cm 01/26/22 1413   Post-Procedure Surface Area (cm^2) 27 cm^2 01/26/22 1413   Post-Procedure Volume (cm^3) 2.7 cm^3 01/26/22 1413   Distance Tunneling (cm) 0 cm 01/26/22 1356   Tunneling Position ___ O'Clock 0 01/26/22 1356   Undermining Starts ___ O'Clock 0 01/26/22 1356   Undermining Ends___ O'Clock 0 01/26/22 1356   Undermining Maxium Distance (cm) 0 01/26/22 1356   Wound Assessment Hyper granulation tissue 01/26/22 1356   Drainage Amount Moderate 01/26/22 1356   Drainage Description Serosanguinous 01/26/22 1356   Odor None 01/26/22 1356   Cassia-wound Assessment Intact 01/26/22 1356   Margins Defined edges 01/26/22 1356   Wound Thickness Description not for Pressure Injury Full thickness 01/26/22 1356   Number of days: 483       Percent of Wound(s) Debrided: approximately 100%    Total  Area  Debrided:  27 sq cm     Bleeding:  Minimal    Hemostasis Achieved:  by pressure    Procedural Pain:  0  / 10     Post Procedural Pain:  0 / 10     Response to treatment:  Well tolerated by patient.      Status of wound progress and description from last visit:   Measuring slightly smaller, but wound is abstractly shaped and difficult to measure  Continue plan of care for now and follow up 1 week       Plan:       Discharge Instructions         Discharge Instructions      PHYSICIAN ORDERS AND DISCHARGE INSTRUCTIONS   NOTE: Upon discharge from the [address], you will receive a patient experience survey. We would be grateful if you would take the time to fill this survey out.     Wound care order history:                 Vascular studies: Arterial studies done 9/27/20; no stenosis              Imaging:   Date               Cultures:   obtained on 11/18/2020                                 Obtained on 1/20/2021               Labs/ HbA1c:   Date               Grafts:                       #1 Apligraf on 12/16/2020                     #2 Apligraf on 12/23/2020                     #3 Apligraf on 12/30/2020                     #4 Apligraf on 1/6/2021                     # 5 Apligraf on 1/13/21                     #1 [place] 1/27/2021                     #2 [place] 2/3/2021                     #3 [place] 2/10/2021                     #4 [place] Right Medial Lower Leg 2/17/21                     #5 [place] 3/10/2021                   HBO:                Antibiotics:  Bactrim DS BID for 10 days on 11/18/2020                                   Cipro BID for 10 days ordered on 11/25/2020                                    Cipro 500 mg BID on 1/20/2021                                    Cipro 500 mg BID on 4/28/2021              Earlier Wound care treatments:                Authorizations:                        Consults:   Date                           Primary care physician:      Continuing wound care orders and information:              Residence:  private               [facility] care with: [facility] discharged 1/19/2022              Your wound-care supplies will be provided by:  Luna Loyd provider:              LAUREL with  Marlee Adams loading:  Date               YCOXS Medications:              OONYY cleansing:                           LV not scrub or use excessive force.                          Wash hands with soap and water before and after dressing changes.                         Prior to applying a clean dressing, cleanse wound with normal saline,                                wound cleanser, or mild soap and water.                           Ask the physician or nurse before getting the wound(s) wet in a shower              Daily Wound management:                          Keep weight off wounds and reposition every 2 hours.                          OMSEE standing for long periods of time.                          HPQSZ wraps/stockings in AM and remove at bedtime.                          If swelling is present, elevate legs to the level of the heart or above for 30 minutes 4-5 times a day and/or when sitting.                                             When taking antibiotics take entire prescription as ordered by physician do not stop taking until medicine is all gone.                                           Orders for this week: 1/26/2022     [place] [facility] [place]     Right dorsal foot - healed      Right lower leg -- wash with soap and water, pat dry.  Apply A&D to sarah wound. Cover with hydrofera blue dampened with saline and cut to size, and ABD Then wrap with Coban 2.  Leave in place for 1 week.             Auth for ERIC vac 9/1/2021     Referral to Dr Tsering Islas on first procedure scheduled on 4/27/2021 and 11/8/2021                    Follow up with Alize Peter CNP in 1 week in the wound care center  Call [phone] for any questions or concerns.   Date__________   Time__________                  Treatment Note Wound 09/30/20 Wound #1 Right Medial Lower Leg Cluster (Onset 5 Years)-Dressing/Treatment:  (a&d ointment hydrofera blue abd coban2)    Written Patient Dismissal Instructions Given            Electronically signed by LOLY Burnett CNP on 1/26/2022 at 2:26 PM

## 2022-01-26 NOTE — PROGRESS NOTES
Multilayer Compression Wrap   (Not Unna) Below the Knee    NAME:  Nehal Sylvester  YOB: 1945  MEDICAL RECORD NUMBER:  [id number]  DATE:  1/26/2022    Multilayer compression wrap: Removed old Multilayer wrap if indicated and wash leg with mild soap/water. Applied moisturizing agent to dry skin as needed. Applied primary and secondary dressing as ordered. Applied multilayered dressing below the knee to right lower leg. Instructed patient/caregiver not to remove dressing and to keep it clean and dry. Instructed patient/caregiver on complications to report to provider, such as pain, numbness in toes, heavy drainage, and slippage of dressing. Instructed patient on purpose of compression dressing and on activity and exercise recommendations.       Electronically signed by William Mendoza LPN on 4/15/9916 at 6:52 PM

## 2022-01-26 NOTE — DISCHARGE INSTR - COC
Continuity of Care Form    Patient Name: Natalie Alvarez   :  1945  MRN:  [id number]    Admit date:  2022  Discharge date:  ***    Code Status Order: Prior   Advance Directives:      Admitting Physician:  No admitting provider for patient encounter. PCP: Alla Donovan DO    Discharging Nurse: [facility] Unit/Room#: No information available for this encounter.   Discharging Unit Phone Number: ***    Emergency Contact:   Extended Emergency Contact Information  Primary Emergency Contact: Jil Daley  Address: [address] [place] [address],[address] Floor, [address] Phone: [phone]  Relation: Spouse    Past Surgical History:  Past Surgical History:   Procedure Laterality Date    TUNNELED VENOUS PORT PLACEMENT      TUNNELED VENOUS PORT PLACEMENT      removed 2012       Immunization History:   Immunization History   Administered Date(s) Administered    COVID-19, Pfizer Purple top, DILUTE for use, 12+ yrs, 30mcg/0.3mL dose 2021, 2021, 2021    Influenza, Quadv, adjuvanted, 65 yrs +, IM, PF (Fluad) 2020    Pneumococcal Polysaccharide ([id number]) 2021       Active Problems:  Patient Active Problem List   Diagnosis Code    Chronic venous hypertension with ulcer and inflammation ([facility] [address] I87.339, L97.909    Leg ulcer ([facility] [address] L97.909    Venous ulcer of left leg ([facility] [address] I83.029, L97.929    Acute blood loss anemia D62    Iron deficiency E61.1    Essential hypertension I10    Venous stasis ulcer of right lower leg with edema of right lower leg (HCC) I83.019, I83.891, L97.919, R60.9    Varicose veins of lower extremities with complications, bilateral I39.386    Varicose veins of left lower extremity with other complications H44.906    Status post endovenous radiofrequency ablation (RFA) of saphenous vein Z98.890    Varicose veins of lower extremities with complications, right N00.190    Varicose veins of right lower extremity with other complications S85.792       Isolation/Infection:   Isolation            No Isolation          Patient Infection Status       Infection Onset Added Last Indicated Last Indicated By Review Planned Expiration Resolved Resolved By    None active    Resolved    MRSA  05/08/12 05/08/12 Kayla Shine RN   07/30/13 Kayla Shine RN    cath tip 5/5/12    MDRO (multi-drug resistant organism)  05/07/12 05/07/12 Kayla Shine RN   05/23/12 Kayla Shine RN    Stenotroph maltophilia wound cx 5/2/12, Review 5/16/12            Nurse Assessment:  Last Vital Signs: BP (!) 171/93   Pulse 75   Temp 97.9 °F (36.6 °C) (Temporal)   Resp 18     Last documented pain score (0-10 scale):    Last Weight:   Wt Readings from Last 1 Encounters:   01/06/22 192 lb (87.1 kg)     Mental Status:  {IP PT MENTAL STATUS:77999}    IV Access:  { ZECHARIAH IV ACCESS:302944706}    Nursing Mobility/ADLs:  Walking   {CHP DME LORP:683896784}  Transfer  {CHP DME TAJR:081420365}  Bathing  {CHP DME XMGE:662517613}  Dressing  {CHP DME LAEU:793321007}  Toileting  {CHP DME NBGT:452375581}  Feeding  {CHP DME LMCY:386728431}  Med Admin  {CHP DME STCK:797374188}  Med Delivery   { ZECHARIAH MED Delivery:097168438}    Wound Care Documentation and Therapy:  Wound 07/29/13 Venous ulcer Ankle Right;Lateral (Active)   Number of days: 3103       Wound 09/30/20 Wound #1 Right Medial Lower Leg Cluster (Onset 5 Years) (Active)   Wound Image   01/19/22 1325   Wound Etiology Venous 01/26/22 1356   Dressing Status New dressing applied 01/26/22 1422   Wound Cleansed Soap and water 01/26/22 1356   Dressing/Treatment Alginate with Ag 01/13/21 1657   Offloading for Diabetic Foot Ulcers Felt or foam 01/26/22 1422   Wound Length (cm) 6 cm 01/26/22 1356   Wound Width (cm) 4.5 cm 01/26/22 1356   Wound Depth (cm) 0.1 cm 01/26/22 1356   Wound Surface Area (cm^2) 27 cm^2 01/26/22 1356   Change in Wound Size % (l*w) 89.29 01/26/22 1356   Wound Volume (cm^3) 2.7 cm^3 01/26/22 1356   Wound Healing % 89 01/26/22 1356   Post-Procedure Length (cm) 6 cm 01/26/22 1413   Post-Procedure Width (cm) 4.5 cm 01/26/22 1413   Post-Procedure Depth (cm) 0.1 cm 01/26/22 1413   Post-Procedure Surface Area (cm^2) 27 cm^2 01/26/22 1413   Post-Procedure Volume (cm^3) 2.7 cm^3 01/26/22 1413   Distance Tunneling (cm) 0 cm 01/26/22 1356   Tunneling Position ___ O'Clock 0 01/26/22 1356   Undermining Starts ___ O'Clock 0 01/26/22 1356   Undermining Ends___ O'Clock 0 01/26/22 1356   Undermining Maxium Distance (cm) 0 01/26/22 1356   Wound Assessment Hyper granulation tissue 01/26/22 1356   Drainage Amount Moderate 01/26/22 1356   Drainage Description Serosanguinous 01/26/22 1356   Odor None 01/26/22 1356   Cassia-wound Assessment Intact 01/26/22 1356   Margins Defined edges 01/26/22 1356   Wound Thickness Description not for Pressure Injury Full thickness 01/26/22 1356   Number of days: 483        Elimination:  Continence: Bowel: {YES / CH:24948}  Bladder: {YES / PN:35055}  Urinary Catheter: {Urinary Catheter:208937421}   Colostomy/Ileostomy/Ileal Conduit: {YES / UY:74503}       Date of Last BM: ***  No intake or output data in the 24 hours ending 01/26/22 1450  No intake/output data recorded.     Safety Concerns:     [address] [employer] Safety Concerns:198172719}    Impairments/Disabilities:      [address] [employer] Impairments/Disabilities:600710954}    Nutrition Therapy:  Current Nutrition Therapy:   [address] [employer] Diet List:829069094}    Routes of Feeding: {CHP DME Other Feedings:211164812}  Liquids: {Slp liquid thickness:30770}  Daily Fluid Restriction: {CHP DME Yes amt example:727168479}  Last Modified Barium Swallow with Video (Video Swallowing Test): {Done Not Done UGVY:775961298}    Treatments at the Time of Hospital Discharge:   Respiratory Treatments: ***  Oxygen Therapy:  {Therapy; copd oxygen:15374}  Ventilator:    {MIKE CARRANZA Vent HRAZ:738405484}    Rehab Therapies: {THERAPEUTIC INTERVENTION:1158365999}  Weight Bearing Status/Restrictions: {MIKE CARRANZA Weight Bearin}  Other Medical Equipment (for information only, NOT a DME order):  {EQUIPMENT:536898541}  Other Treatments: ***    Patient's personal belongings (please select all that are sent with patient):  {CHP DME Belongings:837199703}    RN SIGNATURE:  {Esignature:053159791}    CASE MANAGEMENT/SOCIAL WORK SECTION    Inpatient Status Date: ***    Readmission Risk Assessment Score:  Readmission Risk              Risk of Unplanned Readmission:  0           Discharging to Facility/ Agency   Name:   Address:  Phone:  Fax:    Dialysis Facility (if applicable)   Name:  Address:  Dialysis Schedule:  Phone:  Fax:    / signature: {Esignature:886677565}    PHYSICIAN SECTION    Prognosis: {Prognosis:0989160643}    Condition at Discharge: [address] [place] [address] Patient Condition:984210722}    Rehab Potential (if transferring to Rehab): {Prognosis:6856268702}    Recommended Labs or Other Treatments After Discharge: ***    Physician Certification: I certify the above information and transfer of Juancarlos Cowan  is necessary for the continuing treatment of the diagnosis listed and that he requires {Admit to Appropriate Level of Care:42039} for {GREATER/LESS:663316611} 30 days.      Update Admission H&P: {CHP DME Changes in NRUKU:710153199}    PHYSICIAN SIGNATURE:  {Esignature:861544861}

## 2022-02-02 ENCOUNTER — HOSPITAL ENCOUNTER (OUTPATIENT)
Dept: WOUND CARE | Age: 77
Discharge: HOME | End: 2022-02-02
Payer: COMMERCIAL

## 2022-02-02 VITALS
HEART RATE: 74 BPM | RESPIRATION RATE: 20 BRPM | TEMPERATURE: 98.6 F | SYSTOLIC BLOOD PRESSURE: 157 MMHG | DIASTOLIC BLOOD PRESSURE: 80 MMHG

## 2022-02-02 DIAGNOSIS — I83.029 VENOUS ULCER OF LEFT LEG (HCC): ICD-10-CM

## 2022-02-02 DIAGNOSIS — L97.929 VENOUS ULCER OF LEFT LEG (HCC): ICD-10-CM

## 2022-02-02 DIAGNOSIS — I87.331 CHRONIC VENOUS HYPERTENSION WITH ULCER AND INFLAMMATION INVOLVING RIGHT SIDE (HCC): Primary | ICD-10-CM

## 2022-02-02 DIAGNOSIS — L97.919 CHRONIC VENOUS HYPERTENSION WITH ULCER AND INFLAMMATION INVOLVING RIGHT SIDE (HCC): Primary | ICD-10-CM

## 2022-02-02 PROCEDURE — 11045 DBRDMT SUBQ TISS EACH ADDL: CPT

## 2022-02-02 PROCEDURE — 11042 DBRDMT SUBQ TIS 1ST 20SQCM/<: CPT

## 2022-02-02 PROCEDURE — 11045 DBRDMT SUBQ TISS EACH ADDL: CPT | Performed by: NURSE PRACTITIONER

## 2022-02-02 PROCEDURE — 11042 DBRDMT SUBQ TIS 1ST 20SQCM/<: CPT | Performed by: NURSE PRACTITIONER

## 2022-02-02 RX ORDER — GENTAMICIN SULFATE 1 MG/G
OINTMENT TOPICAL ONCE
Status: CANCELLED | OUTPATIENT
Start: 2022-02-02 | End: 2022-02-02

## 2022-02-02 RX ORDER — CLOBETASOL PROPIONATE 0.5 MG/G
OINTMENT TOPICAL ONCE
Status: CANCELLED | OUTPATIENT
Start: 2022-02-02 | End: 2022-02-02

## 2022-02-02 RX ORDER — LIDOCAINE HYDROCHLORIDE 40 MG/ML
SOLUTION TOPICAL ONCE
Status: CANCELLED | OUTPATIENT
Start: 2022-02-02 | End: 2022-02-02

## 2022-02-02 RX ORDER — BACITRACIN ZINC AND POLYMYXIN B SULFATE 500; 1000 [USP'U]/G; [USP'U]/G
OINTMENT TOPICAL ONCE
Status: CANCELLED | OUTPATIENT
Start: 2022-02-02 | End: 2022-02-02

## 2022-02-02 RX ORDER — LIDOCAINE 50 MG/G
OINTMENT TOPICAL ONCE
Status: CANCELLED | OUTPATIENT
Start: 2022-02-02 | End: 2022-02-02

## 2022-02-02 RX ORDER — BACITRACIN, NEOMYCIN, POLYMYXIN B 400; 3.5; 5 [USP'U]/G; MG/G; [USP'U]/G
OINTMENT TOPICAL ONCE
Status: CANCELLED | OUTPATIENT
Start: 2022-02-02 | End: 2022-02-02

## 2022-02-02 RX ORDER — GINSENG 100 MG
CAPSULE ORAL ONCE
Status: CANCELLED | OUTPATIENT
Start: 2022-02-02 | End: 2022-02-02

## 2022-02-02 RX ORDER — BETAMETHASONE DIPROPIONATE 0.05 %
OINTMENT (GRAM) TOPICAL ONCE
Status: CANCELLED | OUTPATIENT
Start: 2022-02-02 | End: 2022-02-02

## 2022-02-02 RX ORDER — LIDOCAINE 40 MG/G
CREAM TOPICAL ONCE
Status: CANCELLED | OUTPATIENT
Start: 2022-02-02 | End: 2022-02-02

## 2022-02-02 RX ORDER — LIDOCAINE HYDROCHLORIDE 20 MG/ML
JELLY TOPICAL ONCE
Status: CANCELLED | OUTPATIENT
Start: 2022-02-02 | End: 2022-02-02

## 2022-02-02 NOTE — PROGRESS NOTES
Wound Care Center Progress Note With Procedure    Doc Mon  AGE: 77 y.o. GENDER: male  : 1945  EPISODE DATE:  2022     Subjective:     Chief Complaint   Patient presents with    Wound Check     rt leg         HISTORY of PRESENT ILLNESS      Fuentes Daley is a 77 y. o. male who presents today for wound evaluation of Chronic venous wound(s) of R lower leg medial.  The wound is of moderate severity.  The underlying cause of the wound is venous disease and trauma.       Patient wound is clean and dry with good healthy wound bed. No hypergranulation or beefiness present.  May consider adding collagen this week.      Wound Pain Timing/Severity: none  Quality of pain: N/A  Severity of pain:  0 / 10   Modifying Factors: venous stasis  Associated Signs/Symptoms: none           PAST MEDICAL HISTORY        Diagnosis Date    Chronic venous hypertension with ulcer and inflammation involving right side (HCC)     RLE    Hypertension     past    Iron deficiency     Lymphoma Dx 11-    Hx of B cell lymphoma-in remission since        PAST SURGICAL HISTORY    Past Surgical History:   Procedure Laterality Date    TUNNELED VENOUS PORT PLACEMENT      TUNNELED VENOUS PORT PLACEMENT      removed 2012       FAMILY HISTORY    Family History   Problem Relation Age of Onset    Early Death Mother 52    Diabetes Father     Heart Disease Father     Vision Loss Son         \"He wears glasses\"    Early Death Daughter 36       SOCIAL HISTORY    Social History     Tobacco Use    Smoking status: Never Smoker    Smokeless tobacco: Never Used   Substance Use Topics    Alcohol use: No    Drug use: No       ALLERGIES    No Known Allergies    MEDICATIONS    Current Outpatient Medications on File Prior to Encounter   Medication Sig Dispense Refill    metoprolol succinate (TOPROL XL) 50 MG extended release tablet Take 1 tablet by mouth daily 30 tablet 5     No current facility-administered medications on file prior to encounter. REVIEW OF SYSTEMS    Pertinent items are noted in HPI. Constitutional: Negative for systemic symptoms including fever, chills and malaise. Objective:      BP (!) 157/80   Pulse 74   Temp 98.6 °F (37 °C) (Temporal)   Resp 20     PHYSICAL EXAM      General: The patient is in no acute distress. Mental status:  Patient is appropriate, is  oriented to place and plan of care. Dermatologic exam: Visual inspection of the periwound reveals the skin to be normal in turgor and texture and dry  Wound exam: see wound description below in procedure note      Assessment:     Problem List Items Addressed This Visit     Chronic venous hypertension with ulcer and inflammation ([address] - Primary    Relevant Orders    Initiate Outpatient Wound Care Protocol    Venous ulcer of left leg ([facility] [address]    Relevant Orders    Initiate Outpatient Wound Care Protocol        Procedure Note    Indications:  Based on my examination of this patient's wound(s) today, sharp excision into necrotic subcutaneous tissue is required to promote healing and evaluate the extent of previous healing. Performed by: LOLY Garcia CNP    Consent obtained: Yes    Time out taken:  Yes    Pain Control: N/A       Debridement:Excisional Debridement    Using #15 blade scalpel the wound(s) was/were sharply debrided down through and including the removal of subcutaneous tissue.         Devitalized Tissue Debrided:  fibrin, biofilm and slough    Pre Debridement Measurements:  Are located in the Wound Documentation Flow Sheet    All active wounds listed below with today's date are evaluated  Wound(s)    debrided this date include # : 1     Post  Debridement Measurements:  Wound 07/29/13 Venous ulcer Ankle Right;Lateral (Active)   Number of days: 3110       Wound 09/30/20 Wound #1 Right Medial Lower Leg Cluster (Onset 5 Years) (Active)   Wound Image   02/02/22 1307   Wound Etiology Venous 02/02/22 1307   Dressing Status New dressing applied 01/26/22 1422   Wound Cleansed Soap and water 02/02/22 1307   Dressing/Treatment Alginate with Ag 01/13/21 1657   Offloading for Diabetic Foot Ulcers Felt or foam 01/26/22 1422   Wound Length (cm) 7 cm 02/02/22 1307   Wound Width (cm) 4 cm 02/02/22 1307   Wound Depth (cm) 0.2 cm 02/02/22 1307   Wound Surface Area (cm^2) 28 cm^2 02/02/22 1307   Change in Wound Size % (l*w) 88.89 02/02/22 1307   Wound Volume (cm^3) 5.6 cm^3 02/02/22 1307   Wound Healing % 78 02/02/22 1307   Post-Procedure Length (cm) 6 cm 01/26/22 1413   Post-Procedure Width (cm) 4.5 cm 01/26/22 1413   Post-Procedure Depth (cm) 0.1 cm 01/26/22 1413   Post-Procedure Surface Area (cm^2) 27 cm^2 01/26/22 1413   Post-Procedure Volume (cm^3) 2.7 cm^3 01/26/22 1413   Distance Tunneling (cm) 0 cm 02/02/22 1307   Tunneling Position ___ O'Clock 0 02/02/22 1307   Undermining Starts ___ O'Clock 0 02/02/22 1307   Undermining Ends___ O'Clock 0 02/02/22 1307   Undermining Maxium Distance (cm) 0 02/02/22 1307   Wound Assessment Pink/red 02/02/22 1307   Drainage Amount Moderate 02/02/22 1307   Drainage Description Serosanguinous 02/02/22 1307   Odor None 02/02/22 1307   Cassia-wound Assessment Intact 02/02/22 1307   Margins Defined edges 02/02/22 1307   Wound Thickness Description not for Pressure Injury Full thickness 02/02/22 1307   Number of days: 490       Percent of Wound(s) Debrided: approximately 100%    Total  Area  Debrided:  28 sq cm     Bleeding:  Minimal    Hemostasis Achieved:  by pressure    Procedural Pain:  0  / 10     Post Procedural Pain:  0 / 10     Response to treatment:  Well tolerated by patient. Status of wound progress and description from last visit:   Stable. Adding dry collagen this week.  Will follow up in 1 week  Continue plan of care otherwise       Plan:       Discharge Instructions         Discharge Instructions        PHYSICIAN ORDERS AND DISCHARGE INSTRUCTIONS   NOTE: Upon discharge from the [address], you will receive a patient experience survey. We would be grateful if you would take the time to fill this survey out.     Wound care order history:                 Vascular studies: Arterial studies done 9/27/20; no stenosis              Imaging:   Date               Cultures:   obtained on 11/18/2020                                 Obtained on 1/20/2021               Labs/ HbA1c:   Date               Grafts:                       #1 Apligraf on 12/16/2020                     #2 Apligraf on 12/23/2020                     #3 Apligraf on 12/30/2020                     #4 Apligraf on 1/6/2021                     # 5 Apligraf on 1/13/21                     #1 [place] 1/27/2021                     #2 [place] 2/3/2021                     #3 [place] 2/10/2021                     #4 [place] Right Medial Lower Leg 2/17/21                     #5 [place] 3/10/2021                   HBO:                Antibiotics:  Bactrim DS BID for 10 days on 11/18/2020                                   Cipro BID for 10 days ordered on 11/25/2020                                    Cipro 500 mg BID on 1/20/2021                                    Cipro 500 mg BID on 4/28/2021              Earlier Wound care treatments:                Authorizations:                        Consults:   Date                           Primary care physician:      Continuing wound care orders and information:              Residence:  [facility] home health care with: [facility] discharged 1/19/2022              Your wound-care supplies will be provided by: Cristina Recio provider:              [id number] yair Gallegos Cuff loading:  Date               BOIPG Medications:              SBLUB cleansing:                           PY not scrub or use excessive force.                          Wash hands with soap and water before and after dressing changes.                           Prior to applying a clean dressing, cleanse wound with normal saline,                                wound cleanser, or mild soap and water.                           Ask the physician or nurse before getting the wound(s) wet in a shower              Daily Wound management:                          KJYZ weight off wounds and reposition every 2 hours.                          OVQRN standing for long periods of time.                          WZJXB wraps/stockings in AM and remove at bedtime.                          If swelling is present, elevate legs to the level of the heart or above for 30 minutes 4-5 times a day and/or when sitting.                                             When taking antibiotics take entire prescription as ordered by physician do not stop taking until medicine is all gone.                                           Orders for this week: 2/2/2022     [place] [facility] [place]     [place] dorsal foot - healed      Right lower leg -- wash with soap and water, pat dry.  Apply A&D to sarah wound. Apply stimulin powder then Cover with hydrofera blue dampened with saline and cut to size, and ABD Then wrap with Coban 2.  Leave in place for 1 week.             Auth for ERIC vac 9/1/2021     Referral to Dr Dorota Cole on first procedure scheduled on 4/27/2021 and 11/8/2021                    Follow up with Aruna Singh CNP in 1 week in the wound care center  Call [phone] for any questions or concerns.   Date__________   Time__________               Treatment Note      Written Patient Dismissal Instructions Given            Electronically signed by LOLY Briones CNP on 2/2/2022 at 1:26 PM

## 2022-02-09 ENCOUNTER — HOSPITAL ENCOUNTER (OUTPATIENT)
Dept: WOUND CARE | Age: 77
Discharge: HOME | End: 2022-02-09
Payer: COMMERCIAL

## 2022-02-09 VITALS
DIASTOLIC BLOOD PRESSURE: 79 MMHG | SYSTOLIC BLOOD PRESSURE: 167 MMHG | RESPIRATION RATE: 19 BRPM | TEMPERATURE: 98.6 F | HEART RATE: 69 BPM

## 2022-02-09 DIAGNOSIS — L97.919 CHRONIC VENOUS HYPERTENSION WITH ULCER AND INFLAMMATION INVOLVING RIGHT SIDE (HCC): Primary | ICD-10-CM

## 2022-02-09 DIAGNOSIS — I87.331 CHRONIC VENOUS HYPERTENSION WITH ULCER AND INFLAMMATION INVOLVING RIGHT SIDE (HCC): Primary | ICD-10-CM

## 2022-02-09 DIAGNOSIS — I83.029 VENOUS ULCER OF LEFT LEG (HCC): ICD-10-CM

## 2022-02-09 DIAGNOSIS — L97.929 VENOUS ULCER OF LEFT LEG (HCC): ICD-10-CM

## 2022-02-09 PROCEDURE — 11045 DBRDMT SUBQ TISS EACH ADDL: CPT

## 2022-02-09 PROCEDURE — 11042 DBRDMT SUBQ TIS 1ST 20SQCM/<: CPT | Performed by: NURSE PRACTITIONER

## 2022-02-09 PROCEDURE — 11042 DBRDMT SUBQ TIS 1ST 20SQCM/<: CPT

## 2022-02-09 PROCEDURE — 11045 DBRDMT SUBQ TISS EACH ADDL: CPT | Performed by: NURSE PRACTITIONER

## 2022-02-09 RX ORDER — LIDOCAINE HYDROCHLORIDE 20 MG/ML
JELLY TOPICAL ONCE
Status: CANCELLED | OUTPATIENT
Start: 2022-02-09 | End: 2022-02-09

## 2022-02-09 RX ORDER — GINSENG 100 MG
CAPSULE ORAL ONCE
Status: CANCELLED | OUTPATIENT
Start: 2022-02-09 | End: 2022-02-09

## 2022-02-09 RX ORDER — BETAMETHASONE DIPROPIONATE 0.05 %
OINTMENT (GRAM) TOPICAL ONCE
Status: CANCELLED | OUTPATIENT
Start: 2022-02-09 | End: 2022-02-09

## 2022-02-09 RX ORDER — LIDOCAINE HYDROCHLORIDE 40 MG/ML
SOLUTION TOPICAL ONCE
Status: CANCELLED | OUTPATIENT
Start: 2022-02-09 | End: 2022-02-09

## 2022-02-09 RX ORDER — GENTAMICIN SULFATE 1 MG/G
OINTMENT TOPICAL ONCE
Status: CANCELLED | OUTPATIENT
Start: 2022-02-09 | End: 2022-02-09

## 2022-02-09 RX ORDER — BACITRACIN ZINC AND POLYMYXIN B SULFATE 500; 1000 [USP'U]/G; [USP'U]/G
OINTMENT TOPICAL ONCE
Status: CANCELLED | OUTPATIENT
Start: 2022-02-09 | End: 2022-02-09

## 2022-02-09 RX ORDER — LIDOCAINE 40 MG/G
CREAM TOPICAL ONCE
Status: CANCELLED | OUTPATIENT
Start: 2022-02-09 | End: 2022-02-09

## 2022-02-09 RX ORDER — CLOBETASOL PROPIONATE 0.5 MG/G
OINTMENT TOPICAL ONCE
Status: CANCELLED | OUTPATIENT
Start: 2022-02-09 | End: 2022-02-09

## 2022-02-09 RX ORDER — BACITRACIN, NEOMYCIN, POLYMYXIN B 400; 3.5; 5 [USP'U]/G; MG/G; [USP'U]/G
OINTMENT TOPICAL ONCE
Status: CANCELLED | OUTPATIENT
Start: 2022-02-09 | End: 2022-02-09

## 2022-02-09 RX ORDER — LIDOCAINE 50 MG/G
OINTMENT TOPICAL ONCE
Status: CANCELLED | OUTPATIENT
Start: 2022-02-09 | End: 2022-02-09

## 2022-02-09 ASSESSMENT — PAIN SCALES - GENERAL: PAINLEVEL_OUTOF10: 0

## 2022-02-09 NOTE — PROGRESS NOTES
Wound Care Center Progress Note With Procedure    Yonatan Solis  AGE: 77 y.o. GENDER: male  : 1945  EPISODE DATE:  2022     Subjective:     Chief Complaint   Patient presents with    Wound Check     BLE         HISTORY of PRESENT ILLNESS     Hang Hebert a 77 y. o. male who presents today for wound evaluation of Chronic venous wound(s) of R lower leg medial.  The wound is of moderate severity.  The underlying cause of the wound is venous disease and trauma.       Continues to improve. Added collagen last week. Good granulating tissue and wound shrinking down      Wound Pain Timing/Severity: none  Quality of pain: N/A  Severity of pain:  0 / 10   Modifying Factors: venous stasis  Associated Signs/Symptoms: none        PAST MEDICAL HISTORY        Diagnosis Date    Chronic venous hypertension with ulcer and inflammation involving right side (HCC)     RLE    Hypertension     past    Iron deficiency     Lymphoma Dx 11-    Hx of B cell lymphoma-in remission since        PAST SURGICAL HISTORY    Past Surgical History:   Procedure Laterality Date    TUNNELED VENOUS PORT PLACEMENT      TUNNELED VENOUS PORT PLACEMENT      removed 2012       FAMILY HISTORY    Family History   Problem Relation Age of Onset    Early Death Mother 52    Diabetes Father     Heart Disease Father     Vision Loss Son         \"He wears glasses\"    Early Death Daughter 36       SOCIAL HISTORY    Social History     Tobacco Use    Smoking status: Never Smoker    Smokeless tobacco: Never Used   Substance Use Topics    Alcohol use: No    Drug use: No       ALLERGIES    No Known Allergies    MEDICATIONS    Current Outpatient Medications on File Prior to Encounter   Medication Sig Dispense Refill    metoprolol succinate (TOPROL XL) 50 MG extended release tablet Take 1 tablet by mouth daily 30 tablet 5     No current facility-administered medications on file prior to encounter.        REVIEW OF SYSTEMS    Pertinent items are noted in HPI. Constitutional: Negative for systemic symptoms including fever, chills and malaise. Objective:      BP (!) 167/79   Pulse 69   Temp 98.6 °F (37 °C) (Temporal)   Resp 19     PHYSICAL EXAM      General: The patient is in no acute distress. Mental status:  Patient is appropriate, is  oriented to place and plan of care. Dermatologic exam: Visual inspection of the periwound reveals the skin to be normal in turgor and texture and dry  Wound exam: see wound description below in procedure note      Assessment:     Problem List Items Addressed This Visit     Chronic venous hypertension with ulcer and inflammation ([facility] [address] - Primary    Relevant Orders    Initiate Outpatient Wound Care Protocol    Venous ulcer of left leg ([facility] [address]    Relevant Orders    Initiate Outpatient Wound Care Protocol        Procedure Note    Indications:  Based on my examination of this patient's wound(s) today, sharp excision into necrotic subcutaneous tissue is required to promote healing and evaluate the extent of previous healing. Performed by: LOLY Avila CNP    Consent obtained: Yes    Time out taken:  Yes    Pain Control: N/a       Debridement:Excisional Debridement    Using #15 blade scalpel the wound(s) was/were sharply debrided down through and including the removal of subcutaneous tissue.         Devitalized Tissue Debrided:  fibrin, biofilm, slough and exudate    Pre Debridement Measurements:  Are located in the Wound Documentation Flow Sheet    All active wounds listed below with today's date are evaluated  Wound(s)    debrided this date include # : 1     Post  Debridement Measurements:  Wound 07/29/13 Venous ulcer Ankle Right;Lateral (Active)   Number of days: 3117       Wound 09/30/20 Wound #1 Right Medial Lower Leg Cluster (Onset 5 Years) (Active)   Wound Image   02/09/22 1311   Wound Etiology Venous 02/09/22 1311   Dressing Status New dressing applied 02/02/22 1327 Wound Cleansed Soap and water 02/09/22 1311   Dressing/Treatment Alginate with Ag 01/13/21 1657   Offloading for Diabetic Foot Ulcers Felt or foam 02/09/22 1311   Wound Length (cm) 6.5 cm 02/09/22 1311   Wound Width (cm) 4 cm 02/09/22 1311   Wound Depth (cm) 0.1 cm 02/09/22 1311   Wound Surface Area (cm^2) 26 cm^2 02/09/22 1311   Change in Wound Size % (l*w) 89.68 02/09/22 1311   Wound Volume (cm^3) 2.6 cm^3 02/09/22 1311   Wound Healing % 90 02/09/22 1311   Post-Procedure Length (cm) 6.5 cm 02/09/22 1332   Post-Procedure Width (cm) 4 cm 02/09/22 1332   Post-Procedure Depth (cm) 0.1 cm 02/09/22 1332   Post-Procedure Surface Area (cm^2) 26 cm^2 02/09/22 1332   Post-Procedure Volume (cm^3) 2.6 cm^3 02/09/22 1332   Distance Tunneling (cm) 0 cm 02/09/22 1311   Tunneling Position ___ O'Clock 0 02/09/22 1311   Undermining Starts ___ O'Clock 0 02/09/22 1311   Undermining Ends___ O'Clock 0 02/09/22 1311   Undermining Maxium Distance (cm) 0. 02/09/22 1311   Wound Assessment Pink/red 02/09/22 1311   Drainage Amount Moderate 02/09/22 1311   Drainage Description Serosanguinous 02/09/22 1311   Odor None 02/09/22 1311   Cassia-wound Assessment Intact 02/09/22 1311   Margins Defined edges 02/09/22 1311   Wound Thickness Description not for Pressure Injury Full thickness 02/09/22 1311   Number of days: 497       Percent of Wound(s) Debrided: approximately 100%    Total  Area  Debrided:  26 sq cm     Bleeding:  Minimal    Hemostasis Achieved:  by pressure    Procedural Pain:  0  / 10     Post Procedural Pain:  0 / 10     Response to treatment:  Well tolerated by patient. Status of wound progress and description from last visit:   Smaller in measurement. Wound is abstract shape and difficult to show improvement in simple squared measurements.    Continue plan of care for now and continue to monitor       Plan:       Discharge Instructions       [address] [place]  NOTE: Upon discharge from the Wound Center, you will receive a patient experience survey. We would be grateful if you would take the time to fill this survey out.     Wound care order history:                 Vascular studies: Arterial studies done 9/27/20; no stenosis              Imaging:   Date               Cultures:   obtained on 11/18/2020                                 Obtained on 1/20/2021               Labs/ HbA1c:   Date               Grafts:                       #1 Apligraf on 12/16/2020                     #2 Apligraf on 12/23/2020                     #3 Apligraf on 12/30/2020                     #4 Apligraf on 1/6/2021                     # 5 Apligraf on 1/13/21                     #1 [place] 1/27/2021                     #2 [place] 2/3/2021                     #3 [place] 2/10/2021                     #4 [place] Right Medial Lower Leg 2/17/21                     #5 [place] 3/10/2021                   HBO:                Antibiotics:  Bactrim DS BID for 10 days on 11/18/2020                                   Cipro BID for 10 days ordered on 11/25/2020                                    Cipro 500 mg BID on 1/20/2021                                    Cipro 500 mg BID on 4/28/2021              Earlier Wound care treatments:                Authorizations:                        Consults:   Date                           Primary care physician:      Continuing wound care orders and information:              Residence:  private               Continue home health care with: [facility] discharged 1/19/2022              Your wound-care supplies will be provided by: Luna Loyd provider:              GRAHAM with  Marlee Adams loading:  Date               [facility] Medications:              FLLOZ cleansing:                           TW not scrub or use excessive force.                          Wash hands with soap and water before and after dressing changes.                           Prior to applying a clean dressing, cleanse wound with normal saline,                                wound cleanser, or mild soap and water.                           Ask the physician or nurse before getting the wound(s) wet in a shower              Daily Wound management:                          IXYD weight off wounds and reposition every 2 hours.                          HVNKC standing for long periods of time.                          PHEGZ wraps/stockings in AM and remove at bedtime.                          If swelling is present, elevate legs to the level of the heart or above for 30 minutes 4-5 times a day and/or when sitting.                                             When taking antibiotics take entire prescription as ordered by physician do not stop taking until medicine is all gone.                                           Orders for this week: 2/9/2022     Fax orders to [address]     Right dorsal foot -- healed      Right lower leg -- wash with soap and water, pat dry. Apply A&D to sarah wound. Apply stimulin powder then Cover with hydrofera blue dampened with saline and cut to size, and ABD Then wrap with Coban 2. Leave in place for 1 week.             Auth for ERIC vac 9/1/2021     Referral to Dr Rebeca Denise on first procedure scheduled on 4/27/2021 and 11/8/2021                    Follow up with Mitchell George CNP in 1 week in the wound care center. Call [phone] for any questions or concerns.   Date__________   Time__________        Treatment Note      Written Patient Dismissal Instructions Given            Electronically signed by LOLY Dumont CNP on 2/9/2022 at 1:38 PM

## 2022-02-09 NOTE — PROGRESS NOTES
Multilayer Compression Wrap   (Not Unna) Below the Knee    NAME:  Betina Haynes  YOB: 1945  MEDICAL RECORD NUMBER:  [id number]  DATE:  2/9/2022    Multilayer compression wrap: Removed old Multilayer wrap if indicated and wash leg with mild soap/water. Applied moisturizing agent to dry skin as needed. Applied primary and secondary dressing as ordered. Applied multilayered dressing below the knee to right lower leg. Instructed patient/caregiver not to remove dressing and to keep it clean and dry. Instructed patient/caregiver on complications to report to provider, such as pain, numbness in toes, heavy drainage, and slippage of dressing. Instructed patient on purpose of compression dressing and on activity and exercise recommendations.       Electronically signed by Justin Almeida LPN on 0/2/1144 at 2:92 PM

## 2022-02-16 ENCOUNTER — HOSPITAL ENCOUNTER (OUTPATIENT)
Dept: WOUND CARE | Age: 77
Discharge: HOME | End: 2022-02-16
Payer: COMMERCIAL

## 2022-02-16 VITALS
DIASTOLIC BLOOD PRESSURE: 86 MMHG | RESPIRATION RATE: 20 BRPM | SYSTOLIC BLOOD PRESSURE: 170 MMHG | TEMPERATURE: 98.9 F | HEART RATE: 70 BPM

## 2022-02-16 DIAGNOSIS — I83.029 VENOUS ULCER OF LEFT LEG (HCC): ICD-10-CM

## 2022-02-16 DIAGNOSIS — I87.331 CHRONIC VENOUS HYPERTENSION WITH ULCER AND INFLAMMATION INVOLVING RIGHT SIDE (HCC): Primary | ICD-10-CM

## 2022-02-16 DIAGNOSIS — L97.929 VENOUS ULCER OF LEFT LEG (HCC): ICD-10-CM

## 2022-02-16 DIAGNOSIS — L97.919 CHRONIC VENOUS HYPERTENSION WITH ULCER AND INFLAMMATION INVOLVING RIGHT SIDE (HCC): Primary | ICD-10-CM

## 2022-02-16 PROCEDURE — 11042 DBRDMT SUBQ TIS 1ST 20SQCM/<: CPT | Performed by: NURSE PRACTITIONER

## 2022-02-16 PROCEDURE — 11042 DBRDMT SUBQ TIS 1ST 20SQCM/<: CPT

## 2022-02-16 PROCEDURE — 11045 DBRDMT SUBQ TISS EACH ADDL: CPT

## 2022-02-16 PROCEDURE — 11045 DBRDMT SUBQ TISS EACH ADDL: CPT | Performed by: NURSE PRACTITIONER

## 2022-02-16 RX ORDER — LIDOCAINE HYDROCHLORIDE 40 MG/ML
SOLUTION TOPICAL ONCE
Status: CANCELLED | OUTPATIENT
Start: 2022-02-16 | End: 2022-02-16

## 2022-02-16 RX ORDER — LIDOCAINE HYDROCHLORIDE 20 MG/ML
JELLY TOPICAL ONCE
Status: CANCELLED | OUTPATIENT
Start: 2022-02-16 | End: 2022-02-16

## 2022-02-16 RX ORDER — LIDOCAINE 50 MG/G
OINTMENT TOPICAL ONCE
Status: CANCELLED | OUTPATIENT
Start: 2022-02-16 | End: 2022-02-16

## 2022-02-16 RX ORDER — LIDOCAINE 40 MG/G
CREAM TOPICAL ONCE
Status: CANCELLED | OUTPATIENT
Start: 2022-02-16 | End: 2022-02-16

## 2022-02-16 RX ORDER — BACITRACIN ZINC AND POLYMYXIN B SULFATE 500; 1000 [USP'U]/G; [USP'U]/G
OINTMENT TOPICAL ONCE
Status: CANCELLED | OUTPATIENT
Start: 2022-02-16 | End: 2022-02-16

## 2022-02-16 RX ORDER — GINSENG 100 MG
CAPSULE ORAL ONCE
Status: CANCELLED | OUTPATIENT
Start: 2022-02-16 | End: 2022-02-16

## 2022-02-16 RX ORDER — GENTAMICIN SULFATE 1 MG/G
OINTMENT TOPICAL ONCE
Status: CANCELLED | OUTPATIENT
Start: 2022-02-16 | End: 2022-02-16

## 2022-02-16 RX ORDER — CLOBETASOL PROPIONATE 0.5 MG/G
OINTMENT TOPICAL ONCE
Status: CANCELLED | OUTPATIENT
Start: 2022-02-16 | End: 2022-02-16

## 2022-02-16 RX ORDER — BETAMETHASONE DIPROPIONATE 0.05 %
OINTMENT (GRAM) TOPICAL ONCE
Status: CANCELLED | OUTPATIENT
Start: 2022-02-16 | End: 2022-02-16

## 2022-02-16 RX ORDER — BACITRACIN, NEOMYCIN, POLYMYXIN B 400; 3.5; 5 [USP'U]/G; MG/G; [USP'U]/G
OINTMENT TOPICAL ONCE
Status: CANCELLED | OUTPATIENT
Start: 2022-02-16 | End: 2022-02-16

## 2022-02-16 NOTE — PROGRESS NOTES
Wound Care Center Progress Note With Procedure    Racquel Landry  AGE: 77 y.o. GENDER: male  : 1945  EPISODE DATE:  2022     Subjective:     Chief Complaint   Patient presents with    Wound Check     rt leg         HISTORY of PRESENT ILLNESS     Mikhail Jenkins a 77 y. o. male who presents today for wound evaluation of Chronic venous wound(s) of R lower leg medial.  The wound is of moderate severity.  The underlying cause of the wound is venous disease and trauma.       Slowly improving. Patient wound bed looks more healthy today. Continues to change shape but heal overall.  Patient reports no isssues     Wound Pain Timing/Severity: none  Quality of pain: N/A  Severity of pain:  0 / 10   Modifying Factors: venous stasis  Associated Signs/Symptoms: none        PAST MEDICAL HISTORY        Diagnosis Date    Chronic venous hypertension with ulcer and inflammation involving right side (HCC)     RLE    Hypertension     past    Iron deficiency     Lymphoma Dx 11-11    Hx of B cell lymphoma-in remission since        PAST SURGICAL HISTORY    Past Surgical History:   Procedure Laterality Date    TUNNELED VENOUS PORT PLACEMENT      TUNNELED VENOUS PORT PLACEMENT      removed 2012       FAMILY HISTORY    Family History   Problem Relation Age of Onset    Early Death Mother 52    Diabetes Father     Heart Disease Father     Vision Loss Son         \"He wears glasses\"    Early Death Daughter 36       SOCIAL HISTORY    Social History     Tobacco Use    Smoking status: Never Smoker    Smokeless tobacco: Never Used   Substance Use Topics    Alcohol use: No    Drug use: No       ALLERGIES    No Known Allergies    MEDICATIONS    Current Outpatient Medications on File Prior to Encounter   Medication Sig Dispense Refill    metoprolol succinate (TOPROL XL) 50 MG extended release tablet Take 1 tablet by mouth daily 30 tablet 5     No current facility-administered medications on file prior to encounter. REVIEW OF SYSTEMS    Pertinent items are noted in HPI. Constitutional: Negative for systemic symptoms including fever, chills and malaise. Objective:      BP (!) 170/86   Pulse 70   Temp 98.9 °F (37.2 °C) (Temporal)   Resp 20     PHYSICAL EXAM      General: The patient is in no acute distress. Mental status:  Patient is appropriate, is  oriented to place and plan of care. Dermatologic exam: Visual inspection of the periwound reveals the skin to be normal in turgor and texture, dry and coarse  Wound exam: see wound description below in procedure note      Assessment:     Problem List Items Addressed This Visit     Chronic venous hypertension with ulcer and inflammation ([address] - Primary    Relevant Orders    Initiate Outpatient Wound Care Protocol    Venous ulcer of left leg ([facility] [address]    Relevant Orders    Initiate Outpatient Wound Care Protocol        Procedure Note    Indications:  Based on my examination of this patient's wound(s) today, sharp excision into necrotic subcutaneous tissue is required to promote healing and evaluate the extent of previous healing. Performed by: LOLY Briones - CNP    Consent obtained: Yes    Time out taken:  Yes    Pain Control: N/a       Debridement:Excisional Debridement    Using #15 blade scalpel the wound(s) was/were sharply debrided down through and including the removal of subcutaneous tissue.         Devitalized Tissue Debrided:  fibrin, biofilm, slough and exudate    Pre Debridement Measurements:  Are located in the Wound Documentation Flow Sheet    All active wounds listed below with today's date are evaluated  Wound(s)    debrided this date include # : 1     Post  Debridement Measurements:  Wound 07/29/13 Venous ulcer Ankle Right;Lateral (Active)   Number of days: 3124       Wound 09/30/20 Wound #1 Right Medial Lower Leg Cluster (Onset 5 Years) (Active)   Wound Image   02/09/22 1311   Wound Etiology Venous 02/16/22 1308   Dressing Status New dressing applied 02/09/22 1347   Wound Cleansed Soap and water 02/16/22 1308   Dressing/Treatment Alginate with Ag 01/13/21 1657   Offloading for Diabetic Foot Ulcers Felt or foam 02/16/22 1308   Wound Length (cm) 6.5 cm 02/16/22 1308   Wound Width (cm) 3.9 cm 02/16/22 1308   Wound Depth (cm) 0.1 cm 02/16/22 1308   Wound Surface Area (cm^2) 25.35 cm^2 02/16/22 1308   Change in Wound Size % (l*w) 89.94 02/16/22 1308   Wound Volume (cm^3) 2.535 cm^3 02/16/22 1308   Wound Healing % 90 02/16/22 1308   Post-Procedure Length (cm) 6.5 cm 02/16/22 1326   Post-Procedure Width (cm) 3.9 cm 02/16/22 1326   Post-Procedure Depth (cm) 0.1 cm 02/16/22 1326   Post-Procedure Surface Area (cm^2) 25.35 cm^2 02/16/22 1326   Post-Procedure Volume (cm^3) 2.535 cm^3 02/16/22 1326   Distance Tunneling (cm) 0 cm 02/16/22 1308   Tunneling Position ___ O'Clock 0 02/16/22 1308   Undermining Starts ___ O'Clock 0 02/16/22 1308   Undermining Ends___ O'Clock 0 02/16/22 1308   Undermining Maxium Distance (cm) 0 02/16/22 1308   Wound Assessment Pink/red 02/16/22 1308   Drainage Amount Moderate 02/16/22 1308   Drainage Description Serosanguinous 02/16/22 1308   Odor None 02/16/22 1308   Cassia-wound Assessment Intact 02/16/22 1308   Margins Defined edges 02/16/22 1308   Wound Thickness Description not for Pressure Injury Full thickness 02/16/22 1308   Number of days: 504       Percent of Wound(s) Debrided: approximately 100%    Total  Area  Debrided:  25.35 sq cm     Bleeding:  Minimal    Hemostasis Achieved:  by pressure    Procedural Pain:  0  / 10     Post Procedural Pain:  0 / 10     Response to treatment:  Well tolerated by patient. Status of wound progress and description from last visit:   Stable.   Continue plan of care for now and follow up 1 week       Plan:       Discharge Instructions         Discharge Instructions        [facility] [address]  NOTE: Upon discharge from the [address], you will receive a patient experience survey. We would be grateful if you would take the time to fill this survey out.     Wound care order history:                 Vascular studies: Arterial studies done 9/27/20; no stenosis              Imaging:   Date               Cultures:   obtained on 11/18/2020                                 Obtained on 1/20/2021               Labs/ HbA1c:   Date               Grafts:                       #1 Apligraf on 12/16/2020                     #2 Apligraf on 12/23/2020                     #3 Apligraf on 12/30/2020                     #4 Apligraf on 1/6/2021                     # 5 Apligraf on 1/13/21                     #1 [place] 1/27/2021                     #2 [place] 2/3/2021                     #3 [place] 2/10/2021                     #4 [place] Right Medial Lower Leg 2/17/21                     #5 [place] 3/10/2021                   HBO:                Antibiotics:  Bactrim DS BID for 10 days on 11/18/2020                                   Cipro BID for 10 days ordered on 11/25/2020                                    Cipro 500 mg BID on 1/20/2021                                    Cipro 500 mg BID on 4/28/2021              Earlier Wound care treatments:                Authorizations:                        Consults:   Date                           Primary care physician:      Continuing wound care orders and information:              Residence:  private               Continue home health care with: [facility] discharged 1/19/2022              Your wound-care supplies will be provided by: Luna Loyd provider:              MARYCARMEN with  Marlee Adams loading:  Date               [facility] Medications:              AOKOE cleansing:                           MX not scrub or use excessive force.                          Wash hands with soap and water before and after dressing changes.                           Prior to applying a clean dressing, cleanse wound with normal saline,                                wound cleanser, or mild soap and water.                           Ask the physician or nurse before getting the wound(s) wet in a shower              Daily Wound management:                          LWJS weight off wounds and reposition every 2 hours.                          SLLUE standing for long periods of time.                          ATPQB wraps/stockings in AM and remove at bedtime.                          If swelling is present, elevate legs to the level of the heart or above for 30 minutes 4-5 times a day and/or when sitting.                                             When taking antibiotics take entire prescription as ordered by physician do not stop taking until medicine is all gone.                                           Orders for this week: 2/16/2022     Fax orders to [facility]     Right dorsal foot -- healed      Right lower leg -- wash with soap and water, pat dry. Apply A&D to sarah wound. Apply stimulin powder then Cover with hydrofera blue dampened with saline and cut to size, and ABD Then wrap with Coban 2. Leave in place for 1 week.             Auth for ERIC vac 9/1/2021     Referral to Dr Micaela De on first procedure scheduled on 4/27/2021 and 11/8/2021                    Follow up with Sherry Love CNP in 1 week in the wound care center. Call [phone] for any questions or concerns.   Date__________   Time__________             Treatment Note      Written Patient Dismissal Instructions Given            Electronically signed by LOLY Plata CNP on 2/16/2022 at 1:29 PM

## 2022-02-23 ENCOUNTER — HOSPITAL ENCOUNTER (OUTPATIENT)
Dept: WOUND CARE | Age: 77
Discharge: HOME | End: 2022-02-23
Payer: COMMERCIAL

## 2022-02-23 VITALS
RESPIRATION RATE: 18 BRPM | HEART RATE: 69 BPM | SYSTOLIC BLOOD PRESSURE: 166 MMHG | TEMPERATURE: 98.7 F | DIASTOLIC BLOOD PRESSURE: 89 MMHG

## 2022-02-23 DIAGNOSIS — L97.919 CHRONIC VENOUS HYPERTENSION WITH ULCER AND INFLAMMATION INVOLVING RIGHT SIDE (HCC): Primary | ICD-10-CM

## 2022-02-23 DIAGNOSIS — I87.331 CHRONIC VENOUS HYPERTENSION WITH ULCER AND INFLAMMATION INVOLVING RIGHT SIDE (HCC): Primary | ICD-10-CM

## 2022-02-23 DIAGNOSIS — I83.029 VENOUS ULCER OF LEFT LEG (HCC): ICD-10-CM

## 2022-02-23 DIAGNOSIS — L97.929 VENOUS ULCER OF LEFT LEG (HCC): ICD-10-CM

## 2022-02-23 PROCEDURE — 11042 DBRDMT SUBQ TIS 1ST 20SQCM/<: CPT | Performed by: NURSE PRACTITIONER

## 2022-02-23 PROCEDURE — 11042 DBRDMT SUBQ TIS 1ST 20SQCM/<: CPT

## 2022-02-23 RX ORDER — GINSENG 100 MG
CAPSULE ORAL ONCE
Status: CANCELLED | OUTPATIENT
Start: 2022-02-23 | End: 2022-02-23

## 2022-02-23 RX ORDER — BACITRACIN ZINC AND POLYMYXIN B SULFATE 500; 1000 [USP'U]/G; [USP'U]/G
OINTMENT TOPICAL ONCE
Status: CANCELLED | OUTPATIENT
Start: 2022-02-23 | End: 2022-02-23

## 2022-02-23 RX ORDER — GENTAMICIN SULFATE 1 MG/G
OINTMENT TOPICAL ONCE
Status: CANCELLED | OUTPATIENT
Start: 2022-02-23 | End: 2022-02-23

## 2022-02-23 RX ORDER — LIDOCAINE 50 MG/G
OINTMENT TOPICAL ONCE
Status: CANCELLED | OUTPATIENT
Start: 2022-02-23 | End: 2022-02-23

## 2022-02-23 RX ORDER — BETAMETHASONE DIPROPIONATE 0.05 %
OINTMENT (GRAM) TOPICAL ONCE
Status: CANCELLED | OUTPATIENT
Start: 2022-02-23 | End: 2022-02-23

## 2022-02-23 RX ORDER — BACITRACIN, NEOMYCIN, POLYMYXIN B 400; 3.5; 5 [USP'U]/G; MG/G; [USP'U]/G
OINTMENT TOPICAL ONCE
Status: CANCELLED | OUTPATIENT
Start: 2022-02-23 | End: 2022-02-23

## 2022-02-23 RX ORDER — LIDOCAINE HYDROCHLORIDE 20 MG/ML
JELLY TOPICAL ONCE
Status: CANCELLED | OUTPATIENT
Start: 2022-02-23 | End: 2022-02-23

## 2022-02-23 RX ORDER — CLOBETASOL PROPIONATE 0.5 MG/G
OINTMENT TOPICAL ONCE
Status: CANCELLED | OUTPATIENT
Start: 2022-02-23 | End: 2022-02-23

## 2022-02-23 RX ORDER — LIDOCAINE HYDROCHLORIDE 40 MG/ML
SOLUTION TOPICAL ONCE
Status: CANCELLED | OUTPATIENT
Start: 2022-02-23 | End: 2022-02-23

## 2022-02-23 RX ORDER — LIDOCAINE 40 MG/G
CREAM TOPICAL ONCE
Status: CANCELLED | OUTPATIENT
Start: 2022-02-23 | End: 2022-02-23

## 2022-02-23 ASSESSMENT — PAIN SCALES - GENERAL: PAINLEVEL_OUTOF10: 0

## 2022-02-23 NOTE — PROGRESS NOTES
Wound Care Center Progress Note With Procedure    Maxine Quick  AGE: 77 y.o. GENDER: male  : 1945  EPISODE DATE:  2022     Subjective:     Chief Complaint   Patient presents with    Wound Check     left leg          HISTORY of PRESENT ILLNESS     Dillon Ochoa a 77 y. o. male who presents today for wound evaluation of Chronic venous wound(s) of R lower leg medial.  The wound is of moderate severity.  The underlying cause of the wound is venous disease and trauma.       Improved this week. Very good granulating tissue in base and smaller in size.      Wound Pain Timing/Severity: none  Quality of pain: N/A  Severity of pain:  0 / 10   Modifying Factors: venous stasis  Associated Signs/Symptoms: none        PAST MEDICAL HISTORY        Diagnosis Date    Chronic venous hypertension with ulcer and inflammation involving right side (HCC)     RLE    Hypertension     past    Iron deficiency     Lymphoma Dx 11    Hx of B cell lymphoma-in remission since        PAST SURGICAL HISTORY    Past Surgical History:   Procedure Laterality Date    TUNNELED VENOUS PORT PLACEMENT      TUNNELED VENOUS PORT PLACEMENT      removed 2012       FAMILY HISTORY    Family History   Problem Relation Age of Onset    Early Death Mother 52    Diabetes Father     Heart Disease Father     Vision Loss Son         \"He wears glasses\"    Early Death Daughter 36       SOCIAL HISTORY    Social History     Tobacco Use    Smoking status: Never Smoker    Smokeless tobacco: Never Used   Substance Use Topics    Alcohol use: No    Drug use: No       ALLERGIES    No Known Allergies    MEDICATIONS    Current Outpatient Medications on File Prior to Encounter   Medication Sig Dispense Refill    metoprolol succinate (TOPROL XL) 50 MG extended release tablet Take 1 tablet by mouth daily 30 tablet 5     No current facility-administered medications on file prior to encounter.        REVIEW OF SYSTEMS    Pertinent items are noted in HPI. Constitutional: Negative for systemic symptoms including fever, chills and malaise. Objective:      BP (!) 166/89   Pulse 69   Temp 98.7 °F (37.1 °C) (Temporal)   Resp 18     PHYSICAL EXAM      General: The patient is in no acute distress. Mental status:  Patient is appropriate, is  oriented to place and plan of care. Dermatologic exam: Visual inspection of the periwound reveals the skin to be normal in turgor and texture, dry and slack  Wound exam: see wound description below in procedure note      Assessment:     Problem List Items Addressed This Visit     Chronic venous hypertension with ulcer and inflammation ([facility] [address] - Primary    Relevant Orders    Initiate Outpatient Wound Care Protocol    Venous ulcer of left leg ([facility] [address]    Relevant Orders    Initiate Outpatient Wound Care Protocol        Procedure Note    Indications:  Based on my examination of this patient's wound(s) today, sharp excision into necrotic subcutaneous tissue is required to promote healing and evaluate the extent of previous healing. Performed by: LOLY Dumont CNP    Consent obtained: Yes    Time out taken:  Yes    Pain Control: N/A       Debridement:Excisional Debridement    Using #15 blade scalpel the wound(s) was/were sharply debrided down through and including the removal of subcutaneous tissue.         Devitalized Tissue Debrided:  fibrin, biofilm, slough and exudate    Pre Debridement Measurements:  Are located in the Wound Documentation Flow Sheet    All active wounds listed below with today's date are evaluated  Wound(s)    debrided this date include # : 1     Post  Debridement Measurements:  Wound 07/29/13 Venous ulcer Ankle Right;Lateral (Active)   Number of days: 3131       Wound 09/30/20 Wound #1 Right Medial Lower Leg Cluster (Onset 5 Years) (Active)   Wound Image   02/09/22 1311   Wound Etiology Venous 02/23/22 1357   Dressing Status New dressing applied 02/09/22 1347   Wound Cleansed Soap and water 02/23/22 1357   Dressing/Treatment Alginate with Ag 01/13/21 1657   Offloading for Diabetic Foot Ulcers Felt or foam 02/23/22 1357   Wound Length (cm) 6 cm 02/23/22 1357   Wound Width (cm) 3.4 cm 02/23/22 1357   Wound Depth (cm) 0.1 cm 02/23/22 1357   Wound Surface Area (cm^2) 20.4 cm^2 02/23/22 1357   Change in Wound Size % (l*w) 91.9 02/23/22 1357   Wound Volume (cm^3) 2.04 cm^3 02/23/22 1357   Wound Healing % 92 02/23/22 1357   Post-Procedure Length (cm) 6 cm 02/23/22 1410   Post-Procedure Width (cm) 3.4 cm 02/23/22 1410   Post-Procedure Depth (cm) 0.1 cm 02/23/22 1410   Post-Procedure Surface Area (cm^2) 20.4 cm^2 02/23/22 1410   Post-Procedure Volume (cm^3) 2.04 cm^3 02/23/22 1410   Distance Tunneling (cm) 0 cm 02/23/22 1357   Tunneling Position ___ O'Clock 0 02/23/22 1357   Undermining Starts ___ O'Clock 0 02/23/22 1357   Undermining Ends___ O'Clock 0 02/23/22 1357   Undermining Maxium Distance (cm) 0 02/23/22 1357   Wound Assessment Pink/red 02/23/22 1357   Drainage Amount Moderate 02/23/22 1357   Drainage Description Serosanguinous 02/23/22 1357   Odor None 02/23/22 1357   Cassia-wound Assessment Intact 02/23/22 1357   Margins Defined edges 02/23/22 1357   Wound Thickness Description not for Pressure Injury Full thickness 02/23/22 1357   Number of days: 511       Percent of Wound(s) Debrided: approximately 100%    Total  Area  Debrided:  20 sq cm     Bleeding:  Minimal    Hemostasis Achieved:  by pressure    Procedural Pain:  1  / 10     Post Procedural Pain:  0 / 10     Response to treatment:  Well tolerated by patient. Status of wound progress and description from last visit:   Improved. We are ok with continuing plan of care for now. Continue to monitor and follow up 1 week       Plan:       Discharge Instructions         Discharge Instructions        PHYSICIAN ORDERS AND DISCHARGE INSTRUCTIONS   NOTE: Upon discharge from the [address], you will receive a patient experience survey.  We would be grateful if you would take the time to fill this survey out.     Wound care order history:                 Vascular studies: Arterial studies done 9/27/20; no stenosis              Imaging:   Date               Cultures:   obtained on 11/18/2020                                 Obtained on 1/20/2021               Labs/ HbA1c:   Date               Grafts:                       #1 Apligraf on 12/16/2020                     #2 Apligraf on 12/23/2020                     #3 Apligraf on 12/30/2020                     #4 Apligraf on 1/6/2021                     # 5 Apligraf on 1/13/21                     #1 [place] 1/27/2021                     #2 [place] 2/3/2021                     #3 [place] 2/10/2021                     #4 [place] Right Medial Lower Leg 2/17/21                     #5 [place] 3/10/2021                   HBO:                Antibiotics:  Bactrim DS BID for 10 days on 11/18/2020                                   Cipro BID for 10 days ordered on 11/25/2020                                    Cipro 500 mg BID on 1/20/2021                                    Cipro 500 mg BID on 4/28/2021              Earlier Wound care treatments:                Authorizations:                        Consults:   Date                           Primary care physician:      Continuing wound care orders and information:              Residence:  [facility] home health care with: [facility] discharged 1/19/2022              Your wound-care supplies will be provided by: Katja Mccauley provider:              JACQUELINE Hayes loading:  Date               AOSLJ Medications:              XITMJ cleansing:                           QA not scrub or use excessive force.                          Wash hands with soap and water before and after dressing changes.                           Prior to applying a clean dressing, cleanse wound with normal saline,                                wound cleanser, or mild soap and water.                           Ask the physician or nurse before getting the wound(s) wet in a shower              Daily Wound management:                          QBBC weight off wounds and reposition every 2 hours.                          BXIEB standing for long periods of time.                          ENLYP wraps/stockings in AM and remove at bedtime.                          If swelling is present, elevate legs to the level of the heart or above for 30 minutes 4-5 times a day and/or when sitting.                                             When taking antibiotics take entire prescription as ordered by physician do not stop taking until medicine is all gone.                                           Orders for this week: 2/23/2022       Right lower leg -- wash with soap and water, pat dry. Apply A&D to asrah wound. Apply stimulin powder then Cover with hydrofera blue dampened with saline and cut to size, and ABD Then wrap with Coban 2. Leave in place for 1 week.             Auth for ERIC vac 9/1/2021     Referral to Dr Tsering Islas on first procedure scheduled on 4/27/2021 and 11/8/2021                    Follow up with Alize Peter CNP in 1 week in the wound care center. Call [phone] for any questions or concerns.   Date__________   Time__________            Treatment Note      Written Patient Dismissal Instructions Given            Electronically signed by LOLY Burnett CNP on 2/23/2022 at 2:19 PM

## 2022-02-23 NOTE — PROGRESS NOTES
Multilayer Compression Wrap   (Not Unna) Below the Knee    NAME:  Janet Vance  YOB: 1945  MEDICAL RECORD NUMBER:  [id number]  DATE:  2/23/2022    Multilayer compression wrap: Removed old Multilayer wrap if indicated and wash leg with mild soap/water. Applied moisturizing agent to dry skin as needed. Applied primary and secondary dressing as ordered. Applied multilayered dressing below the knee to right lower leg. Instructed patient/caregiver not to remove dressing and to keep it clean and dry. Instructed patient/caregiver on complications to report to provider, such as pain, numbness in toes, heavy drainage, and slippage of dressing. Instructed patient on purpose of compression dressing and on activity and exercise recommendations.       Electronically signed by Dana Bojorquez LPN on 5/17/5929 at 3:92 PM

## 2022-03-02 ENCOUNTER — HOSPITAL ENCOUNTER (OUTPATIENT)
Dept: WOUND CARE | Age: 77
Discharge: HOME | End: 2022-03-02
Payer: COMMERCIAL

## 2022-03-02 VITALS
RESPIRATION RATE: 18 BRPM | SYSTOLIC BLOOD PRESSURE: 148 MMHG | HEART RATE: 78 BPM | TEMPERATURE: 99.4 F | DIASTOLIC BLOOD PRESSURE: 86 MMHG

## 2022-03-02 DIAGNOSIS — L97.919 VENOUS STASIS ULCER OF RIGHT LOWER LEG WITH EDEMA OF RIGHT LOWER LEG (HCC): ICD-10-CM

## 2022-03-02 DIAGNOSIS — I87.331 CHRONIC VENOUS HYPERTENSION WITH ULCER AND INFLAMMATION INVOLVING RIGHT SIDE (HCC): ICD-10-CM

## 2022-03-02 DIAGNOSIS — I83.019 VENOUS STASIS ULCER OF RIGHT LOWER LEG WITH EDEMA OF RIGHT LOWER LEG (HCC): ICD-10-CM

## 2022-03-02 DIAGNOSIS — I83.029 VENOUS ULCER OF LEFT LEG (HCC): ICD-10-CM

## 2022-03-02 DIAGNOSIS — R60.9 VENOUS STASIS ULCER OF RIGHT LOWER LEG WITH EDEMA OF RIGHT LOWER LEG (HCC): ICD-10-CM

## 2022-03-02 DIAGNOSIS — I83.893 VARICOSE VEINS OF LOWER EXTREMITIES WITH COMPLICATIONS, BILATERAL: ICD-10-CM

## 2022-03-02 DIAGNOSIS — I83.891 VARICOSE VEINS OF LOWER EXTREMITIES WITH COMPLICATIONS, RIGHT: Primary | ICD-10-CM

## 2022-03-02 DIAGNOSIS — I83.891 VENOUS STASIS ULCER OF RIGHT LOWER LEG WITH EDEMA OF RIGHT LOWER LEG (HCC): ICD-10-CM

## 2022-03-02 DIAGNOSIS — L97.919 CHRONIC VENOUS HYPERTENSION WITH ULCER AND INFLAMMATION INVOLVING RIGHT SIDE (HCC): ICD-10-CM

## 2022-03-02 DIAGNOSIS — L97.929 VENOUS ULCER OF LEFT LEG (HCC): ICD-10-CM

## 2022-03-02 PROCEDURE — 11042 DBRDMT SUBQ TIS 1ST 20SQCM/<: CPT | Performed by: NURSE PRACTITIONER

## 2022-03-02 PROCEDURE — 11042 DBRDMT SUBQ TIS 1ST 20SQCM/<: CPT

## 2022-03-02 RX ORDER — BACITRACIN, NEOMYCIN, POLYMYXIN B 400; 3.5; 5 [USP'U]/G; MG/G; [USP'U]/G
OINTMENT TOPICAL ONCE
Status: CANCELLED | OUTPATIENT
Start: 2022-03-02 | End: 2022-03-02

## 2022-03-02 RX ORDER — LIDOCAINE HYDROCHLORIDE 20 MG/ML
JELLY TOPICAL ONCE
Status: CANCELLED | OUTPATIENT
Start: 2022-03-02 | End: 2022-03-02

## 2022-03-02 RX ORDER — LIDOCAINE 40 MG/G
CREAM TOPICAL ONCE
Status: CANCELLED | OUTPATIENT
Start: 2022-03-02 | End: 2022-03-02

## 2022-03-02 RX ORDER — GENTAMICIN SULFATE 1 MG/G
OINTMENT TOPICAL ONCE
Status: CANCELLED | OUTPATIENT
Start: 2022-03-02 | End: 2022-03-02

## 2022-03-02 RX ORDER — CLOBETASOL PROPIONATE 0.5 MG/G
OINTMENT TOPICAL ONCE
Status: CANCELLED | OUTPATIENT
Start: 2022-03-02 | End: 2022-03-02

## 2022-03-02 RX ORDER — LIDOCAINE 50 MG/G
OINTMENT TOPICAL ONCE
Status: CANCELLED | OUTPATIENT
Start: 2022-03-02 | End: 2022-03-02

## 2022-03-02 RX ORDER — GINSENG 100 MG
CAPSULE ORAL ONCE
Status: CANCELLED | OUTPATIENT
Start: 2022-03-02 | End: 2022-03-02

## 2022-03-02 RX ORDER — BACITRACIN ZINC AND POLYMYXIN B SULFATE 500; 1000 [USP'U]/G; [USP'U]/G
OINTMENT TOPICAL ONCE
Status: CANCELLED | OUTPATIENT
Start: 2022-03-02 | End: 2022-03-02

## 2022-03-02 RX ORDER — LIDOCAINE HYDROCHLORIDE 40 MG/ML
SOLUTION TOPICAL ONCE
Status: CANCELLED | OUTPATIENT
Start: 2022-03-02 | End: 2022-03-02

## 2022-03-02 RX ORDER — BETAMETHASONE DIPROPIONATE 0.05 %
OINTMENT (GRAM) TOPICAL ONCE
Status: CANCELLED | OUTPATIENT
Start: 2022-03-02 | End: 2022-03-02

## 2022-03-02 NOTE — PROGRESS NOTES
Wound Care Center Progress Note With Procedure    Nehal Sylvestre  AGE: 77 y.o. GENDER: male  : 1945  EPISODE DATE:  3/2/2022     Subjective:     Chief Complaint   Patient presents with    Wound Check     rt legh         HISTORY of PRESENT ILLNESS     Maria Alejandra Daley is a 77 y. o. male who presents today for wound evaluation of Chronic venous wound(s) of R lower leg medial.  The wound is of moderate severity.  The underlying cause of the wound is venous disease and trauma.       Patient improved again this week. 2 of his small spots have healed over that were in the distal part of wound. Good new skin on edges and visible improvement.  In size     Wound Pain Timing/Severity: none  Quality of pain: N/A  Severity of pain:  0 / 10   Modifying Factors: venous stasis  Associated Signs/Symptoms: none        PAST MEDICAL HISTORY        Diagnosis Date    Chronic venous hypertension with ulcer and inflammation involving right side (HCC)     RLE    Hypertension     past    Iron deficiency     Lymphoma Dx 11-11    Hx of B cell lymphoma-in remission since        PAST SURGICAL HISTORY    Past Surgical History:   Procedure Laterality Date    TUNNELED VENOUS PORT PLACEMENT      TUNNELED VENOUS PORT PLACEMENT      removed 2012       FAMILY HISTORY    Family History   Problem Relation Age of Onset    Early Death Mother 52    Diabetes Father     Heart Disease Father     Vision Loss Son         \"He wears glasses\"    Early Death Daughter 36       SOCIAL HISTORY    Social History     Tobacco Use    Smoking status: Never Smoker    Smokeless tobacco: Never Used   Substance Use Topics    Alcohol use: No    Drug use: No       ALLERGIES    No Known Allergies    MEDICATIONS    Current Outpatient Medications on File Prior to Encounter   Medication Sig Dispense Refill    metoprolol succinate (TOPROL XL) 50 MG extended release tablet Take 1 tablet by mouth daily 30 tablet 5     No current facility-administered medications on file prior to encounter. REVIEW OF SYSTEMS    Pertinent items are noted in HPI. Constitutional: Negative for systemic symptoms including fever, chills and malaise. Objective:      BP (!) 148/86   Pulse 78   Temp 99.4 °F (37.4 °C) (Temporal)   Resp 18     PHYSICAL EXAM      General: The patient is in no acute distress. Mental status:  Patient is appropriate, is  oriented to place and plan of care. Dermatologic exam: Visual inspection of the periwound reveals the skin to be normal in turgor and texture, dry and slack  Wound exam: see wound description below in procedure note      Assessment:     Problem List Items Addressed This Visit     Chronic venous hypertension with ulcer and inflammation ([address]    Relevant Orders    Initiate Outpatient Wound Care Protocol    Venous ulcer of left leg ([address]    Relevant Orders    Initiate Outpatient Wound Care Protocol    Venous stasis ulcer of right lower leg with edema of right lower leg (HCC)    Varicose veins of lower extremities with complications, bilateral    Varicose veins of lower extremities with complications, right - Primary        Procedure Note    Indications:  Based on my examination of this patient's wound(s) today, sharp excision into necrotic subcutaneous tissue is required to promote healing and evaluate the extent of previous healing. Performed by: LOLY Garcia CNP    Consent obtained: Yes    Time out taken:  Yes    Pain Control: N/A       Debridement:Excisional Debridement    Using #15 blade scalpel the wound(s) was/were sharply debrided down through and including the removal of subcutaneous tissue.         Devitalized Tissue Debrided:  fibrin, biofilm, slough and exudate    Pre Debridement Measurements:  Are located in the Wound Documentation Flow Sheet    All active wounds listed below with today's date are evaluated  Wound(s)    debrided this date include # : 1     Post  Debridement Measurements:  Wound 07/29/13 Venous ulcer Ankle Right;Lateral (Active)   Number of days: 3138       Wound 09/30/20 Wound #1 Right Medial Lower Leg Cluster (Onset 5 Years) (Active)   Wound Image   03/02/22 1313   Wound Etiology Venous 03/02/22 1313   Dressing Status New dressing applied 02/23/22 1426   Wound Cleansed Soap and water 03/02/22 1313   Dressing/Treatment Alginate with Ag 01/13/21 1657   Offloading for Diabetic Foot Ulcers Felt or foam 03/02/22 1313   Wound Length (cm) 4.5 cm 03/02/22 1313   Wound Width (cm) 3 cm 03/02/22 1313   Wound Depth (cm) 0.1 cm 03/02/22 1313   Wound Surface Area (cm^2) 13.5 cm^2 03/02/22 1313   Change in Wound Size % (l*w) 94.64 03/02/22 1313   Wound Volume (cm^3) 1.35 cm^3 03/02/22 1313   Wound Healing % 95 03/02/22 1313   Post-Procedure Length (cm) 4.5 cm 03/02/22 1330   Post-Procedure Width (cm) 3 cm 03/02/22 1330   Post-Procedure Depth (cm) 0.1 cm 03/02/22 1330   Post-Procedure Surface Area (cm^2) 13.5 cm^2 03/02/22 1330   Post-Procedure Volume (cm^3) 1.35 cm^3 03/02/22 1330   Distance Tunneling (cm) 0 cm 03/02/22 1313   Tunneling Position ___ O'Clock 0 03/02/22 1313   Undermining Starts ___ O'Clock 0 03/02/22 1313   Undermining Ends___ O'Clock 0 03/02/22 1313   Undermining Maxium Distance (cm) 0 03/02/22 1313   Wound Assessment [place]/red;Slough 03/02/22 1313   Drainage Amount Moderate 03/02/22 1313   Drainage Description Serosanguinous 03/02/22 1313   Odor None 03/02/22 1313   Cassia-wound Assessment Intact 03/02/22 1313   Margins Defined edges 03/02/22 1313   Wound Thickness Description not for Pressure Injury Full thickness 03/02/22 1313   Number of days: 518       Percent of Wound(s) Debrided: approximately 100%    Total  Area  Debrided:  13.52 sq cm     Bleeding:  Minimal    Hemostasis Achieved:  by pressure    Procedural Pain:  0  / 10     Post Procedural Pain:  0 / 10     Response to treatment:  Well tolerated by patient.      Status of wound progress and description from last visit: Improved. continue plan of care for now and continue to monitor  Follow up 1 week       Plan:       Discharge Instructions         Discharge Instructions        [address]  NOTE: Upon discharge from the [address], you will receive a patient experience survey. We would be grateful if you would take the time to fill this survey out.     Wound care order history:                 Vascular studies: Arterial studies done 9/27/20; no stenosis              Imaging:   Date               Cultures:   obtained on 11/18/2020                                 Obtained on 1/20/2021               Labs/ HbA1c:   Date               Grafts:                       #1 Apligraf on 12/16/2020                     #2 Apligraf on 12/23/2020                     #3 Apligraf on 12/30/2020                     #4 Apligraf on 1/6/2021                     # 5 Apligraf on 1/13/21                     #1 [place] 1/27/2021                     #2 [place] 2/3/2021                     #3 [place] 2/10/2021                     #4 [place] Right Medial Lower Leg 2/17/21                     #5 [place] 3/10/2021                   HBO:                Antibiotics:  Bactrim DS BID for 10 days on 11/18/2020                                   Cipro BID for 10 days ordered on 11/25/2020                                    Cipro 500 mg BID on 1/20/2021                                    Cipro 500 mg BID on 4/28/2021              Earlier Wound care treatments:                Authorizations:                        Consults:   Date                           Primary care physician:      Continuing wound care orders and information:              Residence:  private               Continue home health care with: [facility] discharged 1/19/2022              Your wound-care supplies will be provided by:  Dayton Taylor provider:  Jake Wilcox with              OPAL loading:  Date               JULIO C Medications:              RPCVG cleansing:                           TE not scrub or use excessive force.                          Wash hands with soap and water before and after dressing changes.                         Prior to applying a clean dressing, cleanse wound with normal saline,                                wound cleanser, or mild soap and water.                           Ask the physician or nurse before getting the wound(s) wet in a shower              Daily Wound management:                          Keep weight off wounds and reposition every 2 hours.                          ORZJK standing for long periods of time.                          ISPEH wraps/stockings in AM and remove at bedtime.                          If swelling is present, elevate legs to the level of the heart or above for 30 minutes 4-5 times a day and/or when sitting.                                             When taking antibiotics take entire prescription as ordered by physician do not stop taking until medicine is all gone.                                           Orders for this week: 3/2/2022        Right lower leg -- wash with soap and water, pat dry. Apply A&D to sarah wound. Apply stimulin powder then Cover with hydrofera blue dampened with saline and cut to size, and ABD Then wrap with Coban 2. Leave in place for 1 week.             Auth for ERIC vac 9/1/2021     Referral to Dr Salima Mcgee on first procedure scheduled on 4/27/2021 and 11/8/2021                    Follow up with Shonna Ramirez CNP in 1 week in the wound care center. Call [phone] for any questions or concerns.   Date__________   Time__________               Treatment Note      Written Patient Dismissal Instructions Given            Electronically signed by LOLY Bailey CNP on 3/2/2022 at 1:32 PM

## 2022-03-02 NOTE — PROGRESS NOTES
Multilayer Compression Wrap   (Not Unna) Below the Knee    NAME:  Gladis Munoz  YOB: 1945  MEDICAL RECORD NUMBER:  [id number]  DATE:  3/2/2022    Multilayer compression wrap: Removed old Multilayer wrap if indicated and wash leg with mild soap/water. Applied moisturizing agent to dry skin as needed. Applied primary and secondary dressing as ordered. Applied multilayered dressing below the knee to right lower leg. Instructed patient/caregiver not to remove dressing and to keep it clean and dry. Instructed patient/caregiver on complications to report to provider, such as pain, numbness in toes, heavy drainage, and slippage of dressing. Instructed patient on purpose of compression dressing and on activity and exercise recommendations.       Electronically signed by Donovan Rubalcava LPN on 6/4/2609 at 9:67 PM

## 2022-03-09 ENCOUNTER — HOSPITAL ENCOUNTER (OUTPATIENT)
Dept: WOUND CARE | Age: 77
Discharge: HOME | End: 2022-03-09
Payer: COMMERCIAL

## 2022-03-09 VITALS
TEMPERATURE: 98.5 F | SYSTOLIC BLOOD PRESSURE: 153 MMHG | DIASTOLIC BLOOD PRESSURE: 89 MMHG | RESPIRATION RATE: 18 BRPM | HEART RATE: 69 BPM

## 2022-03-09 DIAGNOSIS — I83.891 VENOUS STASIS ULCER OF RIGHT LOWER LEG WITH EDEMA OF RIGHT LOWER LEG (HCC): ICD-10-CM

## 2022-03-09 DIAGNOSIS — I83.891 VARICOSE VEINS OF RIGHT LOWER EXTREMITY WITH OTHER COMPLICATIONS: ICD-10-CM

## 2022-03-09 DIAGNOSIS — L97.919 VENOUS STASIS ULCER OF RIGHT LOWER LEG WITH EDEMA OF RIGHT LOWER LEG (HCC): ICD-10-CM

## 2022-03-09 DIAGNOSIS — I83.019 VENOUS STASIS ULCER OF RIGHT LOWER LEG WITH EDEMA OF RIGHT LOWER LEG (HCC): ICD-10-CM

## 2022-03-09 DIAGNOSIS — L97.919 CHRONIC VENOUS HYPERTENSION WITH ULCER AND INFLAMMATION INVOLVING RIGHT SIDE (HCC): Primary | ICD-10-CM

## 2022-03-09 DIAGNOSIS — R60.9 VENOUS STASIS ULCER OF RIGHT LOWER LEG WITH EDEMA OF RIGHT LOWER LEG (HCC): ICD-10-CM

## 2022-03-09 DIAGNOSIS — I87.331 CHRONIC VENOUS HYPERTENSION WITH ULCER AND INFLAMMATION INVOLVING RIGHT SIDE (HCC): Primary | ICD-10-CM

## 2022-03-09 PROCEDURE — 11042 DBRDMT SUBQ TIS 1ST 20SQCM/<: CPT | Performed by: NURSE PRACTITIONER

## 2022-03-09 PROCEDURE — 11042 DBRDMT SUBQ TIS 1ST 20SQCM/<: CPT

## 2022-03-09 RX ORDER — LIDOCAINE HYDROCHLORIDE 20 MG/ML
JELLY TOPICAL ONCE
Status: CANCELLED | OUTPATIENT
Start: 2022-03-09 | End: 2022-03-09

## 2022-03-09 RX ORDER — LIDOCAINE 40 MG/G
CREAM TOPICAL ONCE
Status: CANCELLED | OUTPATIENT
Start: 2022-03-09 | End: 2022-03-09

## 2022-03-09 RX ORDER — CLOBETASOL PROPIONATE 0.5 MG/G
OINTMENT TOPICAL ONCE
Status: CANCELLED | OUTPATIENT
Start: 2022-03-09 | End: 2022-03-09

## 2022-03-09 RX ORDER — GINSENG 100 MG
CAPSULE ORAL ONCE
Status: CANCELLED | OUTPATIENT
Start: 2022-03-09 | End: 2022-03-09

## 2022-03-09 RX ORDER — BACITRACIN, NEOMYCIN, POLYMYXIN B 400; 3.5; 5 [USP'U]/G; MG/G; [USP'U]/G
OINTMENT TOPICAL ONCE
Status: CANCELLED | OUTPATIENT
Start: 2022-03-09 | End: 2022-03-09

## 2022-03-09 RX ORDER — LIDOCAINE HYDROCHLORIDE 40 MG/ML
SOLUTION TOPICAL ONCE
Status: CANCELLED | OUTPATIENT
Start: 2022-03-09 | End: 2022-03-09

## 2022-03-09 RX ORDER — GENTAMICIN SULFATE 1 MG/G
OINTMENT TOPICAL ONCE
Status: CANCELLED | OUTPATIENT
Start: 2022-03-09 | End: 2022-03-09

## 2022-03-09 RX ORDER — BACITRACIN ZINC AND POLYMYXIN B SULFATE 500; 1000 [USP'U]/G; [USP'U]/G
OINTMENT TOPICAL ONCE
Status: CANCELLED | OUTPATIENT
Start: 2022-03-09 | End: 2022-03-09

## 2022-03-09 RX ORDER — LIDOCAINE 50 MG/G
OINTMENT TOPICAL ONCE
Status: CANCELLED | OUTPATIENT
Start: 2022-03-09 | End: 2022-03-09

## 2022-03-09 RX ORDER — BETAMETHASONE DIPROPIONATE 0.05 %
OINTMENT (GRAM) TOPICAL ONCE
Status: CANCELLED | OUTPATIENT
Start: 2022-03-09 | End: 2022-03-09

## 2022-03-09 ASSESSMENT — PAIN SCALES - GENERAL: PAINLEVEL_OUTOF10: 0

## 2022-03-09 NOTE — PROGRESS NOTES
Multilayer Compression Wrap   (Not Unna) Below the Knee    NAME:  Harriet Maradiaga  YOB: 1945  MEDICAL RECORD NUMBER:  [id number]  DATE:  3/9/2022    Multilayer compression wrap: Removed old Multilayer wrap if indicated and wash leg with mild soap/water. Applied moisturizing agent to dry skin as needed. Applied primary and secondary dressing as ordered. Applied multilayered dressing below the knee to right lower leg. Instructed patient/caregiver not to remove dressing and to keep it clean and dry. Instructed patient/caregiver on complications to report to provider, such as pain, numbness in toes, heavy drainage, and slippage of dressing. Instructed patient on purpose of compression dressing and on activity and exercise recommendations.       Electronically signed by Dia Woodward RN on 3/9/2022 at 2:11 PM

## 2022-03-09 NOTE — PROGRESS NOTES
Wound Care Center Progress Note With Procedure    Aleksandar Patterson  AGE: 69 y.o. GENDER: male  : 1945  EPISODE DATE:  3/9/2022     Subjective:     Chief Complaint   Patient presents with    Wound Check         HISTORY of PRESENT ILLNESS     Cristin nowak 77 y. o. male who presents today for wound evaluation of Chronic venous wound(s) of R lower leg medial.  The wound is of moderate severity.  The underlying cause of the wound is venous disease and trauma.         Wound Pain Timing/Severity: none  Quality of pain: N/A  Severity of pain:  0 / 10   Modifying Factors: venous stasis  Associated Signs/Symptoms: none        PAST MEDICAL HISTORY        Diagnosis Date    Chronic venous hypertension with ulcer and inflammation involving right side (HCC)     RLE    Hypertension     past    Iron deficiency     Lymphoma Dx     Hx of B cell lymphoma-in remission since        PAST SURGICAL HISTORY    Past Surgical History:   Procedure Laterality Date    TUNNELED VENOUS PORT PLACEMENT      TUNNELED VENOUS PORT PLACEMENT      removed 2012       FAMILY HISTORY    Family History   Problem Relation Age of Onset    Early Death Mother 52    Diabetes Father     Heart Disease Father     Vision Loss Son         \"He wears glasses\"    Early Death Daughter 36       SOCIAL HISTORY    Social History     Tobacco Use    Smoking status: Never Smoker    Smokeless tobacco: Never Used   Substance Use Topics    Alcohol use: No    Drug use: No       ALLERGIES    No Known Allergies    MEDICATIONS    Current Outpatient Medications on File Prior to Encounter   Medication Sig Dispense Refill    metoprolol succinate (TOPROL XL) 50 MG extended release tablet Take 1 tablet by mouth daily 30 tablet 5     No current facility-administered medications on file prior to encounter. REVIEW OF SYSTEMS    Pertinent items are noted in HPI.     Constitutional: Negative for systemic symptoms including fever, chills and malaise. Objective:      BP (!) 153/89   Pulse 69   Temp 98.5 °F (36.9 °C) (Temporal)   Resp 18     PHYSICAL EXAM      General: The patient is in no acute distress. Mental status:  Patient is appropriate, is  oriented to place and plan of care. Dermatologic exam: Visual inspection of the periwound reveals the skin to be normal in turgor and texture and dry  Wound exam: see wound description below in procedure note      Assessment:     Problem List Items Addressed This Visit     Chronic venous hypertension with ulcer and inflammation ([address] - Primary    Relevant Orders    Initiate Outpatient Wound Care Protocol    Venous stasis ulcer of right lower leg with edema of right lower leg (HCC)    Varicose veins of right lower extremity with other complications        Procedure Note    Indications:  Based on my examination of this patient's wound(s) today, sharp excision into necrotic subcutaneous tissue is required to promote healing and evaluate the extent of previous healing. Performed by: LOLY Raymundo - CNP    Consent obtained: Yes    Time out taken:  Yes    Pain Control: N/a       Debridement:Excisional Debridement    Using #15 blade scalpel the wound(s) was/were sharply debrided down through and including the removal of subcutaneous tissue.         Devitalized Tissue Debrided:  fibrin, biofilm, slough and exudate    Pre Debridement Measurements:  Are located in the Wound Documentation Flow Sheet    All active wounds listed below with today's date are evaluated  Wound(s)    debrided this date include # : 1     Post  Debridement Measurements:  Wound 07/29/13 Venous ulcer Ankle Right;Lateral (Active)   Number of days: 3145       Wound 09/30/20 Wound #1 Right Medial Lower Leg Cluster (Onset 5 Years) (Active)   Wound Image   03/02/22 1313   Wound Etiology Venous 03/09/22 1311   Dressing Status New dressing applied 03/02/22 1347   Wound Cleansed Soap and water 03/09/22 1311   Dressing/Treatment Alginate with Ag 01/13/21 1657   Offloading for Diabetic Foot Ulcers Offloading not required 03/09/22 1311   Wound Length (cm) 4.5 cm 03/09/22 1311   Wound Width (cm) 3 cm 03/09/22 1311   Wound Depth (cm) 0.1 cm 03/09/22 1311   Wound Surface Area (cm^2) 13.5 cm^2 03/09/22 1311   Change in Wound Size % (l*w) 94.64 03/09/22 1311   Wound Volume (cm^3) 1.35 cm^3 03/09/22 1311   Wound Healing % 95 03/09/22 1311   Post-Procedure Length (cm) 4.5 cm 03/09/22 1342   Post-Procedure Width (cm) 3 cm 03/09/22 1342   Post-Procedure Depth (cm) 0.1 cm 03/09/22 1342   Post-Procedure Surface Area (cm^2) 13.5 cm^2 03/09/22 1342   Post-Procedure Volume (cm^3) 1.35 cm^3 03/09/22 1342   Distance Tunneling (cm) 0 cm 03/09/22 1311   Tunneling Position ___ O'Clock 0 03/09/22 1311   Undermining Starts ___ O'Clock 0 03/09/22 1311   Undermining Ends___ O'Clock 0 03/09/22 1311   Undermining Maxium Distance (cm) 0 03/09/22 1311   Wound Assessment Granulation tissue 03/09/22 1311   Drainage Amount Moderate 03/09/22 1311   Drainage Description Serosanguinous 03/09/22 1311   Odor None 03/09/22 1311   Cassia-wound Assessment Intact 03/09/22 1311   Margins Defined edges 03/09/22 1311   Wound Thickness Description not for Pressure Injury Full thickness 03/09/22 1311   Number of days: 525       Percent of Wound(s) Debrided: approximately 100%    Total  Area  Debrided:  13.5 sq cm     Bleeding:  Minimal    Hemostasis Achieved:  by pressure    Procedural Pain:  2  / 10     Post Procedural Pain:  0 / 10     Response to treatment:  Well tolerated by patient. Status of wound progress and description from last visit:   Improving. Plan:       Discharge Instructions         Discharge Instructions        PHYSICIAN ORDERS AND DISCHARGE INSTRUCTIONS   NOTE: [facility] [address] [facility] you will receive a patient experience survey.  We would be grateful if you would take the time to fill this survey out.     Wound care order getting the wound(s) wet in a shower              Daily Wound management:                          USFV weight off wounds and reposition every 2 hours.                          GXJFH standing for long periods of time.                          JDBKZ wraps/stockings in AM and remove at bedtime.                          If swelling is present, elevate legs to the level of the heart or above for 30 minutes 4-5 times a day and/or when sitting.                                             When taking antibiotics take entire prescription as ordered by physician do not stop taking until medicine is all gone.                                           Orders for this week: 3/9/2022        Right lower leg -- wash with soap and water, pat dry. Apply A&D to sarah wound. Apply stimulin powder then Cover with hydrofera blue dampened with saline and cut to size, and ABD Then wrap with Coban 2. Leave in place for 1 week.             Auth for ERIC vac 9/1/2021     Referral to Dr Rebeca Denise on first procedure scheduled on 4/27/2021 and 11/8/2021                    Follow up with Mitchell George CNP in 1 week in the wound care center. Call [phone] for any questions or concerns.   Date__________   Time__________                     Treatment Note      Written Patient Dismissal Instructions Given            Electronically signed by LOLY Dumont CNP on 3/9/2022 at 2:02 PM

## 2022-03-16 ENCOUNTER — HOSPITAL ENCOUNTER (OUTPATIENT)
Dept: WOUND CARE | Age: 77
Discharge: HOME | End: 2022-03-16
Payer: COMMERCIAL

## 2022-03-16 VITALS
DIASTOLIC BLOOD PRESSURE: 98 MMHG | HEART RATE: 78 BPM | TEMPERATURE: 97.2 F | RESPIRATION RATE: 18 BRPM | SYSTOLIC BLOOD PRESSURE: 170 MMHG

## 2022-03-16 DIAGNOSIS — L97.919 CHRONIC VENOUS HYPERTENSION WITH ULCER AND INFLAMMATION INVOLVING RIGHT SIDE (HCC): Primary | ICD-10-CM

## 2022-03-16 DIAGNOSIS — I83.029 VENOUS ULCER OF LEFT LEG (HCC): ICD-10-CM

## 2022-03-16 DIAGNOSIS — L97.929 VENOUS ULCER OF LEFT LEG (HCC): ICD-10-CM

## 2022-03-16 DIAGNOSIS — I87.331 CHRONIC VENOUS HYPERTENSION WITH ULCER AND INFLAMMATION INVOLVING RIGHT SIDE (HCC): Primary | ICD-10-CM

## 2022-03-16 PROCEDURE — 11042 DBRDMT SUBQ TIS 1ST 20SQCM/<: CPT

## 2022-03-16 PROCEDURE — 11042 DBRDMT SUBQ TIS 1ST 20SQCM/<: CPT | Performed by: NURSE PRACTITIONER

## 2022-03-16 RX ORDER — LIDOCAINE HYDROCHLORIDE 20 MG/ML
JELLY TOPICAL ONCE
Status: CANCELLED | OUTPATIENT
Start: 2022-03-16 | End: 2022-03-16

## 2022-03-16 RX ORDER — BETAMETHASONE DIPROPIONATE 0.05 %
OINTMENT (GRAM) TOPICAL ONCE
Status: CANCELLED | OUTPATIENT
Start: 2022-03-16 | End: 2022-03-16

## 2022-03-16 RX ORDER — LIDOCAINE 40 MG/G
CREAM TOPICAL ONCE
Status: CANCELLED | OUTPATIENT
Start: 2022-03-16 | End: 2022-03-16

## 2022-03-16 RX ORDER — GINSENG 100 MG
CAPSULE ORAL ONCE
Status: CANCELLED | OUTPATIENT
Start: 2022-03-16 | End: 2022-03-16

## 2022-03-16 RX ORDER — BACITRACIN ZINC AND POLYMYXIN B SULFATE 500; 1000 [USP'U]/G; [USP'U]/G
OINTMENT TOPICAL ONCE
Status: CANCELLED | OUTPATIENT
Start: 2022-03-16 | End: 2022-03-16

## 2022-03-16 RX ORDER — GENTAMICIN SULFATE 1 MG/G
OINTMENT TOPICAL ONCE
Status: CANCELLED | OUTPATIENT
Start: 2022-03-16 | End: 2022-03-16

## 2022-03-16 RX ORDER — BACITRACIN, NEOMYCIN, POLYMYXIN B 400; 3.5; 5 [USP'U]/G; MG/G; [USP'U]/G
OINTMENT TOPICAL ONCE
Status: CANCELLED | OUTPATIENT
Start: 2022-03-16 | End: 2022-03-16

## 2022-03-16 RX ORDER — CLOBETASOL PROPIONATE 0.5 MG/G
OINTMENT TOPICAL ONCE
Status: CANCELLED | OUTPATIENT
Start: 2022-03-16 | End: 2022-03-16

## 2022-03-16 RX ORDER — LIDOCAINE 50 MG/G
OINTMENT TOPICAL ONCE
Status: CANCELLED | OUTPATIENT
Start: 2022-03-16 | End: 2022-03-16

## 2022-03-16 RX ORDER — LIDOCAINE HYDROCHLORIDE 40 MG/ML
SOLUTION TOPICAL ONCE
Status: CANCELLED | OUTPATIENT
Start: 2022-03-16 | End: 2022-03-16

## 2022-03-16 NOTE — PROGRESS NOTES
Multilayer Compression Wrap   (Not Unna) Below the Knee    NAME:  Yonatan Solis  YOB: 1945  MEDICAL RECORD NUMBER:  [id number]  DATE:  3/16/2022    Multilayer compression wrap: Removed old Multilayer wrap if indicated and wash leg with mild soap/water. Applied moisturizing agent to dry skin as needed. Applied primary and secondary dressing as ordered. Applied multilayered dressing below the knee to right lower leg. Instructed patient/caregiver not to remove dressing and to keep it clean and dry. Instructed patient/caregiver on complications to report to provider, such as pain, numbness in toes, heavy drainage, and slippage of dressing. Instructed patient on purpose of compression dressing and on activity and exercise recommendations.       Electronically signed by Dexter Diallo LPN on 0/81/5863 at 5:51 PM

## 2022-03-16 NOTE — PROGRESS NOTES
Wound Care Center Progress Note With Procedure    Moe Delgado  AGE: 69 y.o. GENDER: male  : 1945  EPISODE DATE:  3/16/2022     Subjective:     Chief Complaint   Patient presents with    Wound Check     Rigt lower leg         HISTORY of PRESENT ILLNESS     Evelin Daley is a 77 y. o. male who presents today for wound evaluation of Chronic venous wound(s) of R lower leg medial.  The wound is of moderate severity.  The underlying cause of the wound is venous disease and trauma.      Improved this week. Good granualation and no signs or symptoms  Of infection.        Wound Pain Timing/Severity: none  Quality of pain: N/A  Severity of pain:  0 / 10   Modifying Factors: venous stasis  Associated Signs/Symptoms: none        PAST MEDICAL HISTORY        Diagnosis Date    Chronic venous hypertension with ulcer and inflammation involving right side (HCC)     RLE    Hypertension     past    Iron deficiency     Lymphoma Dx 11-    Hx of B cell lymphoma-in remission since        PAST SURGICAL HISTORY    Past Surgical History:   Procedure Laterality Date    TUNNELED VENOUS PORT PLACEMENT      TUNNELED VENOUS PORT PLACEMENT      removed 2012       FAMILY HISTORY    Family History   Problem Relation Age of Onset    Early Death Mother 52    Diabetes Father     Heart Disease Father     Vision Loss Son         \"He wears glasses\"    Early Death Daughter 36       SOCIAL HISTORY    Social History     Tobacco Use    Smoking status: Never Smoker    Smokeless tobacco: Never Used   Substance Use Topics    Alcohol use: No    Drug use: No       ALLERGIES    No Known Allergies    MEDICATIONS    Current Outpatient Medications on File Prior to Encounter   Medication Sig Dispense Refill    metoprolol succinate (TOPROL XL) 50 MG extended release tablet Take 1 tablet by mouth daily 30 tablet 5     No current facility-administered medications on file prior to encounter.        REVIEW OF SYSTEMS    Pertinent items are noted in HPI. Constitutional: Negative for systemic symptoms including fever, chills and malaise. Objective:      BP (!) 170/98   Pulse 78   Temp 97.2 °F (36.2 °C)   Resp 18     PHYSICAL EXAM      General: The patient is in no acute distress. Mental status:  Patient is appropriate, is  oriented to place and plan of care. Dermatologic exam: Visual inspection of the periwound reveals the skin to be normal in turgor and texture, dry and slack  Wound exam: see wound description below in procedure note      Assessment:     Problem List Items Addressed This Visit     Chronic venous hypertension with ulcer and inflammation ([facility] [address] - Primary    Relevant Orders    Initiate Outpatient Wound Care Protocol    Venous ulcer of left leg ([facility] [address]    Relevant Orders    Initiate Outpatient Wound Care Protocol        Procedure Note    Indications:  Based on my examination of this patient's wound(s) today, sharp excision into necrotic subcutaneous tissue is required to promote healing and evaluate the extent of previous healing. Performed by: LOLY Moe CNP    Consent obtained: Yes    Time out taken:  Yes    Pain Control: N/A       Debridement:Excisional Debridement    Using #15 blade scalpel the wound(s) was/were sharply debrided down through and including the removal of subcutaneous tissue. Devitalized Tissue Debrided:  fibrin, biofilm, slough and necrotic/eschar    Pre Debridement Measurements:  Are located in the Wound Documentation Flow Sheet    All active wounds listed below with today's date are evaluated  Wound(s)    debrided this date include # : 1     Post  Debridement Measurements:  Wound 07/29/13 Venous ulcer Ankle Right;Lateral (Active)   Number of days: 3152       Wound 09/30/20 Wound #1 Right Medial Lower Leg Cluster (Onset 5 Years) (Active)   Wound Image   03/16/22 1256   Wound Etiology Venous 03/09/22 1311   Dressing Status New dressing applied;Clean;Dry; Intact 03/16/22 1319 Wound Center, you will receive a patient experience survey. We would be grateful if you would take the time to fill this survey out.     Wound care order history:                 Vascular studies: Arterial studies done 9/27/20; no stenosis              Imaging:   Date               Cultures:   obtained on 11/18/2020                                 Obtained on 1/20/2021               Labs/ HbA1c:   Date               Grafts:                       #1 Apligraf on 12/16/2020                     #2 Apligraf on 12/23/2020                     #3 Apligraf on 12/30/2020                     #4 Apligraf on 1/6/2021                     # 5 Apligraf on 1/13/21                     #1 [place] 1/27/2021                     #2 [place] 2/3/2021                     #3 [place] 2/10/2021                     #4 [place] Right Medial Lower Leg 2/17/21                     #5 [place] 3/10/2021                   HBO:                Antibiotics:  Bactrim DS BID for 10 days on 11/18/2020                                   Cipro BID for 10 days ordered on 11/25/2020                                    Cipro 500 mg BID on 1/20/2021                                    Cipro 500 mg BID on 4/28/2021              Earlier Wound care treatments:                Authorizations:                        Consults:   Date                           Primary care physician:      Continuing wound care orders and information:              Residence:  private               Continue [facility] health care with: [facility] discharged 1/19/2022              Your wound-care supplies will be provided by: Angeli Loaiza provider:              VICKI with  Peggi Ringer loading:  Date               AANHA Medications:              RUVYC cleansing:                           BF not scrub or use excessive force.                          Wash hands with soap and water before and after dressing changes.                           Prior to applying a clean dressing, cleanse wound with normal saline,                                wound cleanser, or mild soap and water.                           Ask the physician or nurse before getting the wound(s) wet in a shower              Daily Wound management:                          Keep weight off wounds and reposition every 2 hours.                          TLCDK standing for long periods of time.                          IBVBC wraps/stockings in AM and remove at bedtime.                          If swelling is present, elevate legs to the level of the heart or above for 30 minutes 4-5 times a day and/or when sitting.                                             When taking antibiotics take entire prescription as ordered by physician do not stop taking until medicine is all gone.                                           Orders for this week: 3/16/2022        Right lower leg -- wash with soap and water, pat dry. Apply A&D to sarah wound. Apply stimulin powder then Cover with hydrofera blue dampened with saline and cut to size, and ABD Then wrap with Coban 2. Leave in place for 1 week.             Auth for ERIC vac 9/1/2021     Referral to Dr Mario Bernstein on first procedure scheduled on 4/27/2021 and 11/8/2021                    Follow up with Tracy Christopher CNP in 1 week in the wound care center. Call [phone] for any questions or concerns.   Date__________   Time__________                  Treatment Note Wound 09/30/20 Wound #1 Right Medial Lower Leg Cluster (Onset 5 Years)-Dressing/Treatment:  (Stimulen, hydrofrea blue, ABD coban 2 )    Written Patient Dismissal Instructions Given            Electronically signed by LOLY Rivera CNP on 3/16/2022 at 1:33 PM

## 2022-03-23 ENCOUNTER — HOSPITAL ENCOUNTER (OUTPATIENT)
Dept: WOUND CARE | Age: 77
Discharge: HOME | End: 2022-03-23
Payer: COMMERCIAL

## 2022-03-23 VITALS — TEMPERATURE: 98.4 F | SYSTOLIC BLOOD PRESSURE: 162 MMHG | DIASTOLIC BLOOD PRESSURE: 88 MMHG | HEART RATE: 71 BPM

## 2022-03-23 DIAGNOSIS — R60.9 VENOUS STASIS ULCER OF RIGHT LOWER LEG WITH EDEMA OF RIGHT LOWER LEG (HCC): Primary | ICD-10-CM

## 2022-03-23 DIAGNOSIS — L97.919 VENOUS STASIS ULCER OF RIGHT LOWER LEG WITH EDEMA OF RIGHT LOWER LEG (HCC): Primary | ICD-10-CM

## 2022-03-23 DIAGNOSIS — I83.891 VENOUS STASIS ULCER OF RIGHT LOWER LEG WITH EDEMA OF RIGHT LOWER LEG (HCC): Primary | ICD-10-CM

## 2022-03-23 DIAGNOSIS — I83.019 VENOUS STASIS ULCER OF RIGHT LOWER LEG WITH EDEMA OF RIGHT LOWER LEG (HCC): Primary | ICD-10-CM

## 2022-03-23 PROCEDURE — 11042 DBRDMT SUBQ TIS 1ST 20SQCM/<: CPT

## 2022-03-23 PROCEDURE — 11042 DBRDMT SUBQ TIS 1ST 20SQCM/<: CPT | Performed by: NURSE PRACTITIONER

## 2022-03-23 NOTE — PROGRESS NOTES
Wound Care Center Progress Note With Procedure    Annelise Virk  AGE: 69 y.o. GENDER: male  : 1945  EPISODE DATE:  3/23/2022     Subjective:     Chief Complaint   Patient presents with    Wound Check     rt leg         HISTORY of PRESENT ILLNESS     Oscar Kearney a 77 y. o. male who presents today for wound evaluation of Chronic venous wound of Right lower medial leg. The wound is of moderate severity.  The underlying cause of the wound is venous disease and trauma.      Wound Pain Timing/Severity: none  Quality of pain: N/A  Severity of pain:  0 / 10   Modifying Factors: venous stasis  Associated Signs/Symptoms: none    Diabetes:No  Smoking: Never smoker  Obesity: No  Anticoagulant therapy: No  Immunosuppression: No    Patient educated on the 6 essential components necessary for wound healing: Circulation, Debridements, Proper Dressings and Topical Wound Products, Infection Control, Edema Control and Offloading. Patient educated on those factors that negatively effect or impact wound healing: smoking, obesity, uncontrolled diabetes, anticoagulant and immunosuppressive regimens, inadequate nutrition, untreated arterial and venous disease if applicable and measures to manage edema.        PAST MEDICAL HISTORY        Diagnosis Date    Chronic venous hypertension with ulcer and inflammation involving right side (HCC)     RLE    Hypertension     past    Iron deficiency     Lymphoma Dx 11-11    Hx of B cell lymphoma-in remission since        PAST SURGICAL HISTORY    Past Surgical History:   Procedure Laterality Date    TUNNELED VENOUS PORT PLACEMENT      TUNNELED VENOUS PORT PLACEMENT      removed 2012       FAMILY HISTORY    Family History   Problem Relation Age of Onset    Early Death Mother 52    Diabetes Father     Heart Disease Father     Vision Loss Son         \"He wears glasses\"    Early Death Daughter 36       SOCIAL HISTORY    Social History     Tobacco Use    Smoking status: Never Smoker    Smokeless tobacco: Never Used   Substance Use Topics    Alcohol use: No    Drug use: No       ALLERGIES    No Known Allergies    MEDICATIONS    Current Outpatient Medications on File Prior to Encounter   Medication Sig Dispense Refill    metoprolol succinate (TOPROL XL) 50 MG extended release tablet Take 1 tablet by mouth daily 30 tablet 5     No current facility-administered medications on file prior to encounter. REVIEW OF SYSTEMS    Pertinent items are noted in HPI. Constitutional: Negative for systemic symptoms including fever, chills and malaise. Objective:      BP (!) 162/88   Pulse 71   Temp 98.4 °F (36.9 °C) (Temporal)     PHYSICAL EXAM    General: The patient is in no acute distress. Mental status:  Patient is appropriate, is  oriented to place and plan of care. Dermatologic exam: Visual inspection of the periwound reveals the skin to be normal in turgor and texture  Wound exam: see wound description below in procedure note      Assessment:     Problem List Items Addressed This Visit     Venous stasis ulcer of right lower leg with edema of right lower leg ([address] - Primary        Procedure Note    Indications:  Based on my examination of this patient's wound(s) today, sharp excision into necrotic subcutaneous tissue is required to promote healing and evaluate the extent of previous healing. Performed by: LOLY Burnette CNP    Consent obtained: Yes    Time out taken:  Yes    Pain Control: Anesthetic  Anesthetic: 4% Lidocaine Liquid Topical        Debridement:Excisional Debridement    Using curette the wound(s) was/were sharply debrided down through and including the removal of subcutaneous tissue.         Devitalized Tissue Debrided:  slough and exudate    Pre Debridement Measurements:  Are located in the Wound Documentation Flow Sheet    All active wounds listed below with today's date are evaluated  Wound(s)    debrided this date include # : 1     Post Debridement Measurements:  Wound 09/30/20 #1 (onset 5 years) Right Medial Lower Leg Cluster (Active)   Wound Image   03/16/22 1256   Wound Etiology Venous 03/23/22 1326   Dressing Status New dressing applied;Clean;Dry; Intact 03/23/22 1350   Wound Cleansed Soap and water; Wound cleanser 03/23/22 1326   Dressing/Treatment Alginate with Ag 01/13/21 1657   Offloading for Diabetic Foot Ulcers Offloading not required 03/23/22 1326   Wound Length (cm) 4.5 cm 03/23/22 1326   Wound Width (cm) 3 cm 03/23/22 1326   Wound Depth (cm) 0.1 cm 03/23/22 1326   Wound Surface Area (cm^2) 13.5 cm^2 03/23/22 1326   Change in Wound Size % (l*w) 94.64 03/23/22 1326   Wound Volume (cm^3) 1.35 cm^3 03/23/22 1326   Wound Healing % 95 03/23/22 1326   Post-Procedure Length (cm) 4.5 cm 03/23/22 1336   Post-Procedure Width (cm) 3 cm 03/23/22 1336   Post-Procedure Depth (cm) 0.1 cm 03/23/22 1336   Post-Procedure Surface Area (cm^2) 13.5 cm^2 03/23/22 1336   Post-Procedure Volume (cm^3) 1.35 cm^3 03/23/22 1336   Distance Tunneling (cm) 0 cm 03/23/22 1326   Tunneling Position ___ O'Clock 0 03/23/22 1326   Undermining Starts ___ O'Clock 0 03/23/22 1326   Undermining Ends___ O'Clock 0 03/23/22 1326   Undermining Maxium Distance (cm) 0 03/23/22 1326   Wound Assessment Granulation tissue 03/23/22 1326   Drainage Amount Moderate 03/23/22 1326   Drainage Description Serosanguinous 03/23/22 1326   Odor None 03/23/22 1326   Cassia-wound Assessment Intact 03/23/22 1326   Margins Defined edges; Unattached edges 03/23/22 1326   Wound Thickness Description not for Pressure Injury Full thickness 03/23/22 1326   Number of days: 539       Percent of Wound(s) Debrided: approximately 100%    Total  Area  Debrided: 13.5 sq cm     Bleeding:  Minimal    Hemostasis Achieved:  by pressure    Procedural Pain:  0  / 10     Post Procedural Pain:  0 / 10     Response to treatment:  Well tolerated by patient.      Status of wound progress and description from last visit: Stable, continue regimen. Plan:       Discharge Instructions       PHYSICIAN ORDERS AND DISCHARGE INSTRUCTIONS   NOTE: Upon discharge from the [address], you will receive a patient experience survey. We would be grateful if you would take the time to fill this survey out.     Wound care order history:                 Vascular studies: Arterial studies done 9/27/20; no stenosis              Imaging:   Date               Cultures:   obtained on 11/18/2020                                 Obtained on 1/20/2021               Labs/ HbA1c:   Date               Grafts:                       #1 Apligraf on 12/16/2020                     #2 Apligraf on 12/23/2020                     #3 Apligraf on 12/30/2020                     #4 Apligraf on 1/6/2021                     # 5 Apligraf on 1/13/21                     #1 [place] 1/27/2021                     #2 [place] 2/3/2021                     #3 [place] 2/10/2021                     #4 [place] Right Medial Lower Leg 2/17/21                     #5 [place] 3/10/2021                   HBO:                Antibiotics:  Bactrim DS BID for 10 days on 11/18/2020                                   Cipro BID for 10 days ordered on 11/25/2020                                    Cipro 500 mg BID on 1/20/2021                                    Cipro 500 mg BID on 4/28/2021              Earlier Wound care treatments:                Authorizations:                        Consults:   Date                           Primary care physician:      Continuing wound care orders and information:              Residence:  private               Continue [facility] health care with: [facility] discharged 1/19/2022              Your wound-care supplies will be provided by:  Luna Loyd provider:              Compression with  Marlee Adams loading:  Date               ONIXB Medications:              FOBXE cleansing:                           VA not scrub or use excessive force.                          GKKS hands with soap and water before and after dressing changes.                         Prior to applying a clean dressing, cleanse wound with normal saline,                                wound cleanser, or mild soap and water.                           Ask the physician or nurse before getting the wound(s) wet in a shower              Daily Wound management:                          Keep weight off wounds and reposition every 2 hours.                          FASNE standing for long periods of time.                          CDITB wraps/stockings in AM and remove at bedtime.                          If swelling is present, elevate legs to the level of the heart or above for 30 minutes 4-5 times a day and/or when sitting.                                             When taking antibiotics take entire prescription as ordered by physician do not stop taking until medicine is all gone.                                           Orders for this week: 3/23/2022        Right lower leg -- wash with soap and water, pat dry. Apply A&D to sarah wound. Apply stimulin powder then Cover with hydrofera blue dampened with saline and cut to size, and ABD Then wrap with Coban 2. Leave in place for 1 week.             Auth for ERIC vac 9/1/2021     Referral to Dr Julia Collier on first procedure scheduled on 4/27/2021 and 11/8/2021                    Follow up with Segundo Miramontes CNP in 1 week in the wound care center. Call [phone] for any questions or concerns.   Date__________   Time__________        Treatment Note Wound 09/30/20 #1 (onset 5 years) Right Medial Lower Leg Cluster-Dressing/Treatment:  (Stimulen, hydrofrea blue, ABD coban 2 )    Written Patient Dismissal Instructions Given            Electronically signed by LOLY Harden CNP on 3/23/2022 at 2:00 PM

## 2022-03-23 NOTE — PROGRESS NOTES
Multilayer Compression Wrap   (Not Unna) Below the Knee    NAME:  Keaton Melendez  YOB: 1945  MEDICAL RECORD NUMBER:  [id number]  DATE:  3/23/2022    Multilayer compression wrap: Removed old Multilayer wrap if indicated and wash leg with mild soap/water. Applied moisturizing agent to dry skin as needed. Applied primary and secondary dressing as ordered. Applied multilayered dressing below the knee to right lower leg. Instructed patient/caregiver not to remove dressing and to keep it clean and dry. Instructed patient/caregiver on complications to report to provider, such as pain, numbness in toes, heavy drainage, and slippage of dressing. Instructed patient on purpose of compression dressing and on activity and exercise recommendations.       Electronically signed by Kelly Martinez RN on 3/23/2022 at 1:51 PM

## 2022-03-30 ENCOUNTER — HOSPITAL ENCOUNTER (OUTPATIENT)
Dept: WOUND CARE | Age: 77
Discharge: HOME | End: 2022-03-30
Payer: COMMERCIAL

## 2022-03-30 VITALS
TEMPERATURE: 99.4 F | SYSTOLIC BLOOD PRESSURE: 163 MMHG | RESPIRATION RATE: 18 BRPM | HEART RATE: 74 BPM | DIASTOLIC BLOOD PRESSURE: 92 MMHG

## 2022-03-30 DIAGNOSIS — I83.029 VENOUS ULCER OF LEFT LEG (HCC): ICD-10-CM

## 2022-03-30 DIAGNOSIS — I87.331 CHRONIC VENOUS HYPERTENSION WITH ULCER AND INFLAMMATION INVOLVING RIGHT SIDE (HCC): Primary | ICD-10-CM

## 2022-03-30 DIAGNOSIS — L97.929 VENOUS ULCER OF LEFT LEG (HCC): ICD-10-CM

## 2022-03-30 DIAGNOSIS — L97.919 CHRONIC VENOUS HYPERTENSION WITH ULCER AND INFLAMMATION INVOLVING RIGHT SIDE (HCC): Primary | ICD-10-CM

## 2022-03-30 PROCEDURE — 11042 DBRDMT SUBQ TIS 1ST 20SQCM/<: CPT | Performed by: NURSE PRACTITIONER

## 2022-03-30 PROCEDURE — 11042 DBRDMT SUBQ TIS 1ST 20SQCM/<: CPT

## 2022-03-30 RX ORDER — LIDOCAINE HYDROCHLORIDE 20 MG/ML
JELLY TOPICAL ONCE
Status: CANCELLED | OUTPATIENT
Start: 2022-03-30 | End: 2022-03-30

## 2022-03-30 RX ORDER — CLOBETASOL PROPIONATE 0.5 MG/G
OINTMENT TOPICAL ONCE
Status: CANCELLED | OUTPATIENT
Start: 2022-03-30 | End: 2022-03-30

## 2022-03-30 RX ORDER — BACITRACIN, NEOMYCIN, POLYMYXIN B 400; 3.5; 5 [USP'U]/G; MG/G; [USP'U]/G
OINTMENT TOPICAL ONCE
Status: CANCELLED | OUTPATIENT
Start: 2022-03-30 | End: 2022-03-30

## 2022-03-30 RX ORDER — GINSENG 100 MG
CAPSULE ORAL ONCE
Status: CANCELLED | OUTPATIENT
Start: 2022-03-30 | End: 2022-03-30

## 2022-03-30 RX ORDER — LIDOCAINE 50 MG/G
OINTMENT TOPICAL ONCE
Status: CANCELLED | OUTPATIENT
Start: 2022-03-30 | End: 2022-03-30

## 2022-03-30 RX ORDER — LIDOCAINE 40 MG/G
CREAM TOPICAL ONCE
Status: CANCELLED | OUTPATIENT
Start: 2022-03-30 | End: 2022-03-30

## 2022-03-30 RX ORDER — BACITRACIN ZINC AND POLYMYXIN B SULFATE 500; 1000 [USP'U]/G; [USP'U]/G
OINTMENT TOPICAL ONCE
Status: CANCELLED | OUTPATIENT
Start: 2022-03-30 | End: 2022-03-30

## 2022-03-30 RX ORDER — LIDOCAINE HYDROCHLORIDE 40 MG/ML
SOLUTION TOPICAL ONCE
Status: CANCELLED | OUTPATIENT
Start: 2022-03-30 | End: 2022-03-30

## 2022-03-30 RX ORDER — GENTAMICIN SULFATE 1 MG/G
OINTMENT TOPICAL ONCE
Status: CANCELLED | OUTPATIENT
Start: 2022-03-30 | End: 2022-03-30

## 2022-03-30 RX ORDER — BETAMETHASONE DIPROPIONATE 0.05 %
OINTMENT (GRAM) TOPICAL ONCE
Status: CANCELLED | OUTPATIENT
Start: 2022-03-30 | End: 2022-03-30

## 2022-03-30 ASSESSMENT — PAIN SCALES - GENERAL: PAINLEVEL_OUTOF10: 0

## 2022-03-30 NOTE — PROGRESS NOTES
Multilayer Compression Wrap   (Not Unna) Below the Knee    NAME:  Juancarlos Cowan  YOB: 1945  MEDICAL RECORD NUMBER:  [id number]  DATE:  3/30/2022    Multilayer compression wrap: Removed old Multilayer wrap if indicated and wash leg with mild soap/water. Applied moisturizing agent to dry skin as needed. Applied primary and secondary dressing as ordered. Applied multilayered dressing below the knee to left lower leg. Instructed patient/caregiver not to remove dressing and to keep it clean and dry. Instructed patient/caregiver on complications to report to provider, such as pain, numbness in toes, heavy drainage, and slippage of dressing. Instructed patient on purpose of compression dressing and on activity and exercise recommendations.       Electronically signed by Derick Vidal LPN on 3/88/8045 at 5:68 PM

## 2022-04-01 NOTE — PROGRESS NOTES
Wound Care Center Progress Note With Procedure    Natalie Alvarez  AGE: 69 y.o. GENDER: male  : 1945  EPISODE DATE:  3/30/2022     Subjective:     No chief complaint on file. HISTORY of PRESENT ILLNESS     Bertrand Daley is a 77 y. o. male who presents today for wound evaluation of Chronic venous wound of Right lower medial leg. The wound is of moderate severity.  The underlying cause of the wound is venous disease and trauma.       Wound Pain Timing/Severity: none  Quality of pain: N/A  Severity of pain:  0 / 10   Modifying Factors: venous stasis  Associated Signs/Symptoms: none     Diabetes:No  Smoking: Never smoker  Obesity: No  Anticoagulant therapy: No  Immunosuppression: No     Patient educated on the 6 essential components necessary for wound healing: Circulation, Debridements, Proper Dressings and Topical Wound Products, Infection Control, Edema Control and Offloading.     Patient educated on those factors that negatively effect or impact wound healing: smoking, obesity, uncontrolled diabetes, anticoagulant and immunosuppressive regimens, inadequate nutrition, untreated arterial and venous disease if applicable and measures to manage edema.          PAST MEDICAL HISTORY        Diagnosis Date    Chronic venous hypertension with ulcer and inflammation involving right side (HCC)     RLE    Hypertension     past    Iron deficiency     Lymphoma Dx     Hx of B cell lymphoma-in remission since        PAST SURGICAL HISTORY    Past Surgical History:   Procedure Laterality Date    TUNNELED VENOUS PORT PLACEMENT      TUNNELED VENOUS PORT PLACEMENT      removed 2012       FAMILY HISTORY    Family History   Problem Relation Age of Onset    Early Death Mother 52    Diabetes Father     Heart Disease Father     Vision Loss Son         \"He wears glasses\"    Early Death Daughter 36       SOCIAL HISTORY    Social History     Tobacco Use    Smoking status: Never Smoker    Smokeless tobacco: Never Used   Substance Use Topics    Alcohol use: No    Drug use: No       ALLERGIES    No Known Allergies    MEDICATIONS    Current Outpatient Medications on File Prior to Encounter   Medication Sig Dispense Refill    metoprolol succinate (TOPROL XL) 50 MG extended release tablet Take 1 tablet by mouth daily 30 tablet 5     No current facility-administered medications on file prior to encounter. REVIEW OF SYSTEMS    Pertinent items are noted in HPI. Constitutional: Negative for systemic symptoms including fever, chills and malaise. Objective:      BP (!) 163/92   Pulse 74   Temp 99.4 °F (37.4 °C) (Temporal)   Resp 18     PHYSICAL EXAM    General: The patient is in no acute distress. Mental status:  Patient is appropriate, is  oriented to place and plan of care. Dermatologic exam: Visual inspection of the periwound reveals the skin to be normal in turgor and texture  Wound exam: see wound description below in procedure note      Assessment:     Problem List Items Addressed This Visit     Chronic venous hypertension with ulcer and inflammation ([address] - Primary    Venous ulcer of left leg ([address]        Procedure Note    Indications:  Based on my examination of this patient's wound(s) today, sharp excision into necrotic subcutaneous tissue is required to promote healing and evaluate the extent of previous healing. Performed by: LOLY Patel CNP    Consent obtained: Yes    Time out taken:  Yes    Pain Control: Anesthetic  Anesthetic: 4% Lidocaine Liquid Topical        Debridement:Excisional Debridement    Using curette the wound(s) was/were sharply debrided down through and including the removal of subcutaneous tissue.         Devitalized Tissue Debrided:  slough and exudate    Pre Debridement Measurements:  Are located in the Wound Documentation Flow Sheet    All active wounds listed below with today's date are evaluated  Wound(s)    debrided this date include # : 1     Post Debridement Measurements:  Wound 09/30/20 #1 (onset 5 years) Right Medial Lower Leg Cluster (Active)   Wound Image   03/16/22 1256   Wound Etiology Venous 03/30/22 1316   Dressing Status New dressing applied;Clean;Dry; Intact 03/30/22 1333   Wound Cleansed Soap and water; Wound cleanser 03/30/22 1316   Dressing/Treatment Alginate with Ag 01/13/21 1657   Offloading for Diabetic Foot Ulcers Offloading not required 03/30/22 1333   Wound Length (cm) 3.5 cm 03/30/22 1316   Wound Width (cm) 3 cm 03/30/22 1316   Wound Depth (cm) 0.1 cm 03/30/22 1316   Wound Surface Area (cm^2) 10.5 cm^2 03/30/22 1316   Change in Wound Size % (l*w) 95.83 03/30/22 1316   Wound Volume (cm^3) 1.05 cm^3 03/30/22 1316   Wound Healing % 96 03/30/22 1316   Post-Procedure Length (cm) 3.5 cm 03/30/22 1329   Post-Procedure Width (cm) 3 cm 03/30/22 1329   Post-Procedure Depth (cm) 0.1 cm 03/30/22 1329   Post-Procedure Surface Area (cm^2) 10.5 cm^2 03/30/22 1329   Post-Procedure Volume (cm^3) 1.05 cm^3 03/30/22 1329   Distance Tunneling (cm) 0 cm 03/30/22 1316   Tunneling Position ___ O'Clock 0 03/30/22 1316   Undermining Starts ___ O'Clock 0 03/30/22 1316   Undermining Ends___ O'Clock 0 03/30/22 1316   Undermining Maxium Distance (cm) 0 03/30/22 1316   Wound Assessment Granulation tissue 03/30/22 1316   Drainage Amount Moderate 03/30/22 1316   Drainage Description Serosanguinous 03/30/22 1316   Odor None 03/30/22 1316   Cassia-wound Assessment Intact 03/30/22 1316   Margins Defined edges; Unattached edges 03/30/22 1316   Wound Thickness Description not for Pressure Injury Full thickness 03/30/22 1316   Number of days: 547       Percent of Wound(s) Debrided: approximately 100%    Total  Area  Debrided:  10.5 sq cm     Bleeding:  Minimal    Hemostasis Achieved:  by pressure    Procedural Pain:  0  / 10     Post Procedural Pain:  0 / 10     Response to treatment:  Well tolerated by patient.      Status of wound progress and description from last visit: Improving, continue regimen. Plan:       Discharge Instructions         Discharge Instructions        PHYSICIAN ORDERS AND DISCHARGE INSTRUCTIONS   NOTE: Upon discharge from the [address], you will receive a patient experience survey. We would be grateful if you would take the time to fill this survey out.     Wound care order history:                 Vascular studies: Arterial studies done 9/27/20; no stenosis              Imaging:   Date               Cultures:   obtained on 11/18/2020                                 Obtained on 1/20/2021               Labs/ HbA1c:   Date               Grafts:                       #1 Apligraf on 12/16/2020                     #2 Apligraf on 12/23/2020                     #3 Apligraf on 12/30/2020                     #4 Apligraf on 1/6/2021                     # 5 Apligraf on 1/13/21                     #1 [place] 1/27/2021                     #2 [place] 2/3/2021                     #3 [place] 2/10/2021                     #4 [place] Right Medial Lower Leg 2/17/21                     #5 [place] 3/10/2021                   HBO:                Antibiotics:  Bactrim DS BID for 10 days on 11/18/2020                                   Cipro BID for 10 days ordered on 11/25/2020                                    Cipro 500 mg BID on 1/20/2021                                    Cipro 500 mg BID on 4/28/2021              Earlier Wound care treatments:                Authorizations:                        Consults:   Date                           Primary care physician:      Continuing wound care orders and information:              Residence:  private               Continue [facility] health care with: [facility] discharged 1/19/2022              Your wound-care supplies will be provided by:  Jeff Scherer provider:              Compression with  Akua Persons loading:  Date               DLUZN Medications:              BPCWG cleansing:                           NQ not scrub or use excessive force.                          Wash hands with soap and water before and after dressing changes.                         Prior to applying a clean dressing, cleanse wound with normal saline,                                wound cleanser, or mild soap and water.                           Ask the physician or nurse before getting the wound(s) wet in a shower              Daily Wound management:                          Keep weight off wounds and reposition every 2 hours.                          QHOJS standing for long periods of time.                          IEBHX wraps/stockings in AM and remove at bedtime.                          If swelling is present, elevate legs to the level of the heart or above for 30 minutes 4-5 times a day and/or when sitting.                                             When taking antibiotics take entire prescription as ordered by physician do not stop taking until medicine is all gone.                                           Orders for this week: 3/30/2022        Right lower leg -- wash with soap and water, pat dry. Apply A&D to sarah wound. Apply stimulin powder then Cover with hydrofera blue dampened with saline and cut to size, and ABD Then wrap with Coban 2. Leave in place for 1 week.             Auth for ERIC vac 9/1/2021     Referral to Dr Salima Mcgee on first procedure scheduled on 4/27/2021 and 11/8/2021                    Follow up with Keenan Riley CNP in 1 week in the wound care center. Call [phone] for any questions or concerns.   Date__________   Time__________          Treatment Note Wound 09/30/20 #1 (onset 5 years) Right Medial Lower Leg Cluster-Dressing/Treatment:  (Stimulen, hydrofrea blue, ABD coban 2 )    Written Patient Dismissal Instructions Given            Electronically signed by LOLY Burnette CNP on 4/1/2022 at 10:01 AM

## 2022-04-06 ENCOUNTER — HOSPITAL ENCOUNTER (OUTPATIENT)
Dept: WOUND CARE | Age: 77
Discharge: HOME | End: 2022-04-06
Payer: COMMERCIAL

## 2022-04-06 VITALS
SYSTOLIC BLOOD PRESSURE: 167 MMHG | HEART RATE: 71 BPM | RESPIRATION RATE: 18 BRPM | DIASTOLIC BLOOD PRESSURE: 97 MMHG | TEMPERATURE: 98.4 F

## 2022-04-06 DIAGNOSIS — I83.019 VENOUS STASIS ULCER OF RIGHT LOWER LEG WITH EDEMA OF RIGHT LOWER LEG (HCC): ICD-10-CM

## 2022-04-06 DIAGNOSIS — L97.919 VENOUS STASIS ULCER OF RIGHT LOWER LEG WITH EDEMA OF RIGHT LOWER LEG (HCC): ICD-10-CM

## 2022-04-06 DIAGNOSIS — L97.929 VENOUS ULCER OF LEFT LEG (HCC): ICD-10-CM

## 2022-04-06 DIAGNOSIS — I83.891 VENOUS STASIS ULCER OF RIGHT LOWER LEG WITH EDEMA OF RIGHT LOWER LEG (HCC): ICD-10-CM

## 2022-04-06 DIAGNOSIS — R60.9 VENOUS STASIS ULCER OF RIGHT LOWER LEG WITH EDEMA OF RIGHT LOWER LEG (HCC): ICD-10-CM

## 2022-04-06 DIAGNOSIS — I83.029 VENOUS ULCER OF LEFT LEG (HCC): ICD-10-CM

## 2022-04-06 DIAGNOSIS — L97.919 CHRONIC VENOUS HYPERTENSION WITH ULCER AND INFLAMMATION INVOLVING RIGHT SIDE (HCC): Primary | ICD-10-CM

## 2022-04-06 DIAGNOSIS — I87.331 CHRONIC VENOUS HYPERTENSION WITH ULCER AND INFLAMMATION INVOLVING RIGHT SIDE (HCC): Primary | ICD-10-CM

## 2022-04-06 PROCEDURE — 11042 DBRDMT SUBQ TIS 1ST 20SQCM/<: CPT

## 2022-04-06 PROCEDURE — 11042 DBRDMT SUBQ TIS 1ST 20SQCM/<: CPT | Performed by: NURSE PRACTITIONER

## 2022-04-06 RX ORDER — LIDOCAINE HYDROCHLORIDE 20 MG/ML
JELLY TOPICAL ONCE
Status: CANCELLED | OUTPATIENT
Start: 2022-04-06 | End: 2022-04-06

## 2022-04-06 RX ORDER — GINSENG 100 MG
CAPSULE ORAL ONCE
Status: CANCELLED | OUTPATIENT
Start: 2022-04-06 | End: 2022-04-06

## 2022-04-06 RX ORDER — LIDOCAINE 40 MG/G
CREAM TOPICAL ONCE
Status: CANCELLED | OUTPATIENT
Start: 2022-04-06 | End: 2022-04-06

## 2022-04-06 RX ORDER — CLOBETASOL PROPIONATE 0.5 MG/G
OINTMENT TOPICAL ONCE
Status: CANCELLED | OUTPATIENT
Start: 2022-04-06 | End: 2022-04-06

## 2022-04-06 RX ORDER — LIDOCAINE 50 MG/G
OINTMENT TOPICAL ONCE
Status: CANCELLED | OUTPATIENT
Start: 2022-04-06 | End: 2022-04-06

## 2022-04-06 RX ORDER — BACITRACIN, NEOMYCIN, POLYMYXIN B 400; 3.5; 5 [USP'U]/G; MG/G; [USP'U]/G
OINTMENT TOPICAL ONCE
Status: CANCELLED | OUTPATIENT
Start: 2022-04-06 | End: 2022-04-06

## 2022-04-06 RX ORDER — BACITRACIN ZINC AND POLYMYXIN B SULFATE 500; 1000 [USP'U]/G; [USP'U]/G
OINTMENT TOPICAL ONCE
Status: CANCELLED | OUTPATIENT
Start: 2022-04-06 | End: 2022-04-06

## 2022-04-06 RX ORDER — LIDOCAINE HYDROCHLORIDE 40 MG/ML
SOLUTION TOPICAL ONCE
Status: CANCELLED | OUTPATIENT
Start: 2022-04-06 | End: 2022-04-06

## 2022-04-06 RX ORDER — BETAMETHASONE DIPROPIONATE 0.05 %
OINTMENT (GRAM) TOPICAL ONCE
Status: CANCELLED | OUTPATIENT
Start: 2022-04-06 | End: 2022-04-06

## 2022-04-06 RX ORDER — GENTAMICIN SULFATE 1 MG/G
OINTMENT TOPICAL ONCE
Status: CANCELLED | OUTPATIENT
Start: 2022-04-06 | End: 2022-04-06

## 2022-04-06 ASSESSMENT — PAIN SCALES - GENERAL: PAINLEVEL_OUTOF10: 0

## 2022-04-06 NOTE — PROGRESS NOTES
Multilayer Compression Wrap   (Not Unna) Below the Knee    NAME:  Brandy Pete  YOB: 1945  MEDICAL RECORD NUMBER:  [id number]  DATE:  4/6/2022    Multilayer compression wrap: Removed old Multilayer wrap if indicated and wash leg with mild soap/water. Applied moisturizing agent to dry skin as needed. Applied primary and secondary dressing as ordered. Applied multilayered dressing below the knee to right lower leg. Instructed patient/caregiver not to remove dressing and to keep it clean and dry. Instructed patient/caregiver on complications to report to provider, such as pain, numbness in toes, heavy drainage, and slippage of dressing. Instructed patient on purpose of compression dressing and on activity and exercise recommendations. Patient tolerated treatment well.       Electronically signed by Brody Owusu RN on 4/6/2022 at 2:01 PM

## 2022-04-06 NOTE — PROGRESS NOTES
Wound Care Center Progress Note With Procedure    Radha Ovalle  AGE: 69 y.o. GENDER: male  : 1945  EPISODE DATE:  2022     Subjective:     Chief Complaint   Patient presents with    Wound Check     right lower leg          HISTORY of PRESENT ILLNESS     Karli Shahid a 77 y. o. male who presents today for wound evaluation of Chronic venous wound of Right lower medial leg. The wound is of moderate severity.  The underlying cause of the wound is venous disease and trauma.       Wound Pain Timing/Severity: none  Quality of pain: N/A  Severity of pain:  0 / 10   Modifying Factors: venous stasis  Associated Signs/Symptoms: none     Diabetes:No  Smoking: Never smoker  Obesity: No  Anticoagulant therapy: No  Immunosuppression: No     Patient educated on the 6 essential components necessary for wound healing: Circulation, Debridements, Proper Dressings and Topical Wound Products, Infection Control, Edema Control and Offloading.     Patient educated on those factors that negatively effect or impact wound healing: smoking, obesity, uncontrolled diabetes, anticoagulant and immunosuppressive regimens, inadequate nutrition, untreated arterial and venous disease if applicable and measures to manage edema.         PAST MEDICAL HISTORY        Diagnosis Date    Chronic venous hypertension with ulcer and inflammation involving right side (HCC)     RLE    Hypertension     past    Iron deficiency     Lymphoma Dx     Hx of B cell lymphoma-in remission since        PAST SURGICAL HISTORY    Past Surgical History:   Procedure Laterality Date    TUNNELED VENOUS PORT PLACEMENT      TUNNELED VENOUS PORT PLACEMENT      removed 2012       FAMILY HISTORY    Family History   Problem Relation Age of Onset    Early Death Mother 52    Diabetes Father     Heart Disease Father     Vision Loss Son         \"He wears glasses\"    Early Death Daughter 36       SOCIAL HISTORY    Social History     Tobacco Use    Devitalized Tissue Debrided:  slough and exudate    Pre Debridement Measurements:  Are located in the Wound Documentation Flow Sheet    All active wounds listed below with today's date are evaluated  Wound(s)    debrided this date include # : 1     Post  Debridement Measurements:  Wound 09/30/20 #1 (onset 5 years) Right Medial Lower Leg Cluster (Active)   Wound Image   04/06/22 1321   Wound Etiology Venous 04/06/22 1321   Dressing Status Clean;Dry; Intact; New dressing applied 04/06/22 1359   Wound Cleansed Soap and water 04/06/22 1321   Dressing/Treatment Collagen;ABD;Coban/self-adherent bandages; Moisturizing cream 04/06/22 1359   Offloading for Diabetic Foot Ulcers Offloading not required 03/30/22 1333   Wound Length (cm) 4 cm 04/06/22 1321   Wound Width (cm) 2.6 cm 04/06/22 1321   Wound Depth (cm) 0.1 cm 04/06/22 1321   Wound Surface Area (cm^2) 10.4 cm^2 04/06/22 1321   Change in Wound Size % (l*w) 95.87 04/06/22 1321   Wound Volume (cm^3) 1.04 cm^3 04/06/22 1321   Wound Healing % 96 04/06/22 1321   Post-Procedure Length (cm) 4 cm 04/06/22 1340   Post-Procedure Width (cm) 2.6 cm 04/06/22 1340   Post-Procedure Depth (cm) 0.1 cm 04/06/22 1340   Post-Procedure Surface Area (cm^2) 10.4 cm^2 04/06/22 1340   Post-Procedure Volume (cm^3) 1.04 cm^3 04/06/22 1340   Distance Tunneling (cm) 0 cm 04/06/22 1321   Tunneling Position ___ O'Clock 0 04/06/22 1321   Undermining Starts ___ O'Clock 0 04/06/22 1321   Undermining Ends___ O'Clock 0 04/06/22 1321   Undermining Maxium Distance (cm) 0 04/06/22 1321   Wound Assessment Granulation tissue 04/06/22 1321   Drainage Amount Moderate 04/06/22 1321   Drainage Description Serosanguinous 04/06/22 1321   Odor None 04/06/22 1321   Cassia-wound Assessment Intact 04/06/22 1321   Margins Defined edges 04/06/22 1321   Wound Thickness Description not for Pressure Injury Full thickness 04/06/22 1321   Number of days: 553       Percent of Wound(s) Debrided: approximately 100%    Total  Area Debrided:  10.4 sq cm     Bleeding:  Minimal    Hemostasis Achieved:  by pressure    Procedural Pain:  0  / 10     Post Procedural Pain:  0 / 10     Response to treatment:  Well tolerated by patient. Status of wound progress and description from last visit: Slightly improved this week. Plan:       Discharge Instructions         Discharge Instructions        PHYSICIAN ORDERS AND DISCHARGE INSTRUCTIONS   NOTE: Upon discharge from the [address], you will receive a patient experience survey.  We would be grateful if you would take the time to fill this survey out.     Wound care order history:                 Vascular studies: Arterial studies done 9/27/20; no stenosis              Imaging:   Date               Cultures:   obtained on 11/18/2020                                 Obtained on 1/20/2021               Labs/ HbA1c:   Date               Grafts:                       #1 Apligraf on 12/16/2020                     #2 Apligraf on 12/23/2020                     #3 Apligraf on 12/30/2020                     #4 Apligraf on 1/6/2021                     # 5 Apligraf on 1/13/21                     #1 [place] 1/27/2021                     #2 [place] 2/3/2021                     #3 [place] 2/10/2021                     #4 [place] Right Medial Lower Leg 2/17/21                     #5 [place] 3/10/2021                   HBO:                Antibiotics:  Bactrim DS BID for 10 days on 11/18/2020                                   Cipro BID for 10 days ordered on 11/25/2020                                    Cipro 500 mg BID on 1/20/2021                                    Cipro 500 mg BID on 4/28/2021              Earlier Wound care treatments:                Authorizations:                        Consults:   Date                           Primary care physician:      Continuing wound care orders and information:              Residence:  private               [facility] home health care with: [facility] 1/19/2022              Your wound-care supplies will be provided by: Dayana Mccoy provider:              SANKET with  Charli Edwards loading:  Date               RKMKX Medications:              LITIL cleansing:                           WD not scrub or use excessive force.                          Wash hands with soap and water before and after dressing changes.                         Prior to applying a clean dressing, cleanse wound with normal saline,                                wound cleanser, or mild soap and water.                           Ask the physician or nurse before getting the wound(s) wet in a shower              Daily Wound management:                          Keep weight off wounds and reposition every 2 hours.                          HIDDN standing for long periods of time.                          AFWKD wraps/stockings in AM and remove at bedtime.                          If swelling is present, elevate legs to the level of the heart or above for 30 minutes 4-5 times a day and/or when sitting.                                             When taking antibiotics take entire prescription as ordered by physician do not stop taking until medicine is all gone.                                           Orders for this week: 4/6/2022        Right lower leg -- wash with soap and water, pat dry. Apply A&D to sarah wound. Apply stimulin powder then Cover with hydrofera blue dampened with saline and cut to size of wound , and ABD Then wrap with Coban 2. Leave in place for 1 week.             Auth for ERIC vac 9/1/2021     Referral to Dr Nelly Rasheed on first procedure scheduled on 4/27/2021 and 11/8/2021                    Follow up with Mercy Avila CNP in 1 week in the wound care center. Call [phone] for any questions or concerns.   Date__________   Time__________                  Treatment Note Wound 09/30/20 #1 (onset 5 years) Right Medial Lower Leg Cluster-Dressing/Treatment: Collagen,ABD,Coban/self-adherent bandages,Moisturizing cream (stimulen, moist hydrofrea blue, ABD, coban 2, )    Written Patient Dismissal Instructions Given            Electronically signed by LOLY Osman CNP on 4/6/2022 at 2:00 PM

## 2022-04-13 ENCOUNTER — HOSPITAL ENCOUNTER (OUTPATIENT)
Dept: WOUND CARE | Age: 77
Discharge: HOME | End: 2022-04-13
Payer: COMMERCIAL

## 2022-04-13 VITALS
TEMPERATURE: 98.4 F | SYSTOLIC BLOOD PRESSURE: 166 MMHG | DIASTOLIC BLOOD PRESSURE: 92 MMHG | RESPIRATION RATE: 18 BRPM | HEART RATE: 72 BPM

## 2022-04-13 DIAGNOSIS — I83.029 VENOUS ULCER OF LEFT LEG (HCC): ICD-10-CM

## 2022-04-13 DIAGNOSIS — L97.919 VENOUS STASIS ULCER OF RIGHT LOWER LEG WITH EDEMA OF RIGHT LOWER LEG (HCC): ICD-10-CM

## 2022-04-13 DIAGNOSIS — I87.331 CHRONIC VENOUS HYPERTENSION WITH ULCER AND INFLAMMATION INVOLVING RIGHT SIDE (HCC): Primary | ICD-10-CM

## 2022-04-13 DIAGNOSIS — L97.919 CHRONIC VENOUS HYPERTENSION WITH ULCER AND INFLAMMATION INVOLVING RIGHT SIDE (HCC): Primary | ICD-10-CM

## 2022-04-13 DIAGNOSIS — I83.891 VENOUS STASIS ULCER OF RIGHT LOWER LEG WITH EDEMA OF RIGHT LOWER LEG (HCC): ICD-10-CM

## 2022-04-13 DIAGNOSIS — I83.019 VENOUS STASIS ULCER OF RIGHT LOWER LEG WITH EDEMA OF RIGHT LOWER LEG (HCC): ICD-10-CM

## 2022-04-13 DIAGNOSIS — Z98.890 STATUS POST ENDOVENOUS RADIOFREQUENCY ABLATION (RFA) OF SAPHENOUS VEIN: ICD-10-CM

## 2022-04-13 DIAGNOSIS — L97.929 VENOUS ULCER OF LEFT LEG (HCC): ICD-10-CM

## 2022-04-13 DIAGNOSIS — R60.9 VENOUS STASIS ULCER OF RIGHT LOWER LEG WITH EDEMA OF RIGHT LOWER LEG (HCC): ICD-10-CM

## 2022-04-13 DIAGNOSIS — I83.893 VARICOSE VEINS OF LOWER EXTREMITIES WITH COMPLICATIONS, BILATERAL: ICD-10-CM

## 2022-04-13 PROCEDURE — 11042 DBRDMT SUBQ TIS 1ST 20SQCM/<: CPT | Performed by: NURSE PRACTITIONER

## 2022-04-13 PROCEDURE — 11042 DBRDMT SUBQ TIS 1ST 20SQCM/<: CPT

## 2022-04-13 RX ORDER — LIDOCAINE HYDROCHLORIDE 20 MG/ML
JELLY TOPICAL ONCE
Status: CANCELLED | OUTPATIENT
Start: 2022-04-13 | End: 2022-04-13

## 2022-04-13 RX ORDER — BACITRACIN, NEOMYCIN, POLYMYXIN B 400; 3.5; 5 [USP'U]/G; MG/G; [USP'U]/G
OINTMENT TOPICAL ONCE
Status: CANCELLED | OUTPATIENT
Start: 2022-04-13 | End: 2022-04-13

## 2022-04-13 RX ORDER — BETAMETHASONE DIPROPIONATE 0.05 %
OINTMENT (GRAM) TOPICAL ONCE
Status: CANCELLED | OUTPATIENT
Start: 2022-04-13 | End: 2022-04-13

## 2022-04-13 RX ORDER — LIDOCAINE 50 MG/G
OINTMENT TOPICAL ONCE
Status: CANCELLED | OUTPATIENT
Start: 2022-04-13 | End: 2022-04-13

## 2022-04-13 RX ORDER — GINSENG 100 MG
CAPSULE ORAL ONCE
Status: CANCELLED | OUTPATIENT
Start: 2022-04-13 | End: 2022-04-13

## 2022-04-13 RX ORDER — CLOBETASOL PROPIONATE 0.5 MG/G
OINTMENT TOPICAL ONCE
Status: CANCELLED | OUTPATIENT
Start: 2022-04-13 | End: 2022-04-13

## 2022-04-13 RX ORDER — BACITRACIN ZINC AND POLYMYXIN B SULFATE 500; 1000 [USP'U]/G; [USP'U]/G
OINTMENT TOPICAL ONCE
Status: CANCELLED | OUTPATIENT
Start: 2022-04-13 | End: 2022-04-13

## 2022-04-13 RX ORDER — GENTAMICIN SULFATE 1 MG/G
OINTMENT TOPICAL ONCE
Status: CANCELLED | OUTPATIENT
Start: 2022-04-13 | End: 2022-04-13

## 2022-04-13 RX ORDER — LIDOCAINE 40 MG/G
CREAM TOPICAL ONCE
Status: CANCELLED | OUTPATIENT
Start: 2022-04-13 | End: 2022-04-13

## 2022-04-13 RX ORDER — LIDOCAINE HYDROCHLORIDE 40 MG/ML
SOLUTION TOPICAL ONCE
Status: CANCELLED | OUTPATIENT
Start: 2022-04-13 | End: 2022-04-13

## 2022-04-13 NOTE — PROGRESS NOTES
Multilayer Compression Wrap   (Not Unna) Below the Knee    NAME:  Fe Parsons  YOB: 1945  MEDICAL RECORD NUMBER:  [id number]  DATE:  4/13/2022    Multilayer compression wrap: Removed old Multilayer wrap if indicated and wash leg with mild soap/water. Applied moisturizing agent to dry skin as needed. Applied primary and secondary dressing as ordered. Applied multilayered dressing below the knee to right lower leg. Instructed patient/caregiver not to remove dressing and to keep it clean and dry. Instructed patient/caregiver on complications to report to provider, such as pain, numbness in toes, heavy drainage, and slippage of dressing. Instructed patient on purpose of compression dressing and on activity and exercise recommendations.       Electronically signed by Deacon Sears LPN on 8/25/0749 at 9:35 PM

## 2022-04-13 NOTE — PROGRESS NOTES
Wound Care Center Progress Note With Procedure    Марина Tejeda  AGE: 69 y.o. GENDER: male  : 1945  EPISODE DATE:  2022     Subjective:     Chief Complaint   Patient presents with    Wound Check     Right lower leg         HISTORY of PRESENT ILLNESS     Jovon Daley is a 77 y. o. male who presents today for wound evaluation of Chronic venous wound of Right lower medial leg. The wound is of moderate severity.  The underlying cause of the wound is venous disease and trauma.       Wound Pain Timing/Severity: none  Quality of pain: N/A  Severity of pain:  0 / 10   Modifying Factors: venous stasis  Associated Signs/Symptoms: none     Diabetes:No  Smoking: Never smoker  Obesity: No  Anticoagulant therapy: No  Immunosuppression: No     Patient educated on the 6 essential components necessary for wound healing: Circulation, Debridements, Proper Dressings and Topical Wound Products, Infection Control, Edema Control and Offloading.     Patient educated on those factors that negatively effect or impact wound healing: smoking, obesity, uncontrolled diabetes, anticoagulant and immunosuppressive regimens, inadequate nutrition, untreated arterial and venous disease if applicable and measures to manage edema.         PAST MEDICAL HISTORY        Diagnosis Date    Chronic venous hypertension with ulcer and inflammation involving right side (HCC)     RLE    Hypertension     past    Iron deficiency     Lymphoma Dx 11-    Hx of B cell lymphoma-in remission since        PAST SURGICAL HISTORY    Past Surgical History:   Procedure Laterality Date    TUNNELED VENOUS PORT PLACEMENT      TUNNELED VENOUS PORT PLACEMENT      removed 2012       FAMILY HISTORY    Family History   Problem Relation Age of Onset    Early Death Mother 52    Diabetes Father     Heart Disease Father     Vision Loss Son         \"He wears glasses\"    Early Death Daughter 36       SOCIAL HISTORY    Social History     Tobacco Use    Smoking status: Never Smoker    Smokeless tobacco: Never Used   Substance Use Topics    Alcohol use: No    Drug use: No       ALLERGIES    No Known Allergies    MEDICATIONS    Current Outpatient Medications on File Prior to Encounter   Medication Sig Dispense Refill    metoprolol succinate (TOPROL XL) 50 MG extended release tablet Take 1 tablet by mouth daily 30 tablet 5     No current facility-administered medications on file prior to encounter. REVIEW OF SYSTEMS    Pertinent items are noted in HPI. Constitutional: Negative for systemic symptoms including fever, chills and malaise. Objective:      BP (!) 166/92   Pulse 72   Temp 98.4 °F (36.9 °C) (Temporal)   Resp 18     PHYSICAL EXAM    General: The patient is in no acute distress. Mental status:  Patient is appropriate, is  oriented to place and plan of care. Dermatologic exam: Visual inspection of the periwound reveals the skin to be normal in turgor and texture  Wound exam: see wound description below in procedure note      Assessment:     Problem List Items Addressed This Visit     Chronic venous hypertension with ulcer and inflammation ([facility] [address] - Primary    Relevant Orders    Initiate Outpatient Wound Care Protocol    Venous ulcer of left leg ([address]    Relevant Orders    Initiate Outpatient Wound Care Protocol    Venous stasis ulcer of right lower leg with edema of right lower leg (HCC)    Varicose veins of lower extremities with complications, bilateral    Status post endovenous radiofrequency ablation (RFA) of saphenous vein        Procedure Note    Indications:  Based on my examination of this patient's wound(s) today, sharp excision into necrotic subcutaneous tissue is required to promote healing and evaluate the extent of previous healing.     Performed by: LOLY Osman CNP    Consent obtained: Yes    Time out taken:  Yes    Pain Control: Anesthetic  Anesthetic: 4% Lidocaine Liquid Topical        Debridement:Excisional Debridement    Using curette the wound(s) was/were sharply debrided down through and including the removal of subcutaneous tissue. Devitalized Tissue Debrided:  slough    Pre Debridement Measurements:  Are located in the Wound Documentation Flow Sheet    All active wounds listed below with today's date are evaluated  Wound(s)    debrided this date include # : 1     Post  Debridement Measurements:  Wound 09/30/20 #1 (onset 5 years) Right Medial Lower Leg Cluster (Active)   Wound Image   04/13/22 1324   Wound Etiology Venous 04/06/22 1321   Dressing Status New dressing applied;Clean;Dry; Intact 04/13/22 1335   Wound Cleansed Wound cleanser; Soap and water 04/13/22 1324   Dressing/Treatment Collagen;ABD;Coban/self-adherent bandages; Moisturizing cream 04/06/22 1359   Offloading for Diabetic Foot Ulcers Offloading not required 04/13/22 1324   Wound Length (cm) 2.8 cm 04/13/22 1324   Wound Width (cm) 2.5 cm 04/13/22 1324   Wound Depth (cm) 0.1 cm 04/13/22 1324   Wound Surface Area (cm^2) 7 cm^2 04/13/22 1324   Change in Wound Size % (l*w) 97.22 04/13/22 1324   Wound Volume (cm^3) 0.7 cm^3 04/13/22 1324   Wound Healing % 97 04/13/22 1324   Post-Procedure Length (cm) 2.8 cm 04/13/22 1334   Post-Procedure Width (cm) 2.5 cm 04/13/22 1334   Post-Procedure Depth (cm) 0.1 cm 04/13/22 1334   Post-Procedure Surface Area (cm^2) 7 cm^2 04/13/22 1334   Post-Procedure Volume (cm^3) 0.7 cm^3 04/13/22 1334   Distance Tunneling (cm) 0 cm 04/13/22 1324   Tunneling Position ___ O'Clock 0 04/13/22 1324   Undermining Starts ___ O'Clock 0 04/13/22 1324   Undermining Ends___ O'Clock 0 04/13/22 1324   Undermining Maxium Distance (cm) 0 04/13/22 1324   Wound Assessment Granulation tissue;Pink/red 04/13/22 1324   Drainage Amount Moderate 04/13/22 1324   Drainage Description Serosanguinous 04/13/22 1324   Odor None 04/13/22 1324   Cassia-wound Assessment Intact 04/13/22 1324   Margins Defined edges 04/13/22 1324   Wound Thickness Description not for Pressure Injury Full thickness 04/13/22 1324   Number of days: 560       Percent of Wound(s) Debrided: approximately 100%    Total  Area  Debrided:  7 sq cm     Bleeding:  Minimal    Hemostasis Achieved:  by pressure    Procedural Pain:  0  / 10     Post Procedural Pain:  0 / 10     Response to treatment:  Well tolerated by patient. Status of wound progress and description from last visit: Improving, continue regimen. Plan:       Discharge Instructions         Discharge Instructions        PHYSICIAN ORDERS AND DISCHARGE INSTRUCTIONS   NOTE: Upon discharge from the [address], you will receive a patient experience survey.  We would be grateful if you would take the time to fill this survey out.     Wound care order history:                 Vascular studies: Arterial studies done 9/27/20; no stenosis              Imaging:   Date               Cultures:   obtained on 11/18/2020                                 Obtained on 1/20/2021               Labs/ HbA1c:   Date               Grafts:                       #1 Apligraf on 12/16/2020                     #2 Apligraf on 12/23/2020                     #3 Apligraf on 12/30/2020                     #4 Apligraf on 1/6/2021                     # 5 Apligraf on 1/13/21                     #1 [place] 1/27/2021                     #2 [place] 2/3/2021                     #3 [place] 2/10/2021                     #4 [place] Right Medial Lower Leg 2/17/21                     #5 [place] 3/10/2021                   HBO:                Antibiotics:  Bactrim DS BID for 10 days on 11/18/2020                                   Cipro BID for 10 days ordered on 11/25/2020                                    Cipro 500 mg BID on 1/20/2021                                    Cipro 500 mg BID on 4/28/2021              Earlier Wound care treatments:                Authorizations:                        Consults:   Date                           Primary care physician:    Continuing wound care orders and information:              Residence:  private               Continue home health care with: [facility] discharged 1/19/2022              Your wound-care supplies will be provided by: Rut Goddard provider:              JOANN Yadav loading:  Date               CCDWX Medications:              UKOKH cleansing:                           DM not scrub or use excessive force.                          Wash hands with soap and water before and after dressing changes.                         Prior to applying a clean dressing, cleanse wound with normal saline,                                wound cleanser, or mild soap and water.                           Ask the physician or nurse before getting the wound(s) wet in a shower              Daily Wound management:                          Keep weight off wounds and reposition every 2 hours.                          HGOTS standing for long periods of time.                          OHUWG wraps/stockings in AM and remove at bedtime.                          If swelling is present, elevate legs to the level of the heart or above for 30 minutes 4-5 times a day and/or when sitting.                                             When taking antibiotics take entire prescription as ordered by physician do not stop taking until medicine is all gone.                                           Orders for this week: 4/13/2022        Right lower leg -- wash with soap and water, pat dry. Apply A&D to sarah wound. Apply stimulin powder then Cover with hydrofera blue dampened with saline and cut to size of wound , and ABD Then wrap with Coban 2. Leave in place for 1 week.             Auth for ERIC vac 9/1/2021     Referral to Dr Gil Varela on first procedure scheduled on 4/27/2021 and 11/8/2021                    Follow up with Kandi Pace CNP in 1 week in the wound care center.   Call [phone] for any questions or

## 2022-04-20 ENCOUNTER — HOSPITAL ENCOUNTER (OUTPATIENT)
Dept: WOUND CARE | Age: 77
Discharge: HOME | End: 2022-04-20
Payer: COMMERCIAL

## 2022-04-20 VITALS
SYSTOLIC BLOOD PRESSURE: 185 MMHG | TEMPERATURE: 98.6 F | DIASTOLIC BLOOD PRESSURE: 92 MMHG | HEART RATE: 86 BPM | RESPIRATION RATE: 18 BRPM

## 2022-04-20 DIAGNOSIS — I83.891 VENOUS STASIS ULCER OF RIGHT LOWER LEG WITH EDEMA OF RIGHT LOWER LEG (HCC): ICD-10-CM

## 2022-04-20 DIAGNOSIS — I83.029 VENOUS ULCER OF LEFT LEG (HCC): ICD-10-CM

## 2022-04-20 DIAGNOSIS — R60.9 VENOUS STASIS ULCER OF RIGHT LOWER LEG WITH EDEMA OF RIGHT LOWER LEG (HCC): ICD-10-CM

## 2022-04-20 DIAGNOSIS — L97.919 CHRONIC VENOUS HYPERTENSION WITH ULCER AND INFLAMMATION INVOLVING RIGHT SIDE (HCC): Primary | ICD-10-CM

## 2022-04-20 DIAGNOSIS — Z98.890 STATUS POST ENDOVENOUS RADIOFREQUENCY ABLATION (RFA) OF SAPHENOUS VEIN: ICD-10-CM

## 2022-04-20 DIAGNOSIS — L97.929 VENOUS ULCER OF LEFT LEG (HCC): ICD-10-CM

## 2022-04-20 DIAGNOSIS — L97.919 VENOUS STASIS ULCER OF RIGHT LOWER LEG WITH EDEMA OF RIGHT LOWER LEG (HCC): ICD-10-CM

## 2022-04-20 DIAGNOSIS — I87.331 CHRONIC VENOUS HYPERTENSION WITH ULCER AND INFLAMMATION INVOLVING RIGHT SIDE (HCC): Primary | ICD-10-CM

## 2022-04-20 DIAGNOSIS — I83.019 VENOUS STASIS ULCER OF RIGHT LOWER LEG WITH EDEMA OF RIGHT LOWER LEG (HCC): ICD-10-CM

## 2022-04-20 PROCEDURE — 11042 DBRDMT SUBQ TIS 1ST 20SQCM/<: CPT | Performed by: NURSE PRACTITIONER

## 2022-04-20 PROCEDURE — 11042 DBRDMT SUBQ TIS 1ST 20SQCM/<: CPT

## 2022-04-20 RX ORDER — LIDOCAINE 50 MG/G
OINTMENT TOPICAL ONCE
Status: CANCELLED | OUTPATIENT
Start: 2022-04-20 | End: 2022-04-20

## 2022-04-20 RX ORDER — BACITRACIN, NEOMYCIN, POLYMYXIN B 400; 3.5; 5 [USP'U]/G; MG/G; [USP'U]/G
OINTMENT TOPICAL ONCE
Status: CANCELLED | OUTPATIENT
Start: 2022-04-20 | End: 2022-04-20

## 2022-04-20 RX ORDER — CLOBETASOL PROPIONATE 0.5 MG/G
OINTMENT TOPICAL ONCE
Status: CANCELLED | OUTPATIENT
Start: 2022-04-20 | End: 2022-04-20

## 2022-04-20 RX ORDER — GENTAMICIN SULFATE 1 MG/G
OINTMENT TOPICAL ONCE
Status: CANCELLED | OUTPATIENT
Start: 2022-04-20 | End: 2022-04-20

## 2022-04-20 RX ORDER — GINSENG 100 MG
CAPSULE ORAL ONCE
Status: CANCELLED | OUTPATIENT
Start: 2022-04-20 | End: 2022-04-20

## 2022-04-20 RX ORDER — LIDOCAINE HYDROCHLORIDE 40 MG/ML
SOLUTION TOPICAL ONCE
Status: CANCELLED | OUTPATIENT
Start: 2022-04-20 | End: 2022-04-20

## 2022-04-20 RX ORDER — LIDOCAINE HYDROCHLORIDE 20 MG/ML
JELLY TOPICAL ONCE
Status: CANCELLED | OUTPATIENT
Start: 2022-04-20 | End: 2022-04-20

## 2022-04-20 RX ORDER — BETAMETHASONE DIPROPIONATE 0.05 %
OINTMENT (GRAM) TOPICAL ONCE
Status: CANCELLED | OUTPATIENT
Start: 2022-04-20 | End: 2022-04-20

## 2022-04-20 RX ORDER — LIDOCAINE 40 MG/G
CREAM TOPICAL ONCE
Status: CANCELLED | OUTPATIENT
Start: 2022-04-20 | End: 2022-04-20

## 2022-04-20 RX ORDER — BACITRACIN ZINC AND POLYMYXIN B SULFATE 500; 1000 [USP'U]/G; [USP'U]/G
OINTMENT TOPICAL ONCE
Status: CANCELLED | OUTPATIENT
Start: 2022-04-20 | End: 2022-04-20

## 2022-04-20 ASSESSMENT — PAIN SCALES - GENERAL: PAINLEVEL_OUTOF10: 0

## 2022-04-20 NOTE — PROGRESS NOTES
Multilayer Compression Wrap   (Not Unna) Below the Knee    NAME:  Barron East  YOB: 1945  MEDICAL RECORD NUMBER:  [id number]  DATE:  4/20/2022    Multilayer compression wrap: Removed old Multilayer wrap if indicated and wash leg with mild soap/water. Applied moisturizing agent to dry skin as needed. Applied primary and secondary dressing as ordered. Applied multilayered dressing below the knee to right lower leg. Instructed patient/caregiver not to remove dressing and to keep it clean and dry. Instructed patient/caregiver on complications to report to provider, such as pain, numbness in toes, heavy drainage, and slippage of dressing. Instructed patient on purpose of compression dressing and on activity and exercise recommendations.       Electronically signed by Michi Knowles LPN on 9/88/3707 at 8:59 PM

## 2022-04-20 NOTE — PLAN OF CARE
Problem: Pain  Goal: Verbalizes/displays adequate comfort level or baseline comfort level  Outcome: Progressing
No indicators present

## 2022-04-25 NOTE — PROGRESS NOTES
Wound Care Center Progress Note With Procedure    Joi Gatica  AGE: 69 y.o. GENDER: male  : 1945  EPISODE DATE:  2022     Subjective:     Chief Complaint   Patient presents with    Wound Check     Right Lower leg          HISTORY of PRESENT ILLNESS     Erika Leonardo a 77 y. o. male who presents today for wound evaluation of Chronic venous wound of Right lower medial leg. The wound is of moderate severity.  The underlying cause of the wound is venous disease and trauma.       Wound Pain Timing/Severity: none  Quality of pain: N/A  Severity of pain:  0 / 10   Modifying Factors: venous stasis  Associated Signs/Symptoms: none     Diabetes:No  Smoking: Never smoker  Obesity: No  Anticoagulant therapy: No  Immunosuppression: No     Patient educated on the 6 essential components necessary for wound healing: Circulation, Debridements, Proper Dressings and Topical Wound Products, Infection Control, Edema Control and Offloading.     Patient educated on those factors that negatively effect or impact wound healing: smoking, obesity, uncontrolled diabetes, anticoagulant and immunosuppressive regimens, inadequate nutrition, untreated arterial and venous disease if applicable and measures to manage edema.         PAST MEDICAL HISTORY        Diagnosis Date    Chronic venous hypertension with ulcer and inflammation involving right side (HCC)     RLE    Hypertension     past    Iron deficiency     Lymphoma Dx 11-    Hx of B cell lymphoma-in remission since        PAST SURGICAL HISTORY    Past Surgical History:   Procedure Laterality Date    TUNNELED VENOUS PORT PLACEMENT      TUNNELED VENOUS PORT PLACEMENT      removed 2012       FAMILY HISTORY    Family History   Problem Relation Age of Onset    Early Death Mother 52    Diabetes Father     Heart Disease Father     Vision Loss Son         \"He wears glasses\"    Early Death Daughter 36       SOCIAL HISTORY    Social History     Tobacco Use    Smoking status: Never Smoker    Smokeless tobacco: Never Used   Substance Use Topics    Alcohol use: No    Drug use: No       ALLERGIES    No Known Allergies    MEDICATIONS    Current Outpatient Medications on File Prior to Encounter   Medication Sig Dispense Refill    metoprolol succinate (TOPROL XL) 50 MG extended release tablet Take 1 tablet by mouth daily 30 tablet 5     No current facility-administered medications on file prior to encounter. REVIEW OF SYSTEMS    Pertinent items are noted in HPI. Constitutional: Negative for systemic symptoms including fever, chills and malaise. Objective:      BP (!) 185/92   Pulse 86   Temp 98.6 °F (37 °C) (Temporal)   Resp 18     PHYSICAL EXAM    General: The patient is in no acute distress. Mental status:  Patient is appropriate, is  oriented to place and plan of care. Dermatologic exam: Visual inspection of the periwound reveals the skin to be normal in turgor and texture  Wound exam: see wound description below in procedure note      Assessment:     Problem List Items Addressed This Visit        Circulatory    Chronic venous hypertension with ulcer and inflammation ([address] - Primary       Other    Venous ulcer of left leg (HCC)    Venous stasis ulcer of right lower leg with edema of right lower leg ([address]    Status post endovenous radiofrequency ablation (RFA) of saphenous vein        Procedure Note    Indications:  Based on my examination of this patient's wound(s) today, sharp excision into necrotic subcutaneous tissue is required to promote healing and evaluate the extent of previous healing. Performed by: LOLY Pichardo CNP    Consent obtained: Yes    Time out taken:  Yes    Pain Control: Anesthetic  Anesthetic: 4% Lidocaine Liquid Topical        Debridement:Excisional Debridement    Using curette the wound(s) was/were sharply debrided down through and including the removal of subcutaneous tissue.         Devitalized Tissue Debrided: slough and exudate    Pre Debridement Measurements:  Are located in the Wound Documentation Flow Sheet    All active wounds listed below with today's date are evaluated  Wound(s)    debrided this date include # : 1     Post  Debridement Measurements:  Wound 09/30/20 #1 (onset 5 years) Right Medial Lower Leg Cluster (Active)   Wound Image   04/13/22 1324   Wound Etiology Venous 04/06/22 1321   Dressing Status New dressing applied;Clean;Dry; Intact 04/20/22 1414   Wound Cleansed Wound cleanser; Soap and water 04/20/22 1342   Dressing/Treatment Collagen;ABD;Coban/self-adherent bandages; Moisturizing cream 04/06/22 1359   Offloading for Diabetic Foot Ulcers Offloading not required 04/20/22 1342   Wound Length (cm) 4 cm 04/20/22 1342   Wound Width (cm) 3 cm 04/20/22 1342   Wound Depth (cm) 0.1 cm 04/20/22 1342   Wound Surface Area (cm^2) 12 cm^2 04/20/22 1342   Change in Wound Size % (l*w) 95.24 04/20/22 1342   Wound Volume (cm^3) 1.2 cm^3 04/20/22 1342   Wound Healing % 95 04/20/22 1342   Post-Procedure Length (cm) 4 cm 04/20/22 1401   Post-Procedure Width (cm) 3 cm 04/20/22 1401   Post-Procedure Depth (cm) 0.1 cm 04/20/22 1401   Post-Procedure Surface Area (cm^2) 12 cm^2 04/20/22 1401   Post-Procedure Volume (cm^3) 1.2 cm^3 04/20/22 1401   Distance Tunneling (cm) 0 cm 04/20/22 1342   Tunneling Position ___ O'Clock 0 04/20/22 1342   Undermining Starts ___ O'Clock 0 04/20/22 1342   Undermining Ends___ O'Clock 0 04/20/22 1342   Undermining Maxium Distance (cm) 0 04/20/22 1342   Wound Assessment [place]/red;Slough 04/20/22 1342   Drainage Amount Moderate 04/20/22 1342   Drainage Description Serosanguinous 04/20/22 1342   Odor None 04/20/22 1342   Cassia-wound Assessment Fragile; Intact 04/20/22 1342   Margins Defined edges 04/20/22 1342   Wound Thickness Description not for Pressure Injury Full thickness 04/20/22 1342   Number of days: 571       Percent of Wound(s) Debrided: approximately 100%    Total  Area  Debrided:  12 sq cm Bleeding:  Minimal    Hemostasis Achieved:  by pressure    Procedural Pain:  0  / 10     Post Procedural Pain:  0 / 10     Response to treatment:  Well tolerated by patient. Status of wound progress and description from last visit:  Larger today, added Gent topically to minimize bioburden and continue compression. Plan:       Discharge Instructions         Discharge Instructions        PHYSICIAN ORDERS AND DISCHARGE INSTRUCTIONS   NOTE: Upon discharge from the [address], you will receive a patient experience survey.  We would be grateful if you would take the time to fill this survey out.     Wound care order history:                 Vascular studies: Arterial studies done 9/27/20; no stenosis              Imaging:   Date               Cultures:   obtained on 11/18/2020                                 Obtained on 1/20/2021               Labs/ HbA1c:   Date               Grafts:                       #1 Apligraf on 12/16/2020                     #2 Apligraf on 12/23/2020                     #3 Apligraf on 12/30/2020                     #4 Apligraf on 1/6/2021                     # 5 Apligraf on 1/13/21                     #1 [place] 1/27/2021                     #2 [place] 2/3/2021                     #3 [place] 2/10/2021                     #4 [place] Right Medial Lower Leg 2/17/21                     #5 [place] 3/10/2021                   HBO:                Antibiotics:  Bactrim DS BID for 10 days on 11/18/2020                                   Cipro BID for 10 days ordered on 11/25/2020                                    Cipro 500 mg BID on 1/20/2021                                    Cipro 500 mg BID on 4/28/2021              Earlier Wound care treatments:                Authorizations:                        Consults:   Date                           Primary care physician:      Continuing wound care orders and information:              Residence:  private               Continue home health care with: [facility] discharged 1/19/2022              Your wound-care supplies will be provided by: Adelaida Jimenez provider:              RAÚL with  Phyllis Lainez loading:  Date               MTLNG Medications:              MWAPE cleansing:                           KS not scrub or use excessive force.                          Wash hands with soap and water before and after dressing changes.                         Prior to applying a clean dressing, cleanse wound with normal saline,                                wound cleanser, or mild soap and water.                           Ask the physician or nurse before getting the wound(s) wet in a shower              Daily Wound management:                          Keep weight off wounds and reposition every 2 hours.                          NDYMN standing for long periods of time.                          EFHQC wraps/stockings in AM and remove at bedtime.                          If swelling is present, elevate legs to the level of the heart or above for 30 minutes 4-5 times a day and/or when sitting.                                             When taking antibiotics take entire prescription as ordered by physician do not stop taking until medicine is all gone.                                           Orders for this week: 4/20/2022        Right lower leg -- wash with soap and water, pat dry. Apply A&D to sarah wound. Apply gentamicin and stimulin powder then Cover with calcium alginate, and ABD Then wrap with Coban 2. Leave in place for 1 week.             Auth for ERIC vac 9/1/2021     Referral to Dr Ton Lozano on first procedure scheduled on 4/27/2021 and 11/8/2021                    Follow up with Isa Sinha CNP in 1 week in the wound care center. Call [phone] for any questions or concerns.   Date__________   Time__________            Treatment Note Wound 09/30/20 #1 (onset 5 years) Right Medial Lower Leg Cluster-Dressing/Treatment:  (Stimulen, getn, calcium alginate, ABD, Coban 2 )    Written Patient Dismissal Instructions Given            Electronically signed by LOLY Paredes CNP on 4/25/2022 at 8:32 AM

## 2022-04-27 ENCOUNTER — HOSPITAL ENCOUNTER (OUTPATIENT)
Dept: WOUND CARE | Age: 77
Discharge: HOME | End: 2022-04-27
Payer: COMMERCIAL

## 2022-04-27 VITALS
RESPIRATION RATE: 18 BRPM | TEMPERATURE: 98.7 F | SYSTOLIC BLOOD PRESSURE: 179 MMHG | HEART RATE: 71 BPM | DIASTOLIC BLOOD PRESSURE: 96 MMHG

## 2022-04-27 DIAGNOSIS — L97.919 CHRONIC VENOUS HYPERTENSION WITH ULCER AND INFLAMMATION INVOLVING RIGHT SIDE (HCC): Primary | ICD-10-CM

## 2022-04-27 DIAGNOSIS — I83.029 VENOUS ULCER OF LEFT LEG (HCC): ICD-10-CM

## 2022-04-27 DIAGNOSIS — I87.331 CHRONIC VENOUS HYPERTENSION WITH ULCER AND INFLAMMATION INVOLVING RIGHT SIDE (HCC): Primary | ICD-10-CM

## 2022-04-27 DIAGNOSIS — L97.929 VENOUS ULCER OF LEFT LEG (HCC): ICD-10-CM

## 2022-04-27 PROCEDURE — 11042 DBRDMT SUBQ TIS 1ST 20SQCM/<: CPT

## 2022-04-27 PROCEDURE — 11042 DBRDMT SUBQ TIS 1ST 20SQCM/<: CPT | Performed by: NURSE PRACTITIONER

## 2022-04-27 RX ORDER — LIDOCAINE HYDROCHLORIDE 20 MG/ML
JELLY TOPICAL ONCE
Status: CANCELLED | OUTPATIENT
Start: 2022-04-27 | End: 2022-04-27

## 2022-04-27 RX ORDER — GINSENG 100 MG
CAPSULE ORAL ONCE
Status: CANCELLED | OUTPATIENT
Start: 2022-04-27 | End: 2022-04-27

## 2022-04-27 RX ORDER — BACITRACIN ZINC AND POLYMYXIN B SULFATE 500; 1000 [USP'U]/G; [USP'U]/G
OINTMENT TOPICAL ONCE
Status: CANCELLED | OUTPATIENT
Start: 2022-04-27 | End: 2022-04-27

## 2022-04-27 RX ORDER — LIDOCAINE HYDROCHLORIDE 40 MG/ML
SOLUTION TOPICAL ONCE
Status: CANCELLED | OUTPATIENT
Start: 2022-04-27 | End: 2022-04-27

## 2022-04-27 RX ORDER — BETAMETHASONE DIPROPIONATE 0.05 %
OINTMENT (GRAM) TOPICAL ONCE
Status: CANCELLED | OUTPATIENT
Start: 2022-04-27 | End: 2022-04-27

## 2022-04-27 RX ORDER — LIDOCAINE 40 MG/G
CREAM TOPICAL ONCE
Status: CANCELLED | OUTPATIENT
Start: 2022-04-27 | End: 2022-04-27

## 2022-04-27 RX ORDER — BACITRACIN, NEOMYCIN, POLYMYXIN B 400; 3.5; 5 [USP'U]/G; MG/G; [USP'U]/G
OINTMENT TOPICAL ONCE
Status: CANCELLED | OUTPATIENT
Start: 2022-04-27 | End: 2022-04-27

## 2022-04-27 RX ORDER — LIDOCAINE 50 MG/G
OINTMENT TOPICAL ONCE
Status: CANCELLED | OUTPATIENT
Start: 2022-04-27 | End: 2022-04-27

## 2022-04-27 RX ORDER — GENTAMICIN SULFATE 1 MG/G
OINTMENT TOPICAL ONCE
Status: CANCELLED | OUTPATIENT
Start: 2022-04-27 | End: 2022-04-27

## 2022-04-27 RX ORDER — CLOBETASOL PROPIONATE 0.5 MG/G
OINTMENT TOPICAL ONCE
Status: CANCELLED | OUTPATIENT
Start: 2022-04-27 | End: 2022-04-27

## 2022-04-27 ASSESSMENT — PAIN SCALES - GENERAL: PAINLEVEL_OUTOF10: 0

## 2022-04-27 NOTE — PROGRESS NOTES
Wound Care Center Progress Note With Procedure    Vero Thomas  AGE: 69 y.o. GENDER: male  : 1945  EPISODE DATE:  2022     Subjective:     Chief Complaint   Patient presents with    Wound Check     right leg          HISTORY of PRESENT ILLNESS     Fabricio nowak 77 y. o. male who presents today for wound evaluation of Chronic venous wound of Right lower medial leg. The wound is of moderate severity.  The underlying cause of the wound is venous disease and trauma.       Wound Pain Timing/Severity: none  Quality of pain: N/A  Severity of pain:  0 / 10   Modifying Factors: venous stasis  Associated Signs/Symptoms: none     Diabetes:No  Smoking: Never smoker  Obesity: No  Anticoagulant therapy: No  Immunosuppression: No     Patient educated on the 6 essential components necessary for wound healing: Circulation, Debridements, Proper Dressings and Topical Wound Products, Infection Control, Edema Control and Offloading.     Patient educated on those factors that negatively effect or impact wound healing: smoking, obesity, uncontrolled diabetes, anticoagulant and immunosuppressive regimens, inadequate nutrition, untreated arterial and venous disease if applicable and measures to manage edema.         PAST MEDICAL HISTORY        Diagnosis Date    Chronic venous hypertension with ulcer and inflammation involving right side (HCC)     RLE    Hypertension     past    Iron deficiency     Lymphoma Dx 11-    Hx of B cell lymphoma-in remission since        PAST SURGICAL HISTORY    Past Surgical History:   Procedure Laterality Date    TUNNELED VENOUS PORT PLACEMENT      TUNNELED VENOUS PORT PLACEMENT      removed 2012       FAMILY HISTORY    Family History   Problem Relation Age of Onset    Early Death Mother 52    Diabetes Father     Heart Disease Father     Vision Loss Son         \"He wears glasses\"    Early Death Daughter 36       SOCIAL HISTORY    Social History     Tobacco Use    Smoking status: Never Smoker    Smokeless tobacco: Never Used   Substance Use Topics    Alcohol use: No    Drug use: No       ALLERGIES    No Known Allergies    MEDICATIONS    Current Outpatient Medications on File Prior to Encounter   Medication Sig Dispense Refill    metoprolol succinate (TOPROL XL) 50 MG extended release tablet Take 1 tablet by mouth daily 30 tablet 5     No current facility-administered medications on file prior to encounter. REVIEW OF SYSTEMS    Pertinent items are noted in HPI. Constitutional: Negative for systemic symptoms including fever, chills and malaise. Objective:      BP (!) 179/96   Pulse 71   Temp 98.7 °F (37.1 °C) (Temporal)   Resp 18     PHYSICAL EXAM  General: The patient is in no acute distress. Mental status:  Patient is appropriate, is  oriented to place and plan of care. Dermatologic exam: Visual inspection of the periwound reveals the skin to be edematous  Wound exam: see wound description below in procedure note      Assessment:     Problem List Items Addressed This Visit        Circulatory    Chronic venous hypertension with ulcer and inflammation ([facility] [address] [facility] - Primary    Relevant Orders    Initiate Outpatient Wound Care Protocol       Other    Venous ulcer of left leg ([facility] [facility]    Relevant Orders    Initiate Outpatient Wound Care Protocol        Procedure Note    Indications:  Based on my examination of this patient's wound(s) today, sharp excision into necrotic subcutaneous tissue is required to promote healing and evaluate the extent of previous healing. Performed by: LOLY Jo CNP    Consent obtained: Yes    Time out taken:  Yes    Pain Control: Anesthetic  Anesthetic: 4% Lidocaine Liquid Topical        Debridement:Excisional Debridement    Using curette the wound(s) was/were sharply debrided down through and including the removal of subcutaneous tissue.         Devitalized Tissue Debrided:  slough and exudate    Pre Debridement Measurements:  Are located in the Wound Documentation Flow Sheet    All active wounds listed below with today's date are evaluated  Wound(s)    debrided this date include # : 1     Post  Debridement Measurements:  Wound 09/30/20 #1 (onset 5 years) Right Medial Lower Leg Cluster (Active)   Wound Image   04/13/22 1324   Wound Etiology Venous 04/27/22 1323   Dressing Status New dressing applied;Clean;Dry; Intact 04/27/22 1348   Wound Cleansed Soap and water 04/27/22 1323   Dressing/Treatment Collagen;ABD;Coban/self-adherent bandages; Moisturizing cream 04/06/22 1359   Offloading for Diabetic Foot Ulcers Offloading not required 04/27/22 1348   Wound Length (cm) 3.5 cm 04/27/22 1323   Wound Width (cm) 1.5 cm 04/27/22 1323   Wound Depth (cm) 0.1 cm 04/27/22 1323   Wound Surface Area (cm^2) 5.25 cm^2 04/27/22 1323   Change in Wound Size % (l*w) 97.92 04/27/22 1323   Wound Volume (cm^3) 0.525 cm^3 04/27/22 1323   Wound Healing % 98 04/27/22 1323   Post-Procedure Length (cm) 3.5 cm 04/27/22 1342   Post-Procedure Width (cm) 1.5 cm 04/27/22 1342   Post-Procedure Depth (cm) 0.1 cm 04/27/22 1342   Post-Procedure Surface Area (cm^2) 5.25 cm^2 04/27/22 1342   Post-Procedure Volume (cm^3) 0.525 cm^3 04/27/22 1342   Distance Tunneling (cm) 0 cm 04/27/22 1323   Tunneling Position ___ O'Clock 0 04/27/22 1323   Undermining Starts ___ O'Clock 0 04/27/22 1323   Undermining Ends___ O'Clock 0 04/27/22 1323   Undermining Maxium Distance (cm) 0 04/27/22 1323   Wound Assessment Granulation tissue 04/27/22 1323   Drainage Amount Moderate 04/27/22 1323   Drainage Description Serosanguinous 04/27/22 1323   Odor None 04/27/22 1323   Cassia-wound Assessment Fragile; Intact 04/27/22 1323   Margins Defined edges 04/27/22 1323   Wound Thickness Description not for Pressure Injury Full thickness 04/27/22 1323   Number of days: 574       Percent of Wound(s) Debrided: approximately 100%    Total  Area  Debrided: 5.25 sq cm     Bleeding:  Minimal    Hemostasis Achieved:  by discharged 1/19/2022              Your wound-care supplies will be provided by: Dom Nichole provider:              JULI Garcia loading:  Date               YOILZ Medications:              RLIHX cleansing:                           DO not scrub or use excessive force.                          Wash hands with soap and water before and after dressing changes.                         Prior to applying a clean dressing, cleanse wound with normal saline,                                wound cleanser, or mild soap and water.                           Ask the physician or nurse before getting the wound(s) wet in a shower              Daily Wound management:                          Keep weight off wounds and reposition every 2 hours.                          INNWQ standing for long periods of time.                          QXAIW wraps/stockings in AM and remove at bedtime.                          If swelling is present, elevate legs to the level of the heart or above for 30 minutes 4-5 times a day and/or when sitting.                                             When taking antibiotics take entire prescription as ordered by physician do not stop taking until medicine is all gone.                                           Orders for this week: 4/27/2022        Right lower leg -- wash with soap and water, pat dry. Apply A&D to sarah wound. Apply gentamicin and stimulin powder then Cover with calcium alginate, and ABD Then wrap with Coban 2. Leave in place for 1 week.             Auth for ERIC vac 9/1/2021     Referral to Dr Lena Helm on first procedure scheduled on 4/27/2021 and 11/8/2021                    Follow up with José Miguel Araujo CNP in 1 week in the wound care center. Call [phone] for any questions or concerns.   Date__________   Time__________               Treatment Note Wound 09/30/20 #1 (onset 5 years) Right Medial Lower Leg Cluster-Dressing/Treatment:  (Stimulen, getn, calcium

## 2022-04-27 NOTE — PROGRESS NOTES
Multilayer Compression Wrap   (Not Unna) Below the Knee    NAME:  Brandy Pete  YOB: 1945  MEDICAL RECORD NUMBER:  [id number]  DATE:  4/27/2022    Multilayer compression wrap: Removed old Multilayer wrap if indicated and wash leg with mild soap/water. Applied moisturizing agent to dry skin as needed. Applied primary and secondary dressing as ordered. Applied multilayered dressing below the knee to right lower leg. Instructed patient/caregiver not to remove dressing and to keep it clean and dry. Instructed patient/caregiver on complications to report to provider, such as pain, numbness in toes, heavy drainage, and slippage of dressing. Instructed patient on purpose of compression dressing and on activity and exercise recommendations.       Electronically signed by Marlo Milan LPN on 8/88/5379 at 5:59 PM

## 2022-05-04 ENCOUNTER — HOSPITAL ENCOUNTER (OUTPATIENT)
Dept: WOUND CARE | Age: 77
Discharge: HOME | End: 2022-05-04
Payer: COMMERCIAL

## 2022-05-04 VITALS
SYSTOLIC BLOOD PRESSURE: 167 MMHG | TEMPERATURE: 98.8 F | RESPIRATION RATE: 18 BRPM | DIASTOLIC BLOOD PRESSURE: 90 MMHG | HEART RATE: 73 BPM

## 2022-05-04 DIAGNOSIS — L97.919 CHRONIC VENOUS HYPERTENSION WITH ULCER AND INFLAMMATION INVOLVING RIGHT SIDE (HCC): Primary | ICD-10-CM

## 2022-05-04 DIAGNOSIS — R60.9 VENOUS STASIS ULCER OF RIGHT LOWER LEG WITH EDEMA OF RIGHT LOWER LEG (HCC): ICD-10-CM

## 2022-05-04 DIAGNOSIS — I83.891 VENOUS STASIS ULCER OF RIGHT LOWER LEG WITH EDEMA OF RIGHT LOWER LEG (HCC): ICD-10-CM

## 2022-05-04 DIAGNOSIS — I87.331 CHRONIC VENOUS HYPERTENSION WITH ULCER AND INFLAMMATION INVOLVING RIGHT SIDE (HCC): Primary | ICD-10-CM

## 2022-05-04 DIAGNOSIS — L97.919 VENOUS STASIS ULCER OF RIGHT LOWER LEG WITH EDEMA OF RIGHT LOWER LEG (HCC): ICD-10-CM

## 2022-05-04 DIAGNOSIS — I83.029 VENOUS ULCER OF LEFT LEG (HCC): ICD-10-CM

## 2022-05-04 DIAGNOSIS — L97.929 VENOUS ULCER OF LEFT LEG (HCC): ICD-10-CM

## 2022-05-04 DIAGNOSIS — I83.019 VENOUS STASIS ULCER OF RIGHT LOWER LEG WITH EDEMA OF RIGHT LOWER LEG (HCC): ICD-10-CM

## 2022-05-04 DIAGNOSIS — Z98.890 STATUS POST ENDOVENOUS RADIOFREQUENCY ABLATION (RFA) OF SAPHENOUS VEIN: ICD-10-CM

## 2022-05-04 PROCEDURE — 11042 DBRDMT SUBQ TIS 1ST 20SQCM/<: CPT | Performed by: NURSE PRACTITIONER

## 2022-05-04 PROCEDURE — 87077 CULTURE AEROBIC IDENTIFY: CPT

## 2022-05-04 PROCEDURE — 87075 CULTR BACTERIA EXCEPT BLOOD: CPT

## 2022-05-04 PROCEDURE — 87070 CULTURE OTHR SPECIMN AEROBIC: CPT

## 2022-05-04 PROCEDURE — 6370000000 HC RX 637 (ALT 250 FOR IP): Performed by: SURGERY

## 2022-05-04 PROCEDURE — 11042 DBRDMT SUBQ TIS 1ST 20SQCM/<: CPT

## 2022-05-04 RX ORDER — GENTAMICIN SULFATE 1 MG/G
OINTMENT TOPICAL ONCE
Status: COMPLETED | OUTPATIENT
Start: 2022-05-04 | End: 2022-05-04

## 2022-05-04 RX ORDER — LIDOCAINE HYDROCHLORIDE 20 MG/ML
JELLY TOPICAL ONCE
Status: CANCELLED | OUTPATIENT
Start: 2022-05-04 | End: 2022-05-04

## 2022-05-04 RX ORDER — CLOBETASOL PROPIONATE 0.5 MG/G
OINTMENT TOPICAL ONCE
Status: CANCELLED | OUTPATIENT
Start: 2022-05-04 | End: 2022-05-04

## 2022-05-04 RX ORDER — BETAMETHASONE DIPROPIONATE 0.05 %
OINTMENT (GRAM) TOPICAL ONCE
Status: CANCELLED | OUTPATIENT
Start: 2022-05-04 | End: 2022-05-04

## 2022-05-04 RX ORDER — LIDOCAINE 40 MG/G
CREAM TOPICAL ONCE
Status: CANCELLED | OUTPATIENT
Start: 2022-05-04 | End: 2022-05-04

## 2022-05-04 RX ORDER — LIDOCAINE HYDROCHLORIDE 40 MG/ML
SOLUTION TOPICAL ONCE
Status: CANCELLED | OUTPATIENT
Start: 2022-05-04 | End: 2022-05-04

## 2022-05-04 RX ORDER — LIDOCAINE 50 MG/G
OINTMENT TOPICAL ONCE
Status: CANCELLED | OUTPATIENT
Start: 2022-05-04 | End: 2022-05-04

## 2022-05-04 RX ORDER — GENTAMICIN SULFATE 1 MG/G
OINTMENT TOPICAL ONCE
Status: CANCELLED | OUTPATIENT
Start: 2022-05-04 | End: 2022-05-04

## 2022-05-04 RX ORDER — BACITRACIN, NEOMYCIN, POLYMYXIN B 400; 3.5; 5 [USP'U]/G; MG/G; [USP'U]/G
OINTMENT TOPICAL ONCE
Status: CANCELLED | OUTPATIENT
Start: 2022-05-04 | End: 2022-05-04

## 2022-05-04 RX ORDER — GINSENG 100 MG
CAPSULE ORAL ONCE
Status: CANCELLED | OUTPATIENT
Start: 2022-05-04 | End: 2022-05-04

## 2022-05-04 RX ORDER — BACITRACIN ZINC AND POLYMYXIN B SULFATE 500; 1000 [USP'U]/G; [USP'U]/G
OINTMENT TOPICAL ONCE
Status: CANCELLED | OUTPATIENT
Start: 2022-05-04 | End: 2022-05-04

## 2022-05-04 RX ADMIN — GENTAMICIN SULFATE: 1 OINTMENT TOPICAL at 13:45

## 2022-05-04 ASSESSMENT — PAIN SCALES - GENERAL: PAINLEVEL_OUTOF10: 0

## 2022-05-04 NOTE — PROGRESS NOTES
Multilayer Compression Wrap   (Not Unna) Below the Knee    NAME:  Raimundo Lopez  YOB: 1945  MEDICAL RECORD NUMBER:  [id number]  DATE:  5/4/2022    Multilayer compression wrap: Removed old Multilayer wrap if indicated and wash leg with mild soap/water. Applied moisturizing agent to dry skin as needed. Applied primary and secondary dressing as ordered. Applied multilayered dressing below the knee to right lower leg. Instructed patient/caregiver not to remove dressing and to keep it clean and dry. Instructed patient/caregiver on complications to report to provider, such as pain, numbness in toes, heavy drainage, and slippage of dressing. Instructed patient on purpose of compression dressing and on activity and exercise recommendations.       Electronically signed by Jose Srivastava RN on 5/4/2022 at 1:44 PM

## 2022-05-04 NOTE — PROGRESS NOTES
Wound Care Center Progress Note With Procedure    Reji Bar  AGE: 69 y.o. GENDER: male  : 1945  EPISODE DATE:  2022     Subjective:     Chief Complaint   Patient presents with    Wound Check     rle         HISTORY of PRESENT ILLNESS     Danita Doran a 77 y. o. male who presents today for wound evaluation of Chronic venous wound of Right lower medial leg. The wound is of moderate severity.  The underlying cause of the wound is venous disease and trauma.       Wound Pain Timing/Severity: none  Quality of pain: N/A  Severity of pain:  0 / 10   Modifying Factors: venous stasis  Associated Signs/Symptoms: none     Diabetes:No  Smoking: Never smoker  Obesity: No  Anticoagulant therapy: No  Immunosuppression: No     Patient educated on the 6 essential components necessary for wound healing: Circulation, Debridements, Proper Dressings and Topical Wound Products, Infection Control, Edema Control and Offloading.     Patient educated on those factors that negatively effect or impact wound healing: smoking, obesity, uncontrolled diabetes, anticoagulant and immunosuppressive regimens, inadequate nutrition, untreated arterial and venous disease if applicable and measures to manage edema.         PAST MEDICAL HISTORY        Diagnosis Date    Chronic venous hypertension with ulcer and inflammation involving right side (HCC)     RLE    Hypertension     past    Iron deficiency     Lymphoma Dx     Hx of B cell lymphoma-in remission since        PAST SURGICAL HISTORY    Past Surgical History:   Procedure Laterality Date    TUNNELED VENOUS PORT PLACEMENT      TUNNELED VENOUS PORT PLACEMENT      removed 2012       FAMILY HISTORY    Family History   Problem Relation Age of Onset    Early Death Mother 52    Diabetes Father     Heart Disease Father     Vision Loss Son         \"He wears glasses\"    Early Death Daughter 36       SOCIAL HISTORY    Social History     Tobacco Use    Smoking status: Never Smoker    Smokeless tobacco: Never Used   Substance Use Topics    Alcohol use: No    Drug use: No       ALLERGIES    No Known Allergies    MEDICATIONS    Current Outpatient Medications on File Prior to Encounter   Medication Sig Dispense Refill    metoprolol succinate (TOPROL XL) 50 MG extended release tablet Take 1 tablet by mouth daily 30 tablet 5     No current facility-administered medications on file prior to encounter. REVIEW OF SYSTEMS    Pertinent items are noted in HPI. Constitutional: Negative for systemic symptoms including fever, chills and malaise. Objective:      BP (!) 167/90   Pulse 73   Temp 98.8 °F (37.1 °C) (Temporal)   Resp 18     PHYSICAL EXAM    General: The patient is in no acute distress. Mental status:  Patient is appropriate, is  oriented to place and plan of care. Dermatologic exam: Visual inspection of the periwound reveals the skin to be edematous  Wound exam: see wound description below in procedure note    Assessment:     Problem List Items Addressed This Visit        Circulatory    Chronic venous hypertension with ulcer and inflammation ([address] - Primary    Relevant Orders    Initiate Outpatient Wound Care Protocol    Culture, Wound       Other    Venous ulcer of left leg ([address]    Relevant Orders    Initiate Outpatient Wound Care Protocol    Culture, Wound    Venous stasis ulcer of right lower leg with edema of right lower leg (HCC)    Status post endovenous radiofrequency ablation (RFA) of saphenous vein        Procedure Note    Indications:  Based on my examination of this patient's wound(s) today, sharp excision into necrotic subcutaneous tissue is required to promote healing and evaluate the extent of previous healing.     Performed by: LOLY Arora CNP    Consent obtained: Yes    Time out taken:  Yes    Pain Control: Anesthetic  Anesthetic: 4% Lidocaine Liquid Topical        Debridement:Excisional Debridement    Using curette the wound(s) was/were sharply debrided down through and including the removal of subcutaneous tissue. Devitalized Tissue Debrided:  slough and exudate    Pre Debridement Measurements:  Are located in the Wound Documentation Flow Sheet    All active wounds listed below with today's date are evaluated  Wound(s)    debrided this date include # : 1     Post  Debridement Measurements:  Wound 09/30/20 #1 (onset 5 years) Right Medial Lower Leg Cluster (Active)   Wound Image   05/04/22 1311   Wound Etiology Venous 05/04/22 1311   Dressing Status New dressing applied;Clean;Dry; Intact 05/04/22 1343   Wound Cleansed Soap and water 05/04/22 1311   Dressing/Treatment Collagen;ABD;Coban/self-adherent bandages; Moisturizing cream 04/06/22 1359   Offloading for Diabetic Foot Ulcers Offloading not required 05/04/22 1311   Wound Length (cm) 4.5 cm 05/04/22 1311   Wound Width (cm) 2.5 cm 05/04/22 1311   Wound Depth (cm) 0.1 cm 05/04/22 1311   Wound Surface Area (cm^2) 11.25 cm^2 05/04/22 1311   Change in Wound Size % (l*w) 95.54 05/04/22 1311   Wound Volume (cm^3) 1.125 cm^3 05/04/22 1311   Wound Healing % 96 05/04/22 1311   Post-Procedure Length (cm) 4.5 cm 05/04/22 1337   Post-Procedure Width (cm) 2.5 cm 05/04/22 1337   Post-Procedure Depth (cm) 0.1 cm 05/04/22 1337   Post-Procedure Surface Area (cm^2) 11.25 cm^2 05/04/22 1337   Post-Procedure Volume (cm^3) 1.125 cm^3 05/04/22 1337   Distance Tunneling (cm) 0 cm 05/04/22 1311   Tunneling Position ___ O'Clock 0 05/04/22 1311   Undermining Starts ___ O'Clock 0 05/04/22 1311   Undermining Ends___ O'Clock 0 05/04/22 1311   Undermining Maxium Distance (cm) 0 05/04/22 1311   Wound Assessment Granulation tissue 05/04/22 1311   Drainage Amount Moderate 05/04/22 1311   Drainage Description Serosanguinous 05/04/22 1311   Odor None 05/04/22 1311   Cassia-wound Assessment Fragile; Intact 05/04/22 1311   Margins Defined edges 05/04/22 1311   Wound Thickness Description not for Pressure Injury Full thickness 05/04/22 1311   Number of days: 581       Percent of Wound(s) Debrided: approximately 100%    Total  Area  Debrided:  11.25 sq cm     Bleeding:  Minimal    Hemostasis Achieved:  by pressure    Procedural Pain:  0  / 10     Post Procedural Pain:  0 / 10     Response to treatment:  Well tolerated by patient. Status of wound progress and description from last visit: Larger today, wound culture obtained. Continue regimen for now. Plan:       Discharge Instructions         Discharge Instructions        PHYSICIAN ORDERS AND DISCHARGE INSTRUCTIONS   NOTE: Upon discharge from the [address], you will receive a patient experience survey.  We would be grateful if you would take the time to fill this survey out.     Wound care order history:                 Vascular studies: Arterial studies done 9/27/20; no stenosis              Imaging:   Date               Cultures:   obtained on 11/18/2020                                 Obtained on 1/20/2021               Labs/ HbA1c:   Date               Grafts:                       #1 Apligraf on 12/16/2020                     #2 Apligraf on 12/23/2020                     #3 Apligraf on 12/30/2020                     #4 Apligraf on 1/6/2021                     # 5 Apligraf on 1/13/21                     #1 [place] 1/27/2021                     #2 [place] 2/3/2021                     #3 [place] 2/10/2021                     #4 [place] Right Medial Lower Leg 2/17/21                     #5 [place] 3/10/2021                   HBO:                Antibiotics:  Bactrim DS BID for 10 days on 11/18/2020                                   Cipro BID for 10 days ordered on 11/25/2020                                    Cipro 500 mg BID on 1/20/2021                                    Cipro 500 mg BID on 4/28/2021              Earlier Wound care treatments:                Authorizations:                        Consults:   Date                           Primary care physician:    Continuing wound care orders and information:              Residence:  private               Continue [facility] health care with: [facility] discharged 1/19/2022              Your wound-care supplies will be provided by: Dayana Mccoy provider:              MOISÉS Edwards loading:  Date               FQGDA Medications:              NCCKS cleansing:                           OJ not scrub or use excessive force.                          Wash hands with soap and water before and after dressing changes.                         Prior to applying a clean dressing, cleanse wound with normal saline,                                wound cleanser, or mild soap and water.                           Ask the physician or nurse before getting the wound(s) wet in a shower              Daily Wound management:                          Keep weight off wounds and reposition every 2 hours.                          DBVER standing for long periods of time.                          ABNCY wraps/stockings in AM and remove at bedtime.                          If swelling is present, elevate legs to the level of the heart or above for 30 minutes 4-5 times a day and/or when sitting.                                             When taking antibiotics take entire prescription as ordered by physician do not stop taking until medicine is all gone.                                           Orders for this week: 5/4/2022        Right lower leg -- wash with soap and water, pat dry. Apply A&D to asrah wound. Apply gentamicin and stimulin powder then Cover with calcium alginate, and ABD Then wrap with Coban 2. Leave in place for 1 week.             Auth for ERIC vac 9/1/2021     Referral to Dr Nelly Rasheed on first procedure scheduled on 4/27/2021 and 11/8/2021                    Follow up with Mercy Avila CNP in 1 week in the wound care center. Call [phone] for any questions or concerns.   Date__________   Time__________            Treatment Note Wound 09/30/20 #1 (onset 5 years) Right Medial Lower Leg Cluster-Dressing/Treatment:  (Gentamicin, Stimulen, ca alginate, ABD, Coban 2)    Written Patient Dismissal Instructions Given            Electronically signed by LOLY Ontiveros CNP on 5/4/2022 at 2:34 PM

## 2022-05-04 NOTE — PLAN OF CARE
Problem: Wound:  Goal: Will show signs of wound healing; wound closure and no evidence of infection  Description: Will show signs of wound healing; wound closure and no evidence of infection  Outcome: Progressing     Problem: Pain  Goal: Verbalizes/displays adequate comfort level or baseline comfort level  Outcome: Progressing

## 2022-05-08 LAB
CULTURE: ABNORMAL
CULTURE: ABNORMAL
Lab: ABNORMAL
SPECIMEN: ABNORMAL

## 2022-05-11 ENCOUNTER — HOSPITAL ENCOUNTER (OUTPATIENT)
Dept: WOUND CARE | Age: 77
Discharge: HOME | End: 2022-05-11
Payer: COMMERCIAL

## 2022-05-11 VITALS
SYSTOLIC BLOOD PRESSURE: 168 MMHG | RESPIRATION RATE: 16 BRPM | TEMPERATURE: 99.3 F | DIASTOLIC BLOOD PRESSURE: 85 MMHG | HEART RATE: 73 BPM

## 2022-05-11 DIAGNOSIS — Z98.890 STATUS POST ENDOVENOUS RADIOFREQUENCY ABLATION (RFA) OF SAPHENOUS VEIN: ICD-10-CM

## 2022-05-11 DIAGNOSIS — I83.893 VARICOSE VEINS OF LOWER EXTREMITIES WITH COMPLICATIONS, BILATERAL: ICD-10-CM

## 2022-05-11 DIAGNOSIS — I83.019 VENOUS STASIS ULCER OF RIGHT LOWER LEG WITH EDEMA OF RIGHT LOWER LEG (HCC): ICD-10-CM

## 2022-05-11 DIAGNOSIS — I83.029 VENOUS ULCER OF LEFT LEG (HCC): Primary | ICD-10-CM

## 2022-05-11 DIAGNOSIS — I83.892 VARICOSE VEINS OF LEFT LOWER EXTREMITY WITH OTHER COMPLICATIONS: ICD-10-CM

## 2022-05-11 DIAGNOSIS — L97.919 VENOUS STASIS ULCER OF RIGHT LOWER LEG WITH EDEMA OF RIGHT LOWER LEG (HCC): ICD-10-CM

## 2022-05-11 DIAGNOSIS — R60.9 VENOUS STASIS ULCER OF RIGHT LOWER LEG WITH EDEMA OF RIGHT LOWER LEG (HCC): ICD-10-CM

## 2022-05-11 DIAGNOSIS — I83.891 VENOUS STASIS ULCER OF RIGHT LOWER LEG WITH EDEMA OF RIGHT LOWER LEG (HCC): ICD-10-CM

## 2022-05-11 DIAGNOSIS — L97.929 VENOUS ULCER OF LEFT LEG (HCC): Primary | ICD-10-CM

## 2022-05-11 PROCEDURE — 11042 DBRDMT SUBQ TIS 1ST 20SQCM/<: CPT | Performed by: NURSE PRACTITIONER

## 2022-05-11 PROCEDURE — 11042 DBRDMT SUBQ TIS 1ST 20SQCM/<: CPT

## 2022-05-11 RX ORDER — LIDOCAINE HYDROCHLORIDE 40 MG/ML
SOLUTION TOPICAL ONCE
Status: CANCELLED | OUTPATIENT
Start: 2022-05-11 | End: 2022-05-11

## 2022-05-11 RX ORDER — BACITRACIN ZINC AND POLYMYXIN B SULFATE 500; 1000 [USP'U]/G; [USP'U]/G
OINTMENT TOPICAL ONCE
Status: CANCELLED | OUTPATIENT
Start: 2022-05-11 | End: 2022-05-11

## 2022-05-11 RX ORDER — LIDOCAINE HYDROCHLORIDE 20 MG/ML
JELLY TOPICAL ONCE
Status: CANCELLED | OUTPATIENT
Start: 2022-05-11 | End: 2022-05-11

## 2022-05-11 RX ORDER — BACITRACIN, NEOMYCIN, POLYMYXIN B 400; 3.5; 5 [USP'U]/G; MG/G; [USP'U]/G
OINTMENT TOPICAL ONCE
Status: CANCELLED | OUTPATIENT
Start: 2022-05-11 | End: 2022-05-11

## 2022-05-11 RX ORDER — LIDOCAINE 50 MG/G
OINTMENT TOPICAL ONCE
Status: CANCELLED | OUTPATIENT
Start: 2022-05-11 | End: 2022-05-11

## 2022-05-11 RX ORDER — CLOBETASOL PROPIONATE 0.5 MG/G
OINTMENT TOPICAL ONCE
Status: CANCELLED | OUTPATIENT
Start: 2022-05-11 | End: 2022-05-11

## 2022-05-11 RX ORDER — GENTAMICIN SULFATE 1 MG/G
OINTMENT TOPICAL ONCE
Status: CANCELLED | OUTPATIENT
Start: 2022-05-11 | End: 2022-05-11

## 2022-05-11 RX ORDER — GINSENG 100 MG
CAPSULE ORAL ONCE
Status: CANCELLED | OUTPATIENT
Start: 2022-05-11 | End: 2022-05-11

## 2022-05-11 RX ORDER — BETAMETHASONE DIPROPIONATE 0.05 %
OINTMENT (GRAM) TOPICAL ONCE
Status: CANCELLED | OUTPATIENT
Start: 2022-05-11 | End: 2022-05-11

## 2022-05-11 RX ORDER — LIDOCAINE 40 MG/G
CREAM TOPICAL ONCE
Status: CANCELLED | OUTPATIENT
Start: 2022-05-11 | End: 2022-05-11

## 2022-05-11 NOTE — PROGRESS NOTES
Multilayer Compression Wrap   (Not Unna) Below the Knee    NAME:  Fe Parsons  YOB: 1945  MEDICAL RECORD NUMBER:  [id number]  DATE:  5/11/2022    Multilayer compression wrap: Removed old Multilayer wrap if indicated and wash leg with mild soap/water. Applied moisturizing agent to dry skin as needed. Applied primary and secondary dressing as ordered. Applied multilayered dressing below the knee to right lower leg. Instructed patient/caregiver not to remove dressing and to keep it clean and dry. Instructed patient/caregiver on complications to report to provider, such as pain, numbness in toes, heavy drainage, and slippage of dressing. Instructed patient on purpose of compression dressing and on activity and exercise recommendations.       Electronically signed by Deacon Sears LPN on 8/41/7833 at 3:04 PM

## 2022-05-11 NOTE — PROGRESS NOTES
Wound Care Center Progress Note With Procedure    Joi Gatica  AGE: 69 y.o. GENDER: male  : 1945  EPISODE DATE:  2022     Subjective:     Chief Complaint   Patient presents with    Wound Check     Right lower leg         HISTORY of PRESENT ILLNESS   Connie Daley is a 77 y. o. male who presents today for wound evaluation of Chronic venous wound of Right lower medial leg. The wound is of moderate severity.  The underlying cause of the wound is venous disease and trauma.       Wound Pain Timing/Severity: none  Quality of pain: N/A  Severity of pain:  0 / 10   Modifying Factors: venous stasis  Associated Signs/Symptoms: none     Diabetes:No  Smoking: Never smoker  Obesity: No  Anticoagulant therapy: No  Immunosuppression: No     Patient educated on the 6 essential components necessary for wound healing: Circulation, Debridements, Proper Dressings and Topical Wound Products, Infection Control, Edema Control and Offloading.     Patient educated on those factors that negatively effect or impact wound healing: smoking, obesity, uncontrolled diabetes, anticoagulant and immunosuppressive regimens, inadequate nutrition, untreated arterial and venous disease if applicable and measures to manage edema.         PAST MEDICAL HISTORY        Diagnosis Date    Chronic venous hypertension with ulcer and inflammation involving right side (HCC)     RLE    Hypertension     past    Iron deficiency     Lymphoma Dx     Hx of B cell lymphoma-in remission since        PAST SURGICAL HISTORY    Past Surgical History:   Procedure Laterality Date    TUNNELED VENOUS PORT PLACEMENT      TUNNELED VENOUS PORT PLACEMENT      removed 2012       FAMILY HISTORY    Family History   Problem Relation Age of Onset    Early Death Mother 52    Diabetes Father     Heart Disease Father     Vision Loss Son         \"He wears glasses\"    Early Death Daughter 36       SOCIAL HISTORY    Social History     Tobacco Use    Smoking status: Never Smoker    Smokeless tobacco: Never Used   Substance Use Topics    Alcohol use: No    Drug use: No       ALLERGIES    No Known Allergies    MEDICATIONS    Current Outpatient Medications on File Prior to Encounter   Medication Sig Dispense Refill    metoprolol succinate (TOPROL XL) 50 MG extended release tablet Take 1 tablet by mouth daily 30 tablet 5     No current facility-administered medications on file prior to encounter. REVIEW OF SYSTEMS    Pertinent items are noted in HPI. Constitutional: Negative for systemic symptoms including fever, chills and malaise. Objective:      BP (!) 168/85   Pulse 73   Temp 99.3 °F (37.4 °C) (Temporal)   Resp 16     PHYSICAL EXAM    General: The patient is in no acute distress. Mental status:  Patient is appropriate, is  oriented to place and plan of care. Dermatologic exam: Visual inspection of the periwound reveals the skin to be edematous  Wound exam: see wound description below in procedure note    Assessment:     Problem List Items Addressed This Visit        Circulatory    Varicose veins of lower extremities with complications, bilateral    Varicose veins of left lower extremity with other complications       Other    Venous ulcer of left leg ([address] - Primary    Relevant Orders    Initiate Outpatient Wound Care Protocol    Venous stasis ulcer of right lower leg with edema of right lower leg (HCC)    Status post endovenous radiofrequency ablation (RFA) of saphenous vein        Procedure Note    Indications:  Based on my examination of this patient's wound(s) today, sharp excision into necrotic subcutaneous tissue is required to promote healing and evaluate the extent of previous healing.     Performed by: LOLY Boudreaux - CNP    Consent obtained: Yes    Time out taken:  Yes    Pain Control: Anesthetic  Anesthetic: 4% Lidocaine Liquid Topical        Debridement:Excisional Debridement    Using curette the wound(s) was/were sharply debrided down through and including the removal of subcutaneous tissue. Devitalized Tissue Debrided:  slough and exudate    Pre Debridement Measurements:  Are located in the Wound Documentation Flow Sheet    All active wounds listed below with today's date are evaluated  Wound(s)    debrided this date include # : 1     Post  Debridement Measurements:  Wound 09/30/20 #1 (onset 5 years) Right Medial Lower Leg Cluster (Active)   Wound Image   05/04/22 1311   Wound Etiology Venous 05/04/22 1311   Dressing Status New dressing applied;Clean;Dry; Intact 05/11/22 1401   Wound Cleansed Wound cleanser; Soap and water 05/11/22 1303   Dressing/Treatment Collagen;ABD;Coban/self-adherent bandages; Moisturizing cream 04/06/22 1359   Offloading for Diabetic Foot Ulcers Offloading not required 05/11/22 1401   Wound Length (cm) 4 cm 05/11/22 1303   Wound Width (cm) 3 cm 05/11/22 1303   Wound Depth (cm) 0.1 cm 05/11/22 1303   Wound Surface Area (cm^2) 12 cm^2 05/11/22 1303   Change in Wound Size % (l*w) 95.24 05/11/22 1303   Wound Volume (cm^3) 1.2 cm^3 05/11/22 1303   Wound Healing % 95 05/11/22 1303   Post-Procedure Length (cm) 4 cm 05/11/22 1324   Post-Procedure Width (cm) 3 cm 05/11/22 1324   Post-Procedure Depth (cm) 0.1 cm 05/11/22 1324   Post-Procedure Surface Area (cm^2) 12 cm^2 05/11/22 1324   Post-Procedure Volume (cm^3) 1.2 cm^3 05/11/22 1324   Distance Tunneling (cm) 0 cm 05/11/22 1303   Tunneling Position ___ O'Clock 0 05/11/22 1303   Undermining Starts ___ O'Clock 0 05/11/22 1303   Undermining Ends___ O'Clock 0 05/11/22 1303   Undermining Maxium Distance (cm) 0 05/11/22 1303   Wound Assessment Pink/red;Slough 05/11/22 1303   Drainage Amount Moderate 05/11/22 1303   Drainage Description Serosanguinous 05/11/22 1303   Odor None 05/11/22 1303   Cassia-wound Assessment Dry/flaky 05/11/22 1303   Margins Defined edges 05/11/22 1303   Wound Thickness Description not for Pressure Injury Full thickness 05/11/22 1303 Number of days: 588       Percent of Wound(s) Debrided: approximately 100%    Total  Area  Debrided:  12 sq cm     Bleeding:  Minimal    Hemostasis Achieved:  by pressure    Procedural Pain:  0  / 10     Post Procedural Pain:  0 / 10     Response to treatment:  Well tolerated by patient. Status of wound progress and description from last visit: Stable, continue regimen. Plan:       Discharge Instructions       PHYSICIAN ORDERS AND DISCHARGE INSTRUCTIONS   NOTE: Upon discharge from the [address], you will receive a patient experience survey.  We would be grateful if you would take the time to fill this survey out.     Wound care order history:                 Vascular studies: Arterial studies done 9/27/20; no stenosis              Imaging:   Date               Cultures:   obtained on 11/18/2020                                 Obtained on 1/20/2021               Labs/ HbA1c:   Date               Grafts:                       #1 Apligraf on 12/16/2020                     #2 Apligraf on 12/23/2020                     #3 Apligraf on 12/30/2020                     #4 [place]pligraf on 1/6/2021                     # 5 [place]plig[place] on 1/13/21                     #1 [place] 1/27/2021                     #2 [place] 2/3/2021                     #3 [place] 2/10/2021                     #4 [place] Right Medial Lower Leg 2/17/21                     #5 [place] 3/10/2021                   HBO:                Antibiotics:  Bactrim DS BID for 10 days on 11/18/2020                                   Cipro BID for 10 days ordered on 11/25/2020                                    Cipro 500 mg BID on 1/20/2021                                    Cipro 500 mg BID on 4/28/2021              Earlier Wound care treatments:                Authorizations:                        Consults:   Date                           Primary care physician:      Continuing wound care orders and information:              Residence:  private             Continue home health care with: [facility] discharged 1/19/2022              Your wound-care supplies will be provided by: Flora Sheffield provider:              POLINAGQJTDAPHNEY with  Van Dryer loading:  Date               ZSGXP Medications:              ACFJG cleansing:                           HX not scrub or use excessive force.                          Wash hands with soap and water before and after dressing changes.                         Prior to applying a clean dressing, cleanse wound with normal saline,                                wound cleanser, or mild soap and water.                           Ask the physician or nurse before getting the wound(s) wet in a shower              Daily Wound management:                          Keep weight off wounds and reposition every 2 hours.                          GKUNM standing for long periods of time.                          CMUXY wraps/stockings in AM and remove at bedtime.                          If swelling is present, elevate legs to the level of the heart or above for 30 minutes 4-5 times a day and/or when sitting.                                             When taking antibiotics take entire prescription as ordered by physician do not stop taking until medicine is all gone.                                           Orders for this week: 5/11/2022        Right lower leg -- wash with soap and water, pat dry. Apply A&D to sarah wound. Apply Sulfamylon and Stimulin powder. Cover with calcium alginate and ABD. Then wrap with Coban 2. Leave in place for 1 week.             Auth for ERIC vac 9/1/2021     Referral to Dr Isa Telles on first procedure scheduled on 4/27/2021 and 11/8/2021                    Follow up with Kaylee Rojas CNP in 1 week in the wound care center. Call [phone] for any questions or concerns.   Date__________   Time__________               Treatment Note Wound 09/30/20 #1 (onset 5 years) Right Medial Lower Leg Cluster-Dressing/Treatment:  (sulfamyon, stimulen, calcium algainte, abd, coban 2 )    Written Patient Dismissal Instructions Given            Electronically signed by LOLY Boudreaux CNP on 5/11/2022 at 2:23 PM

## 2022-05-18 ENCOUNTER — HOSPITAL ENCOUNTER (OUTPATIENT)
Dept: WOUND CARE | Age: 77
Discharge: HOME | End: 2022-05-18
Payer: COMMERCIAL

## 2022-05-18 VITALS
DIASTOLIC BLOOD PRESSURE: 86 MMHG | SYSTOLIC BLOOD PRESSURE: 162 MMHG | RESPIRATION RATE: 18 BRPM | HEART RATE: 74 BPM | TEMPERATURE: 99.6 F

## 2022-05-18 DIAGNOSIS — I83.891 VARICOSE VEINS OF RIGHT LOWER EXTREMITY WITH OTHER COMPLICATIONS: ICD-10-CM

## 2022-05-18 DIAGNOSIS — I83.891 VENOUS STASIS ULCER OF RIGHT LOWER LEG WITH EDEMA OF RIGHT LOWER LEG (HCC): ICD-10-CM

## 2022-05-18 DIAGNOSIS — L97.919 VENOUS STASIS ULCER OF RIGHT LOWER LEG WITH EDEMA OF RIGHT LOWER LEG (HCC): ICD-10-CM

## 2022-05-18 DIAGNOSIS — I87.331 CHRONIC VENOUS HYPERTENSION WITH ULCER AND INFLAMMATION INVOLVING RIGHT SIDE (HCC): Primary | ICD-10-CM

## 2022-05-18 DIAGNOSIS — I83.019 VENOUS STASIS ULCER OF RIGHT LOWER LEG WITH EDEMA OF RIGHT LOWER LEG (HCC): ICD-10-CM

## 2022-05-18 DIAGNOSIS — R60.9 VENOUS STASIS ULCER OF RIGHT LOWER LEG WITH EDEMA OF RIGHT LOWER LEG (HCC): ICD-10-CM

## 2022-05-18 DIAGNOSIS — L97.919 CHRONIC VENOUS HYPERTENSION WITH ULCER AND INFLAMMATION INVOLVING RIGHT SIDE (HCC): Primary | ICD-10-CM

## 2022-05-18 PROCEDURE — 11042 DBRDMT SUBQ TIS 1ST 20SQCM/<: CPT | Performed by: NURSE PRACTITIONER

## 2022-05-18 PROCEDURE — 11042 DBRDMT SUBQ TIS 1ST 20SQCM/<: CPT

## 2022-05-18 RX ORDER — CLOBETASOL PROPIONATE 0.5 MG/G
OINTMENT TOPICAL ONCE
Status: CANCELLED | OUTPATIENT
Start: 2022-05-18 | End: 2022-05-18

## 2022-05-18 RX ORDER — LIDOCAINE HYDROCHLORIDE 40 MG/ML
SOLUTION TOPICAL ONCE
Status: CANCELLED | OUTPATIENT
Start: 2022-05-18 | End: 2022-05-18

## 2022-05-18 RX ORDER — LIDOCAINE HYDROCHLORIDE 20 MG/ML
JELLY TOPICAL ONCE
Status: CANCELLED | OUTPATIENT
Start: 2022-05-18 | End: 2022-05-18

## 2022-05-18 RX ORDER — GENTAMICIN SULFATE 1 MG/G
OINTMENT TOPICAL ONCE
Status: CANCELLED | OUTPATIENT
Start: 2022-05-18 | End: 2022-05-18

## 2022-05-18 RX ORDER — BACITRACIN ZINC AND POLYMYXIN B SULFATE 500; 1000 [USP'U]/G; [USP'U]/G
OINTMENT TOPICAL ONCE
Status: CANCELLED | OUTPATIENT
Start: 2022-05-18 | End: 2022-05-18

## 2022-05-18 RX ORDER — LIDOCAINE 40 MG/G
CREAM TOPICAL ONCE
Status: CANCELLED | OUTPATIENT
Start: 2022-05-18 | End: 2022-05-18

## 2022-05-18 RX ORDER — LIDOCAINE 50 MG/G
OINTMENT TOPICAL ONCE
Status: CANCELLED | OUTPATIENT
Start: 2022-05-18 | End: 2022-05-18

## 2022-05-18 RX ORDER — BETAMETHASONE DIPROPIONATE 0.05 %
OINTMENT (GRAM) TOPICAL ONCE
Status: CANCELLED | OUTPATIENT
Start: 2022-05-18 | End: 2022-05-18

## 2022-05-18 RX ORDER — GINSENG 100 MG
CAPSULE ORAL ONCE
Status: CANCELLED | OUTPATIENT
Start: 2022-05-18 | End: 2022-05-18

## 2022-05-18 RX ORDER — BACITRACIN, NEOMYCIN, POLYMYXIN B 400; 3.5; 5 [USP'U]/G; MG/G; [USP'U]/G
OINTMENT TOPICAL ONCE
Status: CANCELLED | OUTPATIENT
Start: 2022-05-18 | End: 2022-05-18

## 2022-05-18 ASSESSMENT — PAIN SCALES - GENERAL: PAINLEVEL_OUTOF10: 0

## 2022-05-18 NOTE — PROGRESS NOTES
Multilayer Compression Wrap   (Not Unna) Below the Knee    NAME:  Nicholas Mueller  YOB: 1945  MEDICAL RECORD NUMBER:  [id number]  DATE:  5/18/2022    Multilayer compression wrap: Removed old Multilayer wrap if indicated and wash leg with mild soap/water. Applied moisturizing agent to dry skin as needed. Applied primary and secondary dressing as ordered. Applied multilayered dressing below the knee to right lower leg. Instructed patient/caregiver not to remove dressing and to keep it clean and dry. Instructed patient/caregiver on complications to report to provider, such as pain, numbness in toes, heavy drainage, and slippage of dressing. Instructed patient on purpose of compression dressing and on activity and exercise recommendations.       Electronically signed by Alex Gomez LPN on 9/44/4015 at 7:44 PM

## 2022-05-18 NOTE — PROGRESS NOTES
Wound Care Center Progress Note With Procedure    Serafin Fleming  AGE: 69 y.o. GENDER: male  : 1945  EPISODE DATE:  2022     Subjective:     Chief Complaint   Patient presents with    Wound Check     right leg          HISTORY of PRESENT ILLNESS     You Wolfe a 77 y. o. male who presents today for wound evaluation of Chronic venous wound of Right lower medial leg. The wound is of moderate severity.  The underlying cause of the wound is venous disease and trauma.      Have not seen patient in about 8 weeks. He is much improved and wound is much smaller. No issues reported this week      Wound Pain Timing/Severity: none  Quality of pain: N/A  Severity of pain:  0 / 10   Modifying Factors: venous stasis  Associated Signs/Symptoms: none        PAST MEDICAL HISTORY        Diagnosis Date    Chronic venous hypertension with ulcer and inflammation involving right side (HCC)     RLE    Hypertension     past    Iron deficiency     Lymphoma Dx 11-    Hx of B cell lymphoma-in remission since        PAST SURGICAL HISTORY    Past Surgical History:   Procedure Laterality Date    TUNNELED VENOUS PORT PLACEMENT      TUNNELED VENOUS PORT PLACEMENT      removed 2012       FAMILY HISTORY    Family History   Problem Relation Age of Onset    Early Death Mother 52    Diabetes Father     Heart Disease Father     Vision Loss Son         \"He wears glasses\"    Early Death Daughter 36       SOCIAL HISTORY    Social History     Tobacco Use    Smoking status: Never Smoker    Smokeless tobacco: Never Used   Substance Use Topics    Alcohol use: No    Drug use: No       ALLERGIES    No Known Allergies    MEDICATIONS    Current Outpatient Medications on File Prior to Encounter   Medication Sig Dispense Refill    metoprolol succinate (TOPROL XL) 50 MG extended release tablet Take 1 tablet by mouth daily 30 tablet 5     No current facility-administered medications on file prior to encounter. REVIEW OF SYSTEMS    Pertinent items are noted in HPI. Constitutional: Negative for systemic symptoms including fever, chills and malaise. Objective:      BP (!) 162/86   Pulse 74   Temp 99.6 °F (37.6 °C) (Temporal)   Resp 18     PHYSICAL EXAM      General: The patient is in no acute distress. Mental status:  Patient is appropriate, is  oriented to place and plan of care. Dermatologic exam: Visual inspection of the periwound reveals the skin to be normal in turgor and texture and dry  Wound exam: see wound description below in procedure note      Assessment:     Problem List Items Addressed This Visit        Circulatory    Chronic venous hypertension with ulcer and inflammation ([address] - Primary    Relevant Orders    Initiate Outpatient Wound Care Protocol    Varicose veins of right lower extremity with other complications       Other    Venous stasis ulcer of right lower leg with edema of right lower leg ([facility] [address]        Procedure Note    Indications:  Based on my examination of this patient's wound(s) today, sharp excision into necrotic subcutaneous tissue is required to promote healing and evaluate the extent of previous healing. Performed by: LOLY Vargas CNP    Consent obtained: Yes    Time out taken:  Yes    Pain Control: N/A       Debridement:Excisional Debridement    Using #15 blade scalpel the wound(s) was/were sharply debrided down through and including the removal of subcutaneous tissue.         Devitalized Tissue Debrided:  fibrin, biofilm and slough    Pre Debridement Measurements:  Are located in the Wound Documentation Flow Sheet    All active wounds listed below with today's date are evaluated  Wound(s)    debrided this date include # : 1     Post  Debridement Measurements:  Wound 07/29/13 Venous ulcer Ankle Right;Lateral (Active)   Number of days: 3215       Wound 09/30/20 #1 (onset 5 years) Right Medial Lower Leg Cluster (Active)   Wound Image   05/04/22 1311   Wound Etiology Venous 05/18/22 1504   Dressing Status New dressing applied;Clean;Dry; Intact 05/11/22 1401   Wound Cleansed Wound cleanser; Soap and water 05/18/22 1504   Dressing/Treatment Collagen;ABD;Coban/self-adherent bandages; Moisturizing cream 04/06/22 1359   Offloading for Diabetic Foot Ulcers Offloading not required 05/18/22 1504   Wound Length (cm) 3.5 cm 05/18/22 1504   Wound Width (cm) 2.5 cm 05/18/22 1504   Wound Depth (cm) 0.1 cm 05/18/22 1504   Wound Surface Area (cm^2) 8.75 cm^2 05/18/22 1504   Change in Wound Size % (l*w) 96.53 05/18/22 1504   Wound Volume (cm^3) 0.875 cm^3 05/18/22 1504   Wound Healing % 97 05/18/22 1504   Post-Procedure Length (cm) 3.5 cm 05/18/22 1519   Post-Procedure Width (cm) 2.5 cm 05/18/22 1519   Post-Procedure Depth (cm) 0.1 cm 05/18/22 1519   Post-Procedure Surface Area (cm^2) 8.75 cm^2 05/18/22 1519   Post-Procedure Volume (cm^3) 0.875 cm^3 05/18/22 1519   Distance Tunneling (cm) 0 cm 05/18/22 1504   Tunneling Position ___ O'Clock 0 05/18/22 1504   Undermining Starts ___ O'Clock 0 05/18/22 1504   Undermining Ends___ O'Clock 0 05/18/22 1504   Undermining Maxium Distance (cm) 0 05/18/22 1504   Wound Assessment Hyper granulation tissue 05/18/22 1504   Drainage Amount Moderate 05/18/22 1504   Drainage Description Serosanguinous 05/18/22 1504   Odor None 05/18/22 1504   Cassia-wound Assessment Dry/flaky 05/18/22 1504   Margins Defined edges 05/18/22 1504   Wound Thickness Description not for Pressure Injury Full thickness 05/18/22 1504   Number of days: 595       Percent of Wound(s) Debrided: approximately 100%    Total  Area  Debrided:  8.75 sq cm     Bleeding:  Minimal    Hemostasis Achieved:  by pressure    Procedural Pain:  0  / 10     Post Procedural Pain:  0 / 10     Response to treatment:  Well tolerated by patient. Status of wound progress and description from last visit:   Stable. Laurie Marinelli RN expresses that we had better results with gent ointment.  Will switch back at this time       Plan:       Discharge Instructions         Discharge Instructions        PHYSICIAN ORDERS AND DISCHARGE INSTRUCTIONS   NOTE: Upon discharge from the [address], you will receive a patient experience survey. We would be grateful if you would take the time to fill this survey out.     Wound care order history:                 Vascular studies: Arterial studies done 9/27/20; no stenosis              Imaging:   Date               Cultures:   obtained on 11/18/2020                                 Obtained on 1/20/2021               Labs/ HbA1c:   Date               Grafts:                       #1 Apligraf on 12/16/2020                     #2 Apligraf on 12/23/2020                     #3 Apligraf on 12/30/2020                     #4 Apligraf on 1/6/2021                     # 5 Apligraf on 1/13/21                     #1 [place] 1/27/2021                     #2 [place] 2/3/2021                     #3 [place] 2/10/2021                     #4 [place] Right Medial Lower Leg 2/17/21                     #5 [place] 3/10/2021                   HBO:                Antibiotics:  Bactrim DS BID for 10 days on 11/18/2020                                   Cipro BID for 10 days ordered on 11/25/2020                                    Cipro 500 mg BID on 1/20/2021                                    Cipro 500 mg BID on 4/28/2021              Earlier Wound care treatments:                Authorizations:                        Consults:   Date                           Primary care physician:      Continuing wound care orders and information:              Residence:  private               [place] [facility] [place]              Your wound-care supplies will be provided by:  Melanie Mariee provider:              Compression with  [place] loading:  Date               NJWGD Medications:              DNWCG cleansing:                           KD not scrub or use excessive force.                          MDFV hands with soap and water before and after dressing changes.                         Prior to applying a clean dressing, cleanse wound with normal saline,                                wound cleanser, or mild soap and water.                           Ask the physician or nurse before getting the wound(s) wet in a shower              Daily Wound management:                          Keep weight off wounds and reposition every 2 hours.                          QZIWE standing for long periods of time.                          MANBP wraps/stockings in AM and remove at bedtime.                          If swelling is present, elevate legs to the level of the heart or above for 30 minutes 4-5 times a day and/or when sitting.                                             When taking antibiotics take entire prescription as ordered by physician do not stop taking until medicine is all gone.                                           Orders for this week: 5/11/2022        Right lower leg -- wash with soap and water, pat dry. Apply A&D to sarah wound. Apply Gentamicin and Stimulin powder. Cover with calcium alginate and ABD. Then wrap with Coban 2. Leave in place for 1 week.             Auth for ERIC vac 9/1/2021     Referral to Dr Lena Helm on first procedure scheduled on 4/27/2021 and 11/8/2021                    Follow up with Radhika Gomez CNP in 1 week in the wound care center. Call [phone] for any questions or concerns.   Date__________   Time__________             Treatment Note      Written Patient Dismissal Instructions Given            Electronically signed by LOLY Jacob CNP on 5/18/2022 at 3:23 PM

## 2022-05-25 ENCOUNTER — HOSPITAL ENCOUNTER (OUTPATIENT)
Dept: WOUND CARE | Age: 77
Discharge: HOME | End: 2022-05-25
Payer: COMMERCIAL

## 2022-05-25 VITALS
RESPIRATION RATE: 18 BRPM | TEMPERATURE: 97.3 F | SYSTOLIC BLOOD PRESSURE: 165 MMHG | DIASTOLIC BLOOD PRESSURE: 84 MMHG | HEART RATE: 75 BPM

## 2022-05-25 DIAGNOSIS — L97.919 VENOUS STASIS ULCER OF RIGHT LOWER LEG WITH EDEMA OF RIGHT LOWER LEG (HCC): ICD-10-CM

## 2022-05-25 DIAGNOSIS — I83.019 VENOUS STASIS ULCER OF RIGHT LOWER LEG WITH EDEMA OF RIGHT LOWER LEG (HCC): ICD-10-CM

## 2022-05-25 DIAGNOSIS — I83.893 VARICOSE VEINS OF LOWER EXTREMITIES WITH COMPLICATIONS, BILATERAL: ICD-10-CM

## 2022-05-25 DIAGNOSIS — I83.891 VENOUS STASIS ULCER OF RIGHT LOWER LEG WITH EDEMA OF RIGHT LOWER LEG (HCC): ICD-10-CM

## 2022-05-25 DIAGNOSIS — R60.9 VENOUS STASIS ULCER OF RIGHT LOWER LEG WITH EDEMA OF RIGHT LOWER LEG (HCC): ICD-10-CM

## 2022-05-25 DIAGNOSIS — I87.331 CHRONIC VENOUS HYPERTENSION WITH ULCER AND INFLAMMATION INVOLVING RIGHT SIDE (HCC): Primary | ICD-10-CM

## 2022-05-25 DIAGNOSIS — L97.919 CHRONIC VENOUS HYPERTENSION WITH ULCER AND INFLAMMATION INVOLVING RIGHT SIDE (HCC): Primary | ICD-10-CM

## 2022-05-25 PROCEDURE — 11042 DBRDMT SUBQ TIS 1ST 20SQCM/<: CPT

## 2022-05-25 PROCEDURE — 11042 DBRDMT SUBQ TIS 1ST 20SQCM/<: CPT | Performed by: NURSE PRACTITIONER

## 2022-05-25 RX ORDER — LIDOCAINE HYDROCHLORIDE 40 MG/ML
SOLUTION TOPICAL ONCE
Status: CANCELLED | OUTPATIENT
Start: 2022-05-25 | End: 2022-05-25

## 2022-05-25 RX ORDER — GINSENG 100 MG
CAPSULE ORAL ONCE
Status: CANCELLED | OUTPATIENT
Start: 2022-05-25 | End: 2022-05-25

## 2022-05-25 RX ORDER — LIDOCAINE 40 MG/G
CREAM TOPICAL ONCE
Status: CANCELLED | OUTPATIENT
Start: 2022-05-25 | End: 2022-05-25

## 2022-05-25 RX ORDER — LIDOCAINE HYDROCHLORIDE 20 MG/ML
JELLY TOPICAL ONCE
Status: CANCELLED | OUTPATIENT
Start: 2022-05-25 | End: 2022-05-25

## 2022-05-25 RX ORDER — LIDOCAINE 50 MG/G
OINTMENT TOPICAL ONCE
Status: CANCELLED | OUTPATIENT
Start: 2022-05-25 | End: 2022-05-25

## 2022-05-25 RX ORDER — BACITRACIN, NEOMYCIN, POLYMYXIN B 400; 3.5; 5 [USP'U]/G; MG/G; [USP'U]/G
OINTMENT TOPICAL ONCE
Status: CANCELLED | OUTPATIENT
Start: 2022-05-25 | End: 2022-05-25

## 2022-05-25 RX ORDER — BACITRACIN ZINC AND POLYMYXIN B SULFATE 500; 1000 [USP'U]/G; [USP'U]/G
OINTMENT TOPICAL ONCE
Status: CANCELLED | OUTPATIENT
Start: 2022-05-25 | End: 2022-05-25

## 2022-05-25 RX ORDER — GENTAMICIN SULFATE 1 MG/G
OINTMENT TOPICAL ONCE
Status: CANCELLED | OUTPATIENT
Start: 2022-05-25 | End: 2022-05-25

## 2022-05-25 RX ORDER — CLOBETASOL PROPIONATE 0.5 MG/G
OINTMENT TOPICAL ONCE
Status: CANCELLED | OUTPATIENT
Start: 2022-05-25 | End: 2022-05-25

## 2022-05-25 RX ORDER — BETAMETHASONE DIPROPIONATE 0.05 %
OINTMENT (GRAM) TOPICAL ONCE
Status: CANCELLED | OUTPATIENT
Start: 2022-05-25 | End: 2022-05-25

## 2022-05-25 NOTE — PLAN OF CARE
Problem: Cognitive:  Goal: Knowledge of wound care  Description: Knowledge of wound care  Outcome: Progressing  Goal: Understands risk factors for wounds  Description: Understands risk factors for wounds  Outcome: Progressing     Problem: Wound:  Goal: Will show signs of wound healing; wound closure and no evidence of infection  Description: Will show signs of wound healing; wound closure and no evidence of infection  Outcome: Progressing     Problem: Cognitive:  Goal: Understands risk factors for wounds  Description: Understands risk factors for wounds  Outcome: Progressing     Problem: Wound:  Goal: Will show signs of wound healing; wound closure and no evidence of infection  Description: Will show signs of wound healing; wound closure and no evidence of infection  Outcome: Progressing

## 2022-05-25 NOTE — PROGRESS NOTES
Wound Care Center Progress Note With Procedure    Mary Williamson  AGE: 69 y.o. GENDER: male  : 1945  EPISODE DATE:  2022     Subjective:     Chief Complaint   Patient presents with    Wound Check     Right lower leg         HISTORY of PRESENT ILLNESS     Van Daley is a 77 y. o. male who presents today for wound evaluation of Chronic venous wound of Right lower medial leg. The wound is of moderate severity.  The underlying cause of the wound is venous disease and trauma.       Patient was not healing well with previous plan of care. Made some changes last week. Stable this week, slight improvement.   Will consider more orders changes     Wound Pain Timing/Severity: none  Quality of pain: N/A  Severity of pain:  0 / 10   Modifying Factors: venous stasis  Associated Signs/Symptoms: none        PAST MEDICAL HISTORY        Diagnosis Date    Chronic venous hypertension with ulcer and inflammation involving right side (HCC)     RLE    Hypertension     past    Iron deficiency     Lymphoma Dx 11-    Hx of B cell lymphoma-in remission since        PAST SURGICAL HISTORY    Past Surgical History:   Procedure Laterality Date    TUNNELED VENOUS PORT PLACEMENT      TUNNELED VENOUS PORT PLACEMENT      removed 2012       FAMILY HISTORY    Family History   Problem Relation Age of Onset    Early Death Mother 52    Diabetes Father     Heart Disease Father     Vision Loss Son         \"He wears glasses\"    Early Death Daughter 36       SOCIAL HISTORY    Social History     Tobacco Use    Smoking status: Never Smoker    Smokeless tobacco: Never Used   Substance Use Topics    Alcohol use: No    Drug use: No       ALLERGIES    No Known Allergies    MEDICATIONS    Current Outpatient Medications on File Prior to Encounter   Medication Sig Dispense Refill    metoprolol succinate (TOPROL XL) 50 MG extended release tablet Take 1 tablet by mouth daily 30 tablet 5     No current facility-administered medications on file prior to encounter. REVIEW OF SYSTEMS    Pertinent items are noted in HPI. Constitutional: Negative for systemic symptoms including fever, chills and malaise. Objective:      BP (!) 165/84   Pulse 75   Temp 97.3 °F (36.3 °C) (Temporal)   Resp 18     PHYSICAL EXAM      General: The patient is in no acute distress. Mental status:  Patient is appropriate, is  oriented to place and plan of care. Dermatologic exam: Visual inspection of the periwound reveals the skin to be normal in turgor and texture and dry  Wound exam: see wound description below in procedure note      Assessment:     Problem List Items Addressed This Visit        Circulatory    Chronic venous hypertension with ulcer and inflammation ([address] - Primary    Relevant Orders    Initiate Outpatient Wound Care Protocol    Varicose veins of lower extremities with complications, bilateral       Other    Venous stasis ulcer of right lower leg with edema of right lower leg ([address]        Procedure Note    Indications:  Based on my examination of this patient's wound(s) today, sharp excision into necrotic subcutaneous tissue is required to promote healing and evaluate the extent of previous healing. Performed by: LOLY Jones - CNP    Consent obtained: Yes    Time out taken:  Yes    Pain Control: N/a       Debridement:Excisional Debridement    Using #15 blade scalpel the wound(s) was/were sharply debrided down through and including the removal of subcutaneous tissue.         Devitalized Tissue Debrided:  fibrin, biofilm, slough and exudate    Pre Debridement Measurements:  Are located in the Wound Documentation Flow Sheet    All active wounds listed below with today's date are evaluated  Wound(s)    debrided this date include # : 1     Post  Debridement Measurements:  Wound 07/29/13 Venous ulcer Ankle Right;Lateral (Active)   Number of days: 3222       Wound 09/30/20 #1 (onset 5 years) Right Medial Lower Leg Cluster (Active)   Wound Image   05/25/22 1310   Wound Etiology Venous 05/18/22 1504   Dressing Status New dressing applied;Clean;Dry; Intact 05/11/22 1401   Wound Cleansed Wound cleanser 05/25/22 1310   Dressing/Treatment Collagen;ABD;Coban/self-adherent bandages; Moisturizing cream 04/06/22 1359   Offloading for Diabetic Foot Ulcers Offloading not required 05/25/22 1310   Wound Length (cm) 4.2 cm 05/25/22 1310   Wound Width (cm) 2.8 cm 05/25/22 1310   Wound Depth (cm) 0.1 cm 05/25/22 1310   Wound Surface Area (cm^2) 11.76 cm^2 05/25/22 1310   Change in Wound Size % (l*w) 95.33 05/25/22 1310   Wound Volume (cm^3) 1.176 cm^3 05/25/22 1310   Wound Healing % 95 05/25/22 1310   Post-Procedure Length (cm) 4.2 cm 05/25/22 1330   Post-Procedure Width (cm) 2.8 cm 05/25/22 1330   Post-Procedure Depth (cm) 0.1 cm 05/25/22 1330   Post-Procedure Surface Area (cm^2) 11.76 cm^2 05/25/22 1330   Post-Procedure Volume (cm^3) 1.176 cm^3 05/25/22 1330   Distance Tunneling (cm) 0 cm 05/25/22 1310   Tunneling Position ___ O'Clock 0 05/25/22 1310   Undermining Starts ___ O'Clock 0 05/25/22 1310   Undermining Ends___ O'Clock 0 05/25/22 1310   Undermining Maxium Distance (cm) 0 05/25/22 1310   Wound Assessment Hyper granulation tissue;Pink/red;Slough 05/25/22 1310   Drainage Amount Moderate 05/25/22 1310   Drainage Description Serosanguinous 05/25/22 1310   Odor None 05/25/22 1310   Cassia-wound Assessment Dry/flaky 05/25/22 1310   Margins Defined edges 05/25/22 1310   Wound Thickness Description not for Pressure Injury Full thickness 05/25/22 1310   Number of days: 602       Percent of Wound(s) Debrided: approximately 100%    Total  Area  Debrided:  11.76 sq cm     Bleeding:  Minimal    Hemostasis Achieved:  by pressure    Procedural Pain:  0  / 10     Post Procedural Pain:  0 / 10     Response to treatment:  Well tolerated by patient. Status of wound progress and description from last visit:   Stable.  Adding hydrofrea blue, for slight hypergranulation. Follow up 1 week and continue to monitor       Plan:       Discharge Instructions         Discharge Instructions        PHYSICIAN ORDERS AND DISCHARGE INSTRUCTIONS   NOTE: Upon discharge from the [address], you will receive a patient experience survey. We would be grateful if you would take the time to fill this survey out.     Wound care order history:                 Vascular studies: Arterial studies done 9/27/20; no stenosis              Imaging:   Date               Cultures:   obtained on 11/18/2020                                 Obtained on 1/20/2021               Labs/ HbA1c:   Date               Grafts:                       #1 Apligraf on 12/16/2020                     #2 Apligraf on 12/23/2020                     #3 Apligraf on 12/30/2020                     #4 Apligraf on 1/6/2021                     # 5 Apligraf on 1/13/21                     #1 [place] 1/27/2021                     #2 [place] 2/3/2021                     #3 [place] 2/10/2021                     #4 [place] Right Medial Lower Leg 2/17/21                     #5 [place] 3/10/2021                   HBO:                Antibiotics:  Bactrim DS BID for 10 days on 11/18/2020                                   Cipro BID for 10 days ordered on 11/25/2020                                    Cipro 500 mg BID on 1/20/2021                                    Cipro 500 mg BID on 4/28/2021              Earlier Wound care treatments:                Authorizations:                        Consults:   Date                           Primary care physician:      Continuing wound care orders and information:              Residence:  private               [facility] home health care with: [facility] discharged 1/19/2022              Your wound-care supplies will be provided by:  Darren Banks provider:  Jerry MARTINEZ loading:  Date               RICHY Medications:              RVLOS cleansing:                           LJ not scrub or use excessive force.                          Wash hands with soap and water before and after dressing changes.                         Prior to applying a clean dressing, cleanse wound with normal saline,                                wound cleanser, or mild soap and water.                           Ask the physician or nurse before getting the wound(s) wet in a shower              Daily Wound management:                          Keep weight off wounds and reposition every 2 hours.                          OCWON standing for long periods of time.                          WMJUP wraps/stockings in AM and remove at bedtime.                          If swelling is present, elevate legs to the level of the heart or above for 30 minutes 4-5 times a day and/or when sitting.                                             When taking antibiotics take entire prescription as ordered by physician do not stop taking until medicine is all gone.                                           Orders for this week: 5/25/2022        Right lower leg -- wash with soap and water, pat dry. Apply A&D to sarah wound. Apply Stimulin powder. Cover with hydrofera blue and ABD. Then wrap with Coban 2. Leave in place for 1 week.             Auth for ERIC vac 9/1/2021     Referral to Dr Erickson Orr on first procedure scheduled on 4/27/2021 and 11/8/2021                    Follow up with Yuri Martins CNP in 1 week in the wound care center. Call [phone] for any questions or concerns.   Date__________   Time__________               Treatment Note      Written Patient Dismissal Instructions Given            Electronically signed by LOLY Alonzo CNP on 5/25/2022 at 1:40 PM

## 2022-06-01 ENCOUNTER — HOSPITAL ENCOUNTER (OUTPATIENT)
Dept: WOUND CARE | Age: 77
Discharge: HOME | End: 2022-06-01
Payer: COMMERCIAL

## 2022-06-01 VITALS — TEMPERATURE: 98.6 F | DIASTOLIC BLOOD PRESSURE: 89 MMHG | HEART RATE: 65 BPM | SYSTOLIC BLOOD PRESSURE: 153 MMHG

## 2022-06-01 DIAGNOSIS — R60.9 VENOUS STASIS ULCER OF RIGHT LOWER LEG WITH EDEMA OF RIGHT LOWER LEG (HCC): ICD-10-CM

## 2022-06-01 DIAGNOSIS — L97.929 VENOUS ULCER OF LEFT LEG (HCC): ICD-10-CM

## 2022-06-01 DIAGNOSIS — L97.919 CHRONIC VENOUS HYPERTENSION WITH ULCER AND INFLAMMATION INVOLVING RIGHT SIDE (HCC): ICD-10-CM

## 2022-06-01 DIAGNOSIS — I83.891 VENOUS STASIS ULCER OF RIGHT LOWER LEG WITH EDEMA OF RIGHT LOWER LEG (HCC): ICD-10-CM

## 2022-06-01 DIAGNOSIS — I83.029 VENOUS ULCER OF LEFT LEG (HCC): ICD-10-CM

## 2022-06-01 DIAGNOSIS — L97.919 VENOUS STASIS ULCER OF RIGHT LOWER LEG WITH EDEMA OF RIGHT LOWER LEG (HCC): ICD-10-CM

## 2022-06-01 DIAGNOSIS — I83.891 VARICOSE VEINS OF RIGHT LOWER EXTREMITY WITH OTHER COMPLICATIONS: Primary | ICD-10-CM

## 2022-06-01 DIAGNOSIS — I87.331 CHRONIC VENOUS HYPERTENSION WITH ULCER AND INFLAMMATION INVOLVING RIGHT SIDE (HCC): ICD-10-CM

## 2022-06-01 DIAGNOSIS — I83.019 VENOUS STASIS ULCER OF RIGHT LOWER LEG WITH EDEMA OF RIGHT LOWER LEG (HCC): ICD-10-CM

## 2022-06-01 PROCEDURE — 11042 DBRDMT SUBQ TIS 1ST 20SQCM/<: CPT

## 2022-06-01 PROCEDURE — 11042 DBRDMT SUBQ TIS 1ST 20SQCM/<: CPT | Performed by: NURSE PRACTITIONER

## 2022-06-01 RX ORDER — LIDOCAINE 40 MG/G
CREAM TOPICAL ONCE
Status: CANCELLED | OUTPATIENT
Start: 2022-06-01 | End: 2022-06-01

## 2022-06-01 RX ORDER — CLOBETASOL PROPIONATE 0.5 MG/G
OINTMENT TOPICAL ONCE
Status: CANCELLED | OUTPATIENT
Start: 2022-06-01 | End: 2022-06-01

## 2022-06-01 RX ORDER — LIDOCAINE HYDROCHLORIDE 20 MG/ML
JELLY TOPICAL ONCE
Status: CANCELLED | OUTPATIENT
Start: 2022-06-01 | End: 2022-06-01

## 2022-06-01 RX ORDER — BACITRACIN ZINC AND POLYMYXIN B SULFATE 500; 1000 [USP'U]/G; [USP'U]/G
OINTMENT TOPICAL ONCE
Status: CANCELLED | OUTPATIENT
Start: 2022-06-01 | End: 2022-06-01

## 2022-06-01 RX ORDER — GINSENG 100 MG
CAPSULE ORAL ONCE
Status: CANCELLED | OUTPATIENT
Start: 2022-06-01 | End: 2022-06-01

## 2022-06-01 RX ORDER — BETAMETHASONE DIPROPIONATE 0.05 %
OINTMENT (GRAM) TOPICAL ONCE
Status: CANCELLED | OUTPATIENT
Start: 2022-06-01 | End: 2022-06-01

## 2022-06-01 RX ORDER — GENTAMICIN SULFATE 1 MG/G
OINTMENT TOPICAL ONCE
Status: CANCELLED | OUTPATIENT
Start: 2022-06-01 | End: 2022-06-01

## 2022-06-01 RX ORDER — LIDOCAINE 50 MG/G
OINTMENT TOPICAL ONCE
Status: CANCELLED | OUTPATIENT
Start: 2022-06-01 | End: 2022-06-01

## 2022-06-01 RX ORDER — LIDOCAINE HYDROCHLORIDE 40 MG/ML
SOLUTION TOPICAL ONCE
Status: CANCELLED | OUTPATIENT
Start: 2022-06-01 | End: 2022-06-01

## 2022-06-01 RX ORDER — BACITRACIN, NEOMYCIN, POLYMYXIN B 400; 3.5; 5 [USP'U]/G; MG/G; [USP'U]/G
OINTMENT TOPICAL ONCE
Status: CANCELLED | OUTPATIENT
Start: 2022-06-01 | End: 2022-06-01

## 2022-06-01 NOTE — PROGRESS NOTES
Wound Care Center Progress Note With Procedure    Jade Diego  AGE: 69 y.o. GENDER: male  : 1945  EPISODE DATE:  2022     Subjective:     Chief Complaint   Patient presents with    Wound Check     right leg          HISTORY of PRESENT ILLNESS     Jake Dejesus a 77 y. o. male who presents today for wound evaluation of Chronic venous wound of Right lower medial leg. The wound is of moderate severity.  The underlying cause of the wound is venous disease and trauma.       Improved in appearance. Looking better since we made some changes to plan of care. No issues this week    Wound Pain Timing/Severity: none  Quality of pain: N/A  Severity of pain:  0 / 10   Modifying Factors: venous stasis  Associated Signs/Symptoms: none        PAST MEDICAL HISTORY        Diagnosis Date    Chronic venous hypertension with ulcer and inflammation involving right side (HCC)     RLE    Hypertension     past    Iron deficiency     Lymphoma Dx     Hx of B cell lymphoma-in remission since        PAST SURGICAL HISTORY    Past Surgical History:   Procedure Laterality Date    TUNNELED VENOUS PORT PLACEMENT      TUNNELED VENOUS PORT PLACEMENT      removed 2012       FAMILY HISTORY    Family History   Problem Relation Age of Onset    Early Death Mother 52    Diabetes Father     Heart Disease Father     Vision Loss Son         \"He wears glasses\"    Early Death Daughter 36       SOCIAL HISTORY    Social History     Tobacco Use    Smoking status: Never Smoker    Smokeless tobacco: Never Used   Substance Use Topics    Alcohol use: No    Drug use: No       ALLERGIES    No Known Allergies    MEDICATIONS    Current Outpatient Medications on File Prior to Encounter   Medication Sig Dispense Refill    metoprolol succinate (TOPROL XL) 50 MG extended release tablet Take 1 tablet by mouth daily 30 tablet 5     No current facility-administered medications on file prior to encounter.        REVIEW OF SYSTEMS    Pertinent items are noted in HPI. Constitutional: Negative for systemic symptoms including fever, chills and malaise. Objective:      BP (!) 153/89   Pulse 65   Temp 98.6 °F (37 °C)     PHYSICAL EXAM      General: The patient is in no acute distress. Mental status:  Patient is appropriate, is  oriented to place and plan of care. Dermatologic exam: Visual inspection of the periwound reveals the skin to be normal in turgor and texture and dry  Wound exam: see wound description below in procedure note      Assessment:     Problem List Items Addressed This Visit        Circulatory    Chronic venous hypertension with ulcer and inflammation ([address]    Relevant Orders    Initiate Outpatient Wound Care Protocol    Varicose veins of right lower extremity with other complications - Primary       Other    Venous ulcer of left leg ([address]    Relevant Orders    Initiate Outpatient Wound Care Protocol    Venous stasis ulcer of right lower leg with edema of right lower leg ([address]        Procedure Note    Indications:  Based on my examination of this patient's wound(s) today, sharp excision into necrotic subcutaneous tissue is required to promote healing and evaluate the extent of previous healing. Performed by: LOLY Alonzo - CNP    Consent obtained: Yes    Time out taken:  Yes    Pain Control: N/a       Debridement:Excisional Debridement    Using #15 blade scalpel the wound(s) was/were sharply debrided down through and including the removal of subcutaneous tissue.         Devitalized Tissue Debrided:  fibrin, biofilm, slough, exudate and callus    Pre Debridement Measurements:  Are located in the Wound Documentation Flow Sheet    All active wounds listed below with today's date are evaluated  Wound(s)    debrided this date include # : 1     Post  Debridement Measurements:  Wound 07/29/13 Venous ulcer Ankle Right;Lateral (Active)   Number of days: 3229       Wound 09/30/20 #1 (onset 5 years) Right Medial Lower Leg Cluster (Active)   Wound Image   05/25/22 1310   Wound Etiology Venous 06/01/22 1303   Dressing Status New dressing applied;Clean;Dry; Intact 06/01/22 1321   Wound Cleansed Wound cleanser 06/01/22 1303   Dressing/Treatment Collagen;ABD;Coban/self-adherent bandages; Moisturizing cream 04/06/22 1359   Offloading for Diabetic Foot Ulcers Offloading not required 06/01/22 1303   Wound Length (cm) 4.2 cm 06/01/22 1303   Wound Width (cm) 2.8 cm 06/01/22 1303   Wound Depth (cm) 0.1 cm 06/01/22 1303   Wound Surface Area (cm^2) 11.76 cm^2 06/01/22 1303   Change in Wound Size % (l*w) 95.33 06/01/22 1303   Wound Volume (cm^3) 1.176 cm^3 06/01/22 1303   Wound Healing % 95 06/01/22 1303   Post-Procedure Length (cm) 4.2 cm 06/01/22 1316   Post-Procedure Width (cm) 2.8 cm 06/01/22 1316   Post-Procedure Depth (cm) 0.1 cm 06/01/22 1316   Post-Procedure Surface Area (cm^2) 11.76 cm^2 06/01/22 1316   Post-Procedure Volume (cm^3) 1.176 cm^3 06/01/22 1316   Distance Tunneling (cm) 0 cm 06/01/22 1303   Tunneling Position ___ O'Clock 0 06/01/22 1303   Undermining Starts ___ O'Clock 0 06/01/22 1303   Undermining Ends___ O'Clock 0 06/01/22 1303   Undermining Maxium Distance (cm) 0 06/01/22 1303   Wound Assessment Hyper granulation tissue;Pink/red;Slough 06/01/22 1303   Drainage Amount Moderate 06/01/22 1303   Drainage Description Serosanguinous 06/01/22 1303   Odor None 06/01/22 1303   Cassia-wound Assessment Dry/flaky 06/01/22 1303   Margins Defined edges 06/01/22 1303   Wound Thickness Description not for Pressure Injury Full thickness 06/01/22 1303   Number of days: 609       Percent of Wound(s) Debrided: approximately 100%    Total  Area  Debrided:  11.76 sq cm     Bleeding:  Minimal    Hemostasis Achieved:  by pressure    Procedural Pain:  0  / 10     Post Procedural Pain:  0 / 10     Response to treatment:  Well tolerated by patient.      Status of wound progress and description from last visit:   Improving slowly. Continue plan of care. Follow up I week       Plan:       Discharge Instructions       [address] [place]  NOTE: Upon discharge from the [address], you will receive a patient experience survey. We would be grateful if you would take the time to fill this survey out.     Wound care order history:                 Vascular studies: Arterial studies done 9/27/20; no stenosis              Imaging:   Date               Cultures:   obtained on 11/18/2020                                 Obtained on 1/20/2021               Labs/ HbA1c:   Date               Grafts:                       #1 Apligraf on 12/16/2020                     #2 Apligraf on 12/23/2020                     #3 Apligraf on 12/30/2020                     #4 Apligraf on 1/6/2021                     # 5 Apligraf on 1/13/21                     #1 [place] 1/27/2021                     #2 [place] 2/3/2021                     #3 [place] 2/10/2021                     #4 [place] Right Medial Lower Leg 2/17/21                     #5 [place] 3/10/2021                   HBO:                Antibiotics:  Bactrim DS BID for 10 days on 11/18/2020                                   Cipro BID for 10 days ordered on 11/25/2020                                    Cipro 500 mg BID on 1/20/2021                                    Cipro 500 mg BID on 4/28/2021              Earlier Wound care treatments:                Authorizations:                        Consults:   Date                           Primary care physician:      Continuing wound care orders and information:              Residence:  private               Continue home health care with: [facility] discharged 1/19/2022              Your wound-care supplies will be provided by:  Fritz Leyva provider:              Compression with  Lunette Led loading:  Date               [facility]:              SWZSW cleansing:                           YO not scrub or use excessive force.                          Wash hands with soap and water before and after dressing changes.                         Prior to applying a clean dressing, cleanse wound with normal saline,                                wound cleanser, or mild soap and water.                           Ask the physician or nurse before getting the wound(s) wet in a shower              Daily Wound management:                          Keep weight off wounds and reposition every 2 hours.                          MXKYY standing for long periods of time.                          YGOQL wraps/stockings in AM and remove at bedtime.                          If swelling is present, elevate legs to the level of the heart or above for 30 minutes 4-5 times a day and/or when sitting.                                             When taking antibiotics take entire prescription as ordered by physician do not stop taking until medicine is all gone.                                             Orders for this week: 6/1/2022      Right lower leg -- wash with soap and water, pat dry. Apply A&D to sarah wound. Apply Stimulin powder. Cover with hydrofera blue and ABD. Then wrap with Coban 2. Leave in place for 1 week.             Auth for ERIC vac 9/1/2021     Referral to Dr Joseph Ashton on first procedure scheduled on 4/27/2021 and 11/8/2021                    Follow up with Rogelio Brown CNP in 1 week in the wound care center. Call [phone] for any questions or concerns.   Date__________   Time__________        Treatment Note Wound 09/30/20 #1 (onset 5 years) Right Medial Lower Leg Cluster-Dressing/Treatment:  (stimulen, hydrofera blue abd coban 2 )    Written Patient Dismissal Instructions Given            Electronically signed by LOLY Harris CNP on 6/1/2022 at 1:42 PM

## 2022-06-01 NOTE — PROGRESS NOTES
Multilayer Compression Wrap   (Not Unna) Below the Knee    NAME:  Serafin Fleming  YOB: 1945  MEDICAL RECORD NUMBER:  [id number]  DATE:  6/1/2022    Multilayer compression wrap: Removed old Multilayer wrap if indicated and wash leg with mild soap/water. Applied moisturizing agent to dry skin as needed. Applied primary and secondary dressing as ordered. Applied multilayered dressing below the knee to right lower leg. Instructed patient/caregiver not to remove dressing and to keep it clean and dry. Instructed patient/caregiver on complications to report to provider, such as pain, numbness in toes, heavy drainage, and slippage of dressing. Instructed patient on purpose of compression dressing and on activity and exercise recommendations.       Electronically signed by Michi Bonilla RN on 6/1/2022 at 1:22 PM

## 2022-06-06 ENCOUNTER — OFFICE VISIT (OUTPATIENT)
Dept: INTERNAL MEDICINE CLINIC | Age: 77
End: 2022-06-06
Payer: COMMERCIAL

## 2022-06-06 VITALS
SYSTOLIC BLOOD PRESSURE: 138 MMHG | HEART RATE: 76 BPM | WEIGHT: 185 LBS | HEIGHT: 75 IN | DIASTOLIC BLOOD PRESSURE: 80 MMHG | BODY MASS INDEX: 23 KG/M2 | OXYGEN SATURATION: 97 %

## 2022-06-06 DIAGNOSIS — E78.5 DYSLIPIDEMIA: ICD-10-CM

## 2022-06-06 DIAGNOSIS — I10 ESSENTIAL HYPERTENSION: Primary | ICD-10-CM

## 2022-06-06 DIAGNOSIS — L97.919 CHRONIC VENOUS HYPERTENSION WITH ULCER AND INFLAMMATION INVOLVING RIGHT SIDE (HCC): ICD-10-CM

## 2022-06-06 DIAGNOSIS — I87.331 CHRONIC VENOUS HYPERTENSION WITH ULCER AND INFLAMMATION INVOLVING RIGHT SIDE (HCC): ICD-10-CM

## 2022-06-06 LAB
A/G RATIO: 2.2 (ref 1.1–2.2)
ALBUMIN SERPL-MCNC: 5.3 G/DL (ref 3.4–5)
ALP BLD-CCNC: 120 U/L (ref 40–129)
ALT SERPL-CCNC: 17 U/L (ref 10–40)
ANION GAP SERPL CALCULATED.3IONS-SCNC: 14 MMOL/L (ref 3–16)
AST SERPL-CCNC: 19 U/L (ref 15–37)
BASOPHILS ABSOLUTE: 0 K/UL (ref 0–0.2)
BASOPHILS RELATIVE PERCENT: 0.6 %
BILIRUB SERPL-MCNC: 1.9 MG/DL (ref 0–1)
BUN BLDV-MCNC: 29 MG/DL (ref 7–20)
CALCIUM SERPL-MCNC: 10.2 MG/DL (ref 8.3–10.6)
CHLORIDE BLD-SCNC: 101 MMOL/L (ref 99–110)
CHOLESTEROL, FASTING: 150 MG/DL (ref 0–199)
CO2: 25 MMOL/L (ref 21–32)
CREAT SERPL-MCNC: 0.8 MG/DL (ref 0.8–1.3)
EOSINOPHILS ABSOLUTE: 0.4 K/UL (ref 0–0.6)
EOSINOPHILS RELATIVE PERCENT: 6.5 %
GFR AFRICAN AMERICAN: >60
GFR NON-AFRICAN AMERICAN: >60
GLUCOSE BLD-MCNC: 101 MG/DL (ref 70–99)
HCT VFR BLD CALC: 47.5 % (ref 40.5–52.5)
HDLC SERPL-MCNC: 65 MG/DL (ref 40–60)
HEMOGLOBIN: 16.2 G/DL (ref 13.5–17.5)
LDL CHOLESTEROL CALCULATED: 71 MG/DL
LYMPHOCYTES ABSOLUTE: 1.4 K/UL (ref 1–5.1)
LYMPHOCYTES RELATIVE PERCENT: 26 %
MCH RBC QN AUTO: 31.9 PG (ref 26–34)
MCHC RBC AUTO-ENTMCNC: 34.1 G/DL (ref 31–36)
MCV RBC AUTO: 93.6 FL (ref 80–100)
MONOCYTES ABSOLUTE: 0.6 K/UL (ref 0–1.3)
MONOCYTES RELATIVE PERCENT: 10.1 %
NEUTROPHILS ABSOLUTE: 3.1 K/UL (ref 1.7–7.7)
NEUTROPHILS RELATIVE PERCENT: 56.8 %
PDW BLD-RTO: 13.8 % (ref 12.4–15.4)
PLATELET # BLD: 132 K/UL (ref 135–450)
PMV BLD AUTO: 9.3 FL (ref 5–10.5)
POTASSIUM SERPL-SCNC: 4.3 MMOL/L (ref 3.5–5.1)
RBC # BLD: 5.08 M/UL (ref 4.2–5.9)
SODIUM BLD-SCNC: 140 MMOL/L (ref 136–145)
TOTAL PROTEIN: 7.7 G/DL (ref 6.4–8.2)
TRIGLYCERIDE, FASTING: 72 MG/DL (ref 0–150)
VLDLC SERPL CALC-MCNC: 14 MG/DL
WBC # BLD: 5.5 K/UL (ref 4–11)

## 2022-06-06 PROCEDURE — 99213 OFFICE O/P EST LOW 20 MIN: CPT | Performed by: FAMILY MEDICINE

## 2022-06-06 PROCEDURE — 36415 COLL VENOUS BLD VENIPUNCTURE: CPT | Performed by: FAMILY MEDICINE

## 2022-06-06 PROCEDURE — 1123F ACP DISCUSS/DSCN MKR DOCD: CPT | Performed by: FAMILY MEDICINE

## 2022-06-06 ASSESSMENT — ENCOUNTER SYMPTOMS
COUGH: 0
ABDOMINAL PAIN: 0
NAUSEA: 0
BACK PAIN: 0
SHORTNESS OF BREATH: 0

## 2022-06-06 ASSESSMENT — PATIENT HEALTH QUESTIONNAIRE - PHQ9
SUM OF ALL RESPONSES TO PHQ QUESTIONS 1-9: 0
2. FEELING DOWN, DEPRESSED OR HOPELESS: 0
1. LITTLE INTEREST OR PLEASURE IN DOING THINGS: 0
SUM OF ALL RESPONSES TO PHQ9 QUESTIONS 1 & 2: 0
SUM OF ALL RESPONSES TO PHQ QUESTIONS 1-9: 0

## 2022-06-06 NOTE — PROGRESS NOTES
Fe Parsons (:  1945) is a 69 y.o. male,Established patient, here for evaluation of the following chief complaint(s):  Hypertension and Other (chronic conditions)         ASSESSMENT/PLAN:  1. Essential hypertension, chronic  -     CBC with Auto Differential; Future  -     Comprehensive Metabolic Panel; Future  Continue Metoprolol  2. Dyslipidemia  -     Lipid, Fasting; Future  3. Chronic venous hypertension with ulcer and inflammation involving right side ([address]  Keep f/u with the wound center    On this date 2022 I have spent 20 minutes reviewing previous notes, test results and face to face with the patient discussing the diagnosis and importance of compliance with the treatment plan as well as documenting on the day of the visit. Return for follow up in 5 1/2 months for HTN. Subjective   SUBJECTIVE/OBJECTIVE:  HPI: This 70 yo M here with his wife for the following  Patient Active Problem List    Diagnosis Date Noted    Chronic venous hypertension with ulcer and inflammation ([address] 2013    Dyslipidemia 2022    Varicose veins of right lower extremity with other complications     Varicose veins of lower extremities with complications, right     Status post endovenous radiofrequency ablation (RFA) of saphenous vein 2021    Varicose veins of left lower extremity with other complications     Venous stasis ulcer of right lower leg with edema of right lower leg ([address] 2021    Varicose veins of lower extremities with complications, bilateral 2021    Essential hypertension 2021    Acute blood loss anemia 10/14/2020    Iron deficiency 10/14/2020    Venous ulcer of left leg ([address] 2020    Leg ulcer ([address] 2013       Pt states he was wearing his work boots and he developed a wound. He has been in wound care and it has improved.  He has chronic venous wound of R lower medial leg  HTN- stable on Metoprolol  Dyslipidemia  He is more active in the summer      Review of Systems   Constitutional: Negative for diaphoresis and fever. Respiratory: Negative for cough and shortness of breath. Cardiovascular: Negative for chest pain and palpitations. Gastrointestinal: Negative for abdominal pain and nausea. Genitourinary: Negative for difficulty urinating. Musculoskeletal: Negative for back pain. Neurological: Negative for dizziness and headaches. No Known Allergies  Current Outpatient Medications   Medication Sig Dispense Refill    metoprolol succinate (TOPROL XL) 50 MG extended release tablet Take 1 tablet by mouth daily 30 tablet 5     No current facility-administered medications for this visit. Vitals:    06/06/22 1419   BP: 138/80   Site: Right Upper Arm   Position: Sitting   Cuff Size: Medium Adult   Pulse: 76   SpO2: 97%   Weight: 185 lb (83.9 kg)   Height: 6' 3\" (1.905 m)     Objective   Physical Exam  Vitals reviewed. Constitutional:       General: He is not in acute distress. Eyes:      General: No scleral icterus. Extraocular Movements: Extraocular movements intact. Cardiovascular:      Rate and Rhythm: Normal rate and regular rhythm. Pulmonary:      Effort: Pulmonary effort is normal. No respiratory distress. Breath sounds: Normal breath sounds. Abdominal:      Palpations: Abdomen is soft. Tenderness: There is no abdominal tenderness. Musculoskeletal:      Cervical back: Neck supple. Right lower leg: No edema. Left lower leg: No edema. Skin:     Comments: Wound R lower leg   Neurological:      Mental Status: He is alert and oriented to person, place, and time. Psychiatric:         Mood and Affect: Mood normal.                An electronic signature was used to authenticate this note.     --Bushra Biggs DO

## 2022-06-08 ENCOUNTER — HOSPITAL ENCOUNTER (OUTPATIENT)
Dept: WOUND CARE | Age: 77
Discharge: HOME | End: 2022-06-08
Payer: COMMERCIAL

## 2022-06-08 VITALS
SYSTOLIC BLOOD PRESSURE: 162 MMHG | TEMPERATURE: 99.1 F | HEART RATE: 73 BPM | DIASTOLIC BLOOD PRESSURE: 90 MMHG | RESPIRATION RATE: 18 BRPM

## 2022-06-08 DIAGNOSIS — I83.891 VENOUS STASIS ULCER OF RIGHT LOWER LEG WITH EDEMA OF RIGHT LOWER LEG (HCC): ICD-10-CM

## 2022-06-08 DIAGNOSIS — I87.331 CHRONIC VENOUS HYPERTENSION WITH ULCER AND INFLAMMATION INVOLVING RIGHT SIDE (HCC): ICD-10-CM

## 2022-06-08 DIAGNOSIS — I83.891 VARICOSE VEINS OF RIGHT LOWER EXTREMITY WITH OTHER COMPLICATIONS: Primary | ICD-10-CM

## 2022-06-08 DIAGNOSIS — R60.9 VENOUS STASIS ULCER OF RIGHT LOWER LEG WITH EDEMA OF RIGHT LOWER LEG (HCC): ICD-10-CM

## 2022-06-08 DIAGNOSIS — L97.919 CHRONIC VENOUS HYPERTENSION WITH ULCER AND INFLAMMATION INVOLVING RIGHT SIDE (HCC): ICD-10-CM

## 2022-06-08 DIAGNOSIS — I83.019 VENOUS STASIS ULCER OF RIGHT LOWER LEG WITH EDEMA OF RIGHT LOWER LEG (HCC): ICD-10-CM

## 2022-06-08 DIAGNOSIS — L97.919 VENOUS STASIS ULCER OF RIGHT LOWER LEG WITH EDEMA OF RIGHT LOWER LEG (HCC): ICD-10-CM

## 2022-06-08 DIAGNOSIS — L97.929 VENOUS ULCER OF LEFT LEG (HCC): ICD-10-CM

## 2022-06-08 DIAGNOSIS — I83.029 VENOUS ULCER OF LEFT LEG (HCC): ICD-10-CM

## 2022-06-08 PROCEDURE — 11042 DBRDMT SUBQ TIS 1ST 20SQCM/<: CPT

## 2022-06-08 PROCEDURE — 11042 DBRDMT SUBQ TIS 1ST 20SQCM/<: CPT | Performed by: NURSE PRACTITIONER

## 2022-06-08 RX ORDER — BACITRACIN, NEOMYCIN, POLYMYXIN B 400; 3.5; 5 [USP'U]/G; MG/G; [USP'U]/G
OINTMENT TOPICAL ONCE
Status: CANCELLED | OUTPATIENT
Start: 2022-06-08 | End: 2022-06-08

## 2022-06-08 RX ORDER — GENTAMICIN SULFATE 1 MG/G
OINTMENT TOPICAL ONCE
Status: CANCELLED | OUTPATIENT
Start: 2022-06-08 | End: 2022-06-08

## 2022-06-08 RX ORDER — LIDOCAINE 50 MG/G
OINTMENT TOPICAL ONCE
Status: CANCELLED | OUTPATIENT
Start: 2022-06-08 | End: 2022-06-08

## 2022-06-08 RX ORDER — GINSENG 100 MG
CAPSULE ORAL ONCE
Status: CANCELLED | OUTPATIENT
Start: 2022-06-08 | End: 2022-06-08

## 2022-06-08 RX ORDER — BACITRACIN ZINC AND POLYMYXIN B SULFATE 500; 1000 [USP'U]/G; [USP'U]/G
OINTMENT TOPICAL ONCE
Status: CANCELLED | OUTPATIENT
Start: 2022-06-08 | End: 2022-06-08

## 2022-06-08 RX ORDER — LIDOCAINE HYDROCHLORIDE 40 MG/ML
SOLUTION TOPICAL ONCE
Status: CANCELLED | OUTPATIENT
Start: 2022-06-08 | End: 2022-06-08

## 2022-06-08 RX ORDER — LIDOCAINE HYDROCHLORIDE 20 MG/ML
JELLY TOPICAL ONCE
Status: CANCELLED | OUTPATIENT
Start: 2022-06-08 | End: 2022-06-08

## 2022-06-08 RX ORDER — BETAMETHASONE DIPROPIONATE 0.05 %
OINTMENT (GRAM) TOPICAL ONCE
Status: CANCELLED | OUTPATIENT
Start: 2022-06-08 | End: 2022-06-08

## 2022-06-08 RX ORDER — CLOBETASOL PROPIONATE 0.5 MG/G
OINTMENT TOPICAL ONCE
Status: CANCELLED | OUTPATIENT
Start: 2022-06-08 | End: 2022-06-08

## 2022-06-08 RX ORDER — LIDOCAINE 40 MG/G
CREAM TOPICAL ONCE
Status: CANCELLED | OUTPATIENT
Start: 2022-06-08 | End: 2022-06-08

## 2022-06-08 ASSESSMENT — PAIN SCALES - GENERAL: PAINLEVEL_OUTOF10: 0

## 2022-06-08 NOTE — PROGRESS NOTES
Wound Care Center Progress Note With Procedure    Joi Gatica  AGE: 69 y.o. GENDER: male  : 1945  EPISODE DATE:  2022     Subjective:     Chief Complaint   Patient presents with    Wound Check     rle         HISTORY of PRESENT ILLNESS     Erika Leonardo a 77 y. o. male who presents today for wound evaluation of Chronic venous wound of Right lower medial leg. The wound is of moderate severity.  The underlying cause of the wound is venous disease and trauma.       Size is down this week.  We have him back fully on his previous plan of care and wound bed is much less friable and tissue is healthier  No issues reported this week.      Wound Pain Timing/Severity: none  Quality of pain: N/A  Severity of pain:  0 / 10   Modifying Factors: venous stasis  Associated Signs/Symptoms: none        PAST MEDICAL HISTORY        Diagnosis Date    Chronic venous hypertension with ulcer and inflammation involving right side (HCC)     RLE    Hypertension     past    Iron deficiency     Lymphoma Dx 11-    Hx of B cell lymphoma-in remission since        PAST SURGICAL HISTORY    Past Surgical History:   Procedure Laterality Date    TUNNELED VENOUS PORT PLACEMENT      TUNNELED VENOUS PORT PLACEMENT      removed 2012       FAMILY HISTORY    Family History   Problem Relation Age of Onset    Early Death Mother 52    Diabetes Father     Heart Disease Father     Vision Loss Son         \"He wears glasses\"    Early Death Daughter 36       SOCIAL HISTORY    Social History     Tobacco Use    Smoking status: Never Smoker    Smokeless tobacco: Never Used   Substance Use Topics    Alcohol use: No    Drug use: No       ALLERGIES    No Known Allergies    MEDICATIONS    Current Outpatient Medications on File Prior to Encounter   Medication Sig Dispense Refill    metoprolol succinate (TOPROL XL) 50 MG extended release tablet Take 1 tablet by mouth daily 30 tablet 5     No current facility-administered medications on file prior to encounter. REVIEW OF SYSTEMS    Pertinent items are noted in HPI. Constitutional: Negative for systemic symptoms including fever, chills and malaise. Objective:      BP (!) 162/90   Pulse 73   Temp 99.1 °F (37.3 °C) (Temporal)   Resp 18     PHYSICAL EXAM      General: The patient is in no acute distress. Mental status:  Patient is appropriate, is  oriented to place and plan of care. 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 Performed by: LOLY Field CNP    Consent obtained: Yes    Time out taken:  Yes    Pain Control: 4% Lidocaine Liquid Topical        Debridement:Excisional Debridement    Using #15 blade scalpel the wound(s) was/were sharply debrided down through and including the removal of subcutaneous tissue.         Devitalized Tissue Debrided:  fibrin, biofilm, slough and exudate    Pre Debridement Measurements:  Are located in the Wound Documentation Flow Sheet    All active wounds listed below with today's date are evaluated  Wound(s)    debrided this date include # : 1     Post  Debridement Measurements:  Wound 07/29/13 Venous ulcer Ankle Right;Lateral (Active)   Number of days: 3236       Wound 09/30/20 #1 (onset 5 years) Right Medial Lower Leg Cluster (Active)   Wound Image   05/25/22 1310   Wound Etiology Venous 06/08/22 1254   Dressing Status New dressing applied;Clean;Dry; Intact 06/01/22 1321   Wound Cleansed Soap and water 06/08/22 1254   Dressing/Treatment Collagen;ABD;Coban/self-adherent bandages; Moisturizing cream 04/06/22 1359   Offloading for Diabetic Foot Ulcers Offloading not required 06/08/22 1254   Wound Length (cm) 3 cm 06/08/22 1254   Wound Width (cm) 2.3 cm 06/08/22 1254   Wound Depth (cm) 0.1 cm 06/08/22 1254   Wound Surface Area (cm^2) 6.9 cm^2 06/08/22 1254   Change in Wound Size % (l*w) 97.26 06/08/22 1254   Wound Volume (cm^3) 0.69 cm^3 06/08/22 1254   Wound Healing % 97 06/08/22 1254   Post-Procedure Length (cm) 3 cm 06/08/22 1308   Post-Procedure Width (cm) 2.3 cm 06/08/22 1308   Post-Procedure Depth (cm) 0.1 cm 06/08/22 1308   Post-Procedure Surface Area (cm^2) 6.9 cm^2 06/08/22 1308   Post-Procedure Volume (cm^3) 0.69 cm^3 06/08/22 1308   Distance Tunneling (cm) 0 cm 06/08/22 1254   Tunneling Position ___ O'Clock 0 06/08/22 1254   Undermining Starts ___ O'Clock 0 06/08/22 1254   Undermining Ends___ O'Clock 0 06/08/22 1254   Undermining Maxium Distance (cm) 0 06/08/22 1254   Wound Assessment Hyper granulation tissue;Pink/red;Slough 06/08/22 1254   Drainage Amount Moderate 06/08/22 1254   Drainage Description Serosanguinous 06/08/22 1254   Odor None 06/08/22 1254   Cassia-wound Assessment Dry/flaky 06/08/22 1254   Margins Defined edges 06/08/22 1254   Wound Thickness Description not for Pressure Injury Full thickness 06/08/22 1254   Number of days: 616       Percent of Wound(s) Debrided: approximately 100%    Total  Area  Debrided:  6.9 sq cm     Bleeding:  Minimal    Hemostasis Achieved:  by pressure    Procedural Pain:  0  / 10     Post Procedural Pain:  0 / 10     Response to treatment:  Well tolerated by patient. Status of wound progress and description from last visit:   Much improved this week. Continue plan of care and follow up 1 week       Plan:       Discharge Instructions       [address] [place]  NOTE: Upon discharge from the [address], you will receive a patient experience survey. We would be grateful if you would take the time to fill this survey out.     Wound care order history:                 Vascular studies: Arterial studies done 9/27/20; no stenosis              Imaging:   Date               Cultures:   obtained on 11/18/2020                                 Obtained on 1/20/2021               Labs/ HbA1c:   Date               Grafts:                       #1 Apligraf on 12/16/2020                     #2 Apligraf on 12/23/2020                     #3 Apligraf on 12/30/2020                     #4 Apligraf on 1/6/2021                     # 5 Apligraf on 1/13/21                     #1 [place] 1/27/2021                     #2 [place] 2/3/2021                     #3 [place] 2/10/2021                     #4 [place] Right Medial Lower Leg 2/17/21                     #5 [place] 3/10/2021                   HBO:                Antibiotics:  Bactrim DS BID for 10 days on 11/18/2020                                   Cipro BID for 10 days ordered on 11/25/2020                                    Cipro 500 mg BID on 1/20/2021                                    Cipro 500 mg BID on 4/28/2021              Earlier Wound care treatments:                Authorizations:                        Consults:   Date                           Primary care physician:      Continuing wound care orders and information:              Residence:  private               Continue home health care with: [facility] discharged 1/19/2022              Your wound-care supplies will be provided by:  José Manuel Molina provider:  Khushbu Mar with              ANDREA loading:  Date               ETJIG Medications:              TBSZD cleansing:                           MORGAN not scrub or use excessive force.                          Wash hands with soap and water before and after dressing changes.                         Prior to applying a clean dressing, cleanse wound with normal saline,                                wound cleanser, or mild soap and water.                           Ask the physician or nurse before getting the wound(s) wet in a shower              Daily Wound management:                          Keep weight off wounds and reposition every 2 hours.                          BKYKD standing for long periods of time.                          WMIVM wraps/stockings in AM and remove at bedtime.                          If swelling is present, elevate legs to the level of the heart or above for 30 minutes 4-5 times a day and/or when sitting.                                             When taking antibiotics take entire prescription as ordered by physician do not stop taking until medicine is all gone.                                              Orders for this week: 6/8/2022      Right lower leg -- wash with soap and water, pat dry. Apply A&D to sarah wound. Apply Stimulin powder. Cover with hydrofera blue and ABD. Then wrap with Coban 2. Leave in place for 1 week.             Auth for ERIC vac 9/1/2021     Referral to Dr Ge Santiago on first procedure scheduled on 4/27/2021 and 11/8/2021                    Follow up with Constanza Case CNP in 1 week in the wound care center. Call [phone] for any questions or concerns.   Date__________   Time__________                 Treatment Note      Written Patient Dismissal Instructions Given            Electronically signed by LOLY Blair CNP on 6/8/2022 at 1:08 PM

## 2022-06-15 ENCOUNTER — HOSPITAL ENCOUNTER (OUTPATIENT)
Dept: WOUND CARE | Age: 77
Discharge: HOME | End: 2022-06-15
Payer: COMMERCIAL

## 2022-06-15 VITALS
DIASTOLIC BLOOD PRESSURE: 87 MMHG | TEMPERATURE: 99.3 F | RESPIRATION RATE: 16 BRPM | HEART RATE: 65 BPM | SYSTOLIC BLOOD PRESSURE: 147 MMHG

## 2022-06-15 DIAGNOSIS — I83.019 VENOUS STASIS ULCER OF RIGHT LOWER LEG WITH EDEMA OF RIGHT LOWER LEG (HCC): ICD-10-CM

## 2022-06-15 DIAGNOSIS — I83.029 VENOUS ULCER OF LEFT LEG (HCC): ICD-10-CM

## 2022-06-15 DIAGNOSIS — I83.891 VENOUS STASIS ULCER OF RIGHT LOWER LEG WITH EDEMA OF RIGHT LOWER LEG (HCC): ICD-10-CM

## 2022-06-15 DIAGNOSIS — I83.891 VARICOSE VEINS OF RIGHT LOWER EXTREMITY WITH OTHER COMPLICATIONS: ICD-10-CM

## 2022-06-15 DIAGNOSIS — L97.919 CHRONIC VENOUS HYPERTENSION WITH ULCER AND INFLAMMATION INVOLVING RIGHT SIDE (HCC): Primary | ICD-10-CM

## 2022-06-15 DIAGNOSIS — R60.9 VENOUS STASIS ULCER OF RIGHT LOWER LEG WITH EDEMA OF RIGHT LOWER LEG (HCC): ICD-10-CM

## 2022-06-15 DIAGNOSIS — L97.919 VENOUS STASIS ULCER OF RIGHT LOWER LEG WITH EDEMA OF RIGHT LOWER LEG (HCC): ICD-10-CM

## 2022-06-15 DIAGNOSIS — L97.929 VENOUS ULCER OF LEFT LEG (HCC): ICD-10-CM

## 2022-06-15 DIAGNOSIS — I87.331 CHRONIC VENOUS HYPERTENSION WITH ULCER AND INFLAMMATION INVOLVING RIGHT SIDE (HCC): Primary | ICD-10-CM

## 2022-06-15 PROCEDURE — 11042 DBRDMT SUBQ TIS 1ST 20SQCM/<: CPT | Performed by: NURSE PRACTITIONER

## 2022-06-15 PROCEDURE — 11042 DBRDMT SUBQ TIS 1ST 20SQCM/<: CPT

## 2022-06-15 RX ORDER — GINSENG 100 MG
CAPSULE ORAL ONCE
Status: CANCELLED | OUTPATIENT
Start: 2022-06-15 | End: 2022-06-15

## 2022-06-15 RX ORDER — LIDOCAINE HYDROCHLORIDE 20 MG/ML
JELLY TOPICAL ONCE
Status: CANCELLED | OUTPATIENT
Start: 2022-06-15 | End: 2022-06-15

## 2022-06-15 RX ORDER — LIDOCAINE 50 MG/G
OINTMENT TOPICAL ONCE
Status: CANCELLED | OUTPATIENT
Start: 2022-06-15 | End: 2022-06-15

## 2022-06-15 RX ORDER — CLOBETASOL PROPIONATE 0.5 MG/G
OINTMENT TOPICAL ONCE
Status: CANCELLED | OUTPATIENT
Start: 2022-06-15 | End: 2022-06-15

## 2022-06-15 RX ORDER — BACITRACIN, NEOMYCIN, POLYMYXIN B 400; 3.5; 5 [USP'U]/G; MG/G; [USP'U]/G
OINTMENT TOPICAL ONCE
Status: CANCELLED | OUTPATIENT
Start: 2022-06-15 | End: 2022-06-15

## 2022-06-15 RX ORDER — BETAMETHASONE DIPROPIONATE 0.05 %
OINTMENT (GRAM) TOPICAL ONCE
Status: CANCELLED | OUTPATIENT
Start: 2022-06-15 | End: 2022-06-15

## 2022-06-15 RX ORDER — GENTAMICIN SULFATE 1 MG/G
OINTMENT TOPICAL ONCE
Status: CANCELLED | OUTPATIENT
Start: 2022-06-15 | End: 2022-06-15

## 2022-06-15 RX ORDER — LIDOCAINE 40 MG/G
CREAM TOPICAL ONCE
Status: CANCELLED | OUTPATIENT
Start: 2022-06-15 | End: 2022-06-15

## 2022-06-15 RX ORDER — LIDOCAINE HYDROCHLORIDE 40 MG/ML
SOLUTION TOPICAL ONCE
Status: CANCELLED | OUTPATIENT
Start: 2022-06-15 | End: 2022-06-15

## 2022-06-15 RX ORDER — BACITRACIN ZINC AND POLYMYXIN B SULFATE 500; 1000 [USP'U]/G; [USP'U]/G
OINTMENT TOPICAL ONCE
Status: CANCELLED | OUTPATIENT
Start: 2022-06-15 | End: 2022-06-15

## 2022-06-15 NOTE — PROGRESS NOTES
Wound Care Center Progress Note With Procedure    Nabila Hector  AGE: 69 y.o. GENDER: male  : 1945  EPISODE DATE:  6/15/2022     Subjective:     Chief Complaint   Patient presents with    Wound Check     Right lower leg         HISTORY of PRESENT ILLNESS     Pepe Daley is a 77 y. o. male who presents today for wound evaluation of Chronic venous wound of Right lower medial leg. The wound is of moderate severity.  The underlying cause of the wound is venous disease and trauma.       Wound slightly hypergranulated. May need a spot treatment of silver nitrate.   No other issues this week no signs or symptoms of infection      Wound Pain Timing/Severity: none  Quality of pain: N/A  Severity of pain:  0 / 10   Modifying Factors: venous stasis  Associated Signs/Symptoms: none        PAST MEDICAL HISTORY        Diagnosis Date    Chronic venous hypertension with ulcer and inflammation involving right side (HCC)     RLE    Hypertension     past    Iron deficiency     Lymphoma Dx 11-11    Hx of B cell lymphoma-in remission since        PAST SURGICAL HISTORY    Past Surgical History:   Procedure Laterality Date    TUNNELED VENOUS PORT PLACEMENT      TUNNELED VENOUS PORT PLACEMENT      removed 2012       FAMILY HISTORY    Family History   Problem Relation Age of Onset    Early Death Mother 52    Diabetes Father     Heart Disease Father     Vision Loss Son         \"He wears glasses\"    Early Death Daughter 36       SOCIAL HISTORY    Social History     Tobacco Use    Smoking status: Never Smoker    Smokeless tobacco: Never Used   Substance Use Topics    Alcohol use: No    Drug use: No       ALLERGIES    No Known Allergies    MEDICATIONS    Current Outpatient Medications on File Prior to Encounter   Medication Sig Dispense Refill    metoprolol succinate (TOPROL XL) 50 MG extended release tablet Take 1 tablet by mouth daily 30 tablet 5     No current facility-administered medications on file prior to encounter. REVIEW OF SYSTEMS    Pertinent items are noted in HPI. Constitutional: Negative for systemic symptoms including fever, chills and malaise. Objective:      BP (!) 147/87   Pulse 65   Temp 99.3 °F (37.4 °C) (Temporal)   Resp 16     PHYSICAL EXAM      General: The patient is in no acute distress. Mental status:  Patient is appropriate, is  oriented to place and plan of care. 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 Performed by: LOLY Sheehan CNP    Consent obtained: Yes    Time out taken:  Yes    Pain Control: N/a       Debridement:Excisional Debridement    Using #15 blade scalpel the wound(s) was/were sharply debrided down through and including the removal of subcutaneous tissue.         Devitalized Tissue Debrided:  fibrin, biofilm, slough and callus    Pre Debridement Measurements:  Are located in the Wound Documentation Flow Sheet    All active wounds listed below with today's date are evaluated  Wound(s)    debrided this date include # : 1     Post  Debridement Measurements:  Wound 07/29/13 Venous ulcer Ankle Right;Lateral (Active)   Number of days: 3243       Wound 09/30/20 #1 (onset 5 years) Right Medial Lower Leg Cluster (Active)   Wound Image   06/15/22 1237   Wound Etiology Venous 06/15/22 1242   Dressing Status New dressing applied 06/15/22 1302   Wound Cleansed Soap and water 06/15/22 1237   Dressing/Treatment Collagen;ABD;Coban/self-adherent bandages; Moisturizing cream 04/06/22 1359   Offloading for Diabetic Foot Ulcers Offloading not required 06/15/22 1237   Wound Length (cm) 3.5 cm 06/15/22 1237   Wound Width (cm) 2.5 cm 06/15/22 1237   Wound Depth (cm) 0.1 cm 06/15/22 1237   Wound Surface Area (cm^2) 8.75 cm^2 06/15/22 1237   Change in Wound Size % (l*w) 96.53 06/15/22 1237   Wound Volume (cm^3) 0.875 cm^3 06/15/22 1237   Wound Healing % 97 06/15/22 1237   Post-Procedure Length (cm) 3.5 cm 06/15/22 1242   Post-Procedure Width (cm) 2.5 cm 06/15/22 1242   Post-Procedure Depth (cm) 0.1 cm 06/15/22 1242   Post-Procedure Surface Area (cm^2) 8.75 cm^2 06/15/22 1242   Post-Procedure Volume (cm^3) 0.875 cm^3 06/15/22 1242   Distance Tunneling (cm) 0 cm 06/15/22 1237   Tunneling Position ___ O'Clock 0 06/15/22 1237   Undermining Starts ___ O'Clock 0 06/15/22 1237   Undermining Ends___ O'Clock 0 06/15/22 1237   Undermining Maxium Distance (cm) 0 06/15/22 1237   Wound Assessment Hyper granulation tissue 06/15/22 1237   Drainage Amount Moderate 06/15/22 1237   Drainage Description Serosanguinous 06/15/22 1237   Odor None 06/15/22 1237   Cassia-wound Assessment Intact 06/15/22 1237   Margins Defined edges 06/15/22 1237   Wound Thickness Description not for Pressure Injury Full thickness 06/15/22 1237   Number of days: 623       Percent of Wound(s) Debrided: approximately 100%    Total  Area  Debrided:  8.75 sq cm     Bleeding:  Minimal    Hemostasis Achieved:  by pressure and by silver nitrate stick    Procedural Pain:  0  / 10     Post Procedural Pain:  0 / 10     Response to treatment:  Well tolerated by patient.      Status of wound progress and description from last visit:   Stable. Continue plan of care for now   Follow up 1 week       Plan:       Discharge Instructions       [address] [place]  NOTE: Upon discharge from the [address], you will receive a patient experience survey. We would be grateful if you would take the time to fill this survey out.     Wound care order history:                 Vascular studies: Arterial studies done 9/27/20; no stenosis              Imaging:   Date               Cultures:   obtained on 11/18/2020                                 Obtained on 1/20/2021               Labs/ HbA1c:   Date               Grafts:                       #1 Apligraf on 12/16/2020                     #2 Apligraf on 12/23/2020                     #3 Apligraf on 12/30/2020                     #4 Apligraf on 1/6/2021                     # 5 Apligraf on 1/13/21                     #1 [place] 1/27/2021                     #2 [place] 2/3/2021                     #3 [place] 2/10/2021                     #4 [place] Right Medial Lower Leg 2/17/21                     #5 [place] 3/10/2021                   HBO:                Antibiotics:  Bactrim DS BID for 10 days on 11/18/2020                                   Cipro BID for 10 days ordered on 11/25/2020                                    Cipro 500 mg BID on 1/20/2021                                    Cipro 500 mg BID on 4/28/2021              Earlier Wound care treatments:                Authorizations:                        Consults:   Date                           Primary care physician:      Continuing wound care orders and information:              Residence:  private               Continue home health care with: [facility] discharged 1/19/2022              Your wound-care supplies will be provided by:  Momo Bautista provider:              Compression with              AXA loading:  Date               BGXUK Medications:              DBKNZ cleansing:                           KK not scrub or use excessive force.                          Wash hands with soap and water before and after dressing changes.                         Prior to applying a clean dressing, cleanse wound with normal saline,                                wound cleanser, or mild soap and water.                           Ask the physician or nurse before getting the wound(s) wet in a shower              Daily Wound management:                          Keep weight off wounds and reposition every 2 hours.                          KUOVI standing for long periods of time.                          IGHNV wraps/stockings in AM and remove at bedtime.                          If swelling is present, elevate legs to the level of the heart or above for 30 minutes 4-5 times a day and/or when sitting.                                             When taking antibiotics take entire prescription as ordered by physician do not stop taking until medicine is all gone.                                              Orders for this week: 6/15/2022      Right Lower Leg -- wash with soap and water, pat dry. Apply A&D to sarah wound. Apply Stimulin powder. Cover with hydrofera blue and ABD. Then wrap with Coban 2. Leave in place for 1 week.             Auth for ERIC vac 9/1/2021     Referral to Dr Lena Helm on first procedure scheduled on 4/27/2021 and 11/8/2021                    Follow up with Radhika Gomez CNP in 1 week in the wound care center. Call [phone] for any questions or concerns.   Date__________   Time________        Treatment Note Wound 09/30/20 #1 (onset 5 years) Right Medial Lower Leg Cluster-Dressing/Treatment:  (A&D, Stimulen, Hydrofera Blue, ABD, Coban 2)    Written Patient Dismissal Instructions Given            Electronically signed by LOLY Jacob CNP on 6/15/2022 at 1:16 PM

## 2022-06-16 ENCOUNTER — TELEPHONE (OUTPATIENT)
Dept: INTERNAL MEDICINE CLINIC | Age: 77
End: 2022-06-16

## 2022-06-16 DIAGNOSIS — D69.6 THROMBOCYTOPENIA (HCC): Primary | ICD-10-CM

## 2022-06-16 DIAGNOSIS — E80.6 HYPERBILIRUBINEMIA: ICD-10-CM

## 2022-06-22 ENCOUNTER — HOSPITAL ENCOUNTER (OUTPATIENT)
Dept: WOUND CARE | Age: 77
Discharge: HOME | End: 2022-06-22
Payer: COMMERCIAL

## 2022-06-22 VITALS
DIASTOLIC BLOOD PRESSURE: 87 MMHG | SYSTOLIC BLOOD PRESSURE: 158 MMHG | RESPIRATION RATE: 18 BRPM | TEMPERATURE: 98.5 F | HEART RATE: 69 BPM

## 2022-06-22 DIAGNOSIS — L97.919 VENOUS STASIS ULCER OF RIGHT LOWER LEG WITH EDEMA OF RIGHT LOWER LEG (HCC): Primary | ICD-10-CM

## 2022-06-22 DIAGNOSIS — L97.929 VENOUS ULCER OF LEFT LEG (HCC): ICD-10-CM

## 2022-06-22 DIAGNOSIS — I83.891 VARICOSE VEINS OF RIGHT LOWER EXTREMITY WITH OTHER COMPLICATIONS: ICD-10-CM

## 2022-06-22 DIAGNOSIS — I83.019 VENOUS STASIS ULCER OF RIGHT LOWER LEG WITH EDEMA OF RIGHT LOWER LEG (HCC): Primary | ICD-10-CM

## 2022-06-22 DIAGNOSIS — R60.9 VENOUS STASIS ULCER OF RIGHT LOWER LEG WITH EDEMA OF RIGHT LOWER LEG (HCC): Primary | ICD-10-CM

## 2022-06-22 DIAGNOSIS — I83.029 VENOUS ULCER OF LEFT LEG (HCC): ICD-10-CM

## 2022-06-22 DIAGNOSIS — L97.919 CHRONIC VENOUS HYPERTENSION WITH ULCER AND INFLAMMATION INVOLVING RIGHT SIDE (HCC): ICD-10-CM

## 2022-06-22 DIAGNOSIS — I83.891 VENOUS STASIS ULCER OF RIGHT LOWER LEG WITH EDEMA OF RIGHT LOWER LEG (HCC): Primary | ICD-10-CM

## 2022-06-22 DIAGNOSIS — I87.331 CHRONIC VENOUS HYPERTENSION WITH ULCER AND INFLAMMATION INVOLVING RIGHT SIDE (HCC): ICD-10-CM

## 2022-06-22 PROCEDURE — 11042 DBRDMT SUBQ TIS 1ST 20SQCM/<: CPT

## 2022-06-22 PROCEDURE — 11042 DBRDMT SUBQ TIS 1ST 20SQCM/<: CPT | Performed by: NURSE PRACTITIONER

## 2022-06-22 RX ORDER — GINSENG 100 MG
CAPSULE ORAL ONCE
Status: CANCELLED | OUTPATIENT
Start: 2022-06-22 | End: 2022-06-22

## 2022-06-22 RX ORDER — GENTAMICIN SULFATE 1 MG/G
OINTMENT TOPICAL ONCE
Status: CANCELLED | OUTPATIENT
Start: 2022-06-22 | End: 2022-06-22

## 2022-06-22 RX ORDER — LIDOCAINE 40 MG/G
CREAM TOPICAL ONCE
Status: CANCELLED | OUTPATIENT
Start: 2022-06-22 | End: 2022-06-22

## 2022-06-22 RX ORDER — BACITRACIN, NEOMYCIN, POLYMYXIN B 400; 3.5; 5 [USP'U]/G; MG/G; [USP'U]/G
OINTMENT TOPICAL ONCE
Status: CANCELLED | OUTPATIENT
Start: 2022-06-22 | End: 2022-06-22

## 2022-06-22 RX ORDER — LIDOCAINE HYDROCHLORIDE 20 MG/ML
JELLY TOPICAL ONCE
Status: CANCELLED | OUTPATIENT
Start: 2022-06-22 | End: 2022-06-22

## 2022-06-22 RX ORDER — CLOBETASOL PROPIONATE 0.5 MG/G
OINTMENT TOPICAL ONCE
Status: CANCELLED | OUTPATIENT
Start: 2022-06-22 | End: 2022-06-22

## 2022-06-22 RX ORDER — LIDOCAINE 50 MG/G
OINTMENT TOPICAL ONCE
Status: CANCELLED | OUTPATIENT
Start: 2022-06-22 | End: 2022-06-22

## 2022-06-22 RX ORDER — BETAMETHASONE DIPROPIONATE 0.05 %
OINTMENT (GRAM) TOPICAL ONCE
Status: CANCELLED | OUTPATIENT
Start: 2022-06-22 | End: 2022-06-22

## 2022-06-22 RX ORDER — LIDOCAINE HYDROCHLORIDE 40 MG/ML
SOLUTION TOPICAL ONCE
Status: CANCELLED | OUTPATIENT
Start: 2022-06-22 | End: 2022-06-22

## 2022-06-22 RX ORDER — BACITRACIN ZINC AND POLYMYXIN B SULFATE 500; 1000 [USP'U]/G; [USP'U]/G
OINTMENT TOPICAL ONCE
Status: CANCELLED | OUTPATIENT
Start: 2022-06-22 | End: 2022-06-22

## 2022-06-22 ASSESSMENT — PAIN SCALES - GENERAL: PAINLEVEL_OUTOF10: 0

## 2022-06-22 NOTE — PROGRESS NOTES
Wound Care Center Progress Note With Procedure    Alex Madsen  AGE: 69 y.o. GENDER: male  : 1945  EPISODE DATE:  2022     Subjective:     Chief Complaint   Patient presents with    Wound Check     right leg          HISTORY of PRESENT ILLNESS     Nathen nowak 77 y. o. male who presents today for wound evaluation of Chronic venous wound of Right lower medial leg. The wound is of moderate severity.  The underlying cause of the wound is venous disease and trauma.       No issues to report this week. Progress is slow per usual. Healthy wound bed and hypergranulation not an issues this week     Wound Pain Timing/Severity: none  Quality of pain: N/A  Severity of pain:  0 / 10   Modifying Factors: venous stasis  Associated Signs/Symptoms: none        PAST MEDICAL HISTORY        Diagnosis Date    Chronic venous hypertension with ulcer and inflammation involving right side (HCC)     RLE    Hypertension     past    Iron deficiency     Lymphoma Dx 11-11    Hx of B cell lymphoma-in remission since        PAST SURGICAL HISTORY    Past Surgical History:   Procedure Laterality Date    TUNNELED VENOUS PORT PLACEMENT      TUNNELED VENOUS PORT PLACEMENT      removed 2012       FAMILY HISTORY    Family History   Problem Relation Age of Onset    Early Death Mother 52    Diabetes Father     Heart Disease Father     Vision Loss Son         \"He wears glasses\"    Early Death Daughter 36       SOCIAL HISTORY    Social History     Tobacco Use    Smoking status: Never Smoker    Smokeless tobacco: Never Used   Substance Use Topics    Alcohol use: No    Drug use: No       ALLERGIES    No Known Allergies    MEDICATIONS    Current Outpatient Medications on File Prior to Encounter   Medication Sig Dispense Refill    metoprolol succinate (TOPROL XL) 50 MG extended release tablet Take 1 tablet by mouth daily 30 tablet 5     No current facility-administered medications on file prior to encounter. REVIEW OF SYSTEMS    Pertinent items are noted in HPI. Constitutional: Negative for systemic symptoms including fever, chills and malaise. Objective:      BP (!) 158/87   Pulse 69   Temp 98.5 °F (36.9 °C) (Temporal)   Resp 18     PHYSICAL EXAM      General: The patient is in no acute distress. Mental status:  Patient is appropriate, is  oriented to place and plan of care. 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 Performed by: LOLY Bay - CNP    Consent obtained: Yes    Time out taken:  Yes    Pain Control: N/A       Debridement:Excisional Debridement    Using #15 blade scalpel the wound(s) was/were sharply debrided down through and including the removal of subcutaneous tissue.         Devitalized Tissue Debrided:  fibrin, biofilm and slough    Pre Debridement Measurements:  Are located in the Wound Documentation Flow Sheet    All active wounds listed below with today's date are evaluated  Wound(s)    debrided this date include # : 1     Post  Debridement Measurements:  Wound 07/29/13 Venous ulcer Ankle Right;Lateral (Active)   Number of days: 3250       Wound 09/30/20 #1 (onset 5 years) Right Medial Lower Leg Cluster (Active)   Wound Image   06/15/22 1237   Wound Etiology Venous 06/22/22 1301   Dressing Status New dressing applied 06/15/22 1302   Wound Cleansed Soap and water 06/22/22 1301   Dressing/Treatment Collagen;ABD;Coban/self-adherent bandages; Moisturizing cream 04/06/22 1359   Offloading for Diabetic Foot Ulcers Offloading not required 06/22/22 1301   Wound Length (cm) 4.2 cm 06/22/22 1301   Wound Width (cm) 3 cm 06/22/22 1301   Wound Depth (cm) 0.1 cm 06/22/22 1301   Wound Surface Area (cm^2) 12.6 cm^2 06/22/22 1301   Change in Wound Size % (l*w) 95 06/22/22 1301   Wound Volume (cm^3) 1.26 cm^3 06/22/22 1301   Wound Healing % 95 06/22/22 1301   Post-Procedure Length (cm) 3.5 cm 06/15/22 1242   Post-Procedure Width (cm) 2.5 cm 06/15/22 1242   Post-Procedure Depth (cm) 0.1 cm 06/15/22 1242   Post-Procedure Surface Area (cm^2) 8.75 cm^2 06/15/22 1242   Post-Procedure Volume (cm^3) 0.875 cm^3 06/15/22 1242   Distance Tunneling (cm) 0 cm 06/22/22 1301   Tunneling Position ___ O'Clock 0 06/22/22 1301   Undermining Starts ___ O'Clock 0 06/22/22 1301   Undermining Ends___ O'Clock 0 06/22/22 1301   Undermining Maxium Distance (cm) 0 06/22/22 1301   Wound Assessment Hyper granulation tissue 06/22/22 1301   Drainage Amount Moderate 06/22/22 1301   Drainage Description Serosanguinous 06/22/22 1301   Odor None 06/22/22 1301   Cassia-wound Assessment Intact 06/22/22 1301   Margins Defined edges 06/22/22 1301   Wound Thickness Description not for Pressure Injury Full thickness 06/22/22 1301   Number of days: 630       Percent of Wound(s) Debrided: approximately 100%    Total  Area  Debrided:  12.6 sq cm     Bleeding:  Minimal    Hemostasis Achieved:  by pressure    Procedural Pain:  0  / 10     Post Procedural Pain:  0 / 10     Response to treatment:  Well tolerated by patient. Status of wound progress and description from last visit:   Stable.       Plan:       Discharge Instructions PHYSICIAN ORDERS AND DISCHARGE INSTRUCTIONS   NOTE: Upon discharge from the [address], you will receive a patient experience survey. We would be grateful if you would take the time to fill this survey out.     Wound care order history:                 Vascular studies: Arterial studies done 9/27/20; no stenosis              Imaging:   Date               Cultures:   obtained on 11/18/2020                                 Obtained on 1/20/2021               Labs/ HbA1c:   Date               Grafts:                       #1 Apligraf on 12/16/2020                     #2 Apligraf on 12/23/2020                     #3 Apligraf on 12/30/2020                     #4 Apligraf on 1/6/2021                     # 5 Apligraf on 1/13/21                     #1 [place] 1/27/2021                     #2 [place] 2/3/2021                     #3 [place] 2/10/2021                     #4 [place] Right Medial Lower Leg 2/17/21                     #5 [place] 3/10/2021                   HBO:                Antibiotics:  Bactrim DS BID for 10 days on 11/18/2020                                   Cipro BID for 10 days ordered on 11/25/2020                                    Cipro 500 mg BID on 1/20/2021                                    Cipro 500 mg BID on 4/28/2021              Earlier Wound care treatments:                Authorizations:                        Consults:   Date                           Primary care physician:      Continuing wound care orders and information:              Residence:  private               [facility] care [facility]: [facility] discharged 1/19/2022              Your wound-care supplies will be provided by:  Doe Spencer provider:              TAHMINA Perez loading:  Date               CPVPY Medications:              RHAQE cleansing:                           FLORESITA not scrub or use excessive force.                          Wash hands with soap and water before and after dressing changes.                         Prior to applying a clean dressing, cleanse wound with normal saline,                                wound cleanser, or mild soap and water.                           Ask the physician or nurse before getting the wound(s) wet in a shower              Daily Wound management:                          Keep weight off wounds and reposition every 2 hours.                          BORBX standing for long periods of time.                          HTMNY wraps/stockings in AM and remove at bedtime.                          If swelling is present, elevate legs to the level of the heart or above for 30 minutes 4-5 times a day and/or when sitting.                                             When taking antibiotics take entire prescription as ordered by physician do not stop taking until medicine is all gone.                                              Orders for this week: 6/22/2022      Right Lower Leg -- wash with soap and water, pat dry. Apply A&D to sarah wound. Apply Stimulin powder. Cover with Hydrofera Blue and ABD. Then wrap with Coban 2. Leave in place for 1 week.       Follow up with Alejandro Bass CNP in 1 week in the wound care center. Call [phone] for any questions or concerns.   Date__________   Time________        Treatment Note      Written Patient Dismissal Instructions Given            Electronically signed by LOLY Cool CNP on 6/22/2022 at 1:19 PM

## 2022-06-22 NOTE — PROGRESS NOTES
Multilayer Compression Wrap   (Not Unna) Below the Knee    NAME:  Constantin Mnuoz  YOB: 1945  MEDICAL RECORD NUMBER:  [id number]  DATE:  6/22/2022    Multilayer compression wrap: Removed old Multilayer wrap if indicated and wash leg with mild soap/water. Applied moisturizing agent to dry skin as needed. Applied primary and secondary dressing as ordered. Applied multilayered dressing below the knee to right lower leg. Instructed patient/caregiver not to remove dressing and to keep it clean and dry. Instructed patient/caregiver on complications to report to provider, such as pain, numbness in toes, heavy drainage, and slippage of dressing. Instructed patient on purpose of compression dressing and on activity and exercise recommendations.       Electronically signed by Alva Solorio LPN on 8/75/1312 at 8:92 PM

## 2022-06-29 ENCOUNTER — HOSPITAL ENCOUNTER (OUTPATIENT)
Dept: WOUND CARE | Age: 77
Discharge: HOME | End: 2022-06-29
Payer: COMMERCIAL

## 2022-06-29 DIAGNOSIS — I83.891 VARICOSE VEINS OF RIGHT LOWER EXTREMITY WITH OTHER COMPLICATIONS: ICD-10-CM

## 2022-06-29 DIAGNOSIS — I83.891 VENOUS STASIS ULCER OF RIGHT LOWER LEG WITH EDEMA OF RIGHT LOWER LEG (HCC): Primary | ICD-10-CM

## 2022-06-29 DIAGNOSIS — L97.919 VENOUS STASIS ULCER OF RIGHT LOWER LEG WITH EDEMA OF RIGHT LOWER LEG (HCC): Primary | ICD-10-CM

## 2022-06-29 DIAGNOSIS — R60.9 VENOUS STASIS ULCER OF RIGHT LOWER LEG WITH EDEMA OF RIGHT LOWER LEG (HCC): Primary | ICD-10-CM

## 2022-06-29 DIAGNOSIS — I83.019 VENOUS STASIS ULCER OF RIGHT LOWER LEG WITH EDEMA OF RIGHT LOWER LEG (HCC): Primary | ICD-10-CM

## 2022-06-29 PROCEDURE — 11042 DBRDMT SUBQ TIS 1ST 20SQCM/<: CPT | Performed by: NURSE PRACTITIONER

## 2022-06-29 PROCEDURE — 11045 DBRDMT SUBQ TISS EACH ADDL: CPT

## 2022-06-29 PROCEDURE — 11042 DBRDMT SUBQ TIS 1ST 20SQCM/<: CPT

## 2022-06-29 PROCEDURE — 11045 DBRDMT SUBQ TISS EACH ADDL: CPT | Performed by: NURSE PRACTITIONER

## 2022-06-29 NOTE — PROGRESS NOTES
Wound Care Center Progress Note With Procedure    Emmanuelle Parent  AGE: 69 y.o. GENDER: male  : 1945  EPISODE DATE:  2022     Subjective:     Chief Complaint   Patient presents with    Wound Check     right leg          HISTORY of PRESENT ILLNESS     Rory nowak 77 y. o. male who presents today for wound evaluation of Chronic venous wound of Right lower medial leg. The wound is of moderate severity.  The underlying cause of the wound is venous disease and trauma.       Patient wound measuring slightly larger this week because a segment of new skin has sloughed away, revealing an incompletely healed wound bed. No other issues.  Slightly macerated and may make some changes to plan of care      Wound Pain Timing/Severity: none  Quality of pain: N/A  Severity of pain:  0 / 10   Modifying Factors: venous stasis  Associated Signs/Symptoms: none        PAST MEDICAL HISTORY        Diagnosis Date    Chronic venous hypertension with ulcer and inflammation involving right side (HCC)     RLE    Hypertension     past    Iron deficiency     Lymphoma Dx 11-11    Hx of B cell lymphoma-in remission since        PAST SURGICAL HISTORY    Past Surgical History:   Procedure Laterality Date    TUNNELED VENOUS PORT PLACEMENT      TUNNELED VENOUS PORT PLACEMENT      removed 2012       FAMILY HISTORY    Family History   Problem Relation Age of Onset    Early Death Mother 52    Diabetes Father     Heart Disease Father     Vision Loss Son         \"He wears glasses\"    Early Death Daughter 36       SOCIAL HISTORY    Social History     Tobacco Use    Smoking status: Never Smoker    Smokeless tobacco: Never Used   Substance Use Topics    Alcohol use: No    Drug use: No       ALLERGIES    No Known Allergies    MEDICATIONS    Current Outpatient Medications on File Prior to Encounter   Medication Sig Dispense Refill    metoprolol succinate (TOPROL XL) 50 MG extended release tablet Take 1 tablet by mouth daily 30 tablet 5     No current facility-administered medications on file prior to encounter. REVIEW OF SYSTEMS    Pertinent items are noted in HPI. Constitutional: Negative for systemic symptoms including fever, chills and malaise. Objective: There were no vitals taken for this visit. PHYSICAL EXAM    General: The patient is in no acute distress. Mental status:  Patient is appropriate, is  oriented to place and plan of care. 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 Performed by: LOLY Casanova - CNP    Consent obtained: Yes    Time out taken:  Yes    Pain Control: N/A       Debridement:Excisional Debridement    Using #15 blade scalpel the wound(s) was/were sharply debrided down through and including the removal of subcutaneous tissue.         Devitalized Tissue Debrided:  fibrin, biofilm, slough, exudate and callus    Pre Debridement Measurements:  Are located in the Wound Documentation Flow Sheet    All active wounds listed below with today's date are evaluated  Wound(s)    debrided this date include # : 1     Post  Debridement Measurements:  Wound 07/29/13 Venous ulcer Ankle Right;Lateral (Active)   Number of days: 4160       Wound 09/30/20 #1 (onset 5 years) Right Medial Lower Leg Cluster (Active)   Wound Image   06/29/22 1313   Wound Etiology Venous 06/29/22 1313 Dressing Status New dressing applied 06/22/22 1333   Wound Cleansed Soap and water 06/29/22 1313   Dressing/Treatment Collagen;ABD;Coban/self-adherent bandages; Moisturizing cream 04/06/22 1359   Offloading for Diabetic Foot Ulcers Offloading not required 06/29/22 1313   Wound Length (cm) 7 cm 06/29/22 1313   Wound Width (cm) 3 cm 06/29/22 1313   Wound Depth (cm) 0.1 cm 06/29/22 1313   Wound Surface Area (cm^2) 21 cm^2 06/29/22 1313   Change in Wound Size % (l*w) 91.67 06/29/22 1313   Wound Volume (cm^3) 2.1 cm^3 06/29/22 1313   Wound Healing % 92 06/29/22 1313   Post-Procedure Length (cm) 7 cm 06/29/22 1329   Post-Procedure Width (cm) 3 cm 06/29/22 1329   Post-Procedure Depth (cm) 0.1 cm 06/29/22 1329   Post-Procedure Surface Area (cm^2) 21 cm^2 06/29/22 1329   Post-Procedure Volume (cm^3) 2.1 cm^3 06/29/22 1329   Distance Tunneling (cm) 0 cm 06/29/22 1313   Tunneling Position ___ O'Clock 0 06/29/22 1313   Undermining Starts ___ O'Clock 0 06/29/22 1313   Undermining Ends___ O'Clock 0 06/29/22 1313   Undermining Maxium Distance (cm) 0 06/29/22 1313   Wound Assessment Hyper granulation tissue 06/29/22 1313   Drainage Amount Moderate 06/29/22 1313   Drainage Description Serosanguinous 06/29/22 1313   Odor None 06/29/22 1313   Cassia-wound Assessment Intact 06/29/22 1313   Margins Defined edges 06/29/22 1313   Wound Thickness Description not for Pressure Injury Full thickness 06/29/22 1313   Number of days: 637       Percent of Wound(s) Debrided: approximately 100%    Total  Area  Debrided:  21 sq cm     Bleeding:  Minimal    Hemostasis Achieved:  by pressure    Procedural Pain:  0  / 10     Post Procedural Pain:  0 / 10     Response to treatment:  Well tolerated by patient. Status of wound progress and description from last visit:   Stable.     Replacing hydrofera blue with ioplex this week to dry further       Plan:       Discharge Instructions       PHYSICIAN ORDERS AND DISCHARGE INSTRUCTIONS   NOTE: Upon discharge from the [facility],[address], you will receive a patient experience survey. We would be grateful if you would take the time to fill this survey out.     Wound care order history:                 Vascular studies: Arterial studies done 9/27/20; no stenosis              Imaging:   Date               Cultures:   obtained on 11/18/2020                                 Obtained on 1/20/2021               Labs/ HbA1c:   Date               Grafts:                       #1 Apligraf on 12/16/2020                     #2 Apligraf on 12/23/2020                     #3 Apligraf on 12/30/2020                     #4 Apligraf on 1/6/2021                     # 5 Apligraf on 1/13/21                     #1 [place] 1/27/2021                     #2 [place] 2/3/2021                     #3 [place] 2/10/2021                     #4 [place] Right Medial Lower Leg 2/17/21                     #5 [place] 3/10/2021                   HBO:                Antibiotics:  Bactrim DS BID for 10 days on 11/18/2020                                   Cipro BID for 10 days ordered on 11/25/2020                                    Cipro 500 mg BID on 1/20/2021                                    Cipro 500 mg BID on 4/28/2021              Earlier Wound care treatments:                Authorizations:                        Consults:   Date                           Primary care physician:      Continuing wound care orders and information:              Residence:  private               Continue home health care with: [facility] discharged 1/19/2022              Your wound-care supplies will be provided by: Joel Millard provider:              [id number] yair Arias loading:  Date               MGAZA Medications:              BINEA cleansing:                           JK not scrub or use excessive force.                          Wash hands with soap and water before and after dressing changes.                           Prior to applying a clean dressing, cleanse wound with normal saline,                                wound cleanser, or mild soap and water.                           Ask the physician or nurse before getting the wound(s) wet in a shower              Daily Wound management:                          Keep weight off wounds and reposition every 2 hours.                          LDGXE standing for long periods of time.                          VCAHA wraps/stockings in AM and remove at bedtime.                          If swelling is present, elevate legs to the level of the heart or above for 30 minutes 4-5 times a day and/or when sitting.                                             When taking antibiotics take entire prescription as ordered by physician do not stop taking until medicine is all gone.                                              Orders for this week: 6/29/2022      Right Lower Leg -- wash with soap and water, pat dry. Apply A&D to sarah wound. Apply Stimulin powder. Cover with Ioplex and ABD. Then wrap with Coban 2. Leave in place for 1 week.        Follow up with Jennifer Kuhn CNP in 1 week in the wound care center. Call [phone] for any questions or concerns.   Date__________   Time________                 Treatment Note      Written Patient Dismissal Instructions Given            Electronically signed by LOLY Johnston CNP on 6/29/2022 at 1:38 PM

## 2022-07-06 ENCOUNTER — HOSPITAL ENCOUNTER (OUTPATIENT)
Dept: WOUND CARE | Age: 77
Discharge: HOME | End: 2022-07-06
Payer: COMMERCIAL

## 2022-07-06 VITALS
HEART RATE: 70 BPM | TEMPERATURE: 98.8 F | SYSTOLIC BLOOD PRESSURE: 167 MMHG | DIASTOLIC BLOOD PRESSURE: 78 MMHG | RESPIRATION RATE: 18 BRPM

## 2022-07-06 DIAGNOSIS — L97.929 VENOUS ULCER OF LEFT LEG (HCC): ICD-10-CM

## 2022-07-06 DIAGNOSIS — I83.891 VENOUS STASIS ULCER OF RIGHT LOWER LEG WITH EDEMA OF RIGHT LOWER LEG (HCC): ICD-10-CM

## 2022-07-06 DIAGNOSIS — I83.891 VARICOSE VEINS OF RIGHT LOWER EXTREMITY WITH OTHER COMPLICATIONS: ICD-10-CM

## 2022-07-06 DIAGNOSIS — I87.331 CHRONIC VENOUS HYPERTENSION WITH ULCER AND INFLAMMATION INVOLVING RIGHT SIDE (HCC): Primary | ICD-10-CM

## 2022-07-06 DIAGNOSIS — I83.029 VENOUS ULCER OF LEFT LEG (HCC): ICD-10-CM

## 2022-07-06 DIAGNOSIS — R60.9 VENOUS STASIS ULCER OF RIGHT LOWER LEG WITH EDEMA OF RIGHT LOWER LEG (HCC): ICD-10-CM

## 2022-07-06 DIAGNOSIS — L97.919 VENOUS STASIS ULCER OF RIGHT LOWER LEG WITH EDEMA OF RIGHT LOWER LEG (HCC): ICD-10-CM

## 2022-07-06 DIAGNOSIS — I83.019 VENOUS STASIS ULCER OF RIGHT LOWER LEG WITH EDEMA OF RIGHT LOWER LEG (HCC): ICD-10-CM

## 2022-07-06 DIAGNOSIS — L97.919 CHRONIC VENOUS HYPERTENSION WITH ULCER AND INFLAMMATION INVOLVING RIGHT SIDE (HCC): Primary | ICD-10-CM

## 2022-07-06 PROCEDURE — 11045 DBRDMT SUBQ TISS EACH ADDL: CPT

## 2022-07-06 PROCEDURE — 11042 DBRDMT SUBQ TIS 1ST 20SQCM/<: CPT | Performed by: NURSE PRACTITIONER

## 2022-07-06 PROCEDURE — 11045 DBRDMT SUBQ TISS EACH ADDL: CPT | Performed by: NURSE PRACTITIONER

## 2022-07-06 PROCEDURE — 11042 DBRDMT SUBQ TIS 1ST 20SQCM/<: CPT

## 2022-07-06 RX ORDER — LIDOCAINE HYDROCHLORIDE 20 MG/ML
JELLY TOPICAL ONCE
Status: CANCELLED | OUTPATIENT
Start: 2022-07-06 | End: 2022-07-06

## 2022-07-06 RX ORDER — LIDOCAINE 50 MG/G
OINTMENT TOPICAL ONCE
Status: CANCELLED | OUTPATIENT
Start: 2022-07-06 | End: 2022-07-06

## 2022-07-06 RX ORDER — CLOBETASOL PROPIONATE 0.5 MG/G
OINTMENT TOPICAL ONCE
Status: CANCELLED | OUTPATIENT
Start: 2022-07-06 | End: 2022-07-06

## 2022-07-06 RX ORDER — GENTAMICIN SULFATE 1 MG/G
OINTMENT TOPICAL ONCE
Status: CANCELLED | OUTPATIENT
Start: 2022-07-06 | End: 2022-07-06

## 2022-07-06 RX ORDER — BACITRACIN ZINC AND POLYMYXIN B SULFATE 500; 1000 [USP'U]/G; [USP'U]/G
OINTMENT TOPICAL ONCE
Status: CANCELLED | OUTPATIENT
Start: 2022-07-06 | End: 2022-07-06

## 2022-07-06 RX ORDER — BETAMETHASONE DIPROPIONATE 0.05 %
OINTMENT (GRAM) TOPICAL ONCE
Status: CANCELLED | OUTPATIENT
Start: 2022-07-06 | End: 2022-07-06

## 2022-07-06 RX ORDER — BACITRACIN, NEOMYCIN, POLYMYXIN B 400; 3.5; 5 [USP'U]/G; MG/G; [USP'U]/G
OINTMENT TOPICAL ONCE
Status: CANCELLED | OUTPATIENT
Start: 2022-07-06 | End: 2022-07-06

## 2022-07-06 RX ORDER — LIDOCAINE 40 MG/G
CREAM TOPICAL ONCE
Status: CANCELLED | OUTPATIENT
Start: 2022-07-06 | End: 2022-07-06

## 2022-07-06 RX ORDER — LIDOCAINE HYDROCHLORIDE 40 MG/ML
SOLUTION TOPICAL ONCE
Status: CANCELLED | OUTPATIENT
Start: 2022-07-06 | End: 2022-07-06

## 2022-07-06 RX ORDER — GINSENG 100 MG
CAPSULE ORAL ONCE
Status: CANCELLED | OUTPATIENT
Start: 2022-07-06 | End: 2022-07-06

## 2022-07-06 ASSESSMENT — PAIN SCALES - GENERAL: PAINLEVEL_OUTOF10: 0

## 2022-07-06 NOTE — PROGRESS NOTES
Multilayer Compression Wrap   (Not Unna) Below the Knee    NAME:  Kashmir Casillas  YOB: 1945  MEDICAL RECORD NUMBER:  [id number]  DATE:  7/6/2022    Multilayer compression wrap: Removed old Multilayer wrap if indicated and wash leg with mild soap/water. Applied moisturizing agent to dry skin as needed. Applied primary and secondary dressing as ordered. Applied multilayered dressing below the knee to right lower leg. Instructed patient/caregiver not to remove dressing and to keep it clean and dry. Instructed patient/caregiver on complications to report to provider, such as pain, numbness in toes, heavy drainage, and slippage of dressing. Instructed patient on purpose of compression dressing and on activity and exercise recommendations.       Electronically signed by Page Antony RN on 7/6/2022 at 1:32 PM

## 2022-07-06 NOTE — PROGRESS NOTES
Wound Care Center Progress Note With Procedure    Mer Lester  AGE: 69 y.o. GENDER: male  : 1945  EPISODE DATE:  2022     Subjective:     Chief Complaint   Patient presents with    Wound Check     right leg         HISTORY of PRESENT ILLNESS     Melva nowak 77 y. o. male who presents today for wound evaluation of Chronic venous wound of Right lower medial leg. The wound is of moderate severity.  The underlying cause of the wound is venous disease and trauma.      Patient improved in appearence today. Good granulating tissue present in wound bed. No other issues reported     Wound Pain Timing/Severity: none  Quality of pain: N/A  Severity of pain:  0 / 10   Modifying Factors: venous stasis  Associated Signs/Symptoms: none    Patient educated on the 6 essential components necessary for wound healing: Circulation, Debridements, Proper Dressings and Topical Wound Products, Infection Control, Edema Control and Offloading. Patient educated on those factors that negatively effect or impact wound healing: smoking, obesity, uncontrolled diabetes, anticoagulant and immunosuppressive regimens, inadequate nutrition, untreated arterial and venous disease if applicable and measures to manage edema.                                                    PAST MEDICAL HISTORY        Diagnosis Date    Chronic venous hypertension with ulcer and inflammation involving right side (HCC)     RLE    Hypertension     past    Iron deficiency     Lymphoma Dx 11-11    Hx of B cell lymphoma-in remission since        PAST SURGICAL HISTORY    Past Surgical History:   Procedure Laterality Date    TUNNELED VENOUS PORT PLACEMENT      TUNNELED VENOUS PORT PLACEMENT      removed 2012       FAMILY HISTORY    Family History   Problem Relation Age of Onset    Early Death Mother 52    Diabetes Father     Heart Disease Father     Vision Loss Son         \"He wears glasses\"    Early Death Daughter 36       SOCIAL scalpel the wound(s) was/were sharply debrided down through and including the removal of subcutaneous tissue. Devitalized Tissue Debrided:  fibrin, biofilm, slough, exudate and callus    Pre Debridement Measurements:  Are located in the Wound Documentation Flow Sheet    All active wounds listed below with today's date are evaluated  Wound(s)    debrided this date include # : 1     Post  Debridement Measurements:  Wound 07/29/13 Venous ulcer Ankle Right;Lateral (Active)   Number of days: 2300       Wound 09/30/20 #1 (onset 5 years) Right Medial Lower Leg Cluster (Active)   Wound Image   06/29/22 1313   Wound Etiology Venous 07/06/22 1307   Dressing Status New dressing applied 06/22/22 1333   Wound Cleansed Soap and water 07/06/22 1307   Dressing/Treatment Collagen;ABD;Coban/self-adherent bandages; Moisturizing cream 04/06/22 1359   Offloading for Diabetic Foot Ulcers Offloading not required 07/06/22 1307   Wound Length (cm) 8 cm 07/06/22 1307   Wound Width (cm) 5 cm 07/06/22 1307   Wound Depth (cm) 0.1 cm 07/06/22 1307   Wound Surface Area (cm^2) 40 cm^2 07/06/22 1307   Change in Wound Size % (l*w) 84.13 07/06/22 1307   Wound Volume (cm^3) 4 cm^3 07/06/22 1307   Wound Healing % 84 07/06/22 1307   Post-Procedure Length (cm) 8 cm 07/06/22 1321   Post-Procedure Width (cm) 5 cm 07/06/22 1321   Post-Procedure Depth (cm) 0.1 cm 07/06/22 1321   Post-Procedure Surface Area (cm^2) 40 cm^2 07/06/22 1321   Post-Procedure Volume (cm^3) 4 cm^3 07/06/22 1321   Distance Tunneling (cm) 0 cm 07/06/22 1307   Tunneling Position ___ O'Clock 0 07/06/22 1307   Undermining Starts ___ O'Clock 0 07/06/22 1307   Undermining Ends___ O'Clock 0 07/06/22 1307   Undermining Maxium Distance (cm) 0 07/06/22 1307   Wound Assessment Pink/red 07/06/22 1307   Drainage Amount Moderate 07/06/22 1307   Drainage Description Serosanguinous 07/06/22 1307   Odor None 07/06/22 1307   Cassia-wound Assessment Maceration;Dry/flaky 07/06/22 1307   Margins Defined edges 07/06/22 1307   Wound Thickness Description not for Pressure Injury Full thickness 07/06/22 1307   Number of days: 644       Percent of Wound(s) Debrided: approximately 100%    Total  Area  Debrided:  40 sq cm     Bleeding:  Minimal    Hemostasis Achieved:  by pressure    Procedural Pain:  0  / 10     Post Procedural Pain:  0 / 10     Response to treatment:  Well tolerated by patient. Status of wound progress and description from last visit:   Stable. Follow up 1 week and continue to monitor       Plan:       Discharge Instructions       [address] [place] [address]  NOTE: Upon discharge from the [address], you will receive a patient experience survey.  We would be grateful if you would take the time to fill this survey out.     Wound care order history:                 Vascular studies: Arterial studies done 9/27/20; no stenosis              Imaging:   Date               Cultures:   obtained on 11/18/2020                                 Obtained on 1/20/2021               Labs/ HbA1c:   Date               Grafts:                       #1 Apligraf on 12/16/2020                     #2 Apligraf on 12/23/2020                     #3 Apligraf on 12/30/2020                     #4 Apligraf on 1/6/2021                     # 5 Apligraf on 1/13/21                     #1 [place] 1/27/2021                     #2 [place] 2/3/2021                     #3 [place] 2/10/2021                     #4 [place] Right Medial Lower Leg 2/17/21                     #5 [place] 3/10/2021                   HBO:                Antibiotics:  Bactrim DS BID for 10 days on 11/18/2020                                   Cipro BID for 10 days ordered on 11/25/2020                                    Cipro 500 mg BID on 1/20/2021                                    Cipro 500 mg BID on 4/28/2021              Earlier Wound care treatments:                Authorizations:                        Consults:   Date                         Primary care physician:      Continuing wound care orders and information:              Residence:  private               Continue home health care with: [facility] discharged 1/19/2022              Your wound-care supplies will be provided by: Clark Campbell provider:              DYLAN Hernandez loading:  Date               GVWEB Medications:              AHKLF cleansing:                           PC not scrub or use excessive force.                          Wash hands with soap and water before and after dressing changes.                         Prior to applying a clean dressing, cleanse wound with normal saline,                                wound cleanser, or mild soap and water.                           Ask the physician or nurse before getting the wound(s) wet in a shower              Daily Wound management:                          Keep weight off wounds and reposition every 2 hours.                          VMXFX standing for long periods of time.                          PPAAF wraps/stockings in AM and remove at bedtime.                          If swelling is present, elevate legs to the level of the heart or above for 30 minutes 4-5 times a day and/or when sitting.                                             When taking antibiotics take entire prescription as ordered by physician do not stop taking until medicine is all gone.                                              Orders for this week: 7/6/2022      Right Lower Leg -- wash with soap and water, pat dry. Apply A&D to sarah wound. Apply Stimulin powder. Cover with Ioplex and ABD. Then wrap with Coban 2. Leave in place for 1 week.        Follow up with Liliya Gambino CNP in 1 week in the wound care center. Call [phone] for any questions or concerns.   Date__________   Time________                     Treatment Note      Written Patient Dismissal Instructions Given            Electronically signed by Landy Richardson, LOLY - CNP on 7/6/2022 at 1:31 PM

## 2022-07-08 ENCOUNTER — NURSE ONLY (OUTPATIENT)
Dept: INTERNAL MEDICINE CLINIC | Age: 77
End: 2022-07-08
Payer: COMMERCIAL

## 2022-07-08 DIAGNOSIS — D69.6 THROMBOCYTOPENIA (HCC): ICD-10-CM

## 2022-07-08 DIAGNOSIS — E80.6 HYPERBILIRUBINEMIA: Primary | ICD-10-CM

## 2022-07-08 PROCEDURE — 99999 PR OFFICE/OUTPT VISIT,PROCEDURE ONLY: CPT | Performed by: FAMILY MEDICINE

## 2022-07-08 PROCEDURE — 36415 COLL VENOUS BLD VENIPUNCTURE: CPT | Performed by: FAMILY MEDICINE

## 2022-07-09 LAB
BASOPHILS ABSOLUTE: 0 K/UL (ref 0–0.2)
BASOPHILS RELATIVE PERCENT: 0.9 %
BILIRUB SERPL-MCNC: 1.3 MG/DL (ref 0–1)
BILIRUBIN DIRECT: 0.3 MG/DL (ref 0–0.3)
BILIRUBIN, INDIRECT: 1 MG/DL (ref 0–1)
EOSINOPHILS ABSOLUTE: 0.4 K/UL (ref 0–0.6)
EOSINOPHILS RELATIVE PERCENT: 7.2 %
HCT VFR BLD CALC: 43.7 % (ref 40.5–52.5)
HEMOGLOBIN: 15 G/DL (ref 13.5–17.5)
LYMPHOCYTES ABSOLUTE: 1.4 K/UL (ref 1–5.1)
LYMPHOCYTES RELATIVE PERCENT: 26.8 %
MCH RBC QN AUTO: 32.2 PG (ref 26–34)
MCHC RBC AUTO-ENTMCNC: 34.2 G/DL (ref 31–36)
MCV RBC AUTO: 93.9 FL (ref 80–100)
MONOCYTES ABSOLUTE: 0.5 K/UL (ref 0–1.3)
MONOCYTES RELATIVE PERCENT: 10.3 %
NEUTROPHILS ABSOLUTE: 2.8 K/UL (ref 1.7–7.7)
NEUTROPHILS RELATIVE PERCENT: 54.8 %
PDW BLD-RTO: 13.7 % (ref 12.4–15.4)
PLATELET # BLD: 128 K/UL (ref 135–450)
PMV BLD AUTO: 9.6 FL (ref 5–10.5)
RBC # BLD: 4.66 M/UL (ref 4.2–5.9)
WBC # BLD: 5.2 K/UL (ref 4–11)

## 2022-07-12 DIAGNOSIS — I10 ESSENTIAL HYPERTENSION: ICD-10-CM

## 2022-07-12 RX ORDER — METOPROLOL SUCCINATE 50 MG/1
50 TABLET, EXTENDED RELEASE ORAL DAILY
Qty: 30 TABLET | Refills: 5 | Status: SHIPPED | OUTPATIENT
Start: 2022-07-12

## 2022-07-13 ENCOUNTER — HOSPITAL ENCOUNTER (OUTPATIENT)
Dept: WOUND CARE | Age: 77
Discharge: HOME | End: 2022-07-13
Payer: COMMERCIAL

## 2022-07-13 VITALS
SYSTOLIC BLOOD PRESSURE: 163 MMHG | DIASTOLIC BLOOD PRESSURE: 90 MMHG | TEMPERATURE: 98.1 F | HEART RATE: 72 BPM | RESPIRATION RATE: 18 BRPM

## 2022-07-13 DIAGNOSIS — R60.9 VENOUS STASIS ULCER OF RIGHT LOWER LEG WITH EDEMA OF RIGHT LOWER LEG (HCC): ICD-10-CM

## 2022-07-13 DIAGNOSIS — L97.919 VENOUS STASIS ULCER OF RIGHT LOWER LEG WITH EDEMA OF RIGHT LOWER LEG (HCC): ICD-10-CM

## 2022-07-13 DIAGNOSIS — I83.891 VARICOSE VEINS OF LOWER EXTREMITIES WITH COMPLICATIONS, RIGHT: ICD-10-CM

## 2022-07-13 DIAGNOSIS — L97.929 VENOUS ULCER OF LEFT LEG (HCC): ICD-10-CM

## 2022-07-13 DIAGNOSIS — I83.029 VENOUS ULCER OF LEFT LEG (HCC): ICD-10-CM

## 2022-07-13 DIAGNOSIS — L97.919 CHRONIC VENOUS HYPERTENSION WITH ULCER AND INFLAMMATION INVOLVING RIGHT SIDE (HCC): ICD-10-CM

## 2022-07-13 DIAGNOSIS — I87.331 CHRONIC VENOUS HYPERTENSION WITH ULCER AND INFLAMMATION INVOLVING RIGHT SIDE (HCC): ICD-10-CM

## 2022-07-13 DIAGNOSIS — I83.019 VENOUS STASIS ULCER OF RIGHT LOWER LEG WITH EDEMA OF RIGHT LOWER LEG (HCC): ICD-10-CM

## 2022-07-13 DIAGNOSIS — I83.891 VARICOSE VEINS OF RIGHT LOWER EXTREMITY WITH OTHER COMPLICATIONS: Primary | ICD-10-CM

## 2022-07-13 DIAGNOSIS — I83.891 VENOUS STASIS ULCER OF RIGHT LOWER LEG WITH EDEMA OF RIGHT LOWER LEG (HCC): ICD-10-CM

## 2022-07-13 PROCEDURE — 11042 DBRDMT SUBQ TIS 1ST 20SQCM/<: CPT | Performed by: NURSE PRACTITIONER

## 2022-07-13 PROCEDURE — 11042 DBRDMT SUBQ TIS 1ST 20SQCM/<: CPT

## 2022-07-13 RX ORDER — BETAMETHASONE DIPROPIONATE 0.05 %
OINTMENT (GRAM) TOPICAL ONCE
Status: CANCELLED | OUTPATIENT
Start: 2022-07-13 | End: 2022-07-13

## 2022-07-13 RX ORDER — GINSENG 100 MG
CAPSULE ORAL ONCE
Status: CANCELLED | OUTPATIENT
Start: 2022-07-13 | End: 2022-07-13

## 2022-07-13 RX ORDER — CLOBETASOL PROPIONATE 0.5 MG/G
OINTMENT TOPICAL ONCE
Status: CANCELLED | OUTPATIENT
Start: 2022-07-13 | End: 2022-07-13

## 2022-07-13 RX ORDER — BACITRACIN ZINC AND POLYMYXIN B SULFATE 500; 1000 [USP'U]/G; [USP'U]/G
OINTMENT TOPICAL ONCE
Status: CANCELLED | OUTPATIENT
Start: 2022-07-13 | End: 2022-07-13

## 2022-07-13 RX ORDER — LIDOCAINE HYDROCHLORIDE 40 MG/ML
SOLUTION TOPICAL ONCE
Status: CANCELLED | OUTPATIENT
Start: 2022-07-13 | End: 2022-07-13

## 2022-07-13 RX ORDER — GENTAMICIN SULFATE 1 MG/G
OINTMENT TOPICAL ONCE
Status: CANCELLED | OUTPATIENT
Start: 2022-07-13 | End: 2022-07-13

## 2022-07-13 RX ORDER — LIDOCAINE HYDROCHLORIDE 20 MG/ML
JELLY TOPICAL ONCE
Status: CANCELLED | OUTPATIENT
Start: 2022-07-13 | End: 2022-07-13

## 2022-07-13 RX ORDER — LIDOCAINE 50 MG/G
OINTMENT TOPICAL ONCE
Status: CANCELLED | OUTPATIENT
Start: 2022-07-13 | End: 2022-07-13

## 2022-07-13 RX ORDER — LIDOCAINE 40 MG/G
CREAM TOPICAL ONCE
Status: CANCELLED | OUTPATIENT
Start: 2022-07-13 | End: 2022-07-13

## 2022-07-13 RX ORDER — BACITRACIN, NEOMYCIN, POLYMYXIN B 400; 3.5; 5 [USP'U]/G; MG/G; [USP'U]/G
OINTMENT TOPICAL ONCE
Status: CANCELLED | OUTPATIENT
Start: 2022-07-13 | End: 2022-07-13

## 2022-07-13 ASSESSMENT — PAIN SCALES - GENERAL: PAINLEVEL_OUTOF10: 0

## 2022-07-13 NOTE — PROGRESS NOTES
Multilayer Compression Wrap   (Not Unna) Below the Knee    NAME:  Kashmir Casillas  YOB: 1945  MEDICAL RECORD NUMBER:  [id number]  DATE:  7/13/2022    Multilayer compression wrap: Removed old Multilayer wrap if indicated and wash leg with mild soap/water. Applied moisturizing agent to dry skin as needed. Applied primary and secondary dressing as ordered. Applied multilayered dressing below the knee to right lower leg. Instructed patient/caregiver not to remove dressing and to keep it clean and dry. Instructed patient/caregiver on complications to report to provider, such as pain, numbness in toes, heavy drainage, and slippage of dressing. Instructed patient on purpose of compression dressing and on activity and exercise recommendations.       Electronically signed by Sil Perez LPN on 6/76/4687 at 7:49 PM

## 2022-07-13 NOTE — PROGRESS NOTES
Wound Care Center Progress Note With Procedure    Héctor Mena  AGE: 69 y.o. GENDER: male  : 1945  EPISODE DATE:  2022     Subjective:     Chief Complaint   Patient presents with    Wound Check     right leg         HISTORY of PRESENT ILLNESS     Gregory Nieves a 77 y. o. male who presents today for wound evaluation of Chronic venous wound of Right lower medial leg. The wound is of moderate severity.  The underlying cause of the wound is venous disease and trauma.       Improved again this week. Area that was excoriated to inferior aspect has closed and good, healthy tissue covers  No issues reported. Good clean base       Wound Pain Timing/Severity: none  Quality of pain: N/A  Severity of pain:  0 / 10   Modifying Factors: venous stasis  Associated Signs/Symptoms: none     Patient educated on the 6 essential components necessary for wound healing: Circulation, Debridements, Proper Dressings and Topical Wound Products, Infection Control, Edema Control and Offloading.     Patient educated on those factors that negatively effect or impact wound healing: smoking, obesity, uncontrolled diabetes, anticoagulant and immunosuppressive regimens, inadequate nutrition, untreated arterial and venous disease if applicable and measures to manage edema.          PAST MEDICAL HISTORY        Diagnosis Date    Chronic venous hypertension with ulcer and inflammation involving right side (HCC)     RLE    Hypertension     past    Iron deficiency     Lymphoma Dx 11-11    Hx of B cell lymphoma-in remission since        PAST SURGICAL HISTORY    Past Surgical History:   Procedure Laterality Date    TUNNELED VENOUS PORT PLACEMENT      TUNNELED VENOUS PORT PLACEMENT      removed 2012       FAMILY HISTORY    Family History   Problem Relation Age of Onset    Early Death Mother 52    Diabetes Father     Heart Disease Father     Vision Loss Son         \"He wears glasses\"    Early Death Daughter 36 SOCIAL HISTORY    Social History     Tobacco Use    Smoking status: Never Smoker    Smokeless tobacco: Never Used   Substance Use Topics    Alcohol use: No    Drug use: No       ALLERGIES    No Known Allergies    MEDICATIONS    Current Outpatient Medications on File Prior to Encounter   Medication Sig Dispense Refill    metoprolol succinate (TOPROL XL) 50 MG extended release tablet Take 1 tablet by mouth daily 30 tablet 5     No current facility-administered medications on file prior to encounter. REVIEW OF SYSTEMS    Pertinent items are noted in HPI. Constitutional: Negative for systemic symptoms including fever, chills and malaise. Objective:      BP (!) 163/90   Pulse 72   Temp 98.1 °F (36.7 °C) (Temporal)   Resp 18     PHYSICAL EXAM      General: The patient is in no acute distress. Mental status:  Patient is appropriate, is  oriented to place and plan of care. Dermatologic exam: Visual inspection of the periwound reveals the skin to be normal in turgor and texture, dry and scaly  Wound exam: see wound description below in procedure note      Assessment:     Problem List Items Addressed This Visit        Circulatory    Varicose veins of lower extremities with complications, right    Varicose veins of right lower extremity with other complications - Primary       Other    Venous stasis ulcer of right lower leg with edema of right lower leg ([address]        Procedure Note    Indications:  Based on my examination of this patient's wound(s) today, sharp excision into necrotic subcutaneous tissue is required to promote healing and evaluate the extent of previous healing. Performed by: Agatha Fothergill, APRN - CNP    Consent obtained: Yes    Time out taken:  Yes    Pain Control: N/a       Debridement:Excisional Debridement    Using #15 blade scalpel the wound(s) was/were sharply debrided down through and including the removal of subcutaneous tissue.         Devitalized Tissue Debrided: fibrin, biofilm, slough and exudate    Pre Debridement Measurements:  Are located in the Wound Documentation Flow Sheet    All active wounds listed below with today's date are evaluated  Wound(s)    debrided this date include # : 1     Post  Debridement Measurements:  Wound 07/29/13 Venous ulcer Ankle Right;Lateral (Active)   Number of days: 3271       Wound 09/30/20 #1 (onset 5 years) Right Medial Lower Leg Cluster (Active)   Wound Image   06/29/22 1313   Wound Etiology Venous 07/13/22 1303   Dressing Status New dressing applied 07/06/22 1331   Wound Cleansed Soap and water 07/13/22 1303   Dressing/Treatment Collagen;ABD;Coban/self-adherent bandages; Moisturizing cream 04/06/22 1359   Offloading for Diabetic Foot Ulcers Offloading not required 07/13/22 1303   Wound Length (cm) 3.3 cm 07/13/22 1303   Wound Width (cm) 2.3 cm 07/13/22 1303   Wound Depth (cm) 0.1 cm 07/13/22 1303   Wound Surface Area (cm^2) 7.59 cm^2 07/13/22 1303   Change in Wound Size % (l*w) 96.99 07/13/22 1303   Wound Volume (cm^3) 0.759 cm^3 07/13/22 1303   Wound Healing % 97 07/13/22 1303   Post-Procedure Length (cm) 8 cm 07/06/22 1321   Post-Procedure Width (cm) 5 cm 07/06/22 1321   Post-Procedure Depth (cm) 0.1 cm 07/06/22 1321   Post-Procedure Surface Area (cm^2) 40 cm^2 07/06/22 1321   Post-Procedure Volume (cm^3) 4 cm^3 07/06/22 1321   Distance Tunneling (cm) 0 cm 07/13/22 1303   Tunneling Position ___ O'Clock 0 07/13/22 1303   Undermining Starts ___ O'Clock 0 07/13/22 1303   Undermining Ends___ O'Clock 0 07/13/22 1303   Undermining Maxium Distance (cm) 0 07/13/22 1303   Wound Assessment Pink/red 07/13/22 1303   Drainage Amount Moderate 07/13/22 1303   Drainage Description Serosanguinous 07/13/22 1303   Odor None 07/13/22 1303   Cassia-wound Assessment Fragile 07/13/22 1303   Margins Defined edges 07/13/22 1303   Wound Thickness Description not for Pressure Injury Full thickness 07/13/22 1303   Number of days: 651       Percent of Wound(s) Debrided: approximately 100%    Total  Area  Debrided:  7.59 sq cm     Bleeding:  Minimal    Hemostasis Achieved:  by pressure    Procedural Pain:  0  / 10     Post Procedural Pain:  0 / 10     Response to treatment:  Well tolerated by patient. Status of wound progress and description from last visit:   Stable and slowly improving. Follow up 1 week and continue plan of care for now       Plan:       Discharge Instructions         Discharge Instructions        PHYSICIAN [facility]  NOTE: Upon discharge from the [address], you will receive a patient experience survey.  We would be grateful if you would take the time to fill this survey out.     Wound care order history:                 Vascular studies: Arterial studies done 9/27/20; no stenosis              Imaging:   Date               Cultures:   obtained on 11/18/2020                                 Obtained on 1/20/2021               Labs/ HbA1c:   Date               Grafts:                       #1 Apligraf on 12/16/2020                     #2 Apligraf on 12/23/2020                     #3 Apligraf on 12/30/2020                     #4 Apligraf on 1/6/2021                     # 5 Apligraf on 1/13/21                     #1 [place] 1/27/2021                     #2 [place] 2/3/2021                     #3 [place] 2/10/2021                     #4 [place] Right Medial Lower Leg 2/17/21                     #5 [place] 3/10/2021                   HBO:                Antibiotics:  Bactrim DS BID for 10 days on 11/18/2020                                   Cipro BID for 10 days ordered on 11/25/2020                                    Cipro 500 mg BID on 1/20/2021                                    Cipro 500 mg BID on 4/28/2021              Earlier Wound care treatments:                Authorizations:                        Consults:   Date                           Primary care physician:      Continuing wound care orders and information:              Residence:  private               [facility] care with: [facility] discharged 1/19/2022              Your wound-care supplies will be provided by: Ilda Holliday provider:              DONNELL with  Eugenia Halsted loading:  Date               UGCSA Medications:              EZZLI cleansing:                           BX not scrub or use excessive force.                          Wash hands with soap and water before and after dressing changes.                         Prior to applying a clean dressing, cleanse wound with normal saline,                                wound cleanser, or mild soap and water.                           Ask the physician or nurse before getting the wound(s) wet in a shower              Daily Wound management:                          Keep weight off wounds and reposition every 2 hours.                          GDRTA standing for long periods of time.                          NAHXY wraps/stockings in AM and remove at bedtime.                          If swelling is present, elevate legs to the level of the heart or above for 30 minutes 4-5 times a day and/or when sitting.                                             When taking antibiotics take entire prescription as ordered by physician do not stop taking until medicine is all gone.                                              Orders for this week: 7/13/2022      Right Lower Leg -- wash with soap and water, pat dry. Apply A&D to sarah wound. Apply Stimulin powder. Cover with Ioplex and ABD. Then wrap with Coban 2. Leave in place for 1 week.        Follow up with Janet Tinoco CNP in 1 week in the wound care center. Call [phone] for any questions or concerns.   Date__________   Time________                          Treatment Note      Written Patient Dismissal Instructions Given            Electronically signed by LOLY Rojas CNP on 7/13/2022 at 1:10 PM

## 2022-07-20 ENCOUNTER — HOSPITAL ENCOUNTER (OUTPATIENT)
Dept: WOUND CARE | Age: 77
Discharge: HOME | End: 2022-07-20
Payer: COMMERCIAL

## 2022-07-20 VITALS
DIASTOLIC BLOOD PRESSURE: 85 MMHG | RESPIRATION RATE: 18 BRPM | HEART RATE: 64 BPM | TEMPERATURE: 98.2 F | SYSTOLIC BLOOD PRESSURE: 156 MMHG

## 2022-07-20 DIAGNOSIS — L97.929 VENOUS ULCER OF LEFT LEG (HCC): ICD-10-CM

## 2022-07-20 DIAGNOSIS — I87.331 CHRONIC VENOUS HYPERTENSION WITH ULCER AND INFLAMMATION INVOLVING RIGHT SIDE (HCC): Primary | ICD-10-CM

## 2022-07-20 DIAGNOSIS — I83.029 VENOUS ULCER OF LEFT LEG (HCC): ICD-10-CM

## 2022-07-20 DIAGNOSIS — L97.919 CHRONIC VENOUS HYPERTENSION WITH ULCER AND INFLAMMATION INVOLVING RIGHT SIDE (HCC): Primary | ICD-10-CM

## 2022-07-20 PROCEDURE — 11042 DBRDMT SUBQ TIS 1ST 20SQCM/<: CPT

## 2022-07-20 PROCEDURE — 11042 DBRDMT SUBQ TIS 1ST 20SQCM/<: CPT | Performed by: NURSE PRACTITIONER

## 2022-07-20 RX ORDER — GENTAMICIN SULFATE 1 MG/G
OINTMENT TOPICAL ONCE
Status: CANCELLED | OUTPATIENT
Start: 2022-07-20 | End: 2022-07-20

## 2022-07-20 RX ORDER — CLOBETASOL PROPIONATE 0.5 MG/G
OINTMENT TOPICAL ONCE
Status: CANCELLED | OUTPATIENT
Start: 2022-07-20 | End: 2022-07-20

## 2022-07-20 RX ORDER — BETAMETHASONE DIPROPIONATE 0.05 %
OINTMENT (GRAM) TOPICAL ONCE
Status: CANCELLED | OUTPATIENT
Start: 2022-07-20 | End: 2022-07-20

## 2022-07-20 RX ORDER — BACITRACIN, NEOMYCIN, POLYMYXIN B 400; 3.5; 5 [USP'U]/G; MG/G; [USP'U]/G
OINTMENT TOPICAL ONCE
Status: CANCELLED | OUTPATIENT
Start: 2022-07-20 | End: 2022-07-20

## 2022-07-20 RX ORDER — LIDOCAINE HYDROCHLORIDE 20 MG/ML
JELLY TOPICAL ONCE
Status: CANCELLED | OUTPATIENT
Start: 2022-07-20 | End: 2022-07-20

## 2022-07-20 RX ORDER — GINSENG 100 MG
CAPSULE ORAL ONCE
Status: CANCELLED | OUTPATIENT
Start: 2022-07-20 | End: 2022-07-20

## 2022-07-20 RX ORDER — LIDOCAINE HYDROCHLORIDE 40 MG/ML
SOLUTION TOPICAL ONCE
Status: CANCELLED | OUTPATIENT
Start: 2022-07-20 | End: 2022-07-20

## 2022-07-20 RX ORDER — LIDOCAINE 40 MG/G
CREAM TOPICAL ONCE
Status: CANCELLED | OUTPATIENT
Start: 2022-07-20 | End: 2022-07-20

## 2022-07-20 RX ORDER — LIDOCAINE 50 MG/G
OINTMENT TOPICAL ONCE
Status: CANCELLED | OUTPATIENT
Start: 2022-07-20 | End: 2022-07-20

## 2022-07-20 RX ORDER — BACITRACIN ZINC AND POLYMYXIN B SULFATE 500; 1000 [USP'U]/G; [USP'U]/G
OINTMENT TOPICAL ONCE
Status: CANCELLED | OUTPATIENT
Start: 2022-07-20 | End: 2022-07-20

## 2022-07-20 ASSESSMENT — PAIN SCALES - GENERAL
PAINLEVEL_OUTOF10: 0
PAINLEVEL_OUTOF10: 0

## 2022-07-20 NOTE — DISCHARGE INSTRUCTIONS
PHYSICIAN ORDERS AND DISCHARGE INSTRUCTIONS   NOTE: Upon discharge from the [address], you will receive a patient experience survey. We would be grateful if you would take the time to fill this survey out. Wound care order history:                 Vascular studies: Arterial studies done 9/27/20; no stenosis              Imaging:   Date              Cultures:   obtained on 11/18/2020                                 Obtained on 1/20/2021              Labs/ HbA1c:   Date              Grafts:                       #1 Apligraf on 12/16/2020                     #2 Apligraf on 12/23/2020                     #3 Apligraf on 12/30/2020                     #4 Apligraf on 1/6/2021                     # 5 Apligraf on 1/13/21                     #1 N[place] 1/27/2021                     #2 [place] 2/3/2021                     #3 [place] 2/10/2021                     #4 [place] Right Medial Lower Leg 2/17/21                     #5 [place] 3/10/2021                  HBO:                Antibiotics:  Bactrim DS BID for 10 days on 11/18/2020                                   Cipro BID for 10 days ordered on 11/25/2020                                    Cipro 500 mg BID on 1/20/2021                                    Cipro 500 mg BID on 4/28/2021              Earlier Wound care treatments:                Authorizations:                        Consults:   Date                          Primary care physician:     Continuing wound care orders and information:              Residence:  private              [facility]  [facility] [place]              Your wound-care supplies will be provided by: Stefanie RAO provider:              Compression with              Off loading:  Date              Wound Medications:              Wound cleansing:                          Do not scrub or use excessive force. Wash hands with soap and water before and after dressing changes. Prior to applying a clean dressing, cleanse wound with normal saline,                                wound cleanser, or mild soap and water. Ask the physician or nurse before getting the wound(s) wet in a shower              Daily Wound management:                          Keep weight off wounds and reposition every 2 hours. Avoid standing for long periods of time. Apply wraps/stockings in AM and remove at bedtime. If swelling is present, elevate legs to the level of the heart or above for 30 minutes 4-5 times a day and/or when sitting. When taking antibiotics take entire prescription as ordered by physician do not stop taking until medicine is all gone. Orders for this week: 7/20/2022      Right Lower Leg -- wash with soap and water, pat dry. Apply A&D to sarah wound. Apply Stimulin powder. Cover with Ioplex and ABD. Then wrap with Coban 2. Leave in place for 1 week. Follow up with Liliya Gambino CNP in 1 week in the wound care center. Call [phone] for any questions or concerns.   Date__________   Time______

## 2022-07-20 NOTE — PROGRESS NOTES
Multilayer Compression Wrap   (Not Unna) Below the Knee    NAME:  Miles Cummings  YOB: 1945  MEDICAL RECORD NUMBER:  [id number]  DATE:  7/20/2022    Multilayer compression wrap: Removed old Multilayer wrap if indicated and wash leg with mild soap/water. Applied moisturizing agent to dry skin as needed. Applied primary and secondary dressing as ordered. Applied multilayered dressing below the knee to right lower leg. Instructed patient/caregiver not to remove dressing and to keep it clean and dry. Instructed patient/caregiver on complications to report to provider, such as pain, numbness in toes, heavy drainage, and slippage of dressing. Instructed patient on purpose of compression dressing and on activity and exercise recommendations.       Electronically signed by Cricket Bunn LPN on 7/93/9182 at 5:03 PM

## 2022-07-20 NOTE — PROGRESS NOTES
Wound Care Center Progress Note With Procedure    Mer Lester  AGE: 69 y.o. GENDER: male  : 1945  EPISODE DATE:  2022     Subjective:     Chief Complaint   Patient presents with    Wound Check     leg         HISTORY of PRESENT ILLNESS     Mer Lester is a 77 y.o. male who presents today for wound evaluation of Chronic venous wound of Right lower medial leg. The wound is of moderate severity. The underlying cause of the wound is venous disease and trauma. Clean and dry this week  Patient reports no problems or concerns   Making good progress the last month     Wound Pain Timing/Severity: none  Quality of pain: N/A  Severity of pain:  0 / 10  Modifying Factors: venous stasis  Associated Signs/Symptoms: none     Patient educated on the 6 essential components necessary for wound healing: Circulation, Debridements, Proper Dressings and Topical Wound Products, Infection Control, Edema Control and Offloading. Patient educated on those factors that negatively effect or impact wound healing: smoking, obesity, uncontrolled diabetes, anticoagulant and immunosuppressive regimens, inadequate nutrition, untreated arterial and venous disease if applicable and measures to manage edema.          PAST MEDICAL HISTORY        Diagnosis Date    Chronic venous hypertension with ulcer and inflammation involving right side ([address]     RLE    Hypertension     past    Iron deficiency     Lymphoma Dx 11-11    Hx of B cell lymphoma-in remission since        PAST SURGICAL HISTORY    Past Surgical History:   Procedure Laterality Date    TUNNELED VENOUS PORT PLACEMENT      TUNNELED VENOUS PORT PLACEMENT      removed 2012       FAMILY HISTORY    Family History   Problem Relation Age of Onset    Early Death Mother 52    Diabetes Father     Heart Disease Father     Vision Loss Son         \"He wears glasses\"    Early Death Daughter 36       SOCIAL HISTORY    Social History     Tobacco Use    Smoking status: Never    Smokeless tobacco: Never   Substance Use Topics    Alcohol use: No    Drug use: No       ALLERGIES    No Known Allergies    MEDICATIONS    Current Outpatient Medications on File Prior to Encounter   Medication Sig Dispense Refill    metoprolol succinate (TOPROL XL) 50 MG extended release tablet Take 1 tablet by mouth daily 30 tablet 5     No current facility-administered medications on file prior to encounter. REVIEW OF SYSTEMS    Pertinent items are noted in HPI. Constitutional: Negative for systemic symptoms including fever, chills and malaise. Objective:      BP (!) 156/85   Pulse 64   Temp 98.2 °F (36.8 °C) (Temporal)   Resp 18     PHYSICAL EXAM      General: The patient is in no acute distress. Mental status:  Patient is appropriate, is  oriented to place and plan of care. Dermatologic exam: Visual inspection of the periwound reveals the skin to be normal in turgor and texture and dry  Wound exam: see wound description below in procedure note      Assessment:     Problem List Items Addressed This Visit          Circulatory    Chronic venous hypertension with ulcer and inflammation ([address] - Primary    Relevant Orders    Initiate Outpatient Wound Care Protocol       Other    Venous ulcer of left leg ([facility] [address]    Relevant Orders    Initiate Outpatient Wound Care Protocol     Procedure Note    Indications:  Based on my examination of this patient's wound(s) today, sharp excision into necrotic subcutaneous tissue is required to promote healing and evaluate the extent of previous healing. Performed by: LOLY Mccann CNP    Consent obtained: Yes    Time out taken:  Yes    Pain Control: N/a      Debridement:Excisional Debridement    Using #15 blade scalpel the wound(s) was/were sharply debrided down through and including the removal of subcutaneous tissue.         Devitalized Tissue Debrided:  fibrin, biofilm, slough, and exudate    Pre Debridement Measurements:  Are located in the Wound Documentation Flow Sheet    All active wounds listed below with today's date are evaluated  Wound(s)    debrided this date include # : 1     Post  Debridement Measurements:  Wound 07/29/13 Venous ulcer Ankle Right;Lateral (Active)   Number of days: 3278       Wound 09/30/20 #1 (onset 5 years) Right Medial Lower Leg Cluster (Active)   Wound Image   07/20/22 1259   Wound Etiology Venous 07/20/22 1259   Dressing Status New dressing applied 07/13/22 1315   Wound Cleansed Soap and water 07/20/22 1259   Dressing/Treatment Collagen;ABD;Coban/self-adherent bandages; Moisturizing cream 04/06/22 1359   Offloading for Diabetic Foot Ulcers Offloading not required 07/20/22 1259   Wound Length (cm) 3.2 cm 07/20/22 1259   Wound Width (cm) 2.6 cm 07/20/22 1259   Wound Depth (cm) 0.1 cm 07/20/22 1259   Wound Surface Area (cm^2) 8.32 cm^2 07/20/22 1259   Change in Wound Size % (l*w) 96.7 07/20/22 1259   Wound Volume (cm^3) 0.832 cm^3 07/20/22 1259   Wound Healing % 97 07/20/22 1259   Post-Procedure Length (cm) 3.2 cm 07/20/22 1312   Post-Procedure Width (cm) 2.6 cm 07/20/22 1312   Post-Procedure Depth (cm) 0.1 cm 07/20/22 1312   Post-Procedure Surface Area (cm^2) 8.32 cm^2 07/20/22 1312   Post-Procedure Volume (cm^3) 0.832 cm^3 07/20/22 1312   Distance Tunneling (cm) 0 cm 07/20/22 1259   Tunneling Position ___ O'Clock 0 07/20/22 1259   Undermining Starts ___ O'Clock 0 07/20/22 1259   Undermining Ends___ O'Clock 0 07/20/22 1259   Undermining Maxium Distance (cm) 0 07/20/22 1259   Wound Assessment Pink/red 07/20/22 1259   Drainage Amount Moderate 07/20/22 1259   Drainage Description Serosanguinous 07/20/22 1259   Odor None 07/20/22 1259   Cassia-wound Assessment Intact;Fragile 07/20/22 1259   Margins Defined edges 07/20/22 1259   Wound Thickness Description not for Pressure Injury Full thickness 07/20/22 1259   Number of days: 658       Percent of Wound(s) Debrided: approximately 100%    Total  Area  Debrided:  8.32 sq cm Bleeding:  Minimal    Hemostasis Achieved:  by pressure    Procedural Pain:  0  / 10     Post Procedural Pain:  0 / 10     Response to treatment:  Well tolerated by patient. Status of wound progress and description from last visit:       Improving. Continue to monitor and follow up 1 week  Continue plan of care for now       Plan:       Discharge Instructions         [address]   NOTE: Upon discharge from the [address], you will receive a patient experience survey. We would be grateful if you would take the time to fill this survey out.      Wound care order history:                 Vascular studies: Arterial studies done 9/27/20; no stenosis              Imaging:   Date              Cultures:   obtained on 11/18/2020                                 Obtained on 1/20/2021              Labs/ HbA1c:   Date              Grafts:                       #1 Apligraf on 12/16/2020                     #2 Apligraf on 12/23/2020                     #3 Apligraf on 12/30/2020                     #4 Apligraf on 1/6/2021                     # 5 Apligraf on 1/13/21                     #1 [place] 1/27/2021                     #2 [place] 2/3/2021                     #3 [place] 2/10/2021                     #4 [place] Right Medial Lower Leg 2/17/21                     #5 [place] 3/10/2021                  HBO:                Antibiotics:  Bactrim DS BID for 10 days on 11/18/2020                                   Cipro BID for 10 days ordered on 11/25/2020                                    Cipro 500 mg BID on 1/20/2021                                    Cipro 500 mg BID on 4/28/2021              Earlier Wound care treatments:                Authorizations:                        Consults:   Date                          Primary care physician:     Continuing wound care orders and information:              Residence:  private              Continue home health care with:  [facility] 1/19/2022              Your wound-care supplies will be provided by: Sana RAO provider:              Compression with              Off loading:  Date              Wound Medications:              Wound cleansing:                          Do not scrub or use excessive force. Wash hands with soap and water before and after dressing changes. Prior to applying a clean dressing, cleanse wound with normal saline,                                wound cleanser, or mild soap and water. Ask the physician or nurse before getting the wound(s) wet in a shower              Daily Wound management:                          Keep weight off wounds and reposition every 2 hours. Avoid standing for long periods of time. Apply wraps/stockings in AM and remove at bedtime. If swelling is present, elevate legs to the level of the heart or above for 30 minutes 4-5 times a day and/or when sitting. When taking antibiotics take entire prescription as ordered by physician do not stop taking until medicine is all gone. Orders for this week: 7/20/2022      Right Lower Leg -- wash with soap and water, pat dry. Apply A&D to sarah wound. Apply Stimulin powder. Cover with Ioplex and ABD. Then wrap with Coban 2. Leave in place for 1 week. Follow up with Rodney Bang CNP in 1 week in the wound care center. Call [phone] for any questions or concerns.   Date__________   Time______          Treatment Note      Written Patient Dismissal Instructions Given            Electronically signed by LOLY Lara CNP on 7/20/2022 at 1:13 PM

## 2022-07-27 ENCOUNTER — HOSPITAL ENCOUNTER (OUTPATIENT)
Dept: WOUND CARE | Age: 77
Discharge: HOME | End: 2022-07-27
Payer: COMMERCIAL

## 2022-07-27 VITALS
SYSTOLIC BLOOD PRESSURE: 164 MMHG | RESPIRATION RATE: 18 BRPM | TEMPERATURE: 98.8 F | HEART RATE: 66 BPM | DIASTOLIC BLOOD PRESSURE: 84 MMHG

## 2022-07-27 DIAGNOSIS — I87.331 CHRONIC VENOUS HYPERTENSION WITH ULCER AND INFLAMMATION INVOLVING RIGHT SIDE (HCC): Primary | ICD-10-CM

## 2022-07-27 DIAGNOSIS — L97.929 VENOUS ULCER OF LEFT LEG (HCC): ICD-10-CM

## 2022-07-27 DIAGNOSIS — L97.919 CHRONIC VENOUS HYPERTENSION WITH ULCER AND INFLAMMATION INVOLVING RIGHT SIDE (HCC): Primary | ICD-10-CM

## 2022-07-27 DIAGNOSIS — I83.029 VENOUS ULCER OF LEFT LEG (HCC): ICD-10-CM

## 2022-07-27 PROCEDURE — 11042 DBRDMT SUBQ TIS 1ST 20SQCM/<: CPT

## 2022-07-27 PROCEDURE — 11042 DBRDMT SUBQ TIS 1ST 20SQCM/<: CPT | Performed by: NURSE PRACTITIONER

## 2022-07-27 RX ORDER — LIDOCAINE HYDROCHLORIDE 20 MG/ML
JELLY TOPICAL ONCE
Status: CANCELLED | OUTPATIENT
Start: 2022-07-27 | End: 2022-07-27

## 2022-07-27 RX ORDER — GINSENG 100 MG
CAPSULE ORAL ONCE
Status: CANCELLED | OUTPATIENT
Start: 2022-07-27 | End: 2022-07-27

## 2022-07-27 RX ORDER — LIDOCAINE 40 MG/G
CREAM TOPICAL ONCE
Status: CANCELLED | OUTPATIENT
Start: 2022-07-27 | End: 2022-07-27

## 2022-07-27 RX ORDER — BACITRACIN ZINC AND POLYMYXIN B SULFATE 500; 1000 [USP'U]/G; [USP'U]/G
OINTMENT TOPICAL ONCE
Status: CANCELLED | OUTPATIENT
Start: 2022-07-27 | End: 2022-07-27

## 2022-07-27 RX ORDER — CLOBETASOL PROPIONATE 0.5 MG/G
OINTMENT TOPICAL ONCE
Status: CANCELLED | OUTPATIENT
Start: 2022-07-27 | End: 2022-07-27

## 2022-07-27 RX ORDER — LIDOCAINE HYDROCHLORIDE 40 MG/ML
SOLUTION TOPICAL ONCE
Status: CANCELLED | OUTPATIENT
Start: 2022-07-27 | End: 2022-07-27

## 2022-07-27 RX ORDER — BETAMETHASONE DIPROPIONATE 0.05 %
OINTMENT (GRAM) TOPICAL ONCE
Status: CANCELLED | OUTPATIENT
Start: 2022-07-27 | End: 2022-07-27

## 2022-07-27 RX ORDER — GENTAMICIN SULFATE 1 MG/G
OINTMENT TOPICAL ONCE
Status: CANCELLED | OUTPATIENT
Start: 2022-07-27 | End: 2022-07-27

## 2022-07-27 RX ORDER — LIDOCAINE 50 MG/G
OINTMENT TOPICAL ONCE
Status: CANCELLED | OUTPATIENT
Start: 2022-07-27 | End: 2022-07-27

## 2022-07-27 RX ORDER — BACITRACIN, NEOMYCIN, POLYMYXIN B 400; 3.5; 5 [USP'U]/G; MG/G; [USP'U]/G
OINTMENT TOPICAL ONCE
Status: CANCELLED | OUTPATIENT
Start: 2022-07-27 | End: 2022-07-27

## 2022-07-27 ASSESSMENT — PAIN SCALES - GENERAL: PAINLEVEL_OUTOF10: 0

## 2022-07-27 NOTE — DISCHARGE INSTRUCTIONS
PHYSICIAN ORDERS AND DISCHARGE INSTRUCTIONS   NOTE: Upon discharge from the [address], you will receive a patient experience survey. We would be grateful if you would take the time to fill this survey out. Wound care order history:                 Vascular studies: Arterial studies done 9/27/20; no stenosis              Imaging:   Date              Cultures:   obtained on 11/18/2020                                 Obtained on 1/20/2021              Labs/ HbA1c:   Date              Grafts:                       #1 Apligraf on 12/16/2020                     #2 Apligraf on 12/23/2020                     #3 Apligraf on 12/30/2020                     #4 Apligraf on 1/6/2021                     # 5 Apligraf on 1/13/21                     #1 Nushield 1/27/2021                     #2 N[place]ield 2/3/2021                     #3 N[place]ield 2/10/2021                     #4 N[place]ield Right Medial Lower Leg 2/17/21                     #5 N[place]ield 3/10/2021                  HBO:                Antibiotics:  Bactrim DS BID for 10 days on 11/18/2020                                   Cipro BID for 10 days ordered on 11/25/2020                                    Cipro 500 mg BID on 1/20/2021                                    Cipro 500 mg BID on 4/28/2021              Earlier Wound care treatments:                Authorizations:                        Consults:   Date                          Primary care physician:     Continuing wound care orders and information:              Residence:  private              [facility] home health care with:  [facility] discharged 1/19/2022              Your wound-care supplies will be provided by: Fernando [facility] provider:              Compression with              Off loading:  Date              Wound Medications:              Wound cleansing:                          Do not scrub or use excessive force. Wash hands with soap and water before and after dressing changes. Prior to applying a clean dressing, cleanse wound with normal saline,                                wound cleanser, or mild soap and water. Ask the physician or nurse before getting the wound(s) wet in a shower              Daily Wound management:                          Keep weight off wounds and reposition every 2 hours. Avoid standing for long periods of time. Apply wraps/stockings in AM and remove at bedtime. If swelling is present, elevate legs to the level of the heart or above for 30 minutes 4-5 times a day and/or when sitting. When taking antibiotics take entire prescription as ordered by physician do not stop taking until medicine is all gone. Orders for this week: 7/27/2022      Right Lower Leg -- wash with soap and water, pat dry. Apply A&D to sarah wound. Apply Stimulin powder. Cover with Ioplex and ABD. Then wrap with Coban 2. Leave in place for 1 week. Follow up with Campbell Francois CNP in 1 week in the wound care center. Call [phone] for any questions or concerns.   Date__________   Time______

## 2022-07-27 NOTE — PROGRESS NOTES
Multilayer Compression Wrap   (Not Unna) Below the Knee    NAME:  Lester Ahmadi  YOB: 1945  MEDICAL RECORD NUMBER:  [id number]  DATE:  7/27/2022    Multilayer compression wrap: Removed old Multilayer wrap if indicated and wash leg with mild soap/water. Applied moisturizing agent to dry skin as needed. Applied primary and secondary dressing as ordered. Applied multilayered dressing below the knee to right lower leg. Instructed patient/caregiver not to remove dressing and to keep it clean and dry. Instructed patient/caregiver on complications to report to provider, such as pain, numbness in toes, heavy drainage, and slippage of dressing. Instructed patient on purpose of compression dressing and on activity and exercise recommendations.       Electronically signed by Janene Hebert LPN on 3/45/8411 at 5:34 PM

## 2022-07-27 NOTE — PROGRESS NOTES
Use Topics    Alcohol use: No    Drug use: No       ALLERGIES    No Known Allergies    MEDICATIONS    Current Outpatient Medications on File Prior to Encounter   Medication Sig Dispense Refill    metoprolol succinate (TOPROL XL) 50 MG extended release tablet Take 1 tablet by mouth daily 30 tablet 5     No current facility-administered medications on file prior to encounter. REVIEW OF SYSTEMS    Pertinent items are noted in HPI. Constitutional: Negative for systemic symptoms including fever, chills and malaise. Objective:      BP (!) 164/84   Pulse 66   Temp 98.8 °F (37.1 °C) (Temporal)   Resp 18     PHYSICAL EXAM      General: The patient is in no acute distress. Mental status:  Patient is appropriate, is  oriented to place and plan of care. Dermatologic exam: Visual inspection of the periwound reveals the skin to be normal in turgor and texture and dry  Wound exam: see wound description below in procedure note      Assessment:     Problem List Items Addressed This Visit          Circulatory    Chronic venous hypertension with ulcer and inflammation ([address] - Primary    Relevant Orders    Initiate Outpatient Wound Care Protocol       Other    Venous ulcer of left leg ([facility] [address]    Relevant Orders    Initiate Outpatient Wound Care Protocol     Procedure Note    Indications:  Based on my examination of this patient's wound(s) today, sharp excision into necrotic subcutaneous tissue is required to promote healing and evaluate the extent of previous healing. Performed by: LOLY Jaime - CNP    Consent obtained: Yes    Time out taken:  Yes    Pain Control: N/a      Debridement:Excisional Debridement    Using #15 blade scalpel the wound(s) was/were sharply debrided down through and including the removal of subcutaneous tissue.         Devitalized Tissue Debrided:  fibrin, biofilm, slough, and exudate    Pre Debridement Measurements:  Are located in the Wound Documentation Flow Sheet    All active wounds listed below with today's date are evaluated  Wound(s)    debrided this date include # : 1     Post  Debridement Measurements:  Wound 07/29/13 Venous ulcer Ankle Right;Lateral (Active)   Number of days: 3285       Wound 09/30/20 #1 (onset 5 years) Right Medial Lower Leg Cluster (Active)   Wound Image   07/20/22 1259   Wound Etiology Venous 07/27/22 1258   Dressing Status New dressing applied 07/20/22 1319   Wound Cleansed Soap and water 07/27/22 1258   Dressing/Treatment Collagen;ABD;Coban/self-adherent bandages; Moisturizing cream 04/06/22 1359   Offloading for Diabetic Foot Ulcers Offloading not required 07/27/22 1258   Wound Length (cm) 3.2 cm 07/27/22 1258   Wound Width (cm) 2.3 cm 07/27/22 1258   Wound Depth (cm) 0.1 cm 07/27/22 1258   Wound Surface Area (cm^2) 7.36 cm^2 07/27/22 1258   Change in Wound Size % (l*w) 97.08 07/27/22 1258   Wound Volume (cm^3) 0.736 cm^3 07/27/22 1258   Wound Healing % 97 07/27/22 1258   Post-Procedure Length (cm) 3.2 cm 07/27/22 1307   Post-Procedure Width (cm) 2.3 cm 07/27/22 1307   Post-Procedure Depth (cm) 0.1 cm 07/27/22 1307   Post-Procedure Surface Area (cm^2) 7.36 cm^2 07/27/22 1307   Post-Procedure Volume (cm^3) 0.736 cm^3 07/27/22 1307   Distance Tunneling (cm) 0 cm 07/27/22 1258   Tunneling Position ___ O'Clock 0 07/27/22 1258   Undermining Starts ___ O'Clock 0 07/27/22 1258   Undermining Ends___ O'Clock 0 07/27/22 1258   Undermining Maxium Distance (cm) 0 07/27/22 1258   Wound Assessment Pink/red;Dry 07/27/22 1258   Drainage Amount Small 07/27/22 1258   Drainage Description Serosanguinous 07/27/22 1258   Odor None 07/27/22 1258   Cassia-wound Assessment Intact;Fragile 07/27/22 1258   Margins Defined edges 07/27/22 1258   Wound Thickness Description not for Pressure Injury Full thickness 07/27/22 1258   Number of days: 665       Percent of Wound(s) Debrided: approximately 100%    Total  Area  Debrided:  7.36 sq cm     Bleeding:  Minimal    Hemostasis Achieved:  by pressure    Procedural Pain:  0  / 10     Post Procedural Pain:  0 / 10     Response to treatment:  Well tolerated by patient. Status of wound progress and description from last visit:   Improving. Continue to monitor and follow up 1 week  Continue plan of care for now       Plan:       Discharge Instructions         [address]   NOTE: Upon discharge from the [address], you will receive a patient experience survey. We would be grateful if you would take the time to fill this survey out.      Wound care order history:                 Vascular studies: Arterial studies done 9/27/20; no stenosis              Imaging:   Date              Cultures:   obtained on 11/18/2020                                 Obtained on 1/20/2021              Labs/ HbA1c:   Date              Grafts:                       #1 Apligraf on 12/16/2020                     #2 Apligraf on 12/23/2020                     #3 Apligraf on 12/30/2020                     #4 Apligraf on 1/6/2021                     # 5 Apligraf on 1/13/21                     #1 Nushield 1/27/2021                     #2 N[place]ield 2/3/2021                     #3 N[place]ield 2/10/2021                     #4 [place] Right Medial Lower Leg 2/17/21                     #5 N[place]ield 3/10/2021                  HBO:                Antibiotics:  Bactrim DS BID for 10 days on 11/18/2020                                   Cipro BID for 10 days ordered on 11/25/2020                                    Cipro 500 mg BID on 1/20/2021                                    Cipro 500 mg BID on 4/28/2021              Earlier Wound care treatments:                Authorizations:                        Consults:   Date                          Primary care physician:     Continuing wound care orders and information:              Residence:  private              Continue home health care with:  [facility]              Your wound-care supplies will be provided by: Concha [facility] provider:              Compression with              Off loading:  Date              Wound Medications:              Wound cleansing:                          Do not scrub or use excessive force. Wash hands with soap and water before and after dressing changes. Prior to applying a clean dressing, cleanse wound with normal saline,                                wound cleanser, or mild soap and water. Ask the physician or nurse before getting the wound(s) wet in a shower              Daily Wound management:                          Keep weight off wounds and reposition every 2 hours. Avoid standing for long periods of time. Apply wraps/stockings in AM and remove at bedtime. If swelling is present, elevate legs to the level of the heart or above for 30 minutes 4-5 times a day and/or when sitting. When taking antibiotics take entire prescription as ordered by physician do not stop taking until medicine is all gone. Orders for this week: 7/27/2022      Right Lower Leg -- wash with soap and water, pat dry. Apply A&D to sarah wound. Apply Stimulin powder. Cover with Ioplex and ABD. Then wrap with Coban 2. Leave in place for 1 week. Follow up with Avtar Portillo CNP in 1 week in the wound care center. Call [phone] for any questions or concerns.   Date__________   Time______        Treatment Note      Written Patient Dismissal Instructions Given            Electronically signed by LOLY Puga CNP on 7/27/2022 at 1:09 PM

## 2022-07-27 NOTE — PLAN OF CARE
Problem: Wound:  Goal: Will show signs of wound healing; wound closure and no evidence of infection  Description: Will show signs of wound healing; wound closure and no evidence of infection  Outcome: Progressing Towards Goal     Problem: Pain  Goal: Verbalizes/displays adequate comfort level or baseline comfort level  Outcome: Progressing Towards Goal

## 2022-07-31 DIAGNOSIS — D69.6 THROMBOCYTOPENIA (HCC): Primary | ICD-10-CM

## 2022-08-03 ENCOUNTER — HOSPITAL ENCOUNTER (OUTPATIENT)
Dept: WOUND CARE | Age: 77
Discharge: HOME | End: 2022-08-03
Payer: COMMERCIAL

## 2022-08-03 VITALS
SYSTOLIC BLOOD PRESSURE: 150 MMHG | HEART RATE: 67 BPM | DIASTOLIC BLOOD PRESSURE: 86 MMHG | RESPIRATION RATE: 16 BRPM | TEMPERATURE: 98.8 F

## 2022-08-03 DIAGNOSIS — L97.929 VENOUS ULCER OF LEFT LEG (HCC): ICD-10-CM

## 2022-08-03 DIAGNOSIS — L97.919 VENOUS STASIS ULCER OF RIGHT LOWER LEG WITH EDEMA OF RIGHT LOWER LEG (HCC): ICD-10-CM

## 2022-08-03 DIAGNOSIS — I87.331 CHRONIC VENOUS HYPERTENSION WITH ULCER AND INFLAMMATION INVOLVING RIGHT SIDE (HCC): Primary | ICD-10-CM

## 2022-08-03 DIAGNOSIS — L97.919 CHRONIC VENOUS HYPERTENSION WITH ULCER AND INFLAMMATION INVOLVING RIGHT SIDE (HCC): Primary | ICD-10-CM

## 2022-08-03 DIAGNOSIS — I83.029 VENOUS ULCER OF LEFT LEG (HCC): ICD-10-CM

## 2022-08-03 DIAGNOSIS — I83.019 VENOUS STASIS ULCER OF RIGHT LOWER LEG WITH EDEMA OF RIGHT LOWER LEG (HCC): ICD-10-CM

## 2022-08-03 DIAGNOSIS — R60.9 VENOUS STASIS ULCER OF RIGHT LOWER LEG WITH EDEMA OF RIGHT LOWER LEG (HCC): ICD-10-CM

## 2022-08-03 DIAGNOSIS — I83.891 VENOUS STASIS ULCER OF RIGHT LOWER LEG WITH EDEMA OF RIGHT LOWER LEG (HCC): ICD-10-CM

## 2022-08-03 PROCEDURE — 11042 DBRDMT SUBQ TIS 1ST 20SQCM/<: CPT | Performed by: NURSE PRACTITIONER

## 2022-08-03 PROCEDURE — 11042 DBRDMT SUBQ TIS 1ST 20SQCM/<: CPT

## 2022-08-03 RX ORDER — LIDOCAINE HYDROCHLORIDE 20 MG/ML
JELLY TOPICAL ONCE
Status: CANCELLED | OUTPATIENT
Start: 2022-08-03 | End: 2022-08-03

## 2022-08-03 RX ORDER — BACITRACIN ZINC AND POLYMYXIN B SULFATE 500; 1000 [USP'U]/G; [USP'U]/G
OINTMENT TOPICAL ONCE
Status: CANCELLED | OUTPATIENT
Start: 2022-08-03 | End: 2022-08-03

## 2022-08-03 RX ORDER — CLOBETASOL PROPIONATE 0.5 MG/G
OINTMENT TOPICAL ONCE
Status: CANCELLED | OUTPATIENT
Start: 2022-08-03 | End: 2022-08-03

## 2022-08-03 RX ORDER — BACITRACIN, NEOMYCIN, POLYMYXIN B 400; 3.5; 5 [USP'U]/G; MG/G; [USP'U]/G
OINTMENT TOPICAL ONCE
Status: CANCELLED | OUTPATIENT
Start: 2022-08-03 | End: 2022-08-03

## 2022-08-03 RX ORDER — GENTAMICIN SULFATE 1 MG/G
OINTMENT TOPICAL ONCE
Status: CANCELLED | OUTPATIENT
Start: 2022-08-03 | End: 2022-08-03

## 2022-08-03 RX ORDER — BETAMETHASONE DIPROPIONATE 0.05 %
OINTMENT (GRAM) TOPICAL ONCE
Status: CANCELLED | OUTPATIENT
Start: 2022-08-03 | End: 2022-08-03

## 2022-08-03 RX ORDER — LIDOCAINE 40 MG/G
CREAM TOPICAL ONCE
Status: CANCELLED | OUTPATIENT
Start: 2022-08-03 | End: 2022-08-03

## 2022-08-03 RX ORDER — LIDOCAINE 50 MG/G
OINTMENT TOPICAL ONCE
Status: CANCELLED | OUTPATIENT
Start: 2022-08-03 | End: 2022-08-03

## 2022-08-03 RX ORDER — LIDOCAINE HYDROCHLORIDE 40 MG/ML
SOLUTION TOPICAL ONCE
Status: CANCELLED | OUTPATIENT
Start: 2022-08-03 | End: 2022-08-03

## 2022-08-03 RX ORDER — GINSENG 100 MG
CAPSULE ORAL ONCE
Status: CANCELLED | OUTPATIENT
Start: 2022-08-03 | End: 2022-08-03

## 2022-08-03 NOTE — PROGRESS NOTES
Multilayer Compression Wrap   (Not Unna) Below the Knee    NAME:  Daniel Jennings  YOB: 1945  MEDICAL RECORD NUMBER:  [id number]  DATE:  8/3/2022    Multilayer compression wrap: Removed old Multilayer wrap if indicated and wash leg with mild soap/water. Applied moisturizing agent to dry skin as needed. Applied primary and secondary dressing as ordered. Applied multilayered dressing below the knee to right lower leg. Instructed patient/caregiver not to remove dressing and to keep it clean and dry. Instructed patient/caregiver on complications to report to provider, such as pain, numbness in toes, heavy drainage, and slippage of dressing. Instructed patient on purpose of compression dressing and on activity and exercise recommendations.       Electronically signed by Neelam Carmona LPN on 8/1/2508 at 1:51 PM

## 2022-08-03 NOTE — DISCHARGE INSTRUCTIONS
PHYSICIAN ORDERS AND DISCHARGE INSTRUCTIONS   NOTE: Upon discharge from the [address], you will receive a patient experience survey. We would be grateful if you would take the time to fill this survey out. Wound care order history:                 Vascular studies: Arterial studies done 9/27/20; no stenosis              Imaging:   Date              Cultures:   obtained on 11/18/2020                                 Obtained on 1/20/2021              Labs/ HbA1c:   Date              Grafts:                       #1 Apligraf on 12/16/2020                     #2 Apligraf on 12/23/2020                     #3 Apligraf on 12/30/2020                     #4 Apligraf on 1/6/2021                     # 5 Apligraf on 1/13/21                     #1 Nushield 1/27/2021                     #2 N[place]ield 2/3/2021                     #3 N[place]ield 2/10/2021                     #4 N[place]ield Right Medial Lower Leg 2/17/21                     #5 N[place]ield 3/10/2021                  HBO:                Antibiotics:  Bactrim DS BID for 10 days on 11/18/2020                                   Cipro BID for 10 days ordered on 11/25/2020                                    Cipro 500 mg BID on 1/20/2021                                    Cipro 500 mg BID on 4/28/2021              Earlier Wound care treatments:                Authorizations:                        Consults:   Date                          Primary care physician:     Continuing wound care orders and information:              Residence:  private              [facility] [place]  [facility]              Your wound-care supplies will be provided by: Olinda [facility] provider:              Compression with              Off loading:  Date              Wound Medications:              Wound cleansing:                          Do not scrub or use excessive force. Wash hands with soap and water before and after dressing changes. Prior to applying a clean dressing, cleanse wound with normal saline,                                wound cleanser, or mild soap and water. Ask the physician or nurse before getting the wound(s) wet in a shower              Daily Wound management:                          Keep weight off wounds and reposition every 2 hours. Avoid standing for long periods of time. Apply wraps/stockings in AM and remove at bedtime. If swelling is present, elevate legs to the level of the heart or above for 30 minutes 4-5 times a day and/or when sitting. When taking antibiotics take entire prescription as ordered by physician do not stop taking until medicine is all gone. Orders for this week: 8/3/2022      Right Lower Leg -- wash with soap and water, pat dry. Apply Desitin to periwound. Apply Stimulin powder. Cover with Ioplex, ca alginate and ABD. Then wrap with Coban 2. Leave in place for 1 week. Follow up with Nayely Altamirano CNP in 1 week in the wound care center. Call [phone] for any questions or concerns.   Date__________   Time______

## 2022-08-03 NOTE — PROGRESS NOTES
Wound Care Center Progress Note With Procedure    Mer Lester  AGE: 69 y.o. GENDER: male  : 1945  EPISODE DATE:  8/3/2022     Subjective:     Chief Complaint   Patient presents with    Wound Check     RLE         HISTORY of PRESENT ILLNESS     Mer Lester is a 77 y.o. male who presents today for wound evaluation of Chronic venous wound of Right lower medial leg. The wound is of moderate severity. The underlying cause of the wound is venous disease and trauma. Patient sarah-wound is very wet this week. Wound benefit from some barrier ointment and an added layer of absorptive pads     Wound Pain Timing/Severity: none  Quality of pain: N/A  Severity of pain:  0 / 10  Modifying Factors: venous stasis  Associated Signs/Symptoms: none     Patient educated on the 6 essential components necessary for wound healing: Circulation, Debridements, Proper Dressings and Topical Wound Products, Infection Control, Edema Control and Offloading. Patient educated on those factors that negatively effect or impact wound healing: smoking, obesity, uncontrolled diabetes, anticoagulant and immunosuppressive regimens, inadequate nutrition, untreated arterial and venous disease if applicable and measures to manage edema.         PAST MEDICAL HISTORY        Diagnosis Date    Chronic venous hypertension with ulcer and inflammation involving right side ([address]     RLE    Hypertension     past    Iron deficiency     Lymphoma Dx 11-11    Hx of B cell lymphoma-in remission since        PAST SURGICAL HISTORY    Past Surgical History:   Procedure Laterality Date    TUNNELED VENOUS PORT PLACEMENT      TUNNELED VENOUS PORT PLACEMENT      removed 2012       FAMILY HISTORY    Family History   Problem Relation Age of Onset    Early Death Mother 52    Diabetes Father     Heart Disease Father     Vision Loss Son         \"He wears glasses\"    Early Death Daughter 36       SOCIAL HISTORY    Social History     Tobacco Use Smoking status: Never    Smokeless tobacco: Never   Substance Use Topics    Alcohol use: No    Drug use: No       ALLERGIES    No Known Allergies    MEDICATIONS    Current Outpatient Medications on File Prior to Encounter   Medication Sig Dispense Refill    metoprolol succinate (TOPROL XL) 50 MG extended release tablet Take 1 tablet by mouth daily 30 tablet 5     No current facility-administered medications on file prior to encounter. REVIEW OF SYSTEMS    Pertinent items are noted in HPI. Constitutional: Negative for systemic symptoms including fever, chills and malaise. Objective:      BP (!) 150/86   Pulse 67   Temp 98.8 °F (37.1 °C) (Temporal)   Resp 16     PHYSICAL EXAM      General: The patient is in no acute distress. Mental status:  Patient is appropriate, is  oriented to place and plan of care. Dermatologic exam: Visual inspection of the periwound reveals the skin to be normal in turgor and texture and dry  Wound exam: see wound description below in procedure note      Assessment:     Problem List Items Addressed This Visit          Circulatory    Chronic venous hypertension with ulcer and inflammation ([address] - Primary    Relevant Orders    Initiate Outpatient Wound Care Protocol       Other    Venous ulcer of left leg ([address]    Relevant Orders    Initiate Outpatient Wound Care Protocol    Venous stasis ulcer of right lower leg with edema of right lower leg ([address]     Procedure Note    Indications:  Based on my examination of this patient's wound(s) today, sharp excision into necrotic subcutaneous tissue is required to promote healing and evaluate the extent of previous healing. Performed by: LOLY Ibrahim - CNP    Consent obtained: Yes    Time out taken:  Yes    Pain Control: N/A      Debridement:Excisional Debridement    Using #15 blade scalpel the wound(s) was/were sharply debrided down through and including the removal of subcutaneous tissue.         Devitalized Tissue Debrided:  fibrin, biofilm, slough, and exudate    Pre Debridement Measurements:  Are located in the Wound Documentation Flow Sheet    All active wounds listed below with today's date are evaluated  Wound(s)    debrided this date include # : 1     Post  Debridement Measurements:  Wound 07/29/13 Venous ulcer Ankle Right;Lateral (Active)   Number of days: 3292       Wound 09/30/20 #1 (onset 5 years) Right Medial Lower Leg Cluster (Active)   Wound Image   08/03/22 1256   Wound Etiology Venous 08/03/22 1256   Dressing Status New dressing applied 07/27/22 1307   Wound Cleansed Soap and water 08/03/22 1256   Dressing/Treatment Collagen;ABD;Coban/self-adherent bandages; Moisturizing cream 04/06/22 1359   Offloading for Diabetic Foot Ulcers Offloading not required 08/03/22 1256   Wound Length (cm) 3 cm 08/03/22 1256   Wound Width (cm) 1.7 cm 08/03/22 1256   Wound Depth (cm) 0.1 cm 08/03/22 1256   Wound Surface Area (cm^2) 5.1 cm^2 08/03/22 1256   Change in Wound Size % (l*w) 97.98 08/03/22 1256   Wound Volume (cm^3) 0.51 cm^3 08/03/22 1256   Wound Healing % 98 08/03/22 1256   Post-Procedure Length (cm) 3 cm 08/03/22 1310   Post-Procedure Width (cm) 1.7 cm 08/03/22 1310   Post-Procedure Depth (cm) 0.1 cm 08/03/22 1310   Post-Procedure Surface Area (cm^2) 5.1 cm^2 08/03/22 1310   Post-Procedure Volume (cm^3) 0.51 cm^3 08/03/22 1310   Distance Tunneling (cm) 0 cm 08/03/22 1256   Tunneling Position ___ O'Clock 0 08/03/22 1256   Undermining Starts ___ O'Clock 0 08/03/22 1256   Undermining Ends___ O'Clock 0 08/03/22 1256   Undermining Maxium Distance (cm) 0 08/03/22 1256   Wound Assessment Pink/red 08/03/22 1256   Drainage Amount Moderate 08/03/22 1256   Drainage Description Serosanguinous 08/03/22 1256   Odor Mild 08/03/22 1256   Cassia-wound Assessment Fragile;Blanchable erythema 08/03/22 1256   Margins Defined edges 08/03/22 1256   Wound Thickness Description not for Pressure Injury Full thickness 08/03/22 1256   Number of days: you would take the time to fill this survey out. Wound care order history:                 Vascular studies: Arterial studies done 9/27/20; no stenosis              Imaging:   Date              Cultures:   obtained on 11/18/2020                                 Obtained on 1/20/2021              Labs/ HbA1c:   Date              Grafts:                       #1 Apligraf on 12/16/2020                     #2 Apligraf on 12/23/2020                     #3 Apligraf on 12/30/2020                     #4 Apligraf on 1/6/2021                     # 5 Apligraf on 1/13/21                     #1 Nushield 1/27/2021                     #2 N[place]ield 2/3/2021                     #3 [place]ield 2/10/2021                     #4 N[place]ield Right Medial Lower Leg 2/17/21                     #5 [place]ield 3/10/2021                  HBO:                Antibiotics:  Bactrim DS BID for 10 days on 11/18/2020                                   Cipro BID for 10 days ordered on 11/25/2020                                    Cipro 500 mg BID on 1/20/2021                                    Cipro 500 mg BID on 4/28/2021              Earlier Wound care treatments:                Authorizations:                        Consults:   Date                          Primary care physician:     Continuing wound care orders and information:              Residence:  [facility] care with:  [id number] discharged 1/19/2022              Your wound-care supplies will be provided by: Sydnie [facility] provider:              Compression with              Off loading:  Date              Wound Medications:              Wound cleansing:                          Do not scrub or use excessive force. Wash hands with soap and water before and after dressing changes.                           Prior to applying a clean dressing, cleanse wound with normal saline,                                wound cleanser, or mild soap and water.                          Ask the physician or nurse before getting the wound(s) wet in a shower              Daily Wound management:                          Keep weight off wounds and reposition every 2 hours. Avoid standing for long periods of time. Apply wraps/stockings in AM and remove at bedtime. If swelling is present, elevate legs to the level of the heart or above for 30 minutes 4-5 times a day and/or when sitting. When taking antibiotics take entire prescription as ordered by physician do not stop taking until medicine is all gone. Orders for this week: 8/3/2022      Right Lower Leg -- wash with soap and water, pat dry. Apply Desitin to periwound. Apply Stimulin powder. Cover with Ioplex, ca alginate and ABD. Then wrap with Coban 2. Leave in place for 1 week. Follow up with Nilsa Tiwari CNP in 1 week in the wound care center. Call [phone] for any questions or concerns.   Date__________   Time______        Treatment Note      Written Patient Dismissal Instructions Given            Electronically signed by LOLY Herrera CNP on 8/3/2022 at 1:17 PM

## 2022-08-10 ENCOUNTER — HOSPITAL ENCOUNTER (OUTPATIENT)
Dept: WOUND CARE | Age: 77
Discharge: HOME | End: 2022-08-10
Payer: COMMERCIAL

## 2022-08-10 VITALS
TEMPERATURE: 98.7 F | SYSTOLIC BLOOD PRESSURE: 155 MMHG | DIASTOLIC BLOOD PRESSURE: 82 MMHG | RESPIRATION RATE: 16 BRPM | HEART RATE: 63 BPM

## 2022-08-10 DIAGNOSIS — L97.919 VENOUS STASIS ULCER OF RIGHT LOWER LEG WITH EDEMA OF RIGHT LOWER LEG (HCC): ICD-10-CM

## 2022-08-10 DIAGNOSIS — I83.019 VENOUS STASIS ULCER OF RIGHT LOWER LEG WITH EDEMA OF RIGHT LOWER LEG (HCC): ICD-10-CM

## 2022-08-10 DIAGNOSIS — I83.891 VENOUS STASIS ULCER OF RIGHT LOWER LEG WITH EDEMA OF RIGHT LOWER LEG (HCC): ICD-10-CM

## 2022-08-10 DIAGNOSIS — L97.919 CHRONIC VENOUS HYPERTENSION WITH ULCER AND INFLAMMATION INVOLVING RIGHT SIDE (HCC): Primary | ICD-10-CM

## 2022-08-10 DIAGNOSIS — R60.9 VENOUS STASIS ULCER OF RIGHT LOWER LEG WITH EDEMA OF RIGHT LOWER LEG (HCC): ICD-10-CM

## 2022-08-10 DIAGNOSIS — I87.331 CHRONIC VENOUS HYPERTENSION WITH ULCER AND INFLAMMATION INVOLVING RIGHT SIDE (HCC): Primary | ICD-10-CM

## 2022-08-10 DIAGNOSIS — I83.029 VENOUS ULCER OF LEFT LEG (HCC): ICD-10-CM

## 2022-08-10 DIAGNOSIS — L97.912 ULCER OF RIGHT LOWER EXTREMITY WITH FAT LAYER EXPOSED (HCC): ICD-10-CM

## 2022-08-10 DIAGNOSIS — L97.929 VENOUS ULCER OF LEFT LEG (HCC): ICD-10-CM

## 2022-08-10 PROCEDURE — 11042 DBRDMT SUBQ TIS 1ST 20SQCM/<: CPT

## 2022-08-10 PROCEDURE — 11042 DBRDMT SUBQ TIS 1ST 20SQCM/<: CPT | Performed by: NURSE PRACTITIONER

## 2022-08-10 RX ORDER — LIDOCAINE 50 MG/G
OINTMENT TOPICAL ONCE
Status: CANCELLED | OUTPATIENT
Start: 2022-08-10 | End: 2022-08-10

## 2022-08-10 RX ORDER — GENTAMICIN SULFATE 1 MG/G
OINTMENT TOPICAL ONCE
Status: CANCELLED | OUTPATIENT
Start: 2022-08-10 | End: 2022-08-10

## 2022-08-10 RX ORDER — BETAMETHASONE DIPROPIONATE 0.05 %
OINTMENT (GRAM) TOPICAL ONCE
Status: CANCELLED | OUTPATIENT
Start: 2022-08-10 | End: 2022-08-10

## 2022-08-10 RX ORDER — LIDOCAINE 40 MG/G
CREAM TOPICAL ONCE
Status: CANCELLED | OUTPATIENT
Start: 2022-08-10 | End: 2022-08-10

## 2022-08-10 RX ORDER — LIDOCAINE HYDROCHLORIDE 40 MG/ML
SOLUTION TOPICAL ONCE
Status: CANCELLED | OUTPATIENT
Start: 2022-08-10 | End: 2022-08-10

## 2022-08-10 RX ORDER — BACITRACIN ZINC AND POLYMYXIN B SULFATE 500; 1000 [USP'U]/G; [USP'U]/G
OINTMENT TOPICAL ONCE
Status: CANCELLED | OUTPATIENT
Start: 2022-08-10 | End: 2022-08-10

## 2022-08-10 RX ORDER — CLOBETASOL PROPIONATE 0.5 MG/G
OINTMENT TOPICAL ONCE
Status: CANCELLED | OUTPATIENT
Start: 2022-08-10 | End: 2022-08-10

## 2022-08-10 RX ORDER — LIDOCAINE HYDROCHLORIDE 20 MG/ML
JELLY TOPICAL ONCE
Status: CANCELLED | OUTPATIENT
Start: 2022-08-10 | End: 2022-08-10

## 2022-08-10 RX ORDER — BACITRACIN, NEOMYCIN, POLYMYXIN B 400; 3.5; 5 [USP'U]/G; MG/G; [USP'U]/G
OINTMENT TOPICAL ONCE
Status: CANCELLED | OUTPATIENT
Start: 2022-08-10 | End: 2022-08-10

## 2022-08-10 RX ORDER — GINSENG 100 MG
CAPSULE ORAL ONCE
Status: CANCELLED | OUTPATIENT
Start: 2022-08-10 | End: 2022-08-10

## 2022-08-10 ASSESSMENT — PAIN SCALES - GENERAL: PAINLEVEL_OUTOF10: 0

## 2022-08-10 NOTE — PROGRESS NOTES
Wound Care Center Progress Note With Procedure    Odalis Heck  AGE: 69 y.o. GENDER: male  : 1945  EPISODE DATE:  8/10/2022     Subjective:     Chief Complaint   Patient presents with    Wound Check     Right leg         HISTORY of PRESENT ILLNESS     Odalis Heck is a 77 y.o. male who presents today for wound evaluation of Chronic venous wound of Right lower medial leg. The wound is of moderate severity. The underlying cause of the wound is venous disease and trauma. Patient continues to improve. Size and measurements down this week  No issues to report this week       Wound Pain Timing/Severity: none  Quality of pain: N/A  Severity of pain:  0 / 10  Modifying Factors: venous stasis  Associated Signs/Symptoms: none     Patient educated on the 6 essential components necessary for wound healing: Circulation, Debridements, Proper Dressings and Topical Wound Products, Infection Control, Edema Control and Offloading. Patient educated on those factors that negatively effect or impact wound healing: smoking, obesity, uncontrolled diabetes, anticoagulant and immunosuppressive regimens, inadequate nutrition, untreated arterial and venous disease if applicable and measures to manage edema.         PAST MEDICAL HISTORY        Diagnosis Date    Chronic venous hypertension with ulcer and inflammation involving right side ([address]     RLE    Hypertension     past    Iron deficiency     Lymphoma Dx 11-11    Hx of B cell lymphoma-in remission since        PAST SURGICAL HISTORY    Past Surgical History:   Procedure Laterality Date    TUNNELED VENOUS PORT PLACEMENT      TUNNELED VENOUS PORT PLACEMENT      removed 2012       FAMILY HISTORY    Family History   Problem Relation Age of Onset    Early Death Mother 52    Diabetes Father     Heart Disease Father     Vision Loss Son         \"He wears glasses\"    Early Death Daughter 36       SOCIAL HISTORY    Social History     Tobacco Use    Smoking status: Never    Smokeless tobacco: Never   Substance Use Topics    Alcohol use: No    Drug use: No       ALLERGIES    No Known Allergies    MEDICATIONS    Current Outpatient Medications on File Prior to Encounter   Medication Sig Dispense Refill    metoprolol succinate (TOPROL XL) 50 MG extended release tablet Take 1 tablet by mouth daily 30 tablet 5     No current facility-administered medications on file prior to encounter. REVIEW OF SYSTEMS    Pertinent items are noted in HPI. Constitutional: Negative for systemic symptoms including fever, chills and malaise. Objective:      BP (!) 155/82   Pulse 63   Temp 98.7 °F (37.1 °C) (Temporal)   Resp 16     PHYSICAL EXAM      General: The patient is in no acute distress. Mental status:  Patient is appropriate, is  oriented to place and plan of care. Dermatologic exam: Visual inspection of the periwound reveals the skin to be normal in turgor and texture, dry, and sclerotic  Wound exam: see wound description below in procedure note      Assessment:     Problem List Items Addressed This Visit          Circulatory    Chronic venous hypertension with ulcer and inflammation ([address] - Primary    Relevant Orders    Initiate Outpatient Wound Care Protocol       Other    Leg ulcer ([address]    Venous ulcer of left leg ([address]    Relevant Orders    Initiate Outpatient Wound Care Protocol    Venous stasis ulcer of right lower leg with edema of right lower leg ([address]     Procedure Note    Indications:  Based on my examination of this patient's wound(s) today, sharp excision into necrotic subcutaneous tissue is required to promote healing and evaluate the extent of previous healing. Performed by: LOLY Escamilla - CNP    Consent obtained: Yes    Time out taken:  Yes    Pain Control: N/a      Debridement:Excisional Debridement    Using #15 blade scalpel the wound(s) was/were sharply debrided down through and including the removal of subcutaneous tissue.         Devitalized Tissue Debrided:  fibrin, biofilm, slough, and exudate    Pre Debridement Measurements:  Are located in the Wound Documentation Flow Sheet    All active wounds listed below with today's date are evaluated  Wound(s)    debrided this date include # : 1     Post  Debridement Measurements:  Wound 07/29/13 Venous ulcer Ankle Right;Lateral (Active)   Number of days: 7870       Wound 09/30/20 #1 (onset 5 years) Right Medial Lower Leg Cluster (Active)   Wound Image   08/03/22 1256   Wound Etiology Venous 08/10/22 1253   Dressing Status New dressing applied 08/03/22 1320   Wound Cleansed Soap and water; Wound cleanser 08/10/22 1253   Dressing/Treatment Collagen;ABD;Coban/self-adherent bandages; Moisturizing cream 04/06/22 1359   Offloading for Diabetic Foot Ulcers Offloading not required 08/10/22 1253   Wound Length (cm) 2.3 cm 08/10/22 1253   Wound Width (cm) 1.3 cm 08/10/22 1253   Wound Depth (cm) 0.1 cm 08/10/22 1253   Wound Surface Area (cm^2) 2.99 cm^2 08/10/22 1253   Change in Wound Size % (l*w) 98.81 08/10/22 1253   Wound Volume (cm^3) 0.299 cm^3 08/10/22 1253   Wound Healing % 99 08/10/22 1253   Post-Procedure Length (cm) 2.3 cm 08/10/22 1313   Post-Procedure Width (cm) 1.3 cm 08/10/22 1313   Post-Procedure Depth (cm) 0.1 cm 08/10/22 1313   Post-Procedure Surface Area (cm^2) 2.99 cm^2 08/10/22 1313   Post-Procedure Volume (cm^3) 0.299 cm^3 08/10/22 1313   Distance Tunneling (cm) 0 cm 08/10/22 1253   Tunneling Position ___ O'Clock 0 08/10/22 1253   Undermining Starts ___ O'Clock 0 08/10/22 1253   Undermining Ends___ O'Clock 0 08/10/22 1253   Undermining Maxium Distance (cm) 0 08/10/22 1253   Wound Assessment Pink/red 08/10/22 1253   Drainage Amount Moderate 08/10/22 1253   Drainage Description Serosanguinous 08/10/22 1253   Odor None 08/10/22 1253   Cassia-wound Assessment Fragile; Intact;Dry/flaky 08/10/22 1253   Margins Defined edges 08/10/22 1253   Wound Thickness Description not for Pressure Injury Full thickness discharge from the [address] [facility], you will receive a patient experience survey. We would be grateful if you would take the time to fill this survey out. Wound care order history:                 Vascular studies: Arterial studies done 9/27/20; no stenosis              Imaging:   Date              Cultures:   obtained on 11/18/2020                                 Obtained on 1/20/2021              Labs/ HbA1c:   Date              Grafts:                       #1 Apligraf on 12/16/2020                     #2 Apligraf on 12/23/2020                     #3 Apligraf on 12/30/2020                     #4 Apligraf on 1/6/2021                     # 5 Apligraf on 1/13/21                     #1 Nushield 1/27/2021                     #2 N[place]ield 2/3/2021                     #3 N[place]ield 2/10/2021                     #4 N[place]ield Right Medial Lower Leg 2/17/21                     #5 N[place]ield 3/10/2021                  HBO:                Antibiotics:  Bactrim DS BID for 10 days on 11/18/2020                                   Cipro BID for 10 days ordered on 11/25/2020                                    Cipro 500 mg BID on 1/20/2021                                    Cipro 500 mg BID on 4/28/2021              Earlier Wound care treatments:                Authorizations:                        Consults:   Date                          Primary care physician:     Continuing wound care orders and information:              Residence:  private              Continue home health care with:  [facility] discharged 1/19/2022              Your wound-care supplies will be provided by: Anabella [facility]O provider:              Compression with              Off loading:  Date              Wound Medications:              Wound cleansing:                          Do not scrub or use excessive force. Wash hands with soap and water before and after dressing changes.                           Prior to applying a clean dressing, cleanse wound with normal saline,                                wound cleanser, or mild soap and water. Ask the physician or nurse before getting the wound(s) wet in a shower              Daily Wound management:                          Keep weight off wounds and reposition every 2 hours. Avoid standing for long periods of time. Apply wraps/stockings in AM and remove at bedtime. If swelling is present, elevate legs to the level of the heart or above for 30 minutes 4-5 times a day and/or when sitting. When taking antibiotics take entire prescription as ordered by physician do not stop taking until medicine is all gone. Orders for this week: 8/10/2022      Right Lower Leg -- wash with soap and water, pat dry. Apply Desitin to periwound. Apply Stimulin powder. Cover with Ioplex, ca alginate and ABD. Then wrap with Coban 2. Leave in place for 1 week. Follow up with Liliya Gambino CNP in 1 week in the wound care center. Call [phone] for any questions or concerns.   Date__________   Time______              Treatment Note      Written Patient Dismissal Instructions Given            Electronically signed by LOLY Kaur CNP on 8/10/2022 at 1:24 PM

## 2022-08-10 NOTE — PROGRESS NOTES
Multilayer Compression Wrap   (Not Unna) Below the Knee    NAME:  Leonardo Cornejo  YOB: 1945  MEDICAL RECORD NUMBER:  [id number]  DATE:  8/10/2022    Multilayer compression wrap: Removed old Multilayer wrap if indicated and wash leg with mild soap/water. Applied moisturizing agent to dry skin as needed. Applied primary and secondary dressing as ordered. Applied multilayered dressing below the knee to right lower leg. Instructed patient/caregiver not to remove dressing and to keep it clean and dry. Instructed patient/caregiver on complications to report to provider, such as pain, numbness in toes, heavy drainage, and slippage of dressing. Instructed patient on purpose of compression dressing and on activity and exercise recommendations.       Electronically signed by Jesus Molina LPN on 4/02/2531 at 8:02 PM

## 2022-08-17 ENCOUNTER — HOSPITAL ENCOUNTER (OUTPATIENT)
Dept: WOUND CARE | Age: 77
Discharge: HOME | End: 2022-08-17
Payer: COMMERCIAL

## 2022-08-17 VITALS
SYSTOLIC BLOOD PRESSURE: 155 MMHG | TEMPERATURE: 98.7 F | DIASTOLIC BLOOD PRESSURE: 91 MMHG | HEART RATE: 64 BPM | RESPIRATION RATE: 16 BRPM

## 2022-08-17 DIAGNOSIS — I87.331 CHRONIC VENOUS HYPERTENSION WITH ULCER AND INFLAMMATION INVOLVING RIGHT SIDE (HCC): Primary | ICD-10-CM

## 2022-08-17 DIAGNOSIS — I83.012 VENOUS STASIS ULCER OF RIGHT CALF WITH FAT LAYER EXPOSED, UNSPECIFIED WHETHER VARICOSE VEINS PRESENT (HCC): ICD-10-CM

## 2022-08-17 DIAGNOSIS — L97.919 CHRONIC VENOUS HYPERTENSION WITH ULCER AND INFLAMMATION INVOLVING RIGHT SIDE (HCC): Primary | ICD-10-CM

## 2022-08-17 DIAGNOSIS — L97.212 VENOUS STASIS ULCER OF RIGHT CALF WITH FAT LAYER EXPOSED, UNSPECIFIED WHETHER VARICOSE VEINS PRESENT (HCC): ICD-10-CM

## 2022-08-17 PROCEDURE — 11042 DBRDMT SUBQ TIS 1ST 20SQCM/<: CPT | Performed by: NURSE PRACTITIONER

## 2022-08-17 PROCEDURE — 11042 DBRDMT SUBQ TIS 1ST 20SQCM/<: CPT

## 2022-08-17 RX ORDER — BACITRACIN, NEOMYCIN, POLYMYXIN B 400; 3.5; 5 [USP'U]/G; MG/G; [USP'U]/G
OINTMENT TOPICAL ONCE
Status: CANCELLED | OUTPATIENT
Start: 2022-08-17 | End: 2022-08-17

## 2022-08-17 RX ORDER — LIDOCAINE HYDROCHLORIDE 40 MG/ML
SOLUTION TOPICAL ONCE
Status: CANCELLED | OUTPATIENT
Start: 2022-08-17 | End: 2022-08-17

## 2022-08-17 RX ORDER — BETAMETHASONE DIPROPIONATE 0.05 %
OINTMENT (GRAM) TOPICAL ONCE
Status: CANCELLED | OUTPATIENT
Start: 2022-08-17 | End: 2022-08-17

## 2022-08-17 RX ORDER — GINSENG 100 MG
CAPSULE ORAL ONCE
Status: CANCELLED | OUTPATIENT
Start: 2022-08-17 | End: 2022-08-17

## 2022-08-17 RX ORDER — LIDOCAINE HYDROCHLORIDE 20 MG/ML
JELLY TOPICAL ONCE
Status: CANCELLED | OUTPATIENT
Start: 2022-08-17 | End: 2022-08-17

## 2022-08-17 RX ORDER — LIDOCAINE 40 MG/G
CREAM TOPICAL ONCE
Status: CANCELLED | OUTPATIENT
Start: 2022-08-17 | End: 2022-08-17

## 2022-08-17 RX ORDER — BACITRACIN ZINC AND POLYMYXIN B SULFATE 500; 1000 [USP'U]/G; [USP'U]/G
OINTMENT TOPICAL ONCE
Status: CANCELLED | OUTPATIENT
Start: 2022-08-17 | End: 2022-08-17

## 2022-08-17 RX ORDER — LIDOCAINE 50 MG/G
OINTMENT TOPICAL ONCE
Status: CANCELLED | OUTPATIENT
Start: 2022-08-17 | End: 2022-08-17

## 2022-08-17 RX ORDER — CLOBETASOL PROPIONATE 0.5 MG/G
OINTMENT TOPICAL ONCE
Status: CANCELLED | OUTPATIENT
Start: 2022-08-17 | End: 2022-08-17

## 2022-08-17 RX ORDER — GENTAMICIN SULFATE 1 MG/G
OINTMENT TOPICAL ONCE
Status: CANCELLED | OUTPATIENT
Start: 2022-08-17 | End: 2022-08-17

## 2022-08-17 ASSESSMENT — PAIN SCALES - GENERAL: PAINLEVEL_OUTOF10: 0

## 2022-08-17 NOTE — DISCHARGE INSTRUCTIONS
Prior to applying a clean dressing, cleanse wound with normal saline,                                wound cleanser, or mild soap and water. Ask the physician or nurse before getting the wound(s) wet in a shower              Daily Wound management:                          Keep weight off wounds and reposition every 2 hours. Avoid standing for long periods of time. Apply wraps/stockings in AM and remove at bedtime. If swelling is present, elevate legs to the level of the heart or above for 30 minutes 4-5 times a day and/or when sitting. When taking antibiotics take entire prescription as ordered by physician do not stop taking until medicine is all gone. Orders for this week: 8/17/2022      Right Lower Leg -- wash with soap and water, pat dry. Apply Desitin to periwound. Apply Stimulin powder. Cover with Ioplex, ca alginate and ABD. Then wrap with Coban 2. Leave in place for 1 week. Follow up with Petey Sher CNP in 1 week in the wound care center. Call [phone] for any questions or concerns.   Date__________   Time______

## 2022-08-17 NOTE — PROGRESS NOTES
Wound Care Center Progress Note With Procedure    Ernestine Oliva  AGE: 69 y.o. GENDER: male  : 1945  EPISODE DATE:  2022     Subjective:     Chief Complaint   Patient presents with    Wound Check     Leg right         HISTORY of PRESENT ILLNESS     Ernestine Oliva is a 77 y.o. male who presents today for wound evaluation of Chronic venous wound of Right lower medial leg. The wound is of moderate severity. The underlying cause of the wound is venous disease and trauma. Smaller in size this week. Patient denies any issues this week. Getting close to healing, about 1cm or less     Wound Pain Timing/Severity: none  Quality of pain: N/A  Severity of pain:  0 / 10  Modifying Factors: venous stasis  Associated Signs/Symptoms: none     Patient educated on the 6 essential components necessary for wound healing: Circulation, Debridements, Proper Dressings and Topical Wound Products, Infection Control, Edema Control and Offloading. Patient educated on those factors that negatively effect or impact wound healing: smoking, obesity, uncontrolled diabetes, anticoagulant and immunosuppressive regimens, inadequate nutrition, untreated arterial and venous disease if applicable and measures to manage edema.         PAST MEDICAL HISTORY        Diagnosis Date    Chronic venous hypertension with ulcer and inflammation involving right side ([facility] [address]     RLE    Hypertension     past    Iron deficiency     Lymphoma Dx 11-11    Hx of B cell lymphoma-in remission since        PAST SURGICAL HISTORY    Past Surgical History:   Procedure Laterality Date    TUNNELED VENOUS PORT PLACEMENT      TUNNELED VENOUS PORT PLACEMENT      removed 2012       FAMILY HISTORY    Family History   Problem Relation Age of Onset    Early Death Mother 52    Diabetes Father     Heart Disease Father     Vision Loss Son         \"He wears glasses\"    Early Death Daughter 36       SOCIAL HISTORY    Social History     Tobacco Use Smoking status: Never    Smokeless tobacco: Never   Substance Use Topics    Alcohol use: No    Drug use: No       ALLERGIES    No Known Allergies    MEDICATIONS    Current Outpatient Medications on File Prior to Encounter   Medication Sig Dispense Refill    metoprolol succinate (TOPROL XL) 50 MG extended release tablet Take 1 tablet by mouth daily 30 tablet 5     No current facility-administered medications on file prior to encounter. REVIEW OF SYSTEMS    Pertinent items are noted in HPI. Constitutional: Negative for systemic symptoms including fever, chills and malaise. Objective:      BP (!) 155/91   Pulse 64   Temp 98.7 °F (37.1 °C) (Temporal)   Resp 16     PHYSICAL EXAM      General: The patient is in no acute distress. Mental status:  Patient is appropriate, is  oriented to place and plan of care. Dermatologic exam: Visual inspection of the periwound reveals the skin to be normal in turgor and texture and dry  Wound exam: see wound description below in procedure note      Assessment:     Problem List Items Addressed This Visit          Circulatory    Chronic venous hypertension with ulcer and inflammation ([facility] [address] - Primary    Relevant Orders    Initiate Outpatient Wound Care Protocol       Other    WD-Venous stasis ulcer of right calf with fat layer exposed ([address]     Procedure Note    Indications:  Based on my examination of this patient's wound(s) today, sharp excision into necrotic subcutaneous tissue is required to promote healing and evaluate the extent of previous healing. Performed by: Agatha Fothergill, APRN - CNP    Consent obtained: Yes    Time out taken:  Yes    Pain Control: N/A      Debridement:Excisional Debridement    Using #15 blade scalpel the wound(s) was/were sharply debrided down through and including the removal of subcutaneous tissue.         Devitalized Tissue Debrided:  fibrin, biofilm, and slough    Pre Debridement Measurements:  Are located in the Wound Documentation Flow Sheet    All active wounds listed below with today's date are evaluated  Wound(s)    debrided this date include # : 1     Post  Debridement Measurements:  Wound 07/29/13 Venous ulcer Ankle Right;Lateral (Active)   Number of days: 3306       Wound 09/30/20 #1 (onset 5 years) Right Medial Lower Leg Cluster (Active)   Wound Image   08/17/22 1304   Wound Etiology Venous 08/17/22 1329   Dressing Status New dressing applied 08/17/22 1329   Wound Cleansed Soap and water 08/17/22 1304   Dressing/Treatment Collagen;ABD;Coban/self-adherent bandages; Moisturizing cream 04/06/22 1359   Offloading for Diabetic Foot Ulcers Offloading not required 08/17/22 1329   Wound Length (cm) 2 cm 08/17/22 1304   Wound Width (cm) 1.8 cm 08/17/22 1304   Wound Depth (cm) 0.1 cm 08/17/22 1304   Wound Surface Area (cm^2) 3.6 cm^2 08/17/22 1304   Change in Wound Size % (l*w) 98.57 08/17/22 1304   Wound Volume (cm^3) 0.36 cm^3 08/17/22 1304   Wound Healing % 99 08/17/22 1304   Post-Procedure Length (cm) 2 cm 08/17/22 1323   Post-Procedure Width (cm) 1.8 cm 08/17/22 1323   Post-Procedure Depth (cm) 0.1 cm 08/17/22 1323   Post-Procedure Surface Area (cm^2) 3.6 cm^2 08/17/22 1323   Post-Procedure Volume (cm^3) 0.36 cm^3 08/17/22 1323   Distance Tunneling (cm) 0 cm 08/17/22 1304   Tunneling Position ___ O'Clock 0 08/17/22 1304   Undermining Starts ___ O'Clock 0 08/17/22 1304   Undermining Ends___ O'Clock 0 08/17/22 1304   Undermining Maxium Distance (cm) 0 08/17/22 1304   Wound Assessment Pink/red 08/17/22 1304   Drainage Amount Moderate 08/17/22 1304   Drainage Description Serosanguinous 08/17/22 1304   Odor None 08/17/22 1304   Cassia-wound Assessment Intact;Fragile;Dry/flaky 08/17/22 1304   Margins Defined edges 08/17/22 1304   Wound Thickness Description not for Pressure Injury Full thickness 08/17/22 1304   Number of days: 686       Percent of Wound(s) Debrided: approximately 100%    Total  Area  Debrided:  3.6 sq cm     Bleeding: Your wound-care supplies will be provided by: Morena RAO provider:              Compression with              Off loading:  Date              Wound Medications:              Wound cleansing:                          Do not scrub or use excessive force. Wash hands with soap and water before and after dressing changes. Prior to applying a clean dressing, cleanse wound with normal saline,                                wound cleanser, or mild soap and water. Ask the physician or nurse before getting the wound(s) wet in a shower              Daily Wound management:                          Keep weight off wounds and reposition every 2 hours. Avoid standing for long periods of time. Apply wraps/stockings in AM and remove at bedtime. If swelling is present, elevate legs to the level of the heart or above for 30 minutes 4-5 times a day and/or when sitting. When taking antibiotics take entire prescription as ordered by physician do not stop taking until medicine is all gone. Orders for this week: 2022      Right Lower Leg -- wash with soap and water, pat dry. Apply Desitin to periwound. Apply Stimulin powder. Cover with Ioplex, ca alginate and ABD. Then wrap with Coban 2. Leave in place for 1 week. Follow up with Annalisa Chavez CNP in 1 week in the wound care center. Call [phone] for any questions or concerns.   Date__________   Time______        Treatment Note Wound 20 #1 (onset 5 years) Right Medial Lower Leg Cluster-Dressing/Treatment:  (desitin,stimulen,ioplex,ca ag,abd,coban2)    Written Patient Dismissal Instructions Given            Electronically signed by LOLY Bloom CNP on 2022 at 1:30 PM

## 2022-08-17 NOTE — PROGRESS NOTES
Multilayer Compression Wrap   (Not Unna) Below the Knee    NAME:  Lizet Urbina  YOB: 1945  MEDICAL RECORD NUMBER:  [id number]  DATE:  8/17/2022    Multilayer compression wrap: Removed old Multilayer wrap if indicated and wash leg with mild soap/water. Applied moisturizing agent to dry skin as needed. Applied primary and secondary dressing as ordered. Applied multilayered dressing below the knee to right lower leg. Instructed patient/caregiver not to remove dressing and to keep it clean and dry. Instructed patient/caregiver on complications to report to provider, such as pain, numbness in toes, heavy drainage, and slippage of dressing. Instructed patient on purpose of compression dressing and on activity and exercise recommendations.       Electronically signed by Sam Morrison LPN on 5/20/5251 at 2:30 PM

## 2022-08-24 ENCOUNTER — HOSPITAL ENCOUNTER (OUTPATIENT)
Dept: WOUND CARE | Age: 77
Discharge: HOME | End: 2022-08-24
Payer: COMMERCIAL

## 2022-08-24 VITALS
HEART RATE: 65 BPM | RESPIRATION RATE: 18 BRPM | TEMPERATURE: 99.2 F | DIASTOLIC BLOOD PRESSURE: 87 MMHG | SYSTOLIC BLOOD PRESSURE: 149 MMHG

## 2022-08-24 DIAGNOSIS — I83.012 VENOUS STASIS ULCER OF RIGHT CALF WITH FAT LAYER EXPOSED, UNSPECIFIED WHETHER VARICOSE VEINS PRESENT (HCC): Primary | ICD-10-CM

## 2022-08-24 DIAGNOSIS — L97.212 VENOUS STASIS ULCER OF RIGHT CALF WITH FAT LAYER EXPOSED, UNSPECIFIED WHETHER VARICOSE VEINS PRESENT (HCC): Primary | ICD-10-CM

## 2022-08-24 PROCEDURE — 11042 DBRDMT SUBQ TIS 1ST 20SQCM/<: CPT | Performed by: NURSE PRACTITIONER

## 2022-08-24 PROCEDURE — 11042 DBRDMT SUBQ TIS 1ST 20SQCM/<: CPT

## 2022-08-24 RX ORDER — BETAMETHASONE DIPROPIONATE 0.05 %
OINTMENT (GRAM) TOPICAL ONCE
Status: CANCELLED | OUTPATIENT
Start: 2022-08-24 | End: 2022-08-24

## 2022-08-24 RX ORDER — LIDOCAINE 40 MG/G
CREAM TOPICAL ONCE
Status: CANCELLED | OUTPATIENT
Start: 2022-08-24 | End: 2022-08-24

## 2022-08-24 RX ORDER — BACITRACIN ZINC AND POLYMYXIN B SULFATE 500; 1000 [USP'U]/G; [USP'U]/G
OINTMENT TOPICAL ONCE
Status: CANCELLED | OUTPATIENT
Start: 2022-08-24 | End: 2022-08-24

## 2022-08-24 RX ORDER — LIDOCAINE 50 MG/G
OINTMENT TOPICAL ONCE
Status: CANCELLED | OUTPATIENT
Start: 2022-08-24 | End: 2022-08-24

## 2022-08-24 RX ORDER — LIDOCAINE HYDROCHLORIDE 40 MG/ML
SOLUTION TOPICAL ONCE
Status: CANCELLED | OUTPATIENT
Start: 2022-08-24 | End: 2022-08-24

## 2022-08-24 RX ORDER — BACITRACIN, NEOMYCIN, POLYMYXIN B 400; 3.5; 5 [USP'U]/G; MG/G; [USP'U]/G
OINTMENT TOPICAL ONCE
Status: CANCELLED | OUTPATIENT
Start: 2022-08-24 | End: 2022-08-24

## 2022-08-24 RX ORDER — LIDOCAINE HYDROCHLORIDE 20 MG/ML
JELLY TOPICAL ONCE
Status: CANCELLED | OUTPATIENT
Start: 2022-08-24 | End: 2022-08-24

## 2022-08-24 RX ORDER — GENTAMICIN SULFATE 1 MG/G
OINTMENT TOPICAL ONCE
Status: CANCELLED | OUTPATIENT
Start: 2022-08-24 | End: 2022-08-24

## 2022-08-24 RX ORDER — CLOBETASOL PROPIONATE 0.5 MG/G
OINTMENT TOPICAL ONCE
Status: CANCELLED | OUTPATIENT
Start: 2022-08-24 | End: 2022-08-24

## 2022-08-24 RX ORDER — GINSENG 100 MG
CAPSULE ORAL ONCE
Status: CANCELLED | OUTPATIENT
Start: 2022-08-24 | End: 2022-08-24

## 2022-08-24 ASSESSMENT — PAIN SCALES - GENERAL: PAINLEVEL_OUTOF10: 0

## 2022-08-24 NOTE — PROGRESS NOTES
Wound Care Center Progress Note With Procedure    Aruna Sanders  AGE: 69 y.o. GENDER: male  : 1945  EPISODE DATE:  2022     Subjective:     Chief Complaint   Patient presents with    Wound Check     RLE         HISTORY of PRESENT ILLNESS     Aruna Sanders is a 77 y.o. male who presents today for wound evaluation of Chronic venous wound of Right lower medial leg. The wound is of moderate severity. The underlying cause of the wound is venous disease and trauma. No issues this week  Smaller in size     Wound Pain Timing/Severity: none  Quality of pain: N/A  Severity of pain:  0 / 10  Modifying Factors: venous stasis  Associated Signs/Symptoms: none     Patient educated on the 6 essential components necessary for wound healing: Circulation, Debridements, Proper Dressings and Topical Wound Products, Infection Control, Edema Control and Offloading. Patient educated on those factors that negatively effect or impact wound healing: smoking, obesity, uncontrolled diabetes, anticoagulant and immunosuppressive regimens, inadequate nutrition, untreated arterial and venous disease if applicable and measures to manage edema.         PAST MEDICAL HISTORY        Diagnosis Date    Chronic venous hypertension with ulcer and inflammation involving right side ([address]     RLE    Hypertension     past    Iron deficiency     Lymphoma Dx 11-11    Hx of B cell lymphoma-in remission since        PAST SURGICAL HISTORY    Past Surgical History:   Procedure Laterality Date    TUNNELED VENOUS PORT PLACEMENT      TUNNELED VENOUS PORT PLACEMENT      removed 2012       FAMILY HISTORY    Family History   Problem Relation Age of Onset    Early Death Mother 52    Diabetes Father     Heart Disease Father     Vision Loss Son         \"He wears glasses\"    Early Death Daughter 36       SOCIAL HISTORY    Social History     Tobacco Use    Smoking status: Never    Smokeless tobacco: Never   Substance Use Topics    Alcohol use: No    Drug use: No       ALLERGIES    No Known Allergies    MEDICATIONS    Current Outpatient Medications on File Prior to Encounter   Medication Sig Dispense Refill    metoprolol succinate (TOPROL XL) 50 MG extended release tablet Take 1 tablet by mouth daily 30 tablet 5     No current facility-administered medications on file prior to encounter. REVIEW OF SYSTEMS    Pertinent items are noted in HPI. Constitutional: Negative for systemic symptoms including fever, chills and malaise. Objective:      BP (!) 149/87   Pulse 65   Temp 99.2 °F (37.3 °C) (Temporal)   Resp 18     PHYSICAL EXAM      General: The patient is in no acute distress. Mental status:  Patient is appropriate, is  oriented to place and plan of care. Dermatologic exam: Visual inspection of the periwound reveals the skin to be normal in turgor and texture, dry, and slack  Wound exam: see wound description below in procedure note      Assessment:     Problem List Items Addressed This Visit          Other    WD-Venous stasis ulcer of right calf with fat layer exposed ([address] - Primary    Relevant Orders    Initiate Outpatient Wound Care Protocol     Procedure Note    Indications:  Based on my examination of this patient's wound(s) today, sharp excision into necrotic subcutaneous tissue is required to promote healing and evaluate the extent of previous healing. Performed by: Madelene Cogan, APRN - CNP    Consent obtained: Yes    Time out taken:  Yes    Pain Control: N/A      Debridement:Excisional Debridement    Using #15 blade scalpel the wound(s) was/were sharply debrided down through and including the removal of subcutaneous tissue.         Devitalized Tissue Debrided:  fibrin, biofilm, slough, and exudate    Pre Debridement Measurements:  Are located in the Wound Documentation Flow Sheet    All active wounds listed below with today's date are evaluated  Wound(s)    debrided this date include # : 1     Post  Debridement Measurements:  Wound 07/29/13 Venous ulcer Ankle Right;Lateral (Active)   Number of days: 3313       Wound 09/30/20 #1 (onset 5 years) Right Medial Lower Leg Cluster (Active)   Wound Image   08/17/22 1304   Wound Etiology Venous 08/24/22 1312   Dressing Status New dressing applied 08/24/22 1312   Wound Cleansed Soap and water 08/24/22 1247   Dressing/Treatment Collagen;ABD;Coban/self-adherent bandages; Moisturizing cream 04/06/22 1359   Offloading for Diabetic Foot Ulcers Offloading not required 08/24/22 1312   Wound Length (cm) 1.7 cm 08/24/22 1247   Wound Width (cm) 1.7 cm 08/24/22 1247   Wound Depth (cm) 0.1 cm 08/24/22 1247   Wound Surface Area (cm^2) 2.89 cm^2 08/24/22 1247   Change in Wound Size % (l*w) 98.85 08/24/22 1247   Wound Volume (cm^3) 0.289 cm^3 08/24/22 1247   Wound Healing % 99 08/24/22 1247   Post-Procedure Length (cm) 1.7 cm 08/24/22 1305   Post-Procedure Width (cm) 1.7 cm 08/24/22 1305   Post-Procedure Depth (cm) 0.1 cm 08/24/22 1305   Post-Procedure Surface Area (cm^2) 2.89 cm^2 08/24/22 1305   Post-Procedure Volume (cm^3) 0.289 cm^3 08/24/22 1305   Distance Tunneling (cm) 0 cm 08/24/22 1247   Tunneling Position ___ O'Clock 0 08/24/22 1247   Undermining Starts ___ O'Clock 0 08/24/22 1247   Undermining Ends___ O'Clock 0 08/24/22 1247   Undermining Maxium Distance (cm) 0 08/24/22 1247   Wound Assessment Epithelialization 08/24/22 1247   Drainage Amount Moderate 08/24/22 1247   Drainage Description Serosanguinous 08/24/22 1247   Odor None 08/24/22 1247   Cassia-wound Assessment Fragile;Dry/flaky 08/24/22 1247   Margins Defined edges 08/24/22 1247   Wound Thickness Description not for Pressure Injury Full thickness 08/24/22 1247   Number of days: 693       Percent of Wound(s) Debrided: approximately 100%    Total  Area  Debrided:  2.89 sq cm     Bleeding:  Minimal    Hemostasis Achieved:  by pressure    Procedural Pain:  0  / 10     Post Procedural Pain:  0 / 10     Response to treatment:  Well tolerated by patient. Status of wound progress and description from last visit:   Improved. Continue plan of care   Follow up 1 week       Plan:       Discharge Instructions         [address]   NOTE: Upon discharge from the [address], you will receive a patient experience survey. We would be grateful if you would take the time to fill this survey out. Wound care order history:                 Vascular studies: Arterial studies done 9/27/20; no stenosis              Imaging:   Date              Cultures:   obtained on 11/18/2020                                 Obtained on 1/20/2021              Labs/ HbA1c:   Date              Grafts:                       #1 Apligraf on 12/16/2020                     #2 Apligraf on 12/23/2020                     #3 Apligraf on 12/30/2020                     #4 Apligraf on 1/6/2021                     # 5 Apligraf on 1/13/21                     #1 Nushield 1/27/2021                     #2 [place] 2/3/2021                     #3 [place] 2/10/2021                     #4 [place] Right Medial Lower Leg 2/17/21                     #5 [place] 3/10/2021                  HBO:                Antibiotics:  Bactrim DS BID for 10 days on 11/18/2020                                   Cipro BID for 10 days ordered on 11/25/2020                                    Cipro 500 mg BID on 1/20/2021                                    Cipro 500 mg BID on 4/28/2021              Earlier Wound care treatments:                Authorizations:                        Consults:   Date                          Primary care physician:     Continuing wound care orders and information:              Residence:  private              Continue home health care with:  [facility] discharged 1/19/2022              Your wound-care supplies will be provided by: Katie RAO provider:              Compression with              Off loading:  Date              Wound Medications: Wound cleansing:                          Do not scrub or use excessive force. Wash hands with soap and water before and after dressing changes. Prior to applying a clean dressing, cleanse wound with normal saline,                                wound cleanser, or mild soap and water. Ask the physician or nurse before getting the wound(s) wet in a shower              Daily Wound management:                          Keep weight off wounds and reposition every 2 hours. Avoid standing for long periods of time. Apply wraps/stockings in AM and remove at bedtime. If swelling is present, elevate legs to the level of the heart or above for 30 minutes 4-5 times a day and/or when sitting. When taking antibiotics take entire prescription as ordered by physician do not stop taking until medicine is all gone. Orders for this week: 8/24/2022      Right Lower Leg -- wash with soap and water, pat dry. Apply Desitin to periwound. Apply Stimulin powder. Cover with Ioplex, ca alginate and ABD. Then wrap with Coban 2. Leave in place for 1 week. Follow up with Tami Davey CNP in 1 week in the wound care center. Call [phone] for any questions or concerns.   Date__________   Time______        Treatment Note Wound 09/30/20 #1 (onset 5 years) Right Medial Lower Leg Cluster-Dressing/Treatment:  (desitin,stimulen,ioplex,c aag,abd [place])    Written Patient Dismissal Instructions Given            Electronically signed by LOLY Fontenot CNP on 8/24/2022 at 1:24 PM

## 2022-08-24 NOTE — DISCHARGE INSTRUCTIONS
PHYSICIAN ORDERS AND DISCHARGE INSTRUCTIONS   NOTE: Upon discharge from the [address], you will receive a patient experience survey. We would be grateful if you would take the time to fill this survey out. Wound care order history:                 Vascular studies: Arterial studies done 9/27/20; no stenosis              Imaging:   Date              Cultures:   obtained on 11/18/2020                                 Obtained on 1/20/2021              Labs/ HbA1c:   Date              Grafts:                       #1 Apligraf on 12/16/2020                     #2 Apligraf on 12/23/2020                     #3 Apligraf on 12/30/2020                     #4 Apligraf on 1/6/2021                     # 5 Apligraf on 1/13/21                     #1 Nushield 1/27/2021                     #2 [place] 2/3/2021                     #3 [place] 2/10/2021                     #4 [place] Right Medial Lower Leg 2/17/21                     #5 [place] 3/10/2021                  HBO:                Antibiotics:  Bactrim DS BID for 10 days on 11/18/2020                                   Cipro BID for 10 days ordered on 11/25/2020                                    Cipro 500 mg BID on 1/20/2021                                    Cipro 500 mg BID on 4/28/2021              Earlier Wound care treatments:                Authorizations:                        Consults:   Date                          Primary care physician:     Continuing wound care orders and information:              Residence:  private              [facility] home health care with:  [facility] discharged 1/19/2022              Your wound-care supplies will be provided by: Layne RAO provider:              Compression with              Off loading:  Date              Wound Medications:              Wound cleansing:                          Do not scrub or use excessive force. Wash hands with soap and water before and after dressing changes. Prior to applying a clean dressing, cleanse wound with normal saline,                                wound cleanser, or mild soap and water. Ask the physician or nurse before getting the wound(s) wet in a shower              Daily Wound management:                          Keep weight off wounds and reposition every 2 hours. Avoid standing for long periods of time. Apply wraps/stockings in AM and remove at bedtime. If swelling is present, elevate legs to the level of the heart or above for 30 minutes 4-5 times a day and/or when sitting. When taking antibiotics take entire prescription as ordered by physician do not stop taking until medicine is all gone. Orders for this week: 8/24/2022      Right Lower Leg -- wash with soap and water, pat dry. Apply Desitin to periwound. Apply Stimulin powder. Cover with Ioplex, ca alginate and ABD. Then wrap with Coban 2. Leave in place for 1 week. Follow up with Nikita Berry CNP in 1 week in the wound care center. Call [phone] for any questions or concerns.   Date__________   Time______

## 2022-08-26 ENCOUNTER — NURSE ONLY (OUTPATIENT)
Dept: INTERNAL MEDICINE CLINIC | Age: 77
End: 2022-08-26
Payer: COMMERCIAL

## 2022-08-26 DIAGNOSIS — D69.6 THROMBOCYTOPENIA (HCC): Primary | ICD-10-CM

## 2022-08-26 LAB
BASOPHILS ABSOLUTE: 0.1 K/UL (ref 0–0.2)
BASOPHILS RELATIVE PERCENT: 0.8 %
EOSINOPHILS ABSOLUTE: 0.4 K/UL (ref 0–0.6)
EOSINOPHILS RELATIVE PERCENT: 5.9 %
HCT VFR BLD CALC: 46.2 % (ref 40.5–52.5)
HEMOGLOBIN: 15.5 G/DL (ref 13.5–17.5)
LYMPHOCYTES ABSOLUTE: 1.3 K/UL (ref 1–5.1)
LYMPHOCYTES RELATIVE PERCENT: 20.1 %
MCH RBC QN AUTO: 31.4 PG (ref 26–34)
MCHC RBC AUTO-ENTMCNC: 33.5 G/DL (ref 31–36)
MCV RBC AUTO: 93.7 FL (ref 80–100)
MONOCYTES ABSOLUTE: 0.7 K/UL (ref 0–1.3)
MONOCYTES RELATIVE PERCENT: 10.6 %
NEUTROPHILS ABSOLUTE: 3.9 K/UL (ref 1.7–7.7)
NEUTROPHILS RELATIVE PERCENT: 62.6 %
PDW BLD-RTO: 13.5 % (ref 12.4–15.4)
PLATELET # BLD: 142 K/UL (ref 135–450)
PMV BLD AUTO: 9.9 FL (ref 5–10.5)
RBC # BLD: 4.93 M/UL (ref 4.2–5.9)
WBC # BLD: 6.2 K/UL (ref 4–11)

## 2022-08-26 PROCEDURE — 99999 PR OFFICE/OUTPT VISIT,PROCEDURE ONLY: CPT | Performed by: FAMILY MEDICINE

## 2022-08-26 PROCEDURE — 36415 COLL VENOUS BLD VENIPUNCTURE: CPT | Performed by: FAMILY MEDICINE

## 2022-08-31 ENCOUNTER — HOSPITAL ENCOUNTER (OUTPATIENT)
Dept: WOUND CARE | Age: 77
Discharge: HOME | End: 2022-08-31
Payer: COMMERCIAL

## 2022-08-31 VITALS
HEART RATE: 61 BPM | RESPIRATION RATE: 20 BRPM | DIASTOLIC BLOOD PRESSURE: 87 MMHG | SYSTOLIC BLOOD PRESSURE: 150 MMHG | TEMPERATURE: 98.9 F

## 2022-08-31 DIAGNOSIS — L97.212 VENOUS STASIS ULCER OF RIGHT CALF WITH FAT LAYER EXPOSED, UNSPECIFIED WHETHER VARICOSE VEINS PRESENT (HCC): Primary | ICD-10-CM

## 2022-08-31 DIAGNOSIS — I83.012 VENOUS STASIS ULCER OF RIGHT CALF WITH FAT LAYER EXPOSED, UNSPECIFIED WHETHER VARICOSE VEINS PRESENT (HCC): Primary | ICD-10-CM

## 2022-08-31 PROCEDURE — 11042 DBRDMT SUBQ TIS 1ST 20SQCM/<: CPT | Performed by: NURSE PRACTITIONER

## 2022-08-31 PROCEDURE — 11042 DBRDMT SUBQ TIS 1ST 20SQCM/<: CPT

## 2022-08-31 RX ORDER — CLOBETASOL PROPIONATE 0.5 MG/G
OINTMENT TOPICAL ONCE
Status: CANCELLED | OUTPATIENT
Start: 2022-08-31 | End: 2022-08-31

## 2022-08-31 RX ORDER — LIDOCAINE 50 MG/G
OINTMENT TOPICAL ONCE
Status: CANCELLED | OUTPATIENT
Start: 2022-08-31 | End: 2022-08-31

## 2022-08-31 RX ORDER — LIDOCAINE HYDROCHLORIDE 20 MG/ML
JELLY TOPICAL ONCE
Status: CANCELLED | OUTPATIENT
Start: 2022-08-31 | End: 2022-08-31

## 2022-08-31 RX ORDER — GINSENG 100 MG
CAPSULE ORAL ONCE
Status: CANCELLED | OUTPATIENT
Start: 2022-08-31 | End: 2022-08-31

## 2022-08-31 RX ORDER — GENTAMICIN SULFATE 1 MG/G
OINTMENT TOPICAL ONCE
Status: CANCELLED | OUTPATIENT
Start: 2022-08-31 | End: 2022-08-31

## 2022-08-31 RX ORDER — BACITRACIN ZINC AND POLYMYXIN B SULFATE 500; 1000 [USP'U]/G; [USP'U]/G
OINTMENT TOPICAL ONCE
Status: CANCELLED | OUTPATIENT
Start: 2022-08-31 | End: 2022-08-31

## 2022-08-31 RX ORDER — LIDOCAINE HYDROCHLORIDE 40 MG/ML
SOLUTION TOPICAL ONCE
Status: CANCELLED | OUTPATIENT
Start: 2022-08-31 | End: 2022-08-31

## 2022-08-31 RX ORDER — LIDOCAINE 40 MG/G
CREAM TOPICAL ONCE
Status: CANCELLED | OUTPATIENT
Start: 2022-08-31 | End: 2022-08-31

## 2022-08-31 RX ORDER — BETAMETHASONE DIPROPIONATE 0.05 %
OINTMENT (GRAM) TOPICAL ONCE
Status: CANCELLED | OUTPATIENT
Start: 2022-08-31 | End: 2022-08-31

## 2022-08-31 RX ORDER — BACITRACIN, NEOMYCIN, POLYMYXIN B 400; 3.5; 5 [USP'U]/G; MG/G; [USP'U]/G
OINTMENT TOPICAL ONCE
Status: CANCELLED | OUTPATIENT
Start: 2022-08-31 | End: 2022-08-31

## 2022-08-31 NOTE — PROGRESS NOTES
Multilayer Compression Wrap   (Not Unna) Below the Knee    NAME:  Kate Church  YOB: 1945  MEDICAL RECORD NUMBER:  [id number]  DATE:  8/31/2022    Multilayer compression wrap: Removed old Multilayer wrap if indicated and wash leg with mild soap/water. Applied moisturizing agent to dry skin as needed. Applied primary and secondary dressing as ordered. Applied multilayered dressing below the knee to right lower leg. Instructed patient/caregiver not to remove dressing and to keep it clean and dry. Instructed patient/caregiver on complications to report to provider, such as pain, numbness in toes, heavy drainage, and slippage of dressing. Instructed patient on purpose of compression dressing and on activity and exercise recommendations.       Electronically signed by Jaycob Lo LPN on 7/02/7208 at 8:36 PM

## 2022-08-31 NOTE — PROGRESS NOTES
Sheet    All active wounds listed below with today's date are evaluated  Wound(s)    debrided this date include # : 1     Post  Debridement Measurements:  Wound 07/29/13 Venous ulcer Ankle Right;Lateral (Active)   Number of days: 3320       Wound 09/30/20 #1 (onset 5 years) Right Medial Lower Leg Cluster (Active)   Wound Image   08/17/22 1304   Wound Etiology Venous 08/31/22 1251   Dressing Status New dressing applied 08/24/22 1312   Wound Cleansed Soap and water 08/31/22 1251   Dressing/Treatment Collagen;ABD;Coban/self-adherent bandages; Moisturizing cream 04/06/22 1359   Offloading for Diabetic Foot Ulcers Offloading not required 08/24/22 1312   Wound Length (cm) 0.5 cm 08/31/22 1251   Wound Width (cm) 0.5 cm 08/31/22 1251   Wound Depth (cm) 0.1 cm 08/31/22 1251   Wound Surface Area (cm^2) 0.25 cm^2 08/31/22 1251   Change in Wound Size % (l*w) 99.9 08/31/22 1251   Wound Volume (cm^3) 0.025 cm^3 08/31/22 1251   Wound Healing % 100 08/31/22 1251   Post-Procedure Length (cm) 0.5 cm 08/31/22 1321   Post-Procedure Width (cm) 0.5 cm 08/31/22 1321   Post-Procedure Depth (cm) 0.1 cm 08/31/22 1321   Post-Procedure Surface Area (cm^2) 0.25 cm^2 08/31/22 1321   Post-Procedure Volume (cm^3) 0.025 cm^3 08/31/22 1321   Distance Tunneling (cm) 0 cm 08/31/22 1251   Tunneling Position ___ O'Clock 0 08/31/22 1251   Undermining Starts ___ O'Clock 0 08/31/22 1251   Undermining Ends___ O'Clock 0 08/31/22 1251   Undermining Maxium Distance (cm) 0 08/31/22 1251   Wound Assessment Pink/red 08/31/22 1251   Drainage Amount Moderate 08/31/22 1251   Drainage Description Yellow 08/31/22 1251   Odor None 08/31/22 1251   Cassia-wound Assessment Intact 08/31/22 1251   Margins Defined edges 08/31/22 1251   Wound Thickness Description not for Pressure Injury Full thickness 08/31/22 1251   Number of days: 700       Percent of Wound(s) Debrided: approximately 100%    Total  Area  Debrided:  0.25 sq cm     Bleeding:  Minimal    Hemostasis Achieved:  by pressure    Procedural Pain:  0  / 10     Post Procedural Pain:  0 / 10     Response to treatment:  Well tolerated by patient. Status of wound progress and description from last visit:   Improving. Slight changes to plan of care to prepare for wound healing and discharge   Follow up 1 week and continue to monitor  Continue plan of care otherwise       Plan:       Discharge Instructions         [address]   NOTE: Upon discharge from the [address], you will receive a patient experience survey. We would be grateful if you would take the time to fill this survey out.      Wound care order history:                 Vascular studies: Arterial studies done 9/27/20; no stenosis              Imaging:   Date              Cultures:   obtained on 11/18/2020                                 Obtained on 1/20/2021              Labs/ HbA1c:   Date              Grafts:                       #1 Apligraf on 12/16/2020                     #2 Apligraf on 12/23/2020                     #3 Apligraf on 12/30/2020                     #4 Apligraf on 1/6/2021                     # 5 Apligraf on 1/13/21                     #1 Nushield 1/27/2021                     #2 Nushield 2/3/2021                     #3 N[place]ield 2/10/2021                     #4 N[place]ield Right Medial Lower Leg 2/17/21                     #5 N[place]ield 3/10/2021                  HBO:                Antibiotics:  Bactrim DS BID for 10 days on 11/18/2020                                   Cipro BID for 10 days ordered on 11/25/2020                                    Cipro 500 mg BID on 1/20/2021                                    Cipro 500 mg BID on 4/28/2021              Earlier Wound care treatments:                Authorizations:                        Consults:   Date                          Primary care physician:     Continuing wound care orders and information:              Residence:  private              [facility] health care with:  [facility] discharged 1/19/2022              Your wound-care supplies will be provided by: Sana RAO provider:              Compression with              Off loading:  Date              Wound Medications:              Wound cleansing:                          Do not scrub or use excessive force. Wash hands with soap and water before and after dressing changes. Prior to applying a clean dressing, cleanse wound with normal saline,                                wound cleanser, or mild soap and water. Ask the physician or nurse before getting the wound(s) wet in a shower              Daily Wound management:                          Keep weight off wounds and reposition every 2 hours. Avoid standing for long periods of time. Apply wraps/stockings in AM and remove at bedtime. If swelling is present, elevate legs to the level of the heart or above for 30 minutes 4-5 times a day and/or when sitting. When taking antibiotics take entire prescription as ordered by physician do not stop taking until medicine is all gone. Orders for this week: 8/31/2022      Right Lower Leg -- wash with soap and water, pat dry. Apply A&D to periwound. Apply Stimulin powder. Cover with Ioplex, ca alginate and ABD. Then wrap with Coban 2. Leave in place for 1 week. Follow up with Rodney Bang CNP in 1 week in the wound care center. Call [phone] for any questions or concerns.   Date__________   Time______        Treatment Note      Written Patient Dismissal Instructions Given            Electronically signed by LOLY Lara CNP on 8/31/2022 at 1:24 PM

## 2022-08-31 NOTE — DISCHARGE INSTRUCTIONS
PHYSICIAN ORDERS AND DISCHARGE INSTRUCTIONS   NOTE: Upon discharge from the [address], you will receive a patient experience survey. We would be grateful if you would take the time to fill this survey out. Wound care order history:                 Vascular studies: Arterial studies done 9/27/20; no stenosis              Imaging:   Date              Cultures:   obtained on 11/18/2020                                 Obtained on 1/20/2021              Labs/ HbA1c:   Date              Grafts:                       #1 Apligraf on 12/16/2020                     #2 Apligraf on 12/23/2020                     #3 Apligraf on 12/30/2020                     #4 Apligraf on 1/6/2021                     # 5 Apligraf on 1/13/21                     #1 Nushield 1/27/2021                     #2 N[place]ield 2/3/2021                     #3 [place] 2/10/2021                     #4 N[place]ield Right Medial Lower Leg 2/17/21                     #5 N[place]ield 3/10/2021                  HBO:                Antibiotics:  Bactrim DS BID for 10 days on 11/18/2020                                   Cipro BID for 10 days ordered on 11/25/2020                                    Cipro 500 mg BID on 1/20/2021                                    Cipro 500 mg BID on 4/28/2021              Earlier Wound care treatments:                Authorizations:                        Consults:   Date                          Primary care physician:     Continuing wound care orders and information:              Residence:  private              [facility] home health care with:  [facility] discharged 1/19/2022              Your wound-care supplies will be provided by: Saad RAO provider:              Compression with              Off loading:  Date              Wound Medications:              Wound cleansing:                          Do not scrub or use excessive force. Wash hands with soap and water before and after dressing changes. Prior to applying a clean dressing, cleanse wound with normal saline,                                wound cleanser, or mild soap and water. Ask the physician or nurse before getting the wound(s) wet in a shower              Daily Wound management:                          Keep weight off wounds and reposition every 2 hours. Avoid standing for long periods of time. Apply wraps/stockings in AM and remove at bedtime. If swelling is present, elevate legs to the level of the heart or above for 30 minutes 4-5 times a day and/or when sitting. When taking antibiotics take entire prescription as ordered by physician do not stop taking until medicine is all gone. Orders for this week: 8/31/2022      Right Lower Leg -- wash with soap and water, pat dry. Apply A&D to periwound. Apply Stimulin powder. Cover with Ioplex, ca alginate and ABD. Then wrap with Coban 2. Leave in place for 1 week. Follow up with Mirza Bagley CNP in 1 week in the wound care center. Call [phone] for any questions or concerns.   Date__________   Time______

## 2022-09-07 ENCOUNTER — HOSPITAL ENCOUNTER (OUTPATIENT)
Dept: WOUND CARE | Age: 77
Discharge: HOME | End: 2022-09-07
Payer: COMMERCIAL

## 2022-09-07 VITALS
DIASTOLIC BLOOD PRESSURE: 65 MMHG | HEART RATE: 64 BPM | SYSTOLIC BLOOD PRESSURE: 165 MMHG | TEMPERATURE: 98 F | RESPIRATION RATE: 20 BRPM

## 2022-09-07 DIAGNOSIS — I83.012 VENOUS STASIS ULCER OF RIGHT CALF WITH FAT LAYER EXPOSED, UNSPECIFIED WHETHER VARICOSE VEINS PRESENT (HCC): Primary | ICD-10-CM

## 2022-09-07 DIAGNOSIS — L97.212 VENOUS STASIS ULCER OF RIGHT CALF WITH FAT LAYER EXPOSED, UNSPECIFIED WHETHER VARICOSE VEINS PRESENT (HCC): Primary | ICD-10-CM

## 2022-09-07 PROCEDURE — 11042 DBRDMT SUBQ TIS 1ST 20SQCM/<: CPT

## 2022-09-07 PROCEDURE — 11042 DBRDMT SUBQ TIS 1ST 20SQCM/<: CPT | Performed by: NURSE PRACTITIONER

## 2022-09-07 RX ORDER — LIDOCAINE HYDROCHLORIDE 20 MG/ML
JELLY TOPICAL ONCE
Status: CANCELLED | OUTPATIENT
Start: 2022-09-07 | End: 2022-09-07

## 2022-09-07 RX ORDER — BACITRACIN ZINC AND POLYMYXIN B SULFATE 500; 1000 [USP'U]/G; [USP'U]/G
OINTMENT TOPICAL ONCE
Status: CANCELLED | OUTPATIENT
Start: 2022-09-07 | End: 2022-09-07

## 2022-09-07 RX ORDER — LIDOCAINE 50 MG/G
OINTMENT TOPICAL ONCE
Status: CANCELLED | OUTPATIENT
Start: 2022-09-07 | End: 2022-09-07

## 2022-09-07 RX ORDER — LIDOCAINE 40 MG/G
CREAM TOPICAL ONCE
Status: CANCELLED | OUTPATIENT
Start: 2022-09-07 | End: 2022-09-07

## 2022-09-07 RX ORDER — GENTAMICIN SULFATE 1 MG/G
OINTMENT TOPICAL ONCE
Status: CANCELLED | OUTPATIENT
Start: 2022-09-07 | End: 2022-09-07

## 2022-09-07 RX ORDER — BACITRACIN, NEOMYCIN, POLYMYXIN B 400; 3.5; 5 [USP'U]/G; MG/G; [USP'U]/G
OINTMENT TOPICAL ONCE
Status: CANCELLED | OUTPATIENT
Start: 2022-09-07 | End: 2022-09-07

## 2022-09-07 RX ORDER — LIDOCAINE HYDROCHLORIDE 40 MG/ML
SOLUTION TOPICAL ONCE
Status: CANCELLED | OUTPATIENT
Start: 2022-09-07 | End: 2022-09-07

## 2022-09-07 RX ORDER — GINSENG 100 MG
CAPSULE ORAL ONCE
Status: CANCELLED | OUTPATIENT
Start: 2022-09-07 | End: 2022-09-07

## 2022-09-07 RX ORDER — CLOBETASOL PROPIONATE 0.5 MG/G
OINTMENT TOPICAL ONCE
Status: CANCELLED | OUTPATIENT
Start: 2022-09-07 | End: 2022-09-07

## 2022-09-07 RX ORDER — BETAMETHASONE DIPROPIONATE 0.05 %
OINTMENT (GRAM) TOPICAL ONCE
Status: CANCELLED | OUTPATIENT
Start: 2022-09-07 | End: 2022-09-07

## 2022-09-07 NOTE — DISCHARGE INSTRUCTIONS
PHYSICIAN ORDERS AND DISCHARGE INSTRUCTIONS   NOTE: Upon discharge from the [address], you will receive a patient experience survey. We would be grateful if you would take the time to fill this survey out. Wound care order history:                 Vascular studies: Arterial studies done 9/27/20; no stenosis              Imaging:   Date              Cultures:   obtained on 11/18/2020                                 Obtained on 1/20/2021              Labs/ HbA1c:   Date              Grafts:                       #1 Apligraf on 12/16/2020                     #2 Apligraf on 12/23/2020                     #3 Apligraf on 12/30/2020                     #4 Apligraf on 1/6/2021                     # 5 Apligraf on 1/13/21                     #1 Nushield 1/27/2021                     #2 N[place]ield 2/3/2021                     #3 N[place]ield 2/10/2021                     #4 N[place]ield Right Medial Lower Leg 2/17/21                     #5 N[place]ield 3/10/2021                  HBO:                Antibiotics:  Bactrim DS BID for 10 days on 11/18/2020                                   Cipro BID for 10 days ordered on 11/25/2020                                    Cipro 500 mg BID on 1/20/2021                                    Cipro 500 mg BID on 4/28/2021              Earlier Wound care treatments:                Authorizations:                        Consults:   Date                          Primary care physician:     Continuing wound care orders and information:              Residence:  private              [facility] home health care with:  [facility] discharged 1/19/2022              Your wound-care supplies will be provided by: Clare RAO provider:              Compression with              Off loading:  Date              Wound Medications:              Wound cleansing:                          Do not scrub or use excessive force. Wash hands with soap and water before and after dressing changes. Prior to applying a clean dressing, cleanse wound with normal saline,                                wound cleanser, or mild soap and water. Ask the physician or nurse before getting the wound(s) wet in a shower              Daily Wound management:                          Keep weight off wounds and reposition every 2 hours. Avoid standing for long periods of time. Apply wraps/stockings in AM and remove at bedtime. If swelling is present, elevate legs to the level of the heart or above for 30 minutes 4-5 times a day and/or when sitting. When taking antibiotics take entire prescription as ordered by physician do not stop taking until medicine is all gone. Orders for this week: 9/7/2022      Applied for grafts for Right Proximal Leg Wound      Right Lower Leg wounds -- wash with soap and water, pat dry. Apply A&D to periwound. Apply Stimulin powder. Cover with Ioplex, ca alginate and ABD. Then wrap with Coban 2. Leave in place for 1 week. Follow up with Roberta Clifford CNP in 1 week in the wound care center. Call [phone] for any questions or concerns.   Date__________   Time______

## 2022-09-07 NOTE — PROGRESS NOTES
Wound Care Center Progress Note With Procedure    Ene Larson  AGE: 69 y.o. GENDER: male  : 1945  EPISODE DATE:  2022     Subjective:     No chief complaint on file. HISTORY of PRESENT ILLNESS     Ene Larson is a 77 y.o. male who presents today for wound evaluation of Chronic venous wound of Right lower medial leg. The wound is of moderate severity. The underlying cause of the wound is venous disease and trauma. Original wound is improving but a larger wound has opened and was revealed when dressing was removed. Patient had large areas of very thin, macerated skin and this likely was beneath waiting to slough off     Wound Pain Timing/Severity: none  Quality of pain: N/A  Severity of pain:  0 / 10  Modifying Factors: venous stasis  Associated Signs/Symptoms: none     Patient educated on the 6 essential components necessary for wound healing: Circulation, Debridements, Proper Dressings and Topical Wound Products, Infection Control, Edema Control and Offloading. Patient educated on those factors that negatively effect or impact wound healing: smoking, obesity, uncontrolled diabetes, anticoagulant and immunosuppressive regimens, inadequate nutrition, untreated arterial and venous disease if applicable and measures to manage edema.         PAST MEDICAL HISTORY        Diagnosis Date    Chronic venous hypertension with ulcer and inflammation involving right side ([address]     RLE    Hypertension     past    Iron deficiency     Lymphoma Dx 11-11    Hx of B cell lymphoma-in remission since        PAST SURGICAL HISTORY    Past Surgical History:   Procedure Laterality Date    TUNNELED VENOUS PORT PLACEMENT      TUNNELED VENOUS PORT PLACEMENT      removed 2012       FAMILY HISTORY    Family History   Problem Relation Age of Onset    Early Death Mother 52    Diabetes Father     Heart Disease Father     Vision Loss Son         \"He wears glasses\"    Early Death Daughter 36       SOCIAL HISTORY    Social History     Tobacco Use    Smoking status: Never    Smokeless tobacco: Never   Substance Use Topics    Alcohol use: No    Drug use: No       ALLERGIES    No Known Allergies    MEDICATIONS    Current Outpatient Medications on File Prior to Encounter   Medication Sig Dispense Refill    metoprolol succinate (TOPROL XL) 50 MG extended release tablet Take 1 tablet by mouth daily 30 tablet 5     No current facility-administered medications on file prior to encounter. REVIEW OF SYSTEMS    Pertinent items are noted in HPI. Constitutional: Negative for systemic symptoms including fever, chills and malaise. Objective:      BP (!) 165/65   Pulse 64   Temp 98 °F (36.7 °C) (Temporal)   Resp 20     PHYSICAL EXAM      General: The patient is in no acute distress. Mental status:  Patient is appropriate, is  oriented to place and plan of care. Dermatologic exam: Visual inspection of the periwound reveals the skin to be normal in turgor and texture, dry, clammy, and coarse  Wound exam: see wound description below in procedure note      Assessment:     Problem List Items Addressed This Visit          Other    WD-Venous stasis ulcer of right calf with fat layer exposed ([address] - Primary    Relevant Orders    Initiate Outpatient Wound Care Protocol     Procedure Note    Indications:  Based on my examination of this patient's wound(s) today, sharp excision into necrotic subcutaneous tissue is required to promote healing and evaluate the extent of previous healing. Performed by: LOLY Ibrahim - CNP    Consent obtained: Yes    Time out taken:  Yes    Pain Control: N/a      Debridement:Excisional Debridement    Using #15 blade scalpel the wound(s) was/were sharply debrided down through and including the removal of subcutaneous tissue.         Devitalized Tissue Debrided:  fibrin, biofilm, slough, and exudate    Pre Debridement Measurements:  Are located in the Wound Documentation Flow Wound Length (cm) 3 cm 09/07/22 1255   Wound Width (cm) 4.2 cm 09/07/22 1255   Wound Depth (cm) 0.1 cm 09/07/22 1255   Wound Surface Area (cm^2) 12.6 cm^2 09/07/22 1255   Wound Volume (cm^3) 1.26 cm^3 09/07/22 1255   Post-Procedure Length (cm) 3 cm 09/07/22 1316   Post-Procedure Width (cm) 4.2 cm 09/07/22 1316   Post-Procedure Depth (cm) 0.1 cm 09/07/22 1316   Post-Procedure Surface Area (cm^2) 12.6 cm^2 09/07/22 1316   Post-Procedure Volume (cm^3) 1.26 cm^3 09/07/22 1316   Wound Assessment Pink/red 09/07/22 1255   Drainage Amount Moderate 09/07/22 1255   Drainage Description Serosanguinous 09/07/22 1255   Odor None 09/07/22 1255   Cassia-wound Assessment Intact 09/07/22 1255   Margins Defined edges 09/07/22 1255   Wound Thickness Description not for Pressure Injury Full thickness 09/07/22 1255   Number of days: 0       Percent of Wound(s) Debrided: approximately 100%    Total  Area  Debrided:  13 sq cm     Bleeding:  Minimal    Hemostasis Achieved:  by pressure    Procedural Pain:  0  / 10     Post Procedural Pain:  0 / 10     Response to treatment:  Well tolerated by patient. Status of wound progress and description from last visit:   Worse, with larger section re-opening  Continue to monitor and continue plan of care for now    Applying for grafts for new wound . Plan:       Discharge Instructions         PHYSICIAN ORDERS AND DISCHARGE INSTRUCTIONS   NOTE: Upon discharge from the [address], you will receive a patient experience survey. We would be grateful if you would take the time to fill this survey out.      Wound care order history:                 Vascular studies: Arterial studies done 9/27/20; no stenosis              Imaging:   Date              Cultures:   obtained on 11/18/2020                                 Obtained on 1/20/2021              Labs/ HbA1c:   Date              Grafts:                       #1 Apligraf on 12/16/2020                     #2 Apligraf on 12/23/2020 #3 Apligraf on 12/30/2020                     #4 Apligraf on 1/6/2021                     # 5 Apligraf on 1/13/21                     #1 Nushield 1/27/2021                     #2 N[place]ield 2/3/2021                     #3 N[place]ield 2/10/2021                     #4 N[place]ield Right Medial Lower Leg 2/17/21                     #5 Nushield 3/10/2021                  HBO:                Antibiotics:  Bactrim DS BID for 10 days on 11/18/2020                                   Cipro BID for 10 days ordered on 11/25/2020                                    Cipro 500 mg BID on 1/20/2021                                    Cipro 500 mg BID on 4/28/2021              Earlier Wound care treatments:                Authorizations:                        Consults:   Date                          Primary care physician:     Continuing wound care orders and information:              Residence:  private              [facility] care with:  [facility] 1/19/2022              Your wound-care supplies will be provided by: Renee RAO provider:              Compression with              Off loading:  Date              Wound Medications:              Wound cleansing:                          Do not scrub or use excessive force. Wash hands with soap and water before and after dressing changes. Prior to applying a clean dressing, cleanse wound with normal saline,                                wound cleanser, or mild soap and water. Ask the physician or nurse before getting the wound(s) wet in a shower              Daily Wound management:                          Keep weight off wounds and reposition every 2 hours. Avoid standing for long periods of time. Apply wraps/stockings in AM and remove at bedtime.                           If swelling is present, elevate legs to the level of the heart or above for 30 minutes 4-5 times a day and/or when sitting. When taking antibiotics take entire prescription as ordered by physician do not stop taking until medicine is all gone. Orders for this week: 9/7/2022      Applied for grafts for Right Proximal Leg Wound      Right Lower Leg wounds -- wash with soap and water, pat dry. Apply A&D to periwound. Apply Stimulin powder. Cover with Ioplex, ca alginate and ABD. Then wrap with Coban 2. Leave in place for 1 week. Follow up with Khurram Nolan CNP in 1 week in the wound care center. Call [phone] for any questions or concerns.   Date__________   Time______        Treatment Note      Written Patient Dismissal Instructions Given            Electronically signed by LOLY Phelan CNP on 9/7/2022 at 1:30 PM

## 2022-09-14 ENCOUNTER — HOSPITAL ENCOUNTER (OUTPATIENT)
Dept: WOUND CARE | Age: 77
Discharge: HOME | End: 2022-09-14
Payer: COMMERCIAL

## 2022-09-14 VITALS
HEART RATE: 72 BPM | TEMPERATURE: 99.5 F | SYSTOLIC BLOOD PRESSURE: 174 MMHG | DIASTOLIC BLOOD PRESSURE: 86 MMHG | RESPIRATION RATE: 18 BRPM

## 2022-09-14 DIAGNOSIS — L97.212 VENOUS STASIS ULCER OF RIGHT CALF WITH FAT LAYER EXPOSED, UNSPECIFIED WHETHER VARICOSE VEINS PRESENT (HCC): Primary | ICD-10-CM

## 2022-09-14 DIAGNOSIS — I83.012 VENOUS STASIS ULCER OF RIGHT CALF WITH FAT LAYER EXPOSED, UNSPECIFIED WHETHER VARICOSE VEINS PRESENT (HCC): Primary | ICD-10-CM

## 2022-09-14 PROCEDURE — 11042 DBRDMT SUBQ TIS 1ST 20SQCM/<: CPT | Performed by: NURSE PRACTITIONER

## 2022-09-14 PROCEDURE — 11042 DBRDMT SUBQ TIS 1ST 20SQCM/<: CPT

## 2022-09-14 RX ORDER — BACITRACIN, NEOMYCIN, POLYMYXIN B 400; 3.5; 5 [USP'U]/G; MG/G; [USP'U]/G
OINTMENT TOPICAL ONCE
Status: CANCELLED | OUTPATIENT
Start: 2022-09-14 | End: 2022-09-14

## 2022-09-14 RX ORDER — LIDOCAINE HYDROCHLORIDE 20 MG/ML
JELLY TOPICAL ONCE
Status: CANCELLED | OUTPATIENT
Start: 2022-09-14 | End: 2022-09-14

## 2022-09-14 RX ORDER — GENTAMICIN SULFATE 1 MG/G
OINTMENT TOPICAL ONCE
Status: CANCELLED | OUTPATIENT
Start: 2022-09-14 | End: 2022-09-14

## 2022-09-14 RX ORDER — LIDOCAINE 50 MG/G
OINTMENT TOPICAL ONCE
Status: CANCELLED | OUTPATIENT
Start: 2022-09-14 | End: 2022-09-14

## 2022-09-14 RX ORDER — BACITRACIN ZINC AND POLYMYXIN B SULFATE 500; 1000 [USP'U]/G; [USP'U]/G
OINTMENT TOPICAL ONCE
Status: CANCELLED | OUTPATIENT
Start: 2022-09-14 | End: 2022-09-14

## 2022-09-14 RX ORDER — GINSENG 100 MG
CAPSULE ORAL ONCE
Status: CANCELLED | OUTPATIENT
Start: 2022-09-14 | End: 2022-09-14

## 2022-09-14 RX ORDER — CLOBETASOL PROPIONATE 0.5 MG/G
OINTMENT TOPICAL ONCE
Status: CANCELLED | OUTPATIENT
Start: 2022-09-14 | End: 2022-09-14

## 2022-09-14 RX ORDER — BETAMETHASONE DIPROPIONATE 0.05 %
OINTMENT (GRAM) TOPICAL ONCE
Status: CANCELLED | OUTPATIENT
Start: 2022-09-14 | End: 2022-09-14

## 2022-09-14 RX ORDER — LIDOCAINE HYDROCHLORIDE 40 MG/ML
SOLUTION TOPICAL ONCE
Status: CANCELLED | OUTPATIENT
Start: 2022-09-14 | End: 2022-09-14

## 2022-09-14 RX ORDER — LIDOCAINE 40 MG/G
CREAM TOPICAL ONCE
Status: CANCELLED | OUTPATIENT
Start: 2022-09-14 | End: 2022-09-14

## 2022-09-14 NOTE — DISCHARGE INSTRUCTIONS
PHYSICIAN ORDERS AND DISCHARGE INSTRUCTIONS   NOTE: Upon discharge from the [address], you will receive a patient experience survey. We would be grateful if you would take the time to fill this survey out. Wound care order history:                 Vascular studies: Arterial studies done 9/27/20; no stenosis              Imaging:   Date              Cultures:   obtained on 11/18/2020                                 Obtained on 1/20/2021              Labs/ HbA1c:   Date              Grafts:                       #1 Apligraf on 12/16/2020                     #2 Apligraf on 12/23/2020                     #3 Apligraf on 12/30/2020                     #4 Apligraf on 1/6/2021                     # 5 Apligraf on 1/13/21                     #1 Nushield 1/27/2021                     #2 N[place]ield 2/3/2021                     #3 N[place]ield 2/10/2021                     #4 [place] Right Medial Lower Leg 2/17/21                     #5 N[place]ield 3/10/2021                  HBO:                Antibiotics:  Bactrim DS BID for 10 days on 11/18/2020                                   Cipro BID for 10 days ordered on 11/25/2020                                    Cipro 500 mg BID on 1/20/2021                                    Cipro 500 mg BID on 4/28/2021              Earlier Wound care treatments:                Authorizations:                        Consults:   Date                          Primary care physician:     Continuing wound care orders and information:              Residence:  private              [facility] home health care with:  [facility] discharged 1/19/2022              Your wound-care supplies will be provided by: Sherryle Moder DME provider:              Compression with              Off loading:  Date              Wound Medications:              Wound cleansing:                          Do not scrub or use excessive force. Wash hands with soap and water before and after dressing changes.

## 2022-09-21 ENCOUNTER — HOSPITAL ENCOUNTER (OUTPATIENT)
Dept: WOUND CARE | Age: 77
Discharge: HOME | End: 2022-09-21
Payer: COMMERCIAL

## 2022-09-21 VITALS
DIASTOLIC BLOOD PRESSURE: 78 MMHG | SYSTOLIC BLOOD PRESSURE: 157 MMHG | HEART RATE: 73 BPM | RESPIRATION RATE: 20 BRPM | TEMPERATURE: 98.6 F

## 2022-09-21 DIAGNOSIS — I83.012 VENOUS STASIS ULCER OF RIGHT CALF WITH FAT LAYER EXPOSED, UNSPECIFIED WHETHER VARICOSE VEINS PRESENT (HCC): Primary | ICD-10-CM

## 2022-09-21 DIAGNOSIS — L97.212 VENOUS STASIS ULCER OF RIGHT CALF WITH FAT LAYER EXPOSED, UNSPECIFIED WHETHER VARICOSE VEINS PRESENT (HCC): Primary | ICD-10-CM

## 2022-09-21 PROCEDURE — 97597 DBRDMT OPN WND 1ST 20 CM/<: CPT

## 2022-09-21 PROCEDURE — 97597 DBRDMT OPN WND 1ST 20 CM/<: CPT | Performed by: NURSE PRACTITIONER

## 2022-09-21 RX ORDER — BACITRACIN, NEOMYCIN, POLYMYXIN B 400; 3.5; 5 [USP'U]/G; MG/G; [USP'U]/G
OINTMENT TOPICAL ONCE
OUTPATIENT
Start: 2022-09-21 | End: 2022-09-21

## 2022-09-21 RX ORDER — BETAMETHASONE DIPROPIONATE 0.05 %
OINTMENT (GRAM) TOPICAL ONCE
OUTPATIENT
Start: 2022-09-21 | End: 2022-09-21

## 2022-09-21 RX ORDER — LIDOCAINE HYDROCHLORIDE 40 MG/ML
SOLUTION TOPICAL ONCE
OUTPATIENT
Start: 2022-09-21 | End: 2022-09-21

## 2022-09-21 RX ORDER — CLOBETASOL PROPIONATE 0.5 MG/G
OINTMENT TOPICAL ONCE
OUTPATIENT
Start: 2022-09-21 | End: 2022-09-21

## 2022-09-21 RX ORDER — LIDOCAINE HYDROCHLORIDE 20 MG/ML
JELLY TOPICAL ONCE
OUTPATIENT
Start: 2022-09-21 | End: 2022-09-21

## 2022-09-21 RX ORDER — GINSENG 100 MG
CAPSULE ORAL ONCE
OUTPATIENT
Start: 2022-09-21 | End: 2022-09-21

## 2022-09-21 RX ORDER — GENTAMICIN SULFATE 1 MG/G
OINTMENT TOPICAL ONCE
OUTPATIENT
Start: 2022-09-21 | End: 2022-09-21

## 2022-09-21 RX ORDER — LIDOCAINE 50 MG/G
OINTMENT TOPICAL ONCE
OUTPATIENT
Start: 2022-09-21 | End: 2022-09-21

## 2022-09-21 RX ORDER — LIDOCAINE 40 MG/G
CREAM TOPICAL ONCE
OUTPATIENT
Start: 2022-09-21 | End: 2022-09-21

## 2022-09-21 RX ORDER — BACITRACIN ZINC AND POLYMYXIN B SULFATE 500; 1000 [USP'U]/G; [USP'U]/G
OINTMENT TOPICAL ONCE
OUTPATIENT
Start: 2022-09-21 | End: 2022-09-21

## 2022-09-21 ASSESSMENT — PAIN DESCRIPTION - PAIN TYPE: TYPE: ACUTE PAIN

## 2022-09-21 ASSESSMENT — PAIN DESCRIPTION - ORIENTATION: ORIENTATION: RIGHT

## 2022-09-21 ASSESSMENT — PAIN DESCRIPTION - LOCATION: LOCATION: LEG

## 2022-09-21 NOTE — PROGRESS NOTES
Wound Care Center Progress Note With Procedure    Kashmir Casillas  AGE: 69 y.o. GENDER: male  : 1945  EPISODE DATE:  2022     Subjective:     Chief Complaint   Patient presents with    Wound Check     Rt leg         HISTORY of PRESENT ILLNESS     Kashmir Casillas is a 77 y.o. male who presents today for wound evaluation of Chronic venous wound of Right lower medial leg. The wound is of moderate severity. The underlying cause of the wound is venous disease and trauma. Patient is now essentially healed  His lower extremity skin looks great and his lesions are now closed. He has brought compressions with him today     Wound Pain Timing/Severity: none  Quality of pain: N/A  Severity of pain:  0 / 10  Modifying Factors: venous stasis  Associated Signs/Symptoms: none     Patient educated on the 6 essential components necessary for wound healing: Circulation, Debridements, Proper Dressings and Topical Wound Products, Infection Control, Edema Control and Offloading. Patient educated on those factors that negatively effect or impact wound healing: smoking, obesity, uncontrolled diabetes, anticoagulant and immunosuppressive regimens, inadequate nutrition, untreated arterial and venous disease if applicable and measures to manage edema.         PAST MEDICAL HISTORY        Diagnosis Date    Chronic venous hypertension with ulcer and inflammation involving right side ([address]     RLE    Hypertension     past    Iron deficiency     Lymphoma Dx 11-11    Hx of B cell lymphoma-in remission since        PAST SURGICAL HISTORY    Past Surgical History:   Procedure Laterality Date    TUNNELED VENOUS PORT PLACEMENT      TUNNELED VENOUS PORT PLACEMENT      removed 2012       FAMILY HISTORY    Family History   Problem Relation Age of Onset    Early Death Mother 52    Diabetes Father     Heart Disease Father     Vision Loss Son         \"He wears glasses\"    Early Death Daughter 36       SOCIAL HISTORY    Social evaluated  Wound(s)    debrided this date include # : 1     Post  Debridement Measurements:  Wound 07/29/13 Venous ulcer Ankle Right;Lateral (Active)   Number of days: 3341       Wound 09/30/20 #1 (onset 5 years) Right Medial Lower Leg Cluster (Active)   Wound Image   09/21/22 1303   Wound Etiology Venous 09/21/22 1303   Dressing Status New dressing applied 09/14/22 1309   Wound Cleansed Soap and water 09/21/22 1303   Dressing/Treatment Collagen;ABD;Coban/self-adherent bandages; Moisturizing cream 04/06/22 1359   Offloading for Diabetic Foot Ulcers Offloading not required 09/14/22 1309   Wound Length (cm) 0.3 cm 09/21/22 1303   Wound Width (cm) 0.3 cm 09/21/22 1303   Wound Depth (cm) 0.1 cm 09/21/22 1303   Wound Surface Area (cm^2) 0.09 cm^2 09/21/22 1303   Change in Wound Size % (l*w) 99.96 09/21/22 1303   Wound Volume (cm^3) 0.009 cm^3 09/21/22 1303   Wound Healing % 100 09/21/22 1303   Post-Procedure Length (cm) 0 cm 09/21/22 1331   Post-Procedure Width (cm) 0 cm 09/21/22 1331   Post-Procedure Depth (cm) 0 cm 09/21/22 1331   Post-Procedure Surface Area (cm^2) 0 cm^2 09/21/22 1331   Post-Procedure Volume (cm^3) 0 cm^3 09/21/22 1331   Distance Tunneling (cm) 0 cm 09/21/22 1303   Tunneling Position ___ O'Clock 0 09/21/22 1303   Undermining Starts ___ O'Clock 0 09/21/22 1303   Undermining Ends___ O'Clock 0 09/21/22 1303   Undermining Maxium Distance (cm) 0 09/21/22 1303   Wound Assessment Purple/maroon 09/21/22 1303   Drainage Amount None 09/21/22 1303   Drainage Description Serosanguinous 09/14/22 1231   Odor None 09/21/22 1303   Cassia-wound Assessment Fragile 09/21/22 1303   Margins Defined edges 09/21/22 1303   Wound Thickness Description not for Pressure Injury Full thickness 09/14/22 1231   Number of days: 721       Percent of Wound(s) Debrided: approximately 100%    Total  Area  Debrided:  0.09 sq cm     Bleeding:  None    Hemostasis Achieved:  not needed    Procedural Pain:  0  / 10     Post Procedural Pain:  0 / 10     Response to treatment:  Well tolerated by patient. Status of wound progress and description from last visit:   Healed. Patient to wear his own compressions and moisturize for 2 weeks  If no issues will plan to discharge       Plan:       Discharge Instructions         [address]   NOTE: Upon discharge from the [address], you will receive a patient experience survey. We would be grateful if you would take the time to fill this survey out. Wound care order history:                 Vascular studies: Arterial studies done 9/27/20; no stenosis              Imaging:   Date              Cultures:   obtained on 11/18/2020                                 Obtained on 1/20/2021              Labs/ HbA1c:   Date              Grafts:                       #1 Apligraf on 12/16/2020                     #2 Apligraf on 12/23/2020                     #3 Apligraf on 12/30/2020                     #4 Apligraf on 1/6/2021                     # 5 Apligraf on 1/13/21                     #1 Nushield 1/27/2021                     #2 Nushield 2/3/2021                     #3 Nushield 2/10/2021                     #4 Nushield Right Medial Lower Leg 2/17/21                     #5 Nushield 3/10/2021                  HBO:                Antibiotics:  Bactrim DS BID for 10 days on 11/18/2020                                   Cipro BID for 10 days ordered on 11/25/2020                                    Cipro 500 mg BID on 1/20/2021                                    Cipro 500 mg BID on 4/28/2021              Earlier Wound care treatments:                Authorizations:                        Consults:   Date                          Primary care physician:     Continuing wound care orders and information:              Residence:  private              Continue home health care with:  [facility] [place] [facility] discharged 1/19/2022              Your wound-care supplies will be provided by: Con[facility] provider:              Compression with              Off loading:  Date              Wound Medications:              Wound cleansing:                          Do not scrub or use excessive force. Wash hands with soap and water before and after dressing changes. Prior to applying a clean dressing, cleanse wound with normal saline,                                wound cleanser, or mild soap and water. Ask the physician or nurse before getting the wound(s) wet in a shower              Daily Wound management:                          Keep weight off wounds and reposition every 2 hours. Avoid standing for long periods of time. Apply wraps/stockings in AM and remove at bedtime. If swelling is present, elevate legs to the level of the heart or above for 30 minutes 4-5 times a day and/or when sitting. When taking antibiotics take entire prescription as ordered by physician do not stop taking until medicine is all gone. Orders for this week: 9/21/2022      Applied for grafts for Right Proximal Leg Wound        Right Lower Leg wounds -- wash with soap and water, pat dry. Moisturize lower extremities daily. Apply Stimulin powder. Cover with Ioplex and Silicone Border. Leave in place for 2-3 days, then remove and may leave open to air. Wear client's compression stockings during the day and may remove at night if legs elevated in bed. Follow up with Omid Acosta CNP in 2 weeks in the wound care center. Call [phone] for any questions or concerns.   Date__________   Time______        Treatment Note      Written Patient Dismissal Instructions Given            Electronically signed by LOLY Wolf CNP on 9/21/2022 at 2:13 PM

## 2022-09-21 NOTE — DISCHARGE INSTRUCTIONS
PHYSICIAN ORDERS AND DISCHARGE INSTRUCTIONS   NOTE: Upon discharge from the [address], you will receive a patient experience survey. We would be grateful if you would take the time to fill this survey out. Wound care order history:                 Vascular studies: Arterial studies done 9/27/20; no stenosis              Imaging:   Date              Cultures:   obtained on 11/18/2020                                 Obtained on 1/20/2021              Labs/ HbA1c:   Date              Grafts:                       #1 Apligraf on 12/16/2020                     #2 Apligraf on 12/23/2020                     #3 Apligraf on 12/30/2020                     #4 Apligraf on 1/6/2021                     # 5 Apligraf on 1/13/21                     #1 Nushield 1/27/2021                     #2 Nushield 2/3/2021                     #3 Nushield 2/10/2021                     #4 Nushield Right Medial Lower Leg 2/17/21                     #5 Nushield 3/10/2021                  HBO:                Antibiotics:  Bactrim DS BID for 10 days on 11/18/2020                                   Cipro BID for 10 days ordered on 11/25/2020                                    Cipro 500 mg BID on 1/20/2021                                    Cipro 500 mg BID on 4/28/2021              Earlier Wound care treatments:                Authorizations:                        Consults:   Date                          Primary care physician:     Continuing wound care orders and information:              Residence:  private              [facility] home health care with:  [facility] Eliezer Estevezvin discharged 1/19/2022              Your wound-care supplies will be provided by: Ol[facility] provider:              Compression with              Off loading:  Date              Wound Medications:              Wound cleansing:                          Do not scrub or use excessive force. Wash hands with soap and water before and after dressing changes. Prior to applying a clean dressing, cleanse wound with normal saline,                                wound cleanser, or mild soap and water. Ask the physician or nurse before getting the wound(s) wet in a shower              Daily Wound management:                          Keep weight off wounds and reposition every 2 hours. Avoid standing for long periods of time. Apply wraps/stockings in AM and remove at bedtime. If swelling is present, elevate legs to the level of the heart or above for 30 minutes 4-5 times a day and/or when sitting. When taking antibiotics take entire prescription as ordered by physician do not stop taking until medicine is all gone. Orders for this week: 9/21/2022      Applied for grafts for Right Proximal Leg Wound        Right Lower Leg wounds -- wash with soap and water, pat dry. Moisturize lower extremities daily. Apply Stimulin powder. Cover with Ioplex and Silicone Border. Leave in place for 2-3 days, then remove and may leave open to air. Wear client's compression stockings during the day and may remove at night if legs elevated in bed. Follow up with Nikita Berry CNP in 2 weeks in the wound care center. Call [phone] for any questions or concerns.   Date__________   Time______

## 2022-10-05 ENCOUNTER — HOSPITAL ENCOUNTER (OUTPATIENT)
Dept: WOUND CARE | Age: 77
Discharge: HOME | End: 2022-10-05
Payer: COMMERCIAL

## 2022-10-05 VITALS
SYSTOLIC BLOOD PRESSURE: 171 MMHG | RESPIRATION RATE: 18 BRPM | TEMPERATURE: 97.9 F | HEART RATE: 74 BPM | DIASTOLIC BLOOD PRESSURE: 84 MMHG

## 2022-10-05 DIAGNOSIS — I83.012 VENOUS STASIS ULCER OF RIGHT CALF WITH FAT LAYER EXPOSED, UNSPECIFIED WHETHER VARICOSE VEINS PRESENT (HCC): Primary | ICD-10-CM

## 2022-10-05 DIAGNOSIS — L97.212 VENOUS STASIS ULCER OF RIGHT CALF WITH FAT LAYER EXPOSED, UNSPECIFIED WHETHER VARICOSE VEINS PRESENT (HCC): Primary | ICD-10-CM

## 2022-10-05 PROCEDURE — 97597 DBRDMT OPN WND 1ST 20 CM/<: CPT | Performed by: NURSE PRACTITIONER

## 2022-10-05 PROCEDURE — 97597 DBRDMT OPN WND 1ST 20 CM/<: CPT

## 2022-10-05 RX ORDER — BACITRACIN, NEOMYCIN, POLYMYXIN B 400; 3.5; 5 [USP'U]/G; MG/G; [USP'U]/G
OINTMENT TOPICAL ONCE
OUTPATIENT
Start: 2022-10-05 | End: 2022-10-05

## 2022-10-05 RX ORDER — BACITRACIN ZINC AND POLYMYXIN B SULFATE 500; 1000 [USP'U]/G; [USP'U]/G
OINTMENT TOPICAL ONCE
OUTPATIENT
Start: 2022-10-05 | End: 2022-10-05

## 2022-10-05 RX ORDER — LIDOCAINE HYDROCHLORIDE 20 MG/ML
JELLY TOPICAL ONCE
OUTPATIENT
Start: 2022-10-05 | End: 2022-10-05

## 2022-10-05 RX ORDER — GENTAMICIN SULFATE 1 MG/G
OINTMENT TOPICAL ONCE
OUTPATIENT
Start: 2022-10-05 | End: 2022-10-05

## 2022-10-05 RX ORDER — GINSENG 100 MG
CAPSULE ORAL ONCE
OUTPATIENT
Start: 2022-10-05 | End: 2022-10-05

## 2022-10-05 RX ORDER — LIDOCAINE HYDROCHLORIDE 40 MG/ML
SOLUTION TOPICAL ONCE
OUTPATIENT
Start: 2022-10-05 | End: 2022-10-05

## 2022-10-05 RX ORDER — LIDOCAINE 40 MG/G
CREAM TOPICAL ONCE
OUTPATIENT
Start: 2022-10-05 | End: 2022-10-05

## 2022-10-05 RX ORDER — BETAMETHASONE DIPROPIONATE 0.05 %
OINTMENT (GRAM) TOPICAL ONCE
OUTPATIENT
Start: 2022-10-05 | End: 2022-10-05

## 2022-10-05 RX ORDER — LIDOCAINE 50 MG/G
OINTMENT TOPICAL ONCE
OUTPATIENT
Start: 2022-10-05 | End: 2022-10-05

## 2022-10-05 RX ORDER — CLOBETASOL PROPIONATE 0.5 MG/G
OINTMENT TOPICAL ONCE
OUTPATIENT
Start: 2022-10-05 | End: 2022-10-05

## 2022-10-05 NOTE — PROGRESS NOTES
Wound Care Center Progress Note With Procedure    Lo Iyer  AGE: 69 y.o. GENDER: male  : 1945  EPISODE DATE:  10/5/2022     Subjective:     Chief Complaint   Patient presents with    Wound Check     Right lower leg         HISTORY of PRESENT ILLNESS     Lo Iyer is a 77 y.o. male who presents today for wound evaluation of Chronic venous wound of Right lower medial leg. The wound is of moderate severity. The underlying cause of the wound is venous disease and trauma. Healed today     Wound Pain Timing/Severity: none  Quality of pain: N/A  Severity of pain:  0 / 10  Modifying Factors: venous stasis  Associated Signs/Symptoms: none     Patient educated on the 6 essential components necessary for wound healing: Circulation, Debridements, Proper Dressings and Topical Wound Products, Infection Control, Edema Control and Offloading. Patient educated on those factors that negatively effect or impact wound healing: smoking, obesity, uncontrolled diabetes, anticoagulant and immunosuppressive regimens, inadequate nutrition, untreated arterial and venous disease if applicable and measures to manage edema. PAST MEDICAL HISTORY        Diagnosis Date    Chronic venous hypertension with ulcer and inflammation involving right side ([address]     RLE    Hypertension     past    Iron deficiency     Lymphoma Dx 11-11    Hx of B cell lymphoma-in remission since        PAST SURGICAL HISTORY    Past Surgical History:   Procedure Laterality Date    TUNNELED VENOUS PORT PLACEMENT      TUNNELED VENOUS PORT PLACEMENT      removed 2012       FAMILY HISTORY    Family History   Problem Relation Age of Onset    Early Death Mother 52    Diabetes Father     Heart Disease Father     Vision Loss Son         \"He wears glasses\"    Early Death Daughter 36       SOCIAL HISTORY    Social History     Tobacco Use    Smoking status: Never    Smokeless tobacco: Never   Substance Use Topics    Alcohol use:  No Drug use: No       ALLERGIES    No Known Allergies    MEDICATIONS    Current Outpatient Medications on File Prior to Encounter   Medication Sig Dispense Refill    metoprolol succinate (TOPROL XL) 50 MG extended release tablet Take 1 tablet by mouth daily 30 tablet 5     No current facility-administered medications on file prior to encounter. REVIEW OF SYSTEMS    Pertinent items are noted in HPI. Constitutional: Negative for systemic symptoms including fever, chills and malaise. Objective:      BP (!) 171/84   Pulse 74   Temp 97.9 °F (36.6 °C) (Temporal)   Resp 18     PHYSICAL EXAM      General: The patient is in no acute distress. Mental status:  Patient is appropriate, is  oriented to place and plan of care. Dermatologic exam: Visual inspection of the periwound reveals the skin to be normal in turgor and texture and dry  Wound exam: see wound description below in procedure note      Assessment:     Problem List Items Addressed This Visit          Other    WD-Venous stasis ulcer of right calf with fat layer exposed ([address] - Primary    Relevant Orders    Initiate Outpatient Wound Care Protocol     Procedure Note    Indications:  Based on my examination of this patient's wound(s) today, sharp excision into necrotic devitalized tissue is required to promote healing and evaluate the extent of previous healing.     Performed by: LOLY Genao CNP    Consent obtained: Yes    Time out taken:  Yes    Pain Control: N/A      Debridement:Excisional Debridement    Using curette the wound(s) was/were sharply debrided down through and including the removal of devitalized tissue         Devitalized Tissue Debrided:  exudate    Pre Debridement Measurements:  Are located in the Wound Documentation Flow Sheet    All active wounds listed below with today's date are evaluated  Wound(s)    debrided this date include # :  right lower extremity       Post  Debridement Measurements:  Wound 07/29/13 Venous ulcer Ankle Right;Lateral (Active)   Number of days: 3355       Percent of Wound(s) Debrided: approximately 100%    Total  Area  Debrided:  3 sq cm     Bleeding:  None    Hemostasis Achieved:  not needed    Procedural Pain:  0  / 10     Post Procedural Pain:  0 / 10     Response to treatment:  Well tolerated by patient. Status of wound progress and description from last visit:   Patient is healed    Has been doing a great job with home management. Moisturizing and regular compression socks     Legs look great and edema well controlled    Discharge to home management    Discharge at this time. Patient knows that they may return or call with any questions, comments, or concerns. Discussed strategies for preventing future wounds, including regular compression, daily moisturizing, regular exercise, and performing regular foot checks. Patient verbalized understanding      Plan:       Discharge Instructions         PHYSICIAN ORDERS AND DISCHARGE INSTRUCTIONS   NOTE: Upon discharge from the [address], you will receive a patient experience survey. We would be grateful if you would take the time to fill this survey out.      Wound care order history:                 Vascular studies: Arterial studies done 9/27/20; no stenosis              Imaging:   Date              Cultures:   obtained on 11/18/2020                                 Obtained on 1/20/2021              Labs/ HbA1c:   Date              Grafts:                       #1 Apligraf on 12/16/2020                     #2 Apligraf on 12/23/2020                     #3 Apligraf on 12/30/2020                     #4 Apligraf on 1/6/2021                     # 5 Apligraf on 1/13/21                     #1 Nushield 1/27/2021                     #2 Nushield 2/3/2021                     #3 Nushield 2/10/2021                     #4 Nushield Right Medial Lower Leg 2/17/21                     #5 Nushield 3/10/2021                  HBO:                Antibiotics:  Bactrim DS BID for 10 days on 11/18/2020                                   Cipro BID for 10 days ordered on 11/25/2020                                    Cipro 500 mg BID on 1/20/2021                                    Cipro 500 mg BID on 4/28/2021              Earlier Wound care treatments:                Authorizations:                        Consults:   Date                          Primary care physician:     Continuing wound care orders and information:              Residence:  private              Continue home health care with:  [facility] discharged 1/19/2022              Your wound-care supplies will be provided by: Andie [facility] provider:              Compression with              Off loading:  Date              Wound Medications:              Wound cleansing:                          Do not scrub or use excessive force. Wash hands with soap and water before and after dressing changes. Prior to applying a clean dressing, cleanse wound with normal saline,                                wound cleanser, or mild soap and water. Ask the physician or nurse before getting the wound(s) wet in a shower              Daily Wound management:                          Keep weight off wounds and reposition every 2 hours. Avoid standing for long periods of time. Apply wraps/stockings in AM and remove at bedtime. If swelling is present, elevate legs to the level of the heart or above for 30 minutes 4-5 times a day and/or when sitting. When taking antibiotics take entire prescription as ordered by physician do not stop taking until medicine is all gone. Orders for this week: 10/5/2022      Applied for grafts for Right Proximal Leg Wound        Right Lower Leg wounds -- Healed 10/5/22.  Keep lower extremities moisturized. Wear compression stockings during the day, may remove at night. Discharged from the wound care center 10/5/22  Call [phone] for any questions or concerns.   Date__________   Time______        Treatment Note      Written Patient Dismissal Instructions Given            Electronically signed by LOLY Sheppard CNP on 10/5/2022 at 1:03 PM

## 2022-10-05 NOTE — DISCHARGE INSTRUCTIONS
PHYSICIAN ORDERS AND DISCHARGE INSTRUCTIONS   NOTE: Upon discharge from the [address], you will receive a patient experience survey. We would be grateful if you would take the time to fill this survey out. Wound care order history:                 Vascular studies: Arterial studies done 9/27/20; no stenosis              Imaging:   Date              Cultures:   obtained on 11/18/2020                                 Obtained on 1/20/2021              Labs/ HbA1c:   Date              Grafts:                       #1 Apligraf on 12/16/2020                     #2 Apligraf on 12/23/2020                     #3 Apligraf on 12/30/2020                     #4 Apligraf on 1/6/2021                     # 5 Apligraf on 1/13/21                     #1 Nushield 1/27/2021                     #2 Nushield 2/3/2021                     #3 Nushield 2/10/2021                     #4 Nushield Right Medial Lower Leg 2/17/21                     #5 Nushield 3/10/2021                  HBO:                Antibiotics:  Bactrim DS BID for 10 days on 11/18/2020                                   Cipro BID for 10 days ordered on 11/25/2020                                    Cipro 500 mg BID on 1/20/2021                                    Cipro 500 mg BID on 4/28/2021              Earlier Wound care treatments:                Authorizations:                        Consults:   Date                          Primary care physician:     Continuing wound care orders and information:              Residence:  private              [facility] care with:  [facility] [place] discharged 1/19/2022              Your wound-care supplies will be provided by: Homer [facility] provider:              Compression with              Off loading:  Date              Wound Medications:              Wound cleansing:                          Do not scrub or use excessive force. Wash hands with soap and water before and after dressing changes. Prior to applying a clean dressing, cleanse wound with normal saline,                                wound cleanser, or mild soap and water. Ask the physician or nurse before getting the wound(s) wet in a shower              Daily Wound management:                          Keep weight off wounds and reposition every 2 hours. Avoid standing for long periods of time. Apply wraps/stockings in AM and remove at bedtime. If swelling is present, elevate legs to the level of the heart or above for 30 minutes 4-5 times a day and/or when sitting. When taking antibiotics take entire prescription as ordered by physician do not stop taking until medicine is all gone. Orders for this week: 10/5/2022      Applied for grafts for Right Proximal Leg Wound        Right Lower Leg wounds -- Healed 10/5/22. Keep lower extremities moisturized. Wear compression stockings during the day, may remove at night. Discharged from the wound care center 10/5/22  Call [phone] for any questions or concerns.   Date__________   Time______

## 2022-10-15 NOTE — PROGRESS NOTES
Wound Care Center Progress Note With Procedure    Eugene Faulkner  AGE: 69 y.o. GENDER: male  : 1945  EPISODE DATE:  2022     Subjective:     Chief Complaint   Patient presents with    Wound Check     Right lower leg         HISTORY of PRESENT ILLNESS     Eugene Faulkner is a 77 y.o. male who presents today for wound evaluation of Chronic venous wound of Right lower medial leg. The wound is of moderate severity. The underlying cause of the wound is venous disease and trauma. All wounds nearly healed had miraculous progress this week and closed most of his wounds. Not able to graft this week. He is nearly completely healed      Wound Pain Timing/Severity: none  Quality of pain: N/A  Severity of pain:  0 / 10  Modifying Factors: venous stasis  Associated Signs/Symptoms: none     Patient educated on the 6 essential components necessary for wound healing: Circulation, Debridements, Proper Dressings and Topical Wound Products, Infection Control, Edema Control and Offloading. Patient educated on those factors that negatively effect or impact wound healing: smoking, obesity, uncontrolled diabetes, anticoagulant and immunosuppressive regimens, inadequate nutrition, untreated arterial and venous disease if applicable and measures to manage edema.         PAST MEDICAL HISTORY        Diagnosis Date    Chronic venous hypertension with ulcer and inflammation involving right side ([address]     RLE    Hypertension     past    Iron deficiency     Lymphoma Dx 11-11    Hx of B cell lymphoma-in remission since        PAST SURGICAL HISTORY    Past Surgical History:   Procedure Laterality Date    TUNNELED VENOUS PORT PLACEMENT      TUNNELED VENOUS PORT PLACEMENT      removed 2012       FAMILY HISTORY    Family History   Problem Relation Age of Onset    Early Death Mother 52    Diabetes Father     Heart Disease Father     Vision Loss Son         \"He wears glasses\"    Early Death Daughter 36       SOCIAL HISTORY    Social History     Tobacco Use    Smoking status: Never    Smokeless tobacco: Never   Substance Use Topics    Alcohol use: No    Drug use: No       ALLERGIES    No Known Allergies    MEDICATIONS    Current Outpatient Medications on File Prior to Encounter   Medication Sig Dispense Refill    metoprolol succinate (TOPROL XL) 50 MG extended release tablet Take 1 tablet by mouth daily 30 tablet 5     No current facility-administered medications on file prior to encounter. REVIEW OF SYSTEMS    Pertinent items are noted in HPI. Constitutional: Negative for systemic symptoms including fever, chills and malaise. Objective:      BP (!) 174/86   Pulse 72   Temp 99.5 °F (37.5 °C) (Temporal)   Resp 18     PHYSICAL EXAM      General: The patient is in no acute distress. Mental status:  Patient is appropriate, is  oriented to place and plan of care. Dermatologic exam: Visual inspection of the periwound reveals the skin to be normal in turgor and texture, dry, scaly, and slack  Wound exam: see wound description below in procedure note      Assessment:     Problem List Items Addressed This Visit          Other    WD-Venous stasis ulcer of right calf with fat layer exposed ([address] - Primary    Relevant Orders    Initiate Outpatient Wound Care Protocol     Procedure Note    Indications:  Based on my examination of this patient's wound(s) today, sharp excision into necrotic subcutaneous tissue is required to promote healing and evaluate the extent of previous healing. Performed by: LOLY Bermudez CNP    Consent obtained: Yes    Time out taken:  Yes    Pain Control: N/A      Debridement:Excisional Debridement    Using #15 blade scalpel the wound(s) was/were sharply debrided down through and including the removal of subcutaneous tissue.         Devitalized Tissue Debrided:  fibrin, biofilm, slough, and exudate    Pre Debridement Measurements:  Are located in the Wound Documentation Flow Sheet    All active wounds listed below with today's date are evaluated  Wound(s)    debrided this date include # : 1     Post  Debridement Measurements:  Wound 07/29/13 Venous ulcer Ankle Right;Lateral (Active)   Number of days: 3334       Wound 09/30/20 #1 (onset 5 years) Right Medial Lower Leg Cluster (Active)   Wound Image   09/07/22 1255   Wound Etiology Venous 09/14/22 1309   Dressing Status New dressing applied 09/14/22 1309   Wound Cleansed Soap and water 09/14/22 1231   Dressing/Treatment Collagen;ABD;Coban/self-adherent bandages; Moisturizing cream 04/06/22 1359   Offloading for Diabetic Foot Ulcers Offloading not required 09/14/22 1309   Wound Length (cm) 0.4 cm 09/14/22 1231   Wound Width (cm) 0.4 cm 09/14/22 1231   Wound Depth (cm) 0.1 cm 09/14/22 1231   Wound Surface Area (cm^2) 0.16 cm^2 09/14/22 1231   Change in Wound Size % (l*w) 99.94 09/14/22 1231   Wound Volume (cm^3) 0.016 cm^3 09/14/22 1231   Wound Healing % 100 09/14/22 1231   Post-Procedure Length (cm) 0.4 cm 09/14/22 1306   Post-Procedure Width (cm) 0.4 cm 09/14/22 1306   Post-Procedure Depth (cm) 0.1 cm 09/14/22 1306   Post-Procedure Surface Area (cm^2) 0.16 cm^2 09/14/22 1306   Post-Procedure Volume (cm^3) 0.016 cm^3 09/14/22 1306   Distance Tunneling (cm) 0 cm 09/14/22 1231   Tunneling Position ___ O'Clock 0 09/14/22 1231   Undermining Starts ___ O'Clock 0 09/14/22 1231   Undermining Ends___ O'Clock 0 09/14/22 1231   Undermining Maxium Distance (cm) 0 09/14/22 1231   Wound Assessment Pink/red 09/14/22 1231   Drainage Amount Small 09/14/22 1231   Drainage Description Serosanguinous 09/14/22 1231   Odor None 09/14/22 1231   Cassia-wound Assessment Fragile; Intact 09/14/22 1231   Margins Defined edges 09/14/22 1231   Wound Thickness Description not for Pressure Injury Full thickness 09/14/22 1231   Number of days: 714       Percent of Wound(s) Debrided: approximately 100%    Total  Area  Debrided:  0.16 sq cm     Bleeding:  Minimal    Hemostasis Your wound-care supplies will be provided by: Stefanie RAO provider:              Compression with              Off loading:  Date              Wound Medications:              Wound cleansing:                          Do not scrub or use excessive force. Wash hands with soap and water before and after dressing changes. Prior to applying a clean dressing, cleanse wound with normal saline,                                wound cleanser, or mild soap and water. Ask the physician or nurse before getting the wound(s) wet in a shower              Daily Wound management:                          Keep weight off wounds and reposition every 2 hours. Avoid standing for long periods of time. Apply wraps/stockings in AM and remove at bedtime. If swelling is present, elevate legs to the level of the heart or above for 30 minutes 4-5 times a day and/or when sitting. When taking antibiotics take entire prescription as ordered by physician do not stop taking until medicine is all gone. Orders for this week: 9/14/2022      Applied for grafts for Right Proximal Leg Wound        Right Lower Leg wounds -- wash with soap and water, pat dry. Apply A&D to periwounds. Apply Stimulin powder. Cover with Ioplex, ca alginate and ABD. Then wrap with Coban 2. Leave in place for 1 week. Follow up with Sun Perez CNP in 1 week in the wound care center. Call [phone] for any questions or concerns.   Date__________   Time______          Treatment Note Wound 09/30/20 #1 (onset 5 years) Right Medial Lower Leg Cluster-Dressing/Treatment:  (A&D, Stimulen, Ioplex, Calcium Alginate, ABD, Coban 2)    Written Patient Dismissal Instructions Given            Electronically signed by LOLY Cullen Closure 2 Information: This tab is for additional flaps and grafts, including complex repair and grafts and complex repair and flaps. You can also specify a different location for the additional defect, if the location is the same you do not need to select a new one. We will insert the automated text for the repair you select below just as we do for solitary flaps and grafts. Please note that at this time if you select a location with a different insurance zone you will need to override the ICD10 and CPT if appropriate.

## 2022-12-05 ENCOUNTER — OFFICE VISIT (OUTPATIENT)
Dept: INTERNAL MEDICINE CLINIC | Age: 77
End: 2022-12-05
Payer: COMMERCIAL

## 2022-12-05 VITALS
DIASTOLIC BLOOD PRESSURE: 86 MMHG | BODY MASS INDEX: 22.38 KG/M2 | SYSTOLIC BLOOD PRESSURE: 136 MMHG | OXYGEN SATURATION: 98 % | HEART RATE: 68 BPM | TEMPERATURE: 97.9 F | HEIGHT: 75 IN | WEIGHT: 180 LBS

## 2022-12-05 VITALS
DIASTOLIC BLOOD PRESSURE: 86 MMHG | OXYGEN SATURATION: 98 % | BODY MASS INDEX: 22.52 KG/M2 | WEIGHT: 180.2 LBS | SYSTOLIC BLOOD PRESSURE: 136 MMHG | HEART RATE: 68 BPM | RESPIRATION RATE: 16 BRPM

## 2022-12-05 DIAGNOSIS — I10 ESSENTIAL HYPERTENSION: Primary | ICD-10-CM

## 2022-12-05 DIAGNOSIS — Z00.00 MEDICARE ANNUAL WELLNESS VISIT, SUBSEQUENT: Primary | ICD-10-CM

## 2022-12-05 DIAGNOSIS — I87.2 CHRONIC VENOUS INSUFFICIENCY: ICD-10-CM

## 2022-12-05 PROCEDURE — 3074F SYST BP LT 130 MM HG: CPT | Performed by: FAMILY MEDICINE

## 2022-12-05 PROCEDURE — G0439 PPPS, SUBSEQ VISIT: HCPCS | Performed by: FAMILY MEDICINE

## 2022-12-05 PROCEDURE — 1123F ACP DISCUSS/DSCN MKR DOCD: CPT | Performed by: FAMILY MEDICINE

## 2022-12-05 PROCEDURE — 3078F DIAST BP <80 MM HG: CPT | Performed by: FAMILY MEDICINE

## 2022-12-05 PROCEDURE — 99213 OFFICE O/P EST LOW 20 MIN: CPT | Performed by: FAMILY MEDICINE

## 2022-12-05 RX ORDER — METOPROLOL SUCCINATE 50 MG/1
50 TABLET, EXTENDED RELEASE ORAL DAILY
Qty: 90 TABLET | Refills: 1 | Status: SHIPPED | OUTPATIENT
Start: 2022-12-05

## 2022-12-05 SDOH — ECONOMIC STABILITY: FOOD INSECURITY: WITHIN THE PAST 12 MONTHS, THE FOOD YOU BOUGHT JUST DIDN'T LAST AND YOU DIDN'T HAVE MONEY TO GET MORE.: NEVER TRUE

## 2022-12-05 SDOH — ECONOMIC STABILITY: FOOD INSECURITY: WITHIN THE PAST 12 MONTHS, YOU WORRIED THAT YOUR FOOD WOULD RUN OUT BEFORE YOU GOT MONEY TO BUY MORE.: NEVER TRUE

## 2022-12-05 ASSESSMENT — LIFESTYLE VARIABLES
HOW OFTEN DO YOU HAVE A DRINK CONTAINING ALCOHOL: NEVER
HOW MANY STANDARD DRINKS CONTAINING ALCOHOL DO YOU HAVE ON A TYPICAL DAY: PATIENT DOES NOT DRINK

## 2022-12-05 ASSESSMENT — ENCOUNTER SYMPTOMS
ABDOMINAL PAIN: 0
COUGH: 0
SHORTNESS OF BREATH: 0
NAUSEA: 0

## 2022-12-05 ASSESSMENT — PATIENT HEALTH QUESTIONNAIRE - PHQ9
SUM OF ALL RESPONSES TO PHQ QUESTIONS 1-9: 0
SUM OF ALL RESPONSES TO PHQ QUESTIONS 1-9: 0
SUM OF ALL RESPONSES TO PHQ9 QUESTIONS 1 & 2: 0
1. LITTLE INTEREST OR PLEASURE IN DOING THINGS: 0
SUM OF ALL RESPONSES TO PHQ QUESTIONS 1-9: 0
2. FEELING DOWN, DEPRESSED OR HOPELESS: 0
SUM OF ALL RESPONSES TO PHQ QUESTIONS 1-9: 0

## 2022-12-05 ASSESSMENT — SOCIAL DETERMINANTS OF HEALTH (SDOH): HOW HARD IS IT FOR YOU TO PAY FOR THE VERY BASICS LIKE FOOD, HOUSING, MEDICAL CARE, AND HEATING?: SOMEWHAT HARD

## 2022-12-05 NOTE — PROGRESS NOTES
Medicare Annual Wellness Visit    Dinesh Suarez is here for Medicare AWV    Assessment & Plan   Medicare annual wellness visit, subsequent      Recommendations for Preventive Services Due: see orders and patient instructions/AVS.  Recommended screening schedule for the next 5-10 years is provided to the patient in written form: see Patient Instructions/AVS.     No follow-ups on file. Subjective       Patient's complete Health Risk Assessment and screening values have been reviewed and are found in Flowsheets. The following problems were reviewed today and where indicated follow up appointments were made and/or referrals ordered. Positive Risk Factor Screenings with Interventions:             General Health and ACP:  General  In general, how would you say your health is?: Very Good  In the past 7 days, have you experienced any of the following: New or Increased Pain, New or Increased Fatigue, Loneliness, Social Isolation, Stress or Anger?: No  Do you get the social and emotional support that you need?: Yes  Do you have a Living Will?: (!) No    Advance Directives       Power of  Living Will ACP-Advance Directive ACP-Power of     Not on File Not on File Not on File Not on File          General Health Risk Interventions:  No Living Will: Advance Care Planning addressed with patient today-information provided to patient today.     Health Habits/Nutrition:  Physical Activity: Sufficiently Active    Days of Exercise per Week: 7 days    Minutes of Exercise per Session: 60 min     Have you lost any weight without trying in the past 3 months?: No  Body mass index: 22.5  Have you seen the dentist within the past year?: (!) No    Health Habits/Nutrition Interventions:  Dental exam overdue:  patient encouraged to make appointment with his/her dentist    Hearing/Vision:  Do you or your family notice any trouble with your hearing that hasn't been managed with hearing aids?: No  Do you have difficulty driving, watching TV, or doing any of your daily activities because of your eyesight?: No  Have you had an eye exam within the past year?: (!) No (every other year)  No results found. Hearing/Vision Interventions:  Vision concerns:  patient encouraged to make appointment with his/her eye specialist, patient states he goes every other year to his eye specialist     ADLs:  In the past 7 days, did you need help from others to perform any of the following everyday activities: Eating, dressing, grooming, bathing, toileting, or walking/balance?: No  In the past 7 days, did you need help from others to take care of any of the following: Laundry, housekeeping, banking/finances, shopping, telephone use, food preparation, transportation, or taking medications?: (!) Yes  Select all that apply: [employer] (wife)    ADL Interventions:  Patient declines any further evaluation/treatment for this issue  Patient states his wife drives him          Objective   Vitals:    12/05/22 1136   BP: 136/86   Pulse: 68   Temp: 97.9 °F (36.6 °C)   SpO2: 98%   Weight: 180 lb (81.6 kg)   Height: 6' 3\" (1.905 m)      Body mass index is 22.5 kg/m². No Known Allergies  Prior to Visit Medications    Medication Sig Taking? Authorizing Provider   metoprolol succinate (TOPROL XL) 50 MG extended release tablet Take 1 tablet by mouth daily Yes Rakesh Saucedo DO       CareTeam (Including outside providers/suppliers regularly involved in providing care):   Patient Care Team:  Rakesh Saucedo DO as PCP - General (Family Medicine)  Rakesh Saucedo DO as PCP - [facility] Empaneled Provider     Reviewed and updated this visit:  Tobacco  Allergies  Meds  Med Hx  Surg Hx  Soc Hx  Fam Hx            IShay LPN, 21/4/2400, performed the documented evaluation under the direct supervision of the attending physician.

## 2022-12-05 NOTE — PATIENT INSTRUCTIONS
Personalized Preventive Plan for Cecilia Mota - 12/5/2022  Medicare offers a range of preventive health benefits. Some of the tests and screenings are paid in full while other may be subject to a deductible, co-insurance, and/or copay. Some of these benefits include a comprehensive review of your medical history including lifestyle, illnesses that may run in your family, and various assessments and screenings as appropriate. After reviewing your medical record and screening and assessments performed today your provider may have ordered immunizations, labs, imaging, and/or referrals for you. A list of these orders (if applicable) as well as your Preventive Care list are included within your After Visit Summary for your review. Other Preventive Recommendations:    A preventive eye exam performed by an eye specialist is recommended every 1-2 years to screen for glaucoma; cataracts, macular degeneration, and other eye disorders. A preventive dental visit is recommended every 6 months. Try to get at least 150 minutes of exercise per week or 10,000 steps per day on a pedometer . Order or download the FREE \"Exercise & Physical Activity: Your Everyday Guide\" from [employer]. Call 1-875.604.5055 or search [employer] online. You need 8308-5222 mg of calcium and 4448-4983 IU of vitamin D per day. It is possible to meet your calcium requirement with diet alone, but a vitamin D supplement is usually necessary to meet this goal.  When exposed to the sun, use a sunscreen that protects against both UVA and UVB radiation with an SPF of 30 or greater. Reapply every 2 to 3 hours or after sweating, drying off with a towel, or swimming. Always wear a seat belt when traveling in a car. Always wear a helmet when riding a bicycle or motorcycle. Heart-Healthy Diet   Sodium, Fat, and Cholesterol Controlled Diet       What Is a Heart Healthy Diet?    A heart-healthy diet is one that limits sodium , certain types of fat , and cholesterol . This type of diet is recommended for:   People with any form of cardiovascular disease (eg, coronary heart disease , peripheral vascular disease , previous heart attack , previous stroke )   People with risk factors for cardiovascular disease, such as high blood pressure , high cholesterol , or diabetes   Anyone who wants to lower their risk of developing cardiovascular disease   Sodium    Sodium is a mineral found in many foods. In general, most people consume much more sodium than they need. Diets high in sodium can increase blood pressure and lead to edema (water retention). On a heart-healthy diet, you should consume no more than 2,300 mg (milligrams) of sodium per dayabout the amount in one teaspoon of table salt. The foods highest in sodium include table salt (about 50% sodium), processed foods, convenience foods, and preserved foods. Cholesterol    Cholesterol is a fat-like, waxy substance in your blood. Our bodies make some cholesterol. It is also found in animal products, with the highest amounts in fatty meat, egg yolks, whole milk, cheese, shellfish, and organ meats. On a heart-healthy diet, you should limit your cholesterol intake to less than 200 mg per day. It is normal and important to have some cholesterol in your bloodstream. But too much cholesterol can cause plaque to build up within your arteries, which can eventually lead to a heart attack or stroke. The two types of cholesterol that are most commonly referred to are:   Low-density lipoprotein (LDL) cholesterol  Also known as bad cholesterol, this is the cholesterol that tends to build up along your arteries. Bad cholesterol levels are increased by eating fats that are saturated or hydrogenated. Optimal level of this cholesterol is less than 100. Over 130 starts to get risky for heart disease.    High-density lipoprotein (HDL) cholesterol  Also known as good cholesterol, this type of cholesterol actually carries cholesterol away from your arteries and may, therefore, help lower your risk of having a heart attack. You want this level to be high (ideally greater than 60). It is a risk to have a level less than 40. You can raise this good cholesterol by eating olive oil, canola oil, avocados, or nuts. Exercise raises this level, too. Fat    Fat is calorie dense and packs a lot of calories into a small amount of food. Even though fats should be limited due to their high calorie content, not all fats are bad. In fact, some fats are quite healthful. Fat can be broken down into four main types. The good-for-you fats are:   Monounsaturated fat  found in oils such as olive and canola, avocados, and nuts and natural nut butters; can decrease cholesterol levels, while keeping levels of HDL cholesterol high   Polyunsaturated fat  found in oils such as safflower, sunflower, soybean, corn, and sesame; can decrease total cholesterol and LDL cholesterol   Omega-3 fatty acids  particularly those found in fatty fish (such as salmon, trout, tuna, mackerel, herring, and sardines); can decrease risk of arrhythmias, decrease triglyceride levels, and slightly lower blood pressure   The fats that you want to limit are:   Saturated fat  found in animal products, many fast foods, and a few vegetables; increases total blood cholesterol, including LDL levels   Animal fats that are saturated include: butter, lard, whole-milk dairy products, meat fat, and poultry skin   Vegetable fats that are saturated include: hydrogenated shortening, palm oil, coconut oil, cocoa butter   Hydrogenated or trans fat  found in margarine and vegetable shortening, most shelf stable snack foods, and fried foods; increases LDL and decreases HDL     It is generally recommended that you limit your total fat for the day to less than 30% of your total calories.  If you follow an 1800-calorie heart healthy diet, for example, this would mean 60 grams of fat or less per day. Saturated fat and trans fat in your diet raises your blood cholesterol the most, much more than dietary cholesterol does. For this reason, on a heart-healthy diet, less than 7% of your calories should come from saturated fat and ideally 0% from trans fat. On an 1800-calorie diet, this translates into less than 14 grams of saturated fat per day, leaving 46 grams of fat to come from mono- and polyunsaturated fats.    Food Choices on a Heart Healthy Diet   Food Category   Foods Recommended   Foods to Avoid   Grains   Breads and rolls without salted tops Most dry and cooked cereals Unsalted crackers and breadsticks Low-sodium or homemade breadcrumbs or stuffing All rice and pastas   Breads, rolls, and crackers with salted tops High-fat baked goods (eg, muffins, donuts, pastries) Quick breads, self-rising flour, and biscuit mixes Regular bread crumbs Instant hot cereals Commercially prepared rice, pasta, or stuffing mixes   Vegetables   Most fresh, frozen, and low-sodium canned vegetables Low-sodium and salt-free vegetable juices Canned vegetables if unsalted or rinsed   Regular canned vegetables and juices, including sauerkraut and pickled vegetables Frozen vegetables with sauces Commercially prepared potato and vegetable mixes   Fruits   Most fresh, frozen, and canned fruits All fruit juices   Fruits processed with salt or sodium   Milk   Nonfat or low-fat (1%) milk Nonfat or low-fat yogurt Cottage cheese, low-fat ricotta, cheeses labeled as low-fat and low-sodium   Whole milk Reduced-fat (2%) milk Malted and chocolate milk Full fat yogurt Most cheeses (unless low-fat and low salt) Buttermilk (no more than 1 cup per week)   Meats and Beans   Lean cuts of fresh or frozen beef, veal, lamb, or pork (look for the word loin) Fresh or frozen poultry without the skin Fresh or frozen fish and some shellfish Egg whites and egg substitutes (Limit whole eggs to three per week) Tofu Nuts or seeds (unsalted, dry-roasted), low-sodium peanut butter Dried peas, beans, and lentils   Any smoked, cured, salted, or canned meat, fish, or poultry (including dowd, chipped beef, cold cuts, hot dogs, sausages, sardines, and anchovies) Poultry skins Breaded and/or fried fish or meats Canned peas, beans, and lentils Salted nuts   Fats and Oils   Olive oil and canola oil Low-sodium, low-fat salad dressings and mayonnaise   Butter, margarine, coconut and palm oils, dowd fat   Snacks, Sweets, and Condiments   Low-sodium or unsalted versions of broths, soups, soy sauce, and condiments Pepper, herbs, and spices; vinegar, lemon, or lime juice Low-fat frozen desserts (yogurt, sherbet, fruit bars) Sugar, cocoa powder, honey, syrup, jam, and preserves Low-fat, trans-fat free cookies, cakes, and pies Chon and animal crackers, fig bars, mago snaps   High-fat desserts Broth, soups, gravies, and sauces, made from instant mixes or other high-sodium ingredients Salted snack foods Canned olives Meat tenderizers, seasoning salt, and most flavored vinegars   Beverages   Low-sodium carbonated beverages Tea and coffee in moderation Soy milk   Commercially softened water   Suggestions   Make whole grains, fruits, and vegetables the base of your diet. Choose heart-healthy fats such as canola, olive, and flaxseed oil, and foods high in heart-healthy fats, such as nuts, seeds, soybeans, tofu, and fish. Eat fish at least twice per week; the fish highest in omega-3 fatty acids and lowest in mercury include salmon, herring, mackerel, sardines, and canned chunk light tuna. If you eat fish less than twice per week or have high triglycerides, talk to your doctor about taking fish oil supplements. Read food labels. For products low in fat and cholesterol, look for fat free, low-fat, cholesterol free, saturated fat free, and trans fat freeAlso scan the Nutrition Facts Label, which lists saturated fat, trans fat, and cholesterol amounts. For products low in sodium, look for sodium free, very low sodium, low sodium, no added salt, and unsalted   Skip the salt when cooking or at the table; if food needs more flavor, get creative and try out different herbs and spices. Garlic and onion also add substantial flavor to foods. Trim any visible fat off meat and poultry before cooking, and drain the fat off after edwards. Use cooking methods that require little or no added fat, such as grilling, boiling, baking, poaching, broiling, roasting, steaming, stir-frying, and sauting. Avoid fast food and convenience food. They tend to be high in saturated and trans fat and have a lot of added salt. Talk to a registered dietitian for individualized diet advice. Last Reviewed: March 2011 Jessie Bauer MS, MPH, RD   Updated: 3/29/2011     Heart-Healthy Diet   Sodium, Fat, and Cholesterol Controlled Diet       What Is a Heart Healthy Diet? A heart-healthy diet is one that limits sodium , certain types of fat , and cholesterol . This type of diet is recommended for:   People with any form of cardiovascular disease (eg, coronary heart disease , peripheral vascular disease , previous heart attack , previous stroke )   People with risk factors for cardiovascular disease, such as high blood pressure , high cholesterol , or diabetes   Anyone who wants to lower their risk of developing cardiovascular disease   Sodium    Sodium is a mineral found in many foods. In general, most people consume much more sodium than they need. Diets high in sodium can increase blood pressure and lead to edema (water retention). On a heart-healthy diet, you should consume no more than 2,300 mg (milligrams) of sodium per dayabout the amount in one teaspoon of table salt. The foods highest in sodium include table salt (about 50% sodium), processed foods, convenience foods, and preserved foods. Cholesterol    Cholesterol is a fat-like, waxy substance in your blood.  Our bodies make some cholesterol. It is also found in animal products, with the highest amounts in fatty meat, egg yolks, whole milk, cheese, shellfish, and organ meats. On a heart-healthy diet, you should limit your cholesterol intake to less than 200 mg per day. It is normal and important to have some cholesterol in your bloodstream. But too much cholesterol can cause plaque to build up within your arteries, which can eventually lead to a heart attack or stroke. The two types of cholesterol that are most commonly referred to are:   Low-density lipoprotein (LDL) cholesterol  Also known as bad cholesterol, this is the cholesterol that tends to build up along your arteries. Bad cholesterol levels are increased by eating fats that are saturated or hydrogenated. Optimal level of this cholesterol is less than 100. Over 130 starts to get risky for heart disease. High-density lipoprotein (HDL) cholesterol  Also known as good cholesterol, this type of cholesterol actually carries cholesterol away from your arteries and may, therefore, help lower your risk of having a heart attack. You want this level to be high (ideally greater than 60). It is a risk to have a level less than 40. You can raise this good cholesterol by eating olive oil, canola oil, avocados, or nuts. Exercise raises this level, too. Fat    Fat is calorie dense and packs a lot of calories into a small amount of food. Even though fats should be limited due to their high calorie content, not all fats are bad. In fact, some fats are quite healthful. Fat can be broken down into four main types.    The good-for-you fats are:   Monounsaturated fat  found in oils such as olive and canola, avocados, and nuts and natural nut butters; can decrease cholesterol levels, while keeping levels of HDL cholesterol high   Polyunsaturated fat  found in oils such as safflower, sunflower, soybean, corn, and sesame; can decrease total cholesterol and LDL cholesterol   Omega-3 fatty acids  particularly those found in fatty fish (such as salmon, trout, tuna, mackerel, herring, and sardines); can decrease risk of arrhythmias, decrease triglyceride levels, and slightly lower blood pressure   The fats that you want to limit are:   Saturated fat  found in animal products, many fast foods, and a few vegetables; increases total blood cholesterol, including LDL levels   Animal fats that are saturated include: butter, lard, whole-milk dairy products, meat fat, and poultry skin   Vegetable fats that are saturated include: hydrogenated shortening, palm oil, coconut oil, cocoa butter   Hydrogenated or trans fat  found in margarine and vegetable shortening, most shelf stable snack foods, and fried foods; increases LDL and decreases HDL     It is generally recommended that you limit your total fat for the day to less than 30% of your total calories. If you follow an 1800-calorie heart healthy diet, for example, this would mean 60 grams of fat or less per day. Saturated fat and trans fat in your diet raises your blood cholesterol the most, much more than dietary cholesterol does. For this reason, on a heart-healthy diet, less than 7% of your calories should come from saturated fat and ideally 0% from trans fat. On an 1800-calorie diet, this translates into less than 14 grams of saturated fat per day, leaving 46 grams of fat to come from mono- and polyunsaturated fats.    Food Choices on a Heart Healthy Diet   Food Category   Foods Recommended   Foods to Avoid   Grains   Breads and rolls without salted tops Most dry and cooked cereals Unsalted crackers and breadsticks Low-sodium or homemade breadcrumbs or stuffing All rice and pastas   Breads, rolls, and crackers with salted tops High-fat baked goods (eg, muffins, donuts, pastries) Quick breads, self-rising flour, and biscuit mixes Regular bread crumbs Instant hot cereals Commercially prepared rice, pasta, or stuffing mixes   Vegetables   Most fresh, frozen, and low-sodium canned vegetables Low-sodium and salt-free vegetable juices Canned vegetables if unsalted or rinsed   Regular canned vegetables and juices, including sauerkraut and pickled vegetables Frozen vegetables with sauces Commercially prepared potato and vegetable mixes   Fruits   Most fresh, frozen, and canned fruits All fruit juices   Fruits processed with salt or sodium   Milk   Nonfat or low-fat (1%) milk Nonfat or low-fat yogurt Cottage cheese, low-fat ricotta, cheeses labeled as low-fat and low-sodium   Whole milk Reduced-fat (2%) milk Malted and chocolate milk Full fat yogurt Most cheeses (unless low-fat and low salt) Buttermilk (no more than 1 cup per week)   Meats and Beans   Lean cuts of fresh or frozen beef, veal, lamb, or pork (look for the word loin) Fresh or frozen poultry without the skin Fresh or frozen fish and some shellfish Egg whites and egg substitutes (Limit whole eggs to three per week) Tofu Nuts or seeds (unsalted, dry-roasted), low-sodium peanut butter Dried peas, beans, and lentils   Any smoked, cured, salted, or canned meat, fish, or poultry (including dowd, chipped beef, cold cuts, hot dogs, sausages, sardines, and anchovies) Poultry skins Breaded and/or fried fish or meats Canned peas, beans, and lentils Salted nuts   Fats and Oils   Olive oil and canola oil Low-sodium, low-fat salad dressings and mayonnaise   Butter, margarine, coconut and palm oils, dowd fat   Snacks, Sweets, and Condiments   Low-sodium or unsalted versions of broths, soups, soy sauce, and condiments Pepper, herbs, and spices; vinegar, lemon, or lime juice Low-fat frozen desserts (yogurt, sherbet, fruit bars) Sugar, cocoa powder, honey, syrup, jam, and preserves Low-fat, trans-fat free cookies, cakes, and pies Chon and animal crackers, fig bars, mago snaps   High-fat desserts Broth, soups, gravies, and sauces, made from instant mixes or other high-sodium ingredients Salted snack foods Canned olives Meat tenderizers, seasoning salt, and most flavored vinegars   Beverages   Low-sodium carbonated beverages Tea and coffee in moderation Soy milk   Commercially softened water   Suggestions   Make whole grains, fruits, and vegetables the base of your diet. Choose heart-healthy fats such as canola, olive, and flaxseed oil, and foods high in heart-healthy fats, such as nuts, seeds, soybeans, tofu, and fish. Eat fish at least twice per week; the fish highest in omega-3 fatty acids and lowest in mercury include salmon, herring, mackerel, sardines, and canned chunk light tuna. If you eat fish less than twice per week or have high triglycerides, talk to your doctor about taking fish oil supplements. Read food labels. For products low in fat and cholesterol, look for fat free, low-fat, cholesterol free, saturated fat free, and trans fat freeAlso scan the Nutrition Facts Label, which lists saturated fat, trans fat, and cholesterol amounts. For products low in sodium, look for sodium free, very low sodium, low sodium, no added salt, and unsalted   Skip the salt when cooking or at the table; if food needs more flavor, get creative and try out different herbs and spices. Garlic and onion also add substantial flavor to foods. Trim any visible fat off meat and poultry before cooking, and drain the fat off after edwards. Use cooking methods that require little or no added fat, such as grilling, boiling, baking, poaching, broiling, roasting, steaming, stir-frying, and sauting. Avoid fast food and convenience food. They tend to be high in saturated and trans fat and have a lot of added salt. Talk to a registered dietitian for individualized diet advice. Last Reviewed: March 2011 Rafi Fournier MS, MPH, RD   Updated: 3/29/2011     High-Fiber Diet     What Is Fiber? Dietary fiber is a form of carbohydrate found in plants that cannot be digested by humans.  All plants contain fiber, including fruits, vegetables, grains, and legumes. Fiber is often classified into two categories: soluble and insoluble. Soluble fiber draws water into the bowel and can help slow digestion. Examples of foods that are high in soluble fiber include oatmeal, oat bran, barley, legumes (eg, beans and peas), apples, and strawberries. Insoluble fiber speeds digestion and can add bulk to the stool. Examples of foods that are high in insoluble fiber include whole-wheat products, wheat bran, cauliflower, green beans, and potatoes. Why Follow a High-Fiber Diet? A high-fiber diet is often recommended to prevent and treat constipation , hemorrhoids , diverticulitis , and irritable bowel syndrome . Eating a high-fiber diet can also help improve your cholesterol levels, lower your risk of coronary heart disease , reduce your risk of type 2 diabetes , and lower your weight. For people with type 1 or 2 diabetes, a high-fiber diet can also help stabilize blood sugar levels. How Much Fiber Should I Eat? A high-fiber diet should contain  20-35 grams  of fiber a day. This is actually the amount recommended for the general adult population; however, most Americans eat only 15 grams of fiber per day. Digestion of Fiber   Eating a higher fiber diet than usual can take some getting used to by your body's digestive system. To avoid the side effects of sudden increases in dietary fiber (eg, gas, cramping, bloating, and diarrhea), increase fiber gradually and be sure to drink plenty of fluids every day. Tips for Increasing Fiber Intake   Whenever possible, choose whole grains over refined grains (eg, brown rice instead of white rice, whole-wheat bread instead of white bread). Include a variety of grains in your diet, such as wheat, rye, barley, oats, quinoa, and bulgur. Eat more vegetarian-based meals. Here are some ideas: black bean burgers, eggplant lasagna, and veggie tofu stir-dexter.     Choose high-fiber snacks, such as fruits, popcorn, whole-grain crackers, and nuts. Make whole-grain cereal or whole-grain toast part of your daily breakfast regime. When eating out, whether ordering a sandwich or dinner, ask for extra vegetables. When baking, replace part of the white flour with whole-wheat flour. Whole-wheat flour is particularly easy to incorporate into a recipe. High-Fiber Diet Eating Guide   Food Category   Foods Recommended   Notes   Grains   Whole-grain breads, muffins, bagels, or davin bread Rye bread Whole-wheat crackers or crisp breads Whole-grain or bran cereals Oatmeal, oat bran, or grits Wheat germ Whole-wheat pasta and brown rice   Read the ingredients list on food labels. Look for products that list \"whole\" as the first ingredient (eg, whole-wheat, whole oats). Choose cereals with at least 2 grams of fiber per serving. Vegetables   All vegetables, especially asparagus, bean sprouts, broccoli, [place] sprouts, cabbage, carrots, cauliflower, celery, corn, greens, green beans, green pepper, onions, peas, potatoes (with skin), snow peas, spinach, squash, sweet potatoes, tomatoes, zucchini   For maximum fiber intake, eat the peels of fruits and vegetablesjust be sure to wash them well first.   Fruits   All fruits, especially apples, berries, grapefruits, mangoes, nectarines, oranges, peaches, pears, dried fruits (figs, dates, prunes, raisins)   Choose raw fruits and vegetables over juice, cooked, or cannedraw fruit has more fiber. Dried fruit is also a good source of fiber. Milk   With the exception of yogurt containing inulin (a type of fiber), dairy foods provide little fiber. Add more fiber by topping your yogurt or cottage cheese with fresh fruit, whole grain or bran cereals, nuts, or seeds.    Meats and Beans   All beans and peas, especially Garbanzo beans, kidney beans, lentils, lima beans, split peas, and [place] beans All nuts and seeds, especially almonds, peanuts, [place] nuts, cashews, peanut butter, walnuts, sesame and sunflower seeds All meat, poultry, fish, and eggs   Increase fiber in meat dishes by adding [place] beans, kidney beans, black-eyed peas, bran, or oatmeal. If you are following a low-fat diet, use nuts and seeds only in moderation. Fats and Oils   All in moderation   Fats and oils do not provide fiber   Snacks, Sweets, and Condiments   Fruit Nuts Popcorn, whole-wheat pretzels, or trail mix made with dried fruits, nuts, and seeds Cakes, breads, and cookies made with oatmeal or whole-wheat flour   Most snack foods do not provide much fiber. Choose snacks with at least 2 grams of fiber per serving. Last Reviewed: March 2011 Jessica Barron MS, MPH, RD   Updated: 3/29/2011     Keep Your Memory Jesse Vickers       Many factors can affect your ability to remembera hectic lifestyle, aging, stress, chronic disease, and certain medicines. But, there are steps you can take to sharpen your mind and help preserve your memory. Challenge Your Brain   Regularly challenging your mind may help keeps it in top shape. Good mental exercises include:   Crossword puzzlesUse a dictionary if you need it; you will learn more that way. Brainteasers Try some! Crafts, such as wood working and sewing   Hobbies, such as gardening and building model airplanes   SocializingVisit old friends or join groups to meet new ones. Reading   Learning a new language   Taking a class, whether it be art history or lety chi   TravelingExperience the food, history, and culture of your destination   Learning to use a computer   Going to museums, the theater, or thought-provoking movies   Changing things in your daily life, such as reversing your pattern in the grocery store or brushing your teeth using your nondominant hand   Use Memory Aids   There is no need to remember every detail on your own.  These memory aids can help:   Calendars and day planners   Electronic organizers to store all sorts of helpful informationThese devices can \"beep\" to remind you of can interfere with mental function. Many of the lifestyle steps discussed here can help manage these conditions. Strive to eat a healthy diet, exercise regularly, get stress under control, and follow your doctor's advice for your condition. Minimize Medications    Talk to your doctor about the medicines that you take. Some may be unnecessary. Also, healthy lifestyle habits may lower the need for certain drugs. Last Reviewed: April 2010 Nura Sanchez MD   Updated: 4/13/2010     Keeping Home a 1101        As we get older, changes in balance, gait, strength, vision, hearing, and cognition make even the most youthful senior more prone to accidents. Falls are one of the leading health risks for older people. This increased risk of falling is related to:   Aging process (eg, decreased muscle strength, slowed reflexes)   Higher incidence of chronic health problems (eg, arthritis, diabetes) that may limit mobility, agility or sensory awareness   Side effects of medicine (eg, dizziness, blurred vision)especially medicines like prescription pain medicines and drugs used to treat mental health conditions   Depending on the brittleness of your bones, the consequences of a fall can be serious and long lasting. Home Life   Research by the Association of Aging [facility]) shows that some home accidents among older adults can be prevented by making simple lifestyle changes and basic modifications and repairs to the home environment. Here are some lifestyle changes that experts recommend:   Have your hearing and vision checked regularly. Be sure to wear prescription glasses that are right for you. Speak to your doctor or pharmacist about the possible side effects of your medicines. A number of medicines can cause dizziness. If you have problems with sleep, talk to your doctor. Limit your intake of alcohol. If necessary, use a cane or walker to help maintain your balance.    Wear supportive, rubber-soled shoes, even at home. If you live in a region that gets wintry weather, you may want to put special cleats on your shoes to prevent you from slipping on the snow and ice. Exercise regularly to help maintain muscle tone, agility, and balance. Always hold the banister when going up or down stairs. Also, use  bars when getting in or out of the bath or shower, or using the toilet. To avoid dizziness, get up slowly from a lying down position. Sit up first, dangling your legs for a minute or two before rising to a standing position. Overall Home Safety Check   According to the Consumer Product Safety Commision's \"Older Consumer Home Safety Checklist,\" it is important to check for potential hazards in each room. And remember, proper lighting is an essential factor in home safety. If you cannot see clearly, you are more likely to fall. Important questions to ask yourself include:   Are lamp, electric, extension, and telephone cords placed out of the flow of traffic and maintained in good condition? Have frayed cords been replaced? Are all small rugs and runners slip resistant? If not, you can secure them to the floor with a special double-sided carpet tape. Are smoke detectors properly locatedone on every floor of your home and one outside of every sleeping area? Are they in good working order? Are batteries replaced at least once a year? Do you have a well-maintained carbon monoxide detector outside every sleeping are in your home? Does your furniture layout leave plenty of space to maneuver between and around chairs, tables, beds, and sofas? Are hallways, stairs and passages between rooms well lit? Can you reach a lamp without getting out of bed? Are floor surfaces well maintained? Shag rugs, high-pile carpeting, tile floors, and polished wood floors can be particularly slippery. Stairs should always have handrails and be carpeted or fitted with a non-skid tread. Is your telephone easily reachable.  Is the cord safely tucked away? Room by Room   According to the Association of Aging, bathrooms and chris are the two most potentially hazardous rooms in your home. In the Kitchen    Be sure your stove is in proper working order and always make sure burners and the oven are off before you go out or go to sleep. Keep pots on the back burners, turn handles away from the front of the stove, and keep stove clean and free of grease build-up. Kitchen ventilation systems and range exhausts should be working properly. Keep flammable objects such as towels and pot holders away from the cooking area except when in use. Make sure kitchen curtains are tied back. Move cords and appliances away from the sink and hot surfaces. If extension cords are needed, install wiring guides so they do not hang over the sink, range, or working areas. Look for coffee pots, kettles and toaster ovens with automatic shut-offs. Keep a mop handy in the kitchen so you can wipe up spills instantly. You should also have a small fire extinguisher. Arrange your kitchen with frequently used items on lower shelves to avoid the need to stand on a stepstool to reach them. Make sure countertops are well-lit to avoid injuries while cutting and preparing food. In the Bathroom    Use a non-slip mat or decals in the tub and shower, since wet, soapy tile or porcelain surfaces are extremely slippery. Make sure bathroom rugs are non-skid or tape them firmly to the floor. Bathtubs should have at least one, preferably two, grab bars, firmly attached to structural supports in the wall. (Do not use built-in soap holders or glass shower doors as grab bars.)    Tub seats fitted with non-slip material on the legs allow you to wash sitting down. For people with limited mobility, bathtub transfer benches allow you to slide safely into the tub. Raised toilet seats and toilet safety rails are helpful for those with knee or hip problems.     In the Bedroom    Make sure you use a nightlight and that the area around your bed is clear of potential obstacles. Be careful with electric blankets and never go to sleep with a heating pad, which can cause serious burns even if on a low setting. Use fire-resistant mattress covers and pillows, and NEVER smoke in bed. Keep a phone next to the bed that is programmed to dial 911 at the push of a button. If you have a chronic condition, you may want to sign on with an automatic call-in service. Typically the system includes a small pendant that connects directly to an emergency medical voice-response system. You should also make arrangements to stay in contact with someonefriend, neighbor, family memberon a regular schedule. Fire Prevention   According to the [employer]. (Smoke Alarms for Every) [address] [place] [address], senior citizens are one of the two highest risk groups for death and serious injuries due to residential fires. When cooking, wear short-sleeved items, never a bulky long-sleeved robe. The [facility]'s Safety Checklist for Older Consumers emphasizes the importance of checking basements, garages, workshops and storage areas for fire hazards, such as volatile liquids, piles of old rags or clothing and overloaded circuits. Never smoke in bed or when lying down on a couch or recliner chair. Small portable electric or kerosene heaters are responsible for many home fires and should be used cautiously if at all. If you do use one, be sure to keep them away from flammable materials. In case of fire, make sure you have a pre-established emergency exit plan. Have a professional check your fireplace and other fuel-burning appliances yearly. Helping Hands   Baby boomers entering the arredondo years will continue to see the development of new products to help older adults live safely and independently in spite of age-related changes.   Making Life More Livable  , by Teresa Wright, lists over 1,000 products for \"living well in the mature years,\" such as bathing and mobility aids, household security devices, ergonomically designed knives and peelers, and faucet valves and knobs for temperature control. Medical supply stores and organizations are good sources of information about products that improve your quality of life and insure your safety. Last Reviewed: November 2009 Elida Nagy MD   Updated: 3/7/2011            Advance Care Planning: Care Instructions  Your Care Instructions     It can be hard to live with an illness that cannot be cured. But if your health is getting worse, you may want to make decisions about end-of-life care. Planning for the end of your life does not mean that you are giving up. It is a way to make sure that your wishes are met. Clearly stating your wishes can make it easier for your loved ones. Making plans while you are still able may also ease your mind and make your final days less stressful and more meaningful. Follow-up care is a key part of your treatment and safety. Be sure to make and go to all appointments, and call your doctor if you are having problems. It's also a good idea to know your test results and keep a list of the medicines you take. What can you do to plan for the end of life? You can bring these issues up with your doctor. You do not need to wait until your doctor starts the conversation. You might start with, \"What makes life worth living for me is. Harden Beech .\" And then follow it with, \"I would not be willing to live with . Harden Beech Harden Beech \" When you complete this sentence it helps your doctor understand your wishes. Talk openly and honestly with your doctor. This is the best way to understand the decisions you will need to make as your health changes. Know that you can always change your mind. Ask your doctor about commonly used life-support measures. These include tube feedings, breathing machines, and fluids given through a vein (IV).  Understanding these treatments will help you decide whether you want them. You may choose to have these life-supporting treatments for a limited time. This allows a trial period to see whether they will help you. You may also decide that you want your doctor to take only certain measures to keep you alive. It may help to think about the big picture, like what makes life worth living for you or what your values and goals are. Talk to your doctor about how long you are likely to live. Your doctor may be able to give you an idea of what usually happens with your specific illness. Think about preparing papers that state your wishes. These papers are called advance directives. If you do this early and review them often, there will not be any confusion about what you want. You can change your instructions at any time. Which papers should you prepare? Advance directives are legal papers that tell doctors how you want to be cared for at the end of your life. You do not need a  to write these papers. Ask your doctor or your state health department for information on how to write your advance directives. They may have the forms for each of these types of papers. Make sure your doctor has a copy of these on file, and give a copy to a family member or close friend. Consider a do-not-resuscitate order (DNR). This order asks that no extra treatments be done if your heart stops or you stop breathing. Extra treatments may include cardiopulmonary resuscitation (CPR), electrical shock to restart your heart, or a machine to breathe for you. If you decide to have a DNR order, ask your doctor to explain and write it. Place the order in your home where everyone can easily see it. Consider a living will. A living will explains your wishes about life support and other treatments at the end of your life if you become unable to speak for yourself. Living heard tell doctors to use or not use treatments that would keep you alive.  You must have one or two witnesses or a notary present when you sign this form. A living will may be called something else in your state. Consider a medical power of . This form allows you to name a person to make decisions about your care if you are not able to. Most people ask a close friend or family member. Talk to this person about the kinds of treatments you want and those that you do not want. Make sure this person understands your wishes. A medical power of  may be called something else in your state. These legal papers are simple to change. Tell your doctor what you want to change, and ask him or her to make a note in your medical file. Give your family updated copies of the papers. Where can you learn more? Go to https://Mango DSPpepiceweb.Imperva. org and sign in to your [place] account. Enter P184 in the Explain My Surgery box to learn more about \"Advance Care Planning: Care Instructions. \"     If you do not have an account, please click on the \"Sign Up Now\" link. Current as of: October 6, 2021               Content Version: 13.4  © 9593-2854 Healthwise, [employer]. Care instructions adapted under license by [facility] ([facility]). If you have questions about a medical condition or this instruction, always ask your healthcare professional. [address] any warranty or liability for your use of this information. Learning About Robert Trevizo  What is a living will? A living will, also called a declaration, is a legal form. It tells your family and your doctor your wishes when you can't speak for yourself. It's used by the health professionals who will treat you as you near the end of your life or if you get seriously hurt or ill. If you put your wishes in writing, your loved ones and others will know what kind of care you want. They won't need to guess. This can ease your mind and be helpful to others. And you can change or cancel your living will at any time.   A living will is not the same as an estate or property will. An estate will explains what you want to happen with your money and property after you die. How do you use it? Keep these facts in mind about how a living will is used. Your living will is used only if you can't speak or make decisions for yourself. Most often, one or more doctors must certify that you can't speak or decide for yourself before your living will takes effect. If you get better and can speak for yourself again, you can accept or refuse any treatment. It doesn't matter what you said in your living will. Some states may limit your right to refuse treatment in certain cases. For example, you may need to clearly state in your living will that you don't want artificial hydration and nutrition, such as being fed through a tube. Is a living will a legal document? A living will is a legal document. Each state has its own laws about living heard. And a living will may be called something else in your state. Here are some things to know about living heard. You don't need an  to complete a living will. But legal advice can be helpful if your state's laws are unclear. It can also help if your health history is complicated or your family can't agree on what should be in your living will. You can change your living will at any time. Some people find that their wishes about end-of-life care change as their health changes. If you make big changes to your living will, complete a new form. If you move to another state, make sure that your living will is legal in the state where you now live. In most cases, doctors will respect your wishes even if you have a form from a different state. You might use a universal form that has been approved by many states. This kind of form can sometimes be filled out and stored online. Your digital copy will then be available wherever you have a connection to the internet.  The doctors and nurses who need to treat you can find it right away. Your state may offer an online registry. This is another place where you can store your living will online. It's a good idea to get your living will notarized. This means using a person called a  to watch two people sign, or witness, your living will. What should you know when you create a living will? Here are some questions to ask yourself as you make your living will. Do you know enough about life support methods that might be used? If not, talk to your doctor so you know what might be done if you can't breathe on your own, your heart stops, or you can't swallow. What things would you still want to be able to do after you receive life-support methods? Would you want to be able to walk? To speak? To eat on your own? To live without the help of machines? Do you want certain Mormon practices performed if you become very ill? If you have a choice, where do you want to be cared for? In your home? At a hospital or nursing home? If you have a choice at the end of your life, where would you prefer to die? At home? In a hospital or nursing home? Somewhere else? Would you prefer to be buried or cremated? Do you want your organs to be donated after you die? What should you do with your living will? Make sure that your family members and your health care agent have copies of your living will (also called a declaration). Give your doctor a copy of your living will. Ask to have it kept as part of your medical record. If you have more than one doctor, make sure that each one has a copy. Put a copy of your living will where it can be easily found. For example, some people may put a copy on their refrigerator door. If you are using a digital copy, be sure your doctor, family members, and health care agent know how to find and access it. Where can you learn more? Go to https://abelardo.[email or web address] org and sign in to your Mopio account.  Enter Z908 in the [address] Information box to learn more about \"Learning About Living [place]. \"     If you do not have an account, please click on the \"Sign Up Now\" link. Current as of: June 16, 2022               Content Version: 13.4  © [id number] Healthwise, Incorporated. Care instructions adapted under license by [facility] ([facility]). If you have questions about a medical condition or this instruction, always ask your healthcare professional. [address] any warranty or liability for your use of this information. Learning About Medical Power of   What is a medical power of ? A medical power of , also called a durable power of  for health care, is one type of the legal forms called advance directives. It lets you name the person you want to make treatment decisions for you if you can't speak or decide for yourself. The person you choose is called your health care agent. This person is also called a health care proxy or health care surrogate. A medical power of  may be called something else in your state. How do you choose a health care agent? Choose your health care agent carefully. This person may or may not be a family member. Talk to the person before you make your final decision. Make sure he or she is comfortable with this responsibility. It's a good idea to choose someone who:  Is at least 25years old. Knows you well and understands what makes life meaningful for you. Understands your Orthodox and moral values. Will do what you want, not what he or she wants. Will be able to make difficult choices at a stressful time. Will be able to refuse or stop treatment, if that is what you would want, even if you could die. Will be firm and confident with health professionals if needed. Will ask questions to get needed information. Lives near you or agrees to travel to you if needed.   Your family may help you make medical decisions while you can still be part of that process. But it's important to choose one person to be your health care agent in case you aren't able to make decisions for yourself. If you don't fill out the legal form and name a health care agent, the decisions your family can make may be limited. A health care agent may be called something else in your state. Who will make decisions for you if you don't have a health care agent? If you don't have a health care agent or a living will, you may not get the care you want. Decisions may be made by family members who disagree about your medical care. Or decisions may be made by a medical professional who doesn't know you well. In some cases, a  makes the decisions. When you name a health care agent, it is very clear who has the power to make health decisions for you. How do you name a health care agent? You name your health care agent on a legal form. This form is usually called a medical power of . Ask your hospital, state bar association, or office on aging where to find these forms. You must sign the form to make it legal. Some states require you to get the form notarized. This means that a person called a  watches you sign the form and then he or she signs the form. Some states also require that two or more witnesses sign the form. Be sure to tell your family members and doctors who your health care agent is. Where can you learn more? Go to https://chpefe.Wish. org and sign in to your [place] account. Enter [id number] in the [facility] box to learn more about \"Learning About [address]. \"     If you do not have an account, please click on the \"Sign Up Now\" link. Current as of: October 6, 2021               Content Version: 13.4  © 2006-2022 Healthwise, Incorporated. Care instructions adapted under license by [facility] ([facility]).  If you have questions about a medical condition or this instruction, always ask your healthcare professional. [employer], Incorporated disclaims any warranty or liability for your use of this information.

## 2022-12-05 NOTE — PROGRESS NOTES
Artist Mcardle (:  1945) is a 69 y.o. male,established patient, here for evaluation of the following chief complaint(s):  Hypertension (Pt presents for 6 month f/u.)         ASSESSMENT/PLAN:  1. Essential hypertension  - metoprolol succinate (TOPROL XL) 50 MG extended release tablet; Take 1 tablet by mouth daily  Dispense: 90 tablet; Refill: 1    2.  Chronic venous insufficiency  Continue compression stockings      Return in about 6 months (around 2023) for physical.       Lab Results   Component Value Date    WBC 6.2 2022    HGB 15.5 2022    HCT 46.2 2022    MCV 93.7 2022     2022       Lab Results   Component Value Date    HDL 65 (H) 2022     Lab Results   Component Value Date    LDLCALC 71 2022    LDLDIRECT 31 2020     Lab Results   Component Value Date    LABA1C 5.6 2021     Lab Results   Component Value Date    .0 2021     Lab Results   Component Value Date     2022    K 4.3 2022     2022    CO2 25 2022    BUN 29 (H) 2022    CREATININE 0.8 2022    GLUCOSE 101 (H) 2022    CALCIUM 10.2 2022    PROT 7.7 2022    LABALBU 5.3 (H) 2022    BILITOT 1.3 (H) 2022    ALKPHOS 120 2022    AST 19 2022    ALT 17 2022    LABGLOM >60 2022    GFRAA >60 2022    AGRATIO 2.2 2022         Subjective   SUBJECTIVE/OBJECTIVE:    HISTORY OF PRESENT ILLNESS:  This is a 69 y.o. 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     Varicose veins of lower extremities with complications, right     Status post endovenous radiofrequency ablation (RFA) of saphenous vein 07/05/2021    Varicose veins of left lower extremity with other complications 39/90/2710    Venous stasis ulcer of right lower leg with edema of right lower leg (HCC) 04/11/2021    Varicose veins of lower extremities with complications, bilateral 04/11/2021    Essential hypertension 03/01/2021    Acute blood loss anemia 10/14/2020    Iron deficiency 10/14/2020    Venous ulcer of left leg ([facility] [address] 09/26/2020    Leg ulcer ([facility] [address] 08/23/2013      CVI. Hx of ulcer-He has been released from the wound center. He is wearing compression stockings and this has helped  HTN- stable on metoprolol      Review of Systems   Constitutional:  Negative for diaphoresis and fever. Respiratory:  Negative for cough and shortness of breath. Cardiovascular:  Negative for chest pain and palpitations. Gastrointestinal:  Negative for abdominal pain and nausea. Genitourinary:  Negative for difficulty urinating. Neurological:  Negative for dizziness and headaches. No Known Allergies  Current Outpatient Medications   Medication Sig Dispense Refill    metoprolol succinate (TOPROL XL) 50 MG extended release tablet Take 1 tablet by mouth daily 90 tablet 1     No current facility-administered medications for this visit. Vitals:    12/05/22 1115   BP: 136/86   Pulse: 68   Resp: 16   SpO2: 98%   Weight: 180 lb 3.2 oz (81.7 kg)     Objective   Physical Exam  Vitals reviewed. Constitutional:       General: He is not in acute distress. Eyes:      Extraocular Movements: Extraocular movements intact. Cardiovascular:      Rate and Rhythm: Normal rate and regular rhythm. Pulmonary:      Effort: Pulmonary effort is normal. No respiratory distress. Breath sounds: Normal breath sounds. Abdominal:      Palpations: Abdomen is soft. Tenderness: There is no abdominal tenderness. Musculoskeletal:      Cervical back: Neck supple. Right lower leg: Edema present. Left lower leg: Edema present. Neurological:      Mental Status: He is alert and oriented to person, place, and time. Psychiatric:         Mood and Affect: Mood normal.              An electronic signature was used to authenticate this note. --Jcarlos Foster DO     This dictation was generated by voice recognition computer software. Although all attempts are made to edit the dictation for accuracy, there may be errors in the transcription that are not intended.

## 2023-01-24 DIAGNOSIS — I10 ESSENTIAL HYPERTENSION: ICD-10-CM

## 2023-01-24 RX ORDER — METOPROLOL SUCCINATE 50 MG/1
TABLET, EXTENDED RELEASE ORAL
Qty: 30 TABLET | Refills: 5 | Status: SHIPPED | OUTPATIENT
Start: 2023-01-24

## 2023-06-12 PROBLEM — L97.212 VENOUS STASIS ULCER OF RIGHT CALF WITH FAT LAYER EXPOSED (HCC): Status: RESOLVED | Noted: 2022-08-17 | Resolved: 2023-06-12

## 2023-06-12 PROBLEM — I83.891 VENOUS STASIS ULCER OF RIGHT LOWER LEG WITH EDEMA OF RIGHT LOWER LEG (HCC): Status: RESOLVED | Noted: 2021-04-11 | Resolved: 2023-06-12

## 2023-06-12 PROBLEM — L97.919 VENOUS STASIS ULCER OF RIGHT LOWER LEG WITH EDEMA OF RIGHT LOWER LEG (HCC): Status: RESOLVED | Noted: 2021-04-11 | Resolved: 2023-06-12

## 2023-06-12 PROBLEM — R60.0 VENOUS STASIS ULCER OF RIGHT LOWER LEG WITH EDEMA OF RIGHT LOWER LEG (HCC): Status: RESOLVED | Noted: 2021-04-11 | Resolved: 2023-06-12

## 2023-06-12 PROBLEM — I83.012 VENOUS STASIS ULCER OF RIGHT CALF WITH FAT LAYER EXPOSED (HCC): Status: RESOLVED | Noted: 2022-08-17 | Resolved: 2023-06-12

## 2023-06-12 PROBLEM — I83.019 VENOUS STASIS ULCER OF RIGHT LOWER LEG WITH EDEMA OF RIGHT LOWER LEG (HCC): Status: RESOLVED | Noted: 2021-04-11 | Resolved: 2023-06-12

## 2024-01-02 ENCOUNTER — TELEPHONE (OUTPATIENT)
Dept: INTERNAL MEDICINE CLINIC | Age: 79
End: 2024-01-02

## 2024-01-02 DIAGNOSIS — I10 ESSENTIAL HYPERTENSION: ICD-10-CM

## 2024-01-02 RX ORDER — METOPROLOL SUCCINATE 50 MG/1
50 TABLET, EXTENDED RELEASE ORAL DAILY
Qty: 90 TABLET | Refills: 1 | Status: SHIPPED | OUTPATIENT
Start: 2024-01-02

## 2024-01-02 NOTE — TELEPHONE ENCOUNTER
Got a call from the patients spouse, stated he needs refill for metoprolol and needs it sent to [facility].

## 2024-06-27 ENCOUNTER — OFFICE VISIT (OUTPATIENT)
Dept: INTERNAL MEDICINE CLINIC | Age: 79
End: 2024-06-27

## 2024-06-27 VITALS
WEIGHT: 160.6 LBS | SYSTOLIC BLOOD PRESSURE: 126 MMHG | HEIGHT: 75 IN | BODY MASS INDEX: 19.97 KG/M2 | OXYGEN SATURATION: 99 % | HEART RATE: 75 BPM | DIASTOLIC BLOOD PRESSURE: 82 MMHG

## 2024-06-27 DIAGNOSIS — I10 ESSENTIAL HYPERTENSION: ICD-10-CM

## 2024-06-27 DIAGNOSIS — Z00.00 MEDICARE ANNUAL WELLNESS VISIT, SUBSEQUENT: Primary | ICD-10-CM

## 2024-06-27 DIAGNOSIS — I87.2 CHRONIC VENOUS INSUFFICIENCY: ICD-10-CM

## 2024-06-27 DIAGNOSIS — R73.03 PREDIABETES: ICD-10-CM

## 2024-06-27 DIAGNOSIS — R63.4 WEIGHT LOSS: ICD-10-CM

## 2024-06-27 DIAGNOSIS — R41.3 MILD MEMORY DISTURBANCE: ICD-10-CM

## 2024-06-27 LAB — ERYTHROCYTE [SEDIMENTATION RATE] IN BLOOD BY WESTERGREN METHOD: 24 MM/HR (ref 0–20)

## 2024-06-27 RX ORDER — METOPROLOL SUCCINATE 50 MG/1
50 TABLET, EXTENDED RELEASE ORAL DAILY
Qty: 90 TABLET | Refills: 1 | Status: SHIPPED | OUTPATIENT
Start: 2024-06-27

## 2024-06-27 SDOH — ECONOMIC STABILITY: HOUSING INSECURITY
IN THE LAST 12 MONTHS, WAS THERE A TIME WHEN YOU DID NOT HAVE A STEADY PLACE TO SLEEP OR SLEPT IN A SHELTER (INCLUDING NOW)?: NO

## 2024-06-27 SDOH — ECONOMIC STABILITY: FOOD INSECURITY: WITHIN THE PAST 12 MONTHS, THE FOOD YOU BOUGHT JUST DIDN'T LAST AND YOU DIDN'T HAVE MONEY TO GET MORE.: SOMETIMES TRUE

## 2024-06-27 SDOH — ECONOMIC STABILITY: INCOME INSECURITY: HOW HARD IS IT FOR YOU TO PAY FOR THE VERY BASICS LIKE FOOD, HOUSING, MEDICAL CARE, AND HEATING?: SOMEWHAT HARD

## 2024-06-27 SDOH — ECONOMIC STABILITY: FOOD INSECURITY: WITHIN THE PAST 12 MONTHS, YOU WORRIED THAT YOUR FOOD WOULD RUN OUT BEFORE YOU GOT MONEY TO BUY MORE.: SOMETIMES TRUE

## 2024-06-27 ASSESSMENT — PATIENT HEALTH QUESTIONNAIRE - PHQ9
1. LITTLE INTEREST OR PLEASURE IN DOING THINGS: NOT AT ALL
2. FEELING DOWN, DEPRESSED OR HOPELESS: NOT AT ALL
SUM OF ALL RESPONSES TO PHQ QUESTIONS 1-9: 0
SUM OF ALL RESPONSES TO PHQ9 QUESTIONS 1 & 2: 0
SUM OF ALL RESPONSES TO PHQ QUESTIONS 1-9: 0

## 2024-06-27 NOTE — PROGRESS NOTES
Medicare Annual Wellness Visit    Fuentes Daley is here for Follow-up and Medicare AWV    Assessment & Plan   Medicare annual wellness visit, subsequent  Chronic venous insufficiency  Continue to wear kingsley hose  Essential hypertension  -     metoprolol succinate (TOPROL XL) 50 MG extended release tablet; Take 1 tablet by mouth daily, Disp-90 tablet, R-1Normal  -     CBC with Auto Differential  -     Comprehensive Metabolic Panel  Prediabetes  -     Hemoglobin A1C  Weight loss  -     CBC with Auto Differential  -     Comprehensive Metabolic Panel  -     TSH with Reflex  -     Sedimentation Rate  He feels this is due to his dietary change  Mild memory disturbance  -     CBC with Auto Differential  -     Comprehensive Metabolic Panel  -     TSH with Reflex  -     Vitamin B12 & Folate  Medications reconciled and discussed with the patient  Recommendations for Preventive Services Due: see orders and patient instructions/AVS.  Recommended screening schedule for the next 5-10 years is provided to the patient in written form: see Patient Instructions/AVS.     Return in about 6 months (around 12/27/2024) for HTN.     Subjective   The following acute and/or chronic problems were also addressed today:      HTN- on metoprolol 50 mg  CVI- wearing kingsley hose  Prediabetes  Wt loss. He states he was drinking Pepsi a lot and he has cut back on th soda. He cut out sweets. He still has a good appetite  No night sweats or fatigue    Patient's complete Health Risk Assessment and screening values have been reviewed and are found in Flowsheets. The following problems were reviewed today and where indicated follow up appointments were made and/or referrals ordered.    Positive Risk Factor Screenings with Interventions:       Cognitive:   Clock Drawing Test (CDT): (!) Abnormal  Words recalled: 2 Words Recalled  Total Score: (!) 2  Total Score Interpretation: Abnormal Mini-Cog  Interventions:  MMSE 25/30  Only mild STM disturbance

## 2024-06-27 NOTE — PATIENT INSTRUCTIONS
little salt when you think you need it. With time, your taste buds will adjust to less salt.     Eat fewer snack items, fast foods, canned soups, and other high-salt, high-fat, processed foods.     Read food labels and try to avoid saturated and trans fats. They increase your risk of heart disease by raising cholesterol levels.     Limit the amount of solid fat--butter, margarine, and shortening--you eat. Use olive, peanut, or canola oil when you cook. Bake, broil, and steam foods instead of frying them.     Eat a variety of fruit and vegetables every day. Dark green, deep orange, red, or yellow fruits and vegetables are especially good for you. Examples include spinach, carrots, peaches, and berries.     Foods high in fiber can reduce your cholesterol and provide important vitamins and minerals. High-fiber foods include whole-grain cereals and breads, oatmeal, beans, brown rice, citrus fruits, and apples.     Eat lean proteins. Heart-healthy proteins include seafood, lean meats and poultry, eggs, beans, peas, nuts, seeds, and soy products.     Limit drinks and foods with added sugar. These include candy, desserts, and soda pop.   Heart-healthy lifestyle    If your doctor recommends it, get more exercise. For many people, walking is a good choice. Or you may want to swim, bike, or do other activities. Bit by bit, increase the time you're active every day. Try for at least 30 minutes on most days of the week.     Try to quit or cut back on using tobacco and other nicotine products. This includes smoking and vaping. If you need help quitting, talk to your doctor about stop-smoking programs and medicines. These can increase your chances of quitting for good. Quitting is one of the most important things you can do to protect your heart. It is never too late to quit. Try to avoid secondhand smoke too.     Stay at a weight that's healthy for you. Talk to your doctor if you need help losing weight.     Try to get 7 to 9

## 2024-06-28 LAB
ALBUMIN SERPL-MCNC: 4.4 G/DL (ref 3.4–5)
ALBUMIN/GLOB SERPL: 1.6 {RATIO} (ref 1.1–2.2)
ALP SERPL-CCNC: 122 U/L (ref 40–129)
ALT SERPL-CCNC: 11 U/L (ref 10–40)
ANION GAP SERPL CALCULATED.3IONS-SCNC: 13 MMOL/L (ref 3–16)
AST SERPL-CCNC: 14 U/L (ref 15–37)
BASOPHILS # BLD: 0.1 K/UL (ref 0–0.2)
BASOPHILS NFR BLD: 0.6 %
BILIRUB SERPL-MCNC: 1.3 MG/DL (ref 0–1)
BUN SERPL-MCNC: 17 MG/DL (ref 7–20)
CALCIUM SERPL-MCNC: 9.8 MG/DL (ref 8.3–10.6)
CHLORIDE SERPL-SCNC: 102 MMOL/L (ref 99–110)
CO2 SERPL-SCNC: 27 MMOL/L (ref 21–32)
CREAT SERPL-MCNC: 0.7 MG/DL (ref 0.8–1.3)
DEPRECATED RDW RBC AUTO: 13.2 % (ref 12.4–15.4)
EOSINOPHIL # BLD: 0.4 K/UL (ref 0–0.6)
EOSINOPHIL NFR BLD: 4.9 %
EST. AVERAGE GLUCOSE BLD GHB EST-MCNC: 111.2 MG/DL
FOLATE SERPL-MCNC: 6.2 NG/ML (ref 4.78–24.2)
GFR SERPLBLD CREATININE-BSD FMLA CKD-EPI: >90 ML/MIN/{1.73_M2}
GLUCOSE SERPL-MCNC: 105 MG/DL (ref 70–99)
HBA1C MFR BLD: 5.5 %
HCT VFR BLD AUTO: 42.4 % (ref 40.5–52.5)
HGB BLD-MCNC: 14.3 G/DL (ref 13.5–17.5)
LYMPHOCYTES # BLD: 1.2 K/UL (ref 1–5.1)
LYMPHOCYTES NFR BLD: 14.6 %
MCH RBC QN AUTO: 31.4 PG (ref 26–34)
MCHC RBC AUTO-ENTMCNC: 33.7 G/DL (ref 31–36)
MCV RBC AUTO: 93.4 FL (ref 80–100)
MONOCYTES # BLD: 0.6 K/UL (ref 0–1.3)
MONOCYTES NFR BLD: 7.7 %
NEUTROPHILS # BLD: 6 K/UL (ref 1.7–7.7)
NEUTROPHILS NFR BLD: 72.2 %
PLATELET # BLD AUTO: 220 K/UL (ref 135–450)
PMV BLD AUTO: 9.2 FL (ref 5–10.5)
POTASSIUM SERPL-SCNC: 5.1 MMOL/L (ref 3.5–5.1)
PROT SERPL-MCNC: 7.1 G/DL (ref 6.4–8.2)
RBC # BLD AUTO: 4.54 M/UL (ref 4.2–5.9)
SODIUM SERPL-SCNC: 142 MMOL/L (ref 136–145)
TSH SERPL DL<=0.005 MIU/L-ACNC: 2.64 UIU/ML (ref 0.27–4.2)
VIT B12 SERPL-MCNC: 519 PG/ML (ref 211–911)
WBC # BLD AUTO: 8.3 K/UL (ref 4–11)

## 2024-09-27 ENCOUNTER — HOSPITAL ENCOUNTER (OUTPATIENT)
Dept: WOUND CARE | Age: 79
Discharge: HOME | End: 2024-09-27
Payer: COMMERCIAL

## 2024-09-27 VITALS
TEMPERATURE: 97.4 F | SYSTOLIC BLOOD PRESSURE: 144 MMHG | DIASTOLIC BLOOD PRESSURE: 83 MMHG | RESPIRATION RATE: 16 BRPM | HEART RATE: 69 BPM

## 2024-09-27 DIAGNOSIS — I87.332 CHRONIC VENOUS HYPERTENSION WITH ULCER AND INFLAMMATION INVOLVING LEFT SIDE (HCC): ICD-10-CM

## 2024-09-27 DIAGNOSIS — L97.212 VENOUS STASIS ULCER OF RIGHT CALF WITH FAT LAYER EXPOSED, UNSPECIFIED WHETHER VARICOSE VEINS PRESENT (HCC): Primary | ICD-10-CM

## 2024-09-27 DIAGNOSIS — I83.012 VENOUS STASIS ULCER OF RIGHT CALF WITH FAT LAYER EXPOSED, UNSPECIFIED WHETHER VARICOSE VEINS PRESENT (HCC): Primary | ICD-10-CM

## 2024-09-27 PROCEDURE — 11045 DBRDMT SUBQ TISS EACH ADDL: CPT

## 2024-09-27 PROCEDURE — 87205 SMEAR GRAM STAIN: CPT

## 2024-09-27 PROCEDURE — 87070 CULTURE OTHR SPECIMN AEROBIC: CPT

## 2024-09-27 PROCEDURE — 87077 CULTURE AEROBIC IDENTIFY: CPT

## 2024-09-27 PROCEDURE — 2500000003 HC RX 250 WO HCPCS: Performed by: NURSE PRACTITIONER

## 2024-09-27 PROCEDURE — 99213 OFFICE O/P EST LOW 20 MIN: CPT

## 2024-09-27 PROCEDURE — 11042 DBRDMT SUBQ TIS 1ST 20SQCM/<: CPT

## 2024-09-27 RX ORDER — LIDOCAINE HYDROCHLORIDE 40 MG/ML
SOLUTION TOPICAL ONCE
Status: CANCELLED | OUTPATIENT
Start: 2024-09-27 | End: 2024-09-27

## 2024-09-27 RX ORDER — BACITRACIN ZINC 500 [USP'U]/G
OINTMENT TOPICAL ONCE
OUTPATIENT
Start: 2024-09-27 | End: 2024-09-27

## 2024-09-27 RX ORDER — LIDOCAINE HYDROCHLORIDE 20 MG/ML
JELLY TOPICAL ONCE
Status: CANCELLED | OUTPATIENT
Start: 2024-09-27 | End: 2024-09-27

## 2024-09-27 RX ORDER — NEOMYCIN/BACITRACIN/POLYMYXINB 3.5-400-5K
OINTMENT (GRAM) TOPICAL ONCE
Status: CANCELLED | OUTPATIENT
Start: 2024-09-27 | End: 2024-09-27

## 2024-09-27 RX ORDER — BACITRACIN ZINC AND POLYMYXIN B SULFATE 500; 1000 [USP'U]/G; [USP'U]/G
OINTMENT TOPICAL ONCE
Status: CANCELLED | OUTPATIENT
Start: 2024-09-27 | End: 2024-09-27

## 2024-09-27 RX ORDER — CLOBETASOL PROPIONATE 0.5 MG/G
OINTMENT TOPICAL ONCE
Status: CANCELLED | OUTPATIENT
Start: 2024-09-27 | End: 2024-09-27

## 2024-09-27 RX ORDER — TRIAMCINOLONE ACETONIDE 1 MG/G
OINTMENT TOPICAL ONCE
Status: CANCELLED | OUTPATIENT
Start: 2024-09-27 | End: 2024-09-27

## 2024-09-27 RX ORDER — LIDOCAINE HYDROCHLORIDE 20 MG/ML
JELLY TOPICAL ONCE
OUTPATIENT
Start: 2024-09-27 | End: 2024-09-27

## 2024-09-27 RX ORDER — MUPIROCIN 20 MG/G
OINTMENT TOPICAL ONCE
OUTPATIENT
Start: 2024-09-27 | End: 2024-09-27

## 2024-09-27 RX ORDER — BETAMETHASONE DIPROPIONATE 0.5 MG/G
CREAM TOPICAL ONCE
OUTPATIENT
Start: 2024-09-27 | End: 2024-09-27

## 2024-09-27 RX ORDER — SODIUM CHLOR/HYPOCHLOROUS ACID 0.033 %
SOLUTION, IRRIGATION IRRIGATION ONCE
OUTPATIENT
Start: 2024-09-27 | End: 2024-09-27

## 2024-09-27 RX ORDER — LIDOCAINE HYDROCHLORIDE 40 MG/ML
SOLUTION TOPICAL ONCE
OUTPATIENT
Start: 2024-09-27 | End: 2024-09-27

## 2024-09-27 RX ORDER — SILVER SULFADIAZINE 10 MG/G
CREAM TOPICAL ONCE
OUTPATIENT
Start: 2024-09-27 | End: 2024-09-27

## 2024-09-27 RX ORDER — CLOBETASOL PROPIONATE 0.5 MG/G
OINTMENT TOPICAL ONCE
OUTPATIENT
Start: 2024-09-27 | End: 2024-09-27

## 2024-09-27 RX ORDER — SODIUM CHLOR/HYPOCHLOROUS ACID 0.033 %
SOLUTION, IRRIGATION IRRIGATION ONCE
Status: CANCELLED | OUTPATIENT
Start: 2024-09-27 | End: 2024-09-27

## 2024-09-27 RX ORDER — NEOMYCIN/BACITRACIN/POLYMYXINB 3.5-400-5K
OINTMENT (GRAM) TOPICAL ONCE
OUTPATIENT
Start: 2024-09-27 | End: 2024-09-27

## 2024-09-27 RX ORDER — LIDOCAINE 40 MG/G
CREAM TOPICAL ONCE
Status: CANCELLED | OUTPATIENT
Start: 2024-09-27 | End: 2024-09-27

## 2024-09-27 RX ORDER — LIDOCAINE 40 MG/G
CREAM TOPICAL ONCE
OUTPATIENT
Start: 2024-09-27 | End: 2024-09-27

## 2024-09-27 RX ORDER — SILVER SULFADIAZINE 10 MG/G
CREAM TOPICAL ONCE
Status: COMPLETED | OUTPATIENT
Start: 2024-09-27 | End: 2024-09-27

## 2024-09-27 RX ORDER — TRIAMCINOLONE ACETONIDE 1 MG/G
OINTMENT TOPICAL ONCE
OUTPATIENT
Start: 2024-09-27 | End: 2024-09-27

## 2024-09-27 RX ORDER — BACITRACIN ZINC AND POLYMYXIN B SULFATE 500; 1000 [USP'U]/G; [USP'U]/G
OINTMENT TOPICAL ONCE
OUTPATIENT
Start: 2024-09-27 | End: 2024-09-27

## 2024-09-27 RX ORDER — BACITRACIN ZINC 500 [USP'U]/G
OINTMENT TOPICAL ONCE
Status: CANCELLED | OUTPATIENT
Start: 2024-09-27 | End: 2024-09-27

## 2024-09-27 RX ORDER — LIDOCAINE 50 MG/G
OINTMENT TOPICAL ONCE
Status: CANCELLED | OUTPATIENT
Start: 2024-09-27 | End: 2024-09-27

## 2024-09-27 RX ORDER — BETAMETHASONE DIPROPIONATE 0.5 MG/G
CREAM TOPICAL ONCE
Status: CANCELLED | OUTPATIENT
Start: 2024-09-27 | End: 2024-09-27

## 2024-09-27 RX ORDER — LIDOCAINE 50 MG/G
OINTMENT TOPICAL ONCE
OUTPATIENT
Start: 2024-09-27 | End: 2024-09-27

## 2024-09-27 RX ORDER — GENTAMICIN SULFATE 1 MG/G
OINTMENT TOPICAL ONCE
Status: CANCELLED | OUTPATIENT
Start: 2024-09-27 | End: 2024-09-27

## 2024-09-27 RX ORDER — MUPIROCIN 20 MG/G
OINTMENT TOPICAL ONCE
Status: CANCELLED | OUTPATIENT
Start: 2024-09-27 | End: 2024-09-27

## 2024-09-27 RX ORDER — GENTAMICIN SULFATE 1 MG/G
OINTMENT TOPICAL ONCE
OUTPATIENT
Start: 2024-09-27 | End: 2024-09-27

## 2024-09-27 RX ADMIN — SILVER SULFADIAZINE: 10 CREAM TOPICAL at 12:14

## 2024-09-29 LAB
MICROORGANISM SPEC CULT: ABNORMAL
MICROORGANISM/AGENT SPEC: ABNORMAL
SPECIMEN DESCRIPTION: ABNORMAL

## 2024-09-30 LAB
MICROORGANISM SPEC CULT: ABNORMAL
SPECIMEN DESCRIPTION: ABNORMAL

## 2024-10-04 ENCOUNTER — HOSPITAL ENCOUNTER (OUTPATIENT)
Dept: WOUND CARE | Age: 79
Discharge: HOME | End: 2024-10-04
Attending: NURSE PRACTITIONER
Payer: COMMERCIAL

## 2024-10-04 VITALS
SYSTOLIC BLOOD PRESSURE: 142 MMHG | HEART RATE: 65 BPM | RESPIRATION RATE: 16 BRPM | DIASTOLIC BLOOD PRESSURE: 75 MMHG | TEMPERATURE: 96.6 F

## 2024-10-04 DIAGNOSIS — L97.212 VENOUS STASIS ULCER OF RIGHT CALF WITH FAT LAYER EXPOSED, UNSPECIFIED WHETHER VARICOSE VEINS PRESENT (HCC): Primary | ICD-10-CM

## 2024-10-04 DIAGNOSIS — I83.012 VENOUS STASIS ULCER OF RIGHT CALF WITH FAT LAYER EXPOSED, UNSPECIFIED WHETHER VARICOSE VEINS PRESENT (HCC): Primary | ICD-10-CM

## 2024-10-04 PROCEDURE — 11042 DBRDMT SUBQ TIS 1ST 20SQCM/<: CPT

## 2024-10-04 PROCEDURE — 11045 DBRDMT SUBQ TISS EACH ADDL: CPT

## 2024-10-04 PROCEDURE — 6370000000 HC RX 637 (ALT 250 FOR IP): Performed by: NURSE PRACTITIONER

## 2024-10-04 PROCEDURE — 2500000003 HC RX 250 WO HCPCS: Performed by: NURSE PRACTITIONER

## 2024-10-04 RX ORDER — BACITRACIN ZINC AND POLYMYXIN B SULFATE 500; 1000 [USP'U]/G; [USP'U]/G
OINTMENT TOPICAL ONCE
OUTPATIENT
Start: 2024-10-04 | End: 2024-10-04

## 2024-10-04 RX ORDER — CLOBETASOL PROPIONATE 0.5 MG/G
OINTMENT TOPICAL ONCE
OUTPATIENT
Start: 2024-10-04 | End: 2024-10-04

## 2024-10-04 RX ORDER — SODIUM CHLOR/HYPOCHLOROUS ACID 0.033 %
SOLUTION, IRRIGATION IRRIGATION ONCE
OUTPATIENT
Start: 2024-10-04 | End: 2024-10-04

## 2024-10-04 RX ORDER — BACITRACIN ZINC 500 [USP'U]/G
OINTMENT TOPICAL ONCE
OUTPATIENT
Start: 2024-10-04 | End: 2024-10-04

## 2024-10-04 RX ORDER — CLOBETASOL PROPIONATE 0.5 MG/G
OINTMENT TOPICAL ONCE
Status: COMPLETED | OUTPATIENT
Start: 2024-10-04 | End: 2024-10-04

## 2024-10-04 RX ORDER — LIDOCAINE HYDROCHLORIDE 40 MG/ML
SOLUTION TOPICAL ONCE
OUTPATIENT
Start: 2024-10-04 | End: 2024-10-04

## 2024-10-04 RX ORDER — SILVER SULFADIAZINE 10 MG/G
CREAM TOPICAL ONCE
Status: COMPLETED | OUTPATIENT
Start: 2024-10-04 | End: 2024-10-04

## 2024-10-04 RX ORDER — LEVOFLOXACIN 750 MG/1
750 TABLET, FILM COATED ORAL DAILY
Qty: 10 TABLET | Refills: 0 | Status: SHIPPED | OUTPATIENT
Start: 2024-10-04 | End: 2024-10-14

## 2024-10-04 RX ORDER — NEOMYCIN/BACITRACIN/POLYMYXINB 3.5-400-5K
OINTMENT (GRAM) TOPICAL ONCE
OUTPATIENT
Start: 2024-10-04 | End: 2024-10-04

## 2024-10-04 RX ORDER — BETAMETHASONE DIPROPIONATE 0.5 MG/G
CREAM TOPICAL ONCE
OUTPATIENT
Start: 2024-10-04 | End: 2024-10-04

## 2024-10-04 RX ORDER — MUPIROCIN 20 MG/G
OINTMENT TOPICAL ONCE
OUTPATIENT
Start: 2024-10-04 | End: 2024-10-04

## 2024-10-04 RX ORDER — LIDOCAINE HYDROCHLORIDE 20 MG/ML
JELLY TOPICAL ONCE
OUTPATIENT
Start: 2024-10-04 | End: 2024-10-04

## 2024-10-04 RX ORDER — LIDOCAINE 50 MG/G
OINTMENT TOPICAL ONCE
OUTPATIENT
Start: 2024-10-04 | End: 2024-10-04

## 2024-10-04 RX ORDER — LIDOCAINE 40 MG/G
CREAM TOPICAL ONCE
OUTPATIENT
Start: 2024-10-04 | End: 2024-10-04

## 2024-10-04 RX ORDER — GENTAMICIN SULFATE 1 MG/G
OINTMENT TOPICAL ONCE
OUTPATIENT
Start: 2024-10-04 | End: 2024-10-04

## 2024-10-04 RX ORDER — TRIAMCINOLONE ACETONIDE 1 MG/G
OINTMENT TOPICAL ONCE
OUTPATIENT
Start: 2024-10-04 | End: 2024-10-04

## 2024-10-04 RX ORDER — SILVER SULFADIAZINE 10 MG/G
CREAM TOPICAL ONCE
OUTPATIENT
Start: 2024-10-04 | End: 2024-10-04

## 2024-10-04 RX ADMIN — SILVER SULFADIAZINE: 10 CREAM TOPICAL at 11:09

## 2024-10-04 RX ADMIN — CLOBETASOL PROPIONATE: 0.5 OINTMENT TOPICAL at 11:09

## 2024-10-04 NOTE — PROGRESS NOTES
Multilayer Compression Wrap   (Not Unna) Below the Knee    NAME:  Fuentes Daley  YOB: 1945  MEDICAL RECORD NUMBER:  [id number]  DATE:  10/4/2024    Multilayer compression wrap: Removed old Multilayer wrap if indicated and wash leg with mild soap/water.  Applied moisturizing agent to dry skin as needed.   Applied primary and secondary dressing as ordered.  Applied multilayered dressing below the knee to right lower leg.  Instructed patient/caregiver not to remove dressing and to keep it clean and dry.   Instructed patient/caregiver on complications to report to provider, such as pain, numbness in toes, heavy drainage, and slippage of dressing.  Instructed patient on purpose of compression dressing and on activity and exercise recommendations.      Electronically signed by Yolis Carreon LPN on 10/4/2024 at 11:08 AM  
subcutaneous tissue.        Devitalized Tissue Debrided:  fibrin, biofilm, slough, necrotic/eschar, and exudate    Pre Debridement Measurements:  Are located in the Wound Documentation Flow Sheet    All active wounds listed below with today's date are evaluated  Wound(s)    debrided this date include # : 1     Post  Debridement Measurements:  Wound 07/29/13 Venous ulcer Ankle Right;Lateral (Active)   Number of days: 4085       Wound 09/27/24 Pretibial Right;Medial #1 (Active)   Wound Image   09/27/24 0942   Wound Etiology Venous 09/27/24 0942   Dressing Status New dressing applied 09/27/24 1210   Wound Cleansed Wound cleanser;Soap and water 10/04/24 1028   Offloading for Diabetic Foot Ulcers Offloading not required 10/04/24 1028   Wound Length (cm) 16.5 cm 10/04/24 1028   Wound Width (cm) 11.2 cm 10/04/24 1028   Wound Depth (cm) 0 cm 10/04/24 1028   Wound Surface Area (cm^2) 184.8 cm^2 10/04/24 1028   Change in Wound Size % (l*w) -24.44 10/04/24 1028   Wound Volume (cm^3) 0 cm^3 10/04/24 1028   Wound Healing % 100 10/04/24 1028   Post-Procedure Length (cm) 16.5 cm 10/04/24 1047   Post-Procedure Width (cm) 11.2 cm 10/04/24 1047   Post-Procedure Depth (cm) 0.1 cm 10/04/24 1047   Post-Procedure Surface Area (cm^2) 184.8 cm^2 10/04/24 1047   Post-Procedure Volume (cm^3) 18.48 cm^3 10/04/24 1047   Distance Tunneling (cm) 0 cm 10/04/24 1028   Tunneling Position ___ O'Clock 0 10/04/24 1028   Undermining Starts ___ O'Clock 0 10/04/24 1028   Undermining Ends___ O'Clock 0 10/04/24 1028   Undermining Maxium Distance (cm) 0 10/04/24 1028   Wound Assessment Pink/red;Slough 10/04/24 1028   Drainage Amount Copious (>75 % saturated) 10/04/24 1028   Drainage Description Yellow;Green;Serosanguinous;Brown 10/04/24 1028   Odor Malodorous/putrid 10/04/24 1028   Cassia-wound Assessment Blanchable erythema;Maceration 10/04/24 1028   Margins Defined edges 10/04/24 1028   Wound Thickness Description not for Pressure Injury Full thickness

## 2024-10-04 NOTE — PATIENT INSTRUCTIONS
PHYSICIAN ORDERS AND DISCHARGE INSTRUCTIONS    Wound cleansing:     Do not scrub or use excessive force.   Wash hands with soap and water before and after dressing changes.   Prior to applying a clean dressing, cleanse wound with normal saline,               wound cleanser, or mild soap and water.    Ask the physician or nurse before getting the wound(s) wet in a shower      Compression Wraps  You’re leg(s) have been placed into compression wraps to control swelling and to speed the healing process of your wounds. Wraps can stay in place up to one week depending on the drainage from your wound. Sometimes it may be necessary to change the dressing more than once per week, the doctor will determine the frequency.    Elevate your legs, lower if painful.  Activity as tolerated in the wraps- but it is a good idea to elevate your legs for 20 min intervals throughout the day when sitting.  Keep wraps clean and dry- notify clinic if they get wet in the shower or for any other reason.   Never attempt to dry with any heat source. Burns can occur (especially with diabetics)  Wraps will wrinkle a little and get looser throughout the week. If they slip more than 3 inches at the top or are causing you pain- please call!  Do NOT stick objects inside wraps to scratch.   Report to provider, such as pain, numbness in toes, heavy drainage, and slippage of dressing.  Monitor for impaired circulation including pale, cool or numb extremities distal to the wrap or garment.   If supplies are not available please DO NOT remove wraps until next visit to Wound Care Center  Never cut your wraps with scissors or other sharp objects. If they feel too tight- follow these steps until pain is relieved  1) Elevate first for at least 30 min  2) Take pain meds as prescribed  3) Remove outer brown layer of wrap  4) Remove entire wrap if needed  5) Notify clinic anytime you are having problems or remove wrap    Wound Care Notes:  Grafts:

## 2024-10-18 ENCOUNTER — HOSPITAL ENCOUNTER (OUTPATIENT)
Dept: WOUND CARE | Age: 79
Discharge: HOME | End: 2024-10-18
Attending: NURSE PRACTITIONER
Payer: COMMERCIAL

## 2024-10-18 VITALS
SYSTOLIC BLOOD PRESSURE: 156 MMHG | TEMPERATURE: 97.8 F | RESPIRATION RATE: 16 BRPM | HEART RATE: 71 BPM | DIASTOLIC BLOOD PRESSURE: 84 MMHG

## 2024-10-18 DIAGNOSIS — L97.212 VENOUS STASIS ULCER OF RIGHT CALF WITH FAT LAYER EXPOSED, UNSPECIFIED WHETHER VARICOSE VEINS PRESENT (HCC): Primary | ICD-10-CM

## 2024-10-18 DIAGNOSIS — I83.012 VENOUS STASIS ULCER OF RIGHT CALF WITH FAT LAYER EXPOSED, UNSPECIFIED WHETHER VARICOSE VEINS PRESENT (HCC): Primary | ICD-10-CM

## 2024-10-18 PROCEDURE — 97597 DBRDMT OPN WND 1ST 20 CM/<: CPT | Performed by: NURSE PRACTITIONER

## 2024-10-18 PROCEDURE — 97598 DBRDMT OPN WND ADDL 20CM/<: CPT

## 2024-10-18 PROCEDURE — 2500000003 HC RX 250 WO HCPCS: Performed by: NURSE PRACTITIONER

## 2024-10-18 PROCEDURE — 97598 DBRDMT OPN WND ADDL 20CM/<: CPT | Performed by: NURSE PRACTITIONER

## 2024-10-18 PROCEDURE — 97597 DBRDMT OPN WND 1ST 20 CM/<: CPT

## 2024-10-18 RX ORDER — TRIAMCINOLONE ACETONIDE 1 MG/G
OINTMENT TOPICAL ONCE
OUTPATIENT
Start: 2024-10-18 | End: 2024-10-18

## 2024-10-18 RX ORDER — LIDOCAINE 40 MG/G
CREAM TOPICAL ONCE
OUTPATIENT
Start: 2024-10-18 | End: 2024-10-18

## 2024-10-18 RX ORDER — SODIUM CHLOR/HYPOCHLOROUS ACID 0.033 %
SOLUTION, IRRIGATION IRRIGATION ONCE
OUTPATIENT
Start: 2024-10-18 | End: 2024-10-18

## 2024-10-18 RX ORDER — CLOBETASOL PROPIONATE 0.5 MG/G
OINTMENT TOPICAL ONCE
OUTPATIENT
Start: 2024-10-18 | End: 2024-10-18

## 2024-10-18 RX ORDER — SILVER SULFADIAZINE 10 MG/G
CREAM TOPICAL ONCE
OUTPATIENT
Start: 2024-10-18 | End: 2024-10-18

## 2024-10-18 RX ORDER — BACITRACIN ZINC AND POLYMYXIN B SULFATE 500; 1000 [USP'U]/G; [USP'U]/G
OINTMENT TOPICAL ONCE
OUTPATIENT
Start: 2024-10-18 | End: 2024-10-18

## 2024-10-18 RX ORDER — LIDOCAINE HYDROCHLORIDE 40 MG/ML
SOLUTION TOPICAL ONCE
OUTPATIENT
Start: 2024-10-18 | End: 2024-10-18

## 2024-10-18 RX ORDER — MUPIROCIN 20 MG/G
OINTMENT TOPICAL ONCE
OUTPATIENT
Start: 2024-10-18 | End: 2024-10-18

## 2024-10-18 RX ORDER — BETAMETHASONE DIPROPIONATE 0.5 MG/G
CREAM TOPICAL ONCE
OUTPATIENT
Start: 2024-10-18 | End: 2024-10-18

## 2024-10-18 RX ORDER — LIDOCAINE 50 MG/G
OINTMENT TOPICAL ONCE
OUTPATIENT
Start: 2024-10-18 | End: 2024-10-18

## 2024-10-18 RX ORDER — SILVER SULFADIAZINE 10 MG/G
CREAM TOPICAL ONCE
Status: COMPLETED | OUTPATIENT
Start: 2024-10-18 | End: 2024-10-18

## 2024-10-18 RX ORDER — BACITRACIN ZINC 500 [USP'U]/G
OINTMENT TOPICAL ONCE
OUTPATIENT
Start: 2024-10-18 | End: 2024-10-18

## 2024-10-18 RX ORDER — LIDOCAINE HYDROCHLORIDE 20 MG/ML
JELLY TOPICAL ONCE
OUTPATIENT
Start: 2024-10-18 | End: 2024-10-18

## 2024-10-18 RX ORDER — CLOBETASOL PROPIONATE 0.5 MG/G
1 OINTMENT TOPICAL ONCE
Status: COMPLETED | OUTPATIENT
Start: 2024-10-18 | End: 2024-10-18

## 2024-10-18 RX ORDER — GENTAMICIN SULFATE 1 MG/G
OINTMENT TOPICAL ONCE
OUTPATIENT
Start: 2024-10-18 | End: 2024-10-18

## 2024-10-18 RX ORDER — NEOMYCIN/BACITRACIN/POLYMYXINB 3.5-400-5K
OINTMENT (GRAM) TOPICAL ONCE
OUTPATIENT
Start: 2024-10-18 | End: 2024-10-18

## 2024-10-18 RX ADMIN — SILVER SULFADIAZINE: 10 CREAM TOPICAL at 11:29

## 2024-10-18 RX ADMIN — CLOBETASOL PROPIONATE 1 EACH: 0.5 OINTMENT TOPICAL at 11:30

## 2024-10-18 NOTE — PROGRESS NOTES
Wound Care Center Progress Note With Procedure    Fuentes Daley  AGE: 79 y.o.   GENDER: male  : 1945  EPISODE DATE:  10/18/2024     Subjective:     Chief Complaint   Patient presents with    Wound Check     RLE          HISTORY of PRESENT ILLNESS     Fuentes Daley is a 79 y.o. male who presents to the Wound Clinic for a visit for evaluation and treatment of Chronic venous  ulcer(s) of  R lower leg medial, R lower leg lateral, R lower leg anterior, R lower leg posterior.  The condition is of moderate severity. The ulcer has been present for several monthys .  The underlying cause is thought to be venous stasis.  The patients care to date has included care at home from wife. The patient has significant underlying medical conditions as below.     10-: Patient finished antibiotics and looking much better. Home health coming out and they have everything they need  Expecting a long course     10-4-2024: Patient looking bad. Sending antibiotics to pharmacy they can pick these up next Wednesday. Needs to to go ER if worsens.  Home health established and we will have them see patient next Friday and follow up 2 weeks      Will set up home care for next week     Culture drawn and will review when resulted. Patient states they do not have money for antibiotics until the  of next month     Patient is not a diabetic or a smoker  Not on a blood thinner     Wound Pain Timing/Severity: waxing and waning  Quality of pain: dull, aching  Severity of pain:  4 / 10   Modifying Factors: venous stasis  Associated Signs/Symptoms: edema, erythema, drainage, and pain        PAST MEDICAL HISTORY        Diagnosis Date    Chronic venous hypertension with ulcer and inflammation involving right side (HCC)     RLE    Hypertension     past    Iron deficiency     Lymphoma Dx     Hx of B cell lymphoma-in remission since        PAST SURGICAL HISTORY    Past Surgical History:   Procedure Laterality Date    TUNNELED

## 2024-10-18 NOTE — PROGRESS NOTES
.Multilayer Compression Wrap   (Not Unna) Below the Knee    NAME:  Fuentes Daley  YOB: 1945  MEDICAL RECORD NUMBER:  [id number]  DATE:  10/18/2024    Multilayer compression wrap: Removed old Multilayer wrap if indicated and wash leg with mild soap/water.  Applied moisturizing agent to dry skin as needed.   Applied primary and secondary dressing as ordered.  Applied multilayered dressing below the knee to right lower leg.  Instructed patient/caregiver not to remove dressing and to keep it clean and dry.   Instructed patient/caregiver on complications to report to provider, such as pain, numbness in toes, heavy drainage, and slippage of dressing.  Instructed patient on purpose of compression dressing and on activity and exercise recommendations.      Electronically signed by Tonja Yoder RN on 10/18/2024 at 11:28 AM

## 2024-10-25 ENCOUNTER — HOSPITAL ENCOUNTER (OUTPATIENT)
Dept: WOUND CARE | Age: 79
Discharge: HOME | End: 2024-10-25
Attending: NURSE PRACTITIONER
Payer: COMMERCIAL

## 2024-10-25 VITALS
SYSTOLIC BLOOD PRESSURE: 175 MMHG | HEART RATE: 78 BPM | RESPIRATION RATE: 16 BRPM | DIASTOLIC BLOOD PRESSURE: 96 MMHG | TEMPERATURE: 97.8 F

## 2024-10-25 DIAGNOSIS — I83.012 VENOUS STASIS ULCER OF RIGHT CALF WITH FAT LAYER EXPOSED, UNSPECIFIED WHETHER VARICOSE VEINS PRESENT (HCC): Primary | ICD-10-CM

## 2024-10-25 DIAGNOSIS — L97.212 VENOUS STASIS ULCER OF RIGHT CALF WITH FAT LAYER EXPOSED, UNSPECIFIED WHETHER VARICOSE VEINS PRESENT (HCC): Primary | ICD-10-CM

## 2024-10-25 PROCEDURE — 11045 DBRDMT SUBQ TISS EACH ADDL: CPT

## 2024-10-25 PROCEDURE — 11042 DBRDMT SUBQ TIS 1ST 20SQCM/<: CPT

## 2024-10-25 PROCEDURE — 2500000003 HC RX 250 WO HCPCS: Performed by: NURSE PRACTITIONER

## 2024-10-25 RX ORDER — SODIUM CHLOR/HYPOCHLOROUS ACID 0.033 %
SOLUTION, IRRIGATION IRRIGATION ONCE
OUTPATIENT
Start: 2024-10-25 | End: 2024-10-25

## 2024-10-25 RX ORDER — CLOBETASOL PROPIONATE 0.5 MG/G
OINTMENT TOPICAL ONCE
Status: COMPLETED | OUTPATIENT
Start: 2024-10-25 | End: 2024-10-25

## 2024-10-25 RX ORDER — LIDOCAINE HYDROCHLORIDE 20 MG/ML
JELLY TOPICAL ONCE
OUTPATIENT
Start: 2024-10-25 | End: 2024-10-25

## 2024-10-25 RX ORDER — BACITRACIN ZINC 500 [USP'U]/G
OINTMENT TOPICAL ONCE
OUTPATIENT
Start: 2024-10-25 | End: 2024-10-25

## 2024-10-25 RX ORDER — LIDOCAINE 50 MG/G
OINTMENT TOPICAL ONCE
OUTPATIENT
Start: 2024-10-25 | End: 2024-10-25

## 2024-10-25 RX ORDER — LIDOCAINE 40 MG/G
CREAM TOPICAL ONCE
OUTPATIENT
Start: 2024-10-25 | End: 2024-10-25

## 2024-10-25 RX ORDER — TRIAMCINOLONE ACETONIDE 1 MG/G
OINTMENT TOPICAL ONCE
OUTPATIENT
Start: 2024-10-25 | End: 2024-10-25

## 2024-10-25 RX ORDER — CLOBETASOL PROPIONATE 0.5 MG/G
OINTMENT TOPICAL ONCE
OUTPATIENT
Start: 2024-10-25 | End: 2024-10-25

## 2024-10-25 RX ORDER — BACITRACIN ZINC AND POLYMYXIN B SULFATE 500; 1000 [USP'U]/G; [USP'U]/G
OINTMENT TOPICAL ONCE
OUTPATIENT
Start: 2024-10-25 | End: 2024-10-25

## 2024-10-25 RX ORDER — GENTAMICIN SULFATE 1 MG/G
OINTMENT TOPICAL ONCE
OUTPATIENT
Start: 2024-10-25 | End: 2024-10-25

## 2024-10-25 RX ORDER — MUPIROCIN 20 MG/G
OINTMENT TOPICAL ONCE
OUTPATIENT
Start: 2024-10-25 | End: 2024-10-25

## 2024-10-25 RX ORDER — BETAMETHASONE DIPROPIONATE 0.5 MG/G
CREAM TOPICAL ONCE
OUTPATIENT
Start: 2024-10-25 | End: 2024-10-25

## 2024-10-25 RX ORDER — LIDOCAINE HYDROCHLORIDE 40 MG/ML
SOLUTION TOPICAL ONCE
OUTPATIENT
Start: 2024-10-25 | End: 2024-10-25

## 2024-10-25 RX ORDER — NEOMYCIN/BACITRACIN/POLYMYXINB 3.5-400-5K
OINTMENT (GRAM) TOPICAL ONCE
OUTPATIENT
Start: 2024-10-25 | End: 2024-10-25

## 2024-10-25 RX ORDER — SILVER SULFADIAZINE 10 MG/G
CREAM TOPICAL ONCE
Status: COMPLETED | OUTPATIENT
Start: 2024-10-25 | End: 2024-10-25

## 2024-10-25 RX ORDER — SILVER SULFADIAZINE 10 MG/G
CREAM TOPICAL ONCE
OUTPATIENT
Start: 2024-10-25 | End: 2024-10-25

## 2024-10-25 RX ADMIN — SILVER SULFADIAZINE: 10 CREAM TOPICAL at 11:25

## 2024-10-25 RX ADMIN — CLOBETASOL PROPIONATE: 0.5 OINTMENT TOPICAL at 11:25

## 2024-10-25 NOTE — PATIENT INSTRUCTIONS
#1 Apligraf on 2020                     #2 Apligraf on 2020                     #3 Apligraf on 2020                     #4 Apligraf on 2021                     # 5 Apligraf on 21                     #1 [place] 2021                     #2 [place] 2/3/2021                     #3 [place] 2/10/2021                     #4 [place] Right Medial Lower Leg 21                     #5 [place] 3/10/2021         Orders for this week:  10/25/2024    FAX TO [facility]     Right Lower Leg wounds -- wash with soap and water, pat dry.   Clobetasol to intact skin   Desitin to periwound   Apply silvadene and sorbact contact layer   Cover with sorbex    wrap with Coban 2 full  Change on  and  (will need home care to change on 24)      Dispense 30 day quantity when ordering supplies.  Plan:     Follow Up Instructions: At the Wound Care Center in 2 weeks.   Primary Wound Care Provider: LOLY Thrasher   Call  for any questions or concerns.  Central [phone] for imaging and lab work

## 2024-10-25 NOTE — PROGRESS NOTES
Multilayer Compression Wrap   (Not Unna) Below the Knee    NAME:  Fuentes Daley  YOB: 1945  MEDICAL RECORD NUMBER:  [id number]  DATE:  10/25/2024    Multilayer compression wrap: Removed old Multilayer wrap if indicated and wash leg with mild soap/water.  Applied moisturizing agent to dry skin as needed.   Applied primary and secondary dressing as ordered.  Applied multilayered dressing below the knee to right lower leg.  Instructed patient/caregiver not to remove dressing and to keep it clean and dry.   Instructed patient/caregiver on complications to report to provider, such as pain, numbness in toes, heavy drainage, and slippage of dressing.  Instructed patient on purpose of compression dressing and on activity and exercise recommendations.      Electronically signed by Destin Iniguez RN on 10/25/2024 at 11:24 AM

## 2024-10-25 NOTE — PROGRESS NOTES
Wound Care Center Progress Note With Procedure    Fuentes Daley  AGE: 79 y.o.   GENDER: male  : 1945  EPISODE DATE:  10/25/2024     Subjective:     Chief Complaint   Patient presents with    Wound Check         HISTORY of PRESENT ILLNESS     Fuentes Daley is a 79 y.o. male who presents to the Wound Clinic for a visit for evaluation and treatment of Chronic venous  ulcer(s) of  R lower leg medial, R lower leg lateral, R lower leg anterior, R lower leg posterior.  The condition is of moderate severity. The ulcer has been present for several monthys .  The underlying cause is thought to be venous stasis.  The patients care to date has included care at home from wife. The patient has significant underlying medical conditions as below.     10-: Patient looks ok. About the same size. Tolerating wrap     10-: Patient finished antibiotics and looking much better. Home health coming out and they have everything they need  Expecting a long course     10-4-2024: Patient looking bad. Sending antibiotics to pharmacy they can pick these up next Wednesday. Needs to to go ER if worsens.  Home health established and we will have them see patient next Friday and follow up 2 weeks      Will set up home care for next week     Culture drawn and will review when resulted. Patient states they do not have money for antibiotics until the  of next month     Patient is not a diabetic or a smoker  Not on a blood thinner     Wound Pain Timing/Severity: waxing and waning  Quality of pain: dull, aching  Severity of pain:  4 / 10   Modifying Factors: venous stasis  Associated Signs/Symptoms: edema, erythema, drainage, and pain        PAST MEDICAL HISTORY        Diagnosis Date    Chronic venous hypertension with ulcer and inflammation involving right side (HCC)     RLE    Hypertension     past    Iron deficiency     Lymphoma Dx     Hx of B cell lymphoma-in remission since        PAST SURGICAL HISTORY    Past

## 2024-11-08 ENCOUNTER — HOSPITAL ENCOUNTER (OUTPATIENT)
Dept: WOUND CARE | Age: 79
Discharge: HOME | End: 2024-11-08
Attending: NURSE PRACTITIONER
Payer: COMMERCIAL

## 2024-11-08 VITALS
RESPIRATION RATE: 18 BRPM | DIASTOLIC BLOOD PRESSURE: 86 MMHG | SYSTOLIC BLOOD PRESSURE: 178 MMHG | HEART RATE: 63 BPM | TEMPERATURE: 97.6 F

## 2024-11-08 DIAGNOSIS — I83.012 VENOUS STASIS ULCER OF RIGHT CALF WITH FAT LAYER EXPOSED, UNSPECIFIED WHETHER VARICOSE VEINS PRESENT (HCC): Primary | ICD-10-CM

## 2024-11-08 DIAGNOSIS — L97.212 VENOUS STASIS ULCER OF RIGHT CALF WITH FAT LAYER EXPOSED, UNSPECIFIED WHETHER VARICOSE VEINS PRESENT (HCC): Primary | ICD-10-CM

## 2024-11-08 DIAGNOSIS — L89.893 PRESSURE INJURY OF DORSUM OF RIGHT FOOT, STAGE 3 (HCC): ICD-10-CM

## 2024-11-08 PROCEDURE — 11042 DBRDMT SUBQ TIS 1ST 20SQCM/<: CPT

## 2024-11-08 PROCEDURE — 11045 DBRDMT SUBQ TISS EACH ADDL: CPT

## 2024-11-08 PROCEDURE — 2500000003 HC RX 250 WO HCPCS: Performed by: NURSE PRACTITIONER

## 2024-11-08 RX ORDER — LIDOCAINE 40 MG/G
CREAM TOPICAL ONCE
OUTPATIENT
Start: 2024-11-08 | End: 2024-11-08

## 2024-11-08 RX ORDER — LIDOCAINE HYDROCHLORIDE 40 MG/ML
SOLUTION TOPICAL ONCE
OUTPATIENT
Start: 2024-11-08 | End: 2024-11-08

## 2024-11-08 RX ORDER — TRIAMCINOLONE ACETONIDE 1 MG/G
OINTMENT TOPICAL ONCE
OUTPATIENT
Start: 2024-11-08 | End: 2024-11-08

## 2024-11-08 RX ORDER — BACITRACIN ZINC 500 [USP'U]/G
OINTMENT TOPICAL ONCE
OUTPATIENT
Start: 2024-11-08 | End: 2024-11-08

## 2024-11-08 RX ORDER — CLOBETASOL PROPIONATE 0.5 MG/G
OINTMENT TOPICAL ONCE
OUTPATIENT
Start: 2024-11-08 | End: 2024-11-08

## 2024-11-08 RX ORDER — LIDOCAINE 50 MG/G
OINTMENT TOPICAL ONCE
OUTPATIENT
Start: 2024-11-08 | End: 2024-11-08

## 2024-11-08 RX ORDER — GENTAMICIN SULFATE 1 MG/G
OINTMENT TOPICAL ONCE
OUTPATIENT
Start: 2024-11-08 | End: 2024-11-08

## 2024-11-08 RX ORDER — SODIUM CHLOR/HYPOCHLOROUS ACID 0.033 %
SOLUTION, IRRIGATION IRRIGATION ONCE
OUTPATIENT
Start: 2024-11-08 | End: 2024-11-08

## 2024-11-08 RX ORDER — CLOBETASOL PROPIONATE 0.5 MG/G
OINTMENT TOPICAL ONCE
Status: COMPLETED | OUTPATIENT
Start: 2024-11-08 | End: 2024-11-08

## 2024-11-08 RX ORDER — LIDOCAINE HYDROCHLORIDE 20 MG/ML
JELLY TOPICAL ONCE
OUTPATIENT
Start: 2024-11-08 | End: 2024-11-08

## 2024-11-08 RX ORDER — BACITRACIN ZINC AND POLYMYXIN B SULFATE 500; 1000 [USP'U]/G; [USP'U]/G
OINTMENT TOPICAL ONCE
OUTPATIENT
Start: 2024-11-08 | End: 2024-11-08

## 2024-11-08 RX ORDER — SILVER SULFADIAZINE 10 MG/G
CREAM TOPICAL ONCE
Status: COMPLETED | OUTPATIENT
Start: 2024-11-08 | End: 2024-11-08

## 2024-11-08 RX ORDER — BETAMETHASONE DIPROPIONATE 0.5 MG/G
CREAM TOPICAL ONCE
OUTPATIENT
Start: 2024-11-08 | End: 2024-11-08

## 2024-11-08 RX ORDER — SILVER SULFADIAZINE 10 MG/G
CREAM TOPICAL ONCE
OUTPATIENT
Start: 2024-11-08 | End: 2024-11-08

## 2024-11-08 RX ORDER — MUPIROCIN 20 MG/G
OINTMENT TOPICAL ONCE
OUTPATIENT
Start: 2024-11-08 | End: 2024-11-08

## 2024-11-08 RX ORDER — NEOMYCIN/BACITRACIN/POLYMYXINB 3.5-400-5K
OINTMENT (GRAM) TOPICAL ONCE
OUTPATIENT
Start: 2024-11-08 | End: 2024-11-08

## 2024-11-08 RX ADMIN — CLOBETASOL PROPIONATE: 0.5 OINTMENT TOPICAL at 11:15

## 2024-11-08 RX ADMIN — SILVER SULFADIAZINE: 10 CREAM TOPICAL at 11:15

## 2024-11-08 NOTE — PATIENT INSTRUCTIONS
#1 Apligraf on 2020                     #2 Apligraf on 2020                     #3 Apligraf on 2020                     #4 Apligraf on 2021                     # 5 Apligraf on 21                     #1 [place] 2021                     #2 [place] 2/3/2021                     #3 [place] 2/10/2021                     #4 [place] Right Medial Lower Leg 21                     #5 [place] 3/10/2021         Orders for this week:  2024    FAX TO [facility]     Right Lower Leg and Right Great Toe wounds -- wash with soap and water, pat dry.   Clobetasol to intact skin   Desitin to periwound  Apply Silvadene, Stimulen, and sorbact contact layer   Cover with sorbex   2x2 gauze between toes.  Wrap with Coban 2 full (enclose toes).  Change on  and .      Dispense 30 day quantity when ordering supplies.  Plan:     Follow Up Instructions: At the Wound Care Center in 1 week.   Primary Wound Care Provider: LOLY Thrasher   Call  for any questions or concerns.  Central [phone] for imaging and lab work

## 2024-11-08 NOTE — PROGRESS NOTES
Wound Care Center Progress Note With Procedure    Fuentes Daley  AGE: 79 y.o.   GENDER: male  : 1945  EPISODE DATE:  2024     Subjective:     Chief Complaint   Patient presents with    Wound Check     Right leg          HISTORY of PRESENT ILLNESS     Fuentes Daley is a 79 y.o. male who presents to the Wound Clinic for a visit for evaluation and treatment of Chronic venous  ulcer(s) of  R lower leg medial, R lower leg lateral, R lower leg anterior, R lower leg posterior.  The condition is of moderate severity. The ulcer has been present for several monthys .  The underlying cause is thought to be venous stasis.  The patients care to date has included care at home from wife. The patient has significant underlying medical conditions as below.     2024: Patient looks ok. New spot to toes from wrap and we will need to enclose toes and pad.      10-: Patient looks ok. About the same size. Tolerating wrap     10-: Patient finished antibiotics and looking much better. Home health coming out and they have everything they need  Expecting a long course     10-4-2024: Patient looking bad. Sending antibiotics to pharmacy they can pick these up next Wednesday. Needs to to go ER if worsens.  Home health established and we will have them see patient next Friday and follow up 2 weeks      Will set up home care for next week     Culture drawn and will review when resulted. Patient states they do not have money for antibiotics until the  of next month     Patient is not a diabetic or a smoker  Not on a blood thinner     Wound Pain Timing/Severity: waxing and waning  Quality of pain: dull, aching  Severity of pain:  4 / 10   Modifying Factors: venous stasis  Associated Signs/Symptoms: edema, erythema, drainage, and pain        PAST MEDICAL HISTORY        Diagnosis Date    Chronic venous hypertension with ulcer and inflammation involving right side (HCC)     RLE    Hypertension     past    Iron

## 2024-11-15 ENCOUNTER — HOSPITAL ENCOUNTER (OUTPATIENT)
Dept: WOUND CARE | Age: 79
Discharge: HOME | End: 2024-11-15
Attending: NURSE PRACTITIONER
Payer: COMMERCIAL

## 2024-11-15 VITALS
DIASTOLIC BLOOD PRESSURE: 83 MMHG | HEART RATE: 61 BPM | TEMPERATURE: 97.3 F | RESPIRATION RATE: 18 BRPM | SYSTOLIC BLOOD PRESSURE: 170 MMHG

## 2024-11-15 DIAGNOSIS — I83.012 VENOUS STASIS ULCER OF RIGHT CALF WITH FAT LAYER EXPOSED, UNSPECIFIED WHETHER VARICOSE VEINS PRESENT (HCC): Primary | ICD-10-CM

## 2024-11-15 DIAGNOSIS — L97.212 VENOUS STASIS ULCER OF RIGHT CALF WITH FAT LAYER EXPOSED, UNSPECIFIED WHETHER VARICOSE VEINS PRESENT (HCC): Primary | ICD-10-CM

## 2024-11-15 PROCEDURE — 97598 DBRDMT OPN WND ADDL 20CM/<: CPT

## 2024-11-15 PROCEDURE — 97597 DBRDMT OPN WND 1ST 20 CM/<: CPT

## 2024-11-15 PROCEDURE — 2500000003 HC RX 250 WO HCPCS: Performed by: NURSE PRACTITIONER

## 2024-11-15 PROCEDURE — 6370000000 HC RX 637 (ALT 250 FOR IP): Performed by: NURSE PRACTITIONER

## 2024-11-15 RX ORDER — BACITRACIN ZINC 500 [USP'U]/G
OINTMENT TOPICAL ONCE
OUTPATIENT
Start: 2024-11-15 | End: 2024-11-15

## 2024-11-15 RX ORDER — BETAMETHASONE DIPROPIONATE 0.5 MG/G
CREAM TOPICAL ONCE
OUTPATIENT
Start: 2024-11-15 | End: 2024-11-15

## 2024-11-15 RX ORDER — MUPIROCIN 20 MG/G
OINTMENT TOPICAL ONCE
OUTPATIENT
Start: 2024-11-15 | End: 2024-11-15

## 2024-11-15 RX ORDER — SILVER SULFADIAZINE 10 MG/G
CREAM TOPICAL ONCE
OUTPATIENT
Start: 2024-11-15 | End: 2024-11-15

## 2024-11-15 RX ORDER — LIDOCAINE HYDROCHLORIDE 20 MG/ML
JELLY TOPICAL ONCE
OUTPATIENT
Start: 2024-11-15 | End: 2024-11-15

## 2024-11-15 RX ORDER — CLOBETASOL PROPIONATE 0.5 MG/G
OINTMENT TOPICAL ONCE
Status: COMPLETED | OUTPATIENT
Start: 2024-11-15 | End: 2024-11-15

## 2024-11-15 RX ORDER — NEOMYCIN/BACITRACIN/POLYMYXINB 3.5-400-5K
OINTMENT (GRAM) TOPICAL ONCE
OUTPATIENT
Start: 2024-11-15 | End: 2024-11-15

## 2024-11-15 RX ORDER — GENTAMICIN SULFATE 1 MG/G
OINTMENT TOPICAL ONCE
OUTPATIENT
Start: 2024-11-15 | End: 2024-11-15

## 2024-11-15 RX ORDER — CLOBETASOL PROPIONATE 0.5 MG/G
OINTMENT TOPICAL ONCE
OUTPATIENT
Start: 2024-11-15 | End: 2024-11-15

## 2024-11-15 RX ORDER — BACITRACIN ZINC AND POLYMYXIN B SULFATE 500; 1000 [USP'U]/G; [USP'U]/G
OINTMENT TOPICAL ONCE
OUTPATIENT
Start: 2024-11-15 | End: 2024-11-15

## 2024-11-15 RX ORDER — LIDOCAINE 40 MG/G
CREAM TOPICAL ONCE
OUTPATIENT
Start: 2024-11-15 | End: 2024-11-15

## 2024-11-15 RX ORDER — SILVER SULFADIAZINE 10 MG/G
CREAM TOPICAL ONCE
Status: COMPLETED | OUTPATIENT
Start: 2024-11-15 | End: 2024-11-15

## 2024-11-15 RX ORDER — TRIAMCINOLONE ACETONIDE 1 MG/G
OINTMENT TOPICAL ONCE
OUTPATIENT
Start: 2024-11-15 | End: 2024-11-15

## 2024-11-15 RX ORDER — LIDOCAINE HYDROCHLORIDE 40 MG/ML
SOLUTION TOPICAL ONCE
OUTPATIENT
Start: 2024-11-15 | End: 2024-11-15

## 2024-11-15 RX ORDER — SODIUM CHLOR/HYPOCHLOROUS ACID 0.033 %
SOLUTION, IRRIGATION IRRIGATION ONCE
OUTPATIENT
Start: 2024-11-15 | End: 2024-11-15

## 2024-11-15 RX ORDER — LIDOCAINE 50 MG/G
OINTMENT TOPICAL ONCE
OUTPATIENT
Start: 2024-11-15 | End: 2024-11-15

## 2024-11-15 RX ADMIN — CLOBETASOL PROPIONATE: 0.5 OINTMENT TOPICAL at 11:22

## 2024-11-15 RX ADMIN — SILVER SULFADIAZINE: 10 CREAM TOPICAL at 11:21

## 2024-11-15 NOTE — PROGRESS NOTES
Multilayer Compression Wrap   (Not Unna) Below the Knee    NAME:  Fuentes Daley  YOB: 1945  MEDICAL RECORD NUMBER:  [id number]  DATE:  11/15/2024    Multilayer compression wrap: Removed old Multilayer wrap if indicated and wash leg with mild soap/water.  Applied moisturizing agent to dry skin as needed.   Applied primary and secondary dressing as ordered.  Applied multilayered dressing below the knee to right lower leg.  Instructed patient/caregiver not to remove dressing and to keep it clean and dry.   Instructed patient/caregiver on complications to report to provider, such as pain, numbness in toes, heavy drainage, and slippage of dressing.  Instructed patient on purpose of compression dressing and on activity and exercise recommendations.      Electronically signed by Destin Iniguez RN on 11/15/2024 at 11:20 AM

## 2024-11-15 NOTE — PROGRESS NOTES
Wound Care Center Progress Note With Procedure    Fuentes Daley  AGE: 79 y.o.   GENDER: male  : 1945  EPISODE DATE:  11/15/2024     Subjective:     Chief Complaint   Patient presents with    Wound Check     Right leg          HISTORY of PRESENT ILLNESS     Fuentes Daley is a 79 y.o. male who presents to the Wound Clinic for a visit for evaluation and treatment of Chronic venous  ulcer(s) of  R lower leg medial, R lower leg lateral, R lower leg anterior, R lower leg posterior.  The condition is of moderate severity. The ulcer has been present for several monthys .  The underlying cause is thought to be venous stasis.  The patients care to date has included care at home from wife. The patient has significant underlying medical conditions as below.     11-: Appylying for grafts today. Prefer apligraf     2024: Patient looks ok. New spot to toes from wrap and we will need to enclose toes and pad.      10-: Patient looks ok. About the same size. Tolerating wrap     10-: Patient finished antibiotics and looking much better. Home health coming out and they have everything they need  Expecting a long course     10-4-2024: Patient looking bad. Sending antibiotics to pharmacy they can pick these up next Wednesday. Needs to to go ER if worsens.  Home health established and we will have them see patient next Friday and follow up 2 weeks      Will set up home care for next week     Culture drawn and will review when resulted. Patient states they do not have money for antibiotics until the th of next month     Patient is not a diabetic or a smoker  Not on a blood thinner     Wound Pain Timing/Severity: waxing and waning  Quality of pain: dull, aching  Severity of pain:  4 / 10   Modifying Factors: venous stasis  Associated Signs/Symptoms: edema, erythema, drainage, and pain        PAST MEDICAL HISTORY        Diagnosis Date    Chronic venous hypertension with ulcer and inflammation involving

## 2024-11-15 NOTE — PATIENT INSTRUCTIONS
PHYSICIAN ORDERS AND DISCHARGE INSTRUCTIONS    Wound cleansing:     Do not scrub or use excessive force.   Wash hands with soap and water before and after dressing changes.   Prior to applying a clean dressing, cleanse wound with normal saline,               wound cleanser, or mild soap and water.    Ask the physician or nurse before getting the wound(s) wet in a shower      Compression Wraps  You’re leg(s) have been placed into compression wraps to control swelling and to speed the healing process of your wounds. Wraps can stay in place up to one week depending on the drainage from your wound. Sometimes it may be necessary to change the dressing more than once per week, the doctor will determine the frequency.    Elevate your legs, lower if painful.  Activity as tolerated in the wraps- but it is a good idea to elevate your legs for 20 min intervals throughout the day when sitting.  Keep wraps clean and dry- notify clinic if they get wet in the shower or for any other reason.   Never attempt to dry with any heat source. Burns can occur (especially with diabetics)  Wraps will wrinkle a little and get looser throughout the week. If they slip more than 3 inches at the top or are causing you pain- please call!  Do NOT stick objects inside wraps to scratch.   Report to provider, such as pain, numbness in toes, heavy drainage, and slippage of dressing.  Monitor for impaired circulation including pale, cool or numb extremities distal to the wrap or garment.   If supplies are not available please DO NOT remove wraps until next visit to Wound Care Center  Never cut your wraps with scissors or other sharp objects. If they feel too tight- follow these steps until pain is relieved  1) Elevate first for at least 30 min  2) Take pain meds as prescribed  3) Remove outer brown layer of wrap  4) Remove entire wrap if needed  5) Notify clinic anytime you are having problems or remove wrap    Wound Care Notes:      [facility]

## 2024-11-15 NOTE — PLAN OF CARE
Insurance Verification Request      Ordering Center:     [facility]  [address] [address]  Phone: [phone]  Fax: [phone]    Facility NPI: [id number]  [employer] Customer Account #: [id number]    Provider Information:     Provider's Name: Justin MOMO Thomas  Provider's NPI: [id number]  Provider's Address   [address] [address]    Patient Information:     Fuentes Ellis  [address] [facility] [address]   [phone]   : 1945  AGE: 79 y.o.     GENDER: male   TODAYS DATE:  11/15/2024    Insurance:     PRIMARY INSURANCE:  Plan: [employer]  Coverage: MEDIGOLD  Effective Date: 2015  Group Number: [unfilled]  Subscriber Number: [id number] - (Medicare Managed)    Payer/Plan Subscr  Sex Relation Sub. Ins. ID Effective Group Num   1. MEDIGOLD - ME* FUENTES ELLIS 1945 Male Self [id number] 1/1/15                                    [address]       Application/product Information:     Benefit Verification Request:    Reason for Request: New Wound    [employer] FAX# [phone]    Trunk/Arms/Legs [id number] (1st 25 sq cm)    Apligraf, Puraply AM, and NuShield    Has patient been treated with any other Skin Substitute on this Wound:     No, Name of the product or products N/A, Number of previous applications none, Wound size 203.55 sq/cm, Number of wounds 1,  Location of wound Right Pretibial, Duration of wound 7 weeks, Estimated number of applications 10     If yes, How many previous applications:  0    WOUND #: 1    Total Square CM: 203.55 sq cm    Procedure setting: Hospital Outpatient Department    Is the Patient currently in a skilled nursing facility: No     Is the wound work related: No    Procedure date: 24    Patient Information:     Dx Codes: I83.891     Problem List Items Addressed This Visit          Other    WD-Venous stasis ulcer of right calf with fat layer exposed (HCC) - Primary    Relevant Orders    Initiate Outpatient

## 2024-11-22 ENCOUNTER — HOSPITAL ENCOUNTER (OUTPATIENT)
Dept: WOUND CARE | Age: 79
Discharge: HOME | End: 2024-11-22
Attending: NURSE PRACTITIONER
Payer: COMMERCIAL

## 2024-11-22 VITALS
RESPIRATION RATE: 18 BRPM | HEART RATE: 62 BPM | DIASTOLIC BLOOD PRESSURE: 82 MMHG | TEMPERATURE: 98 F | SYSTOLIC BLOOD PRESSURE: 164 MMHG

## 2024-11-22 DIAGNOSIS — I83.012 VENOUS STASIS ULCER OF RIGHT CALF WITH FAT LAYER EXPOSED, UNSPECIFIED WHETHER VARICOSE VEINS PRESENT (HCC): Primary | ICD-10-CM

## 2024-11-22 DIAGNOSIS — L97.212 VENOUS STASIS ULCER OF RIGHT CALF WITH FAT LAYER EXPOSED, UNSPECIFIED WHETHER VARICOSE VEINS PRESENT (HCC): Primary | ICD-10-CM

## 2024-11-22 DIAGNOSIS — L89.893 PRESSURE INJURY OF DORSUM OF RIGHT FOOT, STAGE 3 (HCC): ICD-10-CM

## 2024-11-22 PROCEDURE — 2500000003 HC RX 250 WO HCPCS: Performed by: NURSE PRACTITIONER

## 2024-11-22 PROCEDURE — 97598 DBRDMT OPN WND ADDL 20CM/<: CPT

## 2024-11-22 PROCEDURE — 97597 DBRDMT OPN WND 1ST 20 CM/<: CPT

## 2024-11-22 PROCEDURE — 6370000000 HC RX 637 (ALT 250 FOR IP): Performed by: NURSE PRACTITIONER

## 2024-11-22 RX ORDER — MUPIROCIN 20 MG/G
OINTMENT TOPICAL ONCE
OUTPATIENT
Start: 2024-11-22 | End: 2024-11-22

## 2024-11-22 RX ORDER — BACITRACIN ZINC 500 [USP'U]/G
OINTMENT TOPICAL ONCE
OUTPATIENT
Start: 2024-11-22 | End: 2024-11-22

## 2024-11-22 RX ORDER — SILVER SULFADIAZINE 10 MG/G
CREAM TOPICAL ONCE
OUTPATIENT
Start: 2024-11-22 | End: 2024-11-22

## 2024-11-22 RX ORDER — BETAMETHASONE DIPROPIONATE 0.5 MG/G
CREAM TOPICAL ONCE
OUTPATIENT
Start: 2024-11-22 | End: 2024-11-22

## 2024-11-22 RX ORDER — LIDOCAINE 50 MG/G
OINTMENT TOPICAL ONCE
OUTPATIENT
Start: 2024-11-22 | End: 2024-11-22

## 2024-11-22 RX ORDER — CLOBETASOL PROPIONATE 0.5 MG/G
OINTMENT TOPICAL ONCE
OUTPATIENT
Start: 2024-11-22 | End: 2024-11-22

## 2024-11-22 RX ORDER — BACITRACIN ZINC AND POLYMYXIN B SULFATE 500; 1000 [USP'U]/G; [USP'U]/G
OINTMENT TOPICAL ONCE
OUTPATIENT
Start: 2024-11-22 | End: 2024-11-22

## 2024-11-22 RX ORDER — LIDOCAINE HYDROCHLORIDE 20 MG/ML
JELLY TOPICAL ONCE
OUTPATIENT
Start: 2024-11-22 | End: 2024-11-22

## 2024-11-22 RX ORDER — NEOMYCIN/BACITRACIN/POLYMYXINB 3.5-400-5K
OINTMENT (GRAM) TOPICAL ONCE
OUTPATIENT
Start: 2024-11-22 | End: 2024-11-22

## 2024-11-22 RX ORDER — SODIUM CHLOR/HYPOCHLOROUS ACID 0.033 %
SOLUTION, IRRIGATION IRRIGATION ONCE
OUTPATIENT
Start: 2024-11-22 | End: 2024-11-22

## 2024-11-22 RX ORDER — SILVER SULFADIAZINE 10 MG/G
CREAM TOPICAL ONCE
Status: COMPLETED | OUTPATIENT
Start: 2024-11-22 | End: 2024-11-22

## 2024-11-22 RX ORDER — TRIAMCINOLONE ACETONIDE 1 MG/G
OINTMENT TOPICAL ONCE
OUTPATIENT
Start: 2024-11-22 | End: 2024-11-22

## 2024-11-22 RX ORDER — LIDOCAINE 40 MG/G
CREAM TOPICAL ONCE
OUTPATIENT
Start: 2024-11-22 | End: 2024-11-22

## 2024-11-22 RX ORDER — GENTAMICIN SULFATE 1 MG/G
OINTMENT TOPICAL ONCE
OUTPATIENT
Start: 2024-11-22 | End: 2024-11-22

## 2024-11-22 RX ORDER — CLOBETASOL PROPIONATE 0.5 MG/G
OINTMENT TOPICAL ONCE
Status: COMPLETED | OUTPATIENT
Start: 2024-11-22 | End: 2024-11-22

## 2024-11-22 RX ORDER — LIDOCAINE HYDROCHLORIDE 40 MG/ML
SOLUTION TOPICAL ONCE
OUTPATIENT
Start: 2024-11-22 | End: 2024-11-22

## 2024-11-22 RX ADMIN — CLOBETASOL PROPIONATE: 0.5 OINTMENT TOPICAL at 11:41

## 2024-11-22 RX ADMIN — SILVER SULFADIAZINE: 10 CREAM TOPICAL at 11:42

## 2024-11-22 ASSESSMENT — PAIN SCALES - GENERAL: PAINLEVEL_OUTOF10: 0

## 2024-11-22 NOTE — PROGRESS NOTES
Multilayer Compression Wrap   (Not Unna) Below the Knee    NAME:  Fuentes Daley  YOB: 1945  MEDICAL RECORD NUMBER:  [id number]  DATE:  11/22/2024    Multilayer compression wrap: Removed old Multilayer wrap if indicated and wash leg with mild soap/water.  Applied moisturizing agent to dry skin as needed.   Applied primary and secondary dressing as ordered.  Applied multilayered dressing below the knee to right lower leg.  Instructed patient/caregiver not to remove dressing and to keep it clean and dry.   Instructed patient/caregiver on complications to report to provider, such as pain, numbness in toes, heavy drainage, and slippage of dressing.  Instructed patient on purpose of compression dressing and on activity and exercise recommendations.      Electronically signed by Adelita Avilez LPN on 11/22/2024 at 11:39 AM

## 2024-11-22 NOTE — PROGRESS NOTES
Wound Care Center Progress Note With Procedure    Fuentes Daley  AGE: 79 y.o.   GENDER: male  : 1945  EPISODE DATE:  2024     Subjective:     Chief Complaint   Patient presents with    Wound Check     Right leg          HISTORY of PRESENT ILLNESS     Fuentes Daley is a 79 y.o. male who presents to the Wound Clinic for a visit for evaluation and treatment of Chronic venous  ulcer(s) of  R lower leg medial, R lower leg lateral, R lower leg anterior, R lower leg posterior.  The condition is of moderate severity. The ulcer has been present for several monthys .  The underlying cause is thought to be venous stasis.  The patients care to date has included care at home from wife. The patient has significant underlying medical conditions as below.     2024: Patient no word on grafts yet     11-: Appylying for grafts today. Prefer apligraf     2024: Patient looks ok. New spot to toes from wrap and we will need to enclose toes and pad.      10-: Patient looks ok. About the same size. Tolerating wrap     10-: Patient finished antibiotics and looking much better. Home health coming out and they have everything they need  Expecting a long course     10-4-2024: Patient looking bad. Sending antibiotics to pharmacy they can pick these up next Wednesday. Needs to to go ER if worsens.  Home health established and we will have them see patient next Friday and follow up 2 weeks      Will set up home care for next week     Culture drawn and will review when resulted. Patient states they do not have money for antibiotics until the  of next month     Patient is not a diabetic or a smoker  Not on a blood thinner     Wound Pain Timing/Severity: waxing and waning  Quality of pain: dull, aching  Severity of pain:  4 / 10   Modifying Factors: venous stasis  Associated Signs/Symptoms: edema, erythema, drainage, and pain                                              PAST MEDICAL HISTORY

## 2024-12-13 ENCOUNTER — HOSPITAL ENCOUNTER (OUTPATIENT)
Dept: WOUND CARE | Age: 79
Discharge: HOME | End: 2024-12-13
Attending: NURSE PRACTITIONER
Payer: COMMERCIAL

## 2024-12-13 VITALS
HEART RATE: 66 BPM | DIASTOLIC BLOOD PRESSURE: 79 MMHG | TEMPERATURE: 97.8 F | SYSTOLIC BLOOD PRESSURE: 148 MMHG | RESPIRATION RATE: 18 BRPM

## 2024-12-13 DIAGNOSIS — I83.012 VENOUS STASIS ULCER OF RIGHT CALF WITH FAT LAYER EXPOSED, UNSPECIFIED WHETHER VARICOSE VEINS PRESENT (HCC): Primary | ICD-10-CM

## 2024-12-13 DIAGNOSIS — L97.212 VENOUS STASIS ULCER OF RIGHT CALF WITH FAT LAYER EXPOSED, UNSPECIFIED WHETHER VARICOSE VEINS PRESENT (HCC): Primary | ICD-10-CM

## 2024-12-13 PROCEDURE — 11045 DBRDMT SUBQ TISS EACH ADDL: CPT

## 2024-12-13 PROCEDURE — 6370000000 HC RX 637 (ALT 250 FOR IP): Performed by: NURSE PRACTITIONER

## 2024-12-13 PROCEDURE — 11042 DBRDMT SUBQ TIS 1ST 20SQCM/<: CPT

## 2024-12-13 RX ORDER — GENTAMICIN SULFATE 1 MG/G
OINTMENT TOPICAL ONCE
OUTPATIENT
Start: 2024-12-13 | End: 2024-12-13

## 2024-12-13 RX ORDER — BETAMETHASONE DIPROPIONATE 0.5 MG/G
CREAM TOPICAL ONCE
OUTPATIENT
Start: 2024-12-13 | End: 2024-12-13

## 2024-12-13 RX ORDER — MUPIROCIN 20 MG/G
OINTMENT TOPICAL ONCE
OUTPATIENT
Start: 2024-12-13 | End: 2024-12-13

## 2024-12-13 RX ORDER — SILVER SULFADIAZINE 10 MG/G
CREAM TOPICAL ONCE
OUTPATIENT
Start: 2024-12-13 | End: 2024-12-13

## 2024-12-13 RX ORDER — NEOMYCIN/BACITRACIN/POLYMYXINB 3.5-400-5K
OINTMENT (GRAM) TOPICAL ONCE
OUTPATIENT
Start: 2024-12-13 | End: 2024-12-13

## 2024-12-13 RX ORDER — TRIAMCINOLONE ACETONIDE 1 MG/G
OINTMENT TOPICAL ONCE
OUTPATIENT
Start: 2024-12-13 | End: 2024-12-13

## 2024-12-13 RX ORDER — LIDOCAINE 50 MG/G
OINTMENT TOPICAL ONCE
OUTPATIENT
Start: 2024-12-13 | End: 2024-12-13

## 2024-12-13 RX ORDER — LIDOCAINE HYDROCHLORIDE 40 MG/ML
SOLUTION TOPICAL ONCE
OUTPATIENT
Start: 2024-12-13 | End: 2024-12-13

## 2024-12-13 RX ORDER — CLOBETASOL PROPIONATE 0.5 MG/G
OINTMENT TOPICAL ONCE
Status: COMPLETED | OUTPATIENT
Start: 2024-12-13 | End: 2024-12-13

## 2024-12-13 RX ORDER — LIDOCAINE HYDROCHLORIDE 20 MG/ML
JELLY TOPICAL ONCE
OUTPATIENT
Start: 2024-12-13 | End: 2024-12-13

## 2024-12-13 RX ORDER — BACITRACIN ZINC AND POLYMYXIN B SULFATE 500; 1000 [USP'U]/G; [USP'U]/G
OINTMENT TOPICAL ONCE
OUTPATIENT
Start: 2024-12-13 | End: 2024-12-13

## 2024-12-13 RX ORDER — CLOBETASOL PROPIONATE 0.5 MG/G
OINTMENT TOPICAL ONCE
OUTPATIENT
Start: 2024-12-13 | End: 2024-12-13

## 2024-12-13 RX ORDER — BACITRACIN ZINC 500 [USP'U]/G
OINTMENT TOPICAL ONCE
OUTPATIENT
Start: 2024-12-13 | End: 2024-12-13

## 2024-12-13 RX ORDER — SODIUM CHLOR/HYPOCHLOROUS ACID 0.033 %
SOLUTION, IRRIGATION IRRIGATION ONCE
OUTPATIENT
Start: 2024-12-13 | End: 2024-12-13

## 2024-12-13 RX ORDER — LIDOCAINE 40 MG/G
CREAM TOPICAL ONCE
OUTPATIENT
Start: 2024-12-13 | End: 2024-12-13

## 2024-12-13 RX ADMIN — CLOBETASOL PROPIONATE: 0.5 OINTMENT TOPICAL at 11:43

## 2024-12-13 ASSESSMENT — PAIN SCALES - GENERAL: PAINLEVEL_OUTOF10: 0

## 2024-12-13 NOTE — PROGRESS NOTES
Multilayer Compression Wrap   (Not Unna) Below the Knee    NAME:  Fuentes Daley  YOB: 1945  MEDICAL RECORD NUMBER:  [id number]  DATE:  12/13/2024    Multilayer compression wrap: Removed old Multilayer wrap if indicated and wash leg with mild soap/water.  Applied moisturizing agent to dry skin as needed.   Applied primary and secondary dressing as ordered.  Applied multilayered dressing below the knee to right lower leg.  Instructed patient/caregiver not to remove dressing and to keep it clean and dry.   Instructed patient/caregiver on complications to report to provider, such as pain, numbness in toes, heavy drainage, and slippage of dressing.  Instructed patient on purpose of compression dressing and on activity and exercise recommendations.      Electronically signed by Yolis Carreon LPN on 12/13/2024 at 11:42 AM

## 2024-12-13 NOTE — PROGRESS NOTES
Wound Care Center Progress Note With Procedure    Fuentes Daley  AGE: 79 y.o.   GENDER: male  : 1945  EPISODE DATE:  2024     Subjective:     Chief Complaint   Patient presents with    Wound Check     Right leg/foot         HISTORY of PRESENT ILLNESS     Fuentes Daley is a 79 y.o. male who presents to the Wound Clinic for a visit for evaluation and treatment of Chronic venous  ulcer(s) of  R lower leg medial, R lower leg lateral, R lower leg anterior, R lower leg posterior.  The condition is of moderate severity. The ulcer has been present for several monthys .  The underlying cause is thought to be venous stasis.  The patients care to date has included care at home from wife. The patient has significant underlying medical conditions as below.         Patient is not a diabetic or a smoker  Not on a blood thinner     Wound Pain Timing/Severity: waxing and waning  Quality of pain: dull, aching  Severity of pain:  4 / 10   Modifying Factors: venous stasis  Associated Signs/Symptoms: edema, erythema, drainage, and pain        1213/24  Will change regimen to Anasept ointment/stimulant and cover with Sorbact and agile then compression wrap.  Will apply for wound VAC.  Would like to see improved granulation tissue over base of wound.                                            PAST MEDICAL HISTORY        Diagnosis Date    Chronic venous hypertension with ulcer and inflammation involving right side (HCC)     RLE    Hypertension     past    Iron deficiency     Lymphoma Dx 11-    Hx of B cell lymphoma-in remission since        PAST SURGICAL HISTORY    Past Surgical History:   Procedure Laterality Date    TUNNELED VENOUS PORT PLACEMENT      TUNNELED VENOUS PORT PLACEMENT      removed 2012       FAMILY HISTORY    Family History   Problem Relation Age of Onset    Early Death Mother 47    Diabetes Father     Heart Disease Father     No Known Problems Brother     Early Death Daughter 40    Vision Loss

## 2024-12-20 ENCOUNTER — HOSPITAL ENCOUNTER (OUTPATIENT)
Dept: WOUND CARE | Age: 79
Discharge: HOME | End: 2024-12-20
Attending: NURSE PRACTITIONER
Payer: COMMERCIAL

## 2024-12-20 VITALS
HEART RATE: 66 BPM | RESPIRATION RATE: 18 BRPM | SYSTOLIC BLOOD PRESSURE: 112 MMHG | TEMPERATURE: 98.3 F | DIASTOLIC BLOOD PRESSURE: 87 MMHG

## 2024-12-20 DIAGNOSIS — L97.212 VENOUS STASIS ULCER OF RIGHT CALF WITH FAT LAYER EXPOSED, UNSPECIFIED WHETHER VARICOSE VEINS PRESENT (HCC): Primary | ICD-10-CM

## 2024-12-20 DIAGNOSIS — I83.012 VENOUS STASIS ULCER OF RIGHT CALF WITH FAT LAYER EXPOSED, UNSPECIFIED WHETHER VARICOSE VEINS PRESENT (HCC): Primary | ICD-10-CM

## 2024-12-20 PROCEDURE — 11042 DBRDMT SUBQ TIS 1ST 20SQCM/<: CPT

## 2024-12-20 PROCEDURE — 11045 DBRDMT SUBQ TISS EACH ADDL: CPT

## 2024-12-20 RX ORDER — BETAMETHASONE DIPROPIONATE 0.5 MG/G
CREAM TOPICAL ONCE
OUTPATIENT
Start: 2024-12-20 | End: 2024-12-20

## 2024-12-20 RX ORDER — TRIAMCINOLONE ACETONIDE 1 MG/G
OINTMENT TOPICAL ONCE
OUTPATIENT
Start: 2024-12-20 | End: 2024-12-20

## 2024-12-20 RX ORDER — SILVER SULFADIAZINE 10 MG/G
CREAM TOPICAL ONCE
OUTPATIENT
Start: 2024-12-20 | End: 2024-12-20

## 2024-12-20 RX ORDER — GENTAMICIN SULFATE 1 MG/G
OINTMENT TOPICAL ONCE
OUTPATIENT
Start: 2024-12-20 | End: 2024-12-20

## 2024-12-20 RX ORDER — CLOBETASOL PROPIONATE 0.5 MG/G
OINTMENT TOPICAL ONCE
Status: COMPLETED | OUTPATIENT
Start: 2024-12-20 | End: 2024-12-20

## 2024-12-20 RX ORDER — LIDOCAINE 50 MG/G
OINTMENT TOPICAL ONCE
OUTPATIENT
Start: 2024-12-20 | End: 2024-12-20

## 2024-12-20 RX ORDER — LIDOCAINE HYDROCHLORIDE 20 MG/ML
JELLY TOPICAL ONCE
OUTPATIENT
Start: 2024-12-20 | End: 2024-12-20

## 2024-12-20 RX ORDER — NEOMYCIN/BACITRACIN/POLYMYXINB 3.5-400-5K
OINTMENT (GRAM) TOPICAL ONCE
OUTPATIENT
Start: 2024-12-20 | End: 2024-12-20

## 2024-12-20 RX ORDER — BACITRACIN ZINC AND POLYMYXIN B SULFATE 500; 1000 [USP'U]/G; [USP'U]/G
OINTMENT TOPICAL ONCE
OUTPATIENT
Start: 2024-12-20 | End: 2024-12-20

## 2024-12-20 RX ORDER — CLOBETASOL PROPIONATE 0.5 MG/G
OINTMENT TOPICAL ONCE
OUTPATIENT
Start: 2024-12-20 | End: 2024-12-20

## 2024-12-20 RX ORDER — MUPIROCIN 20 MG/G
OINTMENT TOPICAL ONCE
OUTPATIENT
Start: 2024-12-20 | End: 2024-12-20

## 2024-12-20 RX ORDER — LIDOCAINE HYDROCHLORIDE 40 MG/ML
SOLUTION TOPICAL ONCE
OUTPATIENT
Start: 2024-12-20 | End: 2024-12-20

## 2024-12-20 RX ORDER — GINSENG 100 MG
CAPSULE ORAL ONCE
OUTPATIENT
Start: 2024-12-20 | End: 2024-12-20

## 2024-12-20 RX ORDER — SODIUM CHLOR/HYPOCHLOROUS ACID 0.033 %
SOLUTION, IRRIGATION IRRIGATION ONCE
OUTPATIENT
Start: 2024-12-20 | End: 2024-12-20

## 2024-12-20 RX ORDER — LIDOCAINE 40 MG/G
CREAM TOPICAL ONCE
OUTPATIENT
Start: 2024-12-20 | End: 2024-12-20

## 2024-12-20 RX ADMIN — CLOBETASOL PROPIONATE: 0.5 OINTMENT TOPICAL at 11:34

## 2024-12-20 NOTE — PROGRESS NOTES
Wound Care Center Progress Note With Procedure    Fuentes Daley  AGE: 79 y.o.   GENDER: male  : 1945  EPISODE DATE:  2024     Subjective:     Chief Complaint   Patient presents with    Wound Check     Right leg/foot          HISTORY of PRESENT ILLNESS     Fuentes Daley is a 79 y.o. male who presents to the Wound Clinic for a visit for evaluation and treatment of Chronic venous  ulcer(s) of  R lower leg medial, R lower leg lateral, R lower leg anterior, R lower leg posterior.  The condition is of moderate severity. The ulcer has been present for several monthys .  The underlying cause is thought to be venous stasis.  The patients care to date has included care at home from wife. The patient has significant underlying medical conditions as below.         Patient is not a diabetic or a smoker  Not on a blood thinner     Wound Pain Timing/Severity: waxing and waning  Quality of pain: dull, aching  Severity of pain:  4 / 10   Modifying Factors: venous stasis  Associated Signs/Symptoms: edema, erythema, drainage, and pain          Will change regimen to Anasept ointment/stimulant and cover with Sorbact and agile then compression wrap.  Will apply for wound VAC.  Would like to see improved granulation tissue over base of wound.    2024  Continues to have significant sloughing tissue.  Would benefit from wound VAC but patient refusing wound VAC for now.  Will continue with Santyl ointment for this week.  Once able we will apply for Kerecis.                                      PAST MEDICAL HISTORY        Diagnosis Date    Chronic venous hypertension with ulcer and inflammation involving right side (HCC)     RLE    Hypertension     past    Iron deficiency     Lymphoma Dx 11-11    Hx of B cell lymphoma-in remission since        PAST SURGICAL HISTORY    Past Surgical History:   Procedure Laterality Date    TUNNELED VENOUS PORT PLACEMENT      TUNNELED VENOUS PORT PLACEMENT      removed

## 2024-12-20 NOTE — PROGRESS NOTES
Multilayer Compression Wrap   (Not Unna) Below the Knee    NAME:  Fuentes Daley  YOB: 1945  MEDICAL RECORD NUMBER:  [id number]  DATE:  12/20/2024    Multilayer compression wrap: Removed old Multilayer wrap if indicated and wash leg with mild soap/water.  Applied moisturizing agent to dry skin as needed.   Applied primary and secondary dressing as ordered.  Applied multilayered dressing below the knee to right lower leg.  Applied multilayered dressing below the knee to left lower leg.  Instructed patient/caregiver not to remove dressing and to keep it clean and dry.   Instructed patient/caregiver on complications to report to provider, such as pain, numbness in toes, heavy drainage, and slippage of dressing.  Instructed patient on purpose of compression dressing and on activity and exercise recommendations.    Nonselective enzymatic debridement performed with Santyl per physician order to wound right leg,   Patient tolerated the procedure well.       Electronically signed by Adelita Avilez LPN on 12/20/2024 at 11:33 AM

## 2024-12-20 NOTE — PATIENT INSTRUCTIONS
PHYSICIAN ORDERS AND DISCHARGE INSTRUCTIONS    Wound Care Notes:         Orders for this week:  2024    FAX TO [facility]     Right Lower Leg and Right Great Toe wounds -- wash with soap and water, pat dry.   Clobetasol to intact skin   Desitin to periwound  Apply santyl to wound bed   Cover with x3 Large Agile to leg   Apply agile between toes  gauze between toes  Wrap with Coban 2 full (enclose toes)  Change on Monday and Wednesday and 24       Dispense 30 day quantity when ordering supplies.  Plan: Patient refusing wound vac. Kerecis BI faxed for Right medial lower leg 24      Follow Up Instructions: 2 week   Primary Wound Care Provider: Dr Garcia   Call  for any questions or concerns.  Central [phone] for imaging and lab work

## 2025-01-03 ENCOUNTER — HOSPITAL ENCOUNTER (OUTPATIENT)
Dept: WOUND CARE | Age: 80
Discharge: HOME | End: 2025-01-03
Attending: NURSE PRACTITIONER
Payer: COMMERCIAL

## 2025-01-03 VITALS
HEART RATE: 67 BPM | SYSTOLIC BLOOD PRESSURE: 149 MMHG | RESPIRATION RATE: 18 BRPM | TEMPERATURE: 97.2 F | DIASTOLIC BLOOD PRESSURE: 84 MMHG

## 2025-01-03 DIAGNOSIS — I83.012 VENOUS STASIS ULCER OF RIGHT CALF WITH FAT LAYER EXPOSED, UNSPECIFIED WHETHER VARICOSE VEINS PRESENT (HCC): Primary | ICD-10-CM

## 2025-01-03 DIAGNOSIS — L97.212 VENOUS STASIS ULCER OF RIGHT CALF WITH FAT LAYER EXPOSED, UNSPECIFIED WHETHER VARICOSE VEINS PRESENT (HCC): Primary | ICD-10-CM

## 2025-01-03 PROCEDURE — 15274 SKN SUB GRFT T/A/L CHILD ADD: CPT

## 2025-01-03 PROCEDURE — 11045 DBRDMT SUBQ TISS EACH ADDL: CPT

## 2025-01-03 PROCEDURE — 15273 SKIN SUB GRFT T/ARM/LG CHILD: CPT | Performed by: SURGERY

## 2025-01-03 PROCEDURE — 15273 SKIN SUB GRFT T/ARM/LG CHILD: CPT

## 2025-01-03 PROCEDURE — 15274 SKN SUB GRFT T/A/L CHILD ADD: CPT | Performed by: SURGERY

## 2025-01-03 PROCEDURE — 11042 DBRDMT SUBQ TIS 1ST 20SQCM/<: CPT

## 2025-01-03 RX ORDER — LIDOCAINE HYDROCHLORIDE 20 MG/ML
JELLY TOPICAL ONCE
OUTPATIENT
Start: 2025-01-03 | End: 2025-01-03

## 2025-01-03 RX ORDER — LIDOCAINE HYDROCHLORIDE 40 MG/ML
SOLUTION TOPICAL ONCE
OUTPATIENT
Start: 2025-01-03 | End: 2025-01-03

## 2025-01-03 RX ORDER — SODIUM CHLOR/HYPOCHLOROUS ACID 0.033 %
SOLUTION, IRRIGATION IRRIGATION ONCE
OUTPATIENT
Start: 2025-01-03 | End: 2025-01-03

## 2025-01-03 RX ORDER — BACITRACIN ZINC AND POLYMYXIN B SULFATE 500; 1000 [USP'U]/G; [USP'U]/G
OINTMENT TOPICAL ONCE
OUTPATIENT
Start: 2025-01-03 | End: 2025-01-03

## 2025-01-03 RX ORDER — LIDOCAINE 40 MG/G
CREAM TOPICAL ONCE
OUTPATIENT
Start: 2025-01-03 | End: 2025-01-03

## 2025-01-03 RX ORDER — NEOMYCIN/BACITRACIN/POLYMYXINB 3.5-400-5K
OINTMENT (GRAM) TOPICAL ONCE
OUTPATIENT
Start: 2025-01-03 | End: 2025-01-03

## 2025-01-03 RX ORDER — LIDOCAINE 50 MG/G
OINTMENT TOPICAL ONCE
OUTPATIENT
Start: 2025-01-03 | End: 2025-01-03

## 2025-01-03 RX ORDER — SILVER SULFADIAZINE 10 MG/G
CREAM TOPICAL ONCE
OUTPATIENT
Start: 2025-01-03 | End: 2025-01-03

## 2025-01-03 RX ORDER — MUPIROCIN 20 MG/G
OINTMENT TOPICAL ONCE
OUTPATIENT
Start: 2025-01-03 | End: 2025-01-03

## 2025-01-03 RX ORDER — TRIAMCINOLONE ACETONIDE 1 MG/G
OINTMENT TOPICAL ONCE
OUTPATIENT
Start: 2025-01-03 | End: 2025-01-03

## 2025-01-03 RX ORDER — GENTAMICIN SULFATE 1 MG/G
OINTMENT TOPICAL ONCE
OUTPATIENT
Start: 2025-01-03 | End: 2025-01-03

## 2025-01-03 RX ORDER — BETAMETHASONE DIPROPIONATE 0.5 MG/G
CREAM TOPICAL ONCE
OUTPATIENT
Start: 2025-01-03 | End: 2025-01-03

## 2025-01-03 RX ORDER — GINSENG 100 MG
CAPSULE ORAL ONCE
OUTPATIENT
Start: 2025-01-03 | End: 2025-01-03

## 2025-01-03 RX ORDER — CLOBETASOL PROPIONATE 0.5 MG/G
OINTMENT TOPICAL ONCE
OUTPATIENT
Start: 2025-01-03 | End: 2025-01-03

## 2025-01-03 ASSESSMENT — PAIN SCALES - GENERAL: PAINLEVEL_OUTOF10: 0

## 2025-01-03 NOTE — PATIENT INSTRUCTIONS
PHYSICIAN ORDERS AND DISCHARGE INSTRUCTIONS    Wound Care Notes:    Grafts Kerecis  Right medial lower leg  Kerecis #1  1/3/25         Orders for this week:  1/3/2025    FAX TO [facility]  LEAVE GRAFT IN PLACE ON DRESSING CHANGES    Right Lower Leg and Right Great Toe wounds -- wash with soap and water, pat dry.   Clobetasol to intact skin   Desitin to periwound  Kerecis # 1 and Versatel. Do not remove. Graft in place!!  Cover with x3 Large Agile to leg   Apply agile between toes  gauze between toes  Wrap with Coban 2 full (enclose toes)  Change on Monday and Wednesday LEAVE GRAFT IN PLACE      Dispense 30 day quantity when ordering supplies.  Plan: Patient refusing wound vac. Kerecis BI faxed for Right medial lower leg. Approved. Started        Follow Up Instructions:  1 week   Primary Wound Care Provider: Dr Garcia   Call  for any questions or concerns.  Central [phone] for imaging and lab work

## 2025-01-03 NOTE — PROGRESS NOTES
Kerecis Omega3 Treatment Note    NAME:  Fuentes Daley  YOB: 1945  MEDICAL RECORD NUMBER:  [id number]  DATE:  1/3/2025    Goal:  Patient will receive safe and proper application of skin substitute. Patient will comply with caring for dressing, and reporting complications.     The expiration date is checked immediately before use., The package was intact prior to use and no damage was noted., Kerecis Omega3 is stored at room temperature.,  Kerecis Omega3 was hydrated with sterile normal saline per the provider.,  Kerecis Omega3 was removed from protective sterile packaging by the provider and applied to the prepared ulcer bed. ,  Kerecis Omega3 was applied to Right Lower Leg  , Keresis Omega 3 and affixed with steri-strips by the provider.,  Kerecis Omega3 was covered with non-adherent ulcer dressing. , Applied Versatel over non-adherent., Applied dry gauze and/or roll gauze. , Applied appropriate off-loading device., The patient/caregiver was instructed not to remove the dressing and to keep it clean and dry., The patient/family/caregiver was instructed on the need for offloading and elevation of the affected extremity and on using the prescribed offloading device., and The patient/family/caregiver was instructed on signs and symptoms of complication to report, such as draining through dressing, dressing falling down/slipping, getting wet, or severe pain or tingling.     Guidelines followed  Kerecis may only be used every 12 months per wound.      Date of first application of Kerecis Omega 3 for this current wound is January 3, 2025.    Application # 1 out of 10    Electronically signed by Rainer Jaimes RN on 1/3/2025 at 11:55 AM    
Multilayer Compression Wrap   (Not Unna) Below the Knee    NAME:  Fuentes Daley  YOB: 1945  MEDICAL RECORD NUMBER:  [id number]  DATE:  1/3/2025    Multilayer compression wrap: Removed old Multilayer wrap if indicated and wash leg with mild soap/water.  Applied moisturizing agent to dry skin as needed.   Applied primary and secondary dressing as ordered.  Applied multilayered dressing below the knee to right lower leg.  Instructed patient/caregiver not to remove dressing and to keep it clean and dry.   Instructed patient/caregiver on complications to report to provider, such as pain, numbness in toes, heavy drainage, and slippage of dressing.  Instructed patient on purpose of compression dressing and on activity and exercise recommendations.      Electronically signed by Adelita Avilez LPN on 1/3/2025 at 11:20 AM  
protocol used for greater than 30 days. See Epic chart for previous modalities and details of previous treatment.    The patient has no contraindications or evidence of infection.  The patient's competency and support system is appropriate for proper care and follow up for the use of this graft, therefore a graft was placed as below.      Procedure:    The wound bed was cleansed and prepared as necessary to accept the graft.    Debridement was required.    Consent obtained: Yes    Time out taken: Yes    Using curette sharp Excisional Debridement of the wound(s) was/were performed down through and including the removal of muscle/fascia.     Devitalized Tissue Debrided:  biofilm, slough, and exudate      Bleeding:  Minimal    Hemostasis Achieved:  not needed    Procedural Pain:  2  / 10     Post Procedural Pain:  0 / 10     Having prepared the wound bed, the skin graft was completed as below.    Product Utilized: 7 x 8 meshed 2: 1 Kerecis graft      Skin Substitute Applied:    Performed by: Walker Garcia DO    Consent obtained: Yes    Time out taken: Yes     Fenestrated: Mashed Kerecis use    Skin Substitute was Applied to Wound Number(s):     1      Wound 07/29/13 Venous ulcer Ankle Right;Lateral (Active)   Number of days: 4176       Wound 09/27/24 #1 Right Medial Lower Leg Kerecis Approved (Active)   Wound Image   12/13/24 1055   Wound Etiology Venous 10/25/24 1054   Dressing Status New dressing applied 12/20/24 1131   Wound Cleansed Soap and water 01/03/25 1040   Offloading for Diabetic Foot Ulcers Offloading not required 01/03/25 1040   Wound Length (cm) 14 cm 01/03/25 1040   Wound Width (cm) 12 cm 01/03/25 1040   Wound Depth (cm) 0.1 cm 01/03/25 1040   Wound Surface Area (cm^2) 168 cm^2 01/03/25 1040   Change in Wound Size % (l*w) -13.13 01/03/25 1040   Wound Volume (cm^3) 16.8 cm^3 01/03/25 1040   Wound Healing % 62 01/03/25 1040   Post-Procedure Length (cm) 14 cm 01/03/25 1054   Post-Procedure Width (cm) 12

## 2025-01-07 DIAGNOSIS — I10 ESSENTIAL HYPERTENSION: ICD-10-CM

## 2025-01-07 RX ORDER — METOPROLOL SUCCINATE 50 MG/1
50 TABLET, EXTENDED RELEASE ORAL DAILY
Qty: 90 TABLET | Refills: 0 | Status: SHIPPED | OUTPATIENT
Start: 2025-01-07

## 2025-01-07 RX ORDER — METOPROLOL SUCCINATE 50 MG/1
50 TABLET, EXTENDED RELEASE ORAL DAILY
Qty: 90 TABLET | Refills: 1 | Status: CANCELLED | OUTPATIENT
Start: 2025-01-07

## 2025-01-10 ENCOUNTER — HOSPITAL ENCOUNTER (OUTPATIENT)
Dept: WOUND CARE | Age: 80
Discharge: HOME | End: 2025-01-10
Attending: NURSE PRACTITIONER
Payer: COMMERCIAL

## 2025-01-10 VITALS
TEMPERATURE: 97.9 F | DIASTOLIC BLOOD PRESSURE: 83 MMHG | HEART RATE: 59 BPM | SYSTOLIC BLOOD PRESSURE: 151 MMHG | RESPIRATION RATE: 18 BRPM

## 2025-01-10 DIAGNOSIS — L97.212 VENOUS STASIS ULCER OF RIGHT CALF WITH FAT LAYER EXPOSED, UNSPECIFIED WHETHER VARICOSE VEINS PRESENT (HCC): Primary | ICD-10-CM

## 2025-01-10 DIAGNOSIS — I83.012 VENOUS STASIS ULCER OF RIGHT CALF WITH FAT LAYER EXPOSED, UNSPECIFIED WHETHER VARICOSE VEINS PRESENT (HCC): Primary | ICD-10-CM

## 2025-01-10 PROCEDURE — 6370000000 HC RX 637 (ALT 250 FOR IP): Performed by: NURSE PRACTITIONER

## 2025-01-10 PROCEDURE — 11045 DBRDMT SUBQ TISS EACH ADDL: CPT

## 2025-01-10 PROCEDURE — 11042 DBRDMT SUBQ TIS 1ST 20SQCM/<: CPT

## 2025-01-10 RX ORDER — LIDOCAINE HYDROCHLORIDE 20 MG/ML
JELLY TOPICAL ONCE
OUTPATIENT
Start: 2025-01-10 | End: 2025-01-10

## 2025-01-10 RX ORDER — GINSENG 100 MG
CAPSULE ORAL ONCE
OUTPATIENT
Start: 2025-01-10 | End: 2025-01-10

## 2025-01-10 RX ORDER — BETAMETHASONE DIPROPIONATE 0.5 MG/G
CREAM TOPICAL ONCE
OUTPATIENT
Start: 2025-01-10 | End: 2025-01-10

## 2025-01-10 RX ORDER — GENTAMICIN SULFATE 1 MG/G
OINTMENT TOPICAL ONCE
OUTPATIENT
Start: 2025-01-10 | End: 2025-01-10

## 2025-01-10 RX ORDER — SILVER SULFADIAZINE 10 MG/G
CREAM TOPICAL ONCE
OUTPATIENT
Start: 2025-01-10 | End: 2025-01-10

## 2025-01-10 RX ORDER — SODIUM CHLOR/HYPOCHLOROUS ACID 0.033 %
SOLUTION, IRRIGATION IRRIGATION ONCE
OUTPATIENT
Start: 2025-01-10 | End: 2025-01-10

## 2025-01-10 RX ORDER — BACITRACIN ZINC AND POLYMYXIN B SULFATE 500; 1000 [USP'U]/G; [USP'U]/G
OINTMENT TOPICAL ONCE
OUTPATIENT
Start: 2025-01-10 | End: 2025-01-10

## 2025-01-10 RX ORDER — LIDOCAINE 50 MG/G
OINTMENT TOPICAL ONCE
OUTPATIENT
Start: 2025-01-10 | End: 2025-01-10

## 2025-01-10 RX ORDER — MUPIROCIN 20 MG/G
OINTMENT TOPICAL ONCE
OUTPATIENT
Start: 2025-01-10 | End: 2025-01-10

## 2025-01-10 RX ORDER — LIDOCAINE 40 MG/G
CREAM TOPICAL ONCE
OUTPATIENT
Start: 2025-01-10 | End: 2025-01-10

## 2025-01-10 RX ORDER — CLOBETASOL PROPIONATE 0.5 MG/G
OINTMENT TOPICAL ONCE
OUTPATIENT
Start: 2025-01-10 | End: 2025-01-10

## 2025-01-10 RX ORDER — LIDOCAINE HYDROCHLORIDE 40 MG/ML
SOLUTION TOPICAL ONCE
OUTPATIENT
Start: 2025-01-10 | End: 2025-01-10

## 2025-01-10 RX ORDER — CLOBETASOL PROPIONATE 0.5 MG/G
OINTMENT TOPICAL ONCE
Status: COMPLETED | OUTPATIENT
Start: 2025-01-10 | End: 2025-01-10

## 2025-01-10 RX ORDER — TRIAMCINOLONE ACETONIDE 1 MG/G
OINTMENT TOPICAL ONCE
OUTPATIENT
Start: 2025-01-10 | End: 2025-01-10

## 2025-01-10 RX ORDER — NEOMYCIN/BACITRACIN/POLYMYXINB 3.5-400-5K
OINTMENT (GRAM) TOPICAL ONCE
OUTPATIENT
Start: 2025-01-10 | End: 2025-01-10

## 2025-01-10 RX ADMIN — COLLAGENASE SANTYL: 250 OINTMENT TOPICAL at 11:43

## 2025-01-10 RX ADMIN — CLOBETASOL PROPIONATE: 0.5 OINTMENT TOPICAL at 11:43

## 2025-01-10 ASSESSMENT — PAIN SCALES - GENERAL: PAINLEVEL_OUTOF10: 0

## 2025-01-10 NOTE — WOUND CARE
.Multilayer Compression Wrap   (Not Unna) Below the Knee    NAME:  Fuentes Daley  YOB: 1945  MEDICAL RECORD NUMBER:  [id number]  DATE:  1/10/2025    Multilayer compression wrap: Removed old Multilayer wrap if indicated and wash leg with mild soap/water.  Applied moisturizing agent to dry skin as needed.   Applied primary and secondary dressing as ordered.  Applied multilayered dressing below the knee to right lower leg.  Instructed patient/caregiver not to remove dressing and to keep it clean and dry.   Instructed patient/caregiver on complications to report to provider, such as pain, numbness in toes, heavy drainage, and slippage of dressing.  Instructed patient on purpose of compression dressing and on activity and exercise recommendations.      Electronically signed by Tonja Yoder RN on 1/10/2025 at 11:39 AM

## 2025-01-10 NOTE — WOUND CARE
.Nonselective enzymatic debridement performed with Santyl per physician order to wound(s) of the right foot  Patient tolerated the procedure well.

## 2025-01-10 NOTE — PATIENT INSTRUCTIONS
PHYSICIAN ORDERS AND DISCHARGE INSTRUCTIONS    Wound Care Notes:    Grafts Kerecis  Right medial lower leg  Kerecis #1  1/3/25  Kerecis #2 1/10/25         Orders for this week:  1/10/2025    FAX TO [facility]: Needs to see before 25 for precert if continuing home care    Right Lower Leg and Right Great Toe 2nd toe wounds -- wash with soap and water, pat dry.   Clobetasol to intact skin   Desitin to periwound  Apply santyl to wound bed   Cover with x3 zetuvit  Apply agile between toes  Wrap with Coban 2 full (enclose toes)  Leave in place until next visit to wound care center       Dispense 30 day quantity when ordering supplies.  Plan: Patient refusing wound vac. Kerecis BI faxed for Right medial lower leg. Approved. Started        Nurse visit Tuesday   Follow Up Instructions:  1 week   Primary Wound Care Provider: Dr Garcia   Call  for any questions or concerns.  Central [phone] for imaging and lab work

## 2025-01-14 ENCOUNTER — OFFICE VISIT (OUTPATIENT)
Dept: INTERNAL MEDICINE CLINIC | Age: 80
End: 2025-01-14
Payer: COMMERCIAL

## 2025-01-14 ENCOUNTER — HOSPITAL ENCOUNTER (OUTPATIENT)
Dept: WOUND CARE | Age: 80
Discharge: HOME | End: 2025-01-14
Attending: NURSE PRACTITIONER
Payer: COMMERCIAL

## 2025-01-14 VITALS
RESPIRATION RATE: 18 BRPM | TEMPERATURE: 97.4 F | HEART RATE: 64 BPM | SYSTOLIC BLOOD PRESSURE: 141 MMHG | DIASTOLIC BLOOD PRESSURE: 77 MMHG

## 2025-01-14 VITALS
OXYGEN SATURATION: 94 % | WEIGHT: 161 LBS | BODY MASS INDEX: 20.02 KG/M2 | SYSTOLIC BLOOD PRESSURE: 150 MMHG | HEART RATE: 60 BPM | DIASTOLIC BLOOD PRESSURE: 86 MMHG | HEIGHT: 75 IN

## 2025-01-14 DIAGNOSIS — I87.2 CHRONIC VENOUS INSUFFICIENCY: ICD-10-CM

## 2025-01-14 DIAGNOSIS — L97.212 VENOUS STASIS ULCER OF RIGHT CALF WITH FAT LAYER EXPOSED WITH VARICOSE VEINS (HCC): ICD-10-CM

## 2025-01-14 DIAGNOSIS — I10 ESSENTIAL HYPERTENSION: Primary | ICD-10-CM

## 2025-01-14 DIAGNOSIS — I83.012 VENOUS STASIS ULCER OF RIGHT CALF WITH FAT LAYER EXPOSED WITH VARICOSE VEINS (HCC): ICD-10-CM

## 2025-01-14 PROCEDURE — 97602 WOUND(S) CARE NON-SELECTIVE: CPT

## 2025-01-14 PROCEDURE — 1123F ACP DISCUSS/DSCN MKR DOCD: CPT | Performed by: FAMILY MEDICINE

## 2025-01-14 PROCEDURE — 29581 APPL MULTLAYER CMPRN SYS LEG: CPT

## 2025-01-14 PROCEDURE — 99214 OFFICE O/P EST MOD 30 MIN: CPT | Performed by: FAMILY MEDICINE

## 2025-01-14 PROCEDURE — 3077F SYST BP >= 140 MM HG: CPT | Performed by: FAMILY MEDICINE

## 2025-01-14 PROCEDURE — 3079F DIAST BP 80-89 MM HG: CPT | Performed by: FAMILY MEDICINE

## 2025-01-14 RX ORDER — VALSARTAN 80 MG/1
80 TABLET ORAL DAILY
Qty: 90 TABLET | Refills: 0 | Status: SHIPPED | OUTPATIENT
Start: 2025-01-14

## 2025-01-14 RX ORDER — VALSARTAN 80 MG/1
80 TABLET ORAL DAILY
Qty: 90 TABLET | Refills: 1 | Status: SHIPPED | OUTPATIENT
Start: 2025-01-14 | End: 2025-01-14

## 2025-01-14 SDOH — ECONOMIC STABILITY: FOOD INSECURITY: WITHIN THE PAST 12 MONTHS, YOU WORRIED THAT YOUR FOOD WOULD RUN OUT BEFORE YOU GOT MONEY TO BUY MORE.: NEVER TRUE

## 2025-01-14 SDOH — ECONOMIC STABILITY: FOOD INSECURITY: WITHIN THE PAST 12 MONTHS, THE FOOD YOU BOUGHT JUST DIDN'T LAST AND YOU DIDN'T HAVE MONEY TO GET MORE.: NEVER TRUE

## 2025-01-14 ASSESSMENT — ENCOUNTER SYMPTOMS
COUGH: 0
NAUSEA: 0
ABDOMINAL PAIN: 0
SHORTNESS OF BREATH: 0

## 2025-01-14 ASSESSMENT — PATIENT HEALTH QUESTIONNAIRE - PHQ9
SUM OF ALL RESPONSES TO PHQ QUESTIONS 1-9: 0
SUM OF ALL RESPONSES TO PHQ QUESTIONS 1-9: 0
1. LITTLE INTEREST OR PLEASURE IN DOING THINGS: NOT AT ALL
SUM OF ALL RESPONSES TO PHQ9 QUESTIONS 1 & 2: 0
SUM OF ALL RESPONSES TO PHQ QUESTIONS 1-9: 0
SUM OF ALL RESPONSES TO PHQ QUESTIONS 1-9: 0
2. FEELING DOWN, DEPRESSED OR HOPELESS: NOT AT ALL

## 2025-01-14 ASSESSMENT — PAIN SCALES - GENERAL: PAINLEVEL_OUTOF10: 0

## 2025-01-14 NOTE — PROGRESS NOTES
Fuentes Daley (:  1945) is a 79 y.o. male,New Patient, here for evaluation of the following chief complaint(s):  Follow-up (6 month) and Hypertension      Assessment & Plan   ASSESSMENT/PLAN:    Assessment & Plan  1. Essential hypertension.- chronic progressing  Continue metoprolol succinate 50 mg extended-release tablet.   Start valsartan 80 mg tablet, take 1 tablet daily, dispense 90, refill zero.   ADR's explained. Monitor BP  A BMP has been ordered. Obtain lab in 1 week    2. Chronic venous insufficiency.  Continue to wear compression stockings.    3. Venous stasis ulcer of the right calf with fat layer exposed with varicose veins (HCC).  Continue wound care.    Medications reconciled and discussed with the patient  Return for follow up in 2 to 3 months for HTN.       Lab Results   Component Value Date    WBC 8.3 2024    HGB 14.3 2024    HCT 42.4 2024    MCV 93.4 2024     2024       Lab Results   Component Value Date    LABA1C 5.5 2024     Lab Results   Component Value Date    .2 2024     Lab Results   Component Value Date     2024    K 5.1 2024     2024    CO2 27 2024    BUN 17 2024    CREATININE 0.7 (L) 2024    GLUCOSE 105 (H) 2024    CALCIUM 9.8 2024    BILITOT 1.3 (H) 2024    ALKPHOS 122 2024    AST 14 (L) 2024    ALT 11 2024    LABGLOM >90 2024    GFRAA >60 2022    AGRATIO 1.6 2024     Lab Results   Component Value Date    TSH 2.64 2024     Lab Results   Component Value Date/Time    COLORU YELLOW 2020 06:45 PM    NITRU NEGATIVE 2020 06:45 PM    NITRU NEGATIVE 2012 11:00 AM    GLUCOSEU >500 2020 06:45 PM    GLUCOSEU neg 2012 11:11 AM    KETUA NEGATIVE 2020 06:45 PM    UROBILINOGEN NORMAL 2020 06:45 PM    BILIRUBINUR NEGATIVE 2020 06:45 PM       Subjective   SUBJECTIVE/OBJECTIVE:    HISTORY

## 2025-01-14 NOTE — PROGRESS NOTES
Multilayer Compression Wrap   (Not Unna) Below the Knee    NAME:  Fuentes Daley  YOB: 1945  MEDICAL RECORD NUMBER:  [id number]  DATE:  1/14/2025    Multilayer compression wrap: Removed old Multilayer wrap if indicated and wash leg with mild soap/water.  Applied moisturizing agent to dry skin as needed.   Applied primary and secondary dressing as ordered.  Applied multilayered dressing below the knee to right lower leg.  Instructed patient/caregiver not to remove dressing and to keep it clean and dry.   Instructed patient/caregiver on complications to report to provider, such as pain, numbness in toes, heavy drainage, and slippage of dressing.  Instructed patient on purpose of compression dressing and on activity and exercise recommendations.      Electronically signed by Yolis Carreon LPN on 1/14/2025 at 1:35 PM

## 2025-01-14 NOTE — PATIENT INSTRUCTIONS
PHYSICIAN ORDERS AND DISCHARGE INSTRUCTIONS    Wound Care Notes:    Grafts Kerecis  Right medial lower leg  Kerecis #1  1/3/25  Kerecis #2 1/10/25         Orders for this week:  2025    FAX TO [facility]: Needs to see before 25 for precert if continuing home care    Right Lower Leg and Right Great Toe 2nd toe wounds -- wash with soap and water, pat dry.   Clobetasol to intact skin   Desitin to periwound  Apply santyl to wound bed   Cover with x3 zetuvit  Apply agile between toes  Wrap with Coban 2 full (enclose toes)  Leave in place until next visit to wound care center (home care to change on  if excessive drainage)      Dispense 30 day quantity when ordering supplies.  Plan: Patient refusing wound vac. Kerecis BI faxed for Right medial lower leg. Approved. Started        Follow Up Instructions:  1 week   Primary Wound Care Provider: Dr Garcia   Call  for any questions or concerns.  Central [phone] for imaging and lab work

## 2025-01-14 NOTE — PROGRESS NOTES
Nonselective enzymatic debridement performed with Santyl per physician order to wound(s) of the right lower extremity wounds  Patient tolerated the procedure well.

## 2025-01-17 ENCOUNTER — HOSPITAL ENCOUNTER (OUTPATIENT)
Dept: WOUND CARE | Age: 80
Discharge: HOME | End: 2025-01-17
Attending: NURSE PRACTITIONER
Payer: COMMERCIAL

## 2025-01-17 VITALS
SYSTOLIC BLOOD PRESSURE: 141 MMHG | TEMPERATURE: 98.4 F | DIASTOLIC BLOOD PRESSURE: 82 MMHG | RESPIRATION RATE: 18 BRPM | HEART RATE: 68 BPM

## 2025-01-17 DIAGNOSIS — L97.212 VENOUS STASIS ULCER OF RIGHT CALF WITH FAT LAYER EXPOSED, UNSPECIFIED WHETHER VARICOSE VEINS PRESENT (HCC): Primary | ICD-10-CM

## 2025-01-17 DIAGNOSIS — I83.012 VENOUS STASIS ULCER OF RIGHT CALF WITH FAT LAYER EXPOSED, UNSPECIFIED WHETHER VARICOSE VEINS PRESENT (HCC): Primary | ICD-10-CM

## 2025-01-17 PROCEDURE — 11045 DBRDMT SUBQ TISS EACH ADDL: CPT

## 2025-01-17 PROCEDURE — 6370000000 HC RX 637 (ALT 250 FOR IP): Performed by: NURSE PRACTITIONER

## 2025-01-17 PROCEDURE — 11042 DBRDMT SUBQ TIS 1ST 20SQCM/<: CPT

## 2025-01-17 RX ORDER — LIDOCAINE 40 MG/G
CREAM TOPICAL ONCE
OUTPATIENT
Start: 2025-01-17 | End: 2025-01-17

## 2025-01-17 RX ORDER — LIDOCAINE HYDROCHLORIDE 40 MG/ML
SOLUTION TOPICAL ONCE
OUTPATIENT
Start: 2025-01-17 | End: 2025-01-17

## 2025-01-17 RX ORDER — GENTAMICIN SULFATE 1 MG/G
OINTMENT TOPICAL ONCE
OUTPATIENT
Start: 2025-01-17 | End: 2025-01-17

## 2025-01-17 RX ORDER — LIDOCAINE 50 MG/G
OINTMENT TOPICAL ONCE
OUTPATIENT
Start: 2025-01-17 | End: 2025-01-17

## 2025-01-17 RX ORDER — LIDOCAINE HYDROCHLORIDE 20 MG/ML
JELLY TOPICAL ONCE
OUTPATIENT
Start: 2025-01-17 | End: 2025-01-17

## 2025-01-17 RX ORDER — TRIAMCINOLONE ACETONIDE 1 MG/G
OINTMENT TOPICAL ONCE
OUTPATIENT
Start: 2025-01-17 | End: 2025-01-17

## 2025-01-17 RX ORDER — CLOBETASOL PROPIONATE 0.5 MG/G
OINTMENT TOPICAL ONCE
OUTPATIENT
Start: 2025-01-17 | End: 2025-01-17

## 2025-01-17 RX ORDER — BACITRACIN ZINC AND POLYMYXIN B SULFATE 500; 1000 [USP'U]/G; [USP'U]/G
OINTMENT TOPICAL ONCE
OUTPATIENT
Start: 2025-01-17 | End: 2025-01-17

## 2025-01-17 RX ORDER — SODIUM CHLOR/HYPOCHLOROUS ACID 0.033 %
SOLUTION, IRRIGATION IRRIGATION ONCE
OUTPATIENT
Start: 2025-01-17 | End: 2025-01-17

## 2025-01-17 RX ORDER — GINSENG 100 MG
CAPSULE ORAL ONCE
OUTPATIENT
Start: 2025-01-17 | End: 2025-01-17

## 2025-01-17 RX ORDER — BETAMETHASONE DIPROPIONATE 0.5 MG/G
CREAM TOPICAL ONCE
OUTPATIENT
Start: 2025-01-17 | End: 2025-01-17

## 2025-01-17 RX ORDER — NEOMYCIN/BACITRACIN/POLYMYXINB 3.5-400-5K
OINTMENT (GRAM) TOPICAL ONCE
OUTPATIENT
Start: 2025-01-17 | End: 2025-01-17

## 2025-01-17 RX ORDER — SILVER SULFADIAZINE 10 MG/G
CREAM TOPICAL ONCE
OUTPATIENT
Start: 2025-01-17 | End: 2025-01-17

## 2025-01-17 RX ORDER — CLOBETASOL PROPIONATE 0.5 MG/G
OINTMENT TOPICAL ONCE
Status: COMPLETED | OUTPATIENT
Start: 2025-01-17 | End: 2025-01-17

## 2025-01-17 RX ORDER — MUPIROCIN 20 MG/G
OINTMENT TOPICAL ONCE
OUTPATIENT
Start: 2025-01-17 | End: 2025-01-17

## 2025-01-17 RX ADMIN — CLOBETASOL PROPIONATE: 0.5 OINTMENT TOPICAL at 14:49

## 2025-01-17 RX ADMIN — COLLAGENASE SANTYL: 250 OINTMENT TOPICAL at 14:49

## 2025-01-17 ASSESSMENT — PAIN SCALES - GENERAL: PAINLEVEL_OUTOF10: 0

## 2025-01-17 NOTE — PROGRESS NOTES
Nonselective enzymatic debridement performed with Santyl per physician order to wound(s) of the right lower extremity.  Patient tolerated the procedure well.     Multilayer Compression Wrap   (Not Unna) Below the Knee    NAME:  Fuentes Daley  YOB: 1945  MEDICAL RECORD NUMBER:  [id number]  DATE:  1/17/2025    Multilayer compression wrap: Removed old Multilayer wrap if indicated and wash leg with mild soap/water.  Applied moisturizing agent to dry skin as needed.   Applied primary and secondary dressing as ordered.  Applied multilayered dressing below the knee to right lower leg.  Instructed patient/caregiver not to remove dressing and to keep it clean and dry.   Instructed patient/caregiver on complications to report to provider, such as pain, numbness in toes, heavy drainage, and slippage of dressing.  Instructed patient on purpose of compression dressing and on activity and exercise recommendations.      Electronically signed by Destin Iniguez RN on 1/17/2025 at 2:48 PM

## 2025-01-17 NOTE — PATIENT INSTRUCTIONS
PHYSICIAN ORDERS AND DISCHARGE INSTRUCTIONS    Wound Care Notes:    Grafts Kerecis  Right medial lower leg  Kerecis #1  1/3/25  Kerecis #2 1/10/25         Orders for this week:  2025    FAX TO [facility]: Needs to see before 25 for precert if continuing home care    Right Lower Leg and Right Great Toe 2nd toe wounds -- wash with soap and water, pat dry.   Clobetasol to intact skin   Desitin to periwound  Apply santyl to wound bed   Cover with x3 zetuvit  Apply agile between toes  Wrap with Coban 2 full (enclose toes)  Leave in place until next visit to wound care center (home care to change on  if excessive drainage)      Dispense 30 day quantity when ordering supplies.  Plan: Patient refusing wound vac. Kerecis BI faxed for Right medial lower leg. Approved. Started        Follow Up Instructions:  1 week   Primary Wound Care Provider: Dr Garcia   Call  for any questions or concerns.  Central [phone] for imaging and lab work

## 2025-01-18 NOTE — PROGRESS NOTES
Wound Care Center Progress Note With Procedure    Fuentes Daley  AGE: 79 y.o.   GENDER: male  : 1945  EPISODE DATE:  2025     Subjective:     Chief Complaint   Patient presents with    Wound Check     Right leg         HISTORY of PRESENT ILLNESS     Fuentes Daley is a 79 y.o. male who presents to the Wound Clinic for a visit for evaluation and treatment of Chronic venous  ulcer(s) of  R lower leg medial, R lower leg lateral, R lower leg anterior, R lower leg posterior.  The condition is of moderate severity. The ulcer has been present for several monthys .  The underlying cause is thought to be venous stasis.  The patients care to date has included care at home from wife. The patient has significant underlying medical conditions as below.         Patient is not a diabetic or a smoker  Not on a blood thinner     Wound Pain Timing/Severity: waxing and waning  Quality of pain: dull, aching  Severity of pain:  4 / 10   Modifying Factors: venous stasis  Associated Signs/Symptoms: edema, erythema, drainage, and pain          Will change regimen to Anasept ointment/stimulant and cover with Sorbact and agile then compression wrap.  Will apply for wound VAC.  Would like to see improved granulation tissue over base of wound.    2024  Continues to have significant sloughing tissue.  Would benefit from wound VAC but patient refusing wound VAC for now.  Will continue with Santyl ointment for this week.  Once able we will apply for Kerecis.                                      1/3/2025  Improved sloughing tissue.  Better granulation tissue.  Approved for Kerecis.  Patient with palpable distal pulses with triphasic DP/PT  signals.  No signs of surrounding infection.    1/10  Tolerated debridement.   Santyl dressing this week with.  Would like wound surface to improve prior to placing Kerecis.    25  Once again patient presents with wound with significant sloughing tissue/necrotic tissue and

## 2025-01-24 ENCOUNTER — HOSPITAL ENCOUNTER (OUTPATIENT)
Dept: WOUND CARE | Age: 80
Discharge: HOME | End: 2025-01-24
Attending: NURSE PRACTITIONER
Payer: COMMERCIAL

## 2025-01-24 VITALS
HEART RATE: 65 BPM | RESPIRATION RATE: 16 BRPM | TEMPERATURE: 98 F | SYSTOLIC BLOOD PRESSURE: 149 MMHG | DIASTOLIC BLOOD PRESSURE: 77 MMHG

## 2025-01-24 DIAGNOSIS — L97.212 VENOUS STASIS ULCER OF RIGHT CALF WITH FAT LAYER EXPOSED, UNSPECIFIED WHETHER VARICOSE VEINS PRESENT (HCC): Primary | ICD-10-CM

## 2025-01-24 DIAGNOSIS — I83.012 VENOUS STASIS ULCER OF RIGHT CALF WITH FAT LAYER EXPOSED, UNSPECIFIED WHETHER VARICOSE VEINS PRESENT (HCC): Primary | ICD-10-CM

## 2025-01-24 PROCEDURE — 11045 DBRDMT SUBQ TISS EACH ADDL: CPT

## 2025-01-24 PROCEDURE — 6370000000 HC RX 637 (ALT 250 FOR IP): Performed by: NURSE PRACTITIONER

## 2025-01-24 PROCEDURE — 11042 DBRDMT SUBQ TIS 1ST 20SQCM/<: CPT

## 2025-01-24 RX ORDER — LIDOCAINE HYDROCHLORIDE 40 MG/ML
SOLUTION TOPICAL ONCE
OUTPATIENT
Start: 2025-01-24 | End: 2025-01-24

## 2025-01-24 RX ORDER — LIDOCAINE HYDROCHLORIDE 20 MG/ML
JELLY TOPICAL ONCE
OUTPATIENT
Start: 2025-01-24 | End: 2025-01-24

## 2025-01-24 RX ORDER — NEOMYCIN/BACITRACIN/POLYMYXINB 3.5-400-5K
OINTMENT (GRAM) TOPICAL ONCE
OUTPATIENT
Start: 2025-01-24 | End: 2025-01-24

## 2025-01-24 RX ORDER — LIDOCAINE 50 MG/G
OINTMENT TOPICAL ONCE
OUTPATIENT
Start: 2025-01-24 | End: 2025-01-24

## 2025-01-24 RX ORDER — BETAMETHASONE DIPROPIONATE 0.5 MG/G
CREAM TOPICAL ONCE
OUTPATIENT
Start: 2025-01-24 | End: 2025-01-24

## 2025-01-24 RX ORDER — GENTAMICIN SULFATE 1 MG/G
OINTMENT TOPICAL ONCE
OUTPATIENT
Start: 2025-01-24 | End: 2025-01-24

## 2025-01-24 RX ORDER — GINSENG 100 MG
CAPSULE ORAL ONCE
OUTPATIENT
Start: 2025-01-24 | End: 2025-01-24

## 2025-01-24 RX ORDER — BACITRACIN ZINC AND POLYMYXIN B SULFATE 500; 1000 [USP'U]/G; [USP'U]/G
OINTMENT TOPICAL ONCE
OUTPATIENT
Start: 2025-01-24 | End: 2025-01-24

## 2025-01-24 RX ORDER — SILVER SULFADIAZINE 10 MG/G
CREAM TOPICAL ONCE
OUTPATIENT
Start: 2025-01-24 | End: 2025-01-24

## 2025-01-24 RX ORDER — LIDOCAINE 40 MG/G
CREAM TOPICAL ONCE
OUTPATIENT
Start: 2025-01-24 | End: 2025-01-24

## 2025-01-24 RX ORDER — SODIUM CHLOR/HYPOCHLOROUS ACID 0.033 %
SOLUTION, IRRIGATION IRRIGATION ONCE
OUTPATIENT
Start: 2025-01-24 | End: 2025-01-24

## 2025-01-24 RX ORDER — MUPIROCIN 20 MG/G
OINTMENT TOPICAL ONCE
OUTPATIENT
Start: 2025-01-24 | End: 2025-01-24

## 2025-01-24 RX ORDER — TRIAMCINOLONE ACETONIDE 1 MG/G
OINTMENT TOPICAL ONCE
OUTPATIENT
Start: 2025-01-24 | End: 2025-01-24

## 2025-01-24 RX ORDER — CLOBETASOL PROPIONATE 0.5 MG/G
OINTMENT TOPICAL ONCE
Status: COMPLETED | OUTPATIENT
Start: 2025-01-24 | End: 2025-01-24

## 2025-01-24 RX ORDER — CLOBETASOL PROPIONATE 0.5 MG/G
OINTMENT TOPICAL ONCE
OUTPATIENT
Start: 2025-01-24 | End: 2025-01-24

## 2025-01-24 RX ADMIN — CLOBETASOL PROPIONATE: 0.5 OINTMENT TOPICAL at 12:27

## 2025-01-24 NOTE — PROGRESS NOTES
Multilayer Compression Wrap   (Not Unna) Below the Knee    NAME:  Fuentes Daley  YOB: 1945  MEDICAL RECORD NUMBER:  [id number]  DATE:  1/24/2025    Multilayer compression wrap: Removed old Multilayer wrap if indicated and wash leg with mild soap/water.  Applied moisturizing agent to dry skin as needed.   Applied primary and secondary dressing as ordered.  Applied multilayered dressing below the knee to right lower leg.  Instructed patient/caregiver not to remove dressing and to keep it clean and dry.   Instructed patient/caregiver on complications to report to provider, such as pain, numbness in toes, heavy drainage, and slippage of dressing.  Instructed patient on purpose of compression dressing and on activity and exercise recommendations.      Electronically signed by LUC HOLLINGSWORTH LPN on 1/24/2025 at 12:27 PM

## 2025-01-24 NOTE — PROGRESS NOTES
Wound Care Center Progress Note With Procedure    Fuentes Daley  AGE: 79 y.o.   GENDER: male  : 1945  EPISODE DATE:  2025     Subjective:     Chief Complaint   Patient presents with    Wound Check     Right lower leg         HISTORY of PRESENT ILLNESS     Fuenets Daley is a 79 y.o. male who presents to the Wound Clinic for a visit for evaluation and treatment of Chronic venous  ulcer(s) of  R lower leg medial, R lower leg lateral, R lower leg anterior, R lower leg posterior.  The condition is of moderate severity. The ulcer has been present for several monthys .  The underlying cause is thought to be venous stasis.  The patients care to date has included care at home from wife. The patient has significant underlying medical conditions as below.         Patient is not a diabetic or a smoker  Not on a blood thinner     Wound Pain Timing/Severity: waxing and waning  Quality of pain: dull, aching  Severity of pain:  4 / 10   Modifying Factors: venous stasis  Associated Signs/Symptoms: edema, erythema, drainage, and pain          Will change regimen to Anasept ointment/stimulant and cover with Sorbact and agile then compression wrap.  Will apply for wound VAC.  Would like to see improved granulation tissue over base of wound.    2024  Continues to have significant sloughing tissue.  Would benefit from wound VAC but patient refusing wound VAC for now.  Will continue with Santyl ointment for this week.  Once able we will apply for Kerecis.                                      1/3/2025  Improved sloughing tissue.  Better granulation tissue.  Approved for Kerecis.  Patient with palpable distal pulses with triphasic DP/PT  signals.  No signs of surrounding infection.    1/10  Tolerated debridement.   Santyl dressing this week with.  Would like wound surface to improve prior to placing Kerecis.    25  Once again patient presents with wound with significant sloughing tissue/necrotic tissue

## 2025-01-24 NOTE — PATIENT INSTRUCTIONS
PHYSICIAN ORDERS AND DISCHARGE INSTRUCTIONS    Wound Care Notes:    Grafts Kerecis  Right medial lower leg  Kerecis #1  1/3/25  Kerecis #2 1/10/25  Kerecis #3 1/10/25       Orders for this week:  2025    FAX TO [facility]    Right Lower Leg and Right Great Toe 2nd toe wounds -- wash with soap and water, pat dry.   Clobetasol to intact skin   Desitin to periwound  Kerecis Applied using versatel, mastsiol and steristrips   Cover with x3 zetuvit  Apply agile between toes  Wrap with Coban 2 full (enclose toes)  Change  and        Dispense 30 day quantity when ordering supplies.  Plan: Patient refusing wound vac.   Kerecis BI faxed for Right medial lower leg. Do not apply per Rainer      Follow Up Instructions:  1 week   Primary Wound Care Provider: Dr Garcia   Call  for any questions or concerns.  Central [phone] for imaging and lab work

## 2025-01-28 ENCOUNTER — TELEPHONE (OUTPATIENT)
Dept: WOUND CARE | Age: 80
End: 2025-01-28

## 2025-01-28 NOTE — TELEPHONE ENCOUNTER
[facility] Nurse Makayla called with concern over patient wound on right lower leg. States the graft was yellow/green and malodorous. She states patient says this is \"normal\". Contacted patient's wife, she was present during dressing change. She states that there wasn't significant drainage. To leave appointment scheduled for Friday. [facility] nurse to return tomorrow.

## 2025-01-31 ENCOUNTER — HOSPITAL ENCOUNTER (OUTPATIENT)
Dept: WOUND CARE | Age: 80
Discharge: HOME | End: 2025-01-31
Attending: NURSE PRACTITIONER
Payer: COMMERCIAL

## 2025-01-31 VITALS
RESPIRATION RATE: 16 BRPM | HEART RATE: 66 BPM | TEMPERATURE: 97.7 F | SYSTOLIC BLOOD PRESSURE: 148 MMHG | DIASTOLIC BLOOD PRESSURE: 99 MMHG

## 2025-01-31 DIAGNOSIS — L97.212 VENOUS STASIS ULCER OF RIGHT CALF WITH FAT LAYER EXPOSED, UNSPECIFIED WHETHER VARICOSE VEINS PRESENT (HCC): Primary | ICD-10-CM

## 2025-01-31 DIAGNOSIS — I83.012 VENOUS STASIS ULCER OF RIGHT CALF WITH FAT LAYER EXPOSED, UNSPECIFIED WHETHER VARICOSE VEINS PRESENT (HCC): Primary | ICD-10-CM

## 2025-01-31 PROCEDURE — 11045 DBRDMT SUBQ TISS EACH ADDL: CPT

## 2025-01-31 PROCEDURE — 11042 DBRDMT SUBQ TIS 1ST 20SQCM/<: CPT

## 2025-01-31 PROCEDURE — 6370000000 HC RX 637 (ALT 250 FOR IP): Performed by: NURSE PRACTITIONER

## 2025-01-31 RX ORDER — CLOBETASOL PROPIONATE 0.5 MG/G
OINTMENT TOPICAL ONCE
Status: COMPLETED | OUTPATIENT
Start: 2025-01-31 | End: 2025-01-31

## 2025-01-31 RX ORDER — TRIAMCINOLONE ACETONIDE 1 MG/G
OINTMENT TOPICAL ONCE
OUTPATIENT
Start: 2025-01-31 | End: 2025-01-31

## 2025-01-31 RX ORDER — BACITRACIN ZINC AND POLYMYXIN B SULFATE 500; 1000 [USP'U]/G; [USP'U]/G
OINTMENT TOPICAL ONCE
OUTPATIENT
Start: 2025-01-31 | End: 2025-01-31

## 2025-01-31 RX ORDER — LIDOCAINE HYDROCHLORIDE 40 MG/ML
SOLUTION TOPICAL ONCE
OUTPATIENT
Start: 2025-01-31 | End: 2025-01-31

## 2025-01-31 RX ORDER — LIDOCAINE HYDROCHLORIDE 20 MG/ML
JELLY TOPICAL ONCE
OUTPATIENT
Start: 2025-01-31 | End: 2025-01-31

## 2025-01-31 RX ORDER — GINSENG 100 MG
CAPSULE ORAL ONCE
OUTPATIENT
Start: 2025-01-31 | End: 2025-01-31

## 2025-01-31 RX ORDER — CLOBETASOL PROPIONATE 0.5 MG/G
OINTMENT TOPICAL ONCE
OUTPATIENT
Start: 2025-01-31 | End: 2025-01-31

## 2025-01-31 RX ORDER — NEOMYCIN/BACITRACIN/POLYMYXINB 3.5-400-5K
OINTMENT (GRAM) TOPICAL ONCE
OUTPATIENT
Start: 2025-01-31 | End: 2025-01-31

## 2025-01-31 RX ORDER — SILVER SULFADIAZINE 10 MG/G
CREAM TOPICAL ONCE
OUTPATIENT
Start: 2025-01-31 | End: 2025-01-31

## 2025-01-31 RX ORDER — LIDOCAINE 50 MG/G
OINTMENT TOPICAL ONCE
OUTPATIENT
Start: 2025-01-31 | End: 2025-01-31

## 2025-01-31 RX ORDER — MUPIROCIN 20 MG/G
OINTMENT TOPICAL ONCE
OUTPATIENT
Start: 2025-01-31 | End: 2025-01-31

## 2025-01-31 RX ORDER — SODIUM CHLOR/HYPOCHLOROUS ACID 0.033 %
SOLUTION, IRRIGATION IRRIGATION ONCE
OUTPATIENT
Start: 2025-01-31 | End: 2025-01-31

## 2025-01-31 RX ORDER — GENTAMICIN SULFATE 1 MG/G
OINTMENT TOPICAL ONCE
OUTPATIENT
Start: 2025-01-31 | End: 2025-01-31

## 2025-01-31 RX ORDER — BETAMETHASONE DIPROPIONATE 0.5 MG/G
CREAM TOPICAL ONCE
OUTPATIENT
Start: 2025-01-31 | End: 2025-01-31

## 2025-01-31 RX ORDER — LIDOCAINE 40 MG/G
CREAM TOPICAL ONCE
OUTPATIENT
Start: 2025-01-31 | End: 2025-01-31

## 2025-01-31 RX ADMIN — CLOBETASOL PROPIONATE: 0.5 OINTMENT TOPICAL at 11:22

## 2025-01-31 NOTE — PATIENT INSTRUCTIONS
PHYSICIAN ORDERS AND DISCHARGE INSTRUCTIONS    Wound Care Notes:    Grafts Kerecis  Right medial lower leg  Kerecis #1  1/3/25  Kerecis #2  25         Orders for this week:  2025    FAX TO [facility]: Order fourflex to apply ASAP. Leave in place until fourflex obtained     Right Lower Leg and Right Great Toe 2nd toe wounds -- wash with soap and water, pat dry.   Clobetasol to intact skin   Desitin to periwound  Apply anasept gel and stimulen powder to wound bed  Cover with x3 zetuvit  Apply agile between toes  Wrap with four flex  (enclose toes)  ChangeChange Monday and Wednesday.       Dispense 30 day quantity when ordering supplies.  Plan: Patient refusing wound vac.   Kerecis BI faxed for Right medial lower leg. Do not apply per Rainer      Follow Up Instructions:  1 week   Primary Wound Care Provider: Dr Garcia   Call  for any questions or concerns.  Central [phone] for imaging and lab work

## 2025-01-31 NOTE — WOUND CARE
.Multilayer Compression Wrap   (Not Unna) Below the Knee    NAME:  Fuentes Daley  YOB: 1945  MEDICAL RECORD NUMBER:  [id number]  DATE:  1/31/2025    Multilayer compression wrap: Removed old Multilayer wrap if indicated and wash leg with mild soap/water.  Applied moisturizing agent to dry skin as needed.   Applied primary and secondary dressing as ordered.  Applied multilayered dressing below the knee to right lower leg.  Instructed patient/caregiver not to remove dressing and to keep it clean and dry.   Instructed patient/caregiver on complications to report to provider, such as pain, numbness in toes, heavy drainage, and slippage of dressing.  Instructed patient on purpose of compression dressing and on activity and exercise recommendations.      Electronically signed by Tonja Yoder RN on 1/31/2025 at 11:21 AM

## 2025-02-07 ENCOUNTER — HOSPITAL ENCOUNTER (OUTPATIENT)
Dept: WOUND CARE | Age: 80
Discharge: HOME | End: 2025-02-07
Attending: NURSE PRACTITIONER
Payer: COMMERCIAL

## 2025-02-07 VITALS
RESPIRATION RATE: 18 BRPM | TEMPERATURE: 98.1 F | HEART RATE: 77 BPM | DIASTOLIC BLOOD PRESSURE: 97 MMHG | SYSTOLIC BLOOD PRESSURE: 112 MMHG

## 2025-02-07 DIAGNOSIS — L97.212 VENOUS STASIS ULCER OF RIGHT CALF WITH FAT LAYER EXPOSED, UNSPECIFIED WHETHER VARICOSE VEINS PRESENT (HCC): Primary | ICD-10-CM

## 2025-02-07 DIAGNOSIS — I83.012 VENOUS STASIS ULCER OF RIGHT CALF WITH FAT LAYER EXPOSED, UNSPECIFIED WHETHER VARICOSE VEINS PRESENT (HCC): Primary | ICD-10-CM

## 2025-02-07 PROCEDURE — 6370000000 HC RX 637 (ALT 250 FOR IP): Performed by: NURSE PRACTITIONER

## 2025-02-07 PROCEDURE — 11045 DBRDMT SUBQ TISS EACH ADDL: CPT

## 2025-02-07 PROCEDURE — 11042 DBRDMT SUBQ TIS 1ST 20SQCM/<: CPT

## 2025-02-07 RX ORDER — LIDOCAINE HYDROCHLORIDE 40 MG/ML
SOLUTION TOPICAL ONCE
OUTPATIENT
Start: 2025-02-07 | End: 2025-02-07

## 2025-02-07 RX ORDER — LIDOCAINE 40 MG/G
CREAM TOPICAL ONCE
OUTPATIENT
Start: 2025-02-07 | End: 2025-02-07

## 2025-02-07 RX ORDER — LIDOCAINE HYDROCHLORIDE 20 MG/ML
JELLY TOPICAL ONCE
OUTPATIENT
Start: 2025-02-07 | End: 2025-02-07

## 2025-02-07 RX ORDER — MUPIROCIN 20 MG/G
OINTMENT TOPICAL ONCE
OUTPATIENT
Start: 2025-02-07 | End: 2025-02-07

## 2025-02-07 RX ORDER — GENTAMICIN SULFATE 1 MG/G
OINTMENT TOPICAL ONCE
OUTPATIENT
Start: 2025-02-07 | End: 2025-02-07

## 2025-02-07 RX ORDER — CLOBETASOL PROPIONATE 0.5 MG/G
OINTMENT TOPICAL ONCE
Status: COMPLETED | OUTPATIENT
Start: 2025-02-07 | End: 2025-02-07

## 2025-02-07 RX ORDER — BETAMETHASONE DIPROPIONATE 0.5 MG/G
CREAM TOPICAL ONCE
OUTPATIENT
Start: 2025-02-07 | End: 2025-02-07

## 2025-02-07 RX ORDER — TRIAMCINOLONE ACETONIDE 1 MG/G
OINTMENT TOPICAL ONCE
OUTPATIENT
Start: 2025-02-07 | End: 2025-02-07

## 2025-02-07 RX ORDER — GINSENG 100 MG
CAPSULE ORAL ONCE
OUTPATIENT
Start: 2025-02-07 | End: 2025-02-07

## 2025-02-07 RX ORDER — BACITRACIN ZINC AND POLYMYXIN B SULFATE 500; 1000 [USP'U]/G; [USP'U]/G
OINTMENT TOPICAL ONCE
OUTPATIENT
Start: 2025-02-07 | End: 2025-02-07

## 2025-02-07 RX ORDER — SODIUM CHLOR/HYPOCHLOROUS ACID 0.033 %
SOLUTION, IRRIGATION IRRIGATION ONCE
OUTPATIENT
Start: 2025-02-07 | End: 2025-02-07

## 2025-02-07 RX ORDER — LIDOCAINE 50 MG/G
OINTMENT TOPICAL ONCE
OUTPATIENT
Start: 2025-02-07 | End: 2025-02-07

## 2025-02-07 RX ORDER — SILVER SULFADIAZINE 10 MG/G
CREAM TOPICAL ONCE
OUTPATIENT
Start: 2025-02-07 | End: 2025-02-07

## 2025-02-07 RX ORDER — CLOBETASOL PROPIONATE 0.5 MG/G
OINTMENT TOPICAL ONCE
OUTPATIENT
Start: 2025-02-07 | End: 2025-02-07

## 2025-02-07 RX ORDER — NEOMYCIN/BACITRACIN/POLYMYXINB 3.5-400-5K
OINTMENT (GRAM) TOPICAL ONCE
OUTPATIENT
Start: 2025-02-07 | End: 2025-02-07

## 2025-02-07 RX ADMIN — CLOBETASOL PROPIONATE: 0.5 OINTMENT TOPICAL at 12:14

## 2025-02-07 ASSESSMENT — PAIN SCALES - GENERAL: PAINLEVEL_OUTOF10: 0

## 2025-02-07 NOTE — PROGRESS NOTES
Multilayer Compression Wrap   (Not Unna) Below the Knee    NAME:  Fuentes Daley  YOB: 1945  MEDICAL RECORD NUMBER:  [id number]  DATE:  2/7/2025    Multilayer compression wrap: Removed old Multilayer wrap if indicated and wash leg with mild soap/water.  Applied moisturizing agent to dry skin as needed.   Applied primary and secondary dressing as ordered.  Applied multilayered dressing below the knee to left lower leg.  Instructed patient/caregiver not to remove dressing and to keep it clean and dry.   Instructed patient/caregiver on complications to report to provider, such as pain, numbness in toes, heavy drainage, and slippage of dressing.  Instructed patient on purpose of compression dressing and on activity and exercise recommendations.      Electronically signed by José Miguel Krishnamurthy RN on 2/7/2025 at 12:15 PM

## 2025-02-07 NOTE — PATIENT INSTRUCTIONS
PHYSICIAN ORDERS AND DISCHARGE INSTRUCTIONS    Wound Care Notes:    Grafts Kerecis  Right medial lower leg  Kerecis #1  1/3/25  Kerecis #2  25         Orders for this week:  2025    FAX TO [facility]: Order fourflex to apply ASAP. Leave in place until fourflex obtained     Right Lower Leg and Right Great Toe 2nd toe wounds -- wash with soap and water, pat dry.   Clobetasol to intact skin   Desitin to periwound  Apply stimulen powder and Ioplex to wound bed  Cover with x3 zetuvit  Apply agile between toes  Wrap with four flex  (enclose toes)  ChangeChange Monday and Wednesday.       Dispense 30 day quantity when ordering supplies.  Plan: Patient refusing wound vac.   Kerecis BI faxed for Right medial lower leg. Do not apply per Rainer      Follow Up Instructions:  1 week   Primary Wound Care Provider: Dr Garcia   Call  for any questions or concerns.  Central [phone] for imaging and lab work

## 2025-02-08 NOTE — PROGRESS NOTES
Wound Care Center Progress Note With Procedure    Fuentes Daley  AGE: 79 y.o.   GENDER: male  : 1945  EPISODE DATE:  2025     Subjective:     Chief Complaint   Patient presents with    Wound Check     ble         HISTORY of PRESENT ILLNESS     Fuentes Daley is a 79 y.o. male who presents to the Wound Clinic for a visit for evaluation and treatment of Chronic venous  ulcer(s) of  R lower leg medial, R lower leg lateral, R lower leg anterior, R lower leg posterior.  The condition is of moderate severity. The ulcer has been present for several monthys .  The underlying cause is thought to be venous stasis.  The patients care to date has included care at home from wife. The patient has significant underlying medical conditions as below.         Patient is not a diabetic or a smoker  Not on a blood thinner     Wound Pain Timing/Severity: waxing and waning  Quality of pain: dull, aching  Severity of pain:  4 / 10   Modifying Factors: venous stasis  Associated Signs/Symptoms: edema, erythema, drainage, and pain          Will change regimen to Anasept ointment/stimulant and cover with Sorbact and agile then compression wrap.  Will apply for wound VAC.  Would like to see improved granulation tissue over base of wound.    2024  Continues to have significant sloughing tissue.  Would benefit from wound VAC but patient refusing wound VAC for now.  Will continue with Santyl ointment for this week.  Once able we will apply for Kerecis.                                      1/3/2025  Improved sloughing tissue.  Better granulation tissue.  Approved for Kerecis.  Patient with palpable distal pulses with triphasic DP/PT  signals.  No signs of surrounding infection.    1/10  Tolerated debridement.   Santyl dressing this week with.  Would like wound surface to improve prior to placing Kerecis.    25  Once again patient presents with wound with significant sloughing tissue/necrotic tissue and large

## 2025-02-12 NOTE — PATIENT INSTRUCTIONS
PHYSICIAN ORDERS AND DISCHARGE INSTRUCTIONS    Wound Care Notes:    Grafts Kerecis  Right medial lower leg  Kerecis #1  1/3/25  Kerecis #2  25         Orders for this week:  2025    FAX TO [facility]: Order fourflex to apply ASAP. Leave in place until fourflex obtained     Right Lower Leg and Right Great Toe 2nd toe wounds -- wash with soap and water, pat dry.   Clobetasol to intact skin   Desitin to periwound  Apply stimulen powder and Ioplex to wound bed  Cover with x3 zetuvit  Apply agile between toes  Wrap with four flex  (enclose toes)  Change Monday and Wednesday.       Dispense 30 day quantity when ordering supplies.  Plan: Patient refusing wound vac.   Kerecis BI faxed for Right medial lower leg.      Follow Up Instructions:  1 week   Primary Wound Care Provider: Dr Garcia   Call  for any questions or concerns.  Central [phone] for imaging and lab work

## 2025-02-14 ENCOUNTER — HOSPITAL ENCOUNTER (OUTPATIENT)
Dept: WOUND CARE | Age: 80
Discharge: HOME | End: 2025-02-14
Attending: NURSE PRACTITIONER

## 2025-03-07 ENCOUNTER — HOSPITAL ENCOUNTER (OUTPATIENT)
Dept: WOUND CARE | Age: 80
Discharge: HOME | End: 2025-03-07
Attending: NURSE PRACTITIONER

## 2025-03-07 NOTE — PATIENT INSTRUCTIONS
PHYSICIAN ORDERS AND DISCHARGE INSTRUCTIONS    Wound Care Notes:    Grafts Kerecis  Right medial lower leg  Kerecis #1  1/3/25  Kerecis #2  25         Orders for this week:  3/7/2025    FAX TO [facility]: Order fourflex to apply ASAP. Leave in place until fourflex obtained     Right Lower Leg and Right Great Toe 2nd toe wounds -- wash with soap and water, pat dry.   Clobetasol to intact skin   Desitin to periwound  Apply stimulen powder and Ioplex to wound bed  Cover with x3 zetuvit  Apply agile between toes  Wrap with four flex  (enclose toes)  Change Monday and Wednesday.       Dispense 30 day quantity when ordering supplies.  Plan: Patient refusing wound vac.   Kerecis BI faxed for Right medial lower leg.      Follow Up Instructions:  1 week   Primary Wound Care Provider: Dr Garcia   Call  for any questions or concerns.  Central [phone] for imaging and lab work

## 2025-03-21 ENCOUNTER — HOSPITAL ENCOUNTER (OUTPATIENT)
Dept: WOUND CARE | Age: 80
Discharge: HOME | End: 2025-03-21
Attending: NURSE PRACTITIONER
Payer: COMMERCIAL

## 2025-03-21 VITALS
DIASTOLIC BLOOD PRESSURE: 87 MMHG | HEART RATE: 67 BPM | RESPIRATION RATE: 18 BRPM | TEMPERATURE: 96.9 F | SYSTOLIC BLOOD PRESSURE: 171 MMHG

## 2025-03-21 DIAGNOSIS — I83.012 VENOUS STASIS ULCER OF RIGHT CALF WITH FAT LAYER EXPOSED, UNSPECIFIED WHETHER VARICOSE VEINS PRESENT: Primary | ICD-10-CM

## 2025-03-21 DIAGNOSIS — L97.212 VENOUS STASIS ULCER OF RIGHT CALF WITH FAT LAYER EXPOSED, UNSPECIFIED WHETHER VARICOSE VEINS PRESENT: Primary | ICD-10-CM

## 2025-03-21 PROCEDURE — 11042 DBRDMT SUBQ TIS 1ST 20SQCM/<: CPT | Performed by: SURGERY

## 2025-03-21 PROCEDURE — 11042 DBRDMT SUBQ TIS 1ST 20SQCM/<: CPT

## 2025-03-21 PROCEDURE — 6370000000 HC RX 637 (ALT 250 FOR IP): Performed by: NURSE PRACTITIONER

## 2025-03-21 PROCEDURE — 11045 DBRDMT SUBQ TISS EACH ADDL: CPT | Performed by: SURGERY

## 2025-03-21 PROCEDURE — 11045 DBRDMT SUBQ TISS EACH ADDL: CPT

## 2025-03-21 RX ORDER — CLOBETASOL PROPIONATE 0.5 MG/G
OINTMENT TOPICAL ONCE
OUTPATIENT
Start: 2025-03-21 | End: 2025-03-21

## 2025-03-21 RX ORDER — LIDOCAINE 40 MG/G
CREAM TOPICAL ONCE
OUTPATIENT
Start: 2025-03-21 | End: 2025-03-21

## 2025-03-21 RX ORDER — LIDOCAINE HYDROCHLORIDE 40 MG/ML
SOLUTION TOPICAL ONCE
OUTPATIENT
Start: 2025-03-21 | End: 2025-03-21

## 2025-03-21 RX ORDER — NEOMYCIN/BACITRACIN/POLYMYXINB 3.5-400-5K
OINTMENT (GRAM) TOPICAL ONCE
OUTPATIENT
Start: 2025-03-21 | End: 2025-03-21

## 2025-03-21 RX ORDER — SODIUM CHLOR/HYPOCHLOROUS ACID 0.033 %
SOLUTION, IRRIGATION IRRIGATION ONCE
OUTPATIENT
Start: 2025-03-21 | End: 2025-03-21

## 2025-03-21 RX ORDER — MUPIROCIN 20 MG/G
OINTMENT TOPICAL ONCE
OUTPATIENT
Start: 2025-03-21 | End: 2025-03-21

## 2025-03-21 RX ORDER — TRIAMCINOLONE ACETONIDE 1 MG/G
OINTMENT TOPICAL ONCE
OUTPATIENT
Start: 2025-03-21 | End: 2025-03-21

## 2025-03-21 RX ORDER — LIDOCAINE 50 MG/G
OINTMENT TOPICAL ONCE
OUTPATIENT
Start: 2025-03-21 | End: 2025-03-21

## 2025-03-21 RX ORDER — LIDOCAINE HYDROCHLORIDE 20 MG/ML
JELLY TOPICAL ONCE
OUTPATIENT
Start: 2025-03-21 | End: 2025-03-21

## 2025-03-21 RX ORDER — GENTAMICIN SULFATE 1 MG/G
OINTMENT TOPICAL ONCE
OUTPATIENT
Start: 2025-03-21 | End: 2025-03-21

## 2025-03-21 RX ORDER — GINSENG 100 MG
CAPSULE ORAL ONCE
OUTPATIENT
Start: 2025-03-21 | End: 2025-03-21

## 2025-03-21 RX ORDER — CLOBETASOL PROPIONATE 0.5 MG/G
OINTMENT TOPICAL ONCE
Status: COMPLETED | OUTPATIENT
Start: 2025-03-21 | End: 2025-03-21

## 2025-03-21 RX ORDER — SILVER SULFADIAZINE 10 MG/G
CREAM TOPICAL ONCE
OUTPATIENT
Start: 2025-03-21 | End: 2025-03-21

## 2025-03-21 RX ORDER — BETAMETHASONE DIPROPIONATE 0.5 MG/G
CREAM TOPICAL ONCE
OUTPATIENT
Start: 2025-03-21 | End: 2025-03-21

## 2025-03-21 RX ORDER — BACITRACIN ZINC AND POLYMYXIN B SULFATE 500; 1000 [USP'U]/G; [USP'U]/G
OINTMENT TOPICAL ONCE
OUTPATIENT
Start: 2025-03-21 | End: 2025-03-21

## 2025-03-21 RX ADMIN — COLLAGENASE SANTYL: 250 OINTMENT TOPICAL at 11:38

## 2025-03-21 RX ADMIN — CLOBETASOL PROPIONATE: 0.5 OINTMENT TOPICAL at 11:38

## 2025-03-21 NOTE — PROGRESS NOTES
Multilayer Compression Wrap   (Not Unna) Below the Knee    NAME:  Fuentes Daley  YOB: 1945  MEDICAL RECORD NUMBER:  [id number]  DATE:  3/21/2025    Multilayer compression wrap: Removed old Multilayer wrap if indicated and wash leg with mild soap/water.  Applied moisturizing agent to dry skin as needed.   Applied primary and secondary dressing as ordered.  Applied multilayered dressing below the knee to right lower leg.  Instructed patient/caregiver not to remove dressing and to keep it clean and dry.   Instructed patient/caregiver on complications to report to provider, such as pain, numbness in toes, heavy drainage, and slippage of dressing.  Instructed patient on purpose of compression dressing and on activity and exercise recommendations.      Electronically signed by José Miguel Krishnamurthy RN on 3/21/2025 at 11:36 AM  
exudate.  Somewhat improved from last visit due Santyl over wound this week.  He unfortunately again declines wound VAC from which she would greatly benefit.  Will hold off on Kerecis for now as there is significant mount of necrotic tissue and until this improves I do not feel Kerecis would provide significant benefit.   I did discuss I feel wound would likely not improve or worsen if he does not have wound VAC however he is still noncompliant with this recommendation.     1/24/25   In my opinion wound does not progress with great granulation tissue however there is some improvement in sloughing tissue which was fully debrided to healthy bleeding tissue today.  Patient would now benefit from Kerecis placement.  I am not concerned with wound infection I am not concerned with underlying osteo at this time as bone is fully covered with viable tissue.  Patient does not warrant any wound culture at this time.  No imaging at this time indicated.  We will apply Kerecis today.    1/31/2025  Wound service improved this week.  Not approved for further large fenestrated graft therefore we do not have product to place on wound.      3/21/25  Patient here for first visit in 7 weeks.  Has been continue with dressing changes per wound care at home 3 times a week.  Tolerated debridement today.  Will continue with Santyl with dressing changes this week and reapply for Kerecis graft.        PAST MEDICAL HISTORY        Diagnosis Date    Chronic venous hypertension with ulcer and inflammation involving right side (HCC)     RLE    Hypertension     past    Iron deficiency     Lymphoma Dx 11-11    Hx of B cell lymphoma-in remission since 2012       PAST SURGICAL HISTORY    Past Surgical History:   Procedure Laterality Date    TUNNELED VENOUS PORT PLACEMENT      TUNNELED VENOUS PORT PLACEMENT      removed 5/5 2012       FAMILY HISTORY    Family History   Problem Relation Age of Onset    Early Death Mother 47    Diabetes Father     Heart

## 2025-03-21 NOTE — PATIENT INSTRUCTIONS
PHYSICIAN ORDERS AND DISCHARGE INSTRUCTIONS    Wound Care Notes:    Grafts Kerecis  Right medial lower leg  Kerecis #1  1/3/25  Kerecis #2  25         Orders for this week:  3/21/2025    FAX TO [facility]: Order fourflex to apply ASAP. Leave in place until fourflex obtained     Right Lower Leg and Right Great Toe 2nd toe wounds -- wash with soap and water, pat dry.   Clobetasol to intact skin   Desitin to periwound  Apply santyl to wound bed  Cover with x3 zetuvit  Apply agile between toes  Wrap with four flex  (enclose toes)  Change Monday and Wednesday.       Dispense 30 day quantity when ordering supplies.  Plan: Patient refusing wound vac.   Kerecis BI faxed for Right medial lower leg.      Follow Up Instructions:  1 week   Primary Wound Care Provider: Dr Garcia   Call  for any questions or concerns.  Central [phone] for imaging and lab work

## 2025-03-28 ENCOUNTER — HOSPITAL ENCOUNTER (OUTPATIENT)
Dept: WOUND CARE | Age: 80
Discharge: HOME | End: 2025-03-28
Attending: NURSE PRACTITIONER
Payer: COMMERCIAL

## 2025-03-28 VITALS
TEMPERATURE: 97.8 F | DIASTOLIC BLOOD PRESSURE: 75 MMHG | RESPIRATION RATE: 20 BRPM | SYSTOLIC BLOOD PRESSURE: 149 MMHG | HEART RATE: 63 BPM

## 2025-03-28 DIAGNOSIS — I83.012 VENOUS STASIS ULCER OF RIGHT CALF WITH FAT LAYER EXPOSED, UNSPECIFIED WHETHER VARICOSE VEINS PRESENT: Primary | ICD-10-CM

## 2025-03-28 DIAGNOSIS — L97.212 VENOUS STASIS ULCER OF RIGHT CALF WITH FAT LAYER EXPOSED, UNSPECIFIED WHETHER VARICOSE VEINS PRESENT: Primary | ICD-10-CM

## 2025-03-28 PROCEDURE — 6370000000 HC RX 637 (ALT 250 FOR IP): Performed by: NURSE PRACTITIONER

## 2025-03-28 PROCEDURE — 11042 DBRDMT SUBQ TIS 1ST 20SQCM/<: CPT

## 2025-03-28 PROCEDURE — 11046 DBRDMT MUSC&/FSCA EA ADDL: CPT | Performed by: SURGERY

## 2025-03-28 PROCEDURE — 11045 DBRDMT SUBQ TISS EACH ADDL: CPT

## 2025-03-28 PROCEDURE — 11043 DBRDMT MUSC&/FSCA 1ST 20/<: CPT | Performed by: SURGERY

## 2025-03-28 RX ORDER — BETAMETHASONE DIPROPIONATE 0.5 MG/G
CREAM TOPICAL ONCE
OUTPATIENT
Start: 2025-03-28 | End: 2025-03-28

## 2025-03-28 RX ORDER — LIDOCAINE HYDROCHLORIDE 20 MG/ML
JELLY TOPICAL ONCE
OUTPATIENT
Start: 2025-03-28 | End: 2025-03-28

## 2025-03-28 RX ORDER — LIDOCAINE HYDROCHLORIDE 40 MG/ML
SOLUTION TOPICAL ONCE
OUTPATIENT
Start: 2025-03-28 | End: 2025-03-28

## 2025-03-28 RX ORDER — GINSENG 100 MG
CAPSULE ORAL ONCE
OUTPATIENT
Start: 2025-03-28 | End: 2025-03-28

## 2025-03-28 RX ORDER — BACITRACIN ZINC AND POLYMYXIN B SULFATE 500; 1000 [USP'U]/G; [USP'U]/G
OINTMENT TOPICAL ONCE
OUTPATIENT
Start: 2025-03-28 | End: 2025-03-28

## 2025-03-28 RX ORDER — CLOBETASOL PROPIONATE 0.5 MG/G
OINTMENT TOPICAL ONCE
OUTPATIENT
Start: 2025-03-28 | End: 2025-03-28

## 2025-03-28 RX ORDER — LIDOCAINE 50 MG/G
OINTMENT TOPICAL ONCE
OUTPATIENT
Start: 2025-03-28 | End: 2025-03-28

## 2025-03-28 RX ORDER — TRIAMCINOLONE ACETONIDE 1 MG/G
OINTMENT TOPICAL ONCE
OUTPATIENT
Start: 2025-03-28 | End: 2025-03-28

## 2025-03-28 RX ORDER — NEOMYCIN/BACITRACIN/POLYMYXINB 3.5-400-5K
OINTMENT (GRAM) TOPICAL ONCE
OUTPATIENT
Start: 2025-03-28 | End: 2025-03-28

## 2025-03-28 RX ORDER — LIDOCAINE 40 MG/G
CREAM TOPICAL ONCE
OUTPATIENT
Start: 2025-03-28 | End: 2025-03-28

## 2025-03-28 RX ORDER — SODIUM CHLOR/HYPOCHLOROUS ACID 0.033 %
SOLUTION, IRRIGATION IRRIGATION ONCE
OUTPATIENT
Start: 2025-03-28 | End: 2025-03-28

## 2025-03-28 RX ORDER — CLOBETASOL PROPIONATE 0.5 MG/G
OINTMENT TOPICAL ONCE
Status: COMPLETED | OUTPATIENT
Start: 2025-03-28 | End: 2025-03-28

## 2025-03-28 RX ORDER — MUPIROCIN 20 MG/G
OINTMENT TOPICAL ONCE
OUTPATIENT
Start: 2025-03-28 | End: 2025-03-28

## 2025-03-28 RX ORDER — SILVER SULFADIAZINE 10 MG/G
CREAM TOPICAL ONCE
OUTPATIENT
Start: 2025-03-28 | End: 2025-03-28

## 2025-03-28 RX ORDER — GENTAMICIN SULFATE 1 MG/G
OINTMENT TOPICAL ONCE
OUTPATIENT
Start: 2025-03-28 | End: 2025-03-28

## 2025-03-28 RX ADMIN — COLLAGENASE SANTYL: 250 OINTMENT TOPICAL at 11:33

## 2025-03-28 RX ADMIN — CLOBETASOL PROPIONATE: 0.5 OINTMENT TOPICAL at 11:33

## 2025-03-28 NOTE — PROGRESS NOTES
Wound Care Center Progress Note With Procedure    Fuentes Daley  AGE: 80 y.o.   GENDER: male  : 1945  EPISODE DATE:  3/28/2025     Subjective:     Chief Complaint   Patient presents with    Wound Check     Left leg         HISTORY of PRESENT ILLNESS     Fuentes Daley is a 79 y.o. male who presents to the Wound Clinic for a visit for evaluation and treatment of Chronic venous  ulcer(s) of  R lower leg medial, R lower leg lateral, R lower leg anterior, R lower leg posterior.  The condition is of moderate severity. The ulcer has been present for several monthys .  The underlying cause is thought to be venous stasis.  The patients care to date has included care at home from wife. The patient has significant underlying medical conditions as below.         Patient is not a diabetic or a smoker  Not on a blood thinner     Wound Pain Timing/Severity: waxing and waning  Quality of pain: dull, aching  Severity of pain:  4 / 10   Modifying Factors: venous stasis  Associated Signs/Symptoms: edema, erythema, drainage, and pain          Will change regimen to Anasept ointment/stimulant and cover with Sorbact and agile then compression wrap.  Will apply for wound VAC.  Would like to see improved granulation tissue over base of wound.    2024  Continues to have significant sloughing tissue.  Would benefit from wound VAC but patient refusing wound VAC for now.  Will continue with Santyl ointment for this week.  Once able we will apply for Kerecis.                                      1/3/2025  Improved sloughing tissue.  Better granulation tissue.  Approved for Kerecis.  Patient with palpable distal pulses with triphasic DP/PT  signals.  No signs of surrounding infection.    1/10  Tolerated debridement.   Santyl dressing this week with.  Would like wound surface to improve prior to placing Kerecis.    25  Once again patient presents with wound with significant sloughing tissue/necrotic tissue and large

## 2025-03-28 NOTE — WOUND CARE
.Multilayer Compression Wrap   (Not Unna) Below the Knee    NAME:  Fuentes Daley  YOB: 1945  MEDICAL RECORD NUMBER:  [id number]  DATE:  3/28/2025    Multilayer compression wrap: Removed old Multilayer wrap if indicated and wash leg with mild soap/water.  Applied moisturizing agent to dry skin as needed.   Applied primary and secondary dressing as ordered.  Applied multilayered dressing below the knee to right lower leg.  Instructed patient/caregiver not to remove dressing and to keep it clean and dry.   Instructed patient/caregiver on complications to report to provider, such as pain, numbness in toes, heavy drainage, and slippage of dressing.  Instructed patient on purpose of compression dressing and on activity and exercise recommendations.      Electronically signed by Tonja Yoder RN on 3/28/2025 at 11:33 AM

## 2025-03-28 NOTE — WOUND CARE
.Nonselective enzymatic debridement performed with Santyl per physician order to wound(s) of the right leg   Patient tolerated the procedure well.

## 2025-03-28 NOTE — PATIENT INSTRUCTIONS
PHYSICIAN ORDERS AND DISCHARGE INSTRUCTIONS    Wound Care Notes:    Grafts Kerecis  Right medial lower leg  Kerecis #1  1/3/25  Kerecis #2  25         Orders for this week:  3/28/2025    FAX TO [facility]: Order fourflex to apply ASAP. Leave in place until fourflex obtained     Right Lower Leg and Right Great Toe 2nd toe wounds -- wash with soap and water, pat dry.   Clobetasol to intact skin   Desitin to periwound  Apply santyl to wound bed  Cover with x3 zetuvit  Apply agile between toes  Wrap with four flex  (enclose toes)  Change Monday and Wednesday.       Dispense 30 day quantity when ordering supplies.  Plan: Patient refusing wound vac.   New Kerecis BI faxed for Right medial lower leg 25      Follow Up Instructions:  1 week   Primary Wound Care Provider: Dr Garcia   Call  for any questions or concerns.  Central [phone] for imaging and lab work

## 2025-03-28 NOTE — PLAN OF CARE
Insurance Verification Request      Ordering Center:     [facility]  [address] [facility] [address] [facility] [address]  Phone: [phone]  Fax: [phone]    Facility NPI: [id number]    Provider Information:     Provider's Name Dr Walker Garcia   Provider's NPI: [id number]  [facility]   [address] [facility] [address]    Patient Information:     Fuentes Ellis  [address] [facility] [address]   [phone]   : 1945  AGE: 80 y.o.     GENDER: male   TODAYS DATE:  3/28/2025    Insurance:     PRIMARY INSURANCE:  Plan: MEDIGOLD  Coverage: MEDIGOLD  Effective Date: 2015  Group Number: [unfilled]  Subscriber Number: [id number] - (Medicare Managed)    Payer/Plan Subscr  Sex Relation Sub. Ins. ID Effective Group Num   1. MEDIGOLD - ME* FUENTES ELLIS 1945 Male Self [id number] 1/1/15                                    [address]   2. MEDICARE - ME* FUENTES ELLIS 1945 Male Self [id number] 3/1/11                                    [address] [address]       Application/product Information:     Benefit Verification Request:    Reason for Request: New Wound    Kerecis Fish Omega 3 FAX#     Trunk/Arms/Legs 93165 (1st 25 sq cm) and Trunk/Arms/Legs 20264 (additional 25 sq cm)      Has patient been treated with any other Skin Substitute on this Wound:     Yes Name of the product or produces Kerecis , Number of previous applications 2, Wound size 114 sq/cm, Number of wounds 1,  Location of wound Right Medial Lower leg , Duration of wound 6 months , Estimated number of applications 2     If yes, How many previous applications: 2    WOUND #: 1    Total Square CM: 114    Procedure setting: Other 19    Is the Patient currently in a skilled nursing facility: No     Is the wound work related: No    Procedure date: 2025    Patient Information:     Dx Codes: I83.029, L97.929      Problem List Items Addressed This Visit          Musculoskeletal and Integument    WD-Venous stasis

## 2025-04-04 ENCOUNTER — HOSPITAL ENCOUNTER (OUTPATIENT)
Dept: WOUND CARE | Age: 80
Discharge: HOME | End: 2025-04-04
Attending: NURSE PRACTITIONER
Payer: COMMERCIAL

## 2025-04-04 VITALS
SYSTOLIC BLOOD PRESSURE: 146 MMHG | RESPIRATION RATE: 20 BRPM | HEART RATE: 61 BPM | TEMPERATURE: 98 F | DIASTOLIC BLOOD PRESSURE: 80 MMHG

## 2025-04-04 DIAGNOSIS — L97.212 VENOUS STASIS ULCER OF RIGHT CALF WITH FAT LAYER EXPOSED, UNSPECIFIED WHETHER VARICOSE VEINS PRESENT: Primary | ICD-10-CM

## 2025-04-04 DIAGNOSIS — I83.012 VENOUS STASIS ULCER OF RIGHT CALF WITH FAT LAYER EXPOSED, UNSPECIFIED WHETHER VARICOSE VEINS PRESENT: Primary | ICD-10-CM

## 2025-04-04 PROCEDURE — 11046 DBRDMT MUSC&/FSCA EA ADDL: CPT | Performed by: SURGERY

## 2025-04-04 PROCEDURE — 11045 DBRDMT SUBQ TISS EACH ADDL: CPT

## 2025-04-04 PROCEDURE — 11043 DBRDMT MUSC&/FSCA 1ST 20/<: CPT

## 2025-04-04 PROCEDURE — 11043 DBRDMT MUSC&/FSCA 1ST 20/<: CPT | Performed by: SURGERY

## 2025-04-04 PROCEDURE — 11046 DBRDMT MUSC&/FSCA EA ADDL: CPT

## 2025-04-04 PROCEDURE — 11042 DBRDMT SUBQ TIS 1ST 20SQCM/<: CPT

## 2025-04-04 RX ORDER — LIDOCAINE 50 MG/G
OINTMENT TOPICAL ONCE
OUTPATIENT
Start: 2025-04-04 | End: 2025-04-04

## 2025-04-04 RX ORDER — NEOMYCIN/BACITRACIN/POLYMYXINB 3.5-400-5K
OINTMENT (GRAM) TOPICAL ONCE
OUTPATIENT
Start: 2025-04-04 | End: 2025-04-04

## 2025-04-04 RX ORDER — GENTAMICIN SULFATE 1 MG/G
OINTMENT TOPICAL ONCE
OUTPATIENT
Start: 2025-04-04 | End: 2025-04-04

## 2025-04-04 RX ORDER — SODIUM CHLOR/HYPOCHLOROUS ACID 0.033 %
SOLUTION, IRRIGATION IRRIGATION ONCE
OUTPATIENT
Start: 2025-04-04 | End: 2025-04-04

## 2025-04-04 RX ORDER — CLOBETASOL PROPIONATE 0.5 MG/G
OINTMENT TOPICAL ONCE
OUTPATIENT
Start: 2025-04-04 | End: 2025-04-04

## 2025-04-04 RX ORDER — LIDOCAINE HYDROCHLORIDE 40 MG/ML
SOLUTION TOPICAL ONCE
OUTPATIENT
Start: 2025-04-04 | End: 2025-04-04

## 2025-04-04 RX ORDER — SILVER SULFADIAZINE 10 MG/G
CREAM TOPICAL ONCE
OUTPATIENT
Start: 2025-04-04 | End: 2025-04-04

## 2025-04-04 RX ORDER — TRIAMCINOLONE ACETONIDE 1 MG/G
OINTMENT TOPICAL ONCE
OUTPATIENT
Start: 2025-04-04 | End: 2025-04-04

## 2025-04-04 RX ORDER — LIDOCAINE HYDROCHLORIDE 20 MG/ML
JELLY TOPICAL ONCE
OUTPATIENT
Start: 2025-04-04 | End: 2025-04-04

## 2025-04-04 RX ORDER — GINSENG 100 MG
CAPSULE ORAL ONCE
OUTPATIENT
Start: 2025-04-04 | End: 2025-04-04

## 2025-04-04 RX ORDER — BACITRACIN ZINC AND POLYMYXIN B SULFATE 500; 1000 [USP'U]/G; [USP'U]/G
OINTMENT TOPICAL ONCE
OUTPATIENT
Start: 2025-04-04 | End: 2025-04-04

## 2025-04-04 RX ORDER — MUPIROCIN 20 MG/G
OINTMENT TOPICAL ONCE
OUTPATIENT
Start: 2025-04-04 | End: 2025-04-04

## 2025-04-04 RX ORDER — LIDOCAINE 40 MG/G
CREAM TOPICAL ONCE
OUTPATIENT
Start: 2025-04-04 | End: 2025-04-04

## 2025-04-04 RX ORDER — BETAMETHASONE DIPROPIONATE 0.5 MG/G
CREAM TOPICAL ONCE
OUTPATIENT
Start: 2025-04-04 | End: 2025-04-04

## 2025-04-04 NOTE — PROGRESS NOTES
Wound Care Center Progress Note With Procedure    Fuentes Daley  AGE: 80 y.o.   GENDER: male  : 1945  EPISODE DATE:  2025     Subjective:     Chief Complaint   Patient presents with    Wound Check     Rt leg         HISTORY of PRESENT ILLNESS     Fuentes Daley is a 79 y.o. male who presents to the Wound Clinic for a visit for evaluation and treatment of Chronic venous  ulcer(s) of  R lower leg medial, R lower leg lateral, R lower leg anterior, R lower leg posterior.  The condition is of moderate severity. The ulcer has been present for several monthys .  The underlying cause is thought to be venous stasis.  The patients care to date has included care at home from wife. The patient has significant underlying medical conditions as below.         Patient is not a diabetic or a smoker  Not on a blood thinner     Wound Pain Timing/Severity: waxing and waning  Quality of pain: dull, aching  Severity of pain:  4 / 10   Modifying Factors: venous stasis  Associated Signs/Symptoms: edema, erythema, drainage, and pain          Will change regimen to Anasept ointment/stimulant and cover with Sorbact and agile then compression wrap.  Will apply for wound VAC.  Would like to see improved granulation tissue over base of wound.    2024  Continues to have significant sloughing tissue.  Would benefit from wound VAC but patient refusing wound VAC for now.  Will continue with Santyl ointment for this week.  Once able we will apply for Kerecis.                                      1/3/2025  Improved sloughing tissue.  Better granulation tissue.  Approved for Kerecis.  Patient with palpable distal pulses with triphasic DP/PT  signals.  No signs of surrounding infection.    1/10  Tolerated debridement.   Santyl dressing this week with.  Would like wound surface to improve prior to placing Kerecis.    25  Once again patient presents with wound with significant sloughing tissue/necrotic tissue and large

## 2025-04-04 NOTE — PROGRESS NOTES
Multilayer Compression Wrap   (Not Unna) Below the Knee    NAME:  Fuentes Daley  YOB: 1945  MEDICAL RECORD NUMBER:  [id number]  DATE:  4/4/2025    Multilayer compression wrap: Removed old Multilayer wrap if indicated and wash leg with mild soap/water.  Applied moisturizing agent to dry skin as needed.   Applied primary and secondary dressing as ordered.  Applied multilayered dressing below the knee to right lower leg.  Instructed patient/caregiver not to remove dressing and to keep it clean and dry.   Instructed patient/caregiver on complications to report to provider, such as pain, numbness in toes, heavy drainage, and slippage of dressing.  Instructed patient on purpose of compression dressing and on activity and exercise recommendations.      Electronically signed by Yolis Carreon LPN on 4/4/2025 at 11:09 AM

## 2025-04-04 NOTE — PATIENT INSTRUCTIONS
PHYSICIAN ORDERS AND DISCHARGE INSTRUCTIONS    Wound Care Notes:    Grafts Kerecis  Right medial lower leg  Kerecis #1  25  Kerecis #2  25         Orders for this week:  2025    FAX TO [facility]: Order fourflex to apply ASAP. Leave in place until fourflex obtained     Right Lower Leg -- wash with soap and water, pat dry.   Clobetasol to intact skin   Desitin to periwound  Apply anasept and puracol ag  to wound bed  Cover with x3 zetuvit  Apply agile between toes  Wrap with four flex  (enclose toes)  Change Monday and Wednesday.       Dispense 30 day quantity when ordering supplies.  Plan: Patient refusing wound vac.   New Kerecis BI faxed for Right medial lower leg 25      Follow Up Instructions:  1 week   Primary Wound Care Provider: Dr Garcia   Call  for any questions or concerns.  Central [phone] for imaging and lab work

## 2025-04-11 ENCOUNTER — HOSPITAL ENCOUNTER (OUTPATIENT)
Dept: WOUND CARE | Age: 80
Discharge: HOME | End: 2025-04-11
Attending: NURSE PRACTITIONER
Payer: COMMERCIAL

## 2025-04-11 VITALS
RESPIRATION RATE: 20 BRPM | HEART RATE: 58 BPM | TEMPERATURE: 98.4 F | SYSTOLIC BLOOD PRESSURE: 140 MMHG | DIASTOLIC BLOOD PRESSURE: 76 MMHG

## 2025-04-11 DIAGNOSIS — I83.012 VENOUS STASIS ULCER OF RIGHT CALF WITH FAT LAYER EXPOSED, UNSPECIFIED WHETHER VARICOSE VEINS PRESENT: Primary | ICD-10-CM

## 2025-04-11 DIAGNOSIS — L97.212 VENOUS STASIS ULCER OF RIGHT CALF WITH FAT LAYER EXPOSED, UNSPECIFIED WHETHER VARICOSE VEINS PRESENT: Primary | ICD-10-CM

## 2025-04-11 PROCEDURE — 99213 OFFICE O/P EST LOW 20 MIN: CPT

## 2025-04-11 PROCEDURE — 6370000000 HC RX 637 (ALT 250 FOR IP): Performed by: NURSE PRACTITIONER

## 2025-04-11 PROCEDURE — 11042 DBRDMT SUBQ TIS 1ST 20SQCM/<: CPT

## 2025-04-11 PROCEDURE — 11045 DBRDMT SUBQ TISS EACH ADDL: CPT

## 2025-04-11 RX ORDER — SODIUM CHLOR/HYPOCHLOROUS ACID 0.033 %
SOLUTION, IRRIGATION IRRIGATION ONCE
OUTPATIENT
Start: 2025-04-11 | End: 2025-04-11

## 2025-04-11 RX ORDER — CLOBETASOL PROPIONATE 0.5 MG/G
OINTMENT TOPICAL ONCE
OUTPATIENT
Start: 2025-04-11 | End: 2025-04-11

## 2025-04-11 RX ORDER — NEOMYCIN/BACITRACIN/POLYMYXINB 3.5-400-5K
OINTMENT (GRAM) TOPICAL ONCE
OUTPATIENT
Start: 2025-04-11 | End: 2025-04-11

## 2025-04-11 RX ORDER — TRIAMCINOLONE ACETONIDE 1 MG/G
OINTMENT TOPICAL ONCE
OUTPATIENT
Start: 2025-04-11 | End: 2025-04-11

## 2025-04-11 RX ORDER — GENTAMICIN SULFATE 1 MG/G
OINTMENT TOPICAL ONCE
OUTPATIENT
Start: 2025-04-11 | End: 2025-04-11

## 2025-04-11 RX ORDER — LIDOCAINE 40 MG/G
CREAM TOPICAL ONCE
OUTPATIENT
Start: 2025-04-11 | End: 2025-04-11

## 2025-04-11 RX ORDER — MUPIROCIN 20 MG/G
OINTMENT TOPICAL ONCE
OUTPATIENT
Start: 2025-04-11 | End: 2025-04-11

## 2025-04-11 RX ORDER — BETAMETHASONE DIPROPIONATE 0.5 MG/G
CREAM TOPICAL ONCE
OUTPATIENT
Start: 2025-04-11 | End: 2025-04-11

## 2025-04-11 RX ORDER — LIDOCAINE HYDROCHLORIDE 20 MG/ML
JELLY TOPICAL ONCE
OUTPATIENT
Start: 2025-04-11 | End: 2025-04-11

## 2025-04-11 RX ORDER — LIDOCAINE 50 MG/G
OINTMENT TOPICAL ONCE
OUTPATIENT
Start: 2025-04-11 | End: 2025-04-11

## 2025-04-11 RX ORDER — BACITRACIN ZINC AND POLYMYXIN B SULFATE 500; 1000 [USP'U]/G; [USP'U]/G
OINTMENT TOPICAL ONCE
OUTPATIENT
Start: 2025-04-11 | End: 2025-04-11

## 2025-04-11 RX ORDER — CLOBETASOL PROPIONATE 0.5 MG/G
OINTMENT TOPICAL ONCE
Status: COMPLETED | OUTPATIENT
Start: 2025-04-11 | End: 2025-04-11

## 2025-04-11 RX ORDER — GINSENG 100 MG
CAPSULE ORAL ONCE
OUTPATIENT
Start: 2025-04-11 | End: 2025-04-11

## 2025-04-11 RX ORDER — SILVER SULFADIAZINE 10 MG/G
CREAM TOPICAL ONCE
OUTPATIENT
Start: 2025-04-11 | End: 2025-04-11

## 2025-04-11 RX ORDER — LIDOCAINE HYDROCHLORIDE 40 MG/ML
SOLUTION TOPICAL ONCE
OUTPATIENT
Start: 2025-04-11 | End: 2025-04-11

## 2025-04-11 RX ADMIN — CLOBETASOL PROPIONATE: 0.5 OINTMENT TOPICAL at 11:19

## 2025-04-11 NOTE — PATIENT INSTRUCTIONS
PHYSICIAN ORDERS AND DISCHARGE INSTRUCTIONS    Wound Care Notes:    Grafts Kerecis  Right medial lower leg  Kerecis #1  25  Kerecis #2  25         Orders for this week:  2025    FAX TO [facility]: Order fourflex to apply ASAP. Leave in place until fourflex obtained     Right Lower Leg -- wash with soap and water, pat dry.   Clobetasol to intact skin   Desitin to periwound  Apply anasept and puracol ag to wound bed  Cover with x 3 zetuvit  Apply agile between toes  Wrap with four flex  (enclose toes)  Change Monday and Wednesday.     Right 2nd toe: wash with soap and water. Pat dry.   Apply anasept and puracol ag   Cover with agile   Incorporate into four flex wraps (enclose toes)  Change Monday and Wednesday.       Dispense 30 day quantity when ordering supplies.  Plan: Patient refusing wound vac.   New Kerecis BI faxed for Right medial lower leg 25      Follow Up Instructions:  1 week   Primary Wound Care Provider: Dr Garcia   Call  for any questions or concerns.  Central [phone] for imaging and lab work

## 2025-04-11 NOTE — WOUND CARE
.Multilayer Compression Wrap   (Not Unna) Below the Knee    NAME:  Fuentes Daley  YOB: 1945  MEDICAL RECORD NUMBER:  [id number]  DATE:  4/11/2025    Multilayer compression wrap: Removed old Multilayer wrap if indicated and wash leg with mild soap/water.  Applied moisturizing agent to dry skin as needed.   Applied primary and secondary dressing as ordered.  Applied multilayered dressing below the knee to right lower leg.  Instructed patient/caregiver not to remove dressing and to keep it clean and dry.   Instructed patient/caregiver on complications to report to provider, such as pain, numbness in toes, heavy drainage, and slippage of dressing.  Instructed patient on purpose of compression dressing and on activity and exercise recommendations.      Electronically signed by Tonja Yoder RN on 4/11/2025 at 11:19 AM

## 2025-04-14 ENCOUNTER — OFFICE VISIT (OUTPATIENT)
Dept: INTERNAL MEDICINE CLINIC | Age: 80
End: 2025-04-14
Payer: COMMERCIAL

## 2025-04-14 VITALS
BODY MASS INDEX: 19.5 KG/M2 | HEART RATE: 60 BPM | SYSTOLIC BLOOD PRESSURE: 142 MMHG | DIASTOLIC BLOOD PRESSURE: 80 MMHG | OXYGEN SATURATION: 97 % | WEIGHT: 156 LBS

## 2025-04-14 DIAGNOSIS — I10 ESSENTIAL HYPERTENSION: Primary | ICD-10-CM

## 2025-04-14 DIAGNOSIS — I83.012 VENOUS STASIS ULCER OF RIGHT CALF WITH FAT LAYER EXPOSED WITH VARICOSE VEINS: ICD-10-CM

## 2025-04-14 DIAGNOSIS — L97.212 VENOUS STASIS ULCER OF RIGHT CALF WITH FAT LAYER EXPOSED WITH VARICOSE VEINS: ICD-10-CM

## 2025-04-14 PROCEDURE — 1123F ACP DISCUSS/DSCN MKR DOCD: CPT | Performed by: FAMILY MEDICINE

## 2025-04-14 PROCEDURE — 3079F DIAST BP 80-89 MM HG: CPT | Performed by: FAMILY MEDICINE

## 2025-04-14 PROCEDURE — 3077F SYST BP >= 140 MM HG: CPT | Performed by: FAMILY MEDICINE

## 2025-04-14 PROCEDURE — 99213 OFFICE O/P EST LOW 20 MIN: CPT | Performed by: FAMILY MEDICINE

## 2025-04-14 ASSESSMENT — ENCOUNTER SYMPTOMS
NAUSEA: 0
ABDOMINAL PAIN: 0
SHORTNESS OF BREATH: 0
COUGH: 0

## 2025-04-14 NOTE — PROGRESS NOTES
Fuentes Daley (:  1945) is a 80 y.o. male,established patient, here for evaluation of the following chief complaint(s):  Follow-up (Routine visit. No concerns) and Hypertension      Assessment & Plan   ASSESSMENT/PLAN:    Assessment & Plan  1. Essential hypertension.  He has chosen not to utilize valsartan and prefers to monitor his blood pressure independently at this time.   Continue metoprolol succinate 50 mg daily.  He states he can check a BP with home nurse      2. Venous stasis ulcer, right calf, with exposed fat layer and varicose veins.  He will continue his follow-up appointments with the wound center.    Medications reconciled and discussed with the patient  Return for follow up as scheduled.       Lab Results   Component Value Date    WBC 8.3 2024    HGB 14.3 2024    HCT 42.4 2024    MCV 93.4 2024     2024     Lab Results   Component Value Date    LABA1C 5.5 2024     Lab Results   Component Value Date    .2 2024     Lab Results   Component Value Date     2024    K 5.1 2024     2024    CO2 27 2024    BUN 17 2024    CREATININE 0.7 (L) 2024    GLUCOSE 105 (H) 2024    CALCIUM 9.8 2024    BILITOT 1.3 (H) 2024    ALKPHOS 122 2024    AST 14 (L) 2024    ALT 11 2024    LABGLOM >90 2024    GFRAA >60 2022    AGRATIO 1.6 2024     Lab Results   Component Value Date    TSH 2.64 2024     Lab Results   Component Value Date/Time    COLORU YELLOW 2020 06:45 PM    NITRU NEGATIVE 2020 06:45 PM    NITRU NEGATIVE 2012 11:00 AM    GLUCOSEU >500 2020 06:45 PM    GLUCOSEU neg 2012 11:11 AM    KETUA NEGATIVE 2020 06:45 PM    UROBILINOGEN NORMAL 2020 06:45 PM    BILIRUBINUR NEGATIVE 2020 06:45 PM       Subjective   SUBJECTIVE/OBJECTIVE:    HISTORY OF PRESENT ILLNESS:      History of Present Illness  The patient

## 2025-04-15 DIAGNOSIS — I10 ESSENTIAL HYPERTENSION: ICD-10-CM

## 2025-04-15 RX ORDER — METOPROLOL SUCCINATE 50 MG/1
50 TABLET, EXTENDED RELEASE ORAL DAILY
Qty: 90 TABLET | Refills: 1 | Status: SHIPPED | OUTPATIENT
Start: 2025-04-15

## 2025-04-15 NOTE — PATIENT INSTRUCTIONS
PHYSICIAN ORDERS AND DISCHARGE INSTRUCTIONS    Wound Care Notes:    Grafts Kerecis  Right medial lower leg  Kerecis #1  25  Kerecis #2  25         Orders for this week:  4/15/2025    FAX TO [facility]: Order fourflex to apply ASAP. Leave in place until fourflex obtained     Right Lower Leg -- wash with soap and water, pat dry.   Clobetasol to intact skin   Desitin to periwound  Apply anasept and puracol ag to wound bed  Cover with x 3 zetuvit  Wrap with four flex    Change Monday and Wednesday.       Dispense 30 day quantity when ordering supplies.  Plan: Patient refusing wound vac.   Kerecis BI faxed for Right medial lower leg 25 updated request      Follow Up Instructions:  1 week   Primary Wound Care Provider: Dr Garcia   Call  for any questions or concerns.  Central [phone] for imaging and lab work

## 2025-04-18 ENCOUNTER — HOSPITAL ENCOUNTER (OUTPATIENT)
Dept: WOUND CARE | Age: 80
Discharge: HOME | End: 2025-04-18
Attending: NURSE PRACTITIONER
Payer: COMMERCIAL

## 2025-04-18 VITALS
RESPIRATION RATE: 16 BRPM | TEMPERATURE: 97.5 F | HEART RATE: 65 BPM | DIASTOLIC BLOOD PRESSURE: 85 MMHG | SYSTOLIC BLOOD PRESSURE: 160 MMHG

## 2025-04-18 DIAGNOSIS — I83.012 VENOUS STASIS ULCER OF RIGHT CALF WITH FAT LAYER EXPOSED, UNSPECIFIED WHETHER VARICOSE VEINS PRESENT: Primary | ICD-10-CM

## 2025-04-18 DIAGNOSIS — L97.212 VENOUS STASIS ULCER OF RIGHT CALF WITH FAT LAYER EXPOSED, UNSPECIFIED WHETHER VARICOSE VEINS PRESENT: Primary | ICD-10-CM

## 2025-04-18 PROCEDURE — 11042 DBRDMT SUBQ TIS 1ST 20SQCM/<: CPT

## 2025-04-18 PROCEDURE — 11043 DBRDMT MUSC&/FSCA 1ST 20/<: CPT | Performed by: SURGERY

## 2025-04-18 PROCEDURE — 11045 DBRDMT SUBQ TISS EACH ADDL: CPT

## 2025-04-18 PROCEDURE — 11046 DBRDMT MUSC&/FSCA EA ADDL: CPT | Performed by: SURGERY

## 2025-04-18 RX ORDER — TRIAMCINOLONE ACETONIDE 1 MG/G
OINTMENT TOPICAL ONCE
OUTPATIENT
Start: 2025-04-18 | End: 2025-04-18

## 2025-04-18 RX ORDER — LIDOCAINE 50 MG/G
OINTMENT TOPICAL ONCE
OUTPATIENT
Start: 2025-04-18 | End: 2025-04-18

## 2025-04-18 RX ORDER — CLOBETASOL PROPIONATE 0.5 MG/G
OINTMENT TOPICAL ONCE
OUTPATIENT
Start: 2025-04-18 | End: 2025-04-18

## 2025-04-18 RX ORDER — SILVER SULFADIAZINE 10 MG/G
CREAM TOPICAL ONCE
OUTPATIENT
Start: 2025-04-18 | End: 2025-04-18

## 2025-04-18 RX ORDER — LIDOCAINE HYDROCHLORIDE 20 MG/ML
JELLY TOPICAL ONCE
OUTPATIENT
Start: 2025-04-18 | End: 2025-04-18

## 2025-04-18 RX ORDER — NEOMYCIN/BACITRACIN/POLYMYXINB 3.5-400-5K
OINTMENT (GRAM) TOPICAL ONCE
OUTPATIENT
Start: 2025-04-18 | End: 2025-04-18

## 2025-04-18 RX ORDER — LIDOCAINE 40 MG/G
CREAM TOPICAL ONCE
OUTPATIENT
Start: 2025-04-18 | End: 2025-04-18

## 2025-04-18 RX ORDER — BETAMETHASONE DIPROPIONATE 0.5 MG/G
CREAM TOPICAL ONCE
OUTPATIENT
Start: 2025-04-18 | End: 2025-04-18

## 2025-04-18 RX ORDER — LIDOCAINE HYDROCHLORIDE 40 MG/ML
SOLUTION TOPICAL ONCE
OUTPATIENT
Start: 2025-04-18 | End: 2025-04-18

## 2025-04-18 RX ORDER — GENTAMICIN SULFATE 1 MG/G
OINTMENT TOPICAL ONCE
OUTPATIENT
Start: 2025-04-18 | End: 2025-04-18

## 2025-04-18 RX ORDER — BACITRACIN ZINC AND POLYMYXIN B SULFATE 500; 1000 [USP'U]/G; [USP'U]/G
OINTMENT TOPICAL ONCE
OUTPATIENT
Start: 2025-04-18 | End: 2025-04-18

## 2025-04-18 RX ORDER — SODIUM CHLOR/HYPOCHLOROUS ACID 0.033 %
SOLUTION, IRRIGATION IRRIGATION ONCE
OUTPATIENT
Start: 2025-04-18 | End: 2025-04-18

## 2025-04-18 RX ORDER — MUPIROCIN 20 MG/G
OINTMENT TOPICAL ONCE
OUTPATIENT
Start: 2025-04-18 | End: 2025-04-18

## 2025-04-18 RX ORDER — BACITRACIN ZINC 500 [USP'U]/G
OINTMENT TOPICAL ONCE
OUTPATIENT
Start: 2025-04-18 | End: 2025-04-18

## 2025-04-18 NOTE — PROGRESS NOTES
0 25 1027   Wound Assessment Pink/red 25 1027   Drainage Amount Moderate (25-50%) 25 1027   Drainage Description Serosanguinous 25 1027   Odor None 25 1027   Cassia-wound Assessment Intact 25 1027   Margins Defined edges 25 1027   Wound Thickness Description not for Pressure Injury Full thickness 25 1027   Number of days: 203             Percent of Wound Debrided: 100%    Total Surface Area Debrided: 74 sq cm    Bleeding: Minimal    Hemostasis Achieved: not needed    Procedural Pain: 3  / 10     Post Procedural Pain: 0 / 10     Response to treatment:  Well tolerated by patient.            Plan:       Patient Instructions   PHYSICIAN ORDERS AND DISCHARGE INSTRUCTIONS    Wound Care Notes:    Grafts Kerecis  Right medial lower leg  Kerecis #1  25  Kerecis #2  25         Orders for this week:  4/15/2025    [facility]: Order fourflex to apply ASAP. Leave in place until fourflex obtained     Right Lower Leg -- wash with soap and water, pat dry.   Clobetasol to intact skin   Desitin to periwound  Apply anasept and puracol ag to wound bed  Cover with x 3 zetuvit  Wrap with four flex    Change Monday and Wednesday.       Dispense 30 day quantity when ordering supplies.  Plan: Patient refusing wound vac.   Kerecis BI faxed for Right medial lower leg 25 updated request      Follow Up Instructions:  1 week   Primary Wound Care Provider: Dr Garcia   Call  for any questions or concerns.  Central [phone] for imaging and lab work    Treatment Note      Written Patient Dismissal Instructions Given            Electronically signed by Walker Garcia DO on 2025 at 11:14 AM

## 2025-04-18 NOTE — WOUND CARE
.Multilayer Compression Wrap   (Not Unna) Below the Knee    NAME:  Fuentes Daley  YOB: 1945  MEDICAL RECORD NUMBER:  [id number]  DATE:  4/18/2025    Multilayer compression wrap: Removed old Multilayer wrap if indicated and wash leg with mild soap/water.  Applied moisturizing agent to dry skin as needed.   Applied primary and secondary dressing as ordered.  Applied multilayered dressing below the knee to right lower leg.  Instructed patient/caregiver not to remove dressing and to keep it clean and dry.   Instructed patient/caregiver on complications to report to provider, such as pain, numbness in toes, heavy drainage, and slippage of dressing.  Instructed patient on purpose of compression dressing and on activity and exercise recommendations.      Electronically signed by Tonja Yoder RN on 4/18/2025 at 11:28 AM

## 2025-04-18 NOTE — PLAN OF CARE
Insurance Verification Request      Ordering Center:     [facility]  [address] [address]  Phone: [phone]  Fax: [phone]    Facility NPI: [id number]    Provider Information:     Provider's Name Dr Garcia   Provider's NPI: [id number]    Provider's Address   [address] [facility] [address]    Patient Information:     Fara Ellis  [address] [facility] [address]   [phone]   : 1945  AGE: 80 y.o.     GENDER: male   TODAYS DATE:  2025    Insurance:     PRIMARY INSURANCE:  Plan:   Coverage:   Effective Date:   Group Number: [unfilled]  Subscriber Number: [id number] - (Medicare Managed)    Payer/Plan Subscr  Sex Relation Sub. Ins. ID Effective Group Num   1. MEDIGOLD - ME* FARA ELLIS 1945 Male Self [id number] 1/1/15                                    [address]   2. MEDICARE - ME* FARA ELLIS 1945 Male Self [id number] 3/1/11                                    [address] [address]       Application/product Information:     Benefit Verification Request:    Reason for Request: Additional Applications    Kerecis Fish Omega 3 FAX#     Trunk/Arms/Legs 64934 (1st 25 sq cm) and Trunk/Arms/Legs 72115 (additional 25 sq cm)      Has patient been treated with any other Skin Substitute on this Wound:     Yes Name of the product or produces Kerecis, Number of previous applications 2, Wound size 94.3 sq/cm, Number of wounds 1,  Location of wound Right Medial Lower Leg, Duration of wound 7 months, Estimated number of applications 8     If yes, How many previous applications: 2    WOUND #: 1    Total Square CM: 94.3    Procedure setting: Other 19    Is the Patient currently in a skilled nursing facility: No     Is the wound work related: No    Procedure date: 2025    Patient Information:     Dx Codes: I83.012, L97.212    Problem List Items Addressed This Visit    None      Wound Care Treatment Summary  Initial visit in OP Wound?: No

## 2025-04-25 ENCOUNTER — HOSPITAL ENCOUNTER (OUTPATIENT)
Dept: WOUND CARE | Age: 80
Discharge: HOME | End: 2025-04-25
Attending: NURSE PRACTITIONER
Payer: COMMERCIAL

## 2025-04-25 VITALS
DIASTOLIC BLOOD PRESSURE: 80 MMHG | SYSTOLIC BLOOD PRESSURE: 143 MMHG | HEART RATE: 71 BPM | TEMPERATURE: 98 F | RESPIRATION RATE: 18 BRPM

## 2025-04-25 DIAGNOSIS — L97.212 VENOUS STASIS ULCER OF RIGHT CALF WITH FAT LAYER EXPOSED, UNSPECIFIED WHETHER VARICOSE VEINS PRESENT (HCC): Primary | ICD-10-CM

## 2025-04-25 DIAGNOSIS — I83.012 VENOUS STASIS ULCER OF RIGHT CALF WITH FAT LAYER EXPOSED, UNSPECIFIED WHETHER VARICOSE VEINS PRESENT (HCC): Primary | ICD-10-CM

## 2025-04-25 PROCEDURE — 15271 SKIN SUB GRAFT TRNK/ARM/LEG: CPT

## 2025-04-25 PROCEDURE — 15272 SKIN SUB GRAFT T/A/L ADD-ON: CPT

## 2025-04-25 PROCEDURE — 6370000000 HC RX 637 (ALT 250 FOR IP): Performed by: NURSE PRACTITIONER

## 2025-04-25 RX ORDER — LIDOCAINE HYDROCHLORIDE 20 MG/ML
JELLY TOPICAL ONCE
OUTPATIENT
Start: 2025-04-25 | End: 2025-04-25

## 2025-04-25 RX ORDER — NEOMYCIN/BACITRACIN/POLYMYXINB 3.5-400-5K
OINTMENT (GRAM) TOPICAL ONCE
OUTPATIENT
Start: 2025-04-25 | End: 2025-04-25

## 2025-04-25 RX ORDER — LIDOCAINE 50 MG/G
OINTMENT TOPICAL ONCE
OUTPATIENT
Start: 2025-04-25 | End: 2025-04-25

## 2025-04-25 RX ORDER — BACITRACIN ZINC 500 [USP'U]/G
OINTMENT TOPICAL ONCE
OUTPATIENT
Start: 2025-04-25 | End: 2025-04-25

## 2025-04-25 RX ORDER — GENTAMICIN SULFATE 1 MG/G
OINTMENT TOPICAL ONCE
OUTPATIENT
Start: 2025-04-25 | End: 2025-04-25

## 2025-04-25 RX ORDER — TRIAMCINOLONE ACETONIDE 1 MG/G
OINTMENT TOPICAL ONCE
OUTPATIENT
Start: 2025-04-25 | End: 2025-04-25

## 2025-04-25 RX ORDER — CLOBETASOL PROPIONATE 0.5 MG/G
OINTMENT TOPICAL ONCE
Status: COMPLETED | OUTPATIENT
Start: 2025-04-25 | End: 2025-04-25

## 2025-04-25 RX ORDER — LIDOCAINE 40 MG/G
CREAM TOPICAL ONCE
OUTPATIENT
Start: 2025-04-25 | End: 2025-04-25

## 2025-04-25 RX ORDER — CLOBETASOL PROPIONATE 0.5 MG/G
OINTMENT TOPICAL ONCE
OUTPATIENT
Start: 2025-04-25 | End: 2025-04-25

## 2025-04-25 RX ORDER — MUPIROCIN 20 MG/G
OINTMENT TOPICAL ONCE
OUTPATIENT
Start: 2025-04-25 | End: 2025-04-25

## 2025-04-25 RX ORDER — SILVER SULFADIAZINE 10 MG/G
CREAM TOPICAL ONCE
OUTPATIENT
Start: 2025-04-25 | End: 2025-04-25

## 2025-04-25 RX ORDER — SODIUM CHLOR/HYPOCHLOROUS ACID 0.033 %
SOLUTION, IRRIGATION IRRIGATION ONCE
OUTPATIENT
Start: 2025-04-25 | End: 2025-04-25

## 2025-04-25 RX ORDER — BETAMETHASONE DIPROPIONATE 0.5 MG/G
CREAM TOPICAL ONCE
OUTPATIENT
Start: 2025-04-25 | End: 2025-04-25

## 2025-04-25 RX ORDER — BACITRACIN ZINC AND POLYMYXIN B SULFATE 500; 1000 [USP'U]/G; [USP'U]/G
OINTMENT TOPICAL ONCE
OUTPATIENT
Start: 2025-04-25 | End: 2025-04-25

## 2025-04-25 RX ORDER — LIDOCAINE HYDROCHLORIDE 40 MG/ML
SOLUTION TOPICAL ONCE
OUTPATIENT
Start: 2025-04-25 | End: 2025-04-25

## 2025-04-25 RX ADMIN — CLOBETASOL PROPIONATE: 0.5 OINTMENT TOPICAL at 11:16

## 2025-04-25 ASSESSMENT — PAIN SCALES - GENERAL: PAINLEVEL_OUTOF10: 0

## 2025-04-25 NOTE — PLAN OF CARE
Kerecis Marigen Treatment Note    NAME:  Fuentes Daley  YOB: 1945  MEDICAL RECORD NUMBER:  [id number]  DATE:  4/25/2025    Goal:  Patient will receive safe and proper application of skin substitute. Patient will comply with caring for dressing, and reporting complications.     The expiration date is checked immediately before use., The package was intact prior to use and no damage was noted., Kerecis Omega3 is stored at room temperature.,  Kerecis Omega3 was hydrated with sterile normal saline per the provider.,  Kerecis Omega3 was removed from protective sterile packaging by the provider and applied to the prepared ulcer bed. ,  Kerecis Omega3 was applied to Right Lower Leg  , Keresis Omega 3 and affixed with steri-strips by the provider.,  Kerecis Omega3 was covered with non-adherent ulcer dressing. , Applied Zetuvit and Compression wrap over non-adherent., Applied dry gauze and/or roll gauze. , Applied appropriate off-loading device., The patient/caregiver was instructed not to remove the dressing and to keep it clean and dry., The patient/family/caregiver was instructed on the need for offloading and elevation of the affected extremity and on using the prescribed offloading device., and The patient/family/caregiver was instructed on signs and symptoms of complication to report, such as draining through dressing, dressing falling down/slipping, getting wet, or severe pain or tingling.     Guidelines followed  Kerecis may only be used every 12 months per wound.      Date of first application of Kerecis Omega 3 for this current wound is April 25, 2025.    Application # 1 out of 10    Electronically signed by Rainer Jaimes RN on 4/25/2025 at 11:05 AM

## 2025-04-25 NOTE — PATIENT INSTRUCTIONS
PHYSICIAN ORDERS AND DISCHARGE INSTRUCTIONS    Wound Care Notes:    Grafts Kerecis  Right medial lower leg  Kerecis #1  25  Kerecis #2  25    2nd Round  Kerecis # 1 25         Orders for this week:  2025    FAX TO [facility]:     Right Lower Leg -- wash with soap and water, pat dry.   Clobetasol to intact skin   Desitin to periwound  Apply Kerecis # 1 to wound Bed  Cover with x 3 zetuvit  Wrap with four flex    Change Monday and Wednesday.       Dispense 30 day quantity when ordering supplies.  Plan: Patient refusing wound vac.   Kerecis BI faxed for Right medial lower leg 25 updated request      Follow Up Instructions:  1 week   Primary Wound Care Provider: Dr Garcia   Call  for any questions or concerns.  Central [phone] for imaging and lab work

## 2025-04-25 NOTE — PROGRESS NOTES
Wound Care Center Progress Note With Procedure    Fuentes Daley  AGE: 80 y.o.   GENDER: male  : 1945  EPISODE DATE:  2025     Subjective:     Chief Complaint   Patient presents with    Wound Check     Right leg         HISTORY of PRESENT ILLNESS     Fuentes Daley is a 79 y.o. male who presents to the Wound Clinic for a visit for evaluation and treatment of Chronic venous  ulcer(s) of  R lower leg medial, R lower leg lateral, R lower leg anterior, R lower leg posterior.  The condition is of moderate severity. The ulcer has been present for several monthys .  The underlying cause is thought to be venous stasis.  The patients care to date has included care at home from wife. The patient has significant underlying medical conditions as below.         Patient is not a diabetic or a smoker  Not on a blood thinner     Wound Pain Timing/Severity: waxing and waning  Quality of pain: dull, aching  Severity of pain:  4 / 10   Modifying Factors: venous stasis  Associated Signs/Symptoms: edema, erythema, drainage, and pain          Will change regimen to Anasept ointment/stimulant and cover with Sorbact and agile then compression wrap.  Will apply for wound VAC.  Would like to see improved granulation tissue over base of wound.    2024  Continues to have significant sloughing tissue.  Would benefit from wound VAC but patient refusing wound VAC for now.  Will continue with Santyl ointment for this week.  Once able we will apply for Kerecis.                                      1/3/2025  Improved sloughing tissue.  Better granulation tissue.  Approved for Kerecis.  Patient with palpable distal pulses with triphasic DP/PT  signals.  No signs of surrounding infection.    1/10  Tolerated debridement.   Santyl dressing this week with.  Would like wound surface to improve prior to placing Kerecis.    25  Once again patient presents with wound with significant sloughing tissue/necrotic tissue and

## 2025-04-25 NOTE — PROGRESS NOTES
Multilayer Compression Wrap   (Not Unna) Below the Knee    NAME:  Fuentes Daley  YOB: 1945  MEDICAL RECORD NUMBER:  [id number]  DATE:  4/25/2025    Multilayer compression wrap: Removed old Multilayer wrap if indicated and wash leg with mild soap/water.  Applied moisturizing agent to dry skin as needed.   Applied primary and secondary dressing as ordered.  Applied multilayered dressing below the knee to right lower leg.  Instructed patient/caregiver not to remove dressing and to keep it clean and dry.   Instructed patient/caregiver on complications to report to provider, such as pain, numbness in toes, heavy drainage, and slippage of dressing.  Instructed patient on purpose of compression dressing and on activity and exercise recommendations.      Electronically signed by Yolis Carreon LPN on 4/25/2025 at 11:15 AM

## 2025-05-02 ENCOUNTER — HOSPITAL ENCOUNTER (OUTPATIENT)
Dept: WOUND CARE | Age: 80
Discharge: HOME | End: 2025-05-02
Attending: NURSE PRACTITIONER
Payer: COMMERCIAL

## 2025-05-02 VITALS
DIASTOLIC BLOOD PRESSURE: 79 MMHG | RESPIRATION RATE: 18 BRPM | SYSTOLIC BLOOD PRESSURE: 148 MMHG | HEART RATE: 62 BPM | TEMPERATURE: 97.6 F

## 2025-05-02 DIAGNOSIS — I83.012 VENOUS STASIS ULCER OF RIGHT CALF WITH FAT LAYER EXPOSED, UNSPECIFIED WHETHER VARICOSE VEINS PRESENT (HCC): Primary | ICD-10-CM

## 2025-05-02 DIAGNOSIS — L97.212 VENOUS STASIS ULCER OF RIGHT CALF WITH FAT LAYER EXPOSED, UNSPECIFIED WHETHER VARICOSE VEINS PRESENT (HCC): Primary | ICD-10-CM

## 2025-05-02 PROCEDURE — 15271 SKIN SUB GRAFT TRNK/ARM/LEG: CPT

## 2025-05-02 PROCEDURE — 6370000000 HC RX 637 (ALT 250 FOR IP): Performed by: NURSE PRACTITIONER

## 2025-05-02 PROCEDURE — 15272 SKIN SUB GRAFT T/A/L ADD-ON: CPT

## 2025-05-02 RX ORDER — TRIAMCINOLONE ACETONIDE 1 MG/G
OINTMENT TOPICAL ONCE
OUTPATIENT
Start: 2025-05-02 | End: 2025-05-02

## 2025-05-02 RX ORDER — LIDOCAINE 50 MG/G
OINTMENT TOPICAL ONCE
OUTPATIENT
Start: 2025-05-02 | End: 2025-05-02

## 2025-05-02 RX ORDER — MUPIROCIN 20 MG/G
OINTMENT TOPICAL ONCE
OUTPATIENT
Start: 2025-05-02 | End: 2025-05-02

## 2025-05-02 RX ORDER — SODIUM CHLOR/HYPOCHLOROUS ACID 0.033 %
SOLUTION, IRRIGATION IRRIGATION ONCE
OUTPATIENT
Start: 2025-05-02 | End: 2025-05-02

## 2025-05-02 RX ORDER — LIDOCAINE 40 MG/G
CREAM TOPICAL ONCE
OUTPATIENT
Start: 2025-05-02 | End: 2025-05-02

## 2025-05-02 RX ORDER — BACITRACIN ZINC 500 [USP'U]/G
OINTMENT TOPICAL ONCE
OUTPATIENT
Start: 2025-05-02 | End: 2025-05-02

## 2025-05-02 RX ORDER — CLOBETASOL PROPIONATE 0.5 MG/G
OINTMENT TOPICAL ONCE
Status: COMPLETED | OUTPATIENT
Start: 2025-05-02 | End: 2025-05-02

## 2025-05-02 RX ORDER — BACITRACIN ZINC AND POLYMYXIN B SULFATE 500; 1000 [USP'U]/G; [USP'U]/G
OINTMENT TOPICAL ONCE
OUTPATIENT
Start: 2025-05-02 | End: 2025-05-02

## 2025-05-02 RX ORDER — SILVER SULFADIAZINE 10 MG/G
CREAM TOPICAL ONCE
OUTPATIENT
Start: 2025-05-02 | End: 2025-05-02

## 2025-05-02 RX ORDER — NEOMYCIN/BACITRACIN/POLYMYXINB 3.5-400-5K
OINTMENT (GRAM) TOPICAL ONCE
OUTPATIENT
Start: 2025-05-02 | End: 2025-05-02

## 2025-05-02 RX ORDER — LIDOCAINE HYDROCHLORIDE 20 MG/ML
JELLY TOPICAL ONCE
OUTPATIENT
Start: 2025-05-02 | End: 2025-05-02

## 2025-05-02 RX ORDER — GENTAMICIN SULFATE 1 MG/G
OINTMENT TOPICAL ONCE
OUTPATIENT
Start: 2025-05-02 | End: 2025-05-02

## 2025-05-02 RX ORDER — CLOBETASOL PROPIONATE 0.5 MG/G
OINTMENT TOPICAL ONCE
OUTPATIENT
Start: 2025-05-02 | End: 2025-05-02

## 2025-05-02 RX ORDER — LIDOCAINE HYDROCHLORIDE 40 MG/ML
SOLUTION TOPICAL ONCE
OUTPATIENT
Start: 2025-05-02 | End: 2025-05-02

## 2025-05-02 RX ORDER — BETAMETHASONE DIPROPIONATE 0.5 MG/G
CREAM TOPICAL ONCE
OUTPATIENT
Start: 2025-05-02 | End: 2025-05-02

## 2025-05-02 RX ADMIN — CLOBETASOL PROPIONATE: 0.5 OINTMENT TOPICAL at 11:12

## 2025-05-02 NOTE — PATIENT INSTRUCTIONS
PHYSICIAN ORDERS AND DISCHARGE INSTRUCTIONS    Wound Care Notes:    Grafts Kerecis  Right medial lower leg  Kerecis #1  25  Kerecis #2  25    2nd Round  Kerecis # 1 25  Kerecis #2 2025         Orders for this week:  2025    FAX TO [facility]:     Right Lower Leg -- wash with soap and water, pat dry.   Clobetasol to intact skin   Desitin to periwound  Applied Kerecis #2 secured with mastisol, versatel, steristrips (DO NOT remove when changing dressings)  Cover with x 3 zetuvit  Wrap with four flex    Change Monday and Wednesday.       Dispense 30 day quantity when ordering supplies.  Plan: Patient refusing wound vac.   Kerecis BI faxed for Right medial lower leg 25 updated request      Follow Up Instructions:  1 week   Primary Wound Care Provider: Dr Garcia   Call  for any questions or concerns.  Central [phone] for imaging and lab work

## 2025-05-02 NOTE — PROGRESS NOTES
Multilayer Compression Wrap   (Not Unna) Below the Knee    NAME:  Fuentes Daley  YOB: 1945  MEDICAL RECORD NUMBER:  [id number]  DATE:  5/2/2025    Multilayer compression wrap: Removed old Multilayer wrap if indicated and wash leg with mild soap/water.  Applied moisturizing agent to dry skin as needed.   Applied primary and secondary dressing as ordered.  Applied multilayered dressing below the knee to right lower leg.  Instructed patient/caregiver not to remove dressing and to keep it clean and dry.   Instructed patient/caregiver on complications to report to provider, such as pain, numbness in toes, heavy drainage, and slippage of dressing.  Instructed patient on purpose of compression dressing and on activity and exercise recommendations.      Electronically signed by Yolis Carreon LPN on 5/2/2025 at 11:11 AM  
05/02/25 1035   Offloading for Diabetic Foot Ulcers Offloading not required 04/25/25 1114   Wound Length (cm) 12 cm 05/02/25 1035   Wound Width (cm) 6.5 cm 05/02/25 1035   Wound Depth (cm) 0.1 cm 05/02/25 1035   Wound Surface Area (cm^2) 78 cm^2 05/02/25 1035   Change in Wound Size % (l*w) 47.47 05/02/25 1035   Wound Volume (cm^3) 7.8 cm^3 05/02/25 1035   Wound Healing % 82 05/02/25 1035   Post-Procedure Length (cm) 12 cm 05/02/25 1058   Post-Procedure Width (cm) 6.5 cm 05/02/25 1058   Post-Procedure Depth (cm) 0.1 cm 05/02/25 1058   Post-Procedure Surface Area (cm^2) 78 cm^2 05/02/25 1058   Post-Procedure Volume (cm^3) 7.8 cm^3 05/02/25 1058   Distance Tunneling (cm) 0 cm 05/02/25 1035   Tunneling Position ___ O'Clock 0 05/02/25 1035   Undermining Starts ___ O'Clock 0 05/02/25 1035   Undermining Ends___ O'Clock 0 05/02/25 1035   Undermining Maxium Distance (cm) 0 05/02/25 1035   Wound Assessment Pink/red;Slough;Hyper granulation tissue 05/02/25 1035   Drainage Amount Large (50-75% saturated) 05/02/25 1035   Drainage Description Serosanguinous;Brown 05/02/25 1035   Odor Moderate 05/02/25 1035   Cassia-wound Assessment Intact;Maceration;Excoriated;Blanchable erythema 05/02/25 1035   Margins Defined edges 05/02/25 1035   Wound Thickness Description not for Pressure Injury Full thickness 05/02/25 1035   Number of days: 217           Total Surface Area Covered 78 sq/cm    Was the Product Layered  No      Amount Wasted 0 sq/cm    Reason for Waste: material provided was greater than wound size      Secured: Yes    Instruments used to complete placement of graft::scissors    Secured With: Versitel    Procedural Pain: 0/10     Post Procedural Pain: 0 / 10    Response to Treatment:  Well tolerated by patient.      Status of wound progress and description from last visit:   Improving.        Plan:       Patient Instructions   PHYSICIAN ORDERS AND DISCHARGE INSTRUCTIONS    Wound Care Notes:    Grafts Kerecis  Right medial lower

## 2025-05-02 NOTE — PLAN OF CARE
Kerecis Marigen Treatment Note    NAME:  Fuentes Daley  YOB: 1945  MEDICAL RECORD NUMBER:  [id number]  DATE:  5/2/2025    Goal:  Patient will receive safe and proper application of skin substitute. Patient will comply with caring for dressing, and reporting complications.     The expiration date is checked immediately before use., The package was intact prior to use and no damage was noted., Kerecis Omega3 is stored at room temperature.,  Kerecis Omega3 was hydrated with sterile normal saline per the provider.,  Kerecis Omega3 was removed from protective sterile packaging by the provider and applied to the prepared ulcer bed. ,  Kerecis Omega3 was applied to right lower leg  , Keresis Omega 3 and affixed with steri-strips by the provider.,  Kerecis Omega3 was covered with non-adherent ulcer dressing. , Applied zetuvit, fourflex over non-adherent., The patient/caregiver was instructed not to remove the dressing and to keep it clean and dry., The patient/family/caregiver was instructed on the need for offloading and elevation of the affected extremity and on using the prescribed offloading device., and The patient/family/caregiver was instructed on signs and symptoms of complication to report, such as draining through dressing, dressing falling down/slipping, getting wet, or severe pain or tingling.     Guidelines followed  Kerecis may only be used every 12 months per wound.      Date of first application of Kerecis Omega 3 for this current wound is April 25, 2025.    Application # 2 out of 10    Electronically signed by Ernestine Michelle RN on 5/2/2025 at 10:59 AM

## 2025-05-09 ENCOUNTER — HOSPITAL ENCOUNTER (OUTPATIENT)
Dept: WOUND CARE | Age: 80
Discharge: HOME | End: 2025-05-09
Attending: NURSE PRACTITIONER
Payer: COMMERCIAL

## 2025-05-09 VITALS
HEART RATE: 60 BPM | RESPIRATION RATE: 19 BRPM | TEMPERATURE: 97.3 F | SYSTOLIC BLOOD PRESSURE: 147 MMHG | DIASTOLIC BLOOD PRESSURE: 83 MMHG

## 2025-05-09 DIAGNOSIS — I83.012 VENOUS STASIS ULCER OF RIGHT CALF WITH FAT LAYER EXPOSED, UNSPECIFIED WHETHER VARICOSE VEINS PRESENT (HCC): Primary | ICD-10-CM

## 2025-05-09 DIAGNOSIS — L97.212 VENOUS STASIS ULCER OF RIGHT CALF WITH FAT LAYER EXPOSED, UNSPECIFIED WHETHER VARICOSE VEINS PRESENT (HCC): Primary | ICD-10-CM

## 2025-05-09 PROCEDURE — 15271 SKIN SUB GRAFT TRNK/ARM/LEG: CPT

## 2025-05-09 PROCEDURE — 15272 SKIN SUB GRAFT T/A/L ADD-ON: CPT

## 2025-05-09 RX ORDER — BETAMETHASONE DIPROPIONATE 0.5 MG/G
CREAM TOPICAL ONCE
OUTPATIENT
Start: 2025-05-09 | End: 2025-05-09

## 2025-05-09 RX ORDER — GINSENG 100 MG
CAPSULE ORAL ONCE
OUTPATIENT
Start: 2025-05-09 | End: 2025-05-09

## 2025-05-09 RX ORDER — LIDOCAINE HYDROCHLORIDE 20 MG/ML
JELLY TOPICAL ONCE
OUTPATIENT
Start: 2025-05-09 | End: 2025-05-09

## 2025-05-09 RX ORDER — SODIUM CHLOR/HYPOCHLOROUS ACID 0.033 %
SOLUTION, IRRIGATION IRRIGATION ONCE
OUTPATIENT
Start: 2025-05-09 | End: 2025-05-09

## 2025-05-09 RX ORDER — LIDOCAINE 40 MG/G
CREAM TOPICAL ONCE
OUTPATIENT
Start: 2025-05-09 | End: 2025-05-09

## 2025-05-09 RX ORDER — NEOMYCIN/BACITRACIN/POLYMYXINB 3.5-400-5K
OINTMENT (GRAM) TOPICAL ONCE
OUTPATIENT
Start: 2025-05-09 | End: 2025-05-09

## 2025-05-09 RX ORDER — TRIAMCINOLONE ACETONIDE 1 MG/G
OINTMENT TOPICAL ONCE
OUTPATIENT
Start: 2025-05-09 | End: 2025-05-09

## 2025-05-09 RX ORDER — MUPIROCIN 20 MG/G
OINTMENT TOPICAL ONCE
OUTPATIENT
Start: 2025-05-09 | End: 2025-05-09

## 2025-05-09 RX ORDER — LIDOCAINE 50 MG/G
OINTMENT TOPICAL ONCE
OUTPATIENT
Start: 2025-05-09 | End: 2025-05-09

## 2025-05-09 RX ORDER — CLOBETASOL PROPIONATE 0.5 MG/G
OINTMENT TOPICAL ONCE
OUTPATIENT
Start: 2025-05-09 | End: 2025-05-09

## 2025-05-09 RX ORDER — BACITRACIN ZINC AND POLYMYXIN B SULFATE 500; 1000 [USP'U]/G; [USP'U]/G
OINTMENT TOPICAL ONCE
OUTPATIENT
Start: 2025-05-09 | End: 2025-05-09

## 2025-05-09 RX ORDER — SILVER SULFADIAZINE 10 MG/G
CREAM TOPICAL ONCE
OUTPATIENT
Start: 2025-05-09 | End: 2025-05-09

## 2025-05-09 RX ORDER — GENTAMICIN SULFATE 1 MG/G
OINTMENT TOPICAL ONCE
OUTPATIENT
Start: 2025-05-09 | End: 2025-05-09

## 2025-05-09 RX ORDER — LIDOCAINE HYDROCHLORIDE 40 MG/ML
SOLUTION TOPICAL ONCE
OUTPATIENT
Start: 2025-05-09 | End: 2025-05-09

## 2025-05-09 NOTE — PATIENT INSTRUCTIONS
PHYSICIAN ORDERS AND DISCHARGE INSTRUCTIONS    Wound Care Notes:    Grafts Kerecis  Right medial lower leg  Kerecis #1  25  Kerecis #2  25    2nd Round  Kerecis # 1 25  Kerecis #2 2025  Kerecis # 3 25         Orders for this week:  2025    FAX TO [facility]:     Right Lower Leg -- wash with soap and water, pat dry.   Clobetasol to intact skin   Desitin to periwound  Applied Kerecis #3 secured with mastisol, versatel, steristrips (DO NOT remove when changing dressings)  Cover with x 3 zetuvit  Wrap with four flex    Change Monday and Wednesday.       Dispense 30 day quantity when ordering supplies.  Plan: Patient refusing wound vac.   Kerecis BI faxed for Right medial lower leg 25 updated request      Follow Up Instructions:  1 week   Primary Wound Care Provider: Dr Garcia   Call  for any questions or concerns.  Central [phone] for imaging and lab work

## 2025-05-09 NOTE — PROGRESS NOTES
Kerecis Marigen Treatment Note    NAME:  Fuentes Daley  YOB: 1945  MEDICAL RECORD NUMBER:  [id number]  DATE:  5/9/2025    Goal:  Patient will receive safe and proper application of skin substitute. Patient will comply with caring for dressing, and reporting complications.     The expiration date is checked immediately before use., The package was intact prior to use and no damage was noted., Kerecis Omega3 is stored at room temperature.,  Kerecis Omega3 was hydrated with sterile normal saline per the provider.,  Kerecis Omega3 was removed from protective sterile packaging by the provider and applied to the prepared ulcer bed. ,  Kerecis Omega3 was applied to R Lower leg  , Keresis Omega 3 and affixed with steri-strips by the provider.,  Kerecis Omega3 was covered with non-adherent ulcer dressing. , Applied zetuvit over non-adherent., Applied dry gauze and/or roll gauze. , Applied appropriate off-loading device., The patient/caregiver was instructed not to remove the dressing and to keep it clean and dry., The patient/family/caregiver was instructed on the need for offloading and elevation of the affected extremity and on using the prescribed offloading device., and The patient/family/caregiver was instructed on signs and symptoms of complication to report, such as draining through dressing, dressing falling down/slipping, getting wet, or severe pain or tingling.     Guidelines followed  Kerecis may only be used every 12 months per wound.      Date of first application of Kerecis Omega 3 for this current wound is April 25, 2025.    Application # 3 out of 10    Electronically signed by Rainer Jaimes RN on 5/9/2025 at 11:22 AM

## 2025-05-09 NOTE — WOUND CARE
.Multilayer Compression Wrap   (Not Unna) Below the Knee    NAME:  Fuentes Daley  YOB: 1945  MEDICAL RECORD NUMBER:  [id number]  DATE:  5/9/2025    Multilayer compression wrap: Removed old Multilayer wrap if indicated and wash leg with mild soap/water.  Applied moisturizing agent to dry skin as needed.   Applied primary and secondary dressing as ordered.  Applied multilayered dressing below the knee to right lower leg.  Instructed patient/caregiver not to remove dressing and to keep it clean and dry.   Instructed patient/caregiver on complications to report to provider, such as pain, numbness in toes, heavy drainage, and slippage of dressing.  Instructed patient on purpose of compression dressing and on activity and exercise recommendations.      Electronically signed by Tonja Yoder RN on 5/9/2025 at 11:31 AM

## 2025-05-09 NOTE — PROGRESS NOTES
Wound Care Center Progress Note With Procedure    Fuentes Daley  AGE: 80 y.o.   GENDER: male  : 1945  EPISODE DATE:  2025     Subjective:     Chief Complaint   Patient presents with    Wound Check     Right lower leg          HISTORY of PRESENT ILLNESS     Fuentes Daley is a 79 y.o. male who presents to the Wound Clinic for a visit for evaluation and treatment of Chronic venous  ulcer(s) of  R lower leg medial, R lower leg lateral, R lower leg anterior, R lower leg posterior.  The condition is of moderate severity. The ulcer has been present for several monthys .  The underlying cause is thought to be venous stasis.  The patients care to date has included care at home from wife. The patient has significant underlying medical conditions as below.         Patient is not a diabetic or a smoker  Not on a blood thinner     Wound Pain Timing/Severity: waxing and waning  Quality of pain: dull, aching  Severity of pain:  4 / 10   Modifying Factors: venous stasis  Associated Signs/Symptoms: edema, erythema, drainage, and pain          Will change regimen to Anasept ointment/stimulant and cover with Sorbact and agile then compression wrap.  Will apply for wound VAC.  Would like to see improved granulation tissue over base of wound.    2024  Continues to have significant sloughing tissue.  Would benefit from wound VAC but patient refusing wound VAC for now.  Will continue with Santyl ointment for this week.  Once able we will apply for Kerecis.                                      1/3/2025  Improved sloughing tissue.  Better granulation tissue.  Approved for Kerecis.  Patient with palpable distal pulses with triphasic DP/PT  signals.  No signs of surrounding infection.    1/10  Tolerated debridement.   Santyl dressing this week with.  Would like wound surface to improve prior to placing Kerecis.    25  Once again patient presents with wound with significant sloughing tissue/necrotic tissue

## 2025-05-16 ENCOUNTER — HOSPITAL ENCOUNTER (OUTPATIENT)
Dept: WOUND CARE | Age: 80
Discharge: HOME | End: 2025-05-16
Attending: NURSE PRACTITIONER
Payer: COMMERCIAL

## 2025-05-16 VITALS
DIASTOLIC BLOOD PRESSURE: 82 MMHG | HEART RATE: 64 BPM | SYSTOLIC BLOOD PRESSURE: 140 MMHG | TEMPERATURE: 97.9 F | RESPIRATION RATE: 18 BRPM

## 2025-05-16 DIAGNOSIS — L97.212 VENOUS STASIS ULCER OF RIGHT CALF WITH FAT LAYER EXPOSED, UNSPECIFIED WHETHER VARICOSE VEINS PRESENT (HCC): Primary | ICD-10-CM

## 2025-05-16 DIAGNOSIS — I83.012 VENOUS STASIS ULCER OF RIGHT CALF WITH FAT LAYER EXPOSED, UNSPECIFIED WHETHER VARICOSE VEINS PRESENT (HCC): Primary | ICD-10-CM

## 2025-05-16 PROCEDURE — 15272 SKIN SUB GRAFT T/A/L ADD-ON: CPT

## 2025-05-16 PROCEDURE — 6370000000 HC RX 637 (ALT 250 FOR IP): Performed by: NURSE PRACTITIONER

## 2025-05-16 PROCEDURE — 15271 SKIN SUB GRAFT TRNK/ARM/LEG: CPT

## 2025-05-16 RX ORDER — BACITRACIN ZINC AND POLYMYXIN B SULFATE 500; 1000 [USP'U]/G; [USP'U]/G
OINTMENT TOPICAL ONCE
OUTPATIENT
Start: 2025-05-16 | End: 2025-05-16

## 2025-05-16 RX ORDER — CLOBETASOL PROPIONATE 0.5 MG/G
OINTMENT TOPICAL ONCE
OUTPATIENT
Start: 2025-05-16 | End: 2025-05-16

## 2025-05-16 RX ORDER — NEOMYCIN/BACITRACIN/POLYMYXINB 3.5-400-5K
OINTMENT (GRAM) TOPICAL ONCE
OUTPATIENT
Start: 2025-05-16 | End: 2025-05-16

## 2025-05-16 RX ORDER — LIDOCAINE HYDROCHLORIDE 40 MG/ML
SOLUTION TOPICAL ONCE
OUTPATIENT
Start: 2025-05-16 | End: 2025-05-16

## 2025-05-16 RX ORDER — GINSENG 100 MG
CAPSULE ORAL ONCE
OUTPATIENT
Start: 2025-05-16 | End: 2025-05-16

## 2025-05-16 RX ORDER — MUPIROCIN 20 MG/G
OINTMENT TOPICAL ONCE
OUTPATIENT
Start: 2025-05-16 | End: 2025-05-16

## 2025-05-16 RX ORDER — LIDOCAINE HYDROCHLORIDE 20 MG/ML
JELLY TOPICAL ONCE
OUTPATIENT
Start: 2025-05-16 | End: 2025-05-16

## 2025-05-16 RX ORDER — SODIUM CHLOR/HYPOCHLOROUS ACID 0.033 %
SOLUTION, IRRIGATION IRRIGATION ONCE
OUTPATIENT
Start: 2025-05-16 | End: 2025-05-16

## 2025-05-16 RX ORDER — GENTAMICIN SULFATE 1 MG/G
OINTMENT TOPICAL ONCE
OUTPATIENT
Start: 2025-05-16 | End: 2025-05-16

## 2025-05-16 RX ORDER — BETAMETHASONE DIPROPIONATE 0.5 MG/G
CREAM TOPICAL ONCE
OUTPATIENT
Start: 2025-05-16 | End: 2025-05-16

## 2025-05-16 RX ORDER — LIDOCAINE 50 MG/G
OINTMENT TOPICAL ONCE
OUTPATIENT
Start: 2025-05-16 | End: 2025-05-16

## 2025-05-16 RX ORDER — CLOBETASOL PROPIONATE 0.5 MG/G
OINTMENT TOPICAL ONCE
Status: COMPLETED | OUTPATIENT
Start: 2025-05-16 | End: 2025-05-16

## 2025-05-16 RX ORDER — LIDOCAINE 40 MG/G
CREAM TOPICAL ONCE
OUTPATIENT
Start: 2025-05-16 | End: 2025-05-16

## 2025-05-16 RX ORDER — TRIAMCINOLONE ACETONIDE 1 MG/G
OINTMENT TOPICAL ONCE
OUTPATIENT
Start: 2025-05-16 | End: 2025-05-16

## 2025-05-16 RX ORDER — SILVER SULFADIAZINE 10 MG/G
CREAM TOPICAL ONCE
OUTPATIENT
Start: 2025-05-16 | End: 2025-05-16

## 2025-05-16 RX ADMIN — CLOBETASOL PROPIONATE: 0.5 OINTMENT TOPICAL at 11:35

## 2025-05-16 ASSESSMENT — PAIN SCALES - GENERAL: PAINLEVEL_OUTOF10: 0

## 2025-05-16 NOTE — PATIENT INSTRUCTIONS
PHYSICIAN ORDERS AND DISCHARGE INSTRUCTIONS    Wound Care Notes:    Grafts Kerecis  Right medial lower leg  Kerecis #1  25  Kerecis #2  25    2nd Round  Kerecis # 1 25  Kerecis #2 2025  Kerecis # 3 25  Kerecis # 4 25         Orders for this week:  2025    FAX TO [facility]:     Right Lower Leg -- wash with soap and water, pat dry.   Clobetasol to intact skin   Desitin to periwound  Applied Kerecis #4 secured with mastisol, versatel, steristrips (DO NOT remove when changing dressings)  Cover with x 3 zetuvit  Wrap with four flex    Change Monday and Wednesday.       Dispense 30 day quantity when ordering supplies.  Plan: Patient refusing wound vac.   Kerecis BI faxed for Right medial lower leg 25 updated request      Follow Up Instructions:  1 week   Primary Wound Care Provider: Dr Garcia   Call  for any questions or concerns.  Central [phone] for imaging and lab work

## 2025-05-16 NOTE — PROGRESS NOTES
Multilayer Compression Wrap   (Not Unna) Below the Knee    NAME:  Fuentes Daley  YOB: 1945  MEDICAL RECORD NUMBER:  [id number]  DATE:  5/16/2025    Multilayer compression wrap: Removed old Multilayer wrap if indicated and wash leg with mild soap/water.  Applied moisturizing agent to dry skin as needed.   Applied primary and secondary dressing as ordered.  Applied multilayered dressing below the knee to right lower leg.  Instructed patient/caregiver not to remove dressing and to keep it clean and dry.   Instructed patient/caregiver on complications to report to provider, such as pain, numbness in toes, heavy drainage, and slippage of dressing.  Instructed patient on purpose of compression dressing and on activity and exercise recommendations.      Electronically signed by Yolis Carreon LPN on 5/16/2025 at 11:34 AM

## 2025-05-16 NOTE — PROGRESS NOTES
Wound Care Center Progress Note With Procedure    Fuentes Daley  AGE: 80 y.o.   GENDER: male  : 1945  EPISODE DATE:  2025     Subjective:     Chief Complaint   Patient presents with    Wound Check     rle         HISTORY of PRESENT ILLNESS     Fuentes Daley is a 79 y.o. male who presents to the Wound Clinic for a visit for evaluation and treatment of Chronic venous  ulcer(s) of  R lower leg medial, R lower leg lateral, R lower leg anterior, R lower leg posterior.  The condition is of moderate severity. The ulcer has been present for several monthys .  The underlying cause is thought to be venous stasis.  The patients care to date has included care at home from wife. The patient has significant underlying medical conditions as below.         Patient is not a diabetic or a smoker  Not on a blood thinner     Wound Pain Timing/Severity: waxing and waning  Quality of pain: dull, aching  Severity of pain:  4 / 10   Modifying Factors: venous stasis  Associated Signs/Symptoms: edema, erythema, drainage, and pain          Will change regimen to Anasept ointment/stimulant and cover with Sorbact and agile then compression wrap.  Will apply for wound VAC.  Would like to see improved granulation tissue over base of wound.    2024  Continues to have significant sloughing tissue.  Would benefit from wound VAC but patient refusing wound VAC for now.  Will continue with Santyl ointment for this week.  Once able we will apply for Kerecis.                                      1/3/2025  Improved sloughing tissue.  Better granulation tissue.  Approved for Kerecis.  Patient with palpable distal pulses with triphasic DP/PT  signals.  No signs of surrounding infection.    1/10  Tolerated debridement.   Santyl dressing this week with.  Would like wound surface to improve prior to placing Kerecis.    25  Once again patient presents with wound with significant sloughing tissue/necrotic tissue and large

## 2025-05-16 NOTE — PLAN OF CARE
Kerecis Marigen Treatment Note    NAME:  Fuentes Daley  YOB: 1945  MEDICAL RECORD NUMBER:  [id number]  DATE:  5/16/2025    Goal:  Patient will receive safe and proper application of skin substitute. Patient will comply with caring for dressing, and reporting complications.     The expiration date is checked immediately before use., The package was intact prior to use and no damage was noted., Kerecis Omega3 is stored at room temperature.,  Kerecis Omega3 was hydrated with sterile normal saline per the provider.,  Kerecis Omega3 was removed from protective sterile packaging by the provider and applied to the prepared ulcer bed. ,  Kerecis Omega3 was applied to Right Lower Leg  , Keresis Omega 3 and affixed with steri-strips by the provider.,  Kerecis Omega3 was covered with non-adherent ulcer dressing. , Applied Zetuvit and 4 layer wrap over non-adherent., Applied dry gauze and/or roll gauze. , Applied appropriate off-loading device., The patient/caregiver was instructed not to remove the dressing and to keep it clean and dry., The patient/family/caregiver was instructed on the need for offloading and elevation of the affected extremity and on using the prescribed offloading device., and The patient/family/caregiver was instructed on signs and symptoms of complication to report, such as draining through dressing, dressing falling down/slipping, getting wet, or severe pain or tingling.     Guidelines followed  Kerecis may only be used every 12 months per wound.      Date of first application of Kerecis Omega 3 for this current wound is April 25, 2025.    Application # 4 out of 10    Electronically signed by Rainer Jaimes RN on 5/16/2025 at 11:23 AM

## 2025-05-23 ENCOUNTER — HOSPITAL ENCOUNTER (OUTPATIENT)
Dept: WOUND CARE | Age: 80
Discharge: HOME | End: 2025-05-23
Attending: NURSE PRACTITIONER
Payer: COMMERCIAL

## 2025-05-23 VITALS
TEMPERATURE: 97.8 F | DIASTOLIC BLOOD PRESSURE: 88 MMHG | RESPIRATION RATE: 19 BRPM | SYSTOLIC BLOOD PRESSURE: 157 MMHG | HEART RATE: 63 BPM

## 2025-05-23 DIAGNOSIS — I83.012 VENOUS STASIS ULCER OF RIGHT CALF WITH FAT LAYER EXPOSED, UNSPECIFIED WHETHER VARICOSE VEINS PRESENT (HCC): Primary | ICD-10-CM

## 2025-05-23 DIAGNOSIS — L97.212 VENOUS STASIS ULCER OF RIGHT CALF WITH FAT LAYER EXPOSED, UNSPECIFIED WHETHER VARICOSE VEINS PRESENT (HCC): Primary | ICD-10-CM

## 2025-05-23 PROCEDURE — 11042 DBRDMT SUBQ TIS 1ST 20SQCM/<: CPT

## 2025-05-23 PROCEDURE — 11045 DBRDMT SUBQ TISS EACH ADDL: CPT

## 2025-05-23 PROCEDURE — 6370000000 HC RX 637 (ALT 250 FOR IP): Performed by: NURSE PRACTITIONER

## 2025-05-23 RX ORDER — GINSENG 100 MG
CAPSULE ORAL ONCE
OUTPATIENT
Start: 2025-05-23 | End: 2025-05-23

## 2025-05-23 RX ORDER — SILVER SULFADIAZINE 10 MG/G
CREAM TOPICAL ONCE
OUTPATIENT
Start: 2025-05-23 | End: 2025-05-23

## 2025-05-23 RX ORDER — LIDOCAINE HYDROCHLORIDE 40 MG/ML
SOLUTION TOPICAL ONCE
OUTPATIENT
Start: 2025-05-23 | End: 2025-05-23

## 2025-05-23 RX ORDER — GENTAMICIN SULFATE 1 MG/G
OINTMENT TOPICAL ONCE
OUTPATIENT
Start: 2025-05-23 | End: 2025-05-23

## 2025-05-23 RX ORDER — LIDOCAINE 50 MG/G
OINTMENT TOPICAL ONCE
OUTPATIENT
Start: 2025-05-23 | End: 2025-05-23

## 2025-05-23 RX ORDER — LIDOCAINE HYDROCHLORIDE 20 MG/ML
JELLY TOPICAL ONCE
OUTPATIENT
Start: 2025-05-23 | End: 2025-05-23

## 2025-05-23 RX ORDER — LIDOCAINE 40 MG/G
CREAM TOPICAL ONCE
OUTPATIENT
Start: 2025-05-23 | End: 2025-05-23

## 2025-05-23 RX ORDER — CLOBETASOL PROPIONATE 0.5 MG/G
OINTMENT TOPICAL ONCE
Status: COMPLETED | OUTPATIENT
Start: 2025-05-23 | End: 2025-05-23

## 2025-05-23 RX ORDER — CLOBETASOL PROPIONATE 0.5 MG/G
OINTMENT TOPICAL ONCE
OUTPATIENT
Start: 2025-05-23 | End: 2025-05-23

## 2025-05-23 RX ORDER — TRIAMCINOLONE ACETONIDE 1 MG/G
OINTMENT TOPICAL ONCE
OUTPATIENT
Start: 2025-05-23 | End: 2025-05-23

## 2025-05-23 RX ORDER — SODIUM CHLOR/HYPOCHLOROUS ACID 0.033 %
SOLUTION, IRRIGATION IRRIGATION ONCE
OUTPATIENT
Start: 2025-05-23 | End: 2025-05-23

## 2025-05-23 RX ORDER — NEOMYCIN/BACITRACIN/POLYMYXINB 3.5-400-5K
OINTMENT (GRAM) TOPICAL ONCE
OUTPATIENT
Start: 2025-05-23 | End: 2025-05-23

## 2025-05-23 RX ORDER — BETAMETHASONE DIPROPIONATE 0.5 MG/G
CREAM TOPICAL ONCE
OUTPATIENT
Start: 2025-05-23 | End: 2025-05-23

## 2025-05-23 RX ORDER — MUPIROCIN 20 MG/G
OINTMENT TOPICAL ONCE
OUTPATIENT
Start: 2025-05-23 | End: 2025-05-23

## 2025-05-23 RX ORDER — BACITRACIN ZINC AND POLYMYXIN B SULFATE 500; 1000 [USP'U]/G; [USP'U]/G
OINTMENT TOPICAL ONCE
OUTPATIENT
Start: 2025-05-23 | End: 2025-05-23

## 2025-05-23 RX ADMIN — CLOBETASOL PROPIONATE: 0.5 OINTMENT TOPICAL at 11:44

## 2025-05-23 ASSESSMENT — PAIN SCALES - GENERAL: PAINLEVEL_OUTOF10: 0

## 2025-05-23 NOTE — PROGRESS NOTES
Multilayer Compression Wrap   (Not Unna) Below the Knee    NAME:  Feuntes Daley  YOB: 1945  MEDICAL RECORD NUMBER:  [id number]  DATE:  5/23/2025    Multilayer compression wrap: Removed old Multilayer wrap if indicated and wash leg with mild soap/water.  Applied moisturizing agent to dry skin as needed.   Applied primary and secondary dressing as ordered.  Applied multilayered dressing below the knee to right lower leg.  Instructed patient/caregiver not to remove dressing and to keep it clean and dry.   Instructed patient/caregiver on complications to report to provider, such as pain, numbness in toes, heavy drainage, and slippage of dressing.  Instructed patient on purpose of compression dressing and on activity and exercise recommendations.      Electronically signed by José Miguel Krishnamurthy RN on 5/23/2025 at 11:45 AM

## 2025-05-23 NOTE — PATIENT INSTRUCTIONS
PHYSICIAN ORDERS AND DISCHARGE INSTRUCTIONS    Wound Care Notes:    Grafts Kerecis  Right medial lower leg  Kerecis #1  25  Kerecis #2  25    2nd Round  Kerecis # 1 25  Kerecis #2 2025  Kerecis # 3 25  Kerecis # 4 25         Orders for this week:  2025    FAX TO [facility]:     Right Lower Leg -- wash with soap and water, pat dry.   Clobetasol to intact skin   Desitin to periwound  Apply anasept gel and puracol ag to wound bed  Cover with  zetuvit  Wrap with four flex    Change Monday and Wednesday      Dispense 30 day quantity when ordering supplies.  Plan: Patient refusing wound vac.   Kerecis BI faxed for Right medial lower leg 25 updated request      Follow Up Instructions:  1 week   Primary Wound Care Provider: Dr Garcia   Call  for any questions or concerns.  Central [phone] for imaging and lab work

## 2025-05-26 PROBLEM — L89.893 PRESSURE INJURY OF DORSUM OF RIGHT FOOT, STAGE 3 (HCC): Status: RESOLVED | Noted: 2024-11-08 | Resolved: 2025-05-26

## 2025-05-30 ENCOUNTER — HOSPITAL ENCOUNTER (OUTPATIENT)
Dept: WOUND CARE | Age: 80
Discharge: HOME | End: 2025-05-30
Attending: NURSE PRACTITIONER
Payer: COMMERCIAL

## 2025-05-30 DIAGNOSIS — I83.012 VENOUS STASIS ULCER OF RIGHT CALF WITH FAT LAYER EXPOSED, UNSPECIFIED WHETHER VARICOSE VEINS PRESENT (HCC): Primary | ICD-10-CM

## 2025-05-30 DIAGNOSIS — L97.212 VENOUS STASIS ULCER OF RIGHT CALF WITH FAT LAYER EXPOSED, UNSPECIFIED WHETHER VARICOSE VEINS PRESENT (HCC): Primary | ICD-10-CM

## 2025-05-30 PROCEDURE — 15272 SKIN SUB GRAFT T/A/L ADD-ON: CPT

## 2025-05-30 PROCEDURE — 11045 DBRDMT SUBQ TISS EACH ADDL: CPT

## 2025-05-30 PROCEDURE — 11042 DBRDMT SUBQ TIS 1ST 20SQCM/<: CPT

## 2025-05-30 PROCEDURE — 15271 SKIN SUB GRAFT TRNK/ARM/LEG: CPT

## 2025-05-30 PROCEDURE — 6370000000 HC RX 637 (ALT 250 FOR IP): Performed by: NURSE PRACTITIONER

## 2025-05-30 RX ORDER — CLOBETASOL PROPIONATE 0.5 MG/G
OINTMENT TOPICAL ONCE
Status: COMPLETED | OUTPATIENT
Start: 2025-05-30 | End: 2025-05-30

## 2025-05-30 RX ORDER — LIDOCAINE HYDROCHLORIDE 40 MG/ML
SOLUTION TOPICAL ONCE
OUTPATIENT
Start: 2025-05-30 | End: 2025-05-30

## 2025-05-30 RX ORDER — MUPIROCIN 20 MG/G
OINTMENT TOPICAL ONCE
OUTPATIENT
Start: 2025-05-30 | End: 2025-05-30

## 2025-05-30 RX ORDER — LIDOCAINE HYDROCHLORIDE 20 MG/ML
JELLY TOPICAL ONCE
OUTPATIENT
Start: 2025-05-30 | End: 2025-05-30

## 2025-05-30 RX ORDER — GENTAMICIN SULFATE 1 MG/G
OINTMENT TOPICAL ONCE
OUTPATIENT
Start: 2025-05-30 | End: 2025-05-30

## 2025-05-30 RX ORDER — BACITRACIN ZINC AND POLYMYXIN B SULFATE 500; 1000 [USP'U]/G; [USP'U]/G
OINTMENT TOPICAL ONCE
OUTPATIENT
Start: 2025-05-30 | End: 2025-05-30

## 2025-05-30 RX ORDER — BETAMETHASONE DIPROPIONATE 0.5 MG/G
CREAM TOPICAL ONCE
OUTPATIENT
Start: 2025-05-30 | End: 2025-05-30

## 2025-05-30 RX ORDER — TRIAMCINOLONE ACETONIDE 1 MG/G
OINTMENT TOPICAL ONCE
OUTPATIENT
Start: 2025-05-30 | End: 2025-05-30

## 2025-05-30 RX ORDER — LIDOCAINE 40 MG/G
CREAM TOPICAL ONCE
OUTPATIENT
Start: 2025-05-30 | End: 2025-05-30

## 2025-05-30 RX ORDER — SILVER SULFADIAZINE 10 MG/G
CREAM TOPICAL ONCE
OUTPATIENT
Start: 2025-05-30 | End: 2025-05-30

## 2025-05-30 RX ORDER — GINSENG 100 MG
CAPSULE ORAL ONCE
OUTPATIENT
Start: 2025-05-30 | End: 2025-05-30

## 2025-05-30 RX ORDER — SODIUM CHLOR/HYPOCHLOROUS ACID 0.033 %
SOLUTION, IRRIGATION IRRIGATION ONCE
OUTPATIENT
Start: 2025-05-30 | End: 2025-05-30

## 2025-05-30 RX ORDER — LIDOCAINE 50 MG/G
OINTMENT TOPICAL ONCE
OUTPATIENT
Start: 2025-05-30 | End: 2025-05-30

## 2025-05-30 RX ORDER — CLOBETASOL PROPIONATE 0.5 MG/G
OINTMENT TOPICAL ONCE
OUTPATIENT
Start: 2025-05-30 | End: 2025-05-30

## 2025-05-30 RX ORDER — NEOMYCIN/BACITRACIN/POLYMYXINB 3.5-400-5K
OINTMENT (GRAM) TOPICAL ONCE
OUTPATIENT
Start: 2025-05-30 | End: 2025-05-30

## 2025-05-30 RX ADMIN — CLOBETASOL PROPIONATE: 0.5 OINTMENT TOPICAL at 11:05

## 2025-05-30 NOTE — PROGRESS NOTES
Multilayer Compression Wrap   (Not Unna) Below the Knee    NAME:  Fuentes Daley  YOB: 1945  MEDICAL RECORD NUMBER:  [id number]  DATE:  5/30/2025    Multilayer compression wrap: Removed old Multilayer wrap if indicated and wash leg with mild soap/water.  Applied moisturizing agent to dry skin as needed.   Applied primary and secondary dressing as ordered.  Applied multilayered dressing below the knee to right lower leg.  Instructed patient/caregiver not to remove dressing and to keep it clean and dry.   Instructed patient/caregiver on complications to report to provider, such as pain, numbness in toes, heavy drainage, and slippage of dressing.  Instructed patient on purpose of compression dressing and on activity and exercise recommendations.      Electronically signed by Yolis Carreon LPN on 5/30/2025 at 11:04 AM

## 2025-05-30 NOTE — PATIENT INSTRUCTIONS
PHYSICIAN ORDERS AND DISCHARGE INSTRUCTIONS    Wound Care Notes:    Grafts Kerecis  Right medial lower leg  Kerecis #1  25  Kerecis #2  25    2nd Round  Kerecis # 1 25  Kerecis #2 2025  Kerecis # 3 25  Kerecis # 4 25  Kerecis #5 25         Orders for this week:  2025    FAX TO [facility]:     Right Lower Leg -- wash with soap and water, pat dry.   Clobetasol to intact skin   Desitin to periwound  Kerecis using versatel, mastsiol, and steristrips   Cover with  zetuvit  Wrap with four flex    Change Monday and Wednesday      Dispense 30 day quantity when ordering supplies.  Plan: Patient refusing wound vac.   Kerecis BI faxed for Right medial lower leg 25 updated request      Follow Up Instructions:  1 week   Primary Wound Care Provider: Destni Iniguez CNP   Call  for any questions or concerns.  Central [phone] for imaging and lab work

## 2025-06-06 ENCOUNTER — HOSPITAL ENCOUNTER (OUTPATIENT)
Dept: WOUND CARE | Age: 80
Discharge: HOME | End: 2025-06-06
Attending: NURSE PRACTITIONER
Payer: COMMERCIAL

## 2025-06-06 VITALS
DIASTOLIC BLOOD PRESSURE: 80 MMHG | HEART RATE: 71 BPM | RESPIRATION RATE: 18 BRPM | TEMPERATURE: 98.4 F | SYSTOLIC BLOOD PRESSURE: 136 MMHG

## 2025-06-06 DIAGNOSIS — I83.012 VENOUS STASIS ULCER OF RIGHT CALF WITH FAT LAYER EXPOSED, UNSPECIFIED WHETHER VARICOSE VEINS PRESENT (HCC): Primary | ICD-10-CM

## 2025-06-06 DIAGNOSIS — L97.212 VENOUS STASIS ULCER OF RIGHT CALF WITH FAT LAYER EXPOSED, UNSPECIFIED WHETHER VARICOSE VEINS PRESENT (HCC): Primary | ICD-10-CM

## 2025-06-06 PROCEDURE — 11045 DBRDMT SUBQ TISS EACH ADDL: CPT

## 2025-06-06 PROCEDURE — 6370000000 HC RX 637 (ALT 250 FOR IP): Performed by: NURSE PRACTITIONER

## 2025-06-06 PROCEDURE — 11042 DBRDMT SUBQ TIS 1ST 20SQCM/<: CPT

## 2025-06-06 RX ORDER — CLOBETASOL PROPIONATE 0.5 MG/G
OINTMENT TOPICAL ONCE
OUTPATIENT
Start: 2025-06-06 | End: 2025-06-06

## 2025-06-06 RX ORDER — LIDOCAINE HYDROCHLORIDE 40 MG/ML
SOLUTION TOPICAL ONCE
OUTPATIENT
Start: 2025-06-06 | End: 2025-06-06

## 2025-06-06 RX ORDER — CLOBETASOL PROPIONATE 0.5 MG/G
OINTMENT TOPICAL ONCE
Status: COMPLETED | OUTPATIENT
Start: 2025-06-06 | End: 2025-06-06

## 2025-06-06 RX ORDER — LIDOCAINE 40 MG/G
CREAM TOPICAL ONCE
OUTPATIENT
Start: 2025-06-06 | End: 2025-06-06

## 2025-06-06 RX ORDER — LIDOCAINE HYDROCHLORIDE 20 MG/ML
JELLY TOPICAL ONCE
OUTPATIENT
Start: 2025-06-06 | End: 2025-06-06

## 2025-06-06 RX ORDER — BACITRACIN ZINC AND POLYMYXIN B SULFATE 500; 1000 [USP'U]/G; [USP'U]/G
OINTMENT TOPICAL ONCE
OUTPATIENT
Start: 2025-06-06 | End: 2025-06-06

## 2025-06-06 RX ORDER — BETAMETHASONE DIPROPIONATE 0.5 MG/G
CREAM TOPICAL ONCE
OUTPATIENT
Start: 2025-06-06 | End: 2025-06-06

## 2025-06-06 RX ORDER — LIDOCAINE 50 MG/G
OINTMENT TOPICAL ONCE
OUTPATIENT
Start: 2025-06-06 | End: 2025-06-06

## 2025-06-06 RX ORDER — SILVER SULFADIAZINE 10 MG/G
CREAM TOPICAL ONCE
OUTPATIENT
Start: 2025-06-06 | End: 2025-06-06

## 2025-06-06 RX ORDER — MUPIROCIN 20 MG/G
OINTMENT TOPICAL ONCE
OUTPATIENT
Start: 2025-06-06 | End: 2025-06-06

## 2025-06-06 RX ORDER — SODIUM CHLOR/HYPOCHLOROUS ACID 0.033 %
SOLUTION, IRRIGATION IRRIGATION ONCE
OUTPATIENT
Start: 2025-06-06 | End: 2025-06-06

## 2025-06-06 RX ORDER — TRIAMCINOLONE ACETONIDE 1 MG/G
OINTMENT TOPICAL ONCE
OUTPATIENT
Start: 2025-06-06 | End: 2025-06-06

## 2025-06-06 RX ORDER — NEOMYCIN/BACITRACIN/POLYMYXINB 3.5-400-5K
OINTMENT (GRAM) TOPICAL ONCE
OUTPATIENT
Start: 2025-06-06 | End: 2025-06-06

## 2025-06-06 RX ORDER — GINSENG 100 MG
CAPSULE ORAL ONCE
OUTPATIENT
Start: 2025-06-06 | End: 2025-06-06

## 2025-06-06 RX ORDER — GENTAMICIN SULFATE 1 MG/G
OINTMENT TOPICAL ONCE
OUTPATIENT
Start: 2025-06-06 | End: 2025-06-06

## 2025-06-06 RX ADMIN — CLOBETASOL PROPIONATE: 0.5 OINTMENT TOPICAL at 11:15

## 2025-06-06 NOTE — WOUND CARE
.Multilayer Compression Wrap   (Not Unna) Below the Knee    NAME:  Fuentes Daley  YOB: 1945  MEDICAL RECORD NUMBER:  [id number]  DATE:  6/6/2025    Multilayer compression wrap: Removed old Multilayer wrap if indicated and wash leg with mild soap/water.  Applied moisturizing agent to dry skin as needed.   Applied primary and secondary dressing as ordered.  Applied multilayered dressing below the knee to right lower leg.  Instructed patient/caregiver not to remove dressing and to keep it clean and dry.   Instructed patient/caregiver on complications to report to provider, such as pain, numbness in toes, heavy drainage, and slippage of dressing.  Instructed patient on purpose of compression dressing and on activity and exercise recommendations.      Electronically signed by Tonja Yoder RN on 6/6/2025 at 11:14 AM

## 2025-06-06 NOTE — PATIENT INSTRUCTIONS
PHYSICIAN ORDERS AND DISCHARGE INSTRUCTIONS    Wound Care Notes:    Grafts Kerecis  Right medial lower leg  Kerecis #1  25  Kerecis #2  25    2nd Round  Kerecis # 1 25  Kerecis #2 2025  Kerecis # 3 25  Kerecis # 4 25  Kerecis #5 25         Orders for this week:  2025    FAX TO [facility]:     Right Lower Leg -- wash with soap and water, pat dry.   Clobetasol to intact skin   Desitin to periwound  Apply anasept gel and puracol ag to wound bed  Cover with  zetuvit  Wrap with four flex    Change Monday and Wednesday      Dispense 30 day quantity when ordering supplies.  Plan: Patient refusing wound vac.   Kerecis BI faxed for Right medial lower leg 25 updated request      Follow Up Instructions:  1 week   Primary Wound Care Provider: Destin Iniguez CNP   Call  for any questions or concerns.  Central [phone] for imaging and lab work

## 2025-06-13 ENCOUNTER — HOSPITAL ENCOUNTER (OUTPATIENT)
Dept: WOUND CARE | Age: 80
Discharge: HOME | End: 2025-06-13
Attending: NURSE PRACTITIONER
Payer: COMMERCIAL

## 2025-06-13 VITALS
SYSTOLIC BLOOD PRESSURE: 137 MMHG | HEART RATE: 70 BPM | TEMPERATURE: 98 F | DIASTOLIC BLOOD PRESSURE: 86 MMHG | RESPIRATION RATE: 18 BRPM

## 2025-06-13 DIAGNOSIS — I83.012 VENOUS STASIS ULCER OF RIGHT CALF WITH FAT LAYER EXPOSED, UNSPECIFIED WHETHER VARICOSE VEINS PRESENT (HCC): Primary | ICD-10-CM

## 2025-06-13 DIAGNOSIS — L97.212 VENOUS STASIS ULCER OF RIGHT CALF WITH FAT LAYER EXPOSED, UNSPECIFIED WHETHER VARICOSE VEINS PRESENT (HCC): Primary | ICD-10-CM

## 2025-06-13 PROCEDURE — 11045 DBRDMT SUBQ TISS EACH ADDL: CPT | Performed by: NURSE PRACTITIONER

## 2025-06-13 PROCEDURE — 11042 DBRDMT SUBQ TIS 1ST 20SQCM/<: CPT | Performed by: NURSE PRACTITIONER

## 2025-06-13 PROCEDURE — 11045 DBRDMT SUBQ TISS EACH ADDL: CPT

## 2025-06-13 PROCEDURE — 11042 DBRDMT SUBQ TIS 1ST 20SQCM/<: CPT

## 2025-06-13 RX ORDER — GINSENG 100 MG
CAPSULE ORAL ONCE
OUTPATIENT
Start: 2025-06-13 | End: 2025-06-13

## 2025-06-13 RX ORDER — NEOMYCIN/BACITRACIN/POLYMYXINB 3.5-400-5K
OINTMENT (GRAM) TOPICAL ONCE
OUTPATIENT
Start: 2025-06-13 | End: 2025-06-13

## 2025-06-13 RX ORDER — BETAMETHASONE DIPROPIONATE 0.5 MG/G
CREAM TOPICAL ONCE
OUTPATIENT
Start: 2025-06-13 | End: 2025-06-13

## 2025-06-13 RX ORDER — CLOBETASOL PROPIONATE 0.5 MG/G
OINTMENT TOPICAL ONCE
OUTPATIENT
Start: 2025-06-13 | End: 2025-06-13

## 2025-06-13 RX ORDER — SODIUM CHLOR/HYPOCHLOROUS ACID 0.033 %
SOLUTION, IRRIGATION IRRIGATION ONCE
OUTPATIENT
Start: 2025-06-13 | End: 2025-06-13

## 2025-06-13 RX ORDER — MUPIROCIN 2 %
OINTMENT (GRAM) TOPICAL ONCE
OUTPATIENT
Start: 2025-06-13 | End: 2025-06-13

## 2025-06-13 RX ORDER — LIDOCAINE 50 MG/G
OINTMENT TOPICAL ONCE
OUTPATIENT
Start: 2025-06-13 | End: 2025-06-13

## 2025-06-13 RX ORDER — BACITRACIN ZINC AND POLYMYXIN B SULFATE 500; 1000 [USP'U]/G; [USP'U]/G
OINTMENT TOPICAL ONCE
OUTPATIENT
Start: 2025-06-13 | End: 2025-06-13

## 2025-06-13 RX ORDER — GENTAMICIN SULFATE 1 MG/G
OINTMENT TOPICAL ONCE
OUTPATIENT
Start: 2025-06-13 | End: 2025-06-13

## 2025-06-13 RX ORDER — TRIAMCINOLONE ACETONIDE 1 MG/G
OINTMENT TOPICAL ONCE
OUTPATIENT
Start: 2025-06-13 | End: 2025-06-13

## 2025-06-13 RX ORDER — LIDOCAINE 40 MG/G
CREAM TOPICAL ONCE
OUTPATIENT
Start: 2025-06-13 | End: 2025-06-13

## 2025-06-13 RX ORDER — LIDOCAINE HYDROCHLORIDE 20 MG/ML
JELLY TOPICAL ONCE
OUTPATIENT
Start: 2025-06-13 | End: 2025-06-13

## 2025-06-13 RX ORDER — LIDOCAINE HYDROCHLORIDE 40 MG/ML
SOLUTION TOPICAL ONCE
OUTPATIENT
Start: 2025-06-13 | End: 2025-06-13

## 2025-06-13 RX ORDER — SILVER SULFADIAZINE 10 MG/G
CREAM TOPICAL ONCE
OUTPATIENT
Start: 2025-06-13 | End: 2025-06-13

## 2025-06-13 NOTE — PATIENT INSTRUCTIONS
PHYSICIAN ORDERS AND DISCHARGE INSTRUCTIONS    Wound Care Notes:    Grafts Kerecis  Right medial lower leg  Kerecis #1  25  Kerecis #2  25    2nd Round  Kerecis # 1 25  Kerecis #2 2025  Kerecis # 3 25  Kerecis # 4 25  Kerecis #5 25         Orders for this week:  2025    FAX TO [facility]:     Right Lower Leg -- wash with soap and water, pat dry.   Marathon skin prep to periwound  Apply anasept gel and puracol ag to wound bed  Cover with  Ag alginate and zetuvit  Wrap with four flex    Change Monday and Wednesday      Dispense 30 day quantity when ordering supplies.  Plan: Patient refusing wound vac.   Kerecis BI faxed for Right medial lower leg 25 updated request      Follow Up Instructions:  1 week   Primary Wound Care Provider: Destin Iniguez CNP   Call  for any questions or concerns.  Central [phone] for imaging and lab work

## 2025-06-13 NOTE — PROGRESS NOTES
Multilayer Compression Wrap   (Not Unna) Below the Knee    NAME:  Fuentes Daley  YOB: 1945  MEDICAL RECORD NUMBER:  [id number]  DATE:  6/13/2025    Multilayer compression wrap: Removed old Multilayer wrap if indicated and wash leg with mild soap/water.  Applied moisturizing agent to dry skin as needed.   Applied primary and secondary dressing as ordered.  Applied multilayered dressing below the knee to right lower leg.  Instructed patient/caregiver not to remove dressing and to keep it clean and dry.   Instructed patient/caregiver on complications to report to provider, such as pain, numbness in toes, heavy drainage, and slippage of dressing.  Instructed patient on purpose of compression dressing and on activity and exercise recommendations.      Electronically signed by Yolis Carreon LPN on 6/13/2025 at 11:13 AM  
this time.  Follow-up next week.    4/11/25  Wound slowly improving.  Continue present management    4/18/25  Wound improving.  Awaiting final Kerecis approval.    4/25/25  Kerecis application #1 today-7 x 8 cm expanse    5/2/25  Wound improving-Kerecis application #2 today    5/9/25  Wound improving.  Kerecis application #3 today.    5/16/25  Kerecis application #4 today.  Wound improving.  Continue present management    5/30/25  Kerecis application #5 today.  Wound appears relatively stable.    6/6/25  Have been using Kerecis skin subs with some improvement. 5 grafts remain. A wound vac was discussed in the past, but the patient did not want to utilize one when discussed earlier in the year.     6/13/25    Patient presents for follow up, patient with slight increase in purulent drainage. No other obvious signs of infection noted. Will switch to silver alginate for a week and re evaluate next week. Patient to follow up with FARSHAD Iniguez CNP next week.     Regimen discussed and established with patient/family as below. The patients records were reviewed and discussed.  Time was given for questions. All questions were answered to the patients satisfaction.      [] Stable [] Worse []  New wound [] Reopened [x] Improved [] Initial visit []  Revisit []  Healed    [x] Adjustments made to regimen of care  [] Off loading regimen  [x] Compression regimen  [] Wound Vac Therapy  [] Increased frequency of dressing change  [] Revaluation of the wound in a shorter interval to assess effectiveness of changes in care  [] Wound culture obtained  [] Antibiotic therapy added based on current and/or previous culture results  [] Medication(s) added to treatment regimen  [] Lab testing ordered  [] Pathology ordered   [] Radiology ordered  [] Callus maintenance: Hyperkeratotic tissue noted, paring of callous completed using curette and tissue nippers. No wound present within the callous. Medical necessity includes advanced atrophic changes

## 2025-06-20 ENCOUNTER — HOSPITAL ENCOUNTER (OUTPATIENT)
Dept: WOUND CARE | Age: 80
Discharge: HOME | End: 2025-06-20
Attending: NURSE PRACTITIONER
Payer: COMMERCIAL

## 2025-06-20 VITALS
TEMPERATURE: 98.1 F | DIASTOLIC BLOOD PRESSURE: 74 MMHG | HEART RATE: 71 BPM | RESPIRATION RATE: 20 BRPM | SYSTOLIC BLOOD PRESSURE: 149 MMHG

## 2025-06-20 DIAGNOSIS — I83.012 VENOUS STASIS ULCER OF RIGHT CALF WITH FAT LAYER EXPOSED, UNSPECIFIED WHETHER VARICOSE VEINS PRESENT (HCC): Primary | ICD-10-CM

## 2025-06-20 DIAGNOSIS — L97.212 VENOUS STASIS ULCER OF RIGHT CALF WITH FAT LAYER EXPOSED, UNSPECIFIED WHETHER VARICOSE VEINS PRESENT (HCC): Primary | ICD-10-CM

## 2025-06-20 PROCEDURE — 11042 DBRDMT SUBQ TIS 1ST 20SQCM/<: CPT

## 2025-06-20 PROCEDURE — 11045 DBRDMT SUBQ TISS EACH ADDL: CPT

## 2025-06-20 PROCEDURE — 11042 DBRDMT SUBQ TIS 1ST 20SQCM/<: CPT | Performed by: NURSE PRACTITIONER

## 2025-06-20 PROCEDURE — 11045 DBRDMT SUBQ TISS EACH ADDL: CPT | Performed by: NURSE PRACTITIONER

## 2025-06-20 RX ORDER — BETAMETHASONE DIPROPIONATE 0.5 MG/G
CREAM TOPICAL ONCE
OUTPATIENT
Start: 2025-06-20 | End: 2025-06-20

## 2025-06-20 RX ORDER — LIDOCAINE HYDROCHLORIDE 20 MG/ML
JELLY TOPICAL ONCE
OUTPATIENT
Start: 2025-06-20 | End: 2025-06-20

## 2025-06-20 RX ORDER — NEOMYCIN/BACITRACIN/POLYMYXINB 3.5-400-5K
OINTMENT (GRAM) TOPICAL ONCE
OUTPATIENT
Start: 2025-06-20 | End: 2025-06-20

## 2025-06-20 RX ORDER — TRIAMCINOLONE ACETONIDE 1 MG/G
OINTMENT TOPICAL ONCE
OUTPATIENT
Start: 2025-06-20 | End: 2025-06-20

## 2025-06-20 RX ORDER — SILVER SULFADIAZINE 10 MG/G
CREAM TOPICAL ONCE
OUTPATIENT
Start: 2025-06-20 | End: 2025-06-20

## 2025-06-20 RX ORDER — BACITRACIN ZINC AND POLYMYXIN B SULFATE 500; 1000 [USP'U]/G; [USP'U]/G
OINTMENT TOPICAL ONCE
OUTPATIENT
Start: 2025-06-20 | End: 2025-06-20

## 2025-06-20 RX ORDER — LIDOCAINE HYDROCHLORIDE 40 MG/ML
SOLUTION TOPICAL ONCE
OUTPATIENT
Start: 2025-06-20 | End: 2025-06-20

## 2025-06-20 RX ORDER — CLOBETASOL PROPIONATE 0.5 MG/G
OINTMENT TOPICAL ONCE
OUTPATIENT
Start: 2025-06-20 | End: 2025-06-20

## 2025-06-20 RX ORDER — MUPIROCIN 2 %
OINTMENT (GRAM) TOPICAL ONCE
OUTPATIENT
Start: 2025-06-20 | End: 2025-06-20

## 2025-06-20 RX ORDER — LIDOCAINE 50 MG/G
OINTMENT TOPICAL ONCE
OUTPATIENT
Start: 2025-06-20 | End: 2025-06-20

## 2025-06-20 RX ORDER — SODIUM CHLOR/HYPOCHLOROUS ACID 0.033 %
SOLUTION, IRRIGATION IRRIGATION ONCE
OUTPATIENT
Start: 2025-06-20 | End: 2025-06-20

## 2025-06-20 RX ORDER — GINSENG 100 MG
CAPSULE ORAL ONCE
OUTPATIENT
Start: 2025-06-20 | End: 2025-06-20

## 2025-06-20 RX ORDER — GENTAMICIN SULFATE 1 MG/G
OINTMENT TOPICAL ONCE
OUTPATIENT
Start: 2025-06-20 | End: 2025-06-20

## 2025-06-20 RX ORDER — LIDOCAINE 40 MG/G
CREAM TOPICAL ONCE
OUTPATIENT
Start: 2025-06-20 | End: 2025-06-20

## 2025-06-20 NOTE — PATIENT INSTRUCTIONS
PHYSICIAN ORDERS AND DISCHARGE INSTRUCTIONS    Wound Care Notes:    Grafts Kerecis  Right medial lower leg  Kerecis #1  25  Kerecis #2  25    2nd Round  Kerecis # 1 25  Kerecis #2 2025  Kerecis # 3 25  Kerecis # 4 25  Kerecis #5 25         Orders for this week:  2025    FAX TO [facility]:     Right Lower Leg -- wash with soap and water, pat dry.   Marathon skin prep to periwound  Apply  Colactive plus Ag  to wound bed  Cover with  zetuvit  Wrap with four flex    Change Monday and Wednesday      Dispense 30 day quantity when ordering supplies.  Plan: Patient refusing wound vac.   Kerecis BI faxed for Right medial lower leg 25 updated request      Follow Up Instructions:  1 week   Primary Wound Care Provider: Destin Iniguez CNP   Call  for any questions or concerns.  Central [phone] for imaging and lab work

## 2025-06-20 NOTE — WOUND CARE
.Multilayer Compression Wrap   (Not Unna) Below the Knee    NAME:  Fuentes Daley  YOB: 1945  MEDICAL RECORD NUMBER:  [id number]  DATE:  6/20/2025    Multilayer compression wrap: Removed old Multilayer wrap if indicated and wash leg with mild soap/water.  Applied moisturizing agent to dry skin as needed.   Applied primary and secondary dressing as ordered.  Applied multilayered dressing below the knee to right lower leg.  Instructed patient/caregiver not to remove dressing and to keep it clean and dry.   Instructed patient/caregiver on complications to report to provider, such as pain, numbness in toes, heavy drainage, and slippage of dressing.  Instructed patient on purpose of compression dressing and on activity and exercise recommendations.      Electronically signed by Tonja Yoder RN on 6/20/2025 at 10:47 AM

## 2025-06-22 PROBLEM — L97.929 VENOUS ULCER OF LEFT LEG (HCC): Status: RESOLVED | Noted: 2020-09-26 | Resolved: 2025-06-22

## 2025-06-22 PROBLEM — I83.029 VENOUS ULCER OF LEFT LEG (HCC): Status: RESOLVED | Noted: 2020-09-26 | Resolved: 2025-06-22

## 2025-06-27 ENCOUNTER — HOSPITAL ENCOUNTER (OUTPATIENT)
Dept: WOUND CARE | Age: 80
Discharge: HOME | End: 2025-06-27
Attending: NURSE PRACTITIONER
Payer: COMMERCIAL

## 2025-06-27 VITALS
DIASTOLIC BLOOD PRESSURE: 88 MMHG | RESPIRATION RATE: 18 BRPM | HEART RATE: 64 BPM | TEMPERATURE: 97.4 F | SYSTOLIC BLOOD PRESSURE: 139 MMHG

## 2025-06-27 PROCEDURE — 11042 DBRDMT SUBQ TIS 1ST 20SQCM/<: CPT

## 2025-06-27 PROCEDURE — 11045 DBRDMT SUBQ TISS EACH ADDL: CPT

## 2025-06-27 ASSESSMENT — PAIN SCALES - GENERAL: PAINLEVEL_OUTOF10: 0

## 2025-06-27 NOTE — PATIENT INSTRUCTIONS
PHYSICIAN ORDERS AND DISCHARGE INSTRUCTIONS    Wound Care Notes:    Grafts Kerecis  Right medial lower leg  Kerecis #1  25  Kerecis #2  25    2nd Round  Kerecis # 1 25  Kerecis #2 2025  Kerecis # 3 25  Kerecis # 4 25  Kerecis #5 25         Orders for this week:  2025    FAX TO [facility]:     Right Lower Leg -- wash with soap and water, pat dry.   Desitin to periwound  Apply Colactive plus Ag  to wound bed  Cover with zetuvit  Wrap with four flex    Change Monday and Wednesday and Friday ( clinic closed)       Dispense 30 day quantity when ordering supplies.  Plan: Patient refusing wound vac.   Kerecis BI faxed for Right medial lower leg 25 updated request      Follow Up Instructions: 2 weeks   Primary Wound Care Provider: Destin Iniguez CNP   Call  for any questions or concerns.  Central [phone] for imaging and lab work

## 2025-06-27 NOTE — WOUND CARE
.Multilayer Compression Wrap   (Not Unna) Below the Knee    NAME:  Fuentes Daley  YOB: 1945  MEDICAL RECORD NUMBER:  [id number]  DATE:  6/27/2025    Multilayer compression wrap: Removed old Multilayer wrap if indicated and wash leg with mild soap/water.  Applied moisturizing agent to dry skin as needed.   Applied primary and secondary dressing as ordered.  Applied multilayered dressing below the knee to right lower leg.  Instructed patient/caregiver not to remove dressing and to keep it clean and dry.   Instructed patient/caregiver on complications to report to provider, such as pain, numbness in toes, heavy drainage, and slippage of dressing.  Instructed patient on purpose of compression dressing and on activity and exercise recommendations.      Electronically signed by Tonja Yoder RN on 6/27/2025 at 10:58 AM

## 2025-07-11 ENCOUNTER — HOSPITAL ENCOUNTER (OUTPATIENT)
Dept: WOUND CARE | Age: 80
Discharge: HOME | End: 2025-07-11
Attending: NURSE PRACTITIONER
Payer: COMMERCIAL

## 2025-07-11 VITALS
DIASTOLIC BLOOD PRESSURE: 80 MMHG | TEMPERATURE: 98.2 F | RESPIRATION RATE: 18 BRPM | SYSTOLIC BLOOD PRESSURE: 140 MMHG | HEART RATE: 68 BPM

## 2025-07-11 DIAGNOSIS — I83.012 VENOUS STASIS ULCER OF RIGHT CALF WITH FAT LAYER EXPOSED, UNSPECIFIED WHETHER VARICOSE VEINS PRESENT (HCC): Primary | ICD-10-CM

## 2025-07-11 DIAGNOSIS — L97.212 VENOUS STASIS ULCER OF RIGHT CALF WITH FAT LAYER EXPOSED, UNSPECIFIED WHETHER VARICOSE VEINS PRESENT (HCC): Primary | ICD-10-CM

## 2025-07-11 PROCEDURE — 11045 DBRDMT SUBQ TISS EACH ADDL: CPT

## 2025-07-11 PROCEDURE — 11042 DBRDMT SUBQ TIS 1ST 20SQCM/<: CPT

## 2025-07-11 PROCEDURE — 11042 DBRDMT SUBQ TIS 1ST 20SQCM/<: CPT | Performed by: NURSE PRACTITIONER

## 2025-07-11 PROCEDURE — 11045 DBRDMT SUBQ TISS EACH ADDL: CPT | Performed by: NURSE PRACTITIONER

## 2025-07-11 RX ORDER — LIDOCAINE HYDROCHLORIDE 20 MG/ML
JELLY TOPICAL ONCE
OUTPATIENT
Start: 2025-07-11 | End: 2025-07-11

## 2025-07-11 RX ORDER — CLOBETASOL PROPIONATE 0.5 MG/G
OINTMENT TOPICAL ONCE
OUTPATIENT
Start: 2025-07-11 | End: 2025-07-11

## 2025-07-11 RX ORDER — TRIAMCINOLONE ACETONIDE 1 MG/G
OINTMENT TOPICAL ONCE
OUTPATIENT
Start: 2025-07-11 | End: 2025-07-11

## 2025-07-11 RX ORDER — SODIUM CHLOR/HYPOCHLOROUS ACID 0.033 %
SOLUTION, IRRIGATION IRRIGATION ONCE
OUTPATIENT
Start: 2025-07-11 | End: 2025-07-11

## 2025-07-11 RX ORDER — GENTAMICIN SULFATE 1 MG/G
OINTMENT TOPICAL ONCE
OUTPATIENT
Start: 2025-07-11 | End: 2025-07-11

## 2025-07-11 RX ORDER — LIDOCAINE 40 MG/G
CREAM TOPICAL ONCE
OUTPATIENT
Start: 2025-07-11 | End: 2025-07-11

## 2025-07-11 RX ORDER — LIDOCAINE 50 MG/G
OINTMENT TOPICAL ONCE
OUTPATIENT
Start: 2025-07-11 | End: 2025-07-11

## 2025-07-11 RX ORDER — MUPIROCIN 2 %
OINTMENT (GRAM) TOPICAL ONCE
OUTPATIENT
Start: 2025-07-11 | End: 2025-07-11

## 2025-07-11 RX ORDER — GINSENG 100 MG
CAPSULE ORAL ONCE
OUTPATIENT
Start: 2025-07-11 | End: 2025-07-11

## 2025-07-11 RX ORDER — LIDOCAINE HYDROCHLORIDE 40 MG/ML
SOLUTION TOPICAL ONCE
OUTPATIENT
Start: 2025-07-11 | End: 2025-07-11

## 2025-07-11 RX ORDER — BETAMETHASONE DIPROPIONATE 0.5 MG/G
CREAM TOPICAL ONCE
OUTPATIENT
Start: 2025-07-11 | End: 2025-07-11

## 2025-07-11 RX ORDER — BACITRACIN ZINC AND POLYMYXIN B SULFATE 500; 1000 [USP'U]/G; [USP'U]/G
OINTMENT TOPICAL ONCE
OUTPATIENT
Start: 2025-07-11 | End: 2025-07-11

## 2025-07-11 RX ORDER — NEOMYCIN/BACITRACIN/POLYMYXINB 3.5-400-5K
OINTMENT (GRAM) TOPICAL ONCE
OUTPATIENT
Start: 2025-07-11 | End: 2025-07-11

## 2025-07-11 RX ORDER — SILVER SULFADIAZINE 10 MG/G
CREAM TOPICAL ONCE
OUTPATIENT
Start: 2025-07-11 | End: 2025-07-11

## 2025-07-11 ASSESSMENT — PAIN SCALES - GENERAL: PAINLEVEL_OUTOF10: 0

## 2025-07-11 NOTE — WOUND CARE
.Multilayer Compression Wrap   (Not Unna) Below the Knee    NAME:  Fuentes Daley  YOB: 1945  MEDICAL RECORD NUMBER:  [id number]  DATE:  7/11/2025    Multilayer compression wrap: Removed old Multilayer wrap if indicated and wash leg with mild soap/water.  Applied moisturizing agent to dry skin as needed.   Applied primary and secondary dressing as ordered.  Applied multilayered dressing below the knee to right lower leg.  Instructed patient/caregiver not to remove dressing and to keep it clean and dry.   Instructed patient/caregiver on complications to report to provider, such as pain, numbness in toes, heavy drainage, and slippage of dressing.  Instructed patient on purpose of compression dressing and on activity and exercise recommendations.      Electronically signed by Tonja Yoder RN on 7/11/2025 at 10:44 AM

## 2025-07-11 NOTE — PATIENT INSTRUCTIONS
PHYSICIAN ORDERS AND DISCHARGE INSTRUCTIONS    Wound Care Notes:    Grafts Kerecis  Right medial lower leg  Kerecis #1  25  Kerecis #2  25    2nd Round  Kerecis # 1 25  Kerecis #2 2025  Kerecis # 3 25  Kerecis # 4 25  Kerecis #5 25         Orders for this week:  2025    [facility]:     Right Lower Leg -- wash with soap and water, pat dry.   Desitin to periwound  Apply Colactive plus Ag  to wound bed  Cover with zetuvit  Wrap with four flex    Change Monday and Wednesday and Friday       Dispense 30 day quantity when ordering supplies.  Plan: Patient refusing wound vac.   Kerecis BI faxed for Right medial lower leg 25 updated request      Follow Up Instructions: 1 weeks   Primary Wound Care Provider: Destin Iniguez CNP   Call  for any questions or concerns.  Central [phone] for imaging and lab work  
90

## 2025-07-11 NOTE — PROGRESS NOTES
procedure note      Assessment:     Problem List Items Addressed This Visit          Musculoskeletal and Integument    WD-Venous stasis ulcer of right calf with fat layer exposed (HCC) - Primary    Relevant Orders    Initiate Outpatient Wound Care Protocol     Procedure Note    Indications:  Based on my examination of this patient's wound(s) today, sharp excision into subcutaneous tissue is required to promote healing and evaluate the extent of previous healing.    Performed by: LOLY Burton CNP    Consent obtained: Yes    Time out taken:  Yes    Pain Control: 2% Lidocaine topical spray    Debridement: Excisional Debridement    Using curette the wound(s) was/were sharply debrided down through and including the removal of subcutaneous tissue.        Devitalized Tissue Debrided:  fibrin, biofilm, slough, and exudate    Pre Debridement Measurements:  Are located in the Wound Documentation Flow Sheet    All active wounds listed below with today's date are evaluated  Wound(s) debrided this date include # : 1   Wound 09/27/24 #1 Right Medial Lower Leg Kerecis Approved (Active)   Wound Image   07/11/25 1025   Wound Etiology Venous 06/20/25 1023   Dressing Status New dressing applied 07/11/25 1044   Wound Cleansed Wound cleanser;Soap and water 07/11/25 1023   Dressing/Treatment Other (comment) 05/02/25 1058   Offloading for Diabetic Foot Ulcers Offloading not required 07/11/25 1044   Wound Length (cm) 9 cm 07/11/25 1023   Wound Width (cm) 4 cm 07/11/25 1023   Wound Depth (cm) 0.1 cm 07/11/25 1023   Wound Surface Area (cm^2) 36 cm^2 07/11/25 1023   Change in Wound Size % (l*w) 75.76 07/11/25 1023   Wound Volume (cm^3) 3.6 cm^3 07/11/25 1023   Wound Healing % 92 07/11/25 1023   Post-Procedure Length (cm) 9 cm 07/11/25 1036   Post-Procedure Width (cm) 4 cm 07/11/25 1036   Post-Procedure Depth (cm) 0.1 cm 07/11/25 1036   Post-Procedure Surface Area (cm^2) 36 cm^2 07/11/25 1036   Post-Procedure Volume (cm^3) 3.6 cm^3

## 2025-07-18 ENCOUNTER — HOSPITAL ENCOUNTER (OUTPATIENT)
Dept: WOUND CARE | Age: 80
Discharge: HOME | End: 2025-07-18
Attending: NURSE PRACTITIONER
Payer: COMMERCIAL

## 2025-07-18 VITALS
TEMPERATURE: 97.2 F | HEART RATE: 66 BPM | RESPIRATION RATE: 18 BRPM | DIASTOLIC BLOOD PRESSURE: 79 MMHG | SYSTOLIC BLOOD PRESSURE: 138 MMHG

## 2025-07-18 DIAGNOSIS — I83.012 VENOUS STASIS ULCER OF RIGHT CALF WITH FAT LAYER EXPOSED, UNSPECIFIED WHETHER VARICOSE VEINS PRESENT (HCC): Primary | ICD-10-CM

## 2025-07-18 DIAGNOSIS — L97.212 VENOUS STASIS ULCER OF RIGHT CALF WITH FAT LAYER EXPOSED, UNSPECIFIED WHETHER VARICOSE VEINS PRESENT (HCC): Primary | ICD-10-CM

## 2025-07-18 PROCEDURE — 11042 DBRDMT SUBQ TIS 1ST 20SQCM/<: CPT

## 2025-07-18 PROCEDURE — 11045 DBRDMT SUBQ TISS EACH ADDL: CPT

## 2025-07-18 RX ORDER — SODIUM CHLOR/HYPOCHLOROUS ACID 0.033 %
SOLUTION, IRRIGATION IRRIGATION ONCE
OUTPATIENT
Start: 2025-07-18 | End: 2025-07-18

## 2025-07-18 RX ORDER — LIDOCAINE HYDROCHLORIDE 40 MG/ML
SOLUTION TOPICAL ONCE
OUTPATIENT
Start: 2025-07-18 | End: 2025-07-18

## 2025-07-18 RX ORDER — SILVER SULFADIAZINE 10 MG/G
CREAM TOPICAL ONCE
OUTPATIENT
Start: 2025-07-18 | End: 2025-07-18

## 2025-07-18 RX ORDER — LIDOCAINE 40 MG/G
CREAM TOPICAL ONCE
OUTPATIENT
Start: 2025-07-18 | End: 2025-07-18

## 2025-07-18 RX ORDER — NEOMYCIN/BACITRACIN/POLYMYXINB 3.5-400-5K
OINTMENT (GRAM) TOPICAL ONCE
OUTPATIENT
Start: 2025-07-18 | End: 2025-07-18

## 2025-07-18 RX ORDER — LIDOCAINE HYDROCHLORIDE 20 MG/ML
JELLY TOPICAL ONCE
OUTPATIENT
Start: 2025-07-18 | End: 2025-07-18

## 2025-07-18 RX ORDER — GENTAMICIN SULFATE 1 MG/G
OINTMENT TOPICAL ONCE
OUTPATIENT
Start: 2025-07-18 | End: 2025-07-18

## 2025-07-18 RX ORDER — LIDOCAINE 50 MG/G
OINTMENT TOPICAL ONCE
OUTPATIENT
Start: 2025-07-18 | End: 2025-07-18

## 2025-07-18 RX ORDER — CLOBETASOL PROPIONATE 0.5 MG/G
OINTMENT TOPICAL ONCE
OUTPATIENT
Start: 2025-07-18 | End: 2025-07-18

## 2025-07-18 RX ORDER — BACITRACIN ZINC AND POLYMYXIN B SULFATE 500; 1000 [USP'U]/G; [USP'U]/G
OINTMENT TOPICAL ONCE
OUTPATIENT
Start: 2025-07-18 | End: 2025-07-18

## 2025-07-18 RX ORDER — BETAMETHASONE DIPROPIONATE 0.5 MG/G
CREAM TOPICAL ONCE
OUTPATIENT
Start: 2025-07-18 | End: 2025-07-18

## 2025-07-18 RX ORDER — MUPIROCIN 2 %
OINTMENT (GRAM) TOPICAL ONCE
OUTPATIENT
Start: 2025-07-18 | End: 2025-07-18

## 2025-07-18 RX ORDER — TRIAMCINOLONE ACETONIDE 1 MG/G
OINTMENT TOPICAL ONCE
OUTPATIENT
Start: 2025-07-18 | End: 2025-07-18

## 2025-07-18 RX ORDER — GINSENG 100 MG
CAPSULE ORAL ONCE
OUTPATIENT
Start: 2025-07-18 | End: 2025-07-18

## 2025-07-18 NOTE — PATIENT INSTRUCTIONS
PHYSICIAN ORDERS AND DISCHARGE INSTRUCTIONS    Wound Care Notes:    Grafts Kerecis  Right medial lower leg  Kerecis #1  25  Kerecis #2  25    2nd Round  Kerecis # 1 25  Kerecis #2 2025  Kerecis # 3 25  Kerecis # 4 25  Kerecis #5 25         Orders for this week:  2025    FAX TO [facility]:     Right Lower Leg -- wash with soap and water, pat dry.   Desitin to periwound  Apply Colactive plus Ag  to wound bed  Cover with zetuvit  Wrap with four flex    Change Monday and Wednesday and Friday       Dispense 30 day quantity when ordering supplies.  Plan: Patient refusing wound vac.   Kerecis BI faxed for Right medial lower leg 25 updated request      Follow Up Instructions: 1 weeks   Primary Wound Care Provider: Destin Iniguez CNP   Call  for any questions or concerns.  Central [phone] for imaging and lab work

## 2025-07-18 NOTE — PROGRESS NOTES
Wound Care Center Progress Note With Procedure    Fuentes Daley  AGE: 80 y.o.   GENDER: male  : 1945  EPISODE DATE:  2025     Subjective:     Chief Complaint   Patient presents with    Wound Check         HISTORY of PRESENT ILLNESS     Fuentes Daley is a 79 y.o. male who presents to the Wound Clinic for a visit for evaluation and treatment of Chronic venous  ulcer(s) of  R lower leg medial, R lower leg lateral, R lower leg anterior, R lower leg posterior.  The condition is of moderate severity. The ulcer has been present for several monthys .  The underlying cause is thought to be venous stasis.  The patients care to date has included care at home from wife. The patient has significant underlying medical conditions as below.      Patient is not a diabetic or a smoker  Not on a blood thinner    Wound Pain Timing/Severity: waxing and waning  Quality of pain: dull, aching  Severity of pain:   10   Modifying Factors: edema  Associated Signs/Symptoms: edema, drainage    Living Situation  [x] Home [] SNF []  Assisted living  [] Other (ie rehab, etc.) [] Home Health  []  Transportation    24  Will change regimen to Anasept ointment/stimulant and cover with Sorbact and agile then compression wrap.  Will apply for wound VAC.  Would like to see improved granulation tissue over base of wound.    2024  Continues to have significant sloughing tissue.  Would benefit from wound VAC but patient refusing wound VAC for now.  Will continue with Santyl ointment for this week.  Once able we will apply for Kerecis.                                      1/3/2025  Improved sloughing tissue.  Better granulation tissue.  Approved for Kerecis.  Patient with palpable distal pulses with triphasic DP/PT  signals.  No signs of surrounding infection.    1/10  Tolerated debridement.   Santyl dressing this week with.  Would like wound surface to improve prior to placing Kerecis.    25  Once again patient presents

## 2025-07-18 NOTE — PROGRESS NOTES
Multilayer Compression Wrap   (Not Unna) Below the Knee    NAME:  Fuentes Daley  YOB: 1945  MEDICAL RECORD NUMBER:  [id number]  DATE:  7/18/2025    Multilayer compression wrap: Removed old Multilayer wrap if indicated and wash leg with mild soap/water.  Applied moisturizing agent to dry skin as needed.   Applied primary and secondary dressing as ordered.  Applied multilayered dressing below the knee to right lower leg.  Instructed patient/caregiver not to remove dressing and to keep it clean and dry.   Instructed patient/caregiver on complications to report to provider, such as pain, numbness in toes, heavy drainage, and slippage of dressing.  Instructed patient on purpose of compression dressing and on activity and exercise recommendations.      Electronically signed by Suzanne Galarza RN on 7/18/2025 at 10:48 AM

## 2025-07-24 ENCOUNTER — OFFICE VISIT (OUTPATIENT)
Dept: INTERNAL MEDICINE CLINIC | Age: 80
End: 2025-07-24
Payer: COMMERCIAL

## 2025-07-24 VITALS
BODY MASS INDEX: 18.9 KG/M2 | SYSTOLIC BLOOD PRESSURE: 142 MMHG | OXYGEN SATURATION: 94 % | WEIGHT: 152 LBS | HEART RATE: 69 BPM | DIASTOLIC BLOOD PRESSURE: 70 MMHG | HEIGHT: 75 IN

## 2025-07-24 DIAGNOSIS — Z85.72 PERSONAL HISTORY OF LYMPHOMA: ICD-10-CM

## 2025-07-24 DIAGNOSIS — R73.01 IFG (IMPAIRED FASTING GLUCOSE): ICD-10-CM

## 2025-07-24 DIAGNOSIS — L97.212 VENOUS STASIS ULCER OF RIGHT CALF WITH FAT LAYER EXPOSED WITH VARICOSE VEINS (HCC): ICD-10-CM

## 2025-07-24 DIAGNOSIS — I10 ESSENTIAL HYPERTENSION: ICD-10-CM

## 2025-07-24 DIAGNOSIS — Z00.00 MEDICARE ANNUAL WELLNESS VISIT, SUBSEQUENT: Primary | ICD-10-CM

## 2025-07-24 DIAGNOSIS — I87.2 CHRONIC VENOUS INSUFFICIENCY: ICD-10-CM

## 2025-07-24 DIAGNOSIS — I83.012 VENOUS STASIS ULCER OF RIGHT CALF WITH FAT LAYER EXPOSED WITH VARICOSE VEINS (HCC): ICD-10-CM

## 2025-07-24 PROCEDURE — 3078F DIAST BP <80 MM HG: CPT | Performed by: FAMILY MEDICINE

## 2025-07-24 PROCEDURE — 36415 COLL VENOUS BLD VENIPUNCTURE: CPT | Performed by: FAMILY MEDICINE

## 2025-07-24 PROCEDURE — 1123F ACP DISCUSS/DSCN MKR DOCD: CPT | Performed by: FAMILY MEDICINE

## 2025-07-24 PROCEDURE — 3077F SYST BP >= 140 MM HG: CPT | Performed by: FAMILY MEDICINE

## 2025-07-24 PROCEDURE — G0439 PPPS, SUBSEQ VISIT: HCPCS | Performed by: FAMILY MEDICINE

## 2025-07-24 RX ORDER — M-VIT,TX,IRON,MINS/CALC/FOLIC 27MG-0.4MG
1 TABLET ORAL DAILY
COMMUNITY

## 2025-07-24 ASSESSMENT — PATIENT HEALTH QUESTIONNAIRE - PHQ9
SUM OF ALL RESPONSES TO PHQ QUESTIONS 1-9: 0
SUM OF ALL RESPONSES TO PHQ QUESTIONS 1-9: 0
1. LITTLE INTEREST OR PLEASURE IN DOING THINGS: NOT AT ALL
2. FEELING DOWN, DEPRESSED OR HOPELESS: NOT AT ALL
SUM OF ALL RESPONSES TO PHQ QUESTIONS 1-9: 0
SUM OF ALL RESPONSES TO PHQ QUESTIONS 1-9: 0

## 2025-07-24 NOTE — PATIENT INSTRUCTIONS
We are committed to providing you the best care possible.    If you receive a survey after visiting one of our offices, please take time to share your experience concerning your physician office visit.  These surveys are confidential and no health information about you is shared.    We are eager to improve for you and continue to give you satisfactory care, we are counting on your feedback to help make that happen.              Learning About Dental Care for Older Adults  Dental care for older adults: Overview  Dental care for older people is much the same as for younger adults. But older adults do have concerns that younger adults do not. Older adults may have problems with gum disease and decay on the roots of their teeth. They may need missing teeth replaced or broken fillings fixed. Or they may have dentures that need to be cared for. Some older adults may have trouble holding a toothbrush.  You can help remind the person you are caring for to brush and floss their teeth or to clean their dentures. In some cases, you may need to do the brushing and other dental care tasks. People who have trouble using their hands or who have dementia may need this extra help.  How can you help with dental care?  Normal dental care  To keep the teeth and gums healthy:  Brush the teeth with fluoride toothpaste twice a day--in the morning and at night--and floss at least once a day. Plaque can quickly build up on the teeth of older adults.  Watch for the signs of gum disease. These signs include gums that bleed after brushing or after eating hard foods, such as apples.  See a dentist regularly. Many experts recommend checkups every 6 months.  Keep the dentist up to date on any new medications the person is taking.  Encourage a balanced diet that includes whole grains, vegetables, and fruits, and that is low in saturated fat and sodium.  Encourage the person you're caring for not to use tobacco products. They can affect dental and

## 2025-07-24 NOTE — PROGRESS NOTES
TV, or doing any of your daily activities because of your eyesight?: (!) Yes  Have you had an eye exam within the past year?: Yes  Interventions:   Patient encouraged to make appointment with their eye specialist  He has glasses at home- readers       Advanced Directives:  Do you have a Living Will?: (!) No (wants the paperwork for one)    Intervention:  Recommend             Wt Readings from Last 3 Encounters:   07/24/25 68.9 kg (152 lb)   04/14/25 70.8 kg (156 lb)   01/14/25 73 kg (161 lb)             Objective   Vitals:    07/24/25 1018 07/24/25 1053   BP: (!) 152/86 (!) 142/70   Pulse: 69    SpO2: 94%    Weight: 68.9 kg (152 lb)    Height: 1.905 m (6' 3\")       Body mass index is 19 kg/m².      Physical Exam  Vitals reviewed.   Constitutional:       General: He is not in acute distress.  HENT:      Right Ear: Tympanic membrane normal.      Left Ear: Tympanic membrane normal.      Nose: Nose normal.      Mouth/Throat:      Mouth: Mucous membranes are moist.   Eyes:      General: No scleral icterus.     Extraocular Movements: Extraocular movements intact.      Pupils: Pupils are equal, round, and reactive to light.   Cardiovascular:      Rate and Rhythm: Normal rate and regular rhythm.   Pulmonary:      Effort: Pulmonary effort is normal. No respiratory distress.      Breath sounds: Normal breath sounds.   Abdominal:      Palpations: Abdomen is soft.      Tenderness: There is no abdominal tenderness.   Musculoskeletal:      Cervical back: Neck supple.      Right lower leg: No edema.      Left lower leg: No edema.   Skin:     Findings: No rash.   Neurological:      Mental Status: He is alert and oriented to person, place, and time.      Cranial Nerves: No cranial nerve deficit.   Psychiatric:         Mood and Affect: Mood normal.                No Known Allergies  Prior to Visit Medications    Medication Sig Taking? Authorizing Provider   Multiple Vitamins-Minerals (THERAPEUTIC MULTIVITAMIN-MINERALS) tablet Take 1

## 2025-07-25 ENCOUNTER — HOSPITAL ENCOUNTER (OUTPATIENT)
Dept: WOUND CARE | Age: 80
Discharge: HOME | End: 2025-07-25
Attending: NURSE PRACTITIONER
Payer: COMMERCIAL

## 2025-07-25 VITALS
SYSTOLIC BLOOD PRESSURE: 143 MMHG | DIASTOLIC BLOOD PRESSURE: 81 MMHG | RESPIRATION RATE: 18 BRPM | TEMPERATURE: 97.6 F | HEART RATE: 67 BPM

## 2025-07-25 DIAGNOSIS — I83.012 VENOUS STASIS ULCER OF RIGHT CALF WITH FAT LAYER EXPOSED, UNSPECIFIED WHETHER VARICOSE VEINS PRESENT (HCC): Primary | ICD-10-CM

## 2025-07-25 DIAGNOSIS — L97.212 VENOUS STASIS ULCER OF RIGHT CALF WITH FAT LAYER EXPOSED, UNSPECIFIED WHETHER VARICOSE VEINS PRESENT (HCC): Primary | ICD-10-CM

## 2025-07-25 LAB
ALBUMIN SERPL-MCNC: 4.5 G/DL (ref 3.4–5)
ALBUMIN/GLOB SERPL: 1.9 {RATIO} (ref 1.1–2.2)
ALP SERPL-CCNC: 105 U/L (ref 40–129)
ALT SERPL-CCNC: 16 U/L (ref 10–40)
ANION GAP SERPL CALCULATED.3IONS-SCNC: 12 MMOL/L (ref 3–16)
AST SERPL-CCNC: 21 U/L (ref 15–37)
BASOPHILS # BLD: 0 K/UL (ref 0–0.2)
BASOPHILS NFR BLD: 0.7 %
BILIRUB SERPL-MCNC: 1.2 MG/DL (ref 0–1)
BUN SERPL-MCNC: 16 MG/DL (ref 7–20)
CALCIUM SERPL-MCNC: 9.6 MG/DL (ref 8.3–10.6)
CHLORIDE SERPL-SCNC: 102 MMOL/L (ref 99–110)
CO2 SERPL-SCNC: 28 MMOL/L (ref 21–32)
CREAT SERPL-MCNC: 0.8 MG/DL (ref 0.8–1.3)
DEPRECATED RDW RBC AUTO: 14.9 % (ref 12.4–15.4)
EOSINOPHIL # BLD: 0.4 K/UL (ref 0–0.6)
EOSINOPHIL NFR BLD: 7.7 %
EST. AVERAGE GLUCOSE BLD GHB EST-MCNC: 102.5 MG/DL
GFR SERPLBLD CREATININE-BSD FMLA CKD-EPI: 90 ML/MIN/{1.73_M2}
GLUCOSE SERPL-MCNC: 92 MG/DL (ref 70–99)
HBA1C MFR BLD: 5.2 %
HCT VFR BLD AUTO: 43 % (ref 40.5–52.5)
HGB BLD-MCNC: 14.5 G/DL (ref 13.5–17.5)
LYMPHOCYTES # BLD: 1.5 K/UL (ref 1–5.1)
LYMPHOCYTES NFR BLD: 26 %
MCH RBC QN AUTO: 31.5 PG (ref 26–34)
MCHC RBC AUTO-ENTMCNC: 33.8 G/DL (ref 31–36)
MCV RBC AUTO: 93.3 FL (ref 80–100)
MONOCYTES # BLD: 0.5 K/UL (ref 0–1.3)
MONOCYTES NFR BLD: 8.3 %
NEUTROPHILS # BLD: 3.4 K/UL (ref 1.7–7.7)
NEUTROPHILS NFR BLD: 57.3 %
PLATELET # BLD AUTO: 148 K/UL (ref 135–450)
PMV BLD AUTO: 8.9 FL (ref 5–10.5)
POTASSIUM SERPL-SCNC: 4.6 MMOL/L (ref 3.5–5.1)
PROT SERPL-MCNC: 6.9 G/DL (ref 6.4–8.2)
RBC # BLD AUTO: 4.61 M/UL (ref 4.2–5.9)
SODIUM SERPL-SCNC: 142 MMOL/L (ref 136–145)
WBC # BLD AUTO: 5.8 K/UL (ref 4–11)

## 2025-07-25 PROCEDURE — 11042 DBRDMT SUBQ TIS 1ST 20SQCM/<: CPT

## 2025-07-25 PROCEDURE — 11045 DBRDMT SUBQ TISS EACH ADDL: CPT

## 2025-07-25 RX ORDER — SODIUM CHLOR/HYPOCHLOROUS ACID 0.033 %
SOLUTION, IRRIGATION IRRIGATION ONCE
OUTPATIENT
Start: 2025-07-25 | End: 2025-07-25

## 2025-07-25 RX ORDER — GENTAMICIN SULFATE 1 MG/G
OINTMENT TOPICAL ONCE
OUTPATIENT
Start: 2025-07-25 | End: 2025-07-25

## 2025-07-25 RX ORDER — TRIAMCINOLONE ACETONIDE 1 MG/G
OINTMENT TOPICAL ONCE
OUTPATIENT
Start: 2025-07-25 | End: 2025-07-25

## 2025-07-25 RX ORDER — LIDOCAINE HYDROCHLORIDE 40 MG/ML
SOLUTION TOPICAL ONCE
OUTPATIENT
Start: 2025-07-25 | End: 2025-07-25

## 2025-07-25 RX ORDER — LIDOCAINE HYDROCHLORIDE 20 MG/ML
JELLY TOPICAL ONCE
OUTPATIENT
Start: 2025-07-25 | End: 2025-07-25

## 2025-07-25 RX ORDER — GINSENG 100 MG
CAPSULE ORAL ONCE
OUTPATIENT
Start: 2025-07-25 | End: 2025-07-25

## 2025-07-25 RX ORDER — BETAMETHASONE DIPROPIONATE 0.5 MG/G
CREAM TOPICAL ONCE
OUTPATIENT
Start: 2025-07-25 | End: 2025-07-25

## 2025-07-25 RX ORDER — NEOMYCIN/BACITRACIN/POLYMYXINB 3.5-400-5K
OINTMENT (GRAM) TOPICAL ONCE
OUTPATIENT
Start: 2025-07-25 | End: 2025-07-25

## 2025-07-25 RX ORDER — CLOBETASOL PROPIONATE 0.5 MG/G
OINTMENT TOPICAL ONCE
OUTPATIENT
Start: 2025-07-25 | End: 2025-07-25

## 2025-07-25 RX ORDER — SILVER SULFADIAZINE 10 MG/G
CREAM TOPICAL ONCE
OUTPATIENT
Start: 2025-07-25 | End: 2025-07-25

## 2025-07-25 RX ORDER — BACITRACIN ZINC AND POLYMYXIN B SULFATE 500; 1000 [USP'U]/G; [USP'U]/G
OINTMENT TOPICAL ONCE
OUTPATIENT
Start: 2025-07-25 | End: 2025-07-25

## 2025-07-25 RX ORDER — LIDOCAINE 40 MG/G
CREAM TOPICAL ONCE
OUTPATIENT
Start: 2025-07-25 | End: 2025-07-25

## 2025-07-25 RX ORDER — LIDOCAINE 50 MG/G
OINTMENT TOPICAL ONCE
OUTPATIENT
Start: 2025-07-25 | End: 2025-07-25

## 2025-07-25 RX ORDER — MUPIROCIN 2 %
OINTMENT (GRAM) TOPICAL ONCE
OUTPATIENT
Start: 2025-07-25 | End: 2025-07-25

## 2025-07-25 NOTE — PATIENT INSTRUCTIONS
PHYSICIAN ORDERS AND DISCHARGE INSTRUCTIONS    Wound Care Notes:    Grafts Kerecis  Right medial lower leg  Kerecis #1  25  Kerecis #2  25    2nd Round  Kerecis # 1 25  Kerecis #2 2025  Kerecis # 3 25  Kerecis # 4 25  Kerecis #5 25         Orders for this week:  2025    FAX TO [facility]:     Right Lower Leg -- wash with soap and water, pat dry.   Desitin to periwound  Apply Colactive plus Ag  to wound bed  Cover with zetuvit  Wrap with four flex    Change Monday and Wednesday and Friday       Dispense 30 day quantity when ordering supplies.  Plan: Patient refusing wound vac.   Kerecis BI faxed for Right medial lower leg 25 updated request      Follow Up Instructions: 1 weeks   Primary Wound Care Provider: Destin Iniguez CNP   Call  for any questions or concerns.  Central [phone] for imaging and lab work

## 2025-07-25 NOTE — PROGRESS NOTES
Multilayer Compression Wrap   (Not Unna) Below the Knee    NAME:  Fuentes Daley  YOB: 1945  MEDICAL RECORD NUMBER:  [id number]  DATE:  7/25/2025    Multilayer compression wrap: Removed old Multilayer wrap if indicated and wash leg with mild soap/water.  Applied moisturizing agent to dry skin as needed.   Applied primary and secondary dressing as ordered.  Applied multilayered dressing below the knee to right lower leg.  Instructed patient/caregiver not to remove dressing and to keep it clean and dry.   Instructed patient/caregiver on complications to report to provider, such as pain, numbness in toes, heavy drainage, and slippage of dressing.  Instructed patient on purpose of compression dressing and on activity and exercise recommendations.      Electronically signed by Suzanne Galarza RN on 7/25/2025 at 10:34 AM

## 2025-08-01 ENCOUNTER — HOSPITAL ENCOUNTER (OUTPATIENT)
Dept: WOUND CARE | Age: 80
Discharge: HOME | End: 2025-08-01
Attending: NURSE PRACTITIONER
Payer: COMMERCIAL

## 2025-08-01 VITALS
DIASTOLIC BLOOD PRESSURE: 77 MMHG | SYSTOLIC BLOOD PRESSURE: 128 MMHG | HEART RATE: 68 BPM | RESPIRATION RATE: 20 BRPM | TEMPERATURE: 98 F

## 2025-08-01 DIAGNOSIS — L97.212 VENOUS STASIS ULCER OF RIGHT CALF WITH FAT LAYER EXPOSED, UNSPECIFIED WHETHER VARICOSE VEINS PRESENT (HCC): Primary | ICD-10-CM

## 2025-08-01 DIAGNOSIS — I83.012 VENOUS STASIS ULCER OF RIGHT CALF WITH FAT LAYER EXPOSED, UNSPECIFIED WHETHER VARICOSE VEINS PRESENT (HCC): Primary | ICD-10-CM

## 2025-08-01 PROCEDURE — 29581 APPL MULTLAYER CMPRN SYS LEG: CPT

## 2025-08-01 PROCEDURE — 11045 DBRDMT SUBQ TISS EACH ADDL: CPT

## 2025-08-01 PROCEDURE — 11042 DBRDMT SUBQ TIS 1ST 20SQCM/<: CPT

## 2025-08-01 RX ORDER — LIDOCAINE 50 MG/G
OINTMENT TOPICAL ONCE
OUTPATIENT
Start: 2025-08-01 | End: 2025-08-01

## 2025-08-01 RX ORDER — BACITRACIN ZINC AND POLYMYXIN B SULFATE 500; 1000 [USP'U]/G; [USP'U]/G
OINTMENT TOPICAL ONCE
OUTPATIENT
Start: 2025-08-01 | End: 2025-08-01

## 2025-08-01 RX ORDER — LIDOCAINE 40 MG/G
CREAM TOPICAL ONCE
OUTPATIENT
Start: 2025-08-01 | End: 2025-08-01

## 2025-08-01 RX ORDER — SODIUM CHLOR/HYPOCHLOROUS ACID 0.033 %
SOLUTION, IRRIGATION IRRIGATION ONCE
OUTPATIENT
Start: 2025-08-01 | End: 2025-08-01

## 2025-08-01 RX ORDER — TRIAMCINOLONE ACETONIDE 1 MG/G
OINTMENT TOPICAL ONCE
OUTPATIENT
Start: 2025-08-01 | End: 2025-08-01

## 2025-08-01 RX ORDER — LIDOCAINE HYDROCHLORIDE 20 MG/ML
JELLY TOPICAL ONCE
OUTPATIENT
Start: 2025-08-01 | End: 2025-08-01

## 2025-08-01 RX ORDER — GINSENG 100 MG
CAPSULE ORAL ONCE
OUTPATIENT
Start: 2025-08-01 | End: 2025-08-01

## 2025-08-01 RX ORDER — LIDOCAINE HYDROCHLORIDE 40 MG/ML
SOLUTION TOPICAL ONCE
OUTPATIENT
Start: 2025-08-01 | End: 2025-08-01

## 2025-08-01 RX ORDER — SILVER SULFADIAZINE 10 MG/G
CREAM TOPICAL ONCE
OUTPATIENT
Start: 2025-08-01 | End: 2025-08-01

## 2025-08-01 RX ORDER — MUPIROCIN 2 %
OINTMENT (GRAM) TOPICAL ONCE
OUTPATIENT
Start: 2025-08-01 | End: 2025-08-01

## 2025-08-01 RX ORDER — GENTAMICIN SULFATE 1 MG/G
OINTMENT TOPICAL ONCE
OUTPATIENT
Start: 2025-08-01 | End: 2025-08-01

## 2025-08-01 RX ORDER — BETAMETHASONE DIPROPIONATE 0.5 MG/G
CREAM TOPICAL ONCE
OUTPATIENT
Start: 2025-08-01 | End: 2025-08-01

## 2025-08-01 RX ORDER — NEOMYCIN/BACITRACIN/POLYMYXINB 3.5-400-5K
OINTMENT (GRAM) TOPICAL ONCE
OUTPATIENT
Start: 2025-08-01 | End: 2025-08-01

## 2025-08-01 RX ORDER — CLOBETASOL PROPIONATE 0.5 MG/G
OINTMENT TOPICAL ONCE
OUTPATIENT
Start: 2025-08-01 | End: 2025-08-01

## 2025-08-01 NOTE — PROGRESS NOTES
Multilayer Compression Wrap   (Not Unna) Below the Knee    NAME:  Fuentes Daley  YOB: 1945  MEDICAL RECORD NUMBER:  [id number]  DATE:  8/1/2025    Multilayer compression wrap: Removed old Multilayer wrap if indicated and wash leg with mild soap/water.  Applied moisturizing agent to dry skin as needed.   Applied primary and secondary dressing as ordered.  Applied multilayered dressing below the knee to right lower leg.  Instructed patient/caregiver not to remove dressing and to keep it clean and dry.   Instructed patient/caregiver on complications to report to provider, such as pain, numbness in toes, heavy drainage, and slippage of dressing.  Instructed patient on purpose of compression dressing and on activity and exercise recommendations.      Electronically signed by Suzanne Galarza RN on 8/1/2025 at 10:52 AM

## 2025-08-01 NOTE — PATIENT INSTRUCTIONS
PHYSICIAN ORDERS AND DISCHARGE INSTRUCTIONS    Wound Care Notes:    Grafts Kerecis  Right medial lower leg  Kerecis #1  25  Kerecis #2  25    2nd Round  Kerecis # 1 25  Kerecis #2 2025  Kerecis # 3 25  Kerecis # 4 25  Kerecis #5 25         Orders for this week:  2025    FAX TO [facility]:     Right Lower Leg Wounds -- wash with soap and water, pat dry.   Desitin to periwound  Apply Colactive plus Ag  to wound bed  Cover with zetuvit  Wrap with four flex    Change Monday and Wednesday and Friday (in clinic)       Dispense 30 day quantity when ordering supplies.  Plan: Patient refusing wound vac.   Kerecis BI faxed for Right medial lower leg 25 updated request      Follow Up Instructions: 1 weeks   Primary Wound Care Provider: Destin Iniguez CNP   Call  for any questions or concerns.  Central [phone] for imaging and lab work

## 2025-08-08 ENCOUNTER — HOSPITAL ENCOUNTER (OUTPATIENT)
Dept: WOUND CARE | Age: 80
Discharge: HOME | End: 2025-08-08
Attending: NURSE PRACTITIONER
Payer: COMMERCIAL

## 2025-08-08 VITALS
TEMPERATURE: 98 F | DIASTOLIC BLOOD PRESSURE: 84 MMHG | SYSTOLIC BLOOD PRESSURE: 145 MMHG | RESPIRATION RATE: 20 BRPM | HEART RATE: 63 BPM

## 2025-08-08 DIAGNOSIS — I83.012 VENOUS STASIS ULCER OF RIGHT CALF WITH FAT LAYER EXPOSED, UNSPECIFIED WHETHER VARICOSE VEINS PRESENT (HCC): Primary | ICD-10-CM

## 2025-08-08 DIAGNOSIS — L97.212 VENOUS STASIS ULCER OF RIGHT CALF WITH FAT LAYER EXPOSED, UNSPECIFIED WHETHER VARICOSE VEINS PRESENT (HCC): Primary | ICD-10-CM

## 2025-08-08 PROCEDURE — 11042 DBRDMT SUBQ TIS 1ST 20SQCM/<: CPT

## 2025-08-08 PROCEDURE — 29581 APPL MULTLAYER CMPRN SYS LEG: CPT

## 2025-08-08 PROCEDURE — 11045 DBRDMT SUBQ TISS EACH ADDL: CPT

## 2025-08-08 RX ORDER — SODIUM CHLOR/HYPOCHLOROUS ACID 0.033 %
SOLUTION, IRRIGATION IRRIGATION ONCE
OUTPATIENT
Start: 2025-08-08 | End: 2025-08-08

## 2025-08-08 RX ORDER — LIDOCAINE 50 MG/G
OINTMENT TOPICAL ONCE
OUTPATIENT
Start: 2025-08-08 | End: 2025-08-08

## 2025-08-08 RX ORDER — LIDOCAINE HYDROCHLORIDE 40 MG/ML
SOLUTION TOPICAL ONCE
OUTPATIENT
Start: 2025-08-08 | End: 2025-08-08

## 2025-08-08 RX ORDER — TRIAMCINOLONE ACETONIDE 1 MG/G
OINTMENT TOPICAL ONCE
OUTPATIENT
Start: 2025-08-08 | End: 2025-08-08

## 2025-08-08 RX ORDER — LIDOCAINE HYDROCHLORIDE 20 MG/ML
JELLY TOPICAL ONCE
OUTPATIENT
Start: 2025-08-08 | End: 2025-08-08

## 2025-08-08 RX ORDER — BACITRACIN ZINC AND POLYMYXIN B SULFATE 500; 1000 [USP'U]/G; [USP'U]/G
OINTMENT TOPICAL ONCE
OUTPATIENT
Start: 2025-08-08 | End: 2025-08-08

## 2025-08-08 RX ORDER — NEOMYCIN/BACITRACIN/POLYMYXINB 3.5-400-5K
OINTMENT (GRAM) TOPICAL ONCE
OUTPATIENT
Start: 2025-08-08 | End: 2025-08-08

## 2025-08-08 RX ORDER — GENTAMICIN SULFATE 1 MG/G
OINTMENT TOPICAL ONCE
OUTPATIENT
Start: 2025-08-08 | End: 2025-08-08

## 2025-08-08 RX ORDER — CLOBETASOL PROPIONATE 0.5 MG/G
OINTMENT TOPICAL ONCE
OUTPATIENT
Start: 2025-08-08 | End: 2025-08-08

## 2025-08-08 RX ORDER — BETAMETHASONE DIPROPIONATE 0.5 MG/G
CREAM TOPICAL ONCE
OUTPATIENT
Start: 2025-08-08 | End: 2025-08-08

## 2025-08-08 RX ORDER — LIDOCAINE 40 MG/G
CREAM TOPICAL ONCE
OUTPATIENT
Start: 2025-08-08 | End: 2025-08-08

## 2025-08-08 RX ORDER — GINSENG 100 MG
CAPSULE ORAL ONCE
OUTPATIENT
Start: 2025-08-08 | End: 2025-08-08

## 2025-08-08 RX ORDER — SILVER SULFADIAZINE 10 MG/G
CREAM TOPICAL ONCE
OUTPATIENT
Start: 2025-08-08 | End: 2025-08-08

## 2025-08-08 RX ORDER — MUPIROCIN 2 %
OINTMENT (GRAM) TOPICAL ONCE
OUTPATIENT
Start: 2025-08-08 | End: 2025-08-08

## 2025-08-08 ASSESSMENT — PAIN SCALES - GENERAL: PAINLEVEL_OUTOF10: 0

## 2025-08-15 ENCOUNTER — HOSPITAL ENCOUNTER (OUTPATIENT)
Dept: WOUND CARE | Age: 80
Discharge: HOME | End: 2025-08-15
Attending: NURSE PRACTITIONER
Payer: COMMERCIAL

## 2025-08-15 VITALS
DIASTOLIC BLOOD PRESSURE: 96 MMHG | HEART RATE: 67 BPM | SYSTOLIC BLOOD PRESSURE: 124 MMHG | TEMPERATURE: 98.3 F | RESPIRATION RATE: 20 BRPM

## 2025-08-15 DIAGNOSIS — L97.212 VENOUS STASIS ULCER OF RIGHT CALF WITH FAT LAYER EXPOSED, UNSPECIFIED WHETHER VARICOSE VEINS PRESENT (HCC): Primary | ICD-10-CM

## 2025-08-15 DIAGNOSIS — I83.012 VENOUS STASIS ULCER OF RIGHT CALF WITH FAT LAYER EXPOSED, UNSPECIFIED WHETHER VARICOSE VEINS PRESENT (HCC): Primary | ICD-10-CM

## 2025-08-15 PROCEDURE — 29581 APPL MULTLAYER CMPRN SYS LEG: CPT

## 2025-08-15 PROCEDURE — 11042 DBRDMT SUBQ TIS 1ST 20SQCM/<: CPT

## 2025-08-15 RX ORDER — LIDOCAINE HYDROCHLORIDE 20 MG/ML
JELLY TOPICAL ONCE
OUTPATIENT
Start: 2025-08-15 | End: 2025-08-15

## 2025-08-15 RX ORDER — LIDOCAINE 50 MG/G
OINTMENT TOPICAL ONCE
OUTPATIENT
Start: 2025-08-15 | End: 2025-08-15

## 2025-08-15 RX ORDER — LIDOCAINE 40 MG/G
CREAM TOPICAL ONCE
OUTPATIENT
Start: 2025-08-15 | End: 2025-08-15

## 2025-08-15 RX ORDER — TRIAMCINOLONE ACETONIDE 1 MG/G
OINTMENT TOPICAL ONCE
OUTPATIENT
Start: 2025-08-15 | End: 2025-08-15

## 2025-08-15 RX ORDER — NEOMYCIN/BACITRACIN/POLYMYXINB 3.5-400-5K
OINTMENT (GRAM) TOPICAL ONCE
OUTPATIENT
Start: 2025-08-15 | End: 2025-08-15

## 2025-08-15 RX ORDER — SILVER SULFADIAZINE 10 MG/G
CREAM TOPICAL ONCE
OUTPATIENT
Start: 2025-08-15 | End: 2025-08-15

## 2025-08-15 RX ORDER — MUPIROCIN 2 %
OINTMENT (GRAM) TOPICAL ONCE
OUTPATIENT
Start: 2025-08-15 | End: 2025-08-15

## 2025-08-15 RX ORDER — SODIUM CHLOR/HYPOCHLOROUS ACID 0.033 %
SOLUTION, IRRIGATION IRRIGATION ONCE
OUTPATIENT
Start: 2025-08-15 | End: 2025-08-15

## 2025-08-15 RX ORDER — LIDOCAINE HYDROCHLORIDE 40 MG/ML
SOLUTION TOPICAL ONCE
OUTPATIENT
Start: 2025-08-15 | End: 2025-08-15

## 2025-08-15 RX ORDER — GENTAMICIN SULFATE 1 MG/G
OINTMENT TOPICAL ONCE
OUTPATIENT
Start: 2025-08-15 | End: 2025-08-15

## 2025-08-15 RX ORDER — BACITRACIN ZINC AND POLYMYXIN B SULFATE 500; 1000 [USP'U]/G; [USP'U]/G
OINTMENT TOPICAL ONCE
OUTPATIENT
Start: 2025-08-15 | End: 2025-08-15

## 2025-08-15 RX ORDER — GINSENG 100 MG
CAPSULE ORAL ONCE
OUTPATIENT
Start: 2025-08-15 | End: 2025-08-15

## 2025-08-15 RX ORDER — CLOBETASOL PROPIONATE 0.5 MG/G
OINTMENT TOPICAL ONCE
OUTPATIENT
Start: 2025-08-15 | End: 2025-08-15

## 2025-08-15 RX ORDER — BETAMETHASONE DIPROPIONATE 0.5 MG/G
CREAM TOPICAL ONCE
OUTPATIENT
Start: 2025-08-15 | End: 2025-08-15

## 2025-08-15 ASSESSMENT — PAIN SCALES - GENERAL: PAINLEVEL_OUTOF10: 0

## 2025-08-22 ENCOUNTER — HOSPITAL ENCOUNTER (OUTPATIENT)
Dept: WOUND CARE | Age: 80
Discharge: HOME | End: 2025-08-22
Attending: NURSE PRACTITIONER
Payer: COMMERCIAL

## 2025-08-22 VITALS
HEART RATE: 62 BPM | TEMPERATURE: 98 F | SYSTOLIC BLOOD PRESSURE: 146 MMHG | RESPIRATION RATE: 20 BRPM | DIASTOLIC BLOOD PRESSURE: 80 MMHG

## 2025-08-22 DIAGNOSIS — I83.012 VENOUS STASIS ULCER OF RIGHT CALF WITH FAT LAYER EXPOSED, UNSPECIFIED WHETHER VARICOSE VEINS PRESENT (HCC): Primary | ICD-10-CM

## 2025-08-22 DIAGNOSIS — L97.212 VENOUS STASIS ULCER OF RIGHT CALF WITH FAT LAYER EXPOSED, UNSPECIFIED WHETHER VARICOSE VEINS PRESENT (HCC): Primary | ICD-10-CM

## 2025-08-22 PROCEDURE — 29581 APPL MULTLAYER CMPRN SYS LEG: CPT

## 2025-08-22 PROCEDURE — 11042 DBRDMT SUBQ TIS 1ST 20SQCM/<: CPT

## 2025-08-22 RX ORDER — TRIAMCINOLONE ACETONIDE 1 MG/G
OINTMENT TOPICAL ONCE
OUTPATIENT
Start: 2025-08-22 | End: 2025-08-22

## 2025-08-22 RX ORDER — SILVER SULFADIAZINE 10 MG/G
CREAM TOPICAL ONCE
OUTPATIENT
Start: 2025-08-22 | End: 2025-08-22

## 2025-08-22 RX ORDER — LIDOCAINE 40 MG/G
CREAM TOPICAL ONCE
OUTPATIENT
Start: 2025-08-22 | End: 2025-08-22

## 2025-08-22 RX ORDER — LIDOCAINE HYDROCHLORIDE 20 MG/ML
JELLY TOPICAL ONCE
OUTPATIENT
Start: 2025-08-22 | End: 2025-08-22

## 2025-08-22 RX ORDER — LIDOCAINE HYDROCHLORIDE 40 MG/ML
SOLUTION TOPICAL ONCE
OUTPATIENT
Start: 2025-08-22 | End: 2025-08-22

## 2025-08-22 RX ORDER — LIDOCAINE 50 MG/G
OINTMENT TOPICAL ONCE
OUTPATIENT
Start: 2025-08-22 | End: 2025-08-22

## 2025-08-22 RX ORDER — SODIUM CHLOR/HYPOCHLOROUS ACID 0.033 %
SOLUTION, IRRIGATION IRRIGATION ONCE
OUTPATIENT
Start: 2025-08-22 | End: 2025-08-22

## 2025-08-22 RX ORDER — GINSENG 100 MG
CAPSULE ORAL ONCE
OUTPATIENT
Start: 2025-08-22 | End: 2025-08-22

## 2025-08-22 RX ORDER — BETAMETHASONE DIPROPIONATE 0.5 MG/G
CREAM TOPICAL ONCE
OUTPATIENT
Start: 2025-08-22 | End: 2025-08-22

## 2025-08-22 RX ORDER — NEOMYCIN/BACITRACIN/POLYMYXINB 3.5-400-5K
OINTMENT (GRAM) TOPICAL ONCE
OUTPATIENT
Start: 2025-08-22 | End: 2025-08-22

## 2025-08-22 RX ORDER — GENTAMICIN SULFATE 1 MG/G
OINTMENT TOPICAL ONCE
OUTPATIENT
Start: 2025-08-22 | End: 2025-08-22

## 2025-08-22 RX ORDER — BACITRACIN ZINC AND POLYMYXIN B SULFATE 500; 1000 [USP'U]/G; [USP'U]/G
OINTMENT TOPICAL ONCE
OUTPATIENT
Start: 2025-08-22 | End: 2025-08-22

## 2025-08-22 RX ORDER — CLOBETASOL PROPIONATE 0.5 MG/G
OINTMENT TOPICAL ONCE
OUTPATIENT
Start: 2025-08-22 | End: 2025-08-22

## 2025-08-22 RX ORDER — MUPIROCIN 2 %
OINTMENT (GRAM) TOPICAL ONCE
OUTPATIENT
Start: 2025-08-22 | End: 2025-08-22

## 2025-08-29 ENCOUNTER — HOSPITAL ENCOUNTER (OUTPATIENT)
Dept: WOUND CARE | Age: 80
Discharge: HOME | End: 2025-08-29
Attending: NURSE PRACTITIONER
Payer: COMMERCIAL

## 2025-08-29 VITALS
SYSTOLIC BLOOD PRESSURE: 151 MMHG | DIASTOLIC BLOOD PRESSURE: 80 MMHG | RESPIRATION RATE: 18 BRPM | HEART RATE: 59 BPM | TEMPERATURE: 97.3 F

## 2025-08-29 DIAGNOSIS — I83.012 VENOUS STASIS ULCER OF RIGHT CALF WITH FAT LAYER EXPOSED, UNSPECIFIED WHETHER VARICOSE VEINS PRESENT (HCC): Primary | ICD-10-CM

## 2025-08-29 DIAGNOSIS — L97.212 VENOUS STASIS ULCER OF RIGHT CALF WITH FAT LAYER EXPOSED, UNSPECIFIED WHETHER VARICOSE VEINS PRESENT (HCC): Primary | ICD-10-CM

## 2025-08-29 PROCEDURE — 29581 APPL MULTLAYER CMPRN SYS LEG: CPT

## 2025-08-29 PROCEDURE — 11042 DBRDMT SUBQ TIS 1ST 20SQCM/<: CPT

## 2025-08-29 RX ORDER — GENTAMICIN SULFATE 1 MG/G
OINTMENT TOPICAL ONCE
OUTPATIENT
Start: 2025-08-29 | End: 2025-08-29

## 2025-08-29 RX ORDER — SILVER SULFADIAZINE 10 MG/G
CREAM TOPICAL ONCE
OUTPATIENT
Start: 2025-08-29 | End: 2025-08-29

## 2025-08-29 RX ORDER — BACITRACIN ZINC AND POLYMYXIN B SULFATE 500; 1000 [USP'U]/G; [USP'U]/G
OINTMENT TOPICAL ONCE
OUTPATIENT
Start: 2025-08-29 | End: 2025-08-29

## 2025-08-29 RX ORDER — LIDOCAINE HYDROCHLORIDE 20 MG/ML
JELLY TOPICAL ONCE
OUTPATIENT
Start: 2025-08-29 | End: 2025-08-29

## 2025-08-29 RX ORDER — LIDOCAINE 50 MG/G
OINTMENT TOPICAL ONCE
OUTPATIENT
Start: 2025-08-29 | End: 2025-08-29

## 2025-08-29 RX ORDER — MUPIROCIN 2 %
OINTMENT (GRAM) TOPICAL ONCE
OUTPATIENT
Start: 2025-08-29 | End: 2025-08-29

## 2025-08-29 RX ORDER — CLOBETASOL PROPIONATE 0.5 MG/G
OINTMENT TOPICAL ONCE
OUTPATIENT
Start: 2025-08-29 | End: 2025-08-29

## 2025-08-29 RX ORDER — LIDOCAINE HYDROCHLORIDE 40 MG/ML
SOLUTION TOPICAL ONCE
OUTPATIENT
Start: 2025-08-29 | End: 2025-08-29

## 2025-08-29 RX ORDER — SODIUM CHLOR/HYPOCHLOROUS ACID 0.033 %
SOLUTION, IRRIGATION IRRIGATION ONCE
OUTPATIENT
Start: 2025-08-29 | End: 2025-08-29

## 2025-08-29 RX ORDER — BETAMETHASONE DIPROPIONATE 0.5 MG/G
CREAM TOPICAL ONCE
OUTPATIENT
Start: 2025-08-29 | End: 2025-08-29

## 2025-08-29 RX ORDER — LIDOCAINE 40 MG/G
CREAM TOPICAL ONCE
OUTPATIENT
Start: 2025-08-29 | End: 2025-08-29

## 2025-08-29 RX ORDER — GINSENG 100 MG
CAPSULE ORAL ONCE
OUTPATIENT
Start: 2025-08-29 | End: 2025-08-29

## 2025-08-29 RX ORDER — NEOMYCIN/BACITRACIN/POLYMYXINB 3.5-400-5K
OINTMENT (GRAM) TOPICAL ONCE
OUTPATIENT
Start: 2025-08-29 | End: 2025-08-29

## 2025-08-29 RX ORDER — TRIAMCINOLONE ACETONIDE 1 MG/G
OINTMENT TOPICAL ONCE
OUTPATIENT
Start: 2025-08-29 | End: 2025-08-29

## 2025-09-05 ENCOUNTER — HOSPITAL ENCOUNTER (OUTPATIENT)
Dept: WOUND CARE | Age: 80
Discharge: HOME | End: 2025-09-05
Attending: NURSE PRACTITIONER
Payer: COMMERCIAL

## 2025-09-05 VITALS
RESPIRATION RATE: 20 BRPM | SYSTOLIC BLOOD PRESSURE: 144 MMHG | TEMPERATURE: 97.8 F | HEART RATE: 57 BPM | DIASTOLIC BLOOD PRESSURE: 69 MMHG

## 2025-09-05 DIAGNOSIS — I83.012 VENOUS STASIS ULCER OF RIGHT CALF WITH FAT LAYER EXPOSED, UNSPECIFIED WHETHER VARICOSE VEINS PRESENT (HCC): Primary | ICD-10-CM

## 2025-09-05 DIAGNOSIS — S80.821A BLISTER OF RIGHT LOWER LEG, INITIAL ENCOUNTER: ICD-10-CM

## 2025-09-05 DIAGNOSIS — L97.212 VENOUS STASIS ULCER OF RIGHT CALF WITH FAT LAYER EXPOSED, UNSPECIFIED WHETHER VARICOSE VEINS PRESENT (HCC): Primary | ICD-10-CM

## 2025-09-05 PROCEDURE — 11042 DBRDMT SUBQ TIS 1ST 20SQCM/<: CPT

## 2025-09-05 PROCEDURE — 29581 APPL MULTLAYER CMPRN SYS LEG: CPT

## 2025-09-05 RX ORDER — BACITRACIN ZINC AND POLYMYXIN B SULFATE 500; 1000 [USP'U]/G; [USP'U]/G
OINTMENT TOPICAL ONCE
OUTPATIENT
Start: 2025-09-05 | End: 2025-09-05

## 2025-09-05 RX ORDER — LIDOCAINE HYDROCHLORIDE 40 MG/ML
SOLUTION TOPICAL ONCE
OUTPATIENT
Start: 2025-09-05 | End: 2025-09-05

## 2025-09-05 RX ORDER — GENTAMICIN SULFATE 1 MG/G
OINTMENT TOPICAL ONCE
OUTPATIENT
Start: 2025-09-05 | End: 2025-09-05

## 2025-09-05 RX ORDER — LIDOCAINE 40 MG/G
CREAM TOPICAL ONCE
OUTPATIENT
Start: 2025-09-05 | End: 2025-09-05

## 2025-09-05 RX ORDER — TRIAMCINOLONE ACETONIDE 1 MG/G
OINTMENT TOPICAL ONCE
OUTPATIENT
Start: 2025-09-05 | End: 2025-09-05

## 2025-09-05 RX ORDER — BETAMETHASONE DIPROPIONATE 0.5 MG/G
CREAM TOPICAL ONCE
OUTPATIENT
Start: 2025-09-05 | End: 2025-09-05

## 2025-09-05 RX ORDER — NEOMYCIN/BACITRACIN/POLYMYXINB 3.5-400-5K
OINTMENT (GRAM) TOPICAL ONCE
OUTPATIENT
Start: 2025-09-05 | End: 2025-09-05

## 2025-09-05 RX ORDER — GINSENG 100 MG
CAPSULE ORAL ONCE
OUTPATIENT
Start: 2025-09-05 | End: 2025-09-05

## 2025-09-05 RX ORDER — CLOBETASOL PROPIONATE 0.5 MG/G
OINTMENT TOPICAL ONCE
OUTPATIENT
Start: 2025-09-05 | End: 2025-09-05

## 2025-09-05 RX ORDER — MUPIROCIN 2 %
OINTMENT (GRAM) TOPICAL ONCE
OUTPATIENT
Start: 2025-09-05 | End: 2025-09-05

## 2025-09-05 RX ORDER — SODIUM CHLOR/HYPOCHLOROUS ACID 0.033 %
SOLUTION, IRRIGATION IRRIGATION ONCE
OUTPATIENT
Start: 2025-09-05 | End: 2025-09-05

## 2025-09-05 RX ORDER — LIDOCAINE 50 MG/G
OINTMENT TOPICAL ONCE
OUTPATIENT
Start: 2025-09-05 | End: 2025-09-05

## 2025-09-05 RX ORDER — LIDOCAINE HYDROCHLORIDE 20 MG/ML
JELLY TOPICAL ONCE
OUTPATIENT
Start: 2025-09-05 | End: 2025-09-05

## 2025-09-05 RX ORDER — SILVER SULFADIAZINE 10 MG/G
CREAM TOPICAL ONCE
OUTPATIENT
Start: 2025-09-05 | End: 2025-09-05